# Patient Record
Sex: FEMALE | Race: WHITE | NOT HISPANIC OR LATINO | Employment: OTHER
[De-identification: names, ages, dates, MRNs, and addresses within clinical notes are randomized per-mention and may not be internally consistent; named-entity substitution may affect disease eponyms.]

---

## 2017-01-05 ENCOUNTER — RECORDS - HEALTHEAST (OUTPATIENT)
Dept: ADMINISTRATIVE | Facility: OTHER | Age: 72
End: 2017-01-05

## 2017-02-07 ENCOUNTER — RECORDS - HEALTHEAST (OUTPATIENT)
Dept: ADMINISTRATIVE | Facility: OTHER | Age: 72
End: 2017-02-07

## 2017-04-13 ENCOUNTER — RECORDS - HEALTHEAST (OUTPATIENT)
Dept: ADMINISTRATIVE | Facility: OTHER | Age: 72
End: 2017-04-13

## 2017-05-15 ENCOUNTER — COMMUNICATION - HEALTHEAST (OUTPATIENT)
Dept: PHYSICAL MEDICINE AND REHAB | Facility: CLINIC | Age: 72
End: 2017-05-15

## 2017-05-15 DIAGNOSIS — M25.512 SHOULDER PAIN, BILATERAL: ICD-10-CM

## 2017-05-15 DIAGNOSIS — M25.511 SHOULDER PAIN, BILATERAL: ICD-10-CM

## 2017-05-22 ENCOUNTER — HOSPITAL ENCOUNTER (OUTPATIENT)
Dept: PHYSICAL MEDICINE AND REHAB | Facility: CLINIC | Age: 72
Discharge: HOME | End: 2017-05-22
Attending: PHYSICAL MEDICINE & REHABILITATION

## 2017-05-22 DIAGNOSIS — G47.9 SLEEP DIFFICULTIES: ICD-10-CM

## 2017-05-22 DIAGNOSIS — M48.061 FORAMINAL STENOSIS OF LUMBAR REGION: ICD-10-CM

## 2017-05-22 DIAGNOSIS — M54.50 LOW BACK PAIN RADIATING TO LEFT LEG: ICD-10-CM

## 2017-05-22 DIAGNOSIS — R20.0 NUMBNESS AND TINGLING OF LEFT LEG: ICD-10-CM

## 2017-05-22 DIAGNOSIS — M79.605 LOW BACK PAIN RADIATING TO LEFT LEG: ICD-10-CM

## 2017-05-22 DIAGNOSIS — M25.511 SHOULDER PAIN, BILATERAL: ICD-10-CM

## 2017-05-22 DIAGNOSIS — M19.019 GLENOHUMERAL ARTHRITIS, UNSPECIFIED LATERALITY: ICD-10-CM

## 2017-05-22 DIAGNOSIS — M79.18 MYOFASCIAL MUSCLE PAIN: ICD-10-CM

## 2017-05-22 DIAGNOSIS — M25.511 CHRONIC PAIN OF BOTH SHOULDERS: ICD-10-CM

## 2017-05-22 DIAGNOSIS — G89.29 CHRONIC PAIN OF BOTH SHOULDERS: ICD-10-CM

## 2017-05-22 DIAGNOSIS — M12.819 ROTATOR CUFF ARTHROPATHY: ICD-10-CM

## 2017-05-22 DIAGNOSIS — M25.512 CHRONIC PAIN OF BOTH SHOULDERS: ICD-10-CM

## 2017-05-22 DIAGNOSIS — R20.2 NUMBNESS AND TINGLING OF LEFT LEG: ICD-10-CM

## 2017-05-22 DIAGNOSIS — M25.512 SHOULDER PAIN, BILATERAL: ICD-10-CM

## 2017-05-22 DIAGNOSIS — M12.819 ROTATOR CUFF ARTHROPATHY, UNSPECIFIED LATERALITY: ICD-10-CM

## 2017-05-23 ENCOUNTER — OFFICE VISIT - HEALTHEAST (OUTPATIENT)
Dept: PHYSICAL THERAPY | Facility: REHABILITATION | Age: 72
End: 2017-05-23

## 2017-05-23 DIAGNOSIS — M54.50 CHRONIC LEFT-SIDED LOW BACK PAIN WITHOUT SCIATICA: ICD-10-CM

## 2017-05-23 DIAGNOSIS — G89.29 CHRONIC LEFT-SIDED LOW BACK PAIN WITHOUT SCIATICA: ICD-10-CM

## 2017-05-23 DIAGNOSIS — M62.81 MUSCLE WEAKNESS (GENERALIZED): ICD-10-CM

## 2017-05-23 DIAGNOSIS — M54.16 LUMBAR RADICULOPATHY: ICD-10-CM

## 2017-05-31 ENCOUNTER — OFFICE VISIT - HEALTHEAST (OUTPATIENT)
Dept: PHYSICAL THERAPY | Facility: REHABILITATION | Age: 72
End: 2017-05-31

## 2017-05-31 DIAGNOSIS — G89.29 CHRONIC LEFT-SIDED LOW BACK PAIN WITHOUT SCIATICA: ICD-10-CM

## 2017-05-31 DIAGNOSIS — M62.81 MUSCLE WEAKNESS (GENERALIZED): ICD-10-CM

## 2017-05-31 DIAGNOSIS — M54.50 CHRONIC LEFT-SIDED LOW BACK PAIN WITHOUT SCIATICA: ICD-10-CM

## 2017-05-31 DIAGNOSIS — M54.16 LUMBAR RADICULOPATHY: ICD-10-CM

## 2017-06-15 ENCOUNTER — RECORDS - HEALTHEAST (OUTPATIENT)
Dept: ADMINISTRATIVE | Facility: OTHER | Age: 72
End: 2017-06-15

## 2017-07-18 ENCOUNTER — COMMUNICATION - HEALTHEAST (OUTPATIENT)
Dept: PHYSICAL THERAPY | Facility: REHABILITATION | Age: 72
End: 2017-07-18

## 2017-11-30 ENCOUNTER — COMMUNICATION - HEALTHEAST (OUTPATIENT)
Dept: FAMILY MEDICINE | Facility: CLINIC | Age: 72
End: 2017-11-30

## 2017-11-30 DIAGNOSIS — D64.9 ANEMIA: ICD-10-CM

## 2017-12-06 ENCOUNTER — OFFICE VISIT - HEALTHEAST (OUTPATIENT)
Dept: FAMILY MEDICINE | Facility: CLINIC | Age: 72
End: 2017-12-06

## 2017-12-06 DIAGNOSIS — M81.0 OSTEOPOROSIS: ICD-10-CM

## 2017-12-06 DIAGNOSIS — D64.9 ANEMIA: ICD-10-CM

## 2017-12-06 DIAGNOSIS — M06.9 RHEUMATOID ARTHRITIS (H): ICD-10-CM

## 2017-12-06 ASSESSMENT — MIFFLIN-ST. JEOR: SCORE: 1216.17

## 2017-12-07 ENCOUNTER — AMBULATORY - HEALTHEAST (OUTPATIENT)
Dept: SCHEDULING | Facility: CLINIC | Age: 72
End: 2017-12-07

## 2017-12-07 DIAGNOSIS — M81.0 OSTEOPOROSIS: ICD-10-CM

## 2017-12-07 RX ORDER — ZOLPIDEM TARTRATE 10 MG/1
5 TABLET ORAL
Qty: 30 TABLET | Refills: 1 | Status: SHIPPED | OUTPATIENT
Start: 2017-12-07 | End: 2021-09-13

## 2017-12-08 ENCOUNTER — COMMUNICATION - HEALTHEAST (OUTPATIENT)
Dept: PHYSICAL MEDICINE AND REHAB | Facility: CLINIC | Age: 72
End: 2017-12-08

## 2017-12-08 DIAGNOSIS — M19.011 OSTEOARTHRITIS OF BILATERAL GLENOHUMERAL JOINTS: ICD-10-CM

## 2017-12-08 DIAGNOSIS — M25.512 BILATERAL SHOULDER PAIN: ICD-10-CM

## 2017-12-08 DIAGNOSIS — M25.511 BILATERAL SHOULDER PAIN: ICD-10-CM

## 2017-12-08 DIAGNOSIS — M19.012 OSTEOARTHRITIS OF BILATERAL GLENOHUMERAL JOINTS: ICD-10-CM

## 2017-12-19 ENCOUNTER — AMBULATORY - HEALTHEAST (OUTPATIENT)
Dept: PHYSICAL MEDICINE AND REHAB | Facility: CLINIC | Age: 72
End: 2017-12-19

## 2017-12-19 DIAGNOSIS — M25.512 SHOULDER PAIN, BILATERAL: ICD-10-CM

## 2017-12-19 DIAGNOSIS — M25.511 SHOULDER PAIN, BILATERAL: ICD-10-CM

## 2017-12-22 ENCOUNTER — HOSPITAL ENCOUNTER (OUTPATIENT)
Dept: PHYSICAL MEDICINE AND REHAB | Facility: CLINIC | Age: 72
Discharge: HOME | End: 2017-12-22
Attending: PHYSICAL MEDICINE & REHABILITATION

## 2017-12-22 DIAGNOSIS — M19.012 OSTEOARTHRITIS OF BILATERAL GLENOHUMERAL JOINTS: ICD-10-CM

## 2017-12-22 DIAGNOSIS — M25.512 BILATERAL SHOULDER PAIN: ICD-10-CM

## 2017-12-22 DIAGNOSIS — M25.511 SHOULDER PAIN, BILATERAL: ICD-10-CM

## 2017-12-22 DIAGNOSIS — M25.512 SHOULDER PAIN, BILATERAL: ICD-10-CM

## 2017-12-22 DIAGNOSIS — G47.9 SLEEP DIFFICULTIES: ICD-10-CM

## 2017-12-22 DIAGNOSIS — M48.061 FORAMINAL STENOSIS OF LUMBAR REGION: ICD-10-CM

## 2017-12-22 DIAGNOSIS — M25.511 BILATERAL SHOULDER PAIN: ICD-10-CM

## 2017-12-22 DIAGNOSIS — M79.18 MYOFASCIAL MUSCLE PAIN: ICD-10-CM

## 2017-12-22 DIAGNOSIS — M19.011 OSTEOARTHRITIS OF BILATERAL GLENOHUMERAL JOINTS: ICD-10-CM

## 2018-01-03 ENCOUNTER — COMMUNICATION - HEALTHEAST (OUTPATIENT)
Dept: PHYSICAL MEDICINE AND REHAB | Facility: CLINIC | Age: 73
End: 2018-01-03

## 2018-01-04 ENCOUNTER — HOSPITAL ENCOUNTER (OUTPATIENT)
Dept: PHYSICAL MEDICINE AND REHAB | Facility: CLINIC | Age: 73
Discharge: HOME | End: 2018-01-04
Attending: PHYSICAL MEDICINE & REHABILITATION

## 2018-01-04 DIAGNOSIS — M48.061 FORAMINAL STENOSIS OF LUMBAR REGION: ICD-10-CM

## 2018-01-04 DIAGNOSIS — M25.512 BILATERAL SHOULDER PAIN: ICD-10-CM

## 2018-01-04 DIAGNOSIS — M19.011 OSTEOARTHRITIS OF BILATERAL GLENOHUMERAL JOINTS: ICD-10-CM

## 2018-01-04 DIAGNOSIS — M54.16 LUMBAR RADICULAR PAIN: ICD-10-CM

## 2018-01-04 DIAGNOSIS — M19.012 OSTEOARTHRITIS OF BILATERAL GLENOHUMERAL JOINTS: ICD-10-CM

## 2018-01-04 DIAGNOSIS — M48.061 STENOSIS OF LATERAL RECESS OF LUMBAR SPINE: ICD-10-CM

## 2018-01-04 DIAGNOSIS — M06.9 RHEUMATOID ARTHRITIS (H): ICD-10-CM

## 2018-01-04 DIAGNOSIS — M54.42 LOW BACK PAIN WITH LEFT-SIDED SCIATICA: ICD-10-CM

## 2018-01-04 DIAGNOSIS — M25.511 BILATERAL SHOULDER PAIN: ICD-10-CM

## 2018-01-04 DIAGNOSIS — M54.50 LOW BACK PAIN: ICD-10-CM

## 2018-01-04 ASSESSMENT — MIFFLIN-ST. JEOR: SCORE: 1213.9

## 2018-01-08 ENCOUNTER — AMBULATORY - HEALTHEAST (OUTPATIENT)
Dept: FAMILY MEDICINE | Facility: CLINIC | Age: 73
End: 2018-01-08

## 2018-01-08 DIAGNOSIS — M81.0 OSTEOPOROSIS: ICD-10-CM

## 2018-04-20 ENCOUNTER — OFFICE VISIT - HEALTHEAST (OUTPATIENT)
Dept: ENDOCRINOLOGY | Facility: CLINIC | Age: 73
End: 2018-04-20

## 2018-04-20 DIAGNOSIS — M81.0 OSTEOPOROSIS: ICD-10-CM

## 2018-04-20 ASSESSMENT — MIFFLIN-ST. JEOR: SCORE: 1204.83

## 2018-06-01 ENCOUNTER — COMMUNICATION - HEALTHEAST (OUTPATIENT)
Dept: PHYSICAL MEDICINE AND REHAB | Facility: CLINIC | Age: 73
End: 2018-06-01

## 2018-06-01 DIAGNOSIS — M25.512 CHRONIC PAIN OF BOTH SHOULDERS: ICD-10-CM

## 2018-06-01 DIAGNOSIS — M25.511 CHRONIC PAIN OF BOTH SHOULDERS: ICD-10-CM

## 2018-06-01 DIAGNOSIS — G89.29 CHRONIC PAIN OF BOTH SHOULDERS: ICD-10-CM

## 2018-06-11 ENCOUNTER — HOSPITAL ENCOUNTER (OUTPATIENT)
Dept: PHYSICAL MEDICINE AND REHAB | Facility: CLINIC | Age: 73
Discharge: HOME | End: 2018-06-11
Attending: PHYSICAL MEDICINE & REHABILITATION

## 2018-06-11 DIAGNOSIS — M25.512 CHRONIC PAIN OF BOTH SHOULDERS: ICD-10-CM

## 2018-06-11 DIAGNOSIS — M12.819 ROTATOR CUFF ARTHROPATHY, UNSPECIFIED LATERALITY: ICD-10-CM

## 2018-06-11 DIAGNOSIS — M79.18 MYOFASCIAL MUSCLE PAIN: ICD-10-CM

## 2018-06-11 DIAGNOSIS — M25.511 CHRONIC PAIN OF BOTH SHOULDERS: ICD-10-CM

## 2018-06-11 DIAGNOSIS — M54.50 LUMBAR SPINE PAIN: ICD-10-CM

## 2018-06-11 DIAGNOSIS — G89.29 CHRONIC PAIN OF BOTH SHOULDERS: ICD-10-CM

## 2018-06-11 ASSESSMENT — MIFFLIN-ST. JEOR: SCORE: 1204.83

## 2018-06-13 ENCOUNTER — OFFICE VISIT - HEALTHEAST (OUTPATIENT)
Dept: RHEUMATOLOGY | Facility: CLINIC | Age: 73
End: 2018-06-13

## 2018-06-13 DIAGNOSIS — M25.50 POLYARTHRALGIA: ICD-10-CM

## 2018-06-13 DIAGNOSIS — M67.911 BILATERAL SHOULDER TENDINOPATHY: ICD-10-CM

## 2018-06-13 DIAGNOSIS — M67.912 BILATERAL SHOULDER TENDINOPATHY: ICD-10-CM

## 2018-06-13 DIAGNOSIS — D53.9 MACROCYTIC ANEMIA: ICD-10-CM

## 2018-06-13 DIAGNOSIS — M15.0 PRIMARY OSTEOARTHRITIS INVOLVING MULTIPLE JOINTS: ICD-10-CM

## 2018-06-13 DIAGNOSIS — M06.09 SERONEGATIVE RHEUMATOID ARTHRITIS OF MULTIPLE SITES (H): ICD-10-CM

## 2018-06-13 LAB
ALBUMIN SERPL-MCNC: 4.5 G/DL (ref 3.5–5)
ALT SERPL W P-5'-P-CCNC: 11 U/L (ref 0–45)
BASOPHILS # BLD AUTO: 0 THOU/UL (ref 0–0.2)
BASOPHILS NFR BLD AUTO: 0 % (ref 0–2)
CREAT SERPL-MCNC: 0.73 MG/DL (ref 0.6–1.1)
EOSINOPHIL # BLD AUTO: 0.1 THOU/UL (ref 0–0.4)
EOSINOPHIL NFR BLD AUTO: 2 % (ref 0–6)
ERYTHROCYTE [DISTWIDTH] IN BLOOD BY AUTOMATED COUNT: 11.7 % (ref 11–14.5)
GFR SERPL CREATININE-BSD FRML MDRD: >60 ML/MIN/1.73M2
HCT VFR BLD AUTO: 27.8 % (ref 35–47)
HGB BLD-MCNC: 9.4 G/DL (ref 12–16)
LYMPHOCYTES # BLD AUTO: 1.6 THOU/UL (ref 0.8–4.4)
LYMPHOCYTES NFR BLD AUTO: 23 % (ref 20–40)
MCH RBC QN AUTO: 35.9 PG (ref 27–34)
MCHC RBC AUTO-ENTMCNC: 33.9 G/DL (ref 32–36)
MCV RBC AUTO: 106 FL (ref 80–100)
MONOCYTES # BLD AUTO: 0.3 THOU/UL (ref 0–0.9)
MONOCYTES NFR BLD AUTO: 5 % (ref 2–10)
NEUTROPHILS # BLD AUTO: 5 THOU/UL (ref 2–7.7)
NEUTROPHILS NFR BLD AUTO: 71 % (ref 50–70)
PLATELET # BLD AUTO: 525 THOU/UL (ref 140–440)
PMV BLD AUTO: 7.8 FL (ref 7–10)
RBC # BLD AUTO: 2.62 MILL/UL (ref 3.8–5.4)
RHEUMATOID FACT SERPL-ACNC: <15 IU/ML (ref 0–30)
URATE SERPL-MCNC: 6.9 MG/DL (ref 2–7.5)
WBC: 7.1 THOU/UL (ref 4–11)

## 2018-06-14 ENCOUNTER — COMMUNICATION - HEALTHEAST (OUTPATIENT)
Dept: ENDOCRINOLOGY | Facility: CLINIC | Age: 73
End: 2018-06-14

## 2018-06-14 LAB
ANA SER QL: 0.1 U
CCP AB SER IA-ACNC: <0.5 U/ML
HBV SURFACE AG SERPL QL IA: NEGATIVE
HCV AB SERPL QL IA: NEGATIVE

## 2018-07-05 ENCOUNTER — COMMUNICATION - HEALTHEAST (OUTPATIENT)
Dept: ENDOCRINOLOGY | Facility: CLINIC | Age: 73
End: 2018-07-05

## 2018-07-19 ENCOUNTER — COMMUNICATION - HEALTHEAST (OUTPATIENT)
Dept: ENDOCRINOLOGY | Facility: CLINIC | Age: 73
End: 2018-07-19

## 2018-11-01 ENCOUNTER — RECORDS - HEALTHEAST (OUTPATIENT)
Dept: ADMINISTRATIVE | Facility: OTHER | Age: 73
End: 2018-11-01

## 2018-11-03 ENCOUNTER — COMMUNICATION - HEALTHEAST (OUTPATIENT)
Dept: RHEUMATOLOGY | Facility: CLINIC | Age: 73
End: 2018-11-03

## 2018-11-03 DIAGNOSIS — M15.0 PRIMARY OSTEOARTHRITIS INVOLVING MULTIPLE JOINTS: ICD-10-CM

## 2018-11-03 DIAGNOSIS — M06.09 SERONEGATIVE RHEUMATOID ARTHRITIS OF MULTIPLE SITES (H): ICD-10-CM

## 2018-12-11 ENCOUNTER — COMMUNICATION - HEALTHEAST (OUTPATIENT)
Dept: PHYSICAL MEDICINE AND REHAB | Facility: CLINIC | Age: 73
End: 2018-12-11

## 2018-12-11 DIAGNOSIS — M54.16 LUMBAR RADICULITIS: ICD-10-CM

## 2018-12-19 ENCOUNTER — HOSPITAL ENCOUNTER (OUTPATIENT)
Dept: PHYSICAL MEDICINE AND REHAB | Facility: CLINIC | Age: 73
Discharge: HOME | End: 2018-12-19
Attending: PHYSICAL MEDICINE & REHABILITATION

## 2018-12-19 DIAGNOSIS — G89.29 CHRONIC BILATERAL LOW BACK PAIN WITH LEFT-SIDED SCIATICA: ICD-10-CM

## 2018-12-19 DIAGNOSIS — M48.061 BILATERAL STENOSIS OF LATERAL RECESS OF LUMBAR SPINE: ICD-10-CM

## 2018-12-19 DIAGNOSIS — M54.42 CHRONIC BILATERAL LOW BACK PAIN WITH LEFT-SIDED SCIATICA: ICD-10-CM

## 2018-12-19 DIAGNOSIS — M48.061 LUMBAR FORAMINAL STENOSIS: ICD-10-CM

## 2018-12-19 DIAGNOSIS — M99.83 OTHER BIOMECHANICAL LESIONS OF LUMBAR REGION: ICD-10-CM

## 2018-12-19 DIAGNOSIS — M54.16 LUMBAR RADICULITIS: ICD-10-CM

## 2019-01-08 ENCOUNTER — COMMUNICATION - HEALTHEAST (OUTPATIENT)
Dept: PHYSICAL MEDICINE AND REHAB | Facility: CLINIC | Age: 74
End: 2019-01-08

## 2019-01-08 DIAGNOSIS — M19.012 OSTEOARTHRITIS OF BOTH SHOULDERS: ICD-10-CM

## 2019-01-08 DIAGNOSIS — M19.011 OSTEOARTHRITIS OF BOTH SHOULDERS: ICD-10-CM

## 2019-01-10 ENCOUNTER — HOSPITAL ENCOUNTER (OUTPATIENT)
Dept: PHYSICAL MEDICINE AND REHAB | Facility: CLINIC | Age: 74
Discharge: HOME | End: 2019-01-10
Attending: PHYSICAL MEDICINE & REHABILITATION

## 2019-01-10 DIAGNOSIS — M19.011 OSTEOARTHRITIS OF BOTH SHOULDERS, UNSPECIFIED OSTEOARTHRITIS TYPE: ICD-10-CM

## 2019-01-10 DIAGNOSIS — G89.29 CHRONIC PAIN OF BOTH SHOULDERS: ICD-10-CM

## 2019-01-10 DIAGNOSIS — M79.18 MYOFASCIAL MUSCLE PAIN: ICD-10-CM

## 2019-01-10 DIAGNOSIS — M12.819 ROTATOR CUFF ARTHROPATHY, UNSPECIFIED LATERALITY: ICD-10-CM

## 2019-01-10 DIAGNOSIS — M19.012 OSTEOARTHRITIS OF BOTH SHOULDERS, UNSPECIFIED OSTEOARTHRITIS TYPE: ICD-10-CM

## 2019-01-10 DIAGNOSIS — M25.511 CHRONIC PAIN OF BOTH SHOULDERS: ICD-10-CM

## 2019-01-10 DIAGNOSIS — M25.512 CHRONIC PAIN OF BOTH SHOULDERS: ICD-10-CM

## 2019-01-10 DIAGNOSIS — M54.50 LUMBAR SPINE PAIN: ICD-10-CM

## 2019-01-10 ASSESSMENT — MIFFLIN-ST. JEOR: SCORE: 1200.29

## 2019-01-30 ENCOUNTER — RECORDS - HEALTHEAST (OUTPATIENT)
Dept: ADMINISTRATIVE | Facility: OTHER | Age: 74
End: 2019-01-30

## 2019-02-12 ENCOUNTER — OFFICE VISIT - HEALTHEAST (OUTPATIENT)
Dept: RHEUMATOLOGY | Facility: CLINIC | Age: 74
End: 2019-02-12

## 2019-02-12 DIAGNOSIS — M25.50 POLYARTHRALGIA: ICD-10-CM

## 2019-02-12 DIAGNOSIS — M15.0 PRIMARY OSTEOARTHRITIS INVOLVING MULTIPLE JOINTS: ICD-10-CM

## 2019-02-12 DIAGNOSIS — M06.09 SERONEGATIVE RHEUMATOID ARTHRITIS OF MULTIPLE SITES (H): ICD-10-CM

## 2019-02-12 LAB
ALBUMIN SERPL-MCNC: 4.2 G/DL (ref 3.5–5)
ALT SERPL W P-5'-P-CCNC: 11 U/L (ref 0–45)
CREAT SERPL-MCNC: 0.68 MG/DL (ref 0.6–1.1)
ERYTHROCYTE [DISTWIDTH] IN BLOOD BY AUTOMATED COUNT: 11.7 % (ref 11–14.5)
GFR SERPL CREATININE-BSD FRML MDRD: >60 ML/MIN/1.73M2
HCT VFR BLD AUTO: 25.1 % (ref 35–47)
HGB BLD-MCNC: 8.7 G/DL (ref 12–16)
MCH RBC QN AUTO: 37 PG (ref 27–34)
MCHC RBC AUTO-ENTMCNC: 34.7 G/DL (ref 32–36)
MCV RBC AUTO: 107 FL (ref 80–100)
PLATELET # BLD AUTO: 537 THOU/UL (ref 140–440)
PMV BLD AUTO: 8.1 FL (ref 7–10)
RBC # BLD AUTO: 2.36 MILL/UL (ref 3.8–5.4)
WBC: 5.5 THOU/UL (ref 4–11)

## 2019-04-26 ENCOUNTER — COMMUNICATION - HEALTHEAST (OUTPATIENT)
Dept: RHEUMATOLOGY | Facility: CLINIC | Age: 74
End: 2019-04-26

## 2019-04-26 DIAGNOSIS — M15.0 PRIMARY OSTEOARTHRITIS INVOLVING MULTIPLE JOINTS: ICD-10-CM

## 2019-05-12 ENCOUNTER — COMMUNICATION - HEALTHEAST (OUTPATIENT)
Dept: PHYSICAL MEDICINE AND REHAB | Facility: CLINIC | Age: 74
End: 2019-05-12

## 2019-05-12 DIAGNOSIS — M25.512 CHRONIC PAIN OF BOTH SHOULDERS: ICD-10-CM

## 2019-05-12 DIAGNOSIS — M79.18 MYOFASCIAL MUSCLE PAIN: ICD-10-CM

## 2019-05-12 DIAGNOSIS — M25.511 CHRONIC PAIN OF BOTH SHOULDERS: ICD-10-CM

## 2019-05-12 DIAGNOSIS — G89.29 CHRONIC PAIN OF BOTH SHOULDERS: ICD-10-CM

## 2019-07-08 ENCOUNTER — OFFICE VISIT - HEALTHEAST (OUTPATIENT)
Dept: FAMILY MEDICINE | Facility: CLINIC | Age: 74
End: 2019-07-08
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
## 2019-07-08 DIAGNOSIS — Z79.2 NEED FOR PROPHYLACTIC ANTIBIOTIC: ICD-10-CM

## 2019-07-24 ENCOUNTER — COMMUNICATION - HEALTHEAST (OUTPATIENT)
Dept: PHYSICAL MEDICINE AND REHAB | Facility: CLINIC | Age: 74
End: 2019-07-24

## 2019-07-24 DIAGNOSIS — M25.511 SHOULDER PAIN, BILATERAL: ICD-10-CM

## 2019-07-24 DIAGNOSIS — M25.512 SHOULDER PAIN, BILATERAL: ICD-10-CM

## 2019-08-07 ENCOUNTER — COMMUNICATION - HEALTHEAST (OUTPATIENT)
Dept: RHEUMATOLOGY | Facility: CLINIC | Age: 74
End: 2019-08-07

## 2019-08-07 ENCOUNTER — HOSPITAL ENCOUNTER (OUTPATIENT)
Dept: PHYSICAL MEDICINE AND REHAB | Facility: CLINIC | Age: 74
Discharge: HOME | End: 2019-08-07
Attending: PHYSICAL MEDICINE & REHABILITATION

## 2019-08-07 DIAGNOSIS — M25.511 SHOULDER PAIN, BILATERAL: ICD-10-CM

## 2019-08-07 DIAGNOSIS — G89.29 CHRONIC PAIN OF BOTH SHOULDERS: ICD-10-CM

## 2019-08-07 DIAGNOSIS — M25.511 CHRONIC PAIN OF BOTH SHOULDERS: ICD-10-CM

## 2019-08-07 DIAGNOSIS — M06.09 SERONEGATIVE RHEUMATOID ARTHRITIS OF MULTIPLE SITES (H): ICD-10-CM

## 2019-08-07 DIAGNOSIS — M25.512 SHOULDER PAIN, BILATERAL: ICD-10-CM

## 2019-08-07 DIAGNOSIS — M25.512 CHRONIC PAIN OF BOTH SHOULDERS: ICD-10-CM

## 2019-08-07 DIAGNOSIS — M79.18 MYOFASCIAL MUSCLE PAIN: ICD-10-CM

## 2019-08-07 ASSESSMENT — MIFFLIN-ST. JEOR: SCORE: 1200.29

## 2019-09-04 ENCOUNTER — COMMUNICATION - HEALTHEAST (OUTPATIENT)
Dept: PHYSICAL MEDICINE AND REHAB | Facility: CLINIC | Age: 74
End: 2019-09-04

## 2019-09-06 ENCOUNTER — COMMUNICATION - HEALTHEAST (OUTPATIENT)
Dept: OTHER | Facility: CLINIC | Age: 74
End: 2019-09-06

## 2019-09-06 ENCOUNTER — RECORDS - HEALTHEAST (OUTPATIENT)
Dept: RADIOLOGY | Facility: CLINIC | Age: 74
End: 2019-09-06

## 2019-09-06 ENCOUNTER — HOSPITAL ENCOUNTER (OUTPATIENT)
Dept: PHYSICAL MEDICINE AND REHAB | Facility: CLINIC | Age: 74
Discharge: HOME | End: 2019-09-06
Attending: PHYSICIAN ASSISTANT

## 2019-09-06 DIAGNOSIS — M80.00XA AGE-RELATED OSTEOPOROSIS WITH CURRENT PATHOLOGICAL FRACTURE, INITIAL ENCOUNTER: ICD-10-CM

## 2019-09-06 DIAGNOSIS — S32.010A CLOSED COMPRESSION FRACTURE OF FIRST LUMBAR VERTEBRA, INITIAL ENCOUNTER: ICD-10-CM

## 2019-09-09 ENCOUNTER — AMBULATORY - HEALTHEAST (OUTPATIENT)
Dept: OTHER | Facility: CLINIC | Age: 74
End: 2019-09-09

## 2019-09-12 ENCOUNTER — OFFICE VISIT - HEALTHEAST (OUTPATIENT)
Dept: RHEUMATOLOGY | Facility: CLINIC | Age: 74
End: 2019-09-12

## 2019-09-12 DIAGNOSIS — M25.50 POLYARTHRALGIA: ICD-10-CM

## 2019-09-12 DIAGNOSIS — M06.09 SERONEGATIVE RHEUMATOID ARTHRITIS OF MULTIPLE SITES (H): ICD-10-CM

## 2019-09-12 DIAGNOSIS — M15.0 PRIMARY OSTEOARTHRITIS INVOLVING MULTIPLE JOINTS: ICD-10-CM

## 2019-09-17 ENCOUNTER — HOSPITAL ENCOUNTER (OUTPATIENT)
Dept: PHYSICAL MEDICINE AND REHAB | Facility: CLINIC | Age: 74
Discharge: HOME | End: 2019-09-17
Attending: PHYSICIAN ASSISTANT

## 2019-09-17 DIAGNOSIS — S32.010D CLOSED COMPRESSION FRACTURE OF L1 LUMBAR VERTEBRA WITH ROUTINE HEALING, SUBSEQUENT ENCOUNTER: ICD-10-CM

## 2019-09-27 ENCOUNTER — AMBULATORY - HEALTHEAST (OUTPATIENT)
Dept: LAB | Facility: CLINIC | Age: 74
End: 2019-09-27

## 2019-09-27 DIAGNOSIS — M25.50 POLYARTHRALGIA: ICD-10-CM

## 2019-09-27 LAB
ALBUMIN SERPL-MCNC: 4.3 G/DL (ref 3.5–5)
ALT SERPL W P-5'-P-CCNC: 10 U/L (ref 0–45)
CREAT SERPL-MCNC: 0.64 MG/DL (ref 0.6–1.1)
ERYTHROCYTE [DISTWIDTH] IN BLOOD BY AUTOMATED COUNT: 18.1 % (ref 11–14.5)
GFR SERPL CREATININE-BSD FRML MDRD: >60 ML/MIN/1.73M2
HCT VFR BLD AUTO: 27.6 % (ref 35–47)
HGB BLD-MCNC: 8.7 G/DL (ref 12–16)
MCH RBC QN AUTO: 35.1 PG (ref 27–34)
MCHC RBC AUTO-ENTMCNC: 31.5 G/DL (ref 32–36)
MCV RBC AUTO: 111 FL (ref 80–100)
PLATELET # BLD AUTO: 539 THOU/UL (ref 140–440)
PMV BLD AUTO: 11.9 FL (ref 8.5–12.5)
RBC # BLD AUTO: 2.48 MILL/UL (ref 3.8–5.4)
WBC: 9.4 THOU/UL (ref 4–11)

## 2019-10-01 ENCOUNTER — HOSPITAL ENCOUNTER (OUTPATIENT)
Dept: PHYSICAL MEDICINE AND REHAB | Facility: CLINIC | Age: 74
Discharge: HOME | End: 2019-10-01
Attending: PHYSICAL MEDICINE & REHABILITATION

## 2019-10-01 DIAGNOSIS — M06.09 SERONEGATIVE RHEUMATOID ARTHRITIS OF MULTIPLE SITES (H): ICD-10-CM

## 2019-10-01 DIAGNOSIS — M53.3 SACROILIAC JOINT PAIN: ICD-10-CM

## 2019-10-01 DIAGNOSIS — S32.010D COMPRESSION FRACTURE OF L1 VERTEBRA WITH ROUTINE HEALING, SUBSEQUENT ENCOUNTER: ICD-10-CM

## 2019-10-01 DIAGNOSIS — M80.00XD AGE-RELATED OSTEOPOROSIS WITH CURRENT PATHOLOGICAL FRACTURE WITH ROUTINE HEALING, SUBSEQUENT ENCOUNTER: ICD-10-CM

## 2019-10-01 DIAGNOSIS — M54.50 ACUTE RIGHT-SIDED LOW BACK PAIN WITHOUT SCIATICA: ICD-10-CM

## 2019-10-02 ENCOUNTER — COMMUNICATION - HEALTHEAST (OUTPATIENT)
Dept: RHEUMATOLOGY | Facility: CLINIC | Age: 74
End: 2019-10-02

## 2019-10-02 ENCOUNTER — COMMUNICATION - HEALTHEAST (OUTPATIENT)
Dept: PHYSICAL MEDICINE AND REHAB | Facility: CLINIC | Age: 74
End: 2019-10-02

## 2019-10-02 DIAGNOSIS — M15.0 PRIMARY OSTEOARTHRITIS INVOLVING MULTIPLE JOINTS: ICD-10-CM

## 2019-10-03 ENCOUNTER — COMMUNICATION - HEALTHEAST (OUTPATIENT)
Dept: INTERNAL MEDICINE | Facility: CLINIC | Age: 74
End: 2019-10-03

## 2019-10-03 ENCOUNTER — COMMUNICATION - HEALTHEAST (OUTPATIENT)
Dept: PHYSICAL MEDICINE AND REHAB | Facility: CLINIC | Age: 74
End: 2019-10-03

## 2019-10-04 ENCOUNTER — OFFICE VISIT - HEALTHEAST (OUTPATIENT)
Dept: PHYSICAL THERAPY | Facility: REHABILITATION | Age: 74
End: 2019-10-04

## 2019-10-04 DIAGNOSIS — M54.50 ACUTE RIGHT-SIDED LOW BACK PAIN WITHOUT SCIATICA: ICD-10-CM

## 2019-10-04 DIAGNOSIS — R29.818 DIFFICULTY BALANCING: ICD-10-CM

## 2019-10-04 DIAGNOSIS — M80.00XD AGE-RELATED OSTEOPOROSIS WITH CURRENT PATHOLOGICAL FRACTURE WITH ROUTINE HEALING, SUBSEQUENT ENCOUNTER: ICD-10-CM

## 2019-10-04 DIAGNOSIS — M53.3 SACRO-ILIAC PAIN: ICD-10-CM

## 2019-10-04 DIAGNOSIS — M53.3 SACROILIAC JOINT PAIN: ICD-10-CM

## 2019-10-07 ENCOUNTER — OFFICE VISIT - HEALTHEAST (OUTPATIENT)
Dept: INTERNAL MEDICINE | Facility: CLINIC | Age: 74
End: 2019-10-07

## 2019-10-07 DIAGNOSIS — S32.010D COMPRESSION FRACTURE OF L1 VERTEBRA WITH ROUTINE HEALING: ICD-10-CM

## 2019-10-07 DIAGNOSIS — M80.00XD AGE-RELATED OSTEOPOROSIS WITH CURRENT PATHOLOGICAL FRACTURE WITH ROUTINE HEALING, SUBSEQUENT ENCOUNTER: ICD-10-CM

## 2019-10-07 DIAGNOSIS — R53.82 CHRONIC FATIGUE: ICD-10-CM

## 2019-10-07 DIAGNOSIS — M80.08XA AGE-RELATED OSTEOPOROSIS WITH CURRENT PATHOLOGICAL FRACTURE, VERTEBRA(E), INITIAL ENCOUNTER FOR FRACTURE (H): ICD-10-CM

## 2019-10-07 LAB
ALP SERPL-CCNC: 71 U/L (ref 45–120)
ANION GAP SERPL CALCULATED.3IONS-SCNC: 7 MMOL/L (ref 5–18)
BUN SERPL-MCNC: 17 MG/DL (ref 8–28)
CALCIUM SERPL-MCNC: 9.7 MG/DL (ref 8.5–10.5)
CHLORIDE BLD-SCNC: 107 MMOL/L (ref 98–107)
CO2 SERPL-SCNC: 26 MMOL/L (ref 22–31)
CREAT SERPL-MCNC: 0.65 MG/DL (ref 0.6–1.1)
GFR SERPL CREATININE-BSD FRML MDRD: >60 ML/MIN/1.73M2
GLUCOSE BLD-MCNC: 95 MG/DL (ref 70–125)
POTASSIUM BLD-SCNC: 4.8 MMOL/L (ref 3.5–5)
PTH-INTACT SERPL-MCNC: 82 PG/ML (ref 10–86)
SODIUM SERPL-SCNC: 140 MMOL/L (ref 136–145)
TSH SERPL DL<=0.005 MIU/L-ACNC: 9.16 UIU/ML (ref 0.3–5)

## 2019-10-07 ASSESSMENT — MIFFLIN-ST. JEOR: SCORE: 1179.89

## 2019-10-08 ENCOUNTER — COMMUNICATION - HEALTHEAST (OUTPATIENT)
Dept: INTERNAL MEDICINE | Facility: CLINIC | Age: 74
End: 2019-10-08

## 2019-10-08 ENCOUNTER — OFFICE VISIT - HEALTHEAST (OUTPATIENT)
Dept: PHYSICAL THERAPY | Facility: REHABILITATION | Age: 74
End: 2019-10-08

## 2019-10-08 DIAGNOSIS — M53.3 SACRO-ILIAC PAIN: ICD-10-CM

## 2019-10-08 DIAGNOSIS — M53.3 SACROILIAC JOINT PAIN: ICD-10-CM

## 2019-10-08 DIAGNOSIS — M54.50 ACUTE RIGHT-SIDED LOW BACK PAIN WITHOUT SCIATICA: ICD-10-CM

## 2019-10-08 DIAGNOSIS — E03.9 ACQUIRED HYPOTHYROIDISM: ICD-10-CM

## 2019-10-08 DIAGNOSIS — R29.818 DIFFICULTY BALANCING: ICD-10-CM

## 2019-10-08 DIAGNOSIS — M80.00XD AGE-RELATED OSTEOPOROSIS WITH CURRENT PATHOLOGICAL FRACTURE WITH ROUTINE HEALING, SUBSEQUENT ENCOUNTER: ICD-10-CM

## 2019-10-08 DIAGNOSIS — E56.9 VITAMIN DEFICIENCY: ICD-10-CM

## 2019-10-08 LAB
25(OH)D3 SERPL-MCNC: 22.2 NG/ML (ref 30–80)
ALBUMIN PERCENT: 64.8 % (ref 51–67)
ALBUMIN SERPL ELPH-MCNC: 4.2 G/DL (ref 3.2–4.7)
ALPHA 1 PERCENT: 2.6 % (ref 2–4)
ALPHA 2 PERCENT: 9.8 % (ref 5–13)
ALPHA1 GLOB SERPL ELPH-MCNC: 0.2 G/DL (ref 0.1–0.3)
ALPHA2 GLOB SERPL ELPH-MCNC: 0.6 G/DL (ref 0.4–0.9)
B-GLOBULIN SERPL ELPH-MCNC: 0.7 G/DL (ref 0.7–1.2)
BETA PERCENT: 10.4 % (ref 10–17)
GAMMA GLOB SERPL ELPH-MCNC: 0.8 G/DL (ref 0.6–1.4)
GAMMA GLOBULIN PERCENT: 12.4 % (ref 9–20)
PATH ICD:: NORMAL
PROT PATTERN SERPL ELPH-IMP: NORMAL
PROT SERPL-MCNC: 6.5 G/DL (ref 6–8)
REVIEWING PATHOLOGIST: NORMAL

## 2019-10-10 LAB
TTG IGA SER-ACNC: 0.3 U/ML
TTG IGG SER-ACNC: <0.6 U/ML

## 2019-10-11 ENCOUNTER — COMMUNICATION - HEALTHEAST (OUTPATIENT)
Dept: INTERNAL MEDICINE | Facility: CLINIC | Age: 74
End: 2019-10-11

## 2019-10-15 ENCOUNTER — OFFICE VISIT - HEALTHEAST (OUTPATIENT)
Dept: PHYSICAL THERAPY | Facility: REHABILITATION | Age: 74
End: 2019-10-15

## 2019-10-15 DIAGNOSIS — M80.00XD AGE-RELATED OSTEOPOROSIS WITH CURRENT PATHOLOGICAL FRACTURE WITH ROUTINE HEALING, SUBSEQUENT ENCOUNTER: ICD-10-CM

## 2019-10-15 DIAGNOSIS — M54.50 ACUTE RIGHT-SIDED LOW BACK PAIN WITHOUT SCIATICA: ICD-10-CM

## 2019-10-15 DIAGNOSIS — R29.818 DIFFICULTY BALANCING: ICD-10-CM

## 2019-10-15 DIAGNOSIS — M53.3 SACRO-ILIAC PAIN: ICD-10-CM

## 2019-10-16 ENCOUNTER — COMMUNICATION - HEALTHEAST (OUTPATIENT)
Dept: PHYSICAL MEDICINE AND REHAB | Facility: CLINIC | Age: 74
End: 2019-10-16

## 2019-10-22 ENCOUNTER — OFFICE VISIT - HEALTHEAST (OUTPATIENT)
Dept: PHYSICAL THERAPY | Facility: REHABILITATION | Age: 74
End: 2019-10-22

## 2019-10-22 DIAGNOSIS — M53.3 SACRO-ILIAC PAIN: ICD-10-CM

## 2019-10-22 DIAGNOSIS — R29.818 DIFFICULTY BALANCING: ICD-10-CM

## 2019-10-22 DIAGNOSIS — M80.00XD AGE-RELATED OSTEOPOROSIS WITH CURRENT PATHOLOGICAL FRACTURE WITH ROUTINE HEALING, SUBSEQUENT ENCOUNTER: ICD-10-CM

## 2019-10-22 DIAGNOSIS — M54.50 ACUTE RIGHT-SIDED LOW BACK PAIN WITHOUT SCIATICA: ICD-10-CM

## 2019-10-25 ENCOUNTER — COMMUNICATION - HEALTHEAST (OUTPATIENT)
Dept: PHYSICAL MEDICINE AND REHAB | Facility: CLINIC | Age: 74
End: 2019-10-25

## 2019-10-25 ENCOUNTER — AMBULATORY - HEALTHEAST (OUTPATIENT)
Dept: PHYSICAL MEDICINE AND REHAB | Facility: CLINIC | Age: 74
End: 2019-10-25

## 2019-10-25 ENCOUNTER — OFFICE VISIT - HEALTHEAST (OUTPATIENT)
Dept: PHYSICAL THERAPY | Facility: REHABILITATION | Age: 74
End: 2019-10-25

## 2019-10-25 ENCOUNTER — RECORDS - HEALTHEAST (OUTPATIENT)
Dept: ADMINISTRATIVE | Facility: OTHER | Age: 74
End: 2019-10-25

## 2019-10-25 DIAGNOSIS — M54.50 ACUTE RIGHT-SIDED LOW BACK PAIN WITHOUT SCIATICA: ICD-10-CM

## 2019-10-25 DIAGNOSIS — S32.010D COMPRESSION FRACTURE OF L1 VERTEBRA WITH ROUTINE HEALING, SUBSEQUENT ENCOUNTER: ICD-10-CM

## 2019-10-25 DIAGNOSIS — M80.00XD AGE-RELATED OSTEOPOROSIS WITH CURRENT PATHOLOGICAL FRACTURE WITH ROUTINE HEALING, SUBSEQUENT ENCOUNTER: ICD-10-CM

## 2019-10-25 DIAGNOSIS — M53.3 SACRO-ILIAC PAIN: ICD-10-CM

## 2019-10-25 DIAGNOSIS — S32.010D COMPRESSION FRACTURE OF L1 VERTEBRA WITH ROUTINE HEALING: ICD-10-CM

## 2019-10-25 DIAGNOSIS — M53.3 SACROILIAC JOINT PAIN: ICD-10-CM

## 2019-10-29 ENCOUNTER — HOSPITAL ENCOUNTER (OUTPATIENT)
Dept: PHYSICAL MEDICINE AND REHAB | Facility: CLINIC | Age: 74
Discharge: HOME | End: 2019-10-29
Attending: PHYSICAL MEDICINE & REHABILITATION

## 2019-10-29 DIAGNOSIS — M54.50 ACUTE RIGHT-SIDED LOW BACK PAIN WITHOUT SCIATICA: ICD-10-CM

## 2019-10-29 DIAGNOSIS — M80.00XD AGE-RELATED OSTEOPOROSIS WITH CURRENT PATHOLOGICAL FRACTURE WITH ROUTINE HEALING, SUBSEQUENT ENCOUNTER: ICD-10-CM

## 2019-10-29 DIAGNOSIS — S32.010D COMPRESSION FRACTURE OF L1 VERTEBRA WITH ROUTINE HEALING, SUBSEQUENT ENCOUNTER: ICD-10-CM

## 2019-10-29 DIAGNOSIS — M53.3 SACROILIAC JOINT PAIN: ICD-10-CM

## 2019-11-07 ENCOUNTER — OFFICE VISIT - HEALTHEAST (OUTPATIENT)
Dept: NEUROSURGERY | Facility: CLINIC | Age: 74
End: 2019-11-07

## 2019-11-07 DIAGNOSIS — S32.010A COMPRESSION FRACTURE OF L1 VERTEBRA, INITIAL ENCOUNTER (H): ICD-10-CM

## 2019-11-07 ASSESSMENT — MIFFLIN-ST. JEOR: SCORE: 1179.89

## 2019-11-08 ENCOUNTER — OFFICE VISIT - HEALTHEAST (OUTPATIENT)
Dept: PHYSICAL THERAPY | Facility: REHABILITATION | Age: 74
End: 2019-11-08

## 2019-11-08 DIAGNOSIS — M53.3 SACRO-ILIAC PAIN: ICD-10-CM

## 2019-11-08 DIAGNOSIS — S32.010D COMPRESSION FRACTURE OF L1 VERTEBRA WITH ROUTINE HEALING: ICD-10-CM

## 2019-11-08 DIAGNOSIS — M54.50 ACUTE RIGHT-SIDED LOW BACK PAIN WITHOUT SCIATICA: ICD-10-CM

## 2019-11-08 DIAGNOSIS — M80.00XD AGE-RELATED OSTEOPOROSIS WITH CURRENT PATHOLOGICAL FRACTURE WITH ROUTINE HEALING, SUBSEQUENT ENCOUNTER: ICD-10-CM

## 2019-11-08 DIAGNOSIS — R29.818 DIFFICULTY BALANCING: ICD-10-CM

## 2019-11-08 DIAGNOSIS — M53.3 SACROILIAC JOINT PAIN: ICD-10-CM

## 2019-11-12 ENCOUNTER — HOSPITAL ENCOUNTER (OUTPATIENT)
Dept: PHYSICAL MEDICINE AND REHAB | Facility: CLINIC | Age: 74
Discharge: HOME | End: 2019-11-12
Attending: PHYSICAL MEDICINE & REHABILITATION

## 2019-11-12 DIAGNOSIS — M53.3 SACROILIAC JOINT PAIN: ICD-10-CM

## 2019-11-12 DIAGNOSIS — M54.50 ACUTE RIGHT-SIDED LOW BACK PAIN WITHOUT SCIATICA: ICD-10-CM

## 2019-11-13 ENCOUNTER — COMMUNICATION - HEALTHEAST (OUTPATIENT)
Dept: PHYSICAL MEDICINE AND REHAB | Facility: CLINIC | Age: 74
End: 2019-11-13

## 2019-11-15 ENCOUNTER — OFFICE VISIT - HEALTHEAST (OUTPATIENT)
Dept: PHYSICAL THERAPY | Facility: REHABILITATION | Age: 74
End: 2019-11-15

## 2019-11-15 DIAGNOSIS — M53.3 SACRO-ILIAC PAIN: ICD-10-CM

## 2019-11-15 DIAGNOSIS — R29.818 DIFFICULTY BALANCING: ICD-10-CM

## 2019-11-15 DIAGNOSIS — M54.50 ACUTE RIGHT-SIDED LOW BACK PAIN WITHOUT SCIATICA: ICD-10-CM

## 2019-11-15 DIAGNOSIS — M53.3 SACROILIAC JOINT PAIN: ICD-10-CM

## 2019-11-15 DIAGNOSIS — S32.010D COMPRESSION FRACTURE OF L1 VERTEBRA WITH ROUTINE HEALING: ICD-10-CM

## 2019-11-15 DIAGNOSIS — M80.00XD AGE-RELATED OSTEOPOROSIS WITH CURRENT PATHOLOGICAL FRACTURE WITH ROUTINE HEALING, SUBSEQUENT ENCOUNTER: ICD-10-CM

## 2019-11-17 ENCOUNTER — COMMUNICATION - HEALTHEAST (OUTPATIENT)
Dept: PHYSICAL MEDICINE AND REHAB | Facility: CLINIC | Age: 74
End: 2019-11-17

## 2019-11-17 DIAGNOSIS — S32.010D CLOSED COMPRESSION FRACTURE OF L1 LUMBAR VERTEBRA WITH ROUTINE HEALING, SUBSEQUENT ENCOUNTER: ICD-10-CM

## 2019-11-19 ENCOUNTER — COMMUNICATION - HEALTHEAST (OUTPATIENT)
Dept: PHYSICAL MEDICINE AND REHAB | Facility: CLINIC | Age: 74
End: 2019-11-19

## 2019-11-19 DIAGNOSIS — S32.010D CLOSED COMPRESSION FRACTURE OF L1 LUMBAR VERTEBRA WITH ROUTINE HEALING, SUBSEQUENT ENCOUNTER: ICD-10-CM

## 2019-11-22 ENCOUNTER — OFFICE VISIT - HEALTHEAST (OUTPATIENT)
Dept: PHYSICAL THERAPY | Facility: REHABILITATION | Age: 74
End: 2019-11-22

## 2019-11-22 DIAGNOSIS — R29.818 DIFFICULTY BALANCING: ICD-10-CM

## 2019-11-22 DIAGNOSIS — M53.3 SACROILIAC JOINT PAIN: ICD-10-CM

## 2019-11-22 DIAGNOSIS — M54.50 ACUTE RIGHT-SIDED LOW BACK PAIN WITHOUT SCIATICA: ICD-10-CM

## 2019-11-22 DIAGNOSIS — M80.00XD AGE-RELATED OSTEOPOROSIS WITH CURRENT PATHOLOGICAL FRACTURE WITH ROUTINE HEALING, SUBSEQUENT ENCOUNTER: ICD-10-CM

## 2019-11-22 DIAGNOSIS — M53.3 SACRO-ILIAC PAIN: ICD-10-CM

## 2019-11-22 DIAGNOSIS — S32.010D COMPRESSION FRACTURE OF L1 VERTEBRA WITH ROUTINE HEALING: ICD-10-CM

## 2019-11-25 ENCOUNTER — HOSPITAL ENCOUNTER (OUTPATIENT)
Dept: PHYSICAL MEDICINE AND REHAB | Facility: CLINIC | Age: 74
Discharge: HOME | End: 2019-11-25
Attending: PHYSICAL MEDICINE & REHABILITATION

## 2019-11-25 DIAGNOSIS — M80.00XD AGE-RELATED OSTEOPOROSIS WITH CURRENT PATHOLOGICAL FRACTURE WITH ROUTINE HEALING, SUBSEQUENT ENCOUNTER: ICD-10-CM

## 2019-11-25 DIAGNOSIS — M53.3 SACROILIAC JOINT PAIN: ICD-10-CM

## 2019-11-25 DIAGNOSIS — S32.010D COMPRESSION FRACTURE OF L1 VERTEBRA WITH ROUTINE HEALING, SUBSEQUENT ENCOUNTER: ICD-10-CM

## 2019-11-25 DIAGNOSIS — M54.50 ACUTE RIGHT-SIDED LOW BACK PAIN WITHOUT SCIATICA: ICD-10-CM

## 2019-12-02 ENCOUNTER — COMMUNICATION - HEALTHEAST (OUTPATIENT)
Dept: NEUROSURGERY | Facility: CLINIC | Age: 74
End: 2019-12-02

## 2019-12-02 ENCOUNTER — RECORDS - HEALTHEAST (OUTPATIENT)
Dept: ADMINISTRATIVE | Facility: OTHER | Age: 74
End: 2019-12-02

## 2019-12-03 ENCOUNTER — RECORDS - HEALTHEAST (OUTPATIENT)
Dept: RADIOLOGY | Facility: CLINIC | Age: 74
End: 2019-12-03

## 2019-12-05 ENCOUNTER — COMMUNICATION - HEALTHEAST (OUTPATIENT)
Dept: PHYSICAL MEDICINE AND REHAB | Facility: CLINIC | Age: 74
End: 2019-12-05

## 2019-12-05 ENCOUNTER — OFFICE VISIT - HEALTHEAST (OUTPATIENT)
Dept: NEUROSURGERY | Facility: CLINIC | Age: 74
End: 2019-12-05

## 2019-12-05 DIAGNOSIS — M46.1 SACROILIITIS (H): ICD-10-CM

## 2019-12-05 DIAGNOSIS — M81.0 SENILE OSTEOPOROSIS: ICD-10-CM

## 2019-12-05 DIAGNOSIS — S32.011G CLOSED STABLE BURST FRACTURE OF FIRST LUMBAR VERTEBRA WITH DELAYED HEALING, SUBSEQUENT ENCOUNTER: ICD-10-CM

## 2019-12-05 DIAGNOSIS — M53.3 SI (SACROILIAC) JOINT DYSFUNCTION: ICD-10-CM

## 2019-12-05 ASSESSMENT — MIFFLIN-ST. JEOR: SCORE: 1179.89

## 2019-12-17 ENCOUNTER — HOSPITAL ENCOUNTER (OUTPATIENT)
Dept: PHYSICAL MEDICINE AND REHAB | Facility: CLINIC | Age: 74
Discharge: HOME | End: 2019-12-17
Attending: PHYSICAL MEDICINE & REHABILITATION

## 2019-12-17 DIAGNOSIS — M53.3 SI (SACROILIAC) JOINT DYSFUNCTION: ICD-10-CM

## 2019-12-17 ASSESSMENT — MIFFLIN-ST. JEOR: SCORE: 1179.89

## 2020-01-13 ENCOUNTER — HOSPITAL ENCOUNTER (OUTPATIENT)
Dept: PHYSICAL MEDICINE AND REHAB | Facility: CLINIC | Age: 75
Discharge: HOME | End: 2020-01-13
Attending: PHYSICAL MEDICINE & REHABILITATION

## 2020-01-13 DIAGNOSIS — G89.29 CHRONIC RIGHT-SIDED LOW BACK PAIN WITH RIGHT-SIDED SCIATICA: ICD-10-CM

## 2020-01-13 DIAGNOSIS — S32.010D CLOSED COMPRESSION FRACTURE OF L1 LUMBAR VERTEBRA WITH ROUTINE HEALING, SUBSEQUENT ENCOUNTER: ICD-10-CM

## 2020-01-13 DIAGNOSIS — M48.061 LUMBAR FORAMINAL STENOSIS: ICD-10-CM

## 2020-01-13 DIAGNOSIS — M54.41 CHRONIC RIGHT-SIDED LOW BACK PAIN WITH RIGHT-SIDED SCIATICA: ICD-10-CM

## 2020-01-13 DIAGNOSIS — M79.18 RIGHT BUTTOCK PAIN: ICD-10-CM

## 2020-01-14 ENCOUNTER — HOSPITAL ENCOUNTER (OUTPATIENT)
Dept: MRI IMAGING | Facility: HOSPITAL | Age: 75
Discharge: HOME | End: 2020-01-14
Attending: PHYSICAL MEDICINE & REHABILITATION

## 2020-01-16 ENCOUNTER — HOSPITAL ENCOUNTER (OUTPATIENT)
Dept: PHYSICAL MEDICINE AND REHAB | Facility: CLINIC | Age: 75
Discharge: HOME | End: 2020-01-16
Attending: PHYSICAL MEDICINE & REHABILITATION

## 2020-01-16 DIAGNOSIS — M48.061 LUMBAR FORAMINAL STENOSIS: ICD-10-CM

## 2020-01-16 DIAGNOSIS — G89.29 CHRONIC RIGHT-SIDED LOW BACK PAIN WITH RIGHT-SIDED SCIATICA: ICD-10-CM

## 2020-01-16 DIAGNOSIS — M54.41 CHRONIC RIGHT-SIDED LOW BACK PAIN WITH RIGHT-SIDED SCIATICA: ICD-10-CM

## 2020-01-16 DIAGNOSIS — M79.18 RIGHT BUTTOCK PAIN: ICD-10-CM

## 2020-01-16 DIAGNOSIS — S32.010D CLOSED COMPRESSION FRACTURE OF L1 LUMBAR VERTEBRA WITH ROUTINE HEALING, SUBSEQUENT ENCOUNTER: ICD-10-CM

## 2020-01-22 ENCOUNTER — HOSPITAL ENCOUNTER (OUTPATIENT)
Dept: PHYSICAL MEDICINE AND REHAB | Facility: CLINIC | Age: 75
Discharge: HOME | End: 2020-01-22
Attending: PHYSICAL MEDICINE & REHABILITATION

## 2020-01-22 DIAGNOSIS — M48.061 LUMBAR FORAMINAL STENOSIS: ICD-10-CM

## 2020-01-22 DIAGNOSIS — M54.41 CHRONIC RIGHT-SIDED LOW BACK PAIN WITH RIGHT-SIDED SCIATICA: ICD-10-CM

## 2020-01-22 DIAGNOSIS — G89.29 CHRONIC RIGHT-SIDED LOW BACK PAIN WITH RIGHT-SIDED SCIATICA: ICD-10-CM

## 2020-01-22 DIAGNOSIS — M79.18 RIGHT BUTTOCK PAIN: ICD-10-CM

## 2020-01-27 ENCOUNTER — COMMUNICATION - HEALTHEAST (OUTPATIENT)
Dept: PHYSICAL MEDICINE AND REHAB | Facility: CLINIC | Age: 75
End: 2020-01-27

## 2020-01-27 DIAGNOSIS — M54.16 LUMBAR RADICULOPATHY: ICD-10-CM

## 2020-01-29 ENCOUNTER — HOSPITAL ENCOUNTER (OUTPATIENT)
Dept: PHYSICAL MEDICINE AND REHAB | Facility: CLINIC | Age: 75
Discharge: HOME | End: 2020-01-29
Attending: PHYSICAL MEDICINE & REHABILITATION

## 2020-01-29 DIAGNOSIS — M54.16 LUMBAR RADICULOPATHY: ICD-10-CM

## 2020-02-03 ENCOUNTER — COMMUNICATION - HEALTHEAST (OUTPATIENT)
Dept: PHYSICAL MEDICINE AND REHAB | Facility: CLINIC | Age: 75
End: 2020-02-03

## 2020-02-03 ENCOUNTER — AMBULATORY - HEALTHEAST (OUTPATIENT)
Dept: PHYSICAL MEDICINE AND REHAB | Facility: CLINIC | Age: 75
End: 2020-02-03

## 2020-02-03 DIAGNOSIS — M54.16 RIGHT LUMBAR RADICULITIS: ICD-10-CM

## 2020-02-06 ENCOUNTER — HOSPITAL ENCOUNTER (OUTPATIENT)
Dept: PHYSICAL MEDICINE AND REHAB | Facility: CLINIC | Age: 75
Discharge: HOME | End: 2020-02-06
Attending: PHYSICAL MEDICINE & REHABILITATION

## 2020-02-06 DIAGNOSIS — M54.16 RIGHT LUMBAR RADICULITIS: ICD-10-CM

## 2020-02-07 ENCOUNTER — AMBULATORY - HEALTHEAST (OUTPATIENT)
Dept: PHYSICAL MEDICINE AND REHAB | Facility: CLINIC | Age: 75
End: 2020-02-07

## 2020-02-07 DIAGNOSIS — M54.16 RIGHT LUMBAR RADICULITIS: ICD-10-CM

## 2020-02-11 ENCOUNTER — AMBULATORY - HEALTHEAST (OUTPATIENT)
Dept: PHYSICAL MEDICINE AND REHAB | Facility: CLINIC | Age: 75
End: 2020-02-11

## 2020-02-11 ENCOUNTER — HOSPITAL ENCOUNTER (OUTPATIENT)
Dept: PHYSICAL MEDICINE AND REHAB | Facility: CLINIC | Age: 75
Discharge: HOME | End: 2020-02-11
Attending: PHYSICAL MEDICINE & REHABILITATION

## 2020-02-11 DIAGNOSIS — M54.16 RIGHT LUMBAR RADICULITIS: ICD-10-CM

## 2020-02-13 ENCOUNTER — RECORDS - HEALTHEAST (OUTPATIENT)
Dept: ADMINISTRATIVE | Facility: OTHER | Age: 75
End: 2020-02-13

## 2020-02-13 ENCOUNTER — COMMUNICATION - HEALTHEAST (OUTPATIENT)
Dept: RHEUMATOLOGY | Facility: CLINIC | Age: 75
End: 2020-02-13

## 2020-02-13 DIAGNOSIS — M06.09 SERONEGATIVE RHEUMATOID ARTHRITIS OF MULTIPLE SITES (H): ICD-10-CM

## 2020-02-18 ENCOUNTER — AMBULATORY - HEALTHEAST (OUTPATIENT)
Dept: PHYSICAL MEDICINE AND REHAB | Facility: CLINIC | Age: 75
End: 2020-02-18

## 2020-02-24 ENCOUNTER — HOSPITAL ENCOUNTER (OUTPATIENT)
Dept: PHYSICAL MEDICINE AND REHAB | Facility: CLINIC | Age: 75
Discharge: HOME | End: 2020-02-24
Attending: PHYSICAL MEDICINE & REHABILITATION

## 2020-02-24 DIAGNOSIS — M19.012 PRIMARY OSTEOARTHRITIS OF BOTH SHOULDERS: ICD-10-CM

## 2020-02-24 DIAGNOSIS — M54.41 CHRONIC RIGHT-SIDED LOW BACK PAIN WITH RIGHT-SIDED SCIATICA: ICD-10-CM

## 2020-02-24 DIAGNOSIS — M19.011 PRIMARY OSTEOARTHRITIS OF BOTH SHOULDERS: ICD-10-CM

## 2020-02-24 DIAGNOSIS — M48.061 LUMBAR FORAMINAL STENOSIS: ICD-10-CM

## 2020-02-24 DIAGNOSIS — M79.18 RIGHT BUTTOCK PAIN: ICD-10-CM

## 2020-02-24 DIAGNOSIS — G89.29 CHRONIC RIGHT-SIDED LOW BACK PAIN WITH RIGHT-SIDED SCIATICA: ICD-10-CM

## 2020-02-26 ENCOUNTER — AMBULATORY - HEALTHEAST (OUTPATIENT)
Dept: LAB | Facility: CLINIC | Age: 75
End: 2020-02-26

## 2020-02-26 ENCOUNTER — AMBULATORY - HEALTHEAST (OUTPATIENT)
Dept: RHEUMATOLOGY | Facility: CLINIC | Age: 75
End: 2020-02-26

## 2020-02-26 DIAGNOSIS — M06.09 SERONEGATIVE RHEUMATOID ARTHRITIS OF MULTIPLE SITES (H): ICD-10-CM

## 2020-02-26 LAB
ALBUMIN SERPL-MCNC: 4.5 G/DL (ref 3.5–5)
ALT SERPL W P-5'-P-CCNC: 12 U/L (ref 0–45)
CREAT SERPL-MCNC: 0.68 MG/DL (ref 0.6–1.1)
ERYTHROCYTE [DISTWIDTH] IN BLOOD BY AUTOMATED COUNT: 16.6 % (ref 11–14.5)
GFR SERPL CREATININE-BSD FRML MDRD: >60 ML/MIN/1.73M2
HCT VFR BLD AUTO: 26.2 % (ref 35–47)
HGB BLD-MCNC: 8.3 G/DL (ref 12–16)
MCH RBC QN AUTO: 35.8 PG (ref 27–34)
MCHC RBC AUTO-ENTMCNC: 31.7 G/DL (ref 32–36)
MCV RBC AUTO: 113 FL (ref 80–100)
PLATELET # BLD AUTO: 538 THOU/UL (ref 140–440)
PMV BLD AUTO: 11.6 FL (ref 8.5–12.5)
RBC # BLD AUTO: 2.32 MILL/UL (ref 3.8–5.4)
WBC: 6.2 THOU/UL (ref 4–11)

## 2020-02-27 ENCOUNTER — OFFICE VISIT - HEALTHEAST (OUTPATIENT)
Dept: RHEUMATOLOGY | Facility: CLINIC | Age: 75
End: 2020-02-27

## 2020-02-27 DIAGNOSIS — M15.0 PRIMARY OSTEOARTHRITIS INVOLVING MULTIPLE JOINTS: ICD-10-CM

## 2020-02-27 DIAGNOSIS — M25.50 POLYARTHRALGIA: ICD-10-CM

## 2020-02-27 DIAGNOSIS — M06.09 SERONEGATIVE RHEUMATOID ARTHRITIS OF MULTIPLE SITES (H): ICD-10-CM

## 2020-02-27 DIAGNOSIS — Z79.899 HIGH RISK MEDICATION USE: ICD-10-CM

## 2020-02-27 ASSESSMENT — MIFFLIN-ST. JEOR: SCORE: 1179.89

## 2020-02-28 ENCOUNTER — COMMUNICATION - HEALTHEAST (OUTPATIENT)
Dept: PHYSICAL MEDICINE AND REHAB | Facility: CLINIC | Age: 75
End: 2020-02-28

## 2020-02-28 DIAGNOSIS — M54.50 ACUTE RIGHT-SIDED LOW BACK PAIN WITHOUT SCIATICA: ICD-10-CM

## 2020-03-01 ENCOUNTER — COMMUNICATION - HEALTHEAST (OUTPATIENT)
Dept: PHYSICAL MEDICINE AND REHAB | Facility: CLINIC | Age: 75
End: 2020-03-01

## 2020-03-01 DIAGNOSIS — M79.18 MYOFASCIAL MUSCLE PAIN: ICD-10-CM

## 2020-03-03 ENCOUNTER — HOSPITAL ENCOUNTER (OUTPATIENT)
Dept: PHYSICAL MEDICINE AND REHAB | Facility: CLINIC | Age: 75
Discharge: HOME | End: 2020-03-03
Attending: PHYSICAL MEDICINE & REHABILITATION

## 2020-03-03 DIAGNOSIS — M19.012 PRIMARY OSTEOARTHRITIS OF BOTH SHOULDERS: ICD-10-CM

## 2020-03-03 DIAGNOSIS — M19.011 PRIMARY OSTEOARTHRITIS OF BOTH SHOULDERS: ICD-10-CM

## 2020-03-03 ASSESSMENT — MIFFLIN-ST. JEOR: SCORE: 1179.89

## 2020-03-05 ENCOUNTER — OFFICE VISIT - HEALTHEAST (OUTPATIENT)
Dept: PHYSICAL THERAPY | Facility: REHABILITATION | Age: 75
End: 2020-03-05

## 2020-03-05 DIAGNOSIS — M79.18 RIGHT BUTTOCK PAIN: ICD-10-CM

## 2020-03-05 DIAGNOSIS — M48.061 LUMBAR FORAMINAL STENOSIS: ICD-10-CM

## 2020-03-12 ENCOUNTER — OFFICE VISIT - HEALTHEAST (OUTPATIENT)
Dept: PHYSICAL THERAPY | Facility: REHABILITATION | Age: 75
End: 2020-03-12

## 2020-03-12 DIAGNOSIS — M54.50 ACUTE RIGHT-SIDED LOW BACK PAIN WITHOUT SCIATICA: ICD-10-CM

## 2020-03-12 DIAGNOSIS — M79.18 RIGHT BUTTOCK PAIN: ICD-10-CM

## 2020-03-12 DIAGNOSIS — M48.061 LUMBAR FORAMINAL STENOSIS: ICD-10-CM

## 2020-03-23 ENCOUNTER — COMMUNICATION - HEALTHEAST (OUTPATIENT)
Dept: PHYSICAL MEDICINE AND REHAB | Facility: CLINIC | Age: 75
End: 2020-03-23

## 2020-04-01 ENCOUNTER — COMMUNICATION - HEALTHEAST (OUTPATIENT)
Dept: INTERNAL MEDICINE | Facility: CLINIC | Age: 75
End: 2020-04-01

## 2020-04-01 DIAGNOSIS — Z92.29 PERSONAL HISTORY OF OTHER DRUG THERAPY: ICD-10-CM

## 2020-04-01 DIAGNOSIS — M81.0 OSTEOPOROSIS: ICD-10-CM

## 2020-04-14 ENCOUNTER — COMMUNICATION - HEALTHEAST (OUTPATIENT)
Dept: INTERNAL MEDICINE | Facility: CLINIC | Age: 75
End: 2020-04-14

## 2020-04-14 ENCOUNTER — AMBULATORY - HEALTHEAST (OUTPATIENT)
Dept: NURSING | Facility: CLINIC | Age: 75
End: 2020-04-14

## 2020-07-29 ENCOUNTER — COMMUNICATION - HEALTHEAST (OUTPATIENT)
Dept: PHYSICAL MEDICINE AND REHAB | Facility: CLINIC | Age: 75
End: 2020-07-29

## 2020-07-29 DIAGNOSIS — M79.18 MYOFASCIAL MUSCLE PAIN: ICD-10-CM

## 2020-07-30 ENCOUNTER — COMMUNICATION - HEALTHEAST (OUTPATIENT)
Dept: PHYSICAL MEDICINE AND REHAB | Facility: CLINIC | Age: 75
End: 2020-07-30

## 2020-07-30 RX ORDER — BACLOFEN 10 MG/1
TABLET ORAL
Qty: 60 TABLET | Refills: 4 | Status: SHIPPED | OUTPATIENT
Start: 2020-07-30 | End: 2021-09-28

## 2020-08-05 ENCOUNTER — HOSPITAL ENCOUNTER (OUTPATIENT)
Dept: PHYSICAL MEDICINE AND REHAB | Facility: CLINIC | Age: 75
Discharge: HOME | End: 2020-08-05
Attending: PHYSICAL MEDICINE & REHABILITATION

## 2020-08-05 DIAGNOSIS — M48.061 LUMBAR FORAMINAL STENOSIS: ICD-10-CM

## 2020-08-05 DIAGNOSIS — M54.42 CHRONIC BILATERAL LOW BACK PAIN WITH LEFT-SIDED SCIATICA: ICD-10-CM

## 2020-08-05 DIAGNOSIS — G89.29 CHRONIC BILATERAL LOW BACK PAIN WITH LEFT-SIDED SCIATICA: ICD-10-CM

## 2020-08-11 ENCOUNTER — HOSPITAL ENCOUNTER (OUTPATIENT)
Dept: PHYSICAL MEDICINE AND REHAB | Facility: CLINIC | Age: 75
Discharge: HOME | End: 2020-08-11
Attending: PHYSICAL MEDICINE & REHABILITATION

## 2020-08-11 DIAGNOSIS — M25.512 CHRONIC PAIN OF BOTH SHOULDERS: ICD-10-CM

## 2020-08-11 DIAGNOSIS — M25.511 CHRONIC PAIN OF BOTH SHOULDERS: ICD-10-CM

## 2020-08-11 DIAGNOSIS — M48.061 LUMBAR FORAMINAL STENOSIS: ICD-10-CM

## 2020-08-11 DIAGNOSIS — G89.29 CHRONIC PAIN OF BOTH SHOULDERS: ICD-10-CM

## 2020-08-26 ENCOUNTER — HOSPITAL ENCOUNTER (OUTPATIENT)
Dept: PHYSICAL MEDICINE AND REHAB | Facility: CLINIC | Age: 75
Discharge: HOME | End: 2020-08-26
Attending: PHYSICAL MEDICINE & REHABILITATION

## 2020-08-26 DIAGNOSIS — M25.511 CHRONIC PAIN OF BOTH SHOULDERS: ICD-10-CM

## 2020-08-26 DIAGNOSIS — M25.512 CHRONIC PAIN OF BOTH SHOULDERS: ICD-10-CM

## 2020-08-26 DIAGNOSIS — G89.29 CHRONIC PAIN OF BOTH SHOULDERS: ICD-10-CM

## 2020-08-28 ENCOUNTER — COMMUNICATION - HEALTHEAST (OUTPATIENT)
Dept: ADMINISTRATIVE | Facility: CLINIC | Age: 75
End: 2020-08-28

## 2020-09-02 ENCOUNTER — COMMUNICATION - HEALTHEAST (OUTPATIENT)
Dept: LAB | Facility: CLINIC | Age: 75
End: 2020-09-02

## 2020-09-02 ENCOUNTER — AMBULATORY - HEALTHEAST (OUTPATIENT)
Dept: LAB | Facility: CLINIC | Age: 75
End: 2020-09-02

## 2020-09-02 DIAGNOSIS — M06.09 SERONEGATIVE RHEUMATOID ARTHRITIS OF MULTIPLE SITES (H): ICD-10-CM

## 2020-09-02 LAB
ALBUMIN SERPL-MCNC: 4.2 G/DL (ref 3.5–5)
ALT SERPL W P-5'-P-CCNC: 12 U/L (ref 0–45)
CREAT SERPL-MCNC: 0.68 MG/DL (ref 0.6–1.1)
ERYTHROCYTE [DISTWIDTH] IN BLOOD BY AUTOMATED COUNT: 20.1 % (ref 11–14.5)
GFR SERPL CREATININE-BSD FRML MDRD: >60 ML/MIN/1.73M2
HCT VFR BLD AUTO: 25.6 % (ref 35–47)
HGB BLD-MCNC: 8.2 G/DL (ref 12–16)
MCH RBC QN AUTO: 35.5 PG (ref 27–34)
MCHC RBC AUTO-ENTMCNC: 32 G/DL (ref 32–36)
MCV RBC AUTO: 111 FL (ref 80–100)
PLATELET # BLD AUTO: 553 THOU/UL (ref 140–440)
PMV BLD AUTO: 11.7 FL (ref 8.5–12.5)
RBC # BLD AUTO: 2.31 MILL/UL (ref 3.8–5.4)
WBC: 6.6 THOU/UL (ref 4–11)

## 2020-09-08 ENCOUNTER — OFFICE VISIT - HEALTHEAST (OUTPATIENT)
Dept: RHEUMATOLOGY | Facility: CLINIC | Age: 75
End: 2020-09-08

## 2020-09-08 DIAGNOSIS — Z79.899 HIGH RISK MEDICATION USE: ICD-10-CM

## 2020-09-08 DIAGNOSIS — M15.0 PRIMARY OSTEOARTHRITIS INVOLVING MULTIPLE JOINTS: ICD-10-CM

## 2020-09-08 DIAGNOSIS — M06.09 SERONEGATIVE RHEUMATOID ARTHRITIS OF MULTIPLE SITES (H): ICD-10-CM

## 2020-09-08 DIAGNOSIS — M25.50 POLYARTHRALGIA: ICD-10-CM

## 2020-09-09 ENCOUNTER — COMMUNICATION - HEALTHEAST (OUTPATIENT)
Dept: PHYSICAL MEDICINE AND REHAB | Facility: CLINIC | Age: 75
End: 2020-09-09

## 2020-09-17 ENCOUNTER — AMBULATORY - HEALTHEAST (OUTPATIENT)
Dept: SCHEDULING | Facility: CLINIC | Age: 75
End: 2020-09-17

## 2020-09-17 DIAGNOSIS — M80.00XD AGE-RELATED OSTEOPOROSIS WITH CURRENT PATHOLOGICAL FRACTURE WITH ROUTINE HEALING, SUBSEQUENT ENCOUNTER: ICD-10-CM

## 2020-09-17 DIAGNOSIS — M80.08XA AGE-RELATED OSTEOPOROSIS WITH CURRENT PATHOLOGICAL FRACTURE, VERTEBRA(E), INITIAL ENCOUNTER FOR FRACTURE (H): ICD-10-CM

## 2020-09-22 ENCOUNTER — OFFICE VISIT - HEALTHEAST (OUTPATIENT)
Dept: FAMILY MEDICINE | Facility: CLINIC | Age: 75
End: 2020-09-22

## 2020-09-22 DIAGNOSIS — L03.116 CELLULITIS OF LEFT LOWER LEG: ICD-10-CM

## 2020-09-22 DIAGNOSIS — L08.9 WOUND INFECTION: ICD-10-CM

## 2020-09-22 DIAGNOSIS — T14.8XXA WOUND INFECTION: ICD-10-CM

## 2020-09-22 DIAGNOSIS — Z79.899 HIGH RISK MEDICATION USE: ICD-10-CM

## 2020-11-04 ENCOUNTER — COMMUNICATION - HEALTHEAST (OUTPATIENT)
Dept: INTERNAL MEDICINE | Facility: CLINIC | Age: 75
End: 2020-11-04

## 2020-11-12 ENCOUNTER — AMBULATORY - HEALTHEAST (OUTPATIENT)
Dept: NURSING | Facility: CLINIC | Age: 75
End: 2020-11-12

## 2020-11-12 DIAGNOSIS — S32.010D COMPRESSION FRACTURE OF L1 VERTEBRA WITH ROUTINE HEALING: ICD-10-CM

## 2020-11-12 DIAGNOSIS — M80.00XD AGE-RELATED OSTEOPOROSIS WITH CURRENT PATHOLOGICAL FRACTURE WITH ROUTINE HEALING, SUBSEQUENT ENCOUNTER: ICD-10-CM

## 2020-12-20 ENCOUNTER — COMMUNICATION - HEALTHEAST (OUTPATIENT)
Dept: RHEUMATOLOGY | Facility: CLINIC | Age: 75
End: 2020-12-20

## 2020-12-20 DIAGNOSIS — M15.0 PRIMARY OSTEOARTHRITIS INVOLVING MULTIPLE JOINTS: ICD-10-CM

## 2021-01-01 ENCOUNTER — HOSPITAL ENCOUNTER (OUTPATIENT)
Dept: PHYSICAL THERAPY | Facility: CLINIC | Age: 76
End: 2021-11-30
Payer: MEDICARE

## 2021-01-01 ENCOUNTER — HOSPITAL ENCOUNTER (OUTPATIENT)
Dept: PHYSICAL THERAPY | Facility: CLINIC | Age: 76
End: 2021-12-21
Payer: MEDICARE

## 2021-01-01 ENCOUNTER — TELEPHONE (OUTPATIENT)
Dept: LAB | Facility: CLINIC | Age: 76
End: 2021-01-01

## 2021-01-01 ENCOUNTER — HOSPITAL ENCOUNTER (OUTPATIENT)
Dept: PHYSICAL THERAPY | Facility: CLINIC | Age: 76
End: 2021-10-26
Attending: PHYSICAL MEDICINE & REHABILITATION
Payer: MEDICARE

## 2021-01-01 ENCOUNTER — HOSPITAL ENCOUNTER (OUTPATIENT)
Dept: PHYSICAL THERAPY | Facility: CLINIC | Age: 76
End: 2021-11-09
Payer: MEDICARE

## 2021-01-01 ENCOUNTER — HOSPITAL ENCOUNTER (OUTPATIENT)
Dept: PHYSICAL THERAPY | Facility: CLINIC | Age: 76
End: 2021-11-02
Payer: MEDICARE

## 2021-01-01 ENCOUNTER — HOSPITAL ENCOUNTER (OUTPATIENT)
Dept: PHYSICAL THERAPY | Facility: CLINIC | Age: 76
End: 2021-12-07
Payer: MEDICARE

## 2021-01-01 ENCOUNTER — LAB (OUTPATIENT)
Dept: LAB | Facility: CLINIC | Age: 76
End: 2021-01-01
Payer: MEDICARE

## 2021-01-01 ENCOUNTER — OFFICE VISIT (OUTPATIENT)
Dept: RHEUMATOLOGY | Facility: CLINIC | Age: 76
End: 2021-01-01
Payer: MEDICARE

## 2021-01-01 VITALS
HEART RATE: 88 BPM | HEIGHT: 63 IN | BODY MASS INDEX: 28.34 KG/M2 | DIASTOLIC BLOOD PRESSURE: 74 MMHG | SYSTOLIC BLOOD PRESSURE: 124 MMHG

## 2021-01-01 DIAGNOSIS — M62.81 GENERALIZED MUSCLE WEAKNESS: ICD-10-CM

## 2021-01-01 DIAGNOSIS — Z91.81 AT RISK FOR FALLS: ICD-10-CM

## 2021-01-01 DIAGNOSIS — M48.061 LUMBAR FORAMINAL STENOSIS: ICD-10-CM

## 2021-01-01 DIAGNOSIS — M54.42 CHRONIC BILATERAL LOW BACK PAIN WITH BILATERAL SCIATICA: Primary | ICD-10-CM

## 2021-01-01 DIAGNOSIS — M06.09 SERONEGATIVE RHEUMATOID ARTHRITIS OF MULTIPLE SITES (H): ICD-10-CM

## 2021-01-01 DIAGNOSIS — M54.41 CHRONIC BILATERAL LOW BACK PAIN WITH BILATERAL SCIATICA: Primary | ICD-10-CM

## 2021-01-01 DIAGNOSIS — G89.29 CHRONIC BILATERAL LOW BACK PAIN WITH BILATERAL SCIATICA: Primary | ICD-10-CM

## 2021-01-01 DIAGNOSIS — Z79.899 HIGH RISK MEDICATION USE: ICD-10-CM

## 2021-01-01 DIAGNOSIS — M54.42 CHRONIC BILATERAL LOW BACK PAIN WITH BILATERAL SCIATICA: ICD-10-CM

## 2021-01-01 DIAGNOSIS — D46.9 MDS (MYELODYSPLASTIC SYNDROME) (H): Primary | ICD-10-CM

## 2021-01-01 DIAGNOSIS — C94.6 MYELODYSPLASTIC DISEASE (H): ICD-10-CM

## 2021-01-01 DIAGNOSIS — D46.9 MDS (MYELODYSPLASTIC SYNDROME) (H): ICD-10-CM

## 2021-01-01 DIAGNOSIS — M06.09 SERONEGATIVE RHEUMATOID ARTHRITIS OF MULTIPLE SITES (H): Primary | ICD-10-CM

## 2021-01-01 DIAGNOSIS — M54.41 CHRONIC BILATERAL LOW BACK PAIN WITH BILATERAL SCIATICA: ICD-10-CM

## 2021-01-01 DIAGNOSIS — G89.29 CHRONIC BILATERAL LOW BACK PAIN WITH BILATERAL SCIATICA: ICD-10-CM

## 2021-01-01 DIAGNOSIS — M15.0 PRIMARY OSTEOARTHRITIS INVOLVING MULTIPLE JOINTS: ICD-10-CM

## 2021-01-01 DIAGNOSIS — M48.061 LUMBAR FORAMINAL STENOSIS: Primary | ICD-10-CM

## 2021-01-01 LAB
ALBUMIN SERPL-MCNC: 4 G/DL (ref 3.5–5)
ALT SERPL W P-5'-P-CCNC: 13 U/L (ref 0–45)
CREAT SERPL-MCNC: 0.68 MG/DL (ref 0.6–1.1)
ERYTHROCYTE [DISTWIDTH] IN BLOOD BY AUTOMATED COUNT: 23.2 % (ref 10–15)
GFR SERPL CREATININE-BSD FRML MDRD: 85 ML/MIN/1.73M2
HCT VFR BLD AUTO: 25.7 % (ref 35–47)
HGB BLD-MCNC: 8.1 G/DL (ref 11.7–15.7)
MCH RBC QN AUTO: 34.2 PG (ref 26.5–33)
MCHC RBC AUTO-ENTMCNC: 31.5 G/DL (ref 31.5–36.5)
MCV RBC AUTO: 108 FL (ref 78–100)
PLATELET # BLD AUTO: 562 10E3/UL (ref 150–450)
RBC # BLD AUTO: 2.37 10E6/UL (ref 3.8–5.2)
WBC # BLD AUTO: 9.1 10E3/UL (ref 4–11)

## 2021-01-01 PROCEDURE — 97110 THERAPEUTIC EXERCISES: CPT | Mod: GP | Performed by: PHYSICAL THERAPIST

## 2021-01-01 PROCEDURE — 82565 ASSAY OF CREATININE: CPT

## 2021-01-01 PROCEDURE — 36415 COLL VENOUS BLD VENIPUNCTURE: CPT

## 2021-01-01 PROCEDURE — 82040 ASSAY OF SERUM ALBUMIN: CPT

## 2021-01-01 PROCEDURE — 97161 PT EVAL LOW COMPLEX 20 MIN: CPT | Mod: GP | Performed by: PHYSICAL THERAPIST

## 2021-01-01 PROCEDURE — 85027 COMPLETE CBC AUTOMATED: CPT

## 2021-01-01 PROCEDURE — 20606 DRAIN/INJ JOINT/BURSA W/US: CPT | Mod: RT | Performed by: INTERNAL MEDICINE

## 2021-01-01 PROCEDURE — 99214 OFFICE O/P EST MOD 30 MIN: CPT | Mod: 25 | Performed by: INTERNAL MEDICINE

## 2021-01-01 PROCEDURE — 84460 ALANINE AMINO (ALT) (SGPT): CPT

## 2021-01-01 RX ORDER — TRIAMCINOLONE ACETONIDE 40 MG/ML
20 INJECTION, SUSPENSION INTRA-ARTICULAR; INTRAMUSCULAR ONCE
Status: COMPLETED | OUTPATIENT
Start: 2021-01-01 | End: 2021-01-01

## 2021-01-01 RX ORDER — CELECOXIB 200 MG/1
200 CAPSULE ORAL 2 TIMES DAILY PRN
Qty: 120 CAPSULE | Refills: 0 | Status: SHIPPED | OUTPATIENT
Start: 2021-01-01 | End: 2022-01-01

## 2021-01-01 RX ORDER — HYDROXYCHLOROQUINE SULFATE 200 MG/1
TABLET, FILM COATED ORAL
Qty: 180 TABLET | Refills: 0 | Status: SHIPPED | OUTPATIENT
Start: 2021-01-01 | End: 2022-01-01

## 2021-01-01 RX ADMIN — TRIAMCINOLONE ACETONIDE 20 MG: 40 INJECTION, SUSPENSION INTRA-ARTICULAR; INTRAMUSCULAR at 14:38

## 2021-03-08 ENCOUNTER — COMMUNICATION - HEALTHEAST (OUTPATIENT)
Dept: RHEUMATOLOGY | Facility: CLINIC | Age: 76
End: 2021-03-08

## 2021-03-08 DIAGNOSIS — M06.09 SERONEGATIVE RHEUMATOID ARTHRITIS OF MULTIPLE SITES (H): ICD-10-CM

## 2021-03-17 ENCOUNTER — COMMUNICATION - HEALTHEAST (OUTPATIENT)
Dept: FAMILY MEDICINE | Facility: CLINIC | Age: 76
End: 2021-03-17

## 2021-03-17 ENCOUNTER — OFFICE VISIT - HEALTHEAST (OUTPATIENT)
Dept: FAMILY MEDICINE | Facility: CLINIC | Age: 76
End: 2021-03-17

## 2021-03-17 DIAGNOSIS — R79.89 ELEVATED TSH: ICD-10-CM

## 2021-03-17 DIAGNOSIS — D46.9 MDS (MYELODYSPLASTIC SYNDROME) (H): ICD-10-CM

## 2021-03-17 DIAGNOSIS — E03.9 HYPOTHYROIDISM, UNSPECIFIED TYPE: ICD-10-CM

## 2021-03-17 DIAGNOSIS — L03.116 CELLULITIS OF LEFT LOWER LIMB: ICD-10-CM

## 2021-03-17 DIAGNOSIS — M79.89 SWELLING OF TOE OF RIGHT FOOT: ICD-10-CM

## 2021-03-17 DIAGNOSIS — D53.9 MACROCYTIC ANEMIA: ICD-10-CM

## 2021-03-17 DIAGNOSIS — B35.1 ONYCHOMYCOSIS: ICD-10-CM

## 2021-03-17 DIAGNOSIS — D64.9 ANEMIA, UNSPECIFIED TYPE: ICD-10-CM

## 2021-03-17 DIAGNOSIS — Z51.81 ENCOUNTER FOR THERAPEUTIC DRUG MONITORING: ICD-10-CM

## 2021-03-17 LAB
C REACTIVE PROTEIN LHE: 0.1 MG/DL (ref 0–0.8)
CREAT SERPL-MCNC: 0.67 MG/DL (ref 0.6–1.1)
ERYTHROCYTE [DISTWIDTH] IN BLOOD BY AUTOMATED COUNT: 20.2 % (ref 11–14.5)
ERYTHROCYTE [SEDIMENTATION RATE] IN BLOOD BY WESTERGREN METHOD: 32 MM/HR (ref 0–20)
FERRITIN SERPL-MCNC: 296 NG/ML (ref 10–130)
FOLATE SERPL-MCNC: 11.6 NG/ML
GFR SERPL CREATININE-BSD FRML MDRD: >60 ML/MIN/1.73M2
HCT VFR BLD AUTO: 23.6 % (ref 35–47)
HGB BLD-MCNC: 7.8 G/DL (ref 12–16)
IRON SATN MFR SERPL: 42 % (ref 20–50)
IRON SERPL-MCNC: 110 UG/DL (ref 42–175)
MCH RBC QN AUTO: 35.6 PG (ref 27–34)
MCHC RBC AUTO-ENTMCNC: 33.1 G/DL (ref 32–36)
MCV RBC AUTO: 108 FL (ref 80–100)
PLATELET # BLD AUTO: 549 THOU/UL (ref 140–440)
PMV BLD AUTO: 11.3 FL (ref 7–10)
RBC # BLD AUTO: 2.19 MILL/UL (ref 3.8–5.4)
T4 FREE SERPL-MCNC: 0.7 NG/DL (ref 0.7–1.8)
TIBC SERPL-MCNC: 261 UG/DL (ref 313–563)
TRANSFERRIN SERPL-MCNC: 209 MG/DL (ref 212–360)
TSH SERPL DL<=0.005 MIU/L-ACNC: 21.77 UIU/ML (ref 0.3–5)
URATE SERPL-MCNC: 7.2 MG/DL (ref 2–7.5)
VIT B12 SERPL-MCNC: 660 PG/ML (ref 213–816)
WBC: 6.3 THOU/UL (ref 4–11)

## 2021-03-18 ENCOUNTER — COMMUNICATION - HEALTHEAST (OUTPATIENT)
Dept: FAMILY MEDICINE | Facility: CLINIC | Age: 76
End: 2021-03-18

## 2021-03-18 ENCOUNTER — AMBULATORY - HEALTHEAST (OUTPATIENT)
Dept: FAMILY MEDICINE | Facility: CLINIC | Age: 76
End: 2021-03-18

## 2021-03-18 DIAGNOSIS — E03.9 HYPOTHYROIDISM, UNSPECIFIED TYPE: ICD-10-CM

## 2021-03-18 RX ORDER — LEVOTHYROXINE SODIUM 50 UG/1
50 TABLET ORAL DAILY
Qty: 60 TABLET | Refills: 0 | Status: SHIPPED | OUTPATIENT
Start: 2021-03-18 | End: 2021-09-13

## 2021-03-30 ENCOUNTER — OFFICE VISIT - HEALTHEAST (OUTPATIENT)
Dept: FAMILY MEDICINE | Facility: CLINIC | Age: 76
End: 2021-03-30

## 2021-03-30 DIAGNOSIS — L03.116 CELLULITIS OF LEFT LOWER LIMB: ICD-10-CM

## 2021-03-30 DIAGNOSIS — M15.0 PRIMARY OSTEOARTHRITIS INVOLVING MULTIPLE JOINTS: ICD-10-CM

## 2021-03-30 DIAGNOSIS — D64.9 ANEMIA, UNSPECIFIED TYPE: ICD-10-CM

## 2021-03-30 DIAGNOSIS — E03.9 HYPOTHYROIDISM, UNSPECIFIED TYPE: ICD-10-CM

## 2021-03-30 DIAGNOSIS — M81.0 AGE-RELATED OSTEOPOROSIS WITHOUT CURRENT PATHOLOGICAL FRACTURE: ICD-10-CM

## 2021-03-30 DIAGNOSIS — D46.9 MDS (MYELODYSPLASTIC SYNDROME) (H): ICD-10-CM

## 2021-03-30 LAB
ERYTHROCYTE [DISTWIDTH] IN BLOOD BY AUTOMATED COUNT: 21.3 % (ref 11–14.5)
HCT VFR BLD AUTO: 24.5 % (ref 35–47)
HGB BLD-MCNC: 8.1 G/DL (ref 12–16)
MCH RBC QN AUTO: 35.7 PG (ref 27–34)
MCHC RBC AUTO-ENTMCNC: 33.1 G/DL (ref 32–36)
MCV RBC AUTO: 108 FL (ref 80–100)
PLATELET # BLD AUTO: 549 THOU/UL (ref 140–440)
PMV BLD AUTO: 11 FL (ref 7–10)
RBC # BLD AUTO: 2.27 MILL/UL (ref 3.8–5.4)
WBC: 6.6 THOU/UL (ref 4–11)

## 2021-03-31 ENCOUNTER — COMMUNICATION - HEALTHEAST (OUTPATIENT)
Dept: PEDIATRICS | Facility: CLINIC | Age: 76
End: 2021-03-31

## 2021-04-20 ENCOUNTER — AMBULATORY - HEALTHEAST (OUTPATIENT)
Dept: LAB | Facility: CLINIC | Age: 76
End: 2021-04-20

## 2021-04-20 ENCOUNTER — COMMUNICATION - HEALTHEAST (OUTPATIENT)
Dept: RHEUMATOLOGY | Facility: CLINIC | Age: 76
End: 2021-04-20

## 2021-04-20 ENCOUNTER — OFFICE VISIT - HEALTHEAST (OUTPATIENT)
Dept: FAMILY MEDICINE | Facility: CLINIC | Age: 76
End: 2021-04-20

## 2021-04-20 ENCOUNTER — AMBULATORY - HEALTHEAST (OUTPATIENT)
Dept: RHEUMATOLOGY | Facility: CLINIC | Age: 76
End: 2021-04-20

## 2021-04-20 DIAGNOSIS — M79.89 OTHER SPECIFIED SOFT TISSUE DISORDERS: ICD-10-CM

## 2021-04-20 DIAGNOSIS — B35.1 ONYCHOMYCOSIS: ICD-10-CM

## 2021-04-20 DIAGNOSIS — M06.09 SERONEGATIVE RHEUMATOID ARTHRITIS OF MULTIPLE SITES (H): ICD-10-CM

## 2021-04-20 DIAGNOSIS — T14.8XXD WOUND HEALING, DELAYED: ICD-10-CM

## 2021-04-20 DIAGNOSIS — L03.116 CELLULITIS OF LEFT LOWER EXTREMITY: ICD-10-CM

## 2021-04-20 DIAGNOSIS — S81.802D OPEN WOUND OF LEFT LOWER LEG, SUBSEQUENT ENCOUNTER: ICD-10-CM

## 2021-04-20 LAB
ALBUMIN SERPL-MCNC: 4.1 G/DL (ref 3.5–5)
ALT SERPL W P-5'-P-CCNC: 18 U/L (ref 0–45)
CREAT SERPL-MCNC: 0.63 MG/DL (ref 0.6–1.1)
ERYTHROCYTE [DISTWIDTH] IN BLOOD BY AUTOMATED COUNT: 20.9 % (ref 11–14.5)
GFR SERPL CREATININE-BSD FRML MDRD: >60 ML/MIN/1.73M2
HCT VFR BLD AUTO: 23.4 % (ref 35–47)
HGB BLD-MCNC: 7.5 G/DL (ref 12–16)
MCH RBC QN AUTO: 35.2 PG (ref 27–34)
MCHC RBC AUTO-ENTMCNC: 32.1 G/DL (ref 32–36)
MCV RBC AUTO: 110 FL (ref 80–100)
PLATELET # BLD AUTO: 515 THOU/UL (ref 140–440)
PMV BLD AUTO: 10.2 FL (ref 7–10)
RBC # BLD AUTO: 2.13 MILL/UL (ref 3.8–5.4)
WBC: 8.1 THOU/UL (ref 4–11)

## 2021-04-21 ENCOUNTER — OFFICE VISIT - HEALTHEAST (OUTPATIENT)
Dept: RHEUMATOLOGY | Facility: CLINIC | Age: 76
End: 2021-04-21

## 2021-04-21 DIAGNOSIS — C94.6 MYELODYSPLASTIC DISEASE (H): ICD-10-CM

## 2021-04-21 DIAGNOSIS — Z79.899 HIGH RISK MEDICATION USE: ICD-10-CM

## 2021-04-21 DIAGNOSIS — M06.09 SERONEGATIVE RHEUMATOID ARTHRITIS OF MULTIPLE SITES (H): ICD-10-CM

## 2021-04-21 DIAGNOSIS — M25.50 POLYARTHRALGIA: ICD-10-CM

## 2021-04-21 DIAGNOSIS — M15.0 PRIMARY OSTEOARTHRITIS INVOLVING MULTIPLE JOINTS: ICD-10-CM

## 2021-04-29 ENCOUNTER — RECORDS - HEALTHEAST (OUTPATIENT)
Dept: VASCULAR ULTRASOUND | Facility: CLINIC | Age: 76
End: 2021-04-29

## 2021-04-29 DIAGNOSIS — T14.8XXD OTHER INJURY OF UNSPECIFIED BODY REGION, SUBSEQUENT ENCOUNTER: ICD-10-CM

## 2021-04-29 DIAGNOSIS — M79.89 OTHER SPECIFIED SOFT TISSUE DISORDERS: ICD-10-CM

## 2021-04-29 DIAGNOSIS — S81.802D UNSPECIFIED OPEN WOUND, LEFT LOWER LEG, SUBSEQUENT ENCOUNTER: ICD-10-CM

## 2021-05-03 ENCOUNTER — AMBULATORY - HEALTHEAST (OUTPATIENT)
Dept: FAMILY MEDICINE | Facility: CLINIC | Age: 76
End: 2021-05-03

## 2021-05-03 DIAGNOSIS — D46.9 MDS (MYELODYSPLASTIC SYNDROME) (H): ICD-10-CM

## 2021-05-03 DIAGNOSIS — T14.8XXD DELAYED WOUND HEALING: ICD-10-CM

## 2021-05-03 DIAGNOSIS — I73.9 PAD (PERIPHERAL ARTERY DISEASE) (H): ICD-10-CM

## 2021-05-04 ENCOUNTER — AMBULATORY - HEALTHEAST (OUTPATIENT)
Dept: VASCULAR SURGERY | Facility: CLINIC | Age: 76
End: 2021-05-04

## 2021-05-04 ENCOUNTER — COMMUNICATION - HEALTHEAST (OUTPATIENT)
Dept: FAMILY MEDICINE | Facility: CLINIC | Age: 76
End: 2021-05-04

## 2021-05-04 DIAGNOSIS — T14.8XXD DELAYED WOUND HEALING: ICD-10-CM

## 2021-05-04 DIAGNOSIS — Z13.220 LIPID SCREENING: ICD-10-CM

## 2021-05-04 DIAGNOSIS — I73.9 PAD (PERIPHERAL ARTERY DISEASE) (H): ICD-10-CM

## 2021-05-11 ENCOUNTER — OFFICE VISIT - HEALTHEAST (OUTPATIENT)
Dept: FAMILY MEDICINE | Facility: CLINIC | Age: 76
End: 2021-05-11

## 2021-05-11 ENCOUNTER — AMBULATORY - HEALTHEAST (OUTPATIENT)
Dept: LAB | Facility: CLINIC | Age: 76
End: 2021-05-11

## 2021-05-11 DIAGNOSIS — Z13.220 LIPID SCREENING: ICD-10-CM

## 2021-05-11 DIAGNOSIS — I78.1 NON-NEOPLASTIC NEVUS: ICD-10-CM

## 2021-05-11 DIAGNOSIS — D46.9 MDS (MYELODYSPLASTIC SYNDROME) (H): ICD-10-CM

## 2021-05-11 DIAGNOSIS — I73.9 PAD (PERIPHERAL ARTERY DISEASE) (H): ICD-10-CM

## 2021-05-11 DIAGNOSIS — Z23 NEED FOR TD VACCINE: ICD-10-CM

## 2021-05-11 DIAGNOSIS — M81.0 AGE-RELATED OSTEOPOROSIS WITHOUT CURRENT PATHOLOGICAL FRACTURE: ICD-10-CM

## 2021-05-11 DIAGNOSIS — L03.116 CELLULITIS OF LEFT LOWER EXTREMITY: ICD-10-CM

## 2021-05-11 DIAGNOSIS — T14.8XXD DELAYED WOUND HEALING: ICD-10-CM

## 2021-05-11 LAB
CHOLEST SERPL-MCNC: 133 MG/DL
ERYTHROCYTE [DISTWIDTH] IN BLOOD BY AUTOMATED COUNT: 22.6 % (ref 11–14.5)
FASTING STATUS PATIENT QL REPORTED: NORMAL
HCT VFR BLD AUTO: 28.1 % (ref 35–47)
HDLC SERPL-MCNC: 76 MG/DL
HGB BLD-MCNC: 8.7 G/DL (ref 12–16)
LDLC SERPL CALC-MCNC: 47 MG/DL
MCH RBC QN AUTO: 33.9 PG (ref 27–34)
MCHC RBC AUTO-ENTMCNC: 31 G/DL (ref 32–36)
MCV RBC AUTO: 109 FL (ref 80–100)
PLATELET # BLD AUTO: 538 THOU/UL (ref 140–440)
PMV BLD AUTO: 12.3 FL (ref 8.5–12.5)
RBC # BLD AUTO: 2.57 MILL/UL (ref 3.8–5.4)
TRIGL SERPL-MCNC: 48 MG/DL
WBC: 6.4 THOU/UL (ref 4–11)

## 2021-05-17 ENCOUNTER — RECORDS - HEALTHEAST (OUTPATIENT)
Dept: ADMINISTRATIVE | Facility: OTHER | Age: 76
End: 2021-05-17

## 2021-05-26 ENCOUNTER — RECORDS - HEALTHEAST (OUTPATIENT)
Dept: ADMINISTRATIVE | Facility: CLINIC | Age: 76
End: 2021-05-26

## 2021-05-27 ENCOUNTER — RECORDS - HEALTHEAST (OUTPATIENT)
Dept: ADMINISTRATIVE | Facility: CLINIC | Age: 76
End: 2021-05-27

## 2021-05-27 VITALS — DIASTOLIC BLOOD PRESSURE: 60 MMHG | OXYGEN SATURATION: 95 % | SYSTOLIC BLOOD PRESSURE: 130 MMHG | HEART RATE: 97 BPM

## 2021-05-27 VITALS — DIASTOLIC BLOOD PRESSURE: 64 MMHG | RESPIRATION RATE: 16 BRPM | SYSTOLIC BLOOD PRESSURE: 150 MMHG | HEART RATE: 90 BPM

## 2021-05-30 NOTE — TELEPHONE ENCOUNTER
"Patient calling to make an appointment for shoulder injections. Patient was transferred back to nurse navigation line. Patient states she had good relief with the last injections, \"but the pain is back. Has been coming back for some time and I probably have waited too long.\" Reports pain started to return \"in May or maybe June.\"     Patient last seen in clinic per provider in December 2018. Explained provider would be updated with her status and request for another injection. Patient will be called with how provider would like to proceed. Stated understanding.   "

## 2021-05-30 NOTE — TELEPHONE ENCOUNTER
Phone call to pt to discuss. Stated understanding and appreciation for call back. Order placed. Transferred to scheduling to make appointment.

## 2021-05-31 ENCOUNTER — RECORDS - HEALTHEAST (OUTPATIENT)
Dept: ADMINISTRATIVE | Facility: CLINIC | Age: 76
End: 2021-05-31

## 2021-05-31 VITALS — BODY MASS INDEX: 28.24 KG/M2 | HEIGHT: 64 IN

## 2021-05-31 VITALS — HEIGHT: 64 IN | BODY MASS INDEX: 28.09 KG/M2 | WEIGHT: 164.5 LBS

## 2021-05-31 VITALS — BODY MASS INDEX: 28 KG/M2 | HEIGHT: 64 IN | WEIGHT: 164 LBS

## 2021-05-31 NOTE — PROGRESS NOTES
Assessment/Plan:      Diagnoses and all orders for this visit:    Shoulder pain, bilateral  -     OPS US Large Joint Injection Bilateral; Standing  -     OPS US Large Joint Injection Bilateral    Myofascial muscle pain  -     baclofen (LIORESAL) 10 MG tablet; Take 0.5-1 tablets (5-10 mg total) by mouth 2 (two) times a day as needed (for muscle spasms).  Dispense: 30 tablet; Refill: 0    Chronic pain of both shoulders    Other orders  -     methylPREDNISolone acetate injection (DEPO-MEDROL)  -     lidocaine (PF) 10 mg/mL (1 %) injection (XYLOCAINE-MPF)        Assessment: Pleasant 73 y.o. female with a history of rheumatoid arthritis, GERD:    1.  Worsening bilateral shoulder pain over the past several months.  This is consistent with flare of glenohumeral joint osteoarthritis.  Left is typically worse than the right.  She has had severe glenohumeral joint osteoarthritis on MRIs from [facility] 2014.  Done well with steroid injections last injection January 2019.    2.  Cervical spine myofascial pain.    Discussion:    1.  She has done quite well with steroid injections in the past.  We discussed options of repeat glenohumeral joint injections today and she would like to proceed with this.  We discussed options such as orthopedic evaluation for evaluation of shoulder arthroplasties as well but wants to get an injection today.  Please see attached procedure note.    2.  She does have pain across the upper trapezius muscles as well does well with baclofen.  This increased after some falls this winter.  She has a plane ride to see her ill brother in [place] would like a refill of this medication refills provided.    3.  Follow-up 1 month with Dr. Julian.      It was our pleasure caring for your patient today, if there any questions or concerns please do not hesitate to contact us.      Subjective:   Patient ID: Louise Benítez is a 73 y.o. female.    History of Present Illness: Patient presents today for an evaluation of  bilateral shoulder pain for glenohumeral joint injections.  She also has upper trapezius pain and cervical spine pain.  She has bilateral shoulder pain worse with moving her arms.  This is been a chronic issue for her and has had MRIs in the past at [facility].  She gets intermittent glenohumeral joint injections are quite helpful.  This pain is worsened over the past several months and she is felt that it is time to get an injection this was ordered by Dr. Julian.    She also has cervical spine upper trapezius pain bilaterally.  Has flared intermittently after some falls on the ice this winter.  Baclofen is very helpful.  She is out of this medication would like a refill.  She takes this occasionally.     Review of Systems: No fevers chills sweats recent illnesses or antibiotic use.    Past Medical History:   Diagnosis Date     Rheumatoid arthritis (H)        The following portions of the patient's history were reviewed and updated as appropriate: allergies, current medications, past family history, past medical history, past social history, past surgical history and problem list.           Objective:   Physical Exam:    Vitals:    08/07/19 1455   BP: 132/80   Pulse: 74   Temp: 98.5  F (36.9  C)     Body mass index is 27.64 kg/m .      General: Alert and oriented with normal affect. Attention, knowledge, memory, and language are intact. No acute distress.   Eyes: Sclerae are clear.  Respirations: Unlabored. CV: No lower extremity edema.  Skin: No rashes seen.    Gait:  Nonantalgic  Significantly reduced range of motion bilateral shoulders in abduction less than 90 degrees.  Decreased internal/external rotation.  Sensation is intact to light touch throughout the upper   extremities.  Reflexes are  negative Hoffmans.    Hypertonic tissue textures upper trapezius bilaterally.  Manual muscle testing reveals:  Grossly normal strength in the upper extremities          Procedure Note:    After discussing the risks and benefits  of  bilateral glenohumeral joint injection under ultrasound guidance, informed consent was obtained. The patient and physician agreed on the injection site prior to the procedure.  A verbal timeout was done prior to the procedure.  With the patient seated, from posterior lateral approach, The left shoulder region was surveyed with the ultrasound unit to identify the target.  The skin was marked and prepped with chloraprep.  The skin was then anesthetized with a skin wheal followed by infiltration with 0.5 mL of 1% lidocaine.  Under direct ultrasound visualization, a 25-gauge 2 inch needle was used to inject 3 milliliters of injectate containing 2 milliliters of 1% lidocaine and 1 milliliter containing 40 mg of depo-medrol after aspiration was negative for heme.      With the patient seated, from posterior lateral approach, The right shoulder region was surveyed with the ultrasound unit to identify the target.  The skin was marked and prepped with chloraprep.  The skin was then anesthetized with a skin wheal followed by infiltration with 0.5 mL of 1% lidocaine.  Under direct ultrasound visualization, a 25-gauge 2 inch needle was used to inject 3 milliliters of injectate containing 2 milliliters of 1% lidocaine and 1 milliliter containing 40 mg of depo-medrol after aspiration was negative for heme.     A total of 80 mg of Depo-Medrol was used for the procedure.  The patient tolerated the procedure well and there no immediate complications.      Preporcedure pain: 6/10 right, 7/10 left  Poste procedure pain: 2/10 right, 2/10 left

## 2021-05-31 NOTE — PATIENT INSTRUCTIONS - HE
Joellen Bustamante, DO Nuvia Mejia, YEE Aguilar,  DO Nguyen Burrell, YEE Steen PA-C, DC                                                                      TRUNG Sinclair PA-C    DISCHARGE INSTRUCTIONS  During office hours (8:00 a.m.- 4:30 p.m.) questions or concerns may be answered  by calling Spine Navigation Nurses at [phone]. If you experience any problems after hours please call [phone] and you will be connected to [facility].     All Patients:  ? You may experience an increase in your symptoms or have some soreness from the injection for the first 2 days (It may take anywhere between 2 days- 2 weeks for the steroid to have maximum effect).  ? You may use ice on the injection site, as frequently as 20 minutes each hour if needed.  ? You may continue taking your regular medication.  ? You may shower. No swimming, tub bath or hot tub for 48 hours.  You may remove your   bandaid/bandage as soon as you are home.  ? You may resume light activities, as tolerated unless otherwise directed.  ? Resume your usual diet as tolerated.      POSSIBLE STEROID SIDE EFFECTS   (If steroid/cortisone was used for your procedure)  -If you experience these symptoms, it should only last for a short period  Swelling of the legs        Skin redness (flushing)  Mouth (oral) irritation   Blood sugar (glucose) levels      Sweats                          Mood changes  Severe headache                  Sleeplessness            POSSIBLE PROCEDURE SIDE EFFECTS  -Call the Spine Center if you are concerned  Increased Pain      Bruising/bleeding at site                  Redness or swelling  Fever greater than 100.5            Diffuse rash            THESE INSTRUCTIONS HAVE BEEN EXPLAINED TO THE PATIENT AND THE PATIENT/PATIENT REPRESENTATITVE HAS  VERBALIZED UNDERSTANDING.  A COPY OF THE INSTRUCTIONS HAVE BEEN GIVEN TO THE PATIENT/PATIENT REPRESENTATIVE.

## 2021-06-01 ENCOUNTER — RECORDS - HEALTHEAST (OUTPATIENT)
Dept: ADMINISTRATIVE | Facility: CLINIC | Age: 76
End: 2021-06-01

## 2021-06-01 VITALS — WEIGHT: 161 LBS | BODY MASS INDEX: 27.64 KG/M2

## 2021-06-01 VITALS — BODY MASS INDEX: 27.66 KG/M2 | WEIGHT: 162 LBS | HEIGHT: 64 IN

## 2021-06-01 VITALS — WEIGHT: 162 LBS | HEIGHT: 64 IN | BODY MASS INDEX: 27.66 KG/M2

## 2021-06-01 NOTE — PROGRESS NOTES
Assessment:   Louise Benítez (AKAIRAM Booker)  is a 73 y.o. y.o. female with past medical history significant for rheumatoid arthritis, anemia, GERD who presents today for follow-up regarding acute back pain that occurred after a fall on August 30, 2019.  The patient sustained an L1 vertebral body compression fracture (closed fracture, subsequent encounter with routine healing).  There has been some continued height loss of the fracture, though the patient continues to improve clinically with regards to pain control.  She has been treated with a TLSO and activity restrictions which have helped improve her pain.   Her main concern today is acute right-sided low back pain without radicular/sciatica type pain that may be from sacroiliac joint pain in etiology.  She does have positive SI provocative maneuvers on exam today.  Patient does have rheumatoid arthritis and osteoporosis which puts her at risk for further fractures.       Plan:     A shared decision making plan was used.  The patient's values and choices were respected.  The following represents what was discussed and decided upon by the physician and the patient.      1.  DIAGNOSTIC TESTS: The patient's x-rays of the lumbar spine were personally reviewed today by the physician with the physician performing her own interpretation.  Patient does have approximately 50% height loss of the L1 vertebral body.  Per the radiology report, this is slightly progressed from previous, where it was approximately 35% height loss.  Discussed with the patient that she has had a progression of height loss.  This is likely due to the fact that her osteoporosis is untreated.  Emphasized to the patient that she needs to have the osteoporosis treated.  -X-rays of the lumbar spine can be obtained again in approximately 1 month to evaluate any progression of height loss of the L1 vertebral body.  If the patient decides to go on the cruise, she should have the x-rays done after the cruise.   If she decides not to go on the cruise, these x-rays should be done on or after October 31 of 2019..  2.  PHYSICAL THERAPY: The patient would benefit from physical therapy to work on SI mobilization exercises as well as core strengthening, pelvic floor strengthening, range of motion and stretching.  The patient wanted to go to the [facility] rehab so an order was provided today.  3.  MEDICATIONS:    -The patient can continue to take the Celebrex 200 mg twice a day as needed for pain.  -The patient had previously declined calcitonin, citing fear of the risks with this medication.  -The patient can continue using the lidocaine ointment as needed.  -The patient can continue using the baclofen as needed.  -The patient had previously discontinued the hydrocodone secondary to constipation.  4.  INTERVENTIONS: The patient may benefit from a right sacroiliac joint injection if she fails to have relief with physical therapy.  5.  PATIENT EDUCATION:    -The patient has a  cruise later this month.  She is supposed to leave on October 25.  She is wondering if she can still go on the cruise.  If the patient feels like she can tolerate going on the cruise, she could still go.  She will not likely worsen anything with regards to her back, but if her pain is not under good control and she cannot walk or stand for long periods of time she may not be able to enjoy very much.  If she feels that she cannot go on the cruise, Dr. Julian would be happy to fill out the paperwork to try and get her refund.  - Restrictions should continue:  Patient should not lift more than 5 pounds.  She should avoid bending and twisting at the waist.  -Patient should continue wearing her brace when she is up out of bed for another month.  She does not like the brace because it is uncomfortable.  She was encouraged to return to the medical equipment placed to have it adjusted, but she states that she has already done this and it remains  uncomfortable.  -Emphasized to the patient the importance of starting the Prolia treatment for osteoporosis given this fracture and the progression of the fracture.  The endocrinology clinic was contacted today to have them recall the patient to schedule the Prolia infusion.  She was also encouraged to call their office to set up this appointment.  It was strongly emphasized that she have her osteoporosis treated.  Discussed the progression of the fracture could be related to her untreated osteoporosis.  -The risks of a hip fracture were discussed with the patient including the significant morbidity and mortality associated with a hip fracture secondary to osteoporosis.  -All of her questions were answered to her satisfaction today.  She was in agreement with the treatment plan.  6.  FOLLOW-UP: The patient is asked to follow-up in approximately 1 month.  If patient decides to go on the cruise, she is welcome to follow-up prior to the cruise.  If she decides not to go on the cruise, then she should follow-up in approximately 1 month.  If there are any questions/concerns or any significant worsening of pain prior to that time, the patient is asked to call the clinic via the nurse navigation line or via [employer].     Subjective:     Louise Benítez (AKAIRAM Booker)  is a 73 y.o. female who presents today for follow-up regarding pain related to an L1 compression fracture.  She reports that the pain continues to improve.  She really does not have any pain at the level of L1.  Her pain is all right low back with some radiation into the right buttock.  This is most irritating pain to her.  She wonders if this is actually from the fracture of this is a secondary problem.  She did fall, and she would not be surprised if there is another area that is contributing to her pain.  She is rating her pain today at a 7 out of 10.  At worst it is a 10 out of 10.  At best it is a 4 out of 10.  Her pain is worse with walking and with  standing.  She can only walk a few feet before pain limits her.  She can only stand for about 10 minutes before she has to sit down due to the pain.  She is very concerned because she has a cruise coming up on October 25.  They will be flying to [place] for the cruise.  She worries that she will not be able to enjoy her time there because she cannot stand or walk for very long.  She is wondering if she may be she should cancel the cruise.  She does feel better with sitting down and with lying down.  She has been using Celebrex 200 mg twice a day which does help somewhat.  She is also been rubbing CBD cream over the low back area and this seems to help some as well.  She is wondering how much longer she has to wear the back brace for as it is quite uncomfortable for her.  She is wondering the results of her x-rays today.    Past medical history is reviewed and is unchanged for any new medical diagnoses in the interim.      Family history is reviewed and is unchanged in the interim.      Review of Systems:  Negative for numbness/tingling, weakness, bowel/bladder dysfunction, foot drop, headache, dizziness, nausea/vomiting, blurred vision, balance changes.     Objective:   CONSTITUTIONAL:  Vital signs as above.  No acute distress.  The patient is well nourished and well groomed.    PSYCHIATRIC:  The patient is awake, alert, oriented to person, place and time.  The patient is answering questions appropriately with clear speech.  Normal affect.  SKIN:  Skin over the face, posterior torso, bilateral upper and lower extremities is clean, dry, intact without rashes.  MUSCULOSKELETAL:  Gait is non-antalgic.  The patient is able to heel and toe walk without any difficulty.  The patient does not have any tenderness to percussion over the spinous processes from approximately T10 down to L3.  Patient does have significant tenderness over the right SI joint.  The patient has 5/5 strength for the bilateral hip flexors, knee  flexors/extensors, ankle dorsiflexors/plantar flexors, ankle evertors/invertors.   Fabere's test is positive on the right side for pain localizing to the right SI joint.  The patient declines to perform Yeomans test, stating that she cannot lay on her stomach.  Gaenslen's test was deferred given her history of a right total hip arthroplasty.  The patient does not have any significant pain with one leg at standing stork test bilaterally.  NEUROLOGICAL:  Trace patellar, medial hamstring, achilles reflexes which are symmetric bilaterally.  No ankle clonus bilaterally.  Sensation to light touch is intact in the bilateral L4, L5, and S1 dermatomes.

## 2021-06-01 NOTE — PATIENT INSTRUCTIONS - HE
Please call endocrinology back to schedule your Prolia appointment.  If you have further fractures, you have a high morbidity/mortality risk.  You are to fracture your hip, there is a high chance that you would have to enter nursing home and that you would never be able to function without assistance from the nursing home there is also a large chance that you would not live past a year from injury if you sustained a hip fracture.    An order for physical therapy has been provided today.  You can schedule physical therapy before you leave today or someone will call you to schedule physical therapy.  It will be very important for you to do your physical therapy exercises on a regular basis to decrease your pain and prevent future flares of pain.    Xrays for the lumbar spine are ordered.  Please have these done in 1 month.  If you decide to go on the cruise, have the xrays done AFTER the cruise.    Please follow-up with Dr. Julian in 1 month (have your xrays done before this visit UNLESS you are going on the cruise and you want to follow-up before the cruise.  If you follow-up before the cruise, you will have the xrays done AFTER the cruise).    Please wear your brace for another month.    If you decide not to go on the cruise, please send Dr. Julian the paperwork and she would be happy to fill out the paperwork for why you will cannot go on the cruise.

## 2021-06-01 NOTE — PROGRESS NOTES
Assessment:   Louise Benítez is a 73 y.o. y.o. female with past medical history significant for osteoporosis, seronegative rheumatoid arthritis who presents today for follow-up regarding acute low back pain after a fall from standing height 1 week ago.  CT lumbar spine showed an acute appearing 10% compression fracture of the L1 vertebral body.  Pain extends into the buttocks which I believe is referred pain related to the fracture.  She had point tenderness over the L1 spinous process on exam.  She was neurologically intact.       Plan:     A shared decision making plan was used.  The patient's values and choices were respected.  The following represents what was discussed and decided upon by the physician assistant and the patient.      1.  DIAGNOSTIC TESTS: I reviewed the report of the CT lumbar spine from [facility] on August 30, 2019.  I also reviewed the upright AP and lateral lumbar spine x-rays from [facility].  I  ordered repeat AP and lateral lumbar spine x-rays to be performed in 4 weeks to monitor the fracture.    2.  PHYSICAL THERAPY: No physical therapy was ordered.    3.  MEDICATIONS:   -I provided a prescription for hydrocodone/acetaminophen 5/325 mg 1 tab every 8 hours as needed, #30 with no refills.  I reviewed the risk of the medication.  I checked the [place] prescription monitoring database.  She received 6 tabs of Percocet on September 3, 2019.  She had previously received 20 tabs of Percocet on August 31, 2019.  I chose not to refill Percocet because Percocet has been constipating.  Hopefully she will tolerate Vicodin better.  I did encourage her to continue using MiraLAX.  -I also provided a prescription for calcitonin nasal spray.    4.  INTERVENTIONS: No interventions were ordered.    5.  REFERRALS: I entered a referral to [facility] to get a TLSO brace.  Patient should wear this brace when she is up out of bed.  She does not need to wear  when she is sleeping or showering.    6.  PATIENT EDUCATION: Patient should not lift more than 5 pounds.  She should avoid bending and twisting at the waist.  -Patient saw Dr. Ceballos on April 20, 2019.  At that time it was recommended that she start Prolia.  It appears endocrinology attempted to contact her to schedule Prolia injection but they were unable to reach her.  She states that she did decided not to pursue Prolia due to concern for side effects.  I expand to the patient that the risk of not treating her osteoporosis outweighs the risk of medication.  She is at high risk for additional fracture.  I encouraged the patient to call endocrinology to begin treatment of osteoporosis with Prolia.    7.  FOLLOW-UP: I will see the patient back in clinic in 10 days to follow-up and refill medication if needed.  She has questions or concerns in the meantime, she should not hesitate to call.    Subjective:     Louise Benítez is a 73 y.o. female who presents today for follow-up regarding acute low back pain.  Pain began after a fall 1 week ago.  Patient states that she was applying WD-40 to a lock at her cabin.  She reached forward and twisted to put the WD-40 into a [place].  She states that she lost her balance and slipped because she was wearing plastic Birkenstocks.  She fell and landed under a table.  She had abrupt onset of severe low back pain.  Pain became so severe that she went to the local emergency department.  A CT scan was obtained which revealed an acute appearing L1 compression fracture.    Patient complains of low back pain.  Patient states that she has midline lower back pain.  Pain extends into the buttocks bilaterally.  Pain extends to the proximal posterior thighs.  She states that with certain movements she experiences muscle spasms which started in the buttocks and radiate up the back.  He denies any pain radiating further down the legs.  She denies any numbness or tingling down the legs.  She  states that she feels generally weak but denies focal weakness.  She denies any loss of bowel or bladder control.  She states that she developed constipation after taking Percocet.  She stopped taking Percocet approximately 2 days ago.  She does not feel that her pain is under adequate control.  She rates her pain today as a 10 out of 10.  Pain is aggravated with transitioning from seated to standing, twisting, coughing.  Pain is alleviated with sitting and lying down and sleeping.    Patient has been using oxycodone up until 2 days ago.  This helped with her pain because constipation.  She has been using MiraLAX and had a bowel movement.  She uses baclofen 10 mill grams twice daily.  She uses naproxen 500 mg twice daily.  She stopped taking gabapentin due to side effects.    Past medical history is reviewed and is pertinent for the following emergency department visit.    Review of Systems:  Positive for generalized weakness.  Negative for numbness/tingling, loss of bowel/bladder control, inability to urinate, headache, pain much worse at night, trip/stumble/falls, difficulty swallowing, difficulty with hand skills, fevers, unintentional weight loss.     Objective:   CONSTITUTIONAL:  Vital signs as above.  No acute distress.  The patient is well nourished and well groomed.    PSYCHIATRIC:  The patient is awake, alert, oriented to person, place and time.  The patient is answering questions appropriately with clear speech.  Normal affect.  HEENT: Normocephalic, atraumatic.  Sclera clear.    SKIN:  Skin over the face, posterior torso, bilateral upper and lower extremities is clean, dry, intact without rashes.  MUSCULOSKELETAL:  Gait is guarded.  The patient is able to rise onto toes and heels bilaterally with support.  Moderate tenderness over the L1 spinous process.  Patient describes a soreness to palpation along the lower lumbar paraspinous muscles and gluteal muscles.  The patient has 5/5 strength for the  bilateral hip flexors, knee flexors/extensors, ankle dorsiflexors/plantar flexors, ankle evertors/invertors.    NEUROLOGICAL: Trace patellar, achilles reflexes which are symmetric bilaterally.  No ankle clonus bilaterally.  Sensation to light touch is intact in the bilateral L4, L5, and S1 dermatomes.       RESULTS: I reviewed the report of a CT lumbar spine from [facility] from August 30, 2019.  This shows an acute appearing 10% fracture of the L1 vertebral body.  There is also multilevel degenerative disc disease with levocurvature of the thoracolumbar spine and multilevel facet arthropathy.    Upright AP and lateral lumbar spine x-rays from [facility] dated August 31, 2019:  XR SPINE LUMBAR 3 VIEWS    INDICATION:  BACK INJURY; MUSCULOSKELETAL PROBLEM; FALL    COMPARISON:  None.    FINDINGS:  AP lateral cone-down lateral L5-S1 views lumbar spine obtained. Rotatory  scoliotic curvature is present in the lumbar spine convex to the patient's  left, measuring about 27 degrees.  Extensive multilevel lower thoracic and  lumbar degenerative disc disease and spondylosis.  Changes are considered  moderate in severity.  There is grade 1 anterolisthesis at L4-5 measuring about  5 mm.  There is about 2 mm anterolisthesis at L5-S1.  Compression deformity at  L1 is present, of uncertain acuity.  Severe diffuse osseous demineralization.  Lower lumbar facet arthropathy.  Sacroiliac osteoarthritis.  Bilateral total  hip arthroplasties have been performed.    IMPRESSION  1. Superior endplate compression deformity of L1, age indeterminate.  CT or MRI  correlation may be considered.  2. Extensive multilevel lumbar degenerative disc disease and spondylosis.  Degenerative anterolisthesis at L4-5 and L5-S1.  3. Rotatory scoliotic curvature lumbar spine convex to the patient's left.  4. Diffuse osseous demineralization.

## 2021-06-01 NOTE — PROGRESS NOTES
Assessment:   Louise Benítez is a 73 y.o. y.o. female with past medical history significant for osteoporosis, seronegative rheumatoid arthritis who presents today for follow-up regarding acute low back pain after a fall from standing height August 30 (2.5 weeks ago).  CT lumbar spine showed an acute appearing 10% compression fracture of the L1 vertebral body.  Pain extends into the buttocks which I believe is referred pain related to the fracture.  We are treating the fracture with a TLSO brace and activity restrictions.  Patient reports improvement in her pain over the past 10 days.  She remains neurologically intact.       Plan:     A shared decision making plan was used.  The patient's values and choices were respected.  The following represents what was discussed and decided upon by the physician assistant and the patient.      1.  DIAGNOSTIC TESTS: I reviewed the report of the CT lumbar spine from [facility] on August 30, 2019.  I also reviewed the upright AP and lateral lumbar spine x-rays from [facility].  Patient will have follow-up AP and lateral lumbar spine x-rays to monitor the fracture in about a week and a half (4 weeks after her fall).  She request that this be done at [place] [facility] in [place].    2.  PHYSICAL THERAPY: No physical therapy was ordered.    3.  MEDICATIONS:   -I refilled the patient's naproxen 500 mill grams twice daily as needed.  -Patient has not taken Vicodin.  This causes constipation.  -Patient did not start calcitonin due to concern for side effects.  I did educate the patient that this is a low risk medication, but if pain is under adequate control with naproxen she does not have to take the calcitonin.  -Patient can continue using lidocaine ointment as needed.  -Patient can continue using baclofen as needed.    4.  INTERVENTIONS: No interventions were ordered.    5.  PATIENT EDUCATION: Patient should not lift more than 5 pounds.   She should avoid bending and twisting at the waist.  -Patient will continue wearing her brace when she is up out of bed.  -I again strongly urged the patient to schedule an appointment for her Prolia treatment for osteoporosis.    6.  FOLLOW-UP: Patient will follow-up in about 2 weeks with Dr. Julian.  She has questions or concerns in the meantime, she should not hesitate to call.    Subjective:     Louise Benítez is a 73 y.o. female who presents today for follow-up regarding acute low back pain which began after a fall from standing height on August 30, 2019.  I saw the patient on September 6 7/2019.  At that time I prescribed a TLSO brace.  Also prescribed Vicodin and calcitonin.  Patient states that her pain has improved slightly since she was last seen.    Patient complains of low back pain.  Patient states that she has midline lower back pain.  Pain extends into the right buttock.  She denies any pain further down the legs.  She denies any numbness, tingling, or weakness down the legs.  She rates her pain today as an 8 of 10.  At its worst it is an 8-10.  At its best it is an 8-10.  Pain is aggravated with standing and alleviated with lying down.  Patient states that the brace is helping provide significant relief of her back pain, although she is having some discomfort at her hips and ribs from the brace itself.    Patient been wearing her brace when she is up out of bed.  Patient is using naproxen 500 mg once or twice daily for pain.  She request a refill.  She also uses baclofen as needed and lidocaine ointment as needed.  She has not been taking the Vicodin due to constipation.  She did not start the calcitonin due to concern for side effects.    Past medical history is reviewed and is pertinent for the following emergency department visit.    Review of Systems:  Negative for numbness/tingling, weakness, loss of bowel/bladder control, inability to urinate, headache, pain much worse at night,  trip/stumble/falls, difficulty swallowing, difficulty with hand skills, fevers, unintentional weight loss.     Objective:   CONSTITUTIONAL:  Vital signs as above.  No acute distress.  The patient is well nourished and well groomed.  Patient arrives wearing her TLSO brace.  PSYCHIATRIC:  The patient is awake, alert, oriented to person, place and time.  The patient is answering questions appropriately with clear speech.  Normal affect.  HEENT: Normocephalic, atraumatic.  Sclera clear.    SKIN:  Skin over the face, posterior torso, bilateral upper and lower extremities is clean, dry, intact without rashes.  MUSCULOSKELETAL:  Gait is guarded.  The patient is able to rise onto toes and heels bilaterally with support.  No significant tenderness over the L1 spinous process.  Patient describes tenderness palpation along the right PSIS.  NEUROLOGICAL: Trace patellar, achilles reflexes which are symmetric bilaterally.  No ankle clonus bilaterally.  Sensation to light touch is intact in the bilateral L4, L5, and S1 dermatomes.       RESULTS: I reviewed the report of a CT lumbar spine from [facility] from August 30, 2019.  This shows an acute appearing 10% fracture of the L1 vertebral body.  There is also multilevel degenerative disc disease with levocurvature of the thoracolumbar spine and multilevel facet arthropathy.    Upright AP and lateral lumbar spine x-rays from [facility] dated August 31, 2019:  XR SPINE LUMBAR 3 VIEWS    INDICATION:  BACK INJURY; MUSCULOSKELETAL PROBLEM; FALL    COMPARISON:  None.    FINDINGS:  AP lateral cone-down lateral L5-S1 views lumbar spine obtained. Rotatory  scoliotic curvature is present in the lumbar spine convex to the patient's  left, measuring about 27 degrees.  Extensive multilevel lower thoracic and  lumbar degenerative disc disease and spondylosis.  Changes are considered  moderate in severity.  There is grade 1 anterolisthesis at L4-5 measuring about  5  mm.  There is about 2 mm anterolisthesis at L5-S1.  Compression deformity at  L1 is present, of uncertain acuity.  Severe diffuse osseous demineralization.  Lower lumbar facet arthropathy.  Sacroiliac osteoarthritis.  Bilateral total  hip arthroplasties have been performed.    IMPRESSION  1. Superior endplate compression deformity of L1, age indeterminate.  CT or MRI  correlation may be considered.  2. Extensive multilevel lumbar degenerative disc disease and spondylosis.  Degenerative anterolisthesis at L4-5 and L5-S1.  3. Rotatory scoliotic curvature lumbar spine convex to the patient's left.  4. Diffuse osseous demineralization.    CT pelvis from [facility] dated August 30, 2019:  CT PELVIS MUSCULOSKELETAL AND LEFT HIP WO    INDICATION:  BACK INJURY; MUSCULOSKELETAL PROBLEM; FALL    TECHNIQUE:  Axial volumetric MDCT imaging was performed according to the standard protocol  without IV contrast. Multiplanar reconstruction images were generated.        While obtaining CT images, dose reduction techniques were utilized including:    Automated exposure control, adjustment of mA and/or kV according to patient  size, and/or use of iterative reconstruction technique    RADIATION DOSE:  300 DLP (mGy cm)    COMPARISON:  Hip and pelvic radiographs 08/30/2019    FINDINGS:  Postoperative changes from bilateral total hip arthroplasties.  No acute  complication or perihardware lucency to suggest loosening.  Bones are decreased  in mineralization.  No displaced fracture or malalignment.  Mild-to-moderate  degenerative changes of both sacroiliac joints.  Postoperative decompression to  the lower lumbar spine.  Multilevel lower lumbar facet arthropathy.  The  included lower abdominal pelvic contents are unremarkable.  Scattered pelvic  phleboliths.  4.4 x 3.2 transverse by 8.4 cm craniocaudal increased density  within the posterolateral left thigh (series 14, image 618), likely hematoma.    IMPRESSION:  1.  Probable hematoma along the left posterolateral thigh.  2. Postsurgical changes of bilateral total hip arthroplasties.  No acute  fracture.  3. Osteopenia.

## 2021-06-01 NOTE — TELEPHONE ENCOUNTER
Dr. Julian has reached out to one of the osteoporosis specialists to see if she can be seen sooner.  Prolia needs to be ordered by their clinic as Dr. Julian is not familiar with the dose for this medication.

## 2021-06-01 NOTE — PROGRESS NOTES
"ASSESSMENT AND PLAN:  Louise Benítez 73 y.o. female is seen here on 09/12/19 for follow-up.  She has seronegative rheumatoid arthritis with several comorbidities including myelodysplastic syndrome with anemia she follows up with [place] [facility], unfortunately she had a fall resulting in compression fracture of the lumbar spine L1, was seen in spine clinic and is been given a brace.  She held her hydroxychloroquine.  We discussed the importance of staying on it.  She is due for eye examination in November.  Management principles of OA reviewed, prior to avoid nonsteroidals preferably, choice of acetaminophen will be the first 1.  I will ask her to return for follow-up here in 6 months.      Diagnoses and all orders for this visit:    Seronegative rheumatoid arthritis of multiple sites (H)  -     hydroxychloroquine (PLAQUENIL) 200 mg tablet; Take 2 tablets (400 mg total) by mouth at bedtime.  Dispense: 180 tablet; Refill: 0    Primary osteoarthritis involving multiple joints    Polyarthralgia      HISTORY OF PRESENTING ILLNESS:  Louise Benítez, 73 y.o., female is here for follow-up.  She was last seen here in February of this year.  Recently she was seen in spine clinic started on a brace, she is been getting narcotics for pain control.  She held her hydroxychloroquine doing this.  She is also taking less Celebrex especially when she is in other areas such as [place] she noted that she goes weeks without having to take it.  Apart from her low back pain other joint areas and not troublesome at all she noted no pain.  She gets her myelodysplasia management at the [place] [facility] and is due to have for the labs drawn, recent labs showed she remembers a hemoglobin of \"seven something \"she is due for eye examination this November..  She has not had fever weight loss blurry vision eye redness mouth ulcer nausea or rash.  She slipped on ice a few days ago landed on her \"tailbone\" this is been painful and is " "beginning to get better.  On her previous visit she had noted that it was 20 years ago when she developed pain stiffness and found to have elevated sed rate.  A diagnosis of polymyalgia rheumatica was made.  Thereafter she was felt to have rheumatoid arthritis.  Initially she followed up to the clinic in [place] where she was given methotrexate and azathioprine.  She recalls that initially the knees used to troubles her quite a bit.  She remembers having \"gallons of fluid\" removed from her knees.  These have not troubled her of late.  Meanwhile she developed complications from prednisone treatment.  She had AVN bilaterally.  That resulted in replacement of hip joints in 2000 and 2003.  She had been following up at [facility] for several years.  She was on hydroxychloroquine, methotrexate and azathioprine having been discontinued.  She is also been taking Celebrex on fairly regular basis.  She takes this once daily usually but sometimes 2 times.  This is for her widespread osteoarthritis.  Recently she was seen at spine clinic and had shoulder injections.  She has had lumbar spondylosis.  She has noted that it is hard for her to define the level of pain.  She would call this as \"medium\".  She noted severe pain in her shoulders however.  She had those injected 48 hours ago.  She is able to describe herself otherwise in good health apart from chronic anemia.  She has been seen in hematology.  This is microcytic.  Most recent labs showed 8.7.  She was asked to consider bone marrow biopsy.  She has chosen to defer that.  She has noted that she is able to walk couple of miles a day she cleans her own house and does a good job of it she feels.  She does not recognize her psoriasis is at herself or the family.  No history of ulcerative colitis or Crohn's disease.   Further historical information and ADL limitations as noted in the multidimensional health assessment questionnaire attached in the EMR.      " ALLERGIES:Patient has no known allergies.    PAST MEDICAL/ACTIVE PROBLEMS/MEDICATION/ FAMILY HISTORY/SOCIAL DATA:  The patient has a family history of  Past Medical History:   Diagnosis Date     Rheumatoid arthritis (H)      Social History     Tobacco Use   Smoking Status Former Smoker   Smokeless Tobacco Never Used     Patient Active Problem List   Diagnosis     Ptosis Of Eyelid     Lower Back Pain     Patient Counseling: End-Of-Life Issues     Osteoporosis     Seronegative rheumatoid arthritis of multiple sites (H)     Primary osteoarthritis involving multiple joints     Bilateral shoulder tendinopathy     Macrocytic anemia     Polyarthralgia     Current Outpatient Medications   Medication Sig Dispense Refill     baclofen (LIORESAL) 10 MG tablet Take 0.5-1 tablets (5-10 mg total) by mouth 2 (two) times a day as needed (for muscle spasms). 30 tablet 0     calcitonin, salmon, (MIACALCIN) 200 unit/actuation nasal spray Apply 1 spray into alternating nostrils daily. 3.7 mL 0     doxycycline (VIBRA-TABS) 100 MG tablet        gabapentin (NEURONTIN) 100 MG capsule 1 cap at bedtime x 3 days.  Then may take 2 caps at bedtime x 3 days, then may take 3 caps at bedtime 270 capsule 1     HYDROcodone-acetaminophen 5-325 mg per tablet Take 1 tablet by mouth 3 (three) times a day as needed for pain. 30 tablet 0     hydroxychloroquine (PLAQUENIL) 200 mg tablet TAKE TWO TABLETS BY MOUTH DAILY AT BEDTIME  180 tablet 0     lidocaine (XYLOCAINE) 5 % ointment Apply small amount (1gm) to shoulders three times daily as needed for pain 120 g 2     methylPREDNISolone (MEDROL DOSEPACK) 4 mg tablet Keeps on hand       naproxen (NAPROSYN) 500 MG tablet Take 500 mg by mouth.       oxyCODONE-acetaminophen (PERCOCET/ENDOCET) 5-325 mg per tablet TAKE 1 TABLET BY MOUTH EVERY 6 HOURS AS NEEDED FOR PAIN (MAX OF 4000 MG OF ACETAMINOPHEN FROM ALL SOURCES IN 24 HOURS)  0     zolpidem (AMBIEN) 10 mg tablet Take 0.5 tablets (5 mg total) by mouth at  bedtime as needed for sleep. Take 1 tablet by mouth at bedtime if needed for sleep. 30 tablet 1     celecoxib (CELEBREX) 200 MG capsule TAKE ONE CAPSULE BY MOUTH TWICE DAILY  180 capsule 0     omeprazole (PRILOSEC) 20 MG capsule Take 1 capsule (20 mg total) by mouth daily before breakfast. 90 capsule 3     No current facility-administered medications for this visit.      DETAILED EXAMINATION  09/12/19  :  Vitals:    09/12/19 1108   BP: 124/64   Patient Site: Right Arm   Patient Position: Sitting   Cuff Size: Adult Regular   Pulse: 76   Weight: 161 lb (73 kg)     Alert oriented. Head including the face is examined for malar rash, heliotropes, scarring, lupus pernio. Eyes examined for redness such as in episcleritis/scleritis, periorbital lesions.   Neck/ Face examined for parotid gland swelling, range of motion of neck.  Left upper and lower and right upper and lower extremities examined for tenderness, swelling, warmth of the appendicular joints, range of motion, edema, rash.  Some of the important findings included: [place] there is associated deformity, squaring the first CMC, no synovitis of palpable joints of upper extremities.  She has no effusion warmth JLT of the knees.  She is wearing a brace for stabilizing her spine.    LAB / IMAGING DATA:  ALT   Date Value Ref Range Status   02/12/2019 11 0 - 45 U/L Final   06/13/2018 11 0 - 45 U/L Final     Albumin   Date Value Ref Range Status   02/12/2019 4.2 3.5 - 5.0 g/dL Final   06/13/2018 4.5 3.5 - 5.0 g/dL Final     Creatinine   Date Value Ref Range Status   02/12/2019 0.68 0.60 - 1.10 mg/dL Final   06/13/2018 0.73 0.60 - 1.10 mg/dL Final       WBC   Date Value Ref Range Status   02/12/2019 5.5 4.0 - 11.0 thou/uL Final   06/13/2018 7.1 4.0 - 11.0 thou/uL Final     Hemoglobin   Date Value Ref Range Status   02/12/2019 8.7 (L) 12.0 - 16.0 g/dL Final   06/13/2018 9.4 (L) 12.0 - 16.0 g/dL Final   12/06/2017 8.7 (L) 12.0 - 16.0 g/dL Final     Platelets   Date Value  Ref Range Status   02/12/2019 537 (H) 140 - 440 thou/uL Final   06/13/2018 525 (H) 140 - 440 thou/uL Final   06/30/2016 458 (H) 140 - 440 thou/uL Final       Lab Results   Component Value Date    RF <15.0 06/13/2018

## 2021-06-01 NOTE — TELEPHONE ENCOUNTER
"Patient calling to inform provider that she called to make appointment for Prolia. \"I guess I am no longer an established patient there. I can't get in until December to see nurse practitioner. They don't even have an endocrinologist right now. I was told that maybe if Dr. Julian wrote the order for Prolia I would get in sooner. Please ask her.\"   "

## 2021-06-01 NOTE — PROGRESS NOTES
"S: Patient is a 74 y/o female, 5'4\", 161 lbs seen at our [facility] for the fitting and delivery of an Aspen Horizon 456 OTS TLSO on orders from Iveth Pratt PA-C for the treatment of Dx: Closed wedge compression fracture of first lumbar vertebra, initial encounter (H) [S32.010A]    O: Patient arrived with her  and was walking under her own power. Patient that she fell a few days ago at her cabin while fixing a hutch and has had significant back pain since that incident.    G: Patient's TLSO will support and stabilize her affected spinal vertebra with soft tissue compression in order to allow proper healing, aid in pain control, and prevent further injury during her healing period.     A: Patient was fit with the Horizon 456 OTS style TLSO. Patient's TLSO was adjusted, all straps and fasteners were properly tightened to provide optimal fit. After fitting was complete,, fit was deemed as optimal. Patient, her  and I then discussed care and use of her new TLSO and they were given written care and use instructions provided by the . Patient and her  reported they understood all instructions and that all of their questions were answered through the course of our appointment.     P: Patient was given my card and asked to call our office if there are any questions or concerns regarding the fit and function of her OTS TLSO in the future.   "

## 2021-06-01 NOTE — TELEPHONE ENCOUNTER
Phone call to patient to inform her provider has reached out to osteoporosis specialists herself to see if that will move up her appointment. Also explained that the Prolia is a medication managed by them. Stated understanding and appreciation for call back.

## 2021-06-02 VITALS — HEIGHT: 64 IN | BODY MASS INDEX: 27.49 KG/M2 | WEIGHT: 161 LBS

## 2021-06-02 VITALS — BODY MASS INDEX: 27.64 KG/M2 | WEIGHT: 161 LBS

## 2021-06-02 NOTE — PROGRESS NOTES
[facility] Daily Progress     Patient Name: Louise Benítez  Date: 10/25/2019  Visit #: 5  PTA visit #:  -  Referral Diagnosis:  Acute right-sided low back pain without sciatica  Referring provider: Zayra Carrasco*  Visit Diagnosis:     ICD-10-CM    1. Acute right-sided low back pain without sciatica M54.5    2. Sacro-iliac pain M53.3    3. Sacroiliac joint pain M53.3    4. Age-related osteoporosis with current pathological fracture with routine healing, subsequent encounter M80.00XD          Assessment:   The patient has tightness in her dura throughout especially at S2 and has tightness on the piriformis and gluteus medius on the right and some tightness in the sciatic nerve on the right side in the piriformis region.  Did manual therapy to aid in these.  After manual therapy the patient reported that she had less pain with standing and walking    HEP/POC compliance is  good .           Goal Status:  Pt. will demonstrate/verbalize independence in self-management of condition in : 4 weeks;12 weeks  Pt. will be independent with home exercise program in : 4 weeks;12 weeks  Pt. will report decreased intensity, frequency of : Pain;in 4 weeks;in 12 weeks  Patient will transfer: sit/stand;supine/sit;for car;for in/out of bed;for in/out of chair;with less pain;with less difficulty;in 4 weeks;in 12 weeks      Plan / Patient Education:     Plan for Next Visit: Add lower extremity strengthening. Progress stretches as tolerated. Go over proper posture. Progress stabilization- hip, core as tolerated. Work on balance and gait as needed. Assess ankle and knee ROM     Subjective:     Pain Rating: sharp pain right buttock with movement    The patient states that she is still sore in her right buttock SI region. Going down the stairs is sore she is doing it partially sideways was her stairs at home are narrow.  Today at the clinic she was able to go up and down the stairs safely but noted increased pain when  flexing her right hip and bringing it forward.  . She only feels ok if she doesn't move. Any transitional movements increase her pain. Her stretches have been going well at home. Her exercises lying down feel good. Patient has a follow up with Dr. Julian on 11/1/19. The patient reported that she was told that she no longer had to wear the brace at all times.  I told her not to just take it off all the time to slowly wean and also see what the x-ray says and what her MD says on Tuesday.  Discussed with the patient to avoid the slightly bent forward position as this puts extra strain on the SI.      Objective:   Stork test was negative today.  Tightness on the paraspinals of the spine but did not address these at this time will be working on this as needed after the patient gets the results from the x-rays.  I told the patient not to go past neutral if she has a total hip replacement.    The patient has tightness in her dura throughout especially at S2 and has tightness on the piriformis and gluteus medius on the right and some tightness in the sciatic nerve on the right side in the piriformis region.  Did manual therapy to aid in these.  After manual therapy the patient reported that she had less pain with standing and walking       Exercises:  Exercise #1: standing heel and toe raises x10  Comment #1: straight leg raise x10  Exercise #2: supine tighten your stomach and buttocks 5-10 seconds 5-10 reps  Comment #2: supine shotgun x10   Exercise #3: pressure to the left buttocks/piriformis for 3 minutes to get the muscle to relax  Comment #3: supine clamshells x10 orange band  Exercise #4: balance feet together 1 minute  Comment #4: Tandem stance x30sec  Exercise #5: Seated Hamstring stretch 2x30 sec each side  Comment #5: Supine Piriformis stretch x30sec  Exercise #6: Standing rows x10 blue band  Comment #6: Gentle leg pull    Treatment Today     TREATMENT MINUTES COMMENTS   Evaluation     Self-care/ Home management      Manual therapy 28   The patient has tightness in her dura throughout especially at S2 and has tightness on the piriformis and gluteus medius on the right and some tightness in the sciatic nerve on the right side in the piriformis region.  Did manual therapy to aid in these.  After manual therapy the patient reported that she had less pain with standing and walking   Neuromuscular Re-education     Therapeutic Activity     Therapeutic Exercises  Patient to continue with exercises at home   Gait training     Modality__________________                Total 28    Blank areas are intentional and mean the treatment did not include these items.       Adriana Lemos, PT  10/25/2019

## 2021-06-02 NOTE — TELEPHONE ENCOUNTER
Phone call to patient to review results and provider's recommendations. Results given to patient. She will continue to wear her back brace until she can discuss the images and next steps in care in person with Dr. Julian on Tuesday.

## 2021-06-02 NOTE — TELEPHONE ENCOUNTER
"Pt opting to cancel upcoming cruise. Paperwork dropped off by patient; this has been filled out by Dr. Julian, copied and sent to scanning. Pt notified and would like to  because of other pages she needs to submit for cancellation along with this info (ie charges, receipts, etc.)    When speaking with patient, she also brought up that the airlines also need proof that she is not able to travel. Pt requesting letter from physician to state that \"she is unable to travel because of her back\" or something similar to that.    Please advise.      "

## 2021-06-02 NOTE — TELEPHONE ENCOUNTER
Current diagnosis: M81.0  Last Prolia Injection Date: 10/08/19  Requested action for Cydney:  Insurance verification and schedule appt

## 2021-06-02 NOTE — TELEPHONE ENCOUNTER
"[facility] called back and wanted to clarify the XR Lumbar Spine 2 or 3 VWS order. Would like to talk to Dr. Julian. Informed [facility] that Dr. Julian is off today. Per [facility], the pt completed the lateral xray and left their office already. After the pt left the clinic, they realize that in a different comment on the order it also says, \"AP and lateral.\" Since the pt had already left their office, the pt will need to come back for the AP view if that is still needed. [facility] would like to know if Dr. Julian only wanted lateral or both AP and lateral since it was commented on the order on 2 different section. Please advise.      Reason for Exam   Priority: Routine   Lateral view only - for L1 vertebral body compression fx monitoring.   Dx: Compression fracture of L1 vertebra with routine healing, subsequent encounter [S32.010D (ICD-10-CM)]   Comments: AP and Lateral views       "

## 2021-06-02 NOTE — TELEPHONE ENCOUNTER
LMTCB. Please relay below message to pt.     Dr. Julian from [facility] spine care reached out to Dr. Ordonez to see Louise for osteo consult. Dr. Ordonez can see patient earlier appt.

## 2021-06-02 NOTE — PROGRESS NOTES
The following steps were completed to comply with the REMS program for Prolia:  1. Ordering provider has previously reviewed information in the Medication Guide and Patient Counseling Chart, including the serious risks of Prolia  and the symptoms of each risk and have been advised  to seek prompt medical attention if they have signs or symptoms of any of the serious risks.  2. Provided each patient a copy of the Medication Guide and Patient Brochure.  See MAR for administration details.   Indication: Prolia  (denosumab) is a prescription medicine used to treat osteoporosis in patients who:   Are at high risk for fracture, meaning patients who have had a fracture related to osteoporosis, or who have multiple risk factors for fracture; Cannot use another osteoporosis medicine or other osteoporosis medicines did not work well.   The timeline for early/late injections would be 4 weeks early and any time after the 6 month negrita. If a patient receives their injection late, then the subsequent injection would be 6 months from the date that they actually received the injection    1.  When was the last injection?  New    2.  Has insurance for this injection been verified?  Patient sign ABN and make agreement she will pay the injection price if her insurance doesn't cover for that    3.  Did you experience any new onset achiness or rashes that lasted for over a month with your previous Prolia injection?   Unknown    4.  Do you have a fever over 101?F or a new deep cough that is unusual for you today? No    5.  Have you started any new medications in the last 6 months that you were told could affect your immune system? These may have been prescribed by oncologist, transplant, rheumatology, or dermatology.   No    6.  In the last 6 months have you have gastric bypass or parathyroid surgery?   No    7.  Do you plan dental work requiring drilling into the bone such as implants/extractions or oral surgery in the next 2-3 months?    No

## 2021-06-02 NOTE — TELEPHONE ENCOUNTER
I called to call [facility] at [phone] but no one answered. I called the main line [phone] to confirm with the  if they are now able to see the lumbar xray order correctly. Per , they can now see the xray lumbar spine order.

## 2021-06-02 NOTE — TELEPHONE ENCOUNTER
Nurse navigation to call the patient and let her know that Dr. Ordonez has agreed to see her sooner than December.  His office will be calling her.  She is asked to please be aware that they will be calling.  If she misses the call, please return their call.

## 2021-06-02 NOTE — PROGRESS NOTES
Assessment:   Louise Benítez (AKAIRAM Booker) is a 73 y.o. y.o. female with past medical history significant for rheumatoid arthritis, anemia, GERD who presents today for follow-up regarding acute low back pain that occurred after a fall on August 30, 2019 where she sustained an L1 vertebral body compression fracture (closed fracture, subsequent encounter with routine healing).  The patient does not have any further height loss on this time on imaging (stable at 50% height loss), however she does have progression of her kyphosis going from 10 degrees to 15 degrees.  She has just initiated treatment for osteoporosis which is thought to be a contributing factor to her fracture.  At this time her main pain is right-sided low back pain with right buttock pain.  This is thought to be sacroiliac joint pain in etiology and not thought to be from her fracture.  She is not having relief with physical therapy.  She is without red flag symptoms.       Plan:     A shared decision making plan was used.  The patient's values and choices were respected.  The following represents what was discussed and decided upon by the physician and the patient.      1.  DIAGNOSTIC TESTS:    -The patient's lateral x-ray of the lumbar spine from October 25, 2019 was personally reviewed today by the physician with the physician performing her own interpretation.  The patient does not appear to have any progressive height loss from the last x-ray on September 30, 2019.  The height loss remains at approximately 50%.  She does have a 15 degree kyphosis but this does not appear to have progressed from her previous x-ray.  -Dr. Julian was called and spoke with the radiologist regarding her x-ray from September 30, 2019 to measure the kyphosis at that time.  The radiologist said that the kyphosis at that x-ray was 10 degrees.  2.  PHYSICAL THERAPY: The patient is encouraged to complete physical therapy as ordered.  3.  MEDICATIONS: The patient can continue  to take the Celebrex 200 mg twice a day as needed for pain.  -The patient can continue to take baclofen as needed.  -The patient can continue to use the lidocaine ointment as needed.   -Patient had previously declined calcitonin.  She states that she did  the prescription.  Dr. Julian encouraged her to use this to try and see if this would help relieve her pain.  The chances that she will have side effects are very low.  -Of note the patient has had severe constipation with hydrocodone.  4.  INTERVENTIONS: A right sacroiliac joint injection is ordered today.  This will be scheduled 2 weeks out to allow her time to see the neurosurgeon.  If the neurosurgeon does not feel that she should have the injection, it will be canceled.  Otherwise this injection can go forward.  5.  PATIENT EDUCATION:    -The patient is encouraged to continue wearing her brace while she is awake.  She can take it off for bathing and when she sleeps at night.  She should otherwise try and wear it at all times.  If she is going out for special occasions where she is dressed up, would be okay to take it off for an hour or so.  When she sees a neurosurgeon, the neurosurgeon can further determine if she needs to wear the brace or not.  -Patient did have her consultation with the osteoporosis clinic earlier this month.  She is encouraged to follow through with treatment.  She had her first Prolia infusion on October 7 of 2019 and will have Prolia infusions every 6 months.  -Dr. Julian did communicate directly with Dr. Putnam regarding whether or not the patient should be seen in the neurosurgery fracture clinic.  Dr. Putnam did recommend the patient come in for consultation.  A referral was placed today.  Dr. Julian emphasized to the patient that she should return neurosurgeries call if she misses their call to schedule an appointment.  -Dr. Julian did fill out the paperwork so the patient could try and get a refund from her cruise.  -All of  her questions were answered to her satisfaction today.  She was in agreement with the treatment plan.  6.  FOLLOW-UP: Patient can follow-up in 2 weeks for the SI joint injection (if cleared by the neurosurgeon).  If there are any questions/concerns or any significant worsening of pain prior to that time, the patient is asked to call the clinic via the nurse navigation line or [facility].     Subjective:     Louise Benítez (AKAIRAM Booker) is a 73 y.o. female who presents today for follow-up regarding continued low back pain and right buttock pain that occurred after a fall on August 30, 2019.  The patient feels that she has not received any improvement since she has gone through physical therapy for the right buttock pain.  The pain has not worsened, just has not improved.  She is rating her pain at a 0 when she is not moving, but when she does move it can be as high as a 10 out of 10.  At best when she is moving it is a 5 out of 10.  Any sort of normal daily movement great the pain.  She does feel better if she holds absolutely still/is at rest.  She denies any new symptoms at this time.  There is no radicular pain going down into the leg.  The pain just goes into the right buttock.  She denies any numbness tingling or weakness in the legs.  She is interested in a right sacroiliac joint injection.  She has been wearing her brace most of the time, but she does not like how uncomfortable it is and she would like to discontinue it as soon as possible.  She is taking the naproxen 500 mg once a day.  She is not taking the baclofen as a Celebrex at this time.    Past medical history is reviewed and is unchanged for any new medical diagnoses in the interim.      Family history is reviewed and is unchanged in the interim.      Review of Systems:  Negative for numbness/tingling, weakness, bowel/bladder dysfunction, foot drop, headache, dizziness, nausea/vomiting, blurred vision, balance changes.     Objective:   CONSTITUTIONAL:   Vital signs as above.  No acute distress.  The patient is well nourished and well groomed.    PSYCHIATRIC:  The patient is awake, alert, oriented to person, place and time.  The patient is answering questions appropriately with clear speech.  Normal affect.  SKIN:  Skin over the face, posterior torso, bilateral upper and lower extremities is clean, dry, intact without rashes.  MUSCULOSKELETAL:  Gait is non-antalgic.  The patient is able to heel and toe walk with mild difficulty.  No tenderness to percussion over the spinous processes of the lumbar spine.  Patient has some mild tenderness over the right L1 transverse process area.  Patient does have significant tenderness over the right SI joint.  No significant tenderness over the left SI joint.  Mild tenderness to palpation over the right buttock.       The patient has 5/5 strength for the bilateral hip flexors, knee flexors/extensors, ankle dorsiflexors/plantar flexors, ankle evertors/invertors.  Patient does have positive right straight leg raising test for reproduction of pain localizing to the right SI joint.  The patient does not have any significant pain with right hip range of motion testing.  Fabere's test and Gaenslen's tests are deferred given her right total hip arthroplasty.  Patient has significant difficulty with delays test, but this does not cause any significant pain.  NEUROLOGICAL: 1/4 patellar, with absent medial hamstring, and trace Achilles reflexes which are symmetric bilaterally.  No ankle clonus bilaterally.  Sensation to light touch is intact in the bilateral L4, L5, and S1 dermatomes.

## 2021-06-02 NOTE — TELEPHONE ENCOUNTER
----- Message from Patrick Thomson DO sent at 10/25/2019  3:34 PM CDT -----  Please call the patient let her know I reviewed x-ray of her lumbar spine as Dr. Julian is out of the office today.  The L1 compression fracture remains unchanged.  Recommended she continue with recommendations from Dr. Julian.

## 2021-06-02 NOTE — PROGRESS NOTES
[facility] Certification Request    October 4, 2019      Patient: Louise Benítez  MR Number: [id number]  YOB: 1945  Date of Visit: 10/4/2019      Dear Joellen Galeana DO:    Thank you for this referral.   We are seeing Louise Benítez for Physical Therapy of   M54.5 (ICD-10-CM) - Acute right-sided low back pain without sciatica   M53.3 (ICD-10-CM) - Sacroiliac joint pain     .    Medicare and/or Medicaid requires physician review and approval of the treatment plan. Please review the plan of care and verify that you agree with the therapy plan of care by co-signing this note.      Plan of Care  Authorization / Certification Start Date: 10/04/19  Authorization / Certification End Date: 01/04/20  Authorization / Certification Number of Visits: 6-12  Communication with: Referral Source  Patient Related Instruction: Nature of Condition;Treatment plan and rationale;Basis of treatment;Body mechanics;Posture;Next steps;Expected outcome  Times per Week: 1-2  Number of Weeks: 4-12  Number of Visits: 4-12  Discharge Planning: when goals are met or are progressing   Precautions / Restrictions : RA. osteopoosis, bilateral MICHAEL  Therapeutic Exercise: ROM;Stretching;Strengthening  Neuromuscular Reeducation: kinesio tape;posture;balance/proprioception;core  Manual Therapy: soft tissue mobilization  Modalities: electrical stimulation;TENS;cold pack;hot pack  Gait Training: as needed  Equipment: theraband      Goals:  Pt. will demonstrate/verbalize independence in self-management of condition in : 4 weeks;12 weeks  Pt. will be independent with home exercise program in : 4 weeks;12 weeks  Pt. will report decreased intensity, frequency of : Pain;in 4 weeks;in 12 weeks  Patient will transfer: sit/stand;supine/sit;for car;for in/out of bed;for in/out of chair;with less pain;with less difficulty;in 4 weeks;in 12 weeks          If you have any questions or concerns, please don't hesitate to  call.    Sincerely,      Adriana Lemos, PT        Physician recommendation:     _x__ Follow therapist's recommendation        ___ Modify therapy      *Physician co-signature indicates they certify the need for these services furnished within this plan and while under their care.  Diagnosis: Right low back pain thought to be SI joint pain in etiology.  Language:  English  Contraindications/Precautions: L1 compression fracture - routine healing.  Hx of B/L THAs.  Please work on core strengthening, ROM, stretching.  SI self correction exercises.  HEP.  Modalities as needed (not greater than 15% of the total treatment time).  Gentle manual medicine as needed.    4-6 visits.  [facility]   [facility] Initial Evaluation    Patient Name: Louise Benítez  Date of evaluation: 10/4/2019  Referral Diagnosis: Acute right-sided low back pain without sciatica  Referring provider: Zayra Carrasco*  Visit Diagnosis:     ICD-10-CM    1. Acute right-sided low back pain without sciatica M54.5    2. Sacro-iliac pain M53.3        Assessment:    Patient is 5 weeks compression fracture with tight left piriformis most likely causing the  ariable sacrum position and significantly tight muscle on the left pirformis, to assess SI further as we go along,  The patient is at moderate risk for falling.  Pt. is appropriate for skilled PT intervention as outlined in the Plan of Care (POC).  Pt. is a good candidate for skilled PT services to improve pain levels and function.  Skilled Physical Therapy needed to increase ROM, increase strength, decrease pain and improve functional status.     Goals:  Pt. will demonstrate/verbalize independence in self-management of condition in : 4 weeks;12 weeks  Pt. will be independent with home exercise program in : 4 weeks;12 weeks  Pt. will report decreased intensity, frequency of : Pain;in 4 weeks;in 12 weeks  Patient will transfer: sit/stand;supine/sit;for car;for in/out of  bed;for in/out of chair;with less pain;with less difficulty;in 4 weeks;in 12 weeks    The patient was involved in setting the goals.    Patient's expectations/goals are realistic.    Barriers to Learning or Achieving Goals:  No Barriers.       Plan / Patient Instructions:        Plan of Care:   Authorization / Certification Start Date: 10/04/19  Authorization / Certification End Date: 01/04/20  Authorization / Certification Number of Visits: 6-12  Communication with: Referral Source  Patient Related Instruction: Nature of Condition;Treatment plan and rationale;Basis of treatment;Body mechanics;Posture;Next steps;Expected outcome  Times per Week: 1-2  Number of Weeks: 4-12  Number of Visits: 4-12  Discharge Planning: when goals are met or are progressing   Precautions / Restrictions : RA. osteopoosis, bilateral MICHAEL  Therapeutic Exercise: ROM;Stretching;Strengthening  Neuromuscular Reeducation: kinesio tape;posture;balance/proprioception;core  Manual Therapy: soft tissue mobilization  Modalities: electrical stimulation;TENS;cold pack;hot pack  Gait Training: as needed  Equipment: theraband      Plan for next visit: OK to do slight flexion to check the motion of the SI joint for assessment just not repetitive motion.  Add in gentle stretching as tolerated to the LE but MICHAEL bilaterally.  Go over proper posture.  Progress stabilization as tolerated. Work on balance and gait as needed. Check SLR, assess ankle and knee ROM      Subjective:           History of Present Illness:    Louise is a 73 y.o. female who presents to therapy today with complaints of a fall August 30, 5 weeks ago today she fell no pervious pain in her buttock.off to the left  And ended up under a chair.  Bruised buttocks, leg and arm.  She fell on a hard floor.   Sharp pain  In the buttocks pain the right EILEEN region more than the left  And along the right side of the SI.  Tender to the touch and constant pain but it will get better heat helps for 5-10  min  Have not tried cold Onset was related to trauma. Symptoms are constant and getting better.   No Numbness tingling or buring no weakness.          Pain Ratin when sitting  Pain rating at best: 0  Pain rating at worst: 10  Pain description: sharp  Pain with transitional movements  Sit to stand getting into bed really flares not doing house work not liftring bending twisting       Worried about losing her balance    Functional limitations are described as occurring with:   bending  lifting  personal cares dressing lower body and donning shoes and socks  standing increased pain after 5-10 minutes  transitional movements getting in  bed, chair and car and getting out of  bed, chair and car    Patient reports benefit from:  rest  , anti-inflammatory, heat         Objective:      Note: Items left blank indicates the item was not performed or not indicated at the time of the evaluation.    Variable sacrum position and significantly tight muscle on the left pirformis        Patient Outcome Measures :        Examination  1. Acute right-sided low back pain without sciatica     2. Sacro-iliac pain       Involved side: Right  Posture Observation:      General sitting posture is  fair posture the patient is wearing a back support to give her spine support.    Lumbar ROM:  Not able to test as the patient has a compression fracture and is at 5 weeks post  Date:      *Indicate scale AROM AROM AROM   Lumbar Flexion      Lumbar Extension       Right Left Right Left Right Left   Lumbar Sidebending         Lumbar Rotation         Thoracic Flexion      Thoracic Extension      Thoracic Sidebending         Thoracic Rotation           Lower Extremity Strength:     Date:      LE strength/ Right Left Right Left Right Left   Hip Flexion (L1-3)         Hip Extension (L5-S1)         Hip Abduction (L4-5)         Hip Adduction (L2-3)         Hip External Rotation         Hip Internal Rotation         Knee Extension (L3-4) 4/5 4+/5        Knee Flexion 5/5 5/5       Ankle Dorsiflexion (L4-5) 5/5 5/5       Great Toe Extension (L5) 5/5 5/5       Ankle Plantar flexion (S1)         Abdominals        Sensation           Reflex Testing  Lumbar Dermatomes Right Left UE Reflexes Right Left   Iliac Crest and Groin (L1)   Biceps (C5-6)     Anterior Medial Thigh (L2)   Brachioradialis (C5-6)     Anterior Thigh, Medial Epicondyle Femur (L3)   Triceps (C7-8)     Lateral Thigh, Anterior Knee, Medial Leg/Malleolus (L4)   Joes s test     Lateral Leg, Dorsal Foot (L5)   LE Reflexes     Lateral Foot (S1)   Patellar (L3-4)     Posterior Leg (S2)   Achilles (S1-2)     Other:   Babinski Response       Palpation: Variable sacrum position and significantly tight muscle on the left pirformis. After STM, MFR to the left piriformis the SI was then neutral.  The patient had significant pain with transitional movements especially standing to supine she was instructed in the proper way to go seated to supine.    The patient is at moderate risk for falls 23/28 tinetti score  Lumbar Special Tests:     Lumbar Special Tests Right Left SI Tests Right  Left   Quadrant test   SI Compression     Straight leg raise   SI Distraction     Crossover response   POSH Test     Slump   Sacral Thrust     Sit-up test  FADIR     Trunk extensor endurance test  Resisted Abduction     Prone instability test  Other:     Pubic shotgun  Other:     Significantly tight hamstrings bilaterally moderate tight ER bilaterally did not assess IR as patient has bilateral MICHAEL    Treatment Today     TREATMENT MINUTES COMMENTS   Evaluation 25 See evaluation   Self-care/ Home management     Manual therapy 15 STM, MFR to the left piriformis, gluteals as welll as the lateral hip   Neuromuscular Re-education 3 Standing balance HEP   Therapeutic Activity     Therapeutic Exercises 10 See flow sheet   Gait training     Modality__________________                Total 53    Blank areas are intentional and mean the  treatment did not include these items.     PT Evaluation Code: (Please list factors)  Patient History/Comorbidities: none  Examination: compression fracture and SI pain  Clinical Presentation: stable  Clinical Decision Making: low    Patient History/  Comorbidities Examination  (body structures and functions, activity limitations, and/or participation restrictions) Clinical Presentation Clinical Decision Making (Complexity)   No documented Comorbidities or personal factors 1-2 Elements Stable and/or uncomplicated Low   1-2 documented comorbidities or personal factor 3 Elements Evolving clinical presentation with changing characteristics Moderate   3-4 documented comorbidities or personal factors 4 or more Unstable and unpredictable High              Adriana Lemos PT  10/4/2019  8:09 AM

## 2021-06-02 NOTE — TELEPHONE ENCOUNTER
[facility] called the emergency line stating that pt is at their office for an xray now. However, the xray order was ordered incorrectly so they cannot view the order. To please have Dr. Julian re-order it as external to [facility]. Informed [facility] that Dr. Julian is out of the clinic today but I will have someone release the order correctly.

## 2021-06-02 NOTE — PROGRESS NOTES
ASSESSMENT:  1. Age-related osteoporosis with current pathological fracture with routine healing, subsequent encounter  Louise sustained an L1 compression fracture in a fall on August 30.  I would regard this as a fragility fracture.  She also had a history of a left proximal humerus fracture 3 years ago.  Both hips have been replaced due to a diagnosis of avascular necrosis.  Lowest T score was -2.8 in the wrist on recent bone density study.  She has multiple risk factors for low bone density.  She was treated with prolonged steroid therapy in her 50s for a diagnosis of polymyalgia rheumatica.  She is now treated for seronegative rheumatoid arthritis.  She reports previous treatment with alendronate and raloxifene years ago.  She has not had recent antiresorptive therapy.  She will be screened for secondary causes of low bone density.  Management options were discussed including antiresorptive agents and anabolic agents.  I would favor use of an anabolic agent.  However, Louise is not comfortable with self administering a daily injection.  Evenity is not yet available in the [employer] system.  After discussion, she is most interested in beginning Prolia.  She would like to begin this today, although we have not clarified her insurance coverage for it yet.  - Electrophoresis, Protein, Serum  - Thyroid Stimulating Hormone (TSH)  - Parathyroid Hormone Intact  - Basic Metabolic Panel  - Vitamin D, Total (25-Hydroxy)  - Calcium, 24 Hour Urine; Future  - Alkaline Phosphatase  - Tissue Transglutaminase,IgA & IgG  - DXA Bone Density Scan; Future    2. Compression fracture of L1 vertebra with routine healing  She is noting slow improvement in back pain.  She is no longer using any narcotic analgesics    3. Chronic fatigue  TSH is checked as part of her evaluation for low bone density  - Thyroid Stimulating Hormone (TSH)          PLAN:  Patient Instructions   1. Desired calcium intake is 1200 mg to 1500 mg daily between diet  and supplement.    2.  Start Prolia every six months    3.  Recheck bone density in one year with clinic visit to follow.    4.  Skip calcium supplement for two days prior to 24 hour urine collection      Orders Placed This Encounter   Procedures     DXA Bone Density Scan     Standing Status:   Future     Standing Expiration Date:   4/7/2021     Order Specific Question:   Can the procedure be changed per Radiologist protocol?     Answer:   Yes     Electrophoresis, Protein, Serum     Thyroid Stimulating Hormone (TSH)     Parathyroid Hormone Intact     Basic Metabolic Panel     Vitamin D, Total (25-Hydroxy)     Calcium, 24 Hour Urine     Standing Status:   Future     Standing Expiration Date:   10/7/2020     Alkaline Phosphatase     Tissue Transglutaminase,IgA & IgG     Medications Discontinued During This Encounter   Medication Reason     oxyCODONE-acetaminophen (PERCOCET/ENDOCET) 5-325 mg per tablet Therapy completed     HYDROcodone-acetaminophen 5-325 mg per tablet Alternate therapy     naproxen (NAPROSYN) 500 MG tablet Alternate therapy     calcitonin, salmon, (MIACALCIN) 200 unit/actuation nasal spray Therapy completed     doxycycline (VIBRA-TABS) 100 MG tablet Therapy completed     gabapentin (NEURONTIN) 100 MG capsule Therapy completed       Return in about 1 year (around 10/7/2020) for Recheck.      ASSESSED PROBLEMS:  1. Age-related osteoporosis with current pathological fracture with routine healing, subsequent encounter  Electrophoresis, Protein, Serum    Thyroid Stimulating Hormone (TSH)    Parathyroid Hormone Intact    Basic Metabolic Panel    Vitamin D, Total (25-Hydroxy)    Calcium, 24 Hour Urine    Alkaline Phosphatase    Tissue Transglutaminase,IgA & IgG    DXA Bone Density Scan   2. Compression fracture of L1 vertebra with routine healing     3. Chronic fatigue  Thyroid Stimulating Hormone (TSH)   4. Age-related osteoporosis with current pathological fracture, vertebra(e), initial encounter for  "fracture (H)   DXA Bone Density Scan       CHIEF COMPLAINT:  Chief Complaint   Patient presents with     Osteoporosis Consult       HISTORY OF PRESENT ILLNESS:  Louise is a 73 y.o. female presenting to the clinic today for an osteoporosis consult. Louise fractured her back on August 30th. She was moving around her cabin, cleaning for Labor day. She went to put an object down and missed and went flat down. There was nothing to break her fall and she ended up under a table with a kitchen chair on top of her. She currently has a brace on. Louise also broke her left shoulder in 2016.  She notes that it is heeled but she has osteoporosis if both her shoulders.     Rheumatoid Arthritis: Louise was diagnosed with rheumatoid when she was 48. She went undiagnosed for a while and due to that had a lot of complications. She had polymyalgia rheumatica with giant cell arthritis and took a lot of prednisone to help. She notes that steroids had affects on her eyes and body function. She experienced avascular necrosis of the hips and therefore had both replaced       REVIEW OF SYSTEMS:   Denies radiation treatment  Admits to fractures   Admits to low hemoglobin  Admits to ecchemosis   Admits to heart burn   Admits to height changes   All other systems are negative.    PFSH:  The last couple of weeks she has take omeprazole every day    TOBACCO USE:  Social History     Tobacco Use   Smoking Status Former Smoker   Smokeless Tobacco Never Used       VITALS:  Vitals:    10/07/19 1314   BP: 136/80   Patient Site: Left Arm   Patient Position: Sitting   Cuff Size: Adult Large   Pulse: 82   SpO2: 99%   Weight: 160 lb (72.6 kg)   Height: 5' 3\" (1.6 m)     Wt Readings from Last 3 Encounters:   10/07/19 160 lb (72.6 kg)   09/17/19 161 lb (73 kg)   09/12/19 161 lb (73 kg)     Body mass index is 28.34 kg/m .    PHYSICAL EXAM:  Constitutional:   Reveals alert pleasant woman wearing a back brace. Ambulates carefully but without assistance .  Vitals: " per nursing notes.  Back: Mild thoracic Kyphosis  Neuro:  Alert and oriented. No gross focal deficits.  Psychiatric:  Memory intact, mood appropriate.    ADDITIONAL HISTORY SUMMARIZED (2):None.  DECISION TO OBTAIN EXTRA INFORMATION (1): None.   RADIOLOGY TESTS (1): Reviewed DXA Bone Density 1/2/2018.  LABS (1): Labs reviewed and ordered.  MEDICINE TESTS (1): None.  INDEPENDENT REVIEW (2 each): None.     The visit lasted a total of 31 minutes face to face with the patient. Over 50% of the time was spent counseling and educating the patient about osteopathic consultation.    IMariaelena, am scribing for and in the presence of, Dr. Ordonez.    IRadu, personally performed the services described in this documentation, as scribed by Mariaelena Krause in my presence, and it is both accurate and complete.    Dragon dictation was used for this note.  Speech recognition errors are a possibility.    MEDICATIONS:  Current Outpatient Medications   Medication Sig Dispense Refill     baclofen (LIORESAL) 10 MG tablet Take 0.5-1 tablets (5-10 mg total) by mouth 2 (two) times a day as needed (for muscle spasms). 30 tablet 0     celecoxib (CELEBREX) 200 MG capsule TAKE ONE CAPSULE BY MOUTH TWICE DAILY  180 capsule 0     hydroxychloroquine (PLAQUENIL) 200 mg tablet Take 2 tablets (400 mg total) by mouth at bedtime. 180 tablet 0     lidocaine (XYLOCAINE) 5 % ointment Apply small amount (1gm) to shoulders three times daily as needed for pain 120 g 2     methylPREDNISolone (MEDROL DOSEPACK) 4 mg tablet Keeps on hand       zolpidem (AMBIEN) 10 mg tablet Take 0.5 tablets (5 mg total) by mouth at bedtime as needed for sleep. Take 1 tablet by mouth at bedtime if needed for sleep. 30 tablet 1     omeprazole (PRILOSEC) 20 MG capsule Take 1 capsule (20 mg total) by mouth daily before breakfast. 90 capsule 3     Current Facility-Administered Medications   Medication Dose Route Frequency Provider Last Rate Last Dose     denosumab 60 mg  (PROLIA 60 mg/ml)  60 mg Subcutaneous Once Radu Ordonez MD           Total data points: 2

## 2021-06-02 NOTE — TELEPHONE ENCOUNTER
Call to pt with this information. Pt states she just got off the phone with them. The provider is out of clinic today but they will be calling her tomorrow to have her be seen sooner.

## 2021-06-02 NOTE — PATIENT INSTRUCTIONS - HE
1. Desired calcium intake is 1200 mg to 1500 mg daily between diet and supplement.    2.  Start Prolia every six months    3.  Recheck bone density in one year with clinic visit to follow.    4.  Skip calcium supplement for two days prior to 24 hour urine collection

## 2021-06-02 NOTE — PROGRESS NOTES
[facility] Daily Progress     Patient Name: Louise Benítez  Date: 10/15/2019  Visit #: 3  PTA visit #:  -  Referral Diagnosis:  Acute right-sided low back pain without sciatica  Referring provider: Zayra Carrasco*  Visit Diagnosis:     ICD-10-CM    1. Acute right-sided low back pain without sciatica M54.5    2. Sacro-iliac pain M53.3    3. Difficulty balancing R29.818    4. Age-related osteoporosis with current pathological fracture with routine healing, subsequent encounter M80.00XD          Assessment:     HEP/POC compliance is  good .     Patient presents with significant pain which she attributes to the piriformis stretch during her HEP. Following cues for correct technique, pt was able to tolerate the stretch without an increase in pain. Patient's pain is likely muscular as she presents with increased tightness in today's session.  Will benefit from strengthening for hips and core in upcoming sessions, within restrictions. Patient appropriate to continue with skilled PT per POC.    Goal Status:  Pt. will demonstrate/verbalize independence in self-management of condition in : 4 weeks;12 weeks  Pt. will be independent with home exercise program in : 4 weeks;12 weeks  Pt. will report decreased intensity, frequency of : Pain;in 4 weeks;in 12 weeks  Patient will transfer: sit/stand;supine/sit;for car;for in/out of bed;for in/out of chair;with less pain;with less difficulty;in 4 weeks;in 12 weeks      Plan / Patient Education:     Plan for Next Visit: Add lower extremity strengthening. Progress stretches as tolerated. Go over proper posture. Progress stabilization- hip, core as tolerated. Work on balance and gait as needed. Assess ankle and knee ROM     Give HEP pictures.    Subjective:     Pain Rating: 10/10 upon arrival, better in supine, pain returns once sitting or standing    The patient expresses significant pain at the SI today and states that the piriformis stretch has made her pain  worse. She is fearful that she has caused further injury. She took a naproxen this morning. Has been using heat to manage pain which has helped. The TLSO is very bothersome and she does not like it. She has been going for walks at the store while holding onto a cart to stay active. Patient returns to see Dr. Julian next week.     Objective:     Patient technique for piriformis stretch examined and cues given for correct performance.     Pain with transitions from supine<> sitting    Tenderness in left glut, lumbar paraspinals.     Exercises:  Exercise #1: standing heel and toe raises x10  Comment #1: straight leg raise x10  Exercise #2: supine tighten your stomach and buttocks 5-10 seconds 5-10 reps  Comment #2: supine shotgun x10   Exercise #3: pressure to the left buttocks/piriformis for 3 minutes to get the muscle to relax  Comment #3: supine clamshells x10 orange band  Exercise #4: balance feet together 1 minute  Comment #4: Tandem stance x30sec  Exercise #5: Seated Hamstring stretch 2x30 sec each side  Comment #5: Supine Piriformis stretch x30sec    Treatment Today     TREATMENT MINUTES COMMENTS   Evaluation     Self-care/ Home management     Manual therapy     Neuromuscular Re-education     Therapeutic Activity     Therapeutic Exercises 28 Review and progression of HEP   Gait training     Modality__________________                Total 28    Blank areas are intentional and mean the treatment did not include these items.       Dominguez ANAND, PT  10/15/2019

## 2021-06-02 NOTE — PROGRESS NOTES
[facility] Daily Progress     Patient Name: Louise Benítez  Date: 10/22/2019  Visit #: 4  PTA visit #:  -  Referral Diagnosis:  Acute right-sided low back pain without sciatica  Referring provider: Zayra Carrasco*  Visit Diagnosis:     ICD-10-CM    1. Acute right-sided low back pain without sciatica M54.5    2. Sacro-iliac pain M53.3    3. Difficulty balancing R29.818    4. Age-related osteoporosis with current pathological fracture with routine healing, subsequent encounter M80.00XD          Assessment:     HEP/POC compliance is  good .     Patient reports that she is feeling worse, however she presents with decreased tightness and less pain with supine exercises during the session. Pain in right hip/ lower back is consistent with SI dysfunction, likely complicated by inability to stretch and tightness from compression fracture. Rows were added for postural control, patient tolerated this well. Will benefit from strengthening for hips and core in upcoming sessions, within restrictions. Patient appropriate to continue with skilled PT per POC.      Goal Status:  Pt. will demonstrate/verbalize independence in self-management of condition in : 4 weeks;12 weeks  Pt. will be independent with home exercise program in : 4 weeks;12 weeks  Pt. will report decreased intensity, frequency of : Pain;in 4 weeks;in 12 weeks  Patient will transfer: sit/stand;supine/sit;for car;for in/out of bed;for in/out of chair;with less pain;with less difficulty;in 4 weeks;in 12 weeks      Plan / Patient Education:     Plan for Next Visit: Add lower extremity strengthening. Progress stretches as tolerated. Go over proper posture. Progress stabilization- hip, core as tolerated. Work on balance and gait as needed. Assess ankle and knee ROM     Subjective:     Pain Rating: not rated this session    The patient states that she is feeling worse. Going down the stairs is especially challenging. She only feels ok if she doesn't  move. Any transitional movements increase her pain. Her stretches have been going well at home. Her exercises lying down feel good. Patient has a follow up with Dr. Julian on 11/1/19.       Objective:     Improved ROM noted during piriformis stretch.      Pain with transitions from supine<> sitting. Less aberant motion noted.        Exercises:  Exercise #1: standing heel and toe raises x10  Comment #1: straight leg raise x10  Exercise #2: supine tighten your stomach and buttocks 5-10 seconds 5-10 reps  Comment #2: supine shotgun x10   Exercise #3: pressure to the left buttocks/piriformis for 3 minutes to get the muscle to relax  Comment #3: supine clamshells x10 orange band  Exercise #4: balance feet together 1 minute  Comment #4: Tandem stance x30sec  Exercise #5: Seated Hamstring stretch 2x30 sec each side  Comment #5: Supine Piriformis stretch x30sec  Exercise #6: Standing rows x10 blue band  Comment #6: Gentle leg pull    Treatment Today     TREATMENT MINUTES COMMENTS   Evaluation     Self-care/ Home management     Manual therapy     Neuromuscular Re-education     Therapeutic Activity     Therapeutic Exercises 29 Review and progression of HEP   Gait training     Modality__________________                Total 29    Blank areas are intentional and mean the treatment did not include these items.       Dominguez ANAND, PT  10/22/2019

## 2021-06-02 NOTE — TELEPHONE ENCOUNTER
----- Message from Radu Ordonez MD sent at 10/11/2019  7:51 AM CDT -----  Louise: Your vitamin D level is low at 22.2.  I would advise taking vitamin D 50,000 IU once weekly for 3 months as a booster dose.  Then switch to over-the-counter vitamin D 2000 IU daily.  Take this indefinitely.  The TSH, thyroid-stimulating hormone, is mildly elevated at 9.16.  This is consistent with mild hypothyroidism.  I would advise beginning levothyroxine 50 mcg once daily.  You then should have the TSH rechecked by your primary physician in 3 months.  Other labs screening for secondary causes of low bone density were in the normal range.  It was nice to meet you.  I will send the vitamin D and thyroid prescriptions to your pharmacy.    Radu Ordonez MD

## 2021-06-02 NOTE — PROGRESS NOTES
[facility] Daily Progress     Patient Name: Louise Benítez  Date: 10/8/2019  Visit #: 2  PTA visit #:  -  Referral Diagnosis:  Acute right-sided low back pain without sciatica  Referring provider: Zayra Carrasco*  Visit Diagnosis:     ICD-10-CM    1. Acute right-sided low back pain without sciatica M54.5    2. Sacro-iliac pain M53.3    3. Difficulty balancing R29.818    4. Age-related osteoporosis with current pathological fracture with routine healing, subsequent encounter M80.00XD    5. Sacroiliac joint pain M53.3          Assessment:     HEP/POC compliance is  good .     Patient limited with mobility, function due to TLSO. She does report right glut pain with functional mobility as well. Able to tolerate addition of stretching for hip today. Will benefit from strengthening for hips and core in upcoming sessions, within restrictions. Patient appropriate to continue with skilled PT per POC.    Goal Status:  Pt. will demonstrate/verbalize independence in self-management of condition in : 4 weeks;12 weeks  Pt. will be independent with home exercise program in : 4 weeks;12 weeks  Pt. will report decreased intensity, frequency of : Pain;in 4 weeks;in 12 weeks  Patient will transfer: sit/stand;supine/sit;for car;for in/out of bed;for in/out of chair;with less pain;with less difficulty;in 4 weeks;in 12 weeks      Plan / Patient Education:     Plan for Next Visit: Add lower extremity strengthening. Progress stretches as tolerated. Go over proper posture. Progress stabilization- hip, core as tolerated. Work on balance and gait as needed. Check SLR, assess ankle and knee ROM     Print Tandem Stance Picture.    Subjective:     Pain Rating: not rated today    Feeling very sore through left hip where bruised. Gets a sharp pain in right buttock. She is planning to cancel her cruise as she does not feel like she can sit long enough to travel. The patient expresses concern that her brace is not positioned  right, therapist explained proper fit to the patient. Patient states that she has been instructed to wear the brace for three more weeks.       Objective:     Patient instructed to start walking daily for aerobic activity.   Instruction provided on log roll technique, however pt is unable to perform correct technique without Cheng from therapist.     Exercises:  Exercise #1: standing heel and toe raises x10  Exercise #2: standing tighten your stomach and buttocks 5-10 seconds 5-10 reps  Exercise #3: pressure to the left buttocks/piriformis for 3 minutes to get the muscle to relax  Exercise #4: balance feet together 1 minute  Comment #4: Tandem stance x30sec  Exercise #5: Seated Hamstring stretch 2x30 sec each side  Comment #5: Supine Piriformis stretch x30sec    Treatment Today     TREATMENT MINUTES COMMENTS   Evaluation     Self-care/ Home management     Manual therapy     Neuromuscular Re-education     Therapeutic Activity     Therapeutic Exercises 28 Review and progression of HEP   Gait training     Modality__________________                Total 28    Blank areas are intentional and mean the treatment did not include these items.       Dominguez ANAND, PT  10/8/2019

## 2021-06-02 NOTE — PATIENT INSTRUCTIONS - HE
Please continue to wear your brace as often as possible.  You should be wearing it all day except for when you bathe.  You may take it off when you sleep at night.  Otherwise you should try to wear it at all times.    You can take the calcitonin to try and help with your pain.    You can take Tylenol in addition to the naproxen to try to help with your pain.  You can take 500 to 1000 mg of Tylenol up to 3 times a day.  Do not exceed 3000 mg of Tylenol in a 24-hour period.    A referral to the neurosurgeons has been ordered today to have them evaluate your fracture.  It is unlikely that you will need surgery, but the surgeon should evaluate you at this point given that there has been progression of the kyphosis (curvature of your spine).    A right sacroiliac joint injection has been ordered today.  Please schedule this injection at least 2 weeks from now to allow time for insurance prior authorization.  On the day of your injection, you cannot be sick or taking antibiotics.  If you become sick and are prescribed, please call the clinic so your injection can be rescheduled for once you have completed your antibiotics.  You will need to bring a  with you for your injection.   If you have any questions or concerns prior to your injection, please do not hesitate to call the nurse navigation line at [phone] or contact Dr. Julian through [facility].

## 2021-06-02 NOTE — TELEPHONE ENCOUNTER
SPOKE WITH PATIENT LETTING HER KNOW WE WILL NEED TO SPEAK WITH DR FREIRE AND SEE WHEN HE WOULD LIKE TO WORK HER IN EARLIER FOR OSTEO APPT -

## 2021-06-03 ENCOUNTER — RECORDS - HEALTHEAST (OUTPATIENT)
Dept: ADMINISTRATIVE | Facility: CLINIC | Age: 76
End: 2021-06-03

## 2021-06-03 VITALS
HEART RATE: 82 BPM | WEIGHT: 160 LBS | HEIGHT: 63 IN | BODY MASS INDEX: 28.35 KG/M2 | DIASTOLIC BLOOD PRESSURE: 80 MMHG | OXYGEN SATURATION: 99 % | SYSTOLIC BLOOD PRESSURE: 136 MMHG

## 2021-06-03 VITALS
OXYGEN SATURATION: 99 % | WEIGHT: 160 LBS | HEART RATE: 101 BPM | HEIGHT: 63 IN | BODY MASS INDEX: 28.35 KG/M2 | SYSTOLIC BLOOD PRESSURE: 143 MMHG | DIASTOLIC BLOOD PRESSURE: 65 MMHG

## 2021-06-03 VITALS
HEART RATE: 76 BPM | DIASTOLIC BLOOD PRESSURE: 64 MMHG | BODY MASS INDEX: 27.64 KG/M2 | WEIGHT: 161 LBS | SYSTOLIC BLOOD PRESSURE: 124 MMHG

## 2021-06-03 VITALS — WEIGHT: 161 LBS | BODY MASS INDEX: 27.64 KG/M2

## 2021-06-03 VITALS — WEIGHT: 161 LBS | BODY MASS INDEX: 27.49 KG/M2 | HEIGHT: 64 IN

## 2021-06-03 NOTE — TELEPHONE ENCOUNTER
Patient returned call. Explained that she  needs to wear the brace for another 4 weeks until her follow-up. She is to wear it at all times unless she is bathing or sleeping. Also explained that a custom brace could be ordered for her to see if it would be more comfortable. Stated understanding.

## 2021-06-03 NOTE — PROGRESS NOTES
[facility] Daily Progress     Patient Name: Louise Benítez  Date: 11/22/2019  Visit #: 8/12  PTA visit #:  -  Referral Diagnosis:  Acute right-sided low back pain without sciatica  Referring provider: Zayra Carrasco*  Visit Diagnosis:     ICD-10-CM    1. Acute right-sided low back pain without sciatica M54.5    2. Sacro-iliac pain M53.3    3. Sacroiliac joint pain M53.3    4. Age-related osteoporosis with current pathological fracture with routine healing, subsequent encounter M80.00XD    5. Compression fracture of L1 vertebra with routine healing S32.010D    6. Difficulty balancing R29.818          Assessment:   Stork right side is stuck.  After working on tightness in the left upper abdominal region the stork test was then WNL.  The patient reported after trying to do some extension standing that she had increased pain.  So decreased to just trying to stand up straight against the wall and tighten her stomach and buttocks as well as drop her shoulders down and back..  No change in walking with and without the walker with pain.  The patient reports that with standing on one leg she no longer has pain in standing.  The patient consistently has a left rotated L1 with palpation that gets somewhat better with manual therapy.  The tightness in her paraspinals has improved.  The patient was instructed to try to walk and lay down more and decrease the amount of sitting and explained to the patient what happens when  she sits is that she puts increased pressure on the vertebral body    HEP/POC compliance is  good .           Goal Status:  Pt. will demonstrate/verbalize independence in self-management of condition in : 4 weeks;12 weeks;Met  Pt. will be independent with home exercise program in : 4 weeks;12 weeks;Met  Pt. will report decreased intensity, frequency of : Pain;in 4 weeks;in 12 weeks;Comment;Progressing toward  Comment:: soreness across the whol back 5/10 pain with standing, right side of  the SI pain with movement twisting or standing or getting out of bed.  decreased frequency of the intense pain. Goal improving now at a 6/10 pain  Patient will transfer: sit/stand;supine/sit;for car;for in/out of bed;for in/out of chair;with less pain;with less difficulty;in 4 weeks;in 12 weeks;Progressing toward      Plan / Patient Education:     Plan for Next Visit:   Add lower extremity strengthening. Progress stretches as tolerated. Go over proper posture. Progress stabilization- hip, core as tolerated. Work on balance and gait as needed. Assess ankle and knee ROM     Subjective:   With walking the pain has started going down into her hips and upper thighs posteriorly burning this past week.  Pain Ratin/10 pain currently in the SI bilaterally right more than the left.    Since the injection the pain is less with getting in and out of bed she doesn't have  excruciating pain.    The day she went without the brace 4 hours without the brace AMA she did not have pain all day.  Discussed with the patient the importance of wearing the brace until her MD tells her that she no longer needs it      The patient states that she is still sore in her right buttock SI region  Slowly improving.. Any transitional movements increase her pain    Objective:   Right side of SI is stuck  Listening to left anterior stomach   L1 left rotated           Exercises:  Exercise #1: standing heel and toe raises x10, HEP, verbal  Comment #1: straight leg raise x10, patient to dorsiflex foot to do some nerve gliding, HEP , verbal  Exercise #2: supine tighten your stomach and buttocks 5-10 seconds 5-10 reps, HEP, verbal  Comment #2: supine shotgun x10 discontinue patient to do at home, only when needed, verbal  Exercise #3: pressure to the left buttocks/piriformis for 3 minutes to get the muscle to relax, HEP, verbal  Comment #3: supine clamshells x10 orange band, only as needed and as tolerated, changed to sidelying, HEP, verbal  Exercise  #4: balance feet together 1 minute, HEP, verbal  Comment #4: Tandem stance x30sec, HEP, verbal  Exercise #5: standing against the wall tighten your stomach and buttocks and have the shoulder blades go down and back hold 5-10 seconds do 5-10 reps verbal cues on how much to tighten and proper posture  Comment #5: hold off on piriformis stretch patient to not go past neutral as she has a total hip replacement  Exercise #6: Standing rows x10 blue band, HEP trialed pulling to the right and to the left in standing but the patient had increase in her pain.  in standing so decreased to seated and decreased from blue to green and patient no longer had pain., verbal review  Comment #6: supine working transverse abdominis and either do heel slides or hip flexion as tolerated patient did this well after verbal and tactile cues, verbal review    Treatment Today     TREATMENT MINUTES COMMENTS   Evaluation     Self-care/ Home management     Manual therapy 30  After working on tightness in the left upper abdominal region the stork test was then WNL.       Neuromuscular Re-education     Therapeutic Activity      Therapeutic Exercises 25 The patient reported after trying to do some extension standing either just dropping her hips or doing EIS  that she had increased pain.  So decreased to just trying to stand up straight against the wall and tighten her stomach and buttocks as well as drop her shoulders down and back..  No change in walking with and without the walker with pain.     Gait training     Modality__________________                Total 55    Blank areas are intentional and mean the treatment did not include these items.       Adriana Lemos, PT  11/22/2019

## 2021-06-03 NOTE — PROGRESS NOTES
NEUROSURGERY CONSULT NOTE     Louise Benítez   [address] [place] [address] [address]  [place] [address]  73 y.o.  female  Primary Care Provider:  Desire Lua MD    Date of service: 11/7/2019    Informant: Patient, electronic medical record    Neurosurgery was asked to see this patient by Dr. Julian for evaluation of a compression fracture of L1 vertebra .     CC: Lower back pain    NEUROSURGERY ATTENDING: The patient's clinical examination, laboratory data,and the plan was reviewed and made collaboratively with Rabia Putnam MD.    IMPRESSION/PLAN:  73 year old female with hx of L1 fracture sustained august 30, being treated in [facility]. She has had progression of height loss and focal kyphosis measuring roughly 17 degrees by my measurement. Her back pain has improved, she still complains of right sided low back pain thought to be SI joint related, she is due to get an injection at the spine center. She has no neuro deficits. Given her age, osteoporosis and lack of progression/neuro deficits and that she is two months out from injury, I'm not sure fusion would be of benefit at this time. Patient is tearful, feels the brace is limiting her activities. I assured her she can travel and go out to lunch, etc with the brace in place. I offered orthotist consultation for brace recommendations - she declined.     Plan  1. Continue brace and restrictions, would recommend brace at all times when out of bed  2. Would not recommend she take the brace off for lunch, outings etc like she would like  3. Continue osteoporosis treatment  4. Discussed she will likely need at least 12 weeks of bracing, possibly longer if her pain continues  5. Offered alternative meds, she declined. Encouraged her to discontinue naproxen and would not recommend resuming her celebrex  6. F/u 4 weeks with repeat x-rays    Patient Active Problem List   Diagnosis     Ptosis Of Eyelid     Lower Back Pain     Patient Counseling: End-Of-Life Issues      Osteoporosis     Seronegative rheumatoid arthritis of multiple sites (H)     Primary osteoarthritis involving multiple joints     Bilateral shoulder tendinopathy     Macrocytic anemia     Polyarthralgia     Compression fracture of L1 vertebra with routine healing       HPI: Louise Benítez is a 73 year old female presented in referral from Dr. Eliel Bustamante for L1 compression fracture. PMH includes polymyalgia rheumatica, chronic prednison use, avascular necrosis of both hips, temporal arteritis. Fracture occurred in late august after a mechanical fall. This resulted in immediate back pain. She was found to have L1 compression fracture. She was sent to spine center for management of her back pain. Recommended she undergo right SI joint injection but given her fracture had worsened and she had kyphosis, neurosurgical evaluation was warranted. Patient tells me that she has continued to have back soreness but notes that it has been slowly improving over time.  She notes there is an area of pain located in her right buttock and is scheduled for an injection for this.  She has continued to do physical therapy for her back and has tolerated this well. Patient notes that her back brace has been helping, noting that she has been able to do more activity during the day and walk longer distances.  Her pain is exacerbated after standing for a long period of time but will resolve at rest. She does have questions about whether or not she can take her brace off for a few hours so she can go out to lunch.  She will occasionally develop numbness and tingling in her legs particularly when she crosses her legs.  She denies any pain radiating down her legs, bladder or bowel incontinence.      REVIEW OF SYSTEMS:  Review of Systems - History obtained from the patient  Musculoskeletal ROS: Positive for back pain.    Neuro ROS: Positive for numbness and tingling, Negative for bladder and bowel incontinence.      SOCIAL HISTORY:   Social  "History     Socioeconomic History     Marital status:      Spouse name: None     Number of children: None     Years of education: None     Highest education level: None   Occupational History     None   Social Needs     Financial resource strain: None     Food insecurity:     Worry: None     Inability: None     Transportation needs:     Medical: None     Non-medical: None   Tobacco Use     Smoking status: Former Smoker     Smokeless tobacco: Never Used   Substance and Sexual Activity     Alcohol use: Yes     Frequency: 2-3 times a week     Comment: wine      Drug use: None     Sexual activity: None   Lifestyle     Physical activity:     Days per week: None     Minutes per session: None     Stress: None   Relationships     Social connections:     Talks on phone: None     Gets together: None     Attends Nondenominational service: None     Active member of club or organization: None     Attends meetings of clubs or organizations: None     Relationship status: None     Intimate partner violence:     Fear of current or ex partner: None     Emotionally abused: None     Physically abused: None     Forced sexual activity: None   Other Topics Concern     None   Social History Narrative     None       FAMILY HISTORY:  History reviewed. No pertinent family history.    SURGERY HISTORY:  Bilateral hip replacements from avascular necrosis.   ALLERGIES/SENSITIVITIES:   No Known Allergies    MEDICATIONS:  Reviewed, on prolia    PHYSICAL EXAM:     General Appearance:  in no apparent distress      VITAL SIGNS:  /65   Pulse (!) 101   Ht 5' 3\" (1.6 m)   Wt 160 lb (72.6 kg)   SpO2 99%   BMI 28.34 kg/m    Body mass index is 28.34 kg/m .    Mental Status: Alert, oriented, thought content appropriate, speech normal  Cranial Nerves: II to XII  unremarkable  MotorStrength:normal 5/5 strength in all tested muscle groups    Sensation: intact to touch and temp  Bulk and tone: normal  Reflexes: biceps, triceps normal, brachioradialis, " patellar and achilles hypo    [place] Coma Score  Eye openin - Opens eyes on own   Verbal:  5 - Alert and oriented   Motor:  6 - Follows simple motor commands   GCS Total: 15       Cardiac: Extremities: less then 2 second capillary refill  Resp: normal respiratory effort  GI:  soft, nd, nt  Psych:age appropriate, tearful  Skin: no rashes, no ecchymoses, no petechiae      IMAGING:   Personally reviewed image/s and Personally reviewed impression/s  EXAM DATE:         10/25/2019     X-RAY SPINE, SINGLE VIEW  10/25/2019 11:15 AM     INDICATION: L1 compression fracture.  COMPARISON: 2019.  TECHNIQUE: Single upright lateral radiograph of the lumbar spine.     FINDINGS: Diffuse bone demineralization. There are 5 nonrib-bearing lumbar type  vertebral bodies. Redemonstration of chronic anterior wedge compression  deformity of the L1 vertebral body, again with 50% height loss anteriorly. There  is redemonstration of focal segmental kyphosis measuring 15 degrees from the  superior T12 endplate to the inferior L3 endplate. Vertebral body heights are  otherwise maintained. 2-3 mm retrolistheses of L1 on L2 and of L2 on L3 and 3 mm  anterolisthesis of L4 on L5 and of L5 on S1. Unchanged moderate degenerative  spondylosis throughout the lumbar spine. Atherosclerotic vascular  calcifications. Partially visualized hip arthroplasty.       Thank you for allowing us to participate in the care of this patient.      I, Santosh Montez, am serving as a scribe to document services personally performed by Radha Devine CNP, based on my observation and the provider's statements to me. I, Radha Devine CNP attest that Santosh Montez is acting in a scribe capacity, has observed my performance of the services and has documented them in accordance with my direction.      Radha Devine CNP  [facility] Neurosurgery  [phone]

## 2021-06-03 NOTE — PATIENT INSTRUCTIONS - HE
Please follow up with Dr Julian two weeks post procedure. It is recommended to continue physical therapy. Call to schedule a follow up appointment with Dr Putnam. Neuro Surgery scheduling [phone]. Continue to wear your brace until you are directed otherwise. Please call Care Navigation with any questions or concerns.       DISCHARGE INSTRUCTIONS    During office hours (8:00 a.m.- 4:00 p.m.) questions or concerns may be answered  by calling [facility] Navigation Nurses at  [phone].  Messages received after hours will be returned the following business day.      In the case of an emergency, please dial 911 or seek assistance at the nearest Emergency Room/Urgent Care facility.     All Patients:    ? You may experience an increase in your symptoms for the first 2 days (It may take anywhere between 2 days- 2 weeks for the steroid to have maximum effect).    ? You may use ice on the injection site, as frequently as 20 minutes each hour if needed.    ? You may take your pain medicine.    ? You may continue taking your regular medication after your injection. If you have had a Medial Branch Block you may resume pain medication once your pain diary is completed.    ? You may shower. No swimming, tub bath or hot tub for 48 hours.  You may remove your bandaid/bandage as soon as you are home.    ? You may resume light activities, as tolerated.    ? Resume your usual diet as tolerated.    ? It is strongly advised that you do not drive for 1-3 hours post injection.    ? If you have had oral sedation:  Do not drive for 8 hours post injection.      ? If you have had IV sedation:  Do not drive for 24 hours post injection.  Do not operate hazardous machinery or make important personal/business decisions for 24 hours.      POSSIBLE STEROID SIDE EFFECTS (If steroid/cortisone was used for your procedure)    -If you experience these symptoms, it should only last for a short period      Swelling of the legs                Skin  redness (flushing)       Mouth (oral) irritation     Blood sugar (glucose) levels              Sweats                      Mood changes    Headache    Sleeplessness         POSSIBLE PROCEDURE SIDE EFFECTS  -Call the Spine Center if you are concerned    Increased Pain             Increased numbness/tingling        Nausea/Vomiting            Bruising/bleeding at site        Redness or swelling                                                Difficulty walking        Weakness             Fever greater than 100.5    *In the event of a severe headache after an epidural steroid injection that is relieved by lying down, please call the [facility] to speak with a clinical staff member*

## 2021-06-03 NOTE — PROGRESS NOTES
[facility] Daily Progress     Patient Name: Louise Benítez  Date: 11/15/2019  Visit #: 7  PTA visit #:  -  Referral Diagnosis:  Acute right-sided low back pain without sciatica  Referring provider: Zayra Carrasco*  Visit Diagnosis:     ICD-10-CM    1. Acute right-sided low back pain without sciatica M54.5    2. Sacro-iliac pain M53.3    3. Sacroiliac joint pain M53.3    4. Age-related osteoporosis with current pathological fracture with routine healing, subsequent encounter M80.00XD    5. Compression fracture of L1 vertebra with routine healing S32.010D    6. Difficulty balancing R29.818          Assessment:   [facility] [place] today.   Dural mobilization from rectus capitus posterior minor to the sacral base.  After manual therapy she had a neutral spine.  Also discussed the importance of wearing her brace until her bones heal and the MD says that she may start weaning off the brace.  Discussed the importance of not bending over and discussed with the patient a model so she could understand how bending forward can cause more issues.    HEP/POC compliance is  good .           Goal Status:  Pt. will demonstrate/verbalize independence in self-management of condition in : 4 weeks;12 weeks;Met  Pt. will be independent with home exercise program in : 4 weeks;12 weeks;Met  Pt. will report decreased intensity, frequency of : Pain;in 4 weeks;in 12 weeks;Comment;Progressing toward  Comment:: soreness across the whold back 5/10 pain with standing, right side of the SI pain with movement twisting or standing or getting out of bed.  decreased frequency of the intense pain.  Patient will transfer: sit/stand;supine/sit;for car;for in/out of bed;for in/out of chair;with less pain;with less difficulty;in 4 weeks;in 12 weeks;Progressing toward      Plan / Patient Education:     Plan for Next Visit:   Add lower extremity strengthening. Progress stretches as tolerated. Go over proper posture. Progress stabilization-  hip, core as tolerated. Work on balance and gait as needed. Assess ankle and knee ROM     Subjective:     Pain Ratin/10 pain currently in the SI bilaterally right more than the left.    Since the injection the pain is less with getting in and out of bed she don't have  excreciating pain.    The day she went without the brace 4 hours without the brace AMA she did not have pain all day.  Discussed with the patient the importance of wearing the brace until her MD tells her that she no longer needs it      The patient states that she is still sore in her right buttock SI region  Slowly improving.. Any transitional movements increase her pain    Objective:     L2,1,T12, T11  Left rotated tender to     After treeatment 3/10 pain    Stork test negative           Exercises:  Exercise #1: standing heel and toe raises x10, HEP  Comment #1: straight leg raise x10, patient to dorsiflex foot to do some nerve gliding   Exercise #2: supine tighten your stomach and buttocks 5-10 seconds 5-10 reps, HEP  Comment #2: supine shotgun x10 discontinue patient to do at home  Exercise #3: pressure to the left buttocks/piriformis for 3 minutes to get the muscle to relax, HEP  Comment #3: supine clamshells x10 orange band, only as needed and as tolerated  Exercise #4: balance feet together 1 minute, HEP  Comment #4: Tandem stance x30sec, HEP  Exercise #5: seaterd hamstring stretch, HEP  Comment #5: hold off on piriformis stretch patient to not go past neutral as she has a total hip replacement  Exercise #6: Standing rows x10 blue band, HEP  Comment #6: supine working transverse abdominis and either do heel slides or hip flexion as tolerated patient did this well after verbal and tactile cues    Treatment Today     TREATMENT MINUTES COMMENTS   Evaluation     Self-care/ Home management     Manual therapy 20 Dural mobilization from rectus capitus posterior minor to the sacral base.  After manual therapy she had a neutral spine.      Neuromuscular Re-education     Therapeutic Activity      Therapeutic Exercises 8 Added band exercises pulling to either side seated with abdominal set   Gait training     Modality__________________                Total 28    Blank areas are intentional and mean the treatment did not include these items.       Adriana Lemos, PT  11/15/2019

## 2021-06-03 NOTE — PROGRESS NOTES
Assessment:   Louise Benítez (MAILE Booker) is a 73 y.o. y.o. female with past medical history significant for rheumatoid arthritis, anemia, GERD, osteoporosis who presents today for follow-up regarding acute low back pain that started after a fall on August 30 of 2019 where she sustained an L1 vertebral body compression fracture (closed fracture, subsequent encounter with routine healing).  The compression fracture is not thought to be the current cause of her pain.  The current cause of her pain is thought to be right sacroiliac joint pain in etiology.  She is status post a right sacroiliac joint injection on November 12, 2019.  Patient had significant relief for about a week following this injection and pain has returned but she attributes most of her pain to wearing the back brace.  She does have osteoporosis which is being treated with Prolia injections.    Regarding the L1 vertebral body compression fracture, the patient has seen neurosurgery regarding the increase in kyphosis (progression of 5 degrees from 10 to 15 degrees). She is not deemed to need surgery at this time.  However it is recommended that she wear her brace until she sees neurosurgery in follow-up in approximately 2 weeks.       Plan:     A shared decision making plan was used.  The patient's values and choices were respected.  The following represents what was discussed and decided upon by the physician and the patient.      1.  DIAGNOSTIC TESTS: The patient's x-rays of the lumbar spine from October 25 of 2019 was personally reviewed today by the physician with the physician performing her own interpretation.  The patient has approximately 50% height loss at the L1 vertebrae.  2.  PHYSICAL THERAPY: The patient does not have any physical therapy scheduled until after the neurosurgery appointment which is perfect.  She can continue to do the exercises of the physical therapist taught her in the interim.  3.  MEDICATIONS: Patient was requesting a  prescription for naproxen.  Naproxen 500 mg 1 tablet every 12 hours as needed for pain was prescribed today.  She is told that she can alternate between naproxen and Celebrex, but she should not take them simultaneously.  She verbalized understanding.  4.  INTERVENTIONS: No further injections are necessary at this time.  Can consider repeat right sacroiliac joint injection after she completes wearing her brace.  5.  PATIENT EDUCATION:    -Discussed with patient that she should continue to wear the back brace until she follows up with Dr. Putnam.  Dr. Putnam will determine if she still needs to wear the brace.  However if her fracture has stabilized where she is not continuing to lose height, she will likely no longer need to wear the back brace.  Her  -Patient asked if she could travel to [place] for [place] for 2 weeks.  Dr. Julian thinks that this would be an excellent idea, as she typically does better in warmer weather, especially given her rheumatoid arthritis.  However she is welcome to discuss this with Dr. Putnam.  6.  FOLLOW-UP: Nurse navigation is asked to call patient in 2 weeks to check in and see what was discussed at her visit with Dr. Putnam.  If there are any questions/concerns or any significant worsening of pain prior to that time, the patient is asked to call the clinic via the nurse navigation line or via [employer].     Subjective:     Louise Benítez (AKAIRAM Booker) is a 73 y.o. female who presents today for follow-up regarding chronic right-sided low back pain.  The patient is status post a right sacroiliac joint injection performed on November 12, 2019.  The patient reports that she felt significantly better for the first week following the injection but now the pain has returned and she feels that it is potentially even worse than it was prior to the injection.  She is rating her pain today at a 10 out of 10.  At worst it is a 10 out of 10.  At best it is an 8 out of 10.  Her pain is worse with  walking.  She does feel better with sitting and with laying down, especially if she can take off her brace.  She thinks that the brace is contributing to the return of her pain.  She says is rubbing right where she has the pain, near the right SI joint.  She has been going to physical therapy and is trying to do her exercises on a regular basis.  She is managing her pain with Celebrex and has been taking over-the-counter naproxen, but she would like a prescription for prescription strength naproxen.  She does have a follow-up appointment with Dr. Putnam on December 5.  She is very anxious to learn when she can stop wearing the brace.  Denies any radiation of pain going down into the legs.  No numbness tingling or weakness in the legs.    Past medical history is reviewed and is unchanged for any new medical diagnoses in the interim.      Family history is reviewed and is unchanged in the interim.      Review of Systems:  Negative for numbness/tingling, weakness, bowel/bladder dysfunction, foot drop, headache, dizziness, nausea/vomiting, blurred vision, balance changes.     Objective:   CONSTITUTIONAL:  Vital signs as above.  No acute distress.  The patient is well nourished and well groomed.    PSYCHIATRIC:  The patient is awake, alert, oriented to person, place and time.  The patient is answering questions appropriately with clear speech.  Normal affect.  SKIN:  Skin over the face, posterior torso, bilateral upper and lower extremities is clean, dry, intact without rashes.  MUSCULOSKELETAL:  Gait is non-antalgic.  The patient is able to heel and toe walk without any difficulty.  Her back brace is removed for the exam (then done immediately following the exam).  Mild tenderness over the right lower lumbar paraspinal muscles.      The patient has 5/5 strength for the bilateral hip flexors, knee flexors/extensors, ankle dorsiflexors/plantar flexors, ankle evertors/invertors.  Fabere's test is positive on the right side  for pain localizing to the right SI joint.  Gaenslen's test is positive on the right side for pain localizing to the right SI joint.  Yeomans test is positive on the right side for pain localizing to the right SI joint.  NEUROLOGICAL: Trace patellar, medial hamstring, achilles reflexes which are symmetric bilaterally.  No ankle clonus bilaterally.  Sensation to light touch is intact in the bilateral L4, L5, and S1 dermatomes.

## 2021-06-03 NOTE — TELEPHONE ENCOUNTER
Patient is calling to confirm that her xray that she had done, is correct. It was taken with her brace on at [facility]. It is under the media tab. Please call the patient if it needs to be redone. [phone]

## 2021-06-03 NOTE — PROGRESS NOTES
[facility] Daily Progress     Patient Name: Louise Benítez  Date: 11/8/2019  Visit #: 6  PTA visit #:  -  Referral Diagnosis:  Acute right-sided low back pain without sciatica  Referring provider: Zayra Carrasco*  Visit Diagnosis:     ICD-10-CM    1. Acute right-sided low back pain without sciatica M54.5    2. Sacro-iliac pain M53.3    3. Sacroiliac joint pain M53.3    4. Age-related osteoporosis with current pathological fracture with routine healing, subsequent encounter M80.00XD    5. Compression fracture of L1 vertebra with routine healing S32.010D    6. Difficulty balancing R29.818          Assessment:   Global listening left anterior stomach.  stork and reverse stork were WNL today.  Left rotated L4,L3,L2,L1 and tightness in the sacrum tightness in the lower stomach region and tightness in the fascia of the spine.  Did manual therapy for the lower stomach and spine and sacrum and also dural mobilization from rectus capitus posterior minor to the sacral base.  After manual therapy she had a neutral spine.  The patient is unable to get in and out of the bed with log rolling due to fear of falling so she is twisting more than I would like her to she is to try to do this as best as she can at home.  Discussed the importance of not twisting as she has not healed properly.  Also discussed the importance of wearing her brace until her bones heal and the MD says that she may start weaning off the brace.    HEP/POC compliance is  good .           Goal Status:  Pt. will demonstrate/verbalize independence in self-management of condition in : 4 weeks;12 weeks;Met  Pt. will be independent with home exercise program in : 4 weeks;12 weeks;Met  Pt. will report decreased intensity, frequency of : Pain;in 4 weeks;in 12 weeks;Comment;Progressing toward  Comment:: soreness across the whold back 5/10 pain with standing, right side of the SI pain with movement twisting or standing or getting out of bed.   decreased frequency of the intense pain.  Patient will transfer: sit/stand;supine/sit;for car;for in/out of bed;for in/out of chair;with less pain;with less difficulty;in 4 weeks;in 12 weeks;Progressing toward      Plan / Patient Education:     Plan for Next Visit: Continue per MD patient to call back after MD appointment.  Add lower extremity strengthening. Progress stretches as tolerated. Go over proper posture. Progress stabilization- hip, core as tolerated. Work on balance and gait as needed. Assess ankle and knee ROM     Subjective:     Pain Rating: sharp pain right buttock with movement    The patient states that she is still sore in her right buttock SI region  Slowly improving.. Any transitional movements increase her pain. Her stretches have been going well at home. Her exercises lying down feel good. Patient has a follow up with Dr. Julian next week for an injection.     Objective:   Stork test and reverse stork was negative today.    I told the patient not to go past neutral if she has a total hip replacement.  Global listening left anterior stomach.  stork and reverse stork were WNL today.  Left rotated L4,L3,L2,L1 and tightness in the sacrum tightness in the lower stomach region and tightness in the fascia of the spine.  Did manual therapy for the lower stomach and spine and sacrum and also dural mobilization from rectus capitus posterior minor to the sacral base.  After manual therapy she had a neutral spine.  The patient is unable to get in and out of the bed with log rolling due to fear of falling so she is twisting more than I would like her to she is to try to do this as best as she can at home.  Discussed the importance of not twisting as she has not healed properly.  Also discussed the importance of wearing her brace until her bones heal and the MD says that she may start weaning off the brace.         Exercises:  Exercise #1: standing heel and toe raises x10  Comment #1: straight leg raise  x10  Exercise #2: supine tighten your stomach and buttocks 5-10 seconds 5-10 reps  Comment #2: supine shotgun x10   Exercise #3: pressure to the left buttocks/piriformis for 3 minutes to get the muscle to relax  Comment #3: supine clamshells x10 orange band  Exercise #4: balance feet together 1 minute  Comment #4: Tandem stance x30sec  Exercise #5: Seated Hamstring stretch 2x30 sec each side  Comment #5: Supine Piriformis stretch x30sec  Exercise #6: Standing rows x10 blue band  Comment #6: Gentle leg pull    Treatment Today     TREATMENT MINUTES COMMENTS   Evaluation     Self-care/ Home management 8  The patient is unable to get in and out of the bed with log rolling due to fear of falling so she is twisting more than I would like her to she is to try to do this as best as she can at home.  Discussed the importance of not twisting as she has not healed properly.  Also discussed the importance of wearing her brace until her bones heal and the MD says that she may start weaning off the brace   Manual therapy 20   Global listening left anterior stomach.  stork and reverse stork were WNL today.  Left rotated L4,L3,L2,L1 and tightness in the sacrum tightness in the lower stomach region and tightness in the fascia of the spine.  Did manual therapy for the lower stomach and spine and sacrum and also dural mobilization from rectus capitus posterior minor to the sacral base.  After manual therapy she had a neutral spine.  The patient is unable to get in and out of the bed with log rolling due to fear of falling so she is twisting more than I would like her to she is to try to do this as best as she can at home.  Discussed the importance of not twisting as she has not healed properly.  Also discussed the importance of wearing her brace until her bones heal and the MD says that she may start weaning off the brace.     Neuromuscular Re-education     Therapeutic Activity      Therapeutic Exercises 5 Patient to continue with  exercises at home   Gait training     Modality__________________                Total 33    Blank areas are intentional and mean the treatment did not include these items.       Adriana Lemos, PT  11/8/2019

## 2021-06-03 NOTE — TELEPHONE ENCOUNTER
Please call the patient and let her know I spoke to neurosurgery.  Neurosurgery says they discussed with her that she needs to wear the brace for another 4 weeks until her follow-up.  She is to wear it at all times unless she is bathing or sleeping.  They did offer her a custom brace to see if that would be more comfortable for her but she declined.  If she changes her mind about the custom brace, she needs to call the neurosurgery clinic to get an order.

## 2021-06-03 NOTE — PATIENT INSTRUCTIONS - HE
Stand against the wall and tighten your stomach and buttock also bring your shoulders down and back.    Hold 5-10 seconds 5-10 reps.

## 2021-06-03 NOTE — PATIENT INSTRUCTIONS - HE
Naproxen 500 mg 1 tablet every 12 hours as needed for pain as prescribed today.  Please take this medication with food/water to prevent any stomach upset.  Please do not take over-the-counter naproxen/Aleve, ibuprofen/Motrin/Advil while you are taking the prescription strength naproxen.    Naproxen and Celebrex should not be taken at the same time.  You can alternate between naproxen and Celebrex, but they should not be taken within 12 hours of each other.    Please follow-up with Dr. Putnam as scheduled on December 5, 2019.  Please ask her how much longer he have to wear the brace and if she thinks you are okay to travel to [place].    You can keep your physical therapy appointment for December 6.    A nurse will call you in 2 weeks to see how you are doing.  If you are doing better at that time, you are welcome to follow-up as needed.  If you are not doing better, the nurse will relay this information to the physician and then further recommendations can be made. Please do not hesitate to contact the clinic at [phone] if you have any questions/concerns or any worsening of your pain prior to that time.  You are also welcome to contact Dr. Julian via [employer].

## 2021-06-03 NOTE — PATIENT INSTRUCTIONS - HE
We will bring you back in four weeks with repeat x-rays and an appt to follow. I would continue to recommend bracing for an additional four weeks, I would not recommend taking it off for any period of time during the day. Continue osteoporosis treatment. Take tylenol instead of naproxen. 1000mg three times a day.       Call [phone] with the following symptoms:    *Temperature 101(fever) or greater or chills  *Redness, swelling or increased drainage from the wound  *Worsening pain not relieved by the pain prescription given  *Worsening or new onset of weakness, or numbness and tingling  *Loss or change in your ability to control bowel or bladder function  *Change in your ability to walk, talk, see or think  *If you did not have a bowel movement before leaving the hospital and your bowels have not moved within 48 hours of your discharge    QUESTIONS OR CONCERNS:   OUR OFFICE HOURS ARE FROM 9:00 A.M -4:30 P.M. MONDAY-FRIDAY.  AFTER OFFICE HOURS, CALLS ARE ANSWERED BY THE ON-CALL PROVIDER. PLEASE CALL WITH ROUTINE QUESTIONS DURING OFFICE HOURS. THE ON-CALL PROVIDER WILL NOT REFILL PRESCRIPTIONS.      *No lifting, pushing or pulling greater than 5-10 pound (this is about a gallon of milk).  *No repetitive bending, twisting, or jarring activities  *No overhead work  *No aerobic or strenuous activity  *No activities with increased risk of falls  *You may move about your home as tolerated  *You may walk up and down stairs as tolerated

## 2021-06-03 NOTE — TELEPHONE ENCOUNTER
The xrays that were done at [facility] on 12/2/2019 are correct.  Belkys Acosta RN, CNRN

## 2021-06-04 VITALS
HEIGHT: 63 IN | WEIGHT: 160 LBS | OXYGEN SATURATION: 98 % | HEART RATE: 84 BPM | BODY MASS INDEX: 28.35 KG/M2 | DIASTOLIC BLOOD PRESSURE: 80 MMHG | SYSTOLIC BLOOD PRESSURE: 168 MMHG

## 2021-06-04 VITALS — BODY MASS INDEX: 28.35 KG/M2 | WEIGHT: 160 LBS | HEIGHT: 63 IN

## 2021-06-04 VITALS — HEIGHT: 63 IN | BODY MASS INDEX: 28.35 KG/M2 | WEIGHT: 160 LBS

## 2021-06-04 VITALS
HEART RATE: 84 BPM | DIASTOLIC BLOOD PRESSURE: 68 MMHG | WEIGHT: 158 LBS | BODY MASS INDEX: 27.99 KG/M2 | SYSTOLIC BLOOD PRESSURE: 122 MMHG

## 2021-06-04 VITALS
HEIGHT: 63 IN | WEIGHT: 160 LBS | DIASTOLIC BLOOD PRESSURE: 70 MMHG | BODY MASS INDEX: 28.35 KG/M2 | SYSTOLIC BLOOD PRESSURE: 128 MMHG

## 2021-06-04 NOTE — PROGRESS NOTES
"NEUROSURGERY FOLLOWUP  NOTE   12/05/19    A/P:  Louise Benítez with a past medical history significant for osteoporosis, currently on Prolia (initiated in September 2019) who experienced a ground-level fall on August 30, 2018 resulting in L1 burst fracture     X-rays are stable compared to last imaging.  Okay to discontinue TLSO brace and patient can follow-up with the spine center for further treatment of her sacroiliitis.  Patient is graduated from neurosurgery clinic, no follow-up is required.    S:  Since our last visit patient notes that the brace has been causing her significant discomfort and was wondering if she could take her brace off. She had tried a cortisone injection for her back pain which did provide some relief.   She notes some pain radiates into her buttocks but denies any pain radiating into her legs.  This pain is located in the right buttocks and has had this since she had fallen. She denies any numbness or tingling in her legs or feet.  Patient states that she started getting the Prolia shot since the end of September.      The pts PMH, PSH, ROS, Meds, Allergies, SH, FH are all unchanged and summarized in the pts health history from last visit        PHYSICAL EXAM:   Constitutional: /80   Pulse 84   Ht 5' 3\" (1.6 m)   Wt 160 lb (72.6 kg)   SpO2 98%   BMI 28.34 kg/m       General: Awake, alert, NAD  HEENT: AT/NC, EOMI, face symmetric, oral mucosa moist and pink  Heart: RRR  Lungs: Nonlabored respirations without accessory muscle use.  Neuro: Awake, alert, speech is clear and content is appropriate. MAEW x 4 with full strength in b/l UE and LE. Sensation is intact to temperature and LT. vibratory sense is diminished in the right great toe, intact on the left  Coordination: Gait is within normal limits.  Patient does have a kyphotic posture.  Reflexes: 2+ patellar and Achilles reflexes bilaterally.  2 beats of clonus on the left toes downgoing bilaterally.  Musculoskeletal: Negative " straight leg raise bl.  Positive right Jose finger test.  Psych: Appropriate mood and affect, pleasant, cooperative, alert and oriented x 3  Skin: No obvious rash or lesion      IMAGING:   I personally reviewed all radiographic images   Lumbar spine x-rays 12/2/2018: Patient has a prominent dextroscoliosis.  L1 fracture is again visualized with approximately 20 degrees of segmental kyphosis, stable compared to her last x-rays obtained on 10/25/2019    Rabia Putnam MD      CC:     Desire Lua MD  [address] [address]    I, Santosh Montez, am serving as a scribe to document services personally performed by Dr. Rabia Putnam MD, based on my observation and the provider's statements to me. I, Rabia Putnam MD attest that Santosh Montez is acting in a scribe capacity, has observed my performance of the services and has documented them in accordance with my direction.

## 2021-06-04 NOTE — TELEPHONE ENCOUNTER
Discussed with Dr. Julian. Patient may have the repeat right sacroiliac joint injection. Phone call to pt to discuss. Patient appreciative. Injection requirements reviewed with patient. Stated understanding. Transferred to scheduling to make appointment.

## 2021-06-04 NOTE — PATIENT INSTRUCTIONS - HE
Please follow up in about a month post procedure with Dr Julian to evaluate your plan of care.       DISCHARGE INSTRUCTIONS    During office hours (8:00 a.m.- 4:00 p.m.) questions or concerns may be answered  by calling [facility]ine Center Navigation Nurses at  [phone].  Messages received after hours will be returned the following business day.      In the case of an emergency, please dial 911 or seek assistance at the nearest Emergency Room/Urgent Care facility.     All Patients:    ? You may experience an increase in your symptoms for the first 2 days (It may take anywhere between 2 days- 2 weeks for the steroid to have maximum effect).    ? You may use ice on the injection site, as frequently as 20 minutes each hour if needed.    ? You may take your pain medicine.    ? You may continue taking your regular medication after your injection. If you have had a Medial Branch Block you may resume pain medication once your pain diary is completed.    ? You may shower. No swimming, tub bath or hot tub for 48 hours.  You may remove your bandaid/bandage as soon as you are home.    ? You may resume light activities, as tolerated.    ? Resume your usual diet as tolerated.    ? It is strongly advised that you do not drive for 1-3 hours post injection.    ? If you have had oral sedation:  Do not drive for 8 hours post injection.      ? If you have had IV sedation:  Do not drive for 24 hours post injection.  Do not operate hazardous machinery or make important personal/business decisions for 24 hours.      POSSIBLE STEROID SIDE EFFECTS (If steroid/cortisone was used for your procedure)    -If you experience these symptoms, it should only last for a short period      Swelling of the legs                Skin redness (flushing)       Mouth (oral) irritation     Blood sugar (glucose) levels              Sweats                      Mood changes    Headache    Sleeplessness         POSSIBLE PROCEDURE SIDE EFFECTS  -Call the Spine  Center if you are concerned    Increased Pain             Increased numbness/tingling        Nausea/Vomiting            Bruising/bleeding at site        Redness or swelling                                                Difficulty walking        Weakness             Fever greater than 100.5    *In the event of a severe headache after an epidural steroid injection that is relieved by lying down, please call the [facility] to speak with a clinical staff member*

## 2021-06-04 NOTE — TELEPHONE ENCOUNTER
"Patient had called and left message on nurse navigation line stating \"I just saw Dr. Putnam. She said I don't have to wear my brace anymore. I would like to get the injection Dr. Julian said I could have when I was out of the brace.\"     Chart reviewed. Order placed in [facility] for repeat right sacroiliac joint injection. Patient did have a right SI joint injection on 11/12/19.   "

## 2021-06-05 VITALS
DIASTOLIC BLOOD PRESSURE: 80 MMHG | WEIGHT: 160 LBS | HEART RATE: 80 BPM | BODY MASS INDEX: 28.34 KG/M2 | SYSTOLIC BLOOD PRESSURE: 130 MMHG

## 2021-06-05 VITALS
DIASTOLIC BLOOD PRESSURE: 68 MMHG | WEIGHT: 160 LBS | RESPIRATION RATE: 16 BRPM | SYSTOLIC BLOOD PRESSURE: 130 MMHG | TEMPERATURE: 98.5 F | HEART RATE: 65 BPM | BODY MASS INDEX: 28.34 KG/M2

## 2021-06-05 VITALS
RESPIRATION RATE: 16 BRPM | TEMPERATURE: 99.1 F | OXYGEN SATURATION: 99 % | DIASTOLIC BLOOD PRESSURE: 73 MMHG | WEIGHT: 155 LBS | BODY MASS INDEX: 27.46 KG/M2 | HEART RATE: 85 BPM | SYSTOLIC BLOOD PRESSURE: 165 MMHG

## 2021-06-05 VITALS — OXYGEN SATURATION: 100 % | DIASTOLIC BLOOD PRESSURE: 62 MMHG | HEART RATE: 85 BPM | SYSTOLIC BLOOD PRESSURE: 142 MMHG

## 2021-06-05 NOTE — TELEPHONE ENCOUNTER
Nurse navigation to call her and let her know that the next recommendation is a diagnostic right L2-3 transforaminal epidural injection.        Physician note:  If she failed that, the next recommendation would be a right L4-5 TFESI with steroid.

## 2021-06-05 NOTE — PATIENT INSTRUCTIONS - HE
Please complete your pain diary and return the diary to the Spine Center at your earliest convenience.  The Spine Center will contact you to schedule your next appointment after your pain diary is reviewed by your doctor.  Thank you.    DISCHARGE INSTRUCTIONS    During office hours (8:00 a.m.- 4:00 p.m.) questions or concerns may be answered  by calling Spine Center Navigation Nurses at  [phone].  Messages received after hours will be returned the following business day.      In the case of an emergency, please dial 911 or seek assistance at the nearest Emergency Room/Urgent Care facility.     All Patients:  ? You may experience an increase in your symptoms for the first 2 days, once the numbing medication wears off.    ? You may resume your regular medication, no pain medication until you have completed your diary    ? You may shower. No swimming, tub bath or hot tub for 24 hours; remove bandage after 4 hours    ? Continue your activities that can cause you pain to test the blocks.                         ? You should not drive for the next 3 to 5 hours (have someone drive you)           POSSIBLE PROCEDURE SIDE EFFECTS  -Call Spine Center if concerned-    Increased Pain  Increased numbness/tingling     Nausea/Vomiting  Bruising/bleeding at site (hematoma)             Swelling at site (edema) Headache  Difficulty walking  Infection        Fever greater than 100.5

## 2021-06-05 NOTE — TELEPHONE ENCOUNTER
Patient contacted and informed that Dr. Julian recommended proceeding with a diagnostic right L4-5 transforaminal epidural injection given she did not have any short-term improvement with the previous diagnostic right L2-3 TFEI.      The patient was in agreement with the plan and was transferred to the scheduling team to assist with scheduling the procedure.    The patient was encouraged to contact the Spine Center if she had any questions or concerns prior to her next injection appointment.

## 2021-06-05 NOTE — PROGRESS NOTES
Assessment:   Louise Benítez (MAILE Booker) is a 74 y.o. y.o. female with past medical history significant for rheumatoid arthritis, anemia, GERD, osteoporosis who presents today for follow-up regarding chronic right low back pain with what is thought to be right radicular/sciatic type leg pain, manifesting as right buttock pain.  The symptoms worsened after a fall on August 30, 2019 where she sustained an L1 vertebral body compression fracture (closed fracture, subsequent encounter with routine healing).  There was thought to be an element of sacroiliac joint pain as well.  She is status post a repeat right sacroiliac joint injection on December 17, 2019.  At this time, not have any relief after the second SI joint injection.  It is potentially thought that lumbar foraminal stenosis at the L5-S1 level is the current cause of her pain.    Regarding the L1 vertebral body compression fracture, she has seen neurosurgery who does not feel that surgery is necessary for the fracture, despite the kyphosis deformity.  She has been released from wearing the brace and does not require any further follow-up from neurosurgery per Dr. Putnam from her visit from December 5, 2019.    Patient is receiving Prolia shots for her osteoporosis.       Plan:     A shared decision making plan was used.  The patient's values and choices were respected.  The following represents what was discussed and decided upon by the physician and the patient.      1.  DIAGNOSTIC TESTS:  -An updated MRI of her low back is ordered today given that her last MRI is 5 years old.  Patient's pain has not responded as expected to conservative treatment, so an updated MRI is warranted.  -The patient's MRI of the lumbar spine from 2015 (January) was personally reviewed today by the physician with the physician performing her own interpretation.  The patient does have degenerative changes seen throughout the lower lumbar spine with significant right L5-S1 foraminal  stenosis, that is significantly worse on the right side compared to the left.  There is bilateral lateral recess stenosis seen at the L4-5 level with a slight disc bulge at this level.  Please refer to the report for details.  - X-ray of her lumbar spine from 2019 was personally reviewed today by the physician with the physician performing her own interpretation.  The patient's L1 compression fracture is seen without progression of height loss (remains at 50% height loss).  The T12-L3 kyphosis remained stable at 15 degrees.  2.  PHYSICAL THERAPY: Patient has completed physical therapy and is encouraged to continue doing her home exercise program on a regular basis.  She is interested in repeating physical therapy but will await the results of MRI before considering repeat physical therapy  3.  MEDICATIONS:    -She can continue with the naproxen 500 mg twice a day as needed for pain.  She can alternate between the naproxen and the Celebrex, but should not take them simultaneously.  4.  INTERVENTIONS: Patient may benefit from a lumbar epidural steroid injection versus lumbar medial branch blocks to help determine if the pain is lumbar facet mediated.  Will await the results of the MRI prior to ordering any interventions.  5.  PATIENT EDUCATION:    -Discussed the possibility of nerve root impingement from foraminal stenosis versus lumbar facet arthropathy causing the buttock pain.  Patient is in agreement with the plan to update her MRI and then discuss further treatment options after that.  6.  FOLLOW-UP: After the MRI to review the results and discuss treatment options.  If there are any questions/concerns or any significant worsening of pain prior to that time, the patient is asked to call the clinic via the nurse navigation line or via [employer].     Subjective:     Louise Benítez (AKAIRAM Booker) is a 74 y.o. female who presents today for follow-up regarding continued right-sided low back pain and right buttock pain.   The patient is status post a right sacroiliac joint injection performed on December 17, 2019.  Patient reports that she had relief for about a day and then pain has back but concentrating mainly in the right buttock.  It does not go distal to that.  She does have some numbness and tingling, but no weakness.  She rates her pain today at a 5 out of 10.  At worst it is a 9 out of 10.  At best it is a 4 out of 10.  Her pain is worse with standing and walking.  She does feel better with laying down.  She is taking naproxen about once a day as well as using a lidocaine ointment.  These do provide some mild relief.  She has completed physical therapy and is still doing her exercises, but does not feel like they are helping at all with the pain.    Past medical history is reviewed and is unchanged for any new medical diagnoses in the interim.      Family history is reviewed and is unchanged in the interim.      Review of Systems:  Positive for occasional numbness and tingling in the right buttock area.  Negative for any weakness in the right lower extremity or left lower extremity.  Negative for any bowel or bladder incontinence, headaches, pain worse at night, trips, stumbles, falls, dysphagia, difficulty with hand skills, fevers, unintentional weight loss.     Objective:   CONSTITUTIONAL:  Vital signs as above.  No acute distress.  The patient is well nourished and well groomed.    PSYCHIATRIC:  The patient is awake, alert, oriented to person, place and time.  The patient is answering questions appropriately with clear speech.  Normal affect.  SKIN:  Skin over the face, posterior torso, bilateral upper and lower extremities is clean, dry, intact without rashes.  MUSCULOSKELETAL:  Gait is non-antalgic.  The patient is able to heel and toe walk without any difficulty.  Mild tenderness over the right lower lumbar paraspinal muscles and right gluteal muscles, no significant tenderness over the left lower lumbar paraspinal  muscles or left gluteal muscles..      The patient has 5/5 strength for the bilateral hip flexors, knee flexors/extensors, ankle dorsiflexors/plantar flexors, ankle evertors/invertors.   Lumbar facet provocative maneuvers (simultaneous extension rotation) is negative bilaterally for reproduction of pain.  NEUROLOGICAL: 1/4 patellar, medial hamstring, achilles reflexes which are symmetric bilaterally.  No ankle clonus bilaterally.  Sensation to light touch is intact in the bilateral L4, L5, and S1 dermatomes.

## 2021-06-05 NOTE — TELEPHONE ENCOUNTER
Phone call to pt to discuss. Information shared. Stated understanding. Order placed in Epic for diagnostic right L2-3 transforaminal epidural injection. Injection requirements reviewed with patient. Stated understanding. Transferred to scheduling to make appointment.

## 2021-06-05 NOTE — PATIENT INSTRUCTIONS - HE
Please complete your pain diary and return the diary to the Spine Center at your earliest convenience.  The Spine Center will contact you to schedule your next appointment after your pain diary is reviewed by your doctor.  Thank you.    DISCHARGE INSTRUCTIONS    During office hours (8:00 a.m.- 4:00 p.m.) questions or concerns may be answered  by calling Spine Center Navigation Nurses at  [phone].  Messages received after hours will be returned the following business day.      In the case of an emergency, please dial 911 or seek assistance at the nearest Emergency Room/Urgent Care facility.     All Patients:  ? You may experience an increase in your symptoms for the first 2 days, once the numbing medication wears off.    ? You may resume your regular medication, no pain medication until you have completed your diary    ? You may shower. No swimming, tub bath or hot tub for 24 hours; remove bandage after 4 hours    ? Continue your activities that can cause you pain to test the blocks.                         ? You should not drive for the next 3 to 5 hours (have someone drive you)           POSSIBLE PROCEDURE SIDE EFFECTS  -Call Spine Center if concerned-    Increased Pain  Increased numbness/tingling     Nausea/Vomiting  Bruising/bleeding at site (hematoma)             Swelling at site (edema) Headache  Difficulty walking  Infection        Fever greater than 100.5

## 2021-06-05 NOTE — PATIENT INSTRUCTIONS - HE
Please complete your pain diary and return the diary to the Spine Center at your earliest convenience.  The Spine Center will contact you to schedule your next appointment after your pain diary is reviewed by your doctor.  Thank you.    DISCHARGE INSTRUCTIONS    During office hours (8:00 a.m.- 4:00 p.m.) questions or concerns may be answered  by calling Spine Center Navigation Nurses at  [phone].  Messages received after hours will be returned the following business day.      In the case of an emergency, please dial 911 or seek assistance at the nearest Emergency Room/Urgent Care facility.     All Patients:  ? You may experience an increase in your symptoms for the first 2 days, once the numbing medication wears off.    ? You may resume your regular medication, no pain medication until you have completed your diary    ? You may shower. No swimming, tub bath or hot tub for 24 hours; remove bandage after 4 hours    ? Continue your activities that can cause you pain to test the blocks.                         ? You should not drive for the next 3 to 5 hours (have someone drive you)           POSSIBLE PROCEDURE SIDE EFFECTS  -Call Spine Center if concerned-    Increased Pain  Increased numbness/tingling     Nausea/Vomiting  Bruising/bleeding at site (hematoma)             Swelling at site (edema) Headache  Difficulty walking  Infection        Fever greater than 100.5

## 2021-06-05 NOTE — PATIENT INSTRUCTIONS - HE
An MRI of your low back is ordered today.  Please follow-up with Dr. Julian after the MRI to review the results and discuss your treatment options. Please do not hesitate to contact the clinic at [phone] if you have any questions/concerns or any worsening of your pain prior to that time.  You are also welcome to contact Dr. Julian via griddig.

## 2021-06-05 NOTE — PROGRESS NOTES
Assessment:   Louise Benítez (AKAIRAM Booker) is a 74 y.o. y.o. female with past medical history significant for rheumatoid arthritis, anemia, GERD, osteoporosis who presents today for follow-up regarding chronic right-sided low back pain with what is thought to be right radicular/sciatica type leg pain, manifesting as right buttock pain.   Her updated MRI does show that she has severe foraminal stenosis at multiple levels, though slightly worse at the L2-3 and L3-4 level, there is significant foraminal stenosis seen at the L4-5 and L5-S1 levels.  There is endplate inflammatory changes seen at the L2-3 level.The symptoms were significantly worsened after a fall on August 30, 2019 where she sustained an L1 vertebral body compression fracture (closed fracture, subsequent encounter with routine healing).  This is not thought to be contributing to her current pain.  She is status post a right sacroiliac joint injection on December 17, 2019 that did not provide any significant relief.     Of note the patient has been cleared by neurosurgery for the L1 vertebral body compression fracture.  Surgery is not indicated at this time, despite the kyphosis deformity which has developed.  She no longer has to wear the brace and does not require further surgery follow-up.  Patient is receiving Prolia shots for her osteoporosis.       Plan:     A shared decision making plan was used.  The patient's values and choices were respected.  The following represents what was discussed and decided upon by the physician and the patient.      1.  DIAGNOSTIC TESTS: The patient's MRI of the low back performed on January 13, 2020 was personally reviewed today by the physician with visit from performing her own interpretation.  She does have a scoliosis.  Patient does have endplate edema seen at the L2-3 level with severe right L2-3 foraminal stenosis.  There is also severe right L3-4 foraminal stenosis.  There is right L4-5 and L5-S1 foraminal  stenosis that is fairly severe, though it does appear to be slightly worse at the L2-3 and L3-4 levels.  2.  PHYSICAL THERAPY: The patient was offered a repeat course of physical therapy.  She reports that she has done physical therapy in the past but did not feel that it was that beneficial.  She wants to hold off for now.  If she does want to go to physical therapy, a friend recommended a physical therapist and [place] to her.  She is welcome to go wherever she would like for physical therapy, but she is encouraged to contact her insurance company to make sure that this physical therapist is covered by her insurance.  3.  MEDICATIONS: No changes to her medications at this time.  She can continue to alternate between Celebrex and naproxen.  4.  INTERVENTIONS:    -The patient was offered a right L5-S1 transforaminal epidural  DIAGNOSTIC  Injection.  This level was chosen because it most often correlates with right buttock pain.  She also had left buttock pain in 2015 that responded best to a right L4-5 and L5-S1 TFESI after failing to have relief of the left buttock pain with a left L2-3 TF PATRICK and later a left L4-5 TFESI.  If she does have relief, then a right L5-S1 TFESI could be performed.  -If she failed to have relief with this injection, could try a right L2-3 TFESI (could potentially start with a diagnostic injection given her total steroid use needs to be monitored carefully given that she requires injections).  This level would be considered because there are inflammatory changes seen at the endplates, that indicate that this could be more of an acute process.  Essentially could justify doing a right L3-4 or L4-5 TFESI if she failed to have relief with any of the previously mentioned levels.  5.  PATIENT EDUCATION: The patient was in agreement with the treatment plan.  She did not have any further questions today.  6.  FOLLOW-UP: Patient will follow-up next week (January 22 on Wednesday at 10:00 nurse  visit time; she is asked to arrive at 945 for this appointment).  If there are any questions/concerns or any significant worsening of pain prior to that time, the patient is asked to call the clinic via the nurse navigation line or via [facility].     Subjective:     Louise Benítez (AKAIRAM Booker)  is a 74 y.o. female who presents today for follow-up regarding chronic right-sided buttock pain, now radiating into the right posterior lateral proximal thigh.  The patient is here today specifically to review her MRI results.  Her pain is largely unchanged since her last visit on January 13 of 2020.  Her pain continues to be in the right low back and radiating into the right buttock.  Again she has some pain going more distally now, into the proximal part of the right posterior lateral thigh.  She does have some numbness and tingling but denies any weakness.  She is also having some return of the left-sided low back pain and left buttock and thigh pain, but she states that she wants to focus on the right side.  Currently her pain is rated at a 6 out of 10.  At worst it is a 10 out of 10.  At best it is a 5 out of 10.  Her pain is worse with standing.  She does feel better with laying down.  She denies any new symptoms at this time.  She is potentially interested in returning to physical therapy, but not to the same physical therapist that she saw before.  She feels that she did not receive much benefit from that physical therapy.  She was talking with a friend who is a physical therapist who recommended a physical therapist in [facility] ([place]).  She is not sure if she wants to go to physical therapy at this time.  She continues to manage her pain with alternating naproxen and Celebrex which are somewhat helpful.    Past medical history is reviewed and is unchanged for any new medical diagnoses in the interim.      Family history is reviewed and is unchanged in the interim.      Review of Systems:  Positive for numbness and  tingling in the right thigh.  Negative for any weakness, bowel or bladder incontinence, pain worse at night, trips, stumbles, falls, headaches, dysphagia, difficulty with hand skills, fevers, unintentional weight loss.     Objective:   CONSTITUTIONAL:  Vital signs as above.  No acute distress.  The patient is well nourished and well groomed.    PSYCHIATRIC:  The patient is awake, alert, oriented to person, place and time.  The patient is answering questions appropriately with clear speech.  Normal affect.  SKIN:  Skin over the face, posterior torso, bilateral upper and lower extremities is clean, dry, intact without rashes.  MUSCULOSKELETAL:  Gait is non-antalgic.  The patient is able to heel and toe walk without any difficulty.  Mild tenderness over the bilateral lumbar paraspinal muscles and bilateral glue muscles.      The patient has 5/5 strength for the bilateral hip flexors, knee flexors/extensors, ankle dorsiflexors/plantar flexors, ankle evertors/invertors.  With right lumbar facet loading (simultaneous extension rotation the right) the patient reports some pain at the waistline, but no reproduction of buttock pain.  Negative seated straight leg raising test bilaterally.  NEUROLOGICAL: This patellar, medial hamstring, achilles reflexes which are symmetric bilaterally.  No ankle clonus bilaterally.  Sensation to light touch is intact in the bilateral L4, L5, and S1 dermatomes.

## 2021-06-05 NOTE — PATIENT INSTRUCTIONS - HE
A diagnostic right L5 transforaminal epidural injection has been ordered today.  You can schedule this injection for Wednesday, January 22 at 10:00 AM (nurse visit time).  Please plan to arrive at 945 for this appointment.  On the day of your injection, you cannot be sick or taking antibiotics.  If you become sick and are prescribed, please call the clinic so your injection can be rescheduled for once you have completed your antibiotics.  You will need to bring a  with you for your injection.   If you have any questions or concerns prior to your injection, please do not hesitate to call the nurse navigation line at [phone] or contact Dr. Julian through [facility].    Please call if you would like an order for physical therapy.  Please check with your insurance company and make sure that the physical therapist that was recommended to you is covered by your insurance.

## 2021-06-06 NOTE — PROGRESS NOTES
[facility] Certification Request    March 5, 2020      Patient: Louise Benítez  MR Number: [id number]  YOB: 1945  Date of Visit: 3/5/2020      Dear Dr. Julian,    Thank you for this referral.   We are seeing Louise Benítez for Physical Therapy of low back pain.    Medicare and/or Medicaid requires physician review and approval of the treatment plan. Please review the plan of care and verify that you agree with the therapy plan of care by co-signing this note.      Plan of Care  Authorization / Certification Start Date: 03/05/20  Authorization / Certification End Date: 05/04/20  Authorization / Certification Number of Visits: 6  Communication with: Referral Source  Patient Related Instruction: Nature of Condition;Treatment plan and rationale;Self Care instruction;Basis of treatment;Body mechanics;Posture  Times per Week: 1-2  Number of Weeks: 4-6  Number of Visits: 4-6  Discharge Planning: to self-care when PT goals are met or plateau of progress  Precautions / Restrictions : RA, osteoporosis, tamia MICHAEL  Therapeutic Exercise: ROM;Stretching;Strengthening  Neuromuscular Reeducation: kinesio tape;posture;core  Manual Therapy: soft tissue mobilization;myofascial release;joint mobilization;muscle energy      Goals:  Pt. will demonstrate/verbalize independence in self-management of condition in : 4 weeks  Pt. will be independent with home exercise program in : 4 weeks    Pt will: report doing laundry without increasing her low back pain in 4 weeks.  Pt will: tolerate walking for 10 minutes with pain <2/10 in 4 weeks        If you have any questions or concerns, please don't hesitate to call.    Sincerely,      Erum Ang, PT        Physician recommendation:     ___ Follow therapist's recommendation        ___ Modify therapy      *Physician co-signature indicates they certify the need for these services furnished within this plan and while under their care.      [facility]  Rehabilitation  [facility]-Pelvic/Cervico-Thoracic Initial Evaluation    Patient Name: Louise Benítez  Date of evaluation: 3/5/2020  Referral Diagnosis:   Right buttock pain [M79.18]       Lumbar foraminal stenosis [M48.061]       Specifically nerve glides, 4-6 visits    Referring provider:   Visit Diagnosis:     ICD-10-CM    1. Right buttock pain M79.18    2. Lumbar foraminal stenosis M48.061        Assessment:      Louise Benítez is a 74 y.o. female who presents to therapy today with chief complaints of low back pain and buttock pain L>R.  Pt sx began in August 2019 after a fall and landing on her sacrum.  On exam pt demonstrates significantly decreased gross trunk ROM, significantly limited hip and ankle mobility.  Neuro exam today is negative for nerve tension tests and myotome/dermatomes are intact.  Pt would benefit from skilled PT to address the above limitations.  Nerve glides were initiated as a HEP today as well as lower trunk mobility exercises.     PMH significant for rheumatoid arthritis, anemia, GERD, osteoporosis.     POC and pathology of condition were reviewed with patient.  Pt. is in agreement with the Plan of Care  A Home Exercise Program (HEP) was initiated today.  Pt. was instructed in exercises by PT and patient was given a handout with detailed instructions.    Goals:  Pt. will demonstrate/verbalize independence in self-management of condition in : 4 weeks  Pt. will be independent with home exercise program in : 4 weeks    Pt will: report doing laundry without increasing her low back pain in 4 weeks.  Pt will: tolerate walking for 10 minutes with pain <2/10 in 4 weeks      Patient's expectations/goals are realistic.    Barriers to Learning or Achieving Goals:  Chronicity of the problem.        Plan / Patient Instructions:        Plan of Care:   Authorization / Certification Start Date: 03/05/20  Authorization / Certification End Date: 05/04/20  Authorization / Certification Number of Visits:  6  Communication with: Referral Source  Patient Related Instruction: Nature of Condition;Treatment plan and rationale;Self Care instruction;Basis of treatment;Body mechanics;Posture  Times per Week: 1-2  Number of Weeks: 4-6  Number of Visits: 4-6  Discharge Planning: to self-care when PT goals are met or plateau of progress  Precautions / Restrictions : RA, osteoporosis, tamia MICHAEL  Therapeutic Exercise: ROM;Stretching;Strengthening  Neuromuscular Reeducation: kinesio tape;posture;core  Manual Therapy: soft tissue mobilization;myofascial release;joint mobilization;muscle energy    Plan for next visit: continue nerve glides, hip/LE mobility,      Subjective:         Social information:   Living Situation: lives with her , has a cleaning lady    History of Present Illness:    Louise is a 74 y.o. female who presents to therapy today with complaints of low back pain and buttock pain L>R.  Onset 2019, DALJIT fell, was trying to set down objects/turning, ended up under a table with a chair on top of her.  She reports she had a back fracture, she reports she was in a back brace until almost Imelda.  She report her previous PT worked on her SI joint, that pain is better but still there.  She has also had a couple injections which she reports did not help at all.      Location: Low back, left buttocks. Timing: better in the mornings, worse at the end of the day. Alleviating factors: medications  Functional limitations:    Bending   Doing laundry      Severity:   Pain Ratin   Pain rating at best: not rated   Pain rating at worst: not rated  Quality/Description: sore    Pertinent Past Medical History: RA on meds for it, she has OA, osteoporosis, tamia hip replacements >10 years ago    Associated symptoms:                         Numbness: -                        Weakness: -     Denies changes in bowel or bladder   Denies legs 'giving way' or balance issues, though reports falling a couple times this  week.      Objective:      Note: Items left blank indicates the item was not performed or not indicated at the time of the evaluation.      Examination  1. Right buttock pain     2. Lumbar foraminal stenosis       Precautions/Restrictions:     Posture Observation: osteoporosis, RA      Lumbar ROM:  Date: 3/5/2020     *Indicate scale AROM AROM AROM   Lumbar Flexion To mid shins, pain in R low back     Lumbar Extension Severely limited, no pain      Right Left Right Left Right Left   Lumbar Sidebending         Lumbar Rotation 75% limited  Sore in R low back 75% liited  Sore in R low back             Lower Extremity Strength:     Date: 3/5/2020   LE strength/5 Right Left   Hip Flexion (L1-3) 4+ 4+   Hip Extension (L5-S1)     Hip Abduction (L4-5)     Hip Adduction (L2-3)     Hip External Rotation     Hip Internal Rotation     Knee Extension (L3-4) 5 5   Knee Flexion 5 5   Ankle Dorsiflexion (L4-5) 5 5   Great Toe Extension (L5) 5 5   Ankle Plantar flexion (S1)     Abdominals      Lumbar Sensation    Intact to light touch            Lumbar Special Tests:   Lumbar Special Tests Right Left   Slump - -   Straight leg raise - 80 - 80   Crossover response - -   AMY SIGNIFICANT Hypomobility SIGNIFICANT Hypomobility  + LBP   FADIR - -   Hip Scour Test     Robby Test     Melanie's     Hip rotation ROM     Ely's     Prone instability test    Pubic shotgun        Tandem Stance - 0 seconds L foot forward, 3 seconds R foot forward      Palpation: tender to gluteals bilaterally L>R, tenderness throughout lumbar spine and ITB bilaterally    Repeated Motion Testing:  Does not centralize  Does not peripheralize    Passive Mobility - Joint Integrity:  Pt unable to sidelay or lay prone, PAs were not tested today    LE Screen:  Significant decreased gastorc/soleus bilaterally.   R ankle DF 0/5 deg, L ankle DF -3/4 degrees           Treatment Today     TREATMENT MINUTES COMMENTS   Evaluation 20 Low complexity lumbar evaluation   Self-care/  "Home management     Manual therapy 10 Passive stretches with patient, HEP was first done by PT, then by patient demonstrating on her own.   Neuromuscular Re-education     Therapeutic Activity     Therapeutic Exercises 20 Exercises:  Exercise #1: SKTC 30\"  Comment #1: Piriformis modified 30\"  Exercise #2: SLUMP SLRs  10x tamia  Comment #2: LTR 10x slow  Exercise #3: Gastroc stretch standing 30\" each       Gait training     Modality__________________                Total 50    Blank areas are intentional and mean the treatment did not include these items.            Erum Ang, MAYET  3/5/2020  8:47 AM  "

## 2021-06-06 NOTE — PROGRESS NOTES
Assessment:   Louise Benítez (MAILE Booker)  is a 74 y.o. y.o. female with past medical history significant for rheumatoid arthritis, anemia, GERD, osteoporosis who presents today for follow-up regarding chronic right-sided low back pain with what is thought to be radicular pain going into the right buttock.  Her MRI shows severe foraminal stenosis in the lumbar spine at multiple levels.  She is currently status post a right L4-5 TFESI performed on February 11 of 2020.  At this time, she is reporting approximately 60% relief following this injection.  She still has some pain, but she reports that the very intense pain is no longer there.  He remains without any red flag symptoms.    Patient is also complaining of return of bilateral shoulder pain secondary to primary osteoarthritis of the bilateral shoulders.  She has done well in the past with previous glenohumeral joint injections (ultrasound-guided).       Plan:     A shared decision making plan was used.  The patient's values and choices were respected.  The following represents what was discussed and decided upon by the physician and the patient.      1.  DIAGNOSTIC TESTS:  The patient's MRI of the lumbar spine performed on January 13 of 2020 was personally reviewed today by the physician with the physician performing her own interpretation.  She does have a significant scoliosis.  There is severe right L2-3 foraminal stenosis with endplate edema seen at the L2-3 level.  There is severe right L3-4 foraminal stenosis.  There is also significant right L4-5 and L5-S1 foraminal stenosis, though the right L2-3 and L3-4 levels appear to be the most severe.  There are degenerative changes seen of the facets in the lower lumbar spine.  Addendum on 2-24-20 at 16:51:  If the patient is going to be referred to neurosurgery, Dr. Gonzalez requests that she have an EMG first (her case was discussed at [facility]).  Will consider this if patient wishes to have spine surgery  consultation.  End addendum.  2.  PHYSICAL THERAPY: Patient has previously declined physical therapy.  At this time, she would like to return to physical therapy.  Dr. Julian explained that she will have the diagnosis of lumbar radicular pain from lumbar foraminal stenosis.  The physical therapist can work with her on nerve glide/flossing exercises that will be different than the exercises she learned for the SI joint pain.  The patient wanted to go so an order was provided.  3.  MEDICATIONS: No changes to her medications at this time.  She can continue to alternate between naproxen and Celebrex.  -Did discuss with the patient that the gabapentin medication can be helpful for her.  Discussed that if it made her too groggy during the day, she could just take the medication at bedtime.  The patient would like to try taking the gabapentin just at bedtime.  She has gabapentin 100 mg capsules at home.  She can start with 100 mg at bedtime.  If she does not have any side effects but she feels that there is room for improvement, she can increase to 2 capsules at bedtime.  Again if she does not have any side effects but feels that there is still room for improvement, she can increase to even 3 capsules at bedtime.  Can increase further from there, but will start with that for now.  4.  INTERVENTIONS: No further lumbar spine injections are recommended at this time.  The patient had several diagnostic injections without any steroid, but since she does receive steroid injections into multiple areas (lumbar spine as well as shoulders) the total amount of steroid needs to be closely monitored.  She is safe for repeat bilateral glenohumeral joint injections.  Last time she had the glenohumeral injections was in August 2019.  -Bilateral glenohumeral joint injections performed under ultrasound guidance with Dr. Aguilar (who is always provided these injections in the past for her) is ordered today.  She can schedule these in  approximately 1 week to allow time for insurance preauthorization.  5.  PATIENT EDUCATION:    -Discussed with the patient that she has significant degenerative changes in her spine.  We will try to manage conservatively, otherwise she would need a fairly extensive lumbar fusion surgery to likely alleviate pain in the spine but does not resolve with conservative treatment.  She would like to avoid surgery if at all possible.  She may not even be a candidate given her significant osteoporosis.  -All of her questions were answered to her satisfaction.  She is in agreement with the treatment plan at this time.  6.  FOLLOW-UP: Nurse navigation is asked to call her in 4 weeks to check in and see how she is doing with physical therapy and to see if she is still taking the gabapentin at bedtime (and what dose she is currently on).  If there are any questions/concerns or any significant worsening of pain prior to that time, the patient is asked to call the clinic via the nurse navigation line or via [employer].     Subjective:     Louise Benítez (AKAIRAM Booker) is a 74 y.o. female who presents today for follow-up regarding chronic right-sided low back pain with right buttock pain.  The patient is status post a right L4-5 transforaminal epidural steroid injection performed on February 11 of 2020.  The patient reports that she has had approximately 60% relief following this injection.  While she would have like to have had 100% relief, she does state that she is significantly better.  She no longer has the very intense pain that she is having in the right buttock.  She feels satisfied that she does not need further injections, but she would like to return to physical therapy at this time to see if she can have further improvement, as the pain is still fairly significant.  Today she rates her pain today at a 1 out of 10.  She says that her pain is worse/aggravated with standing for prolonged periods of time.  She does feel better if  she can sit down.  She denies any new symptoms at this time.  Again the patient just complains of pain in the right low back and going into the right buttock.  No significant symptoms distal to that.  She denies any numbness tingling or weakness in the legs.  She continues to manage her pain with naproxen or Celebrex.  The patient reminds me that she tried gabapentin in the past and this made her very groggy.  These are somewhat helpful.    She is having return of her shoulder pain and would like to have repeat glenohumeral injections.    Past medical history is reviewed and is unchanged for any new medical diagnoses in the interim.      Family history is reviewed and is unchanged in the interim.      Review of Systems:  No pertinent positives. Pertinent negatives include no fevers, chills, unexplained weight loss, bowel incontinence, bladder incontinence, trips, stumbles, falls.  All others reviewed and are negative.     Objective:   CONSTITUTIONAL:  Vital signs as above.  No acute distress.  The patient is well nourished and well groomed.    PSYCHIATRIC:  The patient is awake, alert, oriented to person, place and time.  The patient is answering questions appropriately with clear speech.  Normal affect.  SKIN:  Skin over the face, posterior torso, bilateral upper and lower extremities is clean, dry, intact without rashes.  MUSCULOSKELETAL:  Gait is non-antalgic.  The patient is able to heel and toe walk without any difficulty.  Mild tenderness over the bilateral lower lumbar paraspinal muscles.      The patient has 5/5 strength for the bilateral hip flexors, knee flexors/extensors, ankle dorsiflexors/plantar flexors, ankle evertors/invertors.  Negative seated straight leg raising test bilaterally.  NEUROLOGICAL: Trace patellar, unable to elicit medial hamstring, and trace Achilles reflexes which are symmetric bilaterally.  No ankle clonus bilaterally.  Sensation to light touch is intact in the bilateral L4, L5, and  S1 dermatomes.

## 2021-06-06 NOTE — PROGRESS NOTES
"ASSESSMENT AND PLAN:  Louise Benítez 74 y.o. female is seen here on 02/27/20 for follow-up of seronegative rheumatoid arthritis, widespread osteoarthritis axial, appendicular, myelodysplastic syndrome with persistent anemia follows up at [facility], had her eyes examined recently and no maculopathy in association with hydroxychloroquine which she has been on for quite some time.  She is going to stay the course.  We discussed management of OA.  She is due to get corticosteroid injections elsewhere in another clinic already made an appointment there.  We will meet here in 6 months or sooner if.  Labs every 3..         Diagnoses and all orders for this visit:    Seronegative rheumatoid arthritis of multiple sites (H)  -     hydroxychloroquine (PLAQUENIL) 200 mg tablet; Take 2 tablets (400 mg total) by mouth at bedtime.  Dispense: 180 tablet; Refill: 0    Primary osteoarthritis involving multiple joints    Polyarthralgia    High risk medication use      HISTORY OF PRESENTING ILLNESS:  Louise Benítez, 74 y.o., female is here for follow-up.  She is here for follow-up of seronegative rheumatoid arthritis diagnosed elsewhere been on hydroxychloroquine for several years recent eye examination without maculopathy.  Her main concern is pain around her shoulders.  She is due to be seen and she recalls with Dr. Jimenez's office in a week where she gets ultrasound-guided corticosteroid injection sounds like intra-articularly.  She has been told that she needs \"new shoulders\".  She has had myelodysplastic syndrome precluding she understands the surgical option.  She has had bilateral hip replacement.  She reports overall pain level 2.5/10.  She has not had a flareup of her rheumatoid arthritis during the interim 6 months.  Recent labs are reviewed low hemoglobin which is stable and in the context of above-noted MDS noted.  Morning stiffness no more than 30 minutes.   She has not had fever weight loss blurry vision eye " "redness mouth ulcer nausea or rash.  She slipped on ice a few days ago landed on her \"tailbone\" this is been painful and is beginning to get better.  On her previous visit she had noted that it was 20 years ago when she developed pain stiffness and found to have elevated sed rate.  A diagnosis of polymyalgia rheumatica was made.  Thereafter she was felt to have rheumatoid arthritis.  Initially she followed up to the clinic in [place] where she was given methotrexate and azathioprine.  She recalls that initially the knees used to troubles her quite a bit.  She remembers having \"gallons of fluid\" removed from her knees.  These have not troubled her of late.  Meanwhile she developed complications from prednisone treatment.  She had AVN bilaterally.  That resulted in replacement of hip joints in 2000 and 2003.  She had been following up at [facility] for several years.  She was on hydroxychloroquine, methotrexate and azathioprine having been discontinued.  She is also been taking Celebrex on fairly regular basis.  She takes this once daily usually but sometimes 2 times.  This is for her widespread osteoarthritis.  Recently she was seen at spine clinic and had shoulder injections.  She has had lumbar spondylosis.  She has noted that it is hard for her to define the level of pain.  She would call this as \"medium\".  She noted severe pain in her shoulders however.  She had those injected 48 hours ago.  She is able to describe herself otherwise in good health apart from chronic anemia.  She has been seen in hematology.  This is microcytic.  Most recent labs showed 8.7.  She was asked to consider bone marrow biopsy.  She has chosen to defer that.  She has noted that she is able to walk couple of miles a day she cleans her own house and does a good job of it she feels.  She does not recognize her psoriasis is at herself or the family.  No history of ulcerative colitis or Crohn's disease.   Further " historical information and ADL limitations as noted in the multidimensional health assessment questionnaire attached in the EMR.      ALLERGIES:Patient has no known allergies.    PAST MEDICAL/ACTIVE PROBLEMS/MEDICATION/ FAMILY HISTORY/SOCIAL DATA:  The patient has a family history of  Past Medical History:   Diagnosis Date     Rheumatoid arthritis (H)      Social History     Tobacco Use   Smoking Status Former Smoker   Smokeless Tobacco Never Used     Patient Active Problem List   Diagnosis     Ptosis Of Eyelid     Lower Back Pain     Patient Counseling: End-Of-Life Issues     Osteoporosis     Seronegative rheumatoid arthritis of multiple sites (H)     Primary osteoarthritis involving multiple joints     Bilateral shoulder tendinopathy     Macrocytic anemia     Polyarthralgia     Compression fracture of L1 vertebra with routine healing     Current Outpatient Medications   Medication Sig Dispense Refill     baclofen (LIORESAL) 10 MG tablet Take 0.5-1 tablets (5-10 mg total) by mouth 2 (two) times a day as needed (for muscle spasms). 30 tablet 0     celecoxib (CELEBREX) 200 MG capsule TAKE ONE CAPSULE BY MOUTH TWICE DAILY  180 capsule 0     cholecalciferol, vitamin D3, 50,000 unit Tab Take 1 tablet weekly 12 tablet 0     hydroxychloroquine (PLAQUENIL) 200 mg tablet TAKE TWO TABLETS BY MOUTH DAILY AT BEDTIME  180 tablet 0     levothyroxine (SYNTHROID) 50 MCG tablet Take 1 tablet (50 mcg total) by mouth daily. 30 tablet 11     lidocaine (XYLOCAINE) 5 % ointment Apply small amount (1gm) to shoulders three times daily as needed for pain 120 g 2     naproxen (NAPROSYN) 500 MG tablet 1 tab po q 12 hours PRN pain.  Take with food/water to prevent stomach upset.  Do not take with Celebrex. 60 tablet 1     zolpidem (AMBIEN) 10 mg tablet Take 0.5 tablets (5 mg total) by mouth at bedtime as needed for sleep. Take 1 tablet by mouth at bedtime if needed for sleep. 30 tablet 1     omeprazole (PRILOSEC) 20 MG capsule Take 1 capsule  "(20 mg total) by mouth daily before breakfast. 90 capsule 3     No current facility-administered medications for this visit.      DETAILED EXAMINATION  02/27/20  :  Vitals:    02/27/20 1124   BP: 128/70   Patient Site: Right Arm   Patient Position: Sitting   Cuff Size: Adult Regular   Weight: 160 lb (72.6 kg)   Height: 5' 3\" (1.6 m)     Alert oriented. Head including the face is examined for malar rash, heliotropes, scarring, lupus pernio. Eyes examined for redness such as in episcleritis/scleritis, periorbital lesions.   Neck/ Face examined for parotid gland swelling, range of motion of neck.  Left upper and lower and right upper and lower extremities examined for tenderness, swelling, warmth of the appendicular joints, range of motion, edema, rash.  Some of the important findings included: Marked Heberden's.  No synovitis of palpable joints of upper extremities impingement both shoulders in abduction with crepitus, first CMC squaring bilaterally.  Knees without effusion warmth or JLT.       LAB / IMAGING DATA:  ALT   Date Value Ref Range Status   02/26/2020 12 0 - 45 U/L Final   09/27/2019 10 0 - 45 U/L Final   02/12/2019 11 0 - 45 U/L Final     Albumin   Date Value Ref Range Status   02/26/2020 4.5 3.5 - 5.0 g/dL Final   09/27/2019 4.3 3.5 - 5.0 g/dL Final   02/12/2019 4.2 3.5 - 5.0 g/dL Final     Creatinine   Date Value Ref Range Status   02/26/2020 0.68 0.60 - 1.10 mg/dL Final   10/07/2019 0.65 0.60 - 1.10 mg/dL Final   09/27/2019 0.64 0.60 - 1.10 mg/dL Final       WBC   Date Value Ref Range Status   02/26/2020 6.2 4.0 - 11.0 thou/uL Final   09/27/2019 9.4 4.0 - 11.0 thou/uL Final     Hemoglobin   Date Value Ref Range Status   02/26/2020 8.3 (L) 12.0 - 16.0 g/dL Final   09/27/2019 8.7 (L) 12.0 - 16.0 g/dL Final   02/12/2019 8.7 (L) 12.0 - 16.0 g/dL Final     Platelets   Date Value Ref Range Status   02/26/2020 538 (H) 140 - 440 thou/uL Final   09/27/2019 539 (H) 140 - 440 thou/uL Final   02/12/2019 537 (H) 140 " - 440 thou/uL Final       Lab Results   Component Value Date    RF <15.0 06/13/2018

## 2021-06-06 NOTE — PROGRESS NOTES
[facility] Daily Progress     Patient Name: Louise Benítez  Date: 3/12/2020  Visit #: 2  PTA visit #:  -  Date of evaluation: 3/5/2020  Referral Diagnosis:   Right buttock pain [M79.18]       Lumbar foraminal stenosis [M48.061]       Specifically nerve glides, 4-6 visits    Referring provider: Desire Lua MD  Visit Diagnosis:     ICD-10-CM    1. Right buttock pain  M79.18    2. Lumbar foraminal stenosis  M48.061    3. Acute right-sided low back pain without sciatica  M54.5          Assessment:     Pt reports some frustration with therapy to date.  She is unable to tolerate the stretches that were given to her.  We changed treatment approach today and focused on isometric hip/core strengthening.  Pt did not tolerate this well.  Lumbar rotational mobilizations Gr 2 were also trialed, pt initially felt better but then reported her soreness increased by the end of the session.  Patient is appropriate to continue with skilled physical therapy intervention, as indicated by initial plan of care.    Goal Status:  Pt. will demonstrate/verbalize independence in self-management of condition in : 4 weeks  Pt. will be independent with home exercise program in : 4 weeks    Pt will: report doing laundry without increasing her low back pain in 4 weeks.  Pt will: tolerate walking for 10 minutes with pain <2/10 in 4 weeks      Plan / Patient Education:     Continue with initial plan of care.  Progress with home program as tolerated.   Next visit:  May trial lumbar mobilizations again.  Continue with core/hip isometric strengthening.  May have to recommend other treatment if PT is not tolerable.    Subjective:     Pain Rating: Not rated today.  Pt comes in very frustrated that the exercises made her much worse.  Per pt report they pull on just the wrong spot in her back.      Objective:     Educated pt that there is a difference between soreness and dangerous pain.  Some soreness is to be expected and sometimes  "after an evaluation we do increase sx slightly but this should resolve.      Exercises:  Exercise #1: SKTC DC  Comment #1: Piriformis modified DC  Exercise #2: SLUMP SLRs  DC  Comment #2: LTR DC  Exercise #3: Gastroc stretch standing 30\" each  Comment #3: Seated hip abduction 10x 5\"  Exercise #4: Seated hip adduction 10x 5\"        Treatment Today     TREATMENT MINUTES COMMENTS   Evaluation     Self-care/ Home management     Manual therapy 10 Lumbar rotational mobilizations R sidelying   Neuromuscular Re-education     Therapeutic Activity     Therapeutic Exercises 20 See above   Gait training     Modality__________________                Total 30    Blank areas are intentional and mean the treatment did not include these items.       Erum Ang  3/12/2020    "

## 2021-06-06 NOTE — PATIENT INSTRUCTIONS - HE
Joellen Bustamante, DO                                                    Nguyen Burrell, YEE Aguilar,  DO                                                                                TRUNG Sinclair, DO                                                                                       DISCHARGE INSTRUCTIONS  During office hours (8:00 a.m.- 4:30 p.m.) questions or concerns may be answered  by calling Spine Navigation Nurses at [phone]. If you experience any problems after hours please call [phone] and you will be connected to [facility].     All Patients:  ? You may experience an increase in your symptoms or have some soreness from the injection for the first 2 days (It may take anywhere between 2 days- 2 weeks for the steroid to have maximum effect).  ? You may use ice on the injection site, as frequently as 20 minutes each hour if needed.  ? You may continue taking your regular medication.  ? You may shower. No swimming, tub bath or hot tub for 48 hours.  You may remove your   bandaid/bandage as soon as you are home.  ? You may resume light activities, as tolerated unless otherwise directed.  ? Resume your usual diet as tolerated.      POSSIBLE STEROID SIDE EFFECTS   (If steroid/cortisone was used for your procedure)  -If you experience these symptoms, it should only last for a short period  Swelling of the legs        Skin redness (flushing)  Mouth (oral) irritation   Blood sugar (glucose) levels      Sweats                          Mood changes  Severe headache                  Sleeplessness            POSSIBLE PROCEDURE SIDE EFFECTS  -Call the Spine Center if you are concerned  Increased Pain      Bruising/bleeding at site                  Redness or swelling  Fever greater than 100.5            Diffuse rash            THESE INSTRUCTIONS HAVE BEEN EXPLAINED TO THE PATIENT AND THE PATIENT/PATIENT REPRESENTATITVE HAS VERBALIZED UNDERSTANDING.  A COPY  OF THE INSTRUCTIONS HAVE BEEN GIVEN TO THE PATIENT/PATIENT REPRESENTATIVE.

## 2021-06-06 NOTE — PROGRESS NOTES
Assessment/Plan:      Diagnoses and all orders for this visit:    Primary osteoarthritis of both shoulders  -     OPS US Large Joint Injection Bilateral; Standing  -     OPS US Large Joint Injection Bilateral    Other orders  -     methylPREDNISolone acetate injection (DEPO-MEDROL)  -     ropivacaine 5 mg/mL (0.5 %) injection (NAROPIN)  -     lidocaine (PF) 10 mg/mL (1 %) injection (XYLOCAINE-MPF)        Assessment: Pleasant 74 y.o. female with past medical history significant for rheumatoid arthritis, anemia, GERD, osteoporosis who presents today for follow-up regarding chronic right-sided low back pain with what is thought to be radicular pain going into the right buttock.:    1.  Chronic bilateral shoulder pain with osteoarthritis.      Discussion:    1.  Patient wishes to proceed with bilateral glenohumeral joint injections today under ultrasound guidance have worked well in the past.  Please see attached procedure note.    2.  Follow-up Dr. Julian 1 month.      It was our pleasure caring for your patient today, if there any questions or concerns please do not hesitate to contact us.      Subjective:   Patient ID: Louise Benítez is a 74 y.o. female.    History of Present Illness: Patient presents at the request of Dr. Julian for a bilateral glenohumeral joint injection under ultrasound guidance.  Has worked well in the past.  Last injection was August 2019.  Has bilateral shoulder pain worsening with time worse with any movement of the shoulders.    No recent fevers chills sweats illnesses antibiotic use.      Past Medical History:   Diagnosis Date     Rheumatoid arthritis (H)        The following portions of the patient's history were reviewed and updated as appropriate: allergies, current medications, past family history, past medical history, past social history, past surgical history and problem list.           Objective:   Physical Exam:    Vitals:    03/03/20 1000   BP: 144/70   Pulse: 83   Temp: 98.7   F (37.1  C)     Body mass index is 28.34 kg/m .      General: Alert and oriented with normal affect. Attention, knowledge, memory, and language are intact. No acute distress.   Eyes: Sclerae are clear.  Respirations: Unlabored.   Decreased abduction and internal rotation of bilateral shoulders.      Procedure Note:    After discussing the risks and benefits of a bilateral glenohumeral joint injection under ultrasound guidance, informed consent was obtained. The patient and physician agreed on the injection site prior to the procedure.  A verbal timeout was done prior to the procedure.  With the patient seated, from posterior lateral approach, The right shoulder region was surveyed with the ultrasound unit to identify the target.  The skin was marked and prepped with chloraprep.  The skin was then anesthetized with a skin wheal followed by infiltration with 0.5 mL of 1% lidocaine.  Under direct ultrasound visualization, a 25-gauge 2 inch needle was used to inject 3 milliliters of injectate containing 2 milliliters of 0.5% ropivacaine and 1 milliliter containing 40 mg of depo-medrol after aspiration was negative for heme.     The procedure was then repeated for the left side:  The left shoulder region was surveyed with the ultrasound unit to identify the target.  The skin was prepped with chloraprep.  The skin was then anesthetized with a skin wheal followed by infiltration with 0.5 mL of 1% lidocaine.  Under direct ultrasound visualization, a 25-gauge 2 inch needle was used to inject 3 milliliters of injectate containing 2 milliliters of 0.5% ropivacaine and 1 milliliter containing 40 mg of depo-medrol after aspiration was negative for heme.    A total of 80 mg Depo-Medrol was used for today's procedure.  The patient tolerated the procedure well and there no immediate complications.    Preporcedure pain: 10/10 bilateral  Poste procedure pain: 0/10 bilateral

## 2021-06-06 NOTE — PATIENT INSTRUCTIONS - HE
Please follow up two weeks post procedure with Dr Julian to evaluate your plan of care.       DISCHARGE INSTRUCTIONS    During office hours (8:00 a.m.- 4:00 p.m.) questions or concerns may be answered  by calling [facility] Navigation Nurses at  [phone].  Messages received after hours will be returned the following business day.      In the case of an emergency, please dial 911 or seek assistance at the nearest Emergency Room/Urgent Care facility.     All Patients:    ? You may experience an increase in your symptoms for the first 2 days (It may take anywhere between 2 days- 2 weeks for the steroid to have maximum effect).    ? You may use ice on the injection site, as frequently as 20 minutes each hour if needed.    ? You may take your pain medicine.    ? You may continue taking your regular medication after your injection. If you have had a Medial Branch Block you may resume pain medication once your pain diary is completed.    ? You may shower. No swimming, tub bath or hot tub for 48 hours.  You may remove your bandaid/bandage as soon as you are home.    ? You may resume light activities, as tolerated.    ? Resume your usual diet as tolerated.    ? It is strongly advised that you do not drive for 1-3 hours post injection.    ? If you have had oral sedation:  Do not drive for 8 hours post injection.      ? If you have had IV sedation:  Do not drive for 24 hours post injection.  Do not operate hazardous machinery or make important personal/business decisions for 24 hours.      POSSIBLE STEROID SIDE EFFECTS (If steroid/cortisone was used for your procedure)    -If you experience these symptoms, it should only last for a short period      Swelling of the legs                Skin redness (flushing)       Mouth (oral) irritation     Blood sugar (glucose) levels              Sweats                      Mood changes    Headache    Sleeplessness         POSSIBLE PROCEDURE SIDE EFFECTS  -Call the Spine Center if  you are concerned    Increased Pain             Increased numbness/tingling        Nausea/Vomiting            Bruising/bleeding at site        Redness or swelling                                                Difficulty walking        Weakness             Fever greater than 100.5    *In the event of a severe headache after an epidural steroid injection that is relieved by lying down, please call the [facility] to speak with a clinical staff member*

## 2021-06-07 ENCOUNTER — OFFICE VISIT - HEALTHEAST (OUTPATIENT)
Dept: VASCULAR SURGERY | Facility: CLINIC | Age: 76
End: 2021-06-07

## 2021-06-07 DIAGNOSIS — I73.9 PAD (PERIPHERAL ARTERY DISEASE) (H): ICD-10-CM

## 2021-06-07 RX ORDER — ATORVASTATIN CALCIUM 40 MG/1
40 TABLET, FILM COATED ORAL AT BEDTIME
Qty: 90 TABLET | Refills: 1 | Status: ON HOLD | OUTPATIENT
Start: 2021-06-07 | End: 2022-01-01

## 2021-06-07 NOTE — PROGRESS NOTES
[facility] Discharge Summary  Patient Name: Louise Benítez  Date: 4/3/2020  Referral Diagnosis: Acute right-sided low back pain without sciatica  Referring provider: Desire Lua MD  Visit Diagnosis:   1. Acute right-sided low back pain without sciatica     2. Sacro-iliac pain     3. Sacroiliac joint pain     4. Age-related osteoporosis with current pathological fracture with routine healing, subsequent encounter     5. Compression fracture of L1 vertebra with routine healing     6. Difficulty balancing         Goals:  Pt. will demonstrate/verbalize independence in self-management of condition in : 4 weeks;12 weeks;Met  Pt. will be independent with home exercise program in : 4 weeks;12 weeks;Met  Pt. will report decreased intensity, frequency of : Pain;in 4 weeks;in 12 weeks;Comment;Progressing toward  Comment:: soreness across the whol back 5/10 pain with standing, right side of the SI pain with movement twisting or standing or getting out of bed.  decreased frequency of the intense pain. Goal improving now at a 6/10 pain  Patient will transfer: sit/stand;supine/sit;for car;for in/out of bed;for in/out of chair;with less pain;with less difficulty;in 4 weeks;in 12 weeks;Progressing toward    Pt will: report doing laundry without increasing her low back pain in 4 weeks.  Pt will: tolerate walking for 10 minutes with pain <2/10 in 4 weeks      Patient was seen for 8 visits from 10/4/19 to 11/22/19 with 0 missed appointments.  The patient met goals and partially met goals and has demonstrated understanding of and independence in the home program for self-care, and progression to next steps.  She will initiate contact if questions or concerns arise.  Patient received a home program  Exercises:  Exercise #1: standing heel and toe raises x10, HEP, verbal, did not see the patient today just doing the discharge and need the exercises to pull through with the . phrase  Comment #1: straight leg raise x10,  patient to dorsiflex foot to do some nerve gliding, HEP , verbal  Exercise #2: supine tighten your stomach and buttocks 5-10 seconds 5-10 reps, HEP, verbal  Comment #2: supine shotgun x10 discontinue patient to do at home, only when needed, verbal  Exercise #3: pressure to the left buttocks/piriformis for 3 minutes to get the muscle to relax, HEP, verbal  Comment #3: supine clamshells x10 orange band, only as needed and as tolerated, changed to sidelying, HEP, verbal  Exercise #4: balance feet together 1 minute, HEP, verbal  Comment #4: Tandem stance x30sec, HEP, verbal  Exercise #5: standing against the wall tighten your stomach and buttocks and have the shoulder blades go down and back hold 5-10 seconds do 5-10 reps verbal cues on how much to tighten and proper posture  Exercise #6: Standing rows x10 blue band, HEP trialed pulling to the right and to the left in standing but the patient had increase in her pain.  in standing so decreased to seated and decreased from blue to green and patient no longer had pain., verbal review  Comment #6: supine working transverse abdominis and either do heel slides or hip flexion as tolerated patient did this well after verbal and tactile cues, verbal review        Therapy will be discontinued at this time.  The patient will need a new referral to resume.    Thank you for your referral.  Adriana Lemos PT  4/3/2020  2:11 PM

## 2021-06-07 NOTE — TELEPHONE ENCOUNTER
"Follow-up call placed to pt. Pt had OV with Dr. Julian on 2/24/2020 and then had US guided bilateral glenohumeral joint injections on 3/3/2020.     Pt states she is \"doing it. It didn't make it go away but I'm doing better. Better than I was\".     At this time pt will follow-up as needed. She will call with any questions/concerns or worsening symptoms. She expressed appreciation for the follow-up call.     "

## 2021-06-07 NOTE — PROGRESS NOTES
The following steps were completed to comply with the REMS program for Prolia:  1. Ordering provider has previously reviewed information in the Medication Guide and Patient Counseling Chart, including the serious risks of Prolia  and the symptoms of each risk and have been advised to seek prompt medical attention if they have signs or symptoms of any of the serious risks.  2. Provided each patient a copy of the Medication Guide and Patient Brochure.  See MAR for administration details.   Indication: Prolia  (denosumab) is a prescription medicine used to treat osteoporosis in patients who:   Are at high risk for fracture, meaning patients who have had a fracture related to osteoporosis, or who have multiple risk factors for fracture; Cannot use another osteoporosis medicine or other osteoporosis medicines did not work well.   The timeline for early/late injections would be 4 weeks early and any time after the 6 month negrita. If a patient receives their injection late, then the subsequent injection would be 6 months from the date that they actually received the injection    Have the screening questions been asked prior to this administration? Yes

## 2021-06-07 NOTE — TELEPHONE ENCOUNTER
"Prolia Injection Phone Screen      Screening questions have been asked 2-3 days prior to administration visit for Prolia. If any questions are answered with \"Yes,\" this phone encounter were will routed to ordering provider for further evaluation.     1.  When was the last injection?  10/7/19    2.  Has insurance for this injection been verified?  Yes    3.  Did you experience any new onset achiness or rashes that lasted for over a month with your previous Prolia injection?   No    4.  Do you have a fever over 101?F or a new deep cough that is unusual for you today? No    5.  Have you started any new medications in the last 6 months that you were told could affect your immune system? These may have been prescribed by oncologist, transplant, rheumatology, or dermatology.   No    6.  In the last 6 months have you have gastric bypass or parathyroid surgery?   No    7.  Do you plan dental work requiring drilling into the bone such as implants/extractions or oral surgery in the next 2-3 months?   No    8. Do you have new insurance since the last injection? NO    Patient informed if symptoms discussed above present prior to their administration appointment, they are to notify clinic immediately.     Mahnaz Ojeda              "

## 2021-06-08 ENCOUNTER — RECORDS - HEALTHEAST (OUTPATIENT)
Dept: ADMINISTRATIVE | Facility: OTHER | Age: 76
End: 2021-06-08

## 2021-06-08 DIAGNOSIS — M06.09 SERONEGATIVE RHEUMATOID ARTHRITIS OF MULTIPLE SITES (H): ICD-10-CM

## 2021-06-08 DIAGNOSIS — M15.0 PRIMARY OSTEOARTHRITIS INVOLVING MULTIPLE JOINTS: ICD-10-CM

## 2021-06-08 RX ORDER — CELECOXIB 200 MG/1
CAPSULE ORAL
Qty: 90 CAPSULE | Refills: 0 | Status: SHIPPED | OUTPATIENT
Start: 2021-06-08 | End: 2021-01-01

## 2021-06-08 RX ORDER — HYDROXYCHLOROQUINE SULFATE 200 MG/1
TABLET, FILM COATED ORAL
Qty: 180 TABLET | Refills: 0 | Status: SHIPPED | OUTPATIENT
Start: 2021-06-08 | End: 2021-01-01

## 2021-06-09 ENCOUNTER — RECORDS - HEALTHEAST (OUTPATIENT)
Dept: ADMINISTRATIVE | Facility: OTHER | Age: 76
End: 2021-06-09

## 2021-06-10 NOTE — PROGRESS NOTES
Assessment:   Louise Benítez (AKAIRAM Booker)  is a 74 y.o. y.o. female with past medical history significant for rheumatoid arthritis, anemia, GERD, osteoporosis who presents today for follow-up regarding chronic bilateral low back pain with left radicular/sciatica type leg pain.  Patient reports that this pain has been progressively worsening over the last 3 months or so.  The patient's pain is thought to be due to foraminal stenosis at the L4-5 and L5-S1 levels.  She is had good relief in the past with a left L4-5 and L5-S1 TFESI.  At this point she is neurologically intact and without any red flag symptoms.    PSP:  Dr. Julian       Plan:     A shared decision making plan was used.  The patient's values and choices were respected.  The following represents what was discussed and decided upon by the physician and the patient.      1.  DIAGNOSTIC TESTS: The patient's MRI of the lumbar spine performed on January 13 of 2020 was personally reviewed today by the physician with the physician performing her own interpretation.  There is a significant scoliosis.  There is severe right L2-3 foraminal stenosis seen as well as severe right L3-4 foraminal stenosis seen.  There is significant right L4-5 and L5-S1 foraminal stenosis, though the L2-3 and L3-4 levels appear to be the most severe.  There are degenerative changes of the facet joints throughout the entire lumbar spine.    -Per neurosurgery, an EMG of the right lower extremity should be ordered prior to a neurosurgery consultation to help determine the etiology of her symptoms given her MRI shows changes at almost every level.  We will hold off on this for now though the patient does not want to move forward with the surgery consultation.  2.  PHYSICAL THERAPY: The patient was given an order for physical therapy to work on the low back and leg pain at her last visit in February 2020.  She was able to go to a few sessions before the shot down occurred.  3.  MEDICATIONS:  She can remain on the baclofen 10 mg up to 3 times a day as needed for pain.  -She can remain on the Celebrex 200 mg twice a day as needed for pain.  -She can alternate the Celebrex with naproxen as needed.  -She can continue with the lidocaine ointment as needed.  -Patient has tried and failed gabapentin secondary to side effects.  4.  INTERVENTIONS: A left L4-5 and L5-S1 transforaminal epidural steroid injection was ordered today.  She can schedule this at her convenience.  5.  PATIENT EDUCATION:    -All of her questions were answered to her satisfaction today.  She was in agreement with the treatment plan.  6.  FOLLOW-UP: First available for the injection.  If there are any questions/concerns or any significant worsening of pain prior to that time, the patient is asked to call the clinic via the nurse navigation line or via [facility].     Subjective:     Louise Benítez (AKAIRAM Booker) is a 74 y.o. female who presents today for follow-up regarding bilateral low back pain with left radicular/sciatic type leg symptoms.  The patient reports that this pain feels the same as it did previously.  She reports that her left L4-5 and L5-S1 transforaminal epidural steroid injection that was performed in December 2018 provided good relief up until recently.  Reports that she has been feeling this pain come on and worsened over the last few months.  She rates her pain today at a 2 out of 10.  It is worse with moving around.  She also states that housework will aggravate the pain.  She does feel better with sitting.  She reports that there have been no significant new symptoms in the interim.  She did do a couple of sessions of physical therapy in February 2020 before the COVID-19 shutdown.  She continues to do her home exercise program at home.  She is managing with Celebrex, baclofen, lidocaine ointment, naproxen.  She had to stop gabapentin secondary to side effects.  She is interested in a repeat injection at this time.    Past  medical history is reviewed and is unchanged for any new medical diagnoses in the interim.      Family history is reviewed and is unchanged in the interim.      Review of Systems:  For numbness and tingling in the left leg as well as pain that prevents her from sleeping at night..  Pertinent negatives include no fevers, chills, unexplained weight loss, bowel incontinence, bladder incontinence, trips, stumbles, falls.  All others reviewed and are negative.     Objective:   CONSTITUTIONAL:  Vital signs as above.  No acute distress.  The patient is well nourished and well groomed.    PSYCHIATRIC:  The patient is awake, alert, oriented to person, place and time.  The patient is answering questions appropriately with clear speech.  Normal affect.  SKIN:  Skin over the face, posterior torso, bilateral upper and lower extremities is clean, dry, intact without rashes.  MUSCULOSKELETAL:  Gait is non-antalgic.  The patient is able to heel and toe walk without any difficulty.  Mild tenderness over the left lower lumbar paraspinal muscles and over the left gluteal muscles..      The patient has 5/5 strength for the bilateral hip flexors, knee flexors/extensors, ankle dorsiflexors/plantar flexors, ankle evertors/invertors.  Straight leg raising test in the supine and seated position bilaterally.  No significant left hip range of motion pain.  Jaime's test is negative on the left side.  No significant pain with right hip range of motion testing.  Fabere's test is negative on the right side.  NEUROLOGICAL: Trace patellar, medial hamstring, achilles reflexes which are symmetric bilaterally.  No ankle clonus bilaterally.  Sensation to light touch is intact in the bilateral L4, L5, and S1 dermatomes.

## 2021-06-10 NOTE — PATIENT INSTRUCTIONS - HE
Joellen Bustamante, DO Nuvia Mejia, YEE Aguilar,  DO Ngueyn Burrell, YEE Steen PA-C, DC                                                                      TRUNG Sinclair PA-C    DISCHARGE INSTRUCTIONS  During office hours (8:00 a.m.- 4:30 p.m.) questions or concerns may be answered  by calling Spine Navigation Nurses at [phone]. If you experience any problems after hours please call [phone] and you will be connected to [facility].     All Patients:  ? You may experience an increase in your symptoms or have some soreness from the injection for the first 2 days (It may take anywhere between 2 days- 2 weeks for the steroid to have maximum effect).  ? You may use ice on the injection site, as frequently as 20 minutes each hour if needed.  ? You may continue taking your regular medication.  ? You may shower. No swimming, tub bath or hot tub for 48 hours.  You may remove your   bandaid/bandage as soon as you are home.  ? You may resume light activities, as tolerated unless otherwise directed.  ? Resume your usual diet as tolerated.      POSSIBLE STEROID SIDE EFFECTS   (If steroid/cortisone was used for your procedure)  -If you experience these symptoms, it should only last for a short period  Swelling of the legs        Skin redness (flushing)  Mouth (oral) irritation   Blood sugar (glucose) levels      Sweats                          Mood changes  Severe headache                  Sleeplessness            POSSIBLE PROCEDURE SIDE EFFECTS  -Call the Spine Center if you are concerned  Increased Pain      Bruising/bleeding at site                  Redness or swelling  Fever greater than 100.5            Diffuse rash            THESE INSTRUCTIONS HAVE BEEN EXPLAINED TO THE PATIENT AND THE PATIENT/PATIENT REPRESENTATITVE HAS  VERBALIZED UNDERSTANDING.  A COPY OF THE INSTRUCTIONS HAVE BEEN GIVEN TO THE PATIENT/PATIENT REPRESENTATIVE.

## 2021-06-10 NOTE — TELEPHONE ENCOUNTER
Phone call to patient to inform her PSP wants to see her back in clinic. Stated understanding. Transferred to scheduling to make appointment.

## 2021-06-10 NOTE — PROGRESS NOTES
[facility] Certification Request    May 23, 2017      Patient: Louise Benítez  MR Number: [id number]  YOB: 1945  Date of Visit: 5/23/2017      Dear Julio Corrales, DO:    Thank you for this referral.   We are seeing Louise Benítez in Physical Therapyfor low back pain.    Medicare and/or Medicaid requires physician review and approval of the treatment plan. Please review the plan of care and verify that you agree with the therapy plan of care by co-signing this note.      Plan of Care  Authorization / Certification Start Date: 05/23/17  Authorization / Certification End Date: 08/21/17  Authorization / Certification Number of Visits: 8  Communication with: Referral Source  Patient Related Instruction: Nature of Condition;Treatment plan and rationale;Self Care instruction;Basis of treatment;Body mechanics  Times per Week: 1-2  Number of Weeks: 4  Number of Visits: 4-8  Precautions / Restrictions : RA, osteoporosis, bilateral MICHAEL  Therapeutic Exercise: ROM;Stretching;Strengthening  Neuromuscular Reeducation: kinesio tape;posture;balance/proprioception;core  Manual Therapy: soft tissue mobilization;myofascial release;muscle energy;strain counterstrain;joint mobilization  Modalities: electrical stimulation;ultrasound    Goals:  Pt. will be independent with home exercise program in : 6 weeks  Pt will: be able to walk 2 miles without increase in pain; in 8 weeks  Pt will: be able to play golf without increase in pain; in 8 weeks  Pt will: be able to complete housework without increase in pain; in 8 weeks      If you have any questions or concerns, please don't hesitate to call.    Sincerely,      Sandy Jones, PT, DPT        Physician recommendation:     _X__ Follow therapist's recommendation        ___ Modify therapy      *Physician co-signature indicates they certify the need for these services furnished within this plan and while under their care.        [facility]    [facility] Initial Evaluation    Patient Name: Louise Benítez  Date of evaluation: 5/23/2017  Referral Diagnosis: Low back pain radiating to left leg  Referring provider: Julio Aguilar DO  Visit Diagnosis:     ICD-10-CM    1. Chronic left-sided low back pain without sciatica M54.5     G89.29    2. Lumbar radiculopathy M54.16    3. Muscle weakness (generalized) M62.81        Assessment:      Louise Benítez is a 71 y.o. female who presents to therapy today with chief complaints of low back pain with pain into her L leg. Onset date of sx was 6-7 years ago.  Pt reported h/o lumbar surgery 6-7 years ago to clean out the calcium deposits, osteoporosis, and rheumatoid arthritis.  Pain symptoms are dull in nature, but can be sharp at times.  Functional impairments include sitting for long periods, lifting, and moving too much.  Pt demo's signs and sx consistent with chronic low back pain with radicular symptoms.   Pt. is appropriate for skilled PT intervention as outlined in the Plan of Care (POC).  Pt. is a good candidate for skilled PT services to improve pain levels and function.    Goals:  Pt. will be independent with home exercise program in : 6 weeks  Pt will: be able to walk 2 miles without increase in pain; in 8 weeks  Pt will: be able to play golf without increase in pain; in 8 weeks  Pt will: be able to complete housework without increase in pain; in 8 weeks    Patient's expectations/goals are realistic.    Barriers to Learning or Achieving Goals:  No Barriers.       Plan / Patient Instructions:        Plan of Care:   Authorization / Certification Start Date: 05/23/17  Authorization / Certification End Date: 08/21/17  Authorization / Certification Number of Visits: 8  Communication with: Referral Source  Patient Related Instruction: Nature of Condition;Treatment plan and rationale;Self Care instruction;Basis of treatment;Body mechanics  Times per Week: 1-2  Number of Weeks: 4  Number of Visits:  4-8  Precautions / Restrictions : RA, osteoporosis, bilateral MICHAEL  Therapeutic Exercise: ROM;Stretching;Strengthening  Neuromuscular Reeducation: kinesio tape;posture;balance/proprioception;core  Manual Therapy: soft tissue mobilization;myofascial release;muscle energy;strain counterstrain;joint mobilization  Modalities: electrical stimulation;ultrasound    POC and pathology of condition were reviewed with patient.  Pt. is in agreement with the Plan of Care  A Home Exercise Program (HEP) was initiated today.  Pt. was instructed in exercises by PT and patient was given a handout with detailed instructions.    Plan for next visit: Core and glute strengthening.  Progress bridging exercise with band if shoulders are feeling better.  Finish balance screen.     Subjective:       The patient reports that she had some steroid injections yesterday in her shoulders.  Sitting is painful.  Her back pain started 6-7 years ago.  She went to the spine center and they found a spot where there is some pinching.  She has since been getting steroid injections in her buttocks.  She continues to have arthritis and some scoliosis in her lumbar spine.  She had a hip replacement on the L in , and then in  she had the R hip replaced after having avascular necrosis.    Social information:   Living Situation:single family home   Occupation:retired   Work Status:NA   Equipment Available: None    Pain Ratin  Pain rating at best: 0  Pain rating at worst: 10  Pain description: dull, sharp and tingling    Functional limitations are described as occurring with:   Lifting, moving too much, sitting    Patient reports benefit from:  movement or exercise , pain medication, injection:   type cortisone date(s)2016       Objective:      Note: Items left blank indicates the item was not performed or not indicated at the time of the evaluation.    Patient Outcome Measures :    Modified Oswestry Low Back Pain Disablity Questionnaire  in %: 34    Scores range from 0-100%, where a score of 0% represents minimal pain and maximal function. The minimal clinically important difference is a score reduction of 12%.    Examination  1. Chronic left-sided low back pain without sciatica     2. Lumbar radiculopathy     3. Muscle weakness (generalized)       Involved side: Left  Posture Observation:      General standing posture is poor.  Convex L scoliosis thoracic/lumbar spine.    Lumbar ROM:    Date: 05/23/17     *Indicate scale AROM AROM AROM   Lumbar Flexion Major limited     Lumbar Extension Major limited      Right Left Right Left Right Left   Lumbar Sidebending Major limited Mod limited       Lumbar Rotation Major limited Major limited       Thoracic Flexion      Thoracic Extension      Thoracic Sidebending         Thoracic Rotation           Lower Extremity Strength:     Date: 05/23/17     LE strength/5 Right Left Right Left Right Left   Hip Flexion (L1-3) 5 4+       Hip Extension (L5-S1)         Hip Abduction (L4-5) 5 5       Hip Adduction (L2-3) 5 5       Hip External Rotation 4 4       Hip Internal Rotation 4+ 4+       Knee Extension (L3-4) 5 5       Knee Flexion 5 5       Ankle Dorsiflexion (L4-5) 5 5       Great Toe Extension (L5)         Ankle Plantar flexion (S1)         Abdominals        Sensation            Reflex Testing  Lumbar Dermatomes Right Left UE Reflexes Right Left   Iliac Crest and Groin (L1) WNL WNL Biceps (C5-6)     Anterior Medial Thigh (L2) WNL WNL Brachioradialis (C5-6)     Anterior Thigh, Medial Epicondyle Femur (L3) WNL WNL Triceps (C7-8)     Lateral Thigh, Anterior Knee, Medial Leg/Malleolus (L4) WNL WNL Jose s test     Lateral Leg, Dorsal Foot (L5) WNL WNL LE Reflexes     Lateral Foot (S1) WNL WNL Patellar (L3-4)     Posterior Leg (S2)   Achilles (S1-2)     Other:   Babinski Response       Palpation: Tender L PSIS and along hip rotator mm L.    Lumbar Special Tests:     Lumbar Special Tests Right Left SI Tests Right  Left    Quadrant test   SI Compression - -   Straight leg raise   SI Distraction     Crossover response   POSH Test     Slump - - Sacral Thrust - -   Sit-up test  FADIR     Trunk extensor endurance test  Resisted Abduction     Prone instability test  Other:     Pubic shotgun  Other:       Repeated Motion Testing:  Not indicated    Passive Mobility - Joint Integrity:  Hypomobile, painful L L5/S1 facet joint    LE Screen:  WNL    Appt time: 10:38AM - 11:23AM    Treatment Today     TREATMENT MINUTES COMMENTS   Evaluation 20 Low complexity lumbar evaluation   Self-care/ Home management     Manual therapy     Neuromuscular Re-education     Therapeutic Activity     Therapeutic Exercises 25 Demo/performance of HEP  Patient educated on pathology  Discussed POC   Gait training     Modality__________________                Total 45    Blank areas are intentional and mean the treatment did not include these items.     PT Evaluation Code: (Please list factors)  Patient History/Comorbidities: Osteoporosis, RA, lumbar surgery, bilateral MICHAEL 2000, 2003  Examination: Lumbar spine  Clinical Presentation: Stable  Clinical Decision Making: Low complexity    Patient History/  Comorbidities Examination  (body structures and functions, activity limitations, and/or participation restrictions) Clinical Presentation Clinical Decision Making (Complexity)   No documented Comorbidities or personal factors 1-2 Elements Stable and/or uncomplicated Low   1-2 documented comorbidities or personal factor 3 Elements Evolving clinical presentation with changing characteristics Moderate   3-4 documented comorbidities or personal factors 4 or more Unstable and unpredictable High            Sandy Jones, PT, DPT  5/23/2017  11:42 AM

## 2021-06-10 NOTE — PATIENT INSTRUCTIONS - HE
A left L4-5 and L5-S1 TFESI has been ordered today.  You can schedule this injection for August 11th or 12th next week.   On the day of your injection, you cannot be sick or taking antibiotics.  If you become sick and are prescribed, please call the clinic so your injection can be rescheduled for once you have completed your antibiotics.  You will need to bring a  with you for your injection.   If you have any questions or concerns prior to your injection, please do not hesitate to call the nurse navigation line at [phone] or contact Dr. Julian through [employer].

## 2021-06-10 NOTE — PATIENT INSTRUCTIONS - HE
DISCHARGE INSTRUCTIONS    During office hours (8:00 a.m.- 4:00 p.m.) questions or concerns may be answered  by calling [facility] Navigation Nurses at  [phone].  Messages received after hours will be returned the following business day.      In the case of an emergency, please dial 911 or seek assistance at the nearest Emergency Room/Urgent Care facility.     All Patients:    ? You may experience an increase in your symptoms for the first 2 days (It may take anywhere between 2 days- 2 weeks for the steroid to have maximum effect).    ? You may use ice on the injection site, as frequently as 20 minutes each hour if needed.    ? You may take your pain medicine.    ? You may continue taking your regular medication after your injection. If you have had a Medial Branch Block you may resume pain medication once your pain diary is completed.    ? You may shower. No swimming, tub bath or hot tub for 48 hours.  You may remove your bandaid/bandage as soon as you are home.    ? You may resume light activities, as tolerated.    ? Resume your usual diet as tolerated.    ? It is strongly advised that you do not drive for 1-3 hours post injection.    ? If you have had oral sedation:  Do not drive for 8 hours post injection.      ? If you have had IV sedation:  Do not drive for 24 hours post injection.  Do not operate hazardous machinery or make important personal/business decisions for 24 hours.      POSSIBLE STEROID SIDE EFFECTS (If steroid/cortisone was used for your procedure)    -If you experience these symptoms, it should only last for a short period      Swelling of the legs                Skin redness (flushing)       Mouth (oral) irritation     Blood sugar (glucose) levels              Sweats                      Mood changes    Headache    Sleeplessness         POSSIBLE PROCEDURE SIDE EFFECTS  -Call the Spine Center if you are concerned    Increased Pain             Increased numbness/tingling         Nausea/Vomiting            Bruising/bleeding at site        Redness or swelling                                                Difficulty walking        Weakness             Fever greater than 100.5    *In the event of a severe headache after an epidural steroid injection that is relieved by lying down, please call the [facility] to speak with a clinical staff member*

## 2021-06-10 NOTE — PROGRESS NOTES
Assessment/Plan:      Diagnoses and all orders for this visit:    Chronic pain of both shoulders  -     OPS US Large Joint Injection Bilateral; Standing  -     OPS US Large Joint Injection Bilateral        Assessment: Pleasant 74 y.o. female with past medical history significant for rheumatoid arthritis, anemia, GERD, osteoporosis     1.  Chronic bilateral shoulder pain with osteoarthritis.      Discussion:    1.  Patient presents for bilateral glenohumeral joint injections under ultrasound guidance.  Has done well with these in the past.  Wishes to proceed.  Please see attached procedure note.    2.  We will have nurse navigation contact her in 2 weeks to see how she is doing and follow-up with Dr. Julian as needed.      It was our pleasure caring for your patient today, if there any questions or concerns please do not hesitate to contact us.      Subjective:   Patient ID: Louise Benítez is a 74 y.o. female.    History of Present Illness:Patient presents at the request of Dr. Julina for bilateral glenohumeral joint injections under ultrasound guidance.  Has had these in the past.  She has bilateral shoulder pain with any movement or raising her arms.  Wishes to proceed with injection today.    Review of systems: No fever chills sweats recent illnesses or antibiotics.           Past Medical History:   Diagnosis Date     Rheumatoid arthritis (H)        The following portions of the patient's history were reviewed and updated as appropriate: allergies, current medications, past family history, past medical history, past social history, past surgical history and problem list.           Objective:   Physical Exam:    Vitals:    08/26/20 1427   BP: 132/70   Pulse: 90     There is no height or weight on file to calculate BMI.      General: Alert and oriented with normal affect. Attention, knowledge, memory, and language are intact. No acute distress.   Eyes: Sclerae are clear.  Respirations: Unlabored.  Skin: No  rashes seen.    Gait:  Nonantalgic decreased range of motion shoulders in abduction bilaterally.        Procedure Note:    After discussing the risks and benefits of a bilateral glenohumeral joint injection under ultrasound guidance, informed consent was obtained. The patient and physician agreed on the injection site prior to the procedure.  A verbal timeout was done prior to the procedure.      With the patient seated, from posterior lateral approach, The right shoulder region was surveyed with the ultrasound unit to identify the target.  The skin was marked and prepped with chloraprep.  The skin was then anesthetized with a skin wheal followed by infiltration with 0.5 mL of 1% lidocaine.  Under direct ultrasound visualization, a 25-gauge 2 inch needle was used to inject 3 milliliters of injectate containing 2 milliliters of 0.5% ropivacaine and 1 milliliter containing 40 mg of depo-medrol after aspiration was negative for heme.     The injection was then repeated on the left: With the patient seated, from posterior lateral approach, The left shoulder region was surveyed with the ultrasound unit to identify the target.  The skin was marked and prepped with chloraprep.  The skin was then anesthetized with a skin wheal followed by infiltration with 0.5 mL of 1% lidocaine.  Under direct ultrasound visualization, a 25-gauge 2 inch needle was used to inject 3 milliliters of injectate containing 2 milliliters of 0.5% ropivacaine and 1 milliliter containing 40 mg of depo-medrol after aspiration was negative for heme.     A total of 80 mg of Depo-Medrol was used for this procedure. The patient tolerated the procedure well and there no immediate complications.    Preporcedure pain: 10/10 bilaterally   Poste procedure pain: 2/10 bilaterally

## 2021-06-10 NOTE — PROGRESS NOTES
Assessment/Plan:      Diagnoses and all orders for this visit:    Rotator cuff arthropathy, unspecified laterality  -     OPS US Large Joint Injection Bilateral; Standing  -     OPS US Large Joint Injection Bilateral  -     lidocaine (XYLOCAINE) 5 % ointment; Apply small amount (1gm) to shoulders three times daily as needed for pain  Dispense: 120 g; Refill: 2  -     methylPREDNISolone acetate injection 80 mg (DEPO-MEDROL); Inject 2 mL (80 mg total) into the joint once.  -     lidocaine (PF) 10 mg/mL (1 %) injection 6 mL (XYLOCAINE-MPF); Inject 6 mL into the joint once.    Shoulder pain, bilateral  -     OPS US Large Joint Injection Bilateral; Standing  -     OPS US Large Joint Injection Bilateral    Glenohumeral arthritis, unspecified laterality  -     OPS US Large Joint Injection Bilateral; Standing  -     OPS US Large Joint Injection Bilateral    Rotator cuff arthropathy  -     OPS US Large Joint Injection Bilateral; Standing  -     OPS US Large Joint Injection Bilateral    Low back pain radiating to left leg  -     Ambulatory referral to Physical Therapy    Numbness and tingling of left leg  -     Ambulatory referral to Physical Therapy    Foraminal stenosis of lumbar region  -     gabapentin (NEURONTIN) 100 MG capsule; 1 cap at bedtime x 3 days.  Then may take 2 caps at bedtime x 3 days, then may take 3 caps at bedtime  Dispense: 90 capsule; Refill: 2  -     Ambulatory referral to Physical Therapy    Sleep difficulties  -     gabapentin (NEURONTIN) 100 MG capsule; 1 cap at bedtime x 3 days.  Then may take 2 caps at bedtime x 3 days, then may take 3 caps at bedtime  Dispense: 90 capsule; Refill: 2    Myofascial muscle pain  -     gabapentin (NEURONTIN) 100 MG capsule; 1 cap at bedtime x 3 days.  Then may take 2 caps at bedtime x 3 days, then may take 3 caps at bedtime  Dispense: 90 capsule; Refill: 2    Chronic pain of both shoulders  -     lidocaine (XYLOCAINE) 5 % ointment; Apply small amount (1gm) to  shoulders three times daily as needed for pain  Dispense: 120 g; Refill: 2        Assessment: Pleasant 71-year-old female with a history of rheumatoid arthritis, GERD:    1.  Bilateral shoulder pain consistent with clinical humeral joint osteoarthritis.  Right typically worse than the left.  Has responded well to injections in the past lasting approximately 5-6 months.  Pain flared over the past 3-4 weeks    2.  Lumbar spine pain with some left lateral leg pain to the anterior knee.  Severe foraminal stenosis on the left at L4-5.  3.  Lumbar facet arthropathy in the lower lumbar spine most significant L5-S1 resulting in a spondylolisthesis  Of L4 on L5that appears degenerative.  4.  Poor sleep    Discussion:    1. I discussed the diagnosis and treatment options.  The main issue today is her shoulders.  Injections have been helpful in the past.  She would like an injection today.  Bilateral glenohumeral joint injections under ultrasound guidance.  Please see attached procedure note.  2.  Lidocaine gel has been helpful for the shoulders.  This to be refilled.  3.  She has poor sleep along with flare of her low back pain and left leg pain.  Trial gabapentin at bedtime.  Cautioned regarding side effects.  4.  Recommend physical therapy for lumbar spine with pelvic tilt and core strengthening and nerve glides.  5.  We will follow-up with Dr. Julian in 4-6 weeks for ongoing management of the lumbar spine pain.      It was our pleasure caring for your patient today, if there any questions or concerns please do not hesitate to contact us.      Subjective:   Patient ID: Louise Benítez is a 71 y.o. female.    History of Present Illness:   Patient presents for evaluation of bilateral shoulder pain.  Had bilateral shoulder injections done approximately 6-7 months ago.  Took a couple of weeks to start working and then helped for approximately 5-6 months.  Over the past 4 weeks or so she has had increase in shoulder pain  bilaterally right is somewhat worse than left.  Pain is a 4/10 today.  Difficult for her to raise her arms up to shoulder level or do any reaching.  It does wake her up at night.    She is also having a flare of her low back pain over the past several days.  She was helping her  put some wallpaper up is having pain in the low back on the left lateral legs across the front of the knee.  She has poor sleep.  She has had physical therapy in the past for the shoulders in the back.  She gets epidural injections in the lumbar spine which generally last a year her last injection was in December.  She continues to take Celebrex.      Imaging: MRI report and images were personally reviewed and discussed with the patient.  A plastic model was utilized during the discussion.  MRI of the lumbar spine personally reviewed.  Multilevel degenerative disc disease most significant findings severe facet arthropathy L5-S1 as well as facet arthropathy L4-5 with trace/mild grade 1 spondylolisthesis that appears degenerative.  History of lumbar decompression.  Severe foraminal stenosis on the left at L4-5.    Review of Systems: *No fevers chills sweats recent illnesses.  No recent antibiotics.  No numbness, tingling or weakness.  No bowel or bladder incontinence.  No headaches, dizziness, nausea, vomiting, blurred vision or balance deficits.    Past Medical History:   Diagnosis Date     Rheumatoid arthritis        The following portions of the patient's history were reviewed and updated as appropriate: allergies, current medications, past family history, past medical history, past social history, past surgical history and problem list.      Objective:   Physical Exam:    Vitals:    05/22/17 1330   BP: 134/58   Pulse: 80       General: Alert and oriented with normal affect. Attention, knowledge, memory, and language are intact. No acute distress.   Eyes: Sclerae are clear.  Respirations: Unlabored. CV: No lower extremity edema.    Gait:  Nonantalgic  Shoulders have decreased abduction by approximately 20  bilaterally.  Sensation is intact to light touch throughout the upper and lower extremities.  Reflexes are 2+ and symmetric in the biceps triceps and brachioradialis with negative Hoffmans.      Manual muscle testing reveals:  Right /Left out of 5  Difficult to assess shoulder abductor secondary to decreased range of motion of the shoulders  5/5 elbow flexors  5/5 elbow extensors  5/5 wrist extensors  5/5 interosseus  5/5 finger flexors     5/5 knee flexors  5/5 knee extensors  5/5 ankle plantar flexors  5/5 ankle dorsiflexors  5/5   ankle evertors    Procedure Note:     After discussing the risks and benefits of bilateral glenohumeral joint injections under ultrasound guidance, informed consent was obtained. The patient and physician agreed on the injection site prior to the procedure.  A verbal timeout was done prior to the procedure.  With the patient seated, from posterior lateral approach, The right  shoulder region was surveyed with the ultrasound unit to identify the target.  The skin was marked and prepped with chloraprep.   skin wheal was made with 1% lidocaine for local anesthetic.  Under direct ultrasound visualization, a 22-gauge 3.5 inch needle was used to inject 3 milliliters of injectate containing 2 milliliters of 1% lidocaine and 1 milliliter containing 40 mg of depo-medrol after aspiration was negative for heme.      The procedure was then repeated for the left shoulder.The left  shoulder region was surveyed with the ultrasound unit to identify the target.  The skin was marked and prepped with chloraprep.   skin wheal and 0.5 mL's of 1% lidocaine was used for local anesthetic.  Under direct ultrasound visualization, a 22-gauge 3.5 inch needle was used to inject 3 milliliters of injectate containing 2 milliliters of 1% lidocaine and 1 milliliter containing 40 mg of depo-medrol after aspiration was negative for heme.   Sterile technique was utilized for the procedure.  A total of 80 mg Depo-Medrol and 4.5 milliliters of 1% lidocaine was used. The patient tolerated the procedure well and there no immediate complications.     Preprocedure pain: Left: 4/10 right: 4/10  Post procedure pain: Left: 4/10 right: 0/10

## 2021-06-10 NOTE — TELEPHONE ENCOUNTER
"PSP: Dr. Julian   Last clinic visit:  2/24/20 follow up; 3/3/20 bilateral glenohumeral joint injections  Reason for call: Would like an injection for her left leg pain  Clinical information:  Patient had called at end of day wanting to set up an appointment for injection. Returned her call. \"I normally get this one injection for the left side about once a year. It is really bothering me. Pain down the left leg, the knee wakes me up at night, and it goes all the way to the ankle. It has been bothering me for some time. I didn't think you were even open. I really need a shot all over.\"   Advice given to patient: Explained as she has not been in in close to 6 months, she should really be evaluated to determine the best injection for her. States the left leg/side is the most problematic for her. Explained PSP will be notified of the update and request for injection. Patient will be called as to how PSP would like to proceed. Stated understanding.    Provider to address: follow up needed or OK to schedule the Left L4-L5 and L5-S1 TFESI (last had 12/19/18).     **Please call patient back on her cell number.   "

## 2021-06-11 NOTE — TELEPHONE ENCOUNTER
"Phone call to patient to check on status 2 weeks post bilateral glenohumeral joint injections per Dr. Aguilar.     Patient reports \"I'm good. I would say at least 50% better. I am much more comfortable than before I came in for the injections. The pain never goes completely away.\"     Explained Dr. Julian would be updated with response. Encouraged pt to call nurse navigation line if questions or concerns arise or pain returns. Stated understanding and appreciation for call.    "

## 2021-06-11 NOTE — TELEPHONE ENCOUNTER
Dr Us    Wants an in person appt. 09/08 @ 2:45pm is her current video appt. States he wants her to review her foot, it is giving her pain and throwing off her balance.    Please call pt back @ [phone]

## 2021-06-11 NOTE — TELEPHONE ENCOUNTER
Patient has a lab appt.  No orders available.  Please review and place orders needed.  Thank you  Lab    Patient coming in at 9:00 today

## 2021-06-11 NOTE — PROGRESS NOTES
"ASSESSMENT AND PLAN:  Louise Benítez 74 y.o. female is seen here on 09/08/20 for follow-up.  This is for follow-up.  She has rheumatoid arthritis, widespread osteoarthritis, on hydroxychloroquine.  She has noted worsening pain.  This is in her right first MTP and the source of which is likely osteoarthritis, in the past corticosteroid injections been helpful.  She would like to go ahead with one today, pros and cons outlined, 20 mg of Kenalog injected under ultrasound guidance of the right first MTP.  She is to stay the course with hydroxychloroquine, in part given the good control with this and also in the background of myelodysplastic syndrome.  We will meet here in 6 months or sooner.            Diagnoses and all orders for this visit:    Seronegative rheumatoid arthritis of multiple sites (H)  -     hydrOXYchloroQUINE (PLAQUENIL) 200 mg tablet; Take 2 tablets (400 mg total) by mouth at bedtime.  Dispense: 180 tablet; Refill: 0    Primary osteoarthritis involving multiple joints  -     triamcinolone acetonide 40 mg/mL injection 20 mg (KENALOG-40)  -     celecoxib (CELEBREX) 200 MG capsule; Take 1 capsule (200 mg total) by mouth daily.  Dispense: 90 capsule; Refill: 0    High risk medication use    Polyarthralgia      HISTORY OF PRESENTING ILLNESS:  Louise Benítez, 74 y.o., female is here for follow-up.  She is here for follow-up of seronegative rheumatoid arthritis diagnosed elsewhere been on hydroxychloroquine for several years recent eye examination without maculopathy.  She has noted pain.  This is moderately severe to severe.  This is worse on the right foot, she points to the first MTP, going on for the past several weeks.  No history of trauma.  She is concerned that her fingers and toes are getting \"crooked\".  She was concerned about her left ring finger PIP swelling.  This is on her liver turned out to be and exuberant Rianna's.  Recent labs are reviewed low hemoglobin which is stable and in the " "context of above-noted MDS noted.  Morning stiffness no more than 30 minutes.   She has not had fever weight loss blurry vision eye redness mouth ulcer nausea or rash.  She slipped on ice a few days ago landed on her \"tailbone\" this is been painful and is beginning to get better.  On her previous visit she had noted that it was 20 years ago when she developed pain stiffness and found to have elevated sed rate.  A diagnosis of polymyalgia rheumatica was made.  Thereafter she was felt to have rheumatoid arthritis.  Initially she followed up to the clinic in [place] where she was given methotrexate and azathioprine.  She recalls that initially the knees used to troubles her quite a bit.  She remembers having \"gallons of fluid\" removed from her knees.  These have not troubled her of late.  Meanwhile she developed complications from prednisone treatment.  She had AVN bilaterally.  That resulted in replacement of hip joints in 2000 and 2003.  She had been following up at [facility] for several years.  She was on hydroxychloroquine, methotrexate and azathioprine having been discontinued.  She is also been taking Celebrex on fairly regular basis.  She takes this once daily usually but sometimes 2 times.  This is for her widespread osteoarthritis.  Recently she was seen at spine clinic and had shoulder injections.  She has had lumbar spondylosis.  She has noted that it is hard for her to define the level of pain.  She would call this as \"medium\".  She noted severe pain in her shoulders however.  She had those injected 48 hours ago.  She is able to describe herself otherwise in good health apart from chronic anemia.  She has been seen in hematology.  This is microcytic.  Most recent labs showed 8.7.  She was asked to consider bone marrow biopsy.  She has chosen to defer that.  She has noted that she is able to walk couple of miles a day she cleans her own house and does a good job of it she feels.  She " does not recognize her psoriasis is at herself or the family.  No history of ulcerative colitis or Crohn's disease.   Further historical information and ADL limitations as noted in the multidimensional health assessment questionnaire attached in the EMR.      ALLERGIES:Patient has no known allergies.    PAST MEDICAL/ACTIVE PROBLEMS/MEDICATION/ FAMILY HISTORY/SOCIAL DATA:  The patient has a family history of  Past Medical History:   Diagnosis Date     Rheumatoid arthritis (H)      Social History     Tobacco Use   Smoking Status Former Smoker   Smokeless Tobacco Never Used     Patient Active Problem List   Diagnosis     Ptosis Of Eyelid     Lower Back Pain     Patient Counseling: End-Of-Life Issues     Osteoporosis     Seronegative rheumatoid arthritis of multiple sites (H)     Primary osteoarthritis involving multiple joints     Bilateral shoulder tendinopathy     Macrocytic anemia     Polyarthralgia     Compression fracture of L1 vertebra with routine healing     High risk medication use     Current Outpatient Medications   Medication Sig Dispense Refill     baclofen (LIORESAL) 10 MG tablet TAKE HALF TO ONE TABLET BY MOUTH TWICE DAILY AS NEEDED FOR MUSCLE SPASMS 60 tablet 4     celecoxib (CELEBREX) 200 MG capsule TAKE ONE CAPSULE BY MOUTH TWICE DAILY  180 capsule 0     cholecalciferol, vitamin D3, 50,000 unit Tab Take 1 tablet weekly 12 tablet 0     hydroxychloroquine (PLAQUENIL) 200 mg tablet Take 2 tablets (400 mg total) by mouth at bedtime. 180 tablet 0     levothyroxine (SYNTHROID) 50 MCG tablet Take 1 tablet (50 mcg total) by mouth daily. 30 tablet 11     lidocaine (XYLOCAINE) 5 % ointment Apply small amount (1gm) to shoulders three times daily as needed for pain 120 g 2     naproxen (NAPROSYN) 500 MG tablet TAKE ONE TABLET BY MOUTH EVERY TWELVE HOURS AS NEEDED FOR PAIN. TAKE WITH FOOD/WATER TO PREVENT STOMACH UPSET, DO NOT TAKE WITH CELEBREX 60 tablet 1     zolpidem (AMBIEN) 10 mg tablet Take 0.5 tablets (5 mg  total) by mouth at bedtime as needed for sleep. Take 1 tablet by mouth at bedtime if needed for sleep. 30 tablet 1     omeprazole (PRILOSEC) 20 MG capsule Take 1 capsule (20 mg total) by mouth daily before breakfast. 90 capsule 3     No current facility-administered medications for this visit.      DETAILED EXAMINATION  09/08/20  :  Vitals:    09/08/20 1442   BP: 122/68   Patient Site: Right Arm   Patient Position: Sitting   Cuff Size: Adult Regular   Pulse: 84   Weight: 158 lb (71.7 kg)     Alert oriented. Head including the face is examined for malar rash, heliotropes, scarring, lupus pernio. Eyes examined for redness such as in episcleritis/scleritis, periorbital lesions.   Neck/ Face examined for parotid gland swelling, range of motion of neck.  Left upper and lower and right upper and lower extremities examined for tenderness, swelling, warmth of the appendicular joints, range of motion, edema, rash.  Some of the important findings included: Marked Heberden's.  No synovitis of palpable joints of upper extremities or the lower extremities, first CMC squaring bilaterally.  Knees without effusion warmth or JLT.  She has marked tenderness of the right first MTP joint line.       LAB / IMAGING DATA:  ALT   Date Value Ref Range Status   09/02/2020 12 0 - 45 U/L Final   02/26/2020 12 0 - 45 U/L Final   09/27/2019 10 0 - 45 U/L Final     Albumin   Date Value Ref Range Status   09/02/2020 4.2 3.5 - 5.0 g/dL Final   02/26/2020 4.5 3.5 - 5.0 g/dL Final   09/27/2019 4.3 3.5 - 5.0 g/dL Final     Creatinine   Date Value Ref Range Status   09/02/2020 0.68 0.60 - 1.10 mg/dL Final   02/26/2020 0.68 0.60 - 1.10 mg/dL Final   10/07/2019 0.65 0.60 - 1.10 mg/dL Final       WBC   Date Value Ref Range Status   09/02/2020 6.6 4.0 - 11.0 thou/uL Final   02/26/2020 6.2 4.0 - 11.0 thou/uL Final     Hemoglobin   Date Value Ref Range Status   09/02/2020 8.2 (L) 12.0 - 16.0 g/dL Final   02/26/2020 8.3 (L) 12.0 - 16.0 g/dL Final   09/27/2019  8.7 (L) 12.0 - 16.0 g/dL Final     Platelets   Date Value Ref Range Status   09/02/2020 553 (H) 140 - 440 thou/uL Final   02/26/2020 538 (H) 140 - 440 thou/uL Final   09/27/2019 539 (H) 140 - 440 thou/uL Final       Lab Results   Component Value Date    RF <15.0 06/13/2018

## 2021-06-11 NOTE — PROGRESS NOTES
[facility] Daily Progress     Patient Name: Louise Benítez  Date: 2017  Visit #: 2  PTA visit #:    Referral Diagnosis:  Low back pain radiating to left leg  Referring provider: Desire Lua MD  Visit Diagnosis:     ICD-10-CM    1. Chronic left-sided low back pain without sciatica M54.5     G89.29    2. Lumbar radiculopathy M54.16    3. Muscle weakness (generalized) M62.81      Louise Benítez is a 71 y.o. female who presents to therapy today with chief complaints of low back pain with pain into her L leg. Onset date of sx was 6-7 years ago.  Pt reported h/o lumbar surgery 6-7 years ago to clean out the calcium deposits, osteoporosis, and rheumatoid arthritis.  Pain symptoms are dull in nature, but can be sharp at times.  Functional impairments include sitting for long periods, lifting, and moving too much.  Pt demo's signs and sx consistent with chronic low back pain with radicular symptoms.     Precautions / Restrictions : RA, osteoporosis, bilateral MICHAEL    Assessment:     HEP/POC compliance is  fair .  The patient demonstrates good understanding of her exercises.  She reports improvements in pain level and is appropriate to continue with PT services at this time.    Goal Status:  Pt. will be independent with home exercise program in : 6 weeks  Pt will: be able to walk 2 miles without increase in pain; in 8 weeks  Pt will: be able to play golf without increase in pain; in 8 weeks  Pt will: be able to complete housework without increase in pain; in 8 weeks    Plan / Patient Education:     Continue with initial plan of care.  Progress with home program as tolerated.  Progress core strengthening.  Continue with pilates arch only if shoulders not aggravated by movement.    Subjective:     Pain Ratin  The patient reports that the weekend went surprisingly well.  Her knee was feeling a little better as well.  The back also feels better.  If she does a lot of twisting, she'll feel some  pain.  The patient reports that she was not as diligent with her exercises over the weekend.  She has had some tingling but not a lot.    Objective:     12 STS in 30 seconds.    Appt time: 11:01AM - 11:27AM    Treatment Today     TREATMENT MINUTES COMMENTS   Evaluation     Self-care/ Home management     Manual therapy     Neuromuscular Re-education     Therapeutic Activity     Therapeutic Exercises 26 See flowsheet  Subjective measures taken   Gait training     Modality__________________                Total 26    Blank areas are intentional and mean the treatment did not include these items.       Sandy Jones, PT, DPT  5/31/2017

## 2021-06-11 NOTE — PROGRESS NOTES
[facility] Discharge Summary  Patient Name: Louise Benítez  Date: 7/18/2017  Referral Diagnosis: Low back pain radiating to left leg  Referring provider: Desire Lua MD  Visit Diagnosis:   1. Chronic left-sided low back pain without sciatica     2. Lumbar radiculopathy     3. Muscle weakness (generalized)         Goals:  Pt. will be independent with home exercise program in : 6 weeks;Met  Pt will: be able to walk 2 miles without increase in pain; in 8 weeks; Unknown status  Pt will: be able to play golf without increase in pain; in 8 weeks; Unknown status  Pt will: be able to complete housework without increase in pain; in 8 weeks; Unknown status    Patient was seen for 2 visits from 5/23/17 to 5/31/17 with 2 missed appointments.  The patient attended therapy initially, but did not finish the therapy sessions prescribed.  Goals were not fully achieved. Explanation for goals not achieved: Did not return to PT.  Patient received a home program core strengthening.  The patient discontinued therapy, did not return.  The patient has been working on her HEP and is doing well overall.  She [place] her cabin for the summer and decided that if she still needs PT at the end of summer, she will request another order from her MD to return to PT in the fall.    Therapy will be discontinued at this time.  The patient will need a new referral to resume.    Thank you for your referral.  Sandy Jones, PT, DPT  7/18/2017  11:30 AM

## 2021-06-12 NOTE — TELEPHONE ENCOUNTER
Left voicemail for patient to return call to clinic. When patient returns call, please give them below message.    Patient is on CSS schedule for 11/12/20202 to receive prolia injection. Patient needs to f/u with Dr. Dye regarding Osteo since it has been one year. Also per last note, patient also needs to do DXA scan. Order is in already. [phone] to schedule DXA. Please cancel CSS appointment and help patient schedule with Dr. Dye for follow up.  Sandrine Elder CMA ............... 11:03 AM, 11/04/20

## 2021-06-13 NOTE — TELEPHONE ENCOUNTER
Message to Dr. Dye to advised ok for Prolia or need for visit as we have no orders in the MAR.  Bessie GARCIA CMA

## 2021-06-14 NOTE — PROGRESS NOTES
Assessment/Plan:      Diagnoses and all orders for this visit:    Bilateral shoulder pain    Shoulder pain, bilateral  -     OPS US Large Joint Injection Bilateral; Standing  -     OPS US Large Joint Injection Bilateral    Osteoarthritis of bilateral glenohumeral joints    Foraminal stenosis of lumbar region  -     gabapentin (NEURONTIN) 100 MG capsule; 1 cap at bedtime x 3 days.  Then may take 2 caps at bedtime x 3 days, then may take 3 caps at bedtime  Dispense: 270 capsule; Refill: 1    Sleep difficulties  -     gabapentin (NEURONTIN) 100 MG capsule; 1 cap at bedtime x 3 days.  Then may take 2 caps at bedtime x 3 days, then may take 3 caps at bedtime  Dispense: 270 capsule; Refill: 1    Myofascial muscle pain  -     gabapentin (NEURONTIN) 100 MG capsule; 1 cap at bedtime x 3 days.  Then may take 2 caps at bedtime x 3 days, then may take 3 caps at bedtime  Dispense: 270 capsule; Refill: 1        Assessment: Pleasant 71-year-old female with a history of rheumatoid arthritis, GERD:     1.    Significantly worsening bilateral shoulder pain consistent with clinical humeral joint osteoarthritis.  Right typically worse than the left.  Has responded well to injections in the past lasting approximately 5-6 months ago.  Pain flared over the past month     2.  Lumbar spine pain with some left lateral leg pain to the anterior knee.  Severe foraminal stenosis on the left at L4-5.  3.  Lumbar facet arthropathy in the lower lumbar spine most significant L5-S1 resulting in a spondylolisthesis  Of L4 on L5that appears degenerative.  4.  Poor sleep         Discussion:    1.  Her shoulder pain is significantly worse over the past month.  She responds very well to injections.  We discussed the risks and benefits of potential injections.  We discussed medications as well as well as alternatives such as surgical referral.  She is not a good surgical candidate.  2.  Bilateral gluteal humeral joint injections today under ultrasound  guidance.  Patient agrees to proceed.  Please see attached procedure note.  3.  We discussed option of gabapentin.  This does help her sleep and helps with her pain overall.  She had run out of this medication and feels it was helpful.  Will provide gabapentin 1-300 mg at bedtime for pain.  3 month supply provided per patient request.  She is going to try the lowest possible dose and see if that helps her pain.  4.  Monitor low back pain.  Stable for now.  5.  We will follow-up with me as needed if symptoms do not improve or worsen.    It was our pleasure caring for your patient today, if there any questions or concerns please do not hesitate to contact us.      Subjective:   Patient ID: Louise Benítez is a 72 y.o. female.    History of Present Illness: Patient presents for evaluation of bilateral shoulder pain.  Last seen in clinic over 6 months ago.  Had injections at that time bilateral glenohumeral joint injections which were quite helpful.  She has improved range of motion with injections and decreased pain.  With no new trauma her symptoms worsened over the past month at least.  Right shoulder is equal to left.  Has difficulty with moving her arms especially overhead activities.  No radiation down the arms numbness tingling or weakness.  Pain is an 8/10 today.    Was taking gabapentin which she feels really helped all of her pain complaints including her shoulders but feels that this caused a little drowsiness when taking 300 mg but out of this medication for quite some time and would like to try this again.  Did not need to take sleeping medication while on this as it did help her sleep fairly dramatically.  She also feels her low back pain is stable with the medications.  Feeling fairly good overall.  Wondering what other options there are other than injections or shoulder replacement.    Review of systems: No numbness, tingling or weakness.  No bowel or bladder incontinence.  No headaches, dizziness,  nausea, vomiting, blurred vision or balance deficits.    Past Medical History:   Diagnosis Date     Rheumatoid arthritis        The following portions of the patient's history were reviewed and updated as appropriate: allergies, current medications, past family history, past medical history, past social history, past surgical history and problem list.      Objective:   Physical Exam:    Vitals:    12/22/17 1244   BP: 143/63   Pulse: 73   Temp: 98.2  F (36.8  C)       General: Alert and oriented with normal affect. Attention, knowledge, memory, and language are intact. No acute distress.   Eyes: Sclerae are clear.  Respirations: Unlabored.   Gait:  Nonantalgic  Significant decreased range of motion of the shoulders in abduction and internal and external rotation with pain.  Unable to reach over her head.  Sensation is intact to light touch throughout the upper   extremities.  Reflexes are 1+ and symmetric in the biceps triceps and brachioradialis with negative Hoffmans.      Manual muscle testing reveals:  Right /Left out of 5     5/5 elbow flexors  5/5 elbow extensors  5/5 wrist extensors  5/5 interosseus  5/5 finger flexors       Procedure Note:      After discussing the risks and benefits of bilateral glenohumeral joint injections under ultrasound guidance, informed consent was obtained. The patient and physician agreed on the injection site prior to the procedure.  A verbal timeout was done prior to the procedure.  With the patient seated, from posterior lateral approach, The left shoulder region was surveyed with the ultrasound unit to identify the target.  The skin was marked and prepped with chloraprep.     Under direct ultrasound visualization, a 25-gauge 1.5 inch needle was used to inject 3 milliliters of injectate containing 2 milliliters of 1% lidocaine and 1 milliliter containing 40 mg of depo-medrol after aspiration was negative for heme.      The procedure was then repeated for the right shoulder.The  right shoulder region was surveyed with the ultrasound unit to identify the target.  The skin was marked and prepped with chloraprep.   Under direct ultrasound visualization, a 25-gauge 1.5 inch needle was used to inject 3 milliliters of injectate containing 2 milliliters of 1% lidocaine and 1 milliliter containing 40 mg of depo-medrol after aspiration was negative for heme.  Sterile technique was utilized for the procedure.  A total of 80 mg Depo-Medrol and 4  milliliters of 1% lidocaine was used. The patient tolerated the procedure well and there no immediate complications.      Preprocedure pain: Left: 8/10 right: 0/10  Post procedure pain: Left: 8/10 right: 0/10

## 2021-06-14 NOTE — PROGRESS NOTES
Assessment/Plan:     1. Anemia  Etiology of anemia is not clear.  I will request records from hematology office to determine evaluation completed.  I suspect that she has had a very thorough evaluation through their office and likely will agree that a bone marrow biopsy is needed, though will appreciate reviewing myself as well.  Advised that her anemia is quite significant and puts her at risk of significant difficulties or need for transfusion should she have blood loss in the future.  Especially she is looking toward potential shoulder replacement in the future, addressing her anemia will be important.  She states understanding.  Records requested.  While her labs here do not strongly indicate iron deficiency, I recommend a trial of iron supplement between now and her visit with hematology in follow-up.  - Hemoglobin    2. Rheumatoid arthritis  Referral is placed to [facility] rheumatology for ongoing management.    Referral placed for bone density scan with prior history of osteoporosis.  She may be a good candidate for Prolia..          Subjective:      Louise Benítez is a 72 y.o. female resented clinic today for follow-up of anemia.  She has been following with her rheumatologist on a regular basis and then had a consultation with [facility] in regards to chronic anemia.  She has drifted downward most recently has had a hemoglobin in the 8-9 range, confirmed again today.  States that she had a significant workup at [facility], however these results records are not available to me.  States that they recommended a bone marrow biopsy which she did not pursue.  She feels fine.  Denies any change in exercise tolerance or endurance.  No lightheadedness or dizziness.  She had onset of symptoms following diagnosis of polymyalgia rheumatica and then with diagnosis of rheumatoid arthritis.  Has not noted any blood in her urine or stool.  Has had previous colonoscopy, the  most recent was January 2006, without any cause, but has not had any further evaluation.  Her lab results have not indicated significant iron deficiency, she has not tried an iron supplement.  She is also hoping to reestablish care with a rheumatologist in the [facility] system and would like my recommendations and management of her rheumatoid arthritis.  She is a history of osteoporosis, is due for follow-up bone density scan.  History of Fosamax, has been off Fosamax now for many years, believes she was on about 10 years ago.    Left shoulder fracture last year, anticipate future need for bilateral shoulder replacement.    Current Outpatient Prescriptions   Medication Sig Dispense Refill     celecoxib (CELEBREX) 200 MG capsule Take 200 mg by mouth 2 (two) times a day.       hydroxychloroquine (PLAQUENIL) 200 mg tablet Take 200 mg by mouth daily. Take 2 tablets by mouth.  At bedtime.       lidocaine (XYLOCAINE) 5 % ointment Apply small amount (1gm) to shoulders three times daily as needed for pain 120 g 2     zolpidem (AMBIEN) 10 mg tablet Take 0.5 tablets (5 mg total) by mouth at bedtime as needed for sleep. Take 1 tablet by mouth at bedtime if needed for sleep. 30 tablet 1     gabapentin (NEURONTIN) 100 MG capsule 1 cap at bedtime x 3 days.  Then may take 2 caps at bedtime x 3 days, then may take 3 caps at bedtime 90 capsule 2     methylPREDNISolone (MEDROL DOSEPACK) 4 mg tablet Keeps on hand       naproxen (NAPROSYN) 500 MG tablet Take 500 mg by mouth.       omeprazole (PRILOSEC) 10 MG capsule Take 10 mg by mouth.       No current facility-administered medications for this visit.        Past Medical History, Family History, and Social History reviewed.  Past Medical History:   Diagnosis Date     Rheumatoid arthritis      No past surgical history on file.  Review of patient's allergies indicates no known allergies.  No family history on file.  Social History     Social History     Marital status:       "Spouse name: N/A     Number of children: N/A     Years of education: N/A     Occupational History     Not on file.     Social History Main Topics     Smoking status: Former Smoker     Smokeless tobacco: Not on file     Alcohol use Yes     Drug use: Not on file     Sexual activity: Not on file     Other Topics Concern     Not on file     Social History Narrative         Review of systems is as stated in HPI, and the remainder of the 10 system review is otherwise negative.    Objective:     Vitals:    12/06/17 1131   BP: 128/80   Patient Site: Right Arm   Patient Position: Sitting   Cuff Size: Adult Regular   Pulse: 72   Resp: 20   Temp: 98.3  F (36.8  C)   TempSrc: Oral   Weight: 164 lb 8 oz (74.6 kg)   Height: 5' 4\" (1.626 m)    Body mass index is 28.24 kg/(m^2).    Alert female.  No acute distress.  Further exam not performed today.    Office Visit on 12/06/2017   Component Date Value Ref Range Status     Hemoglobin 12/06/2017 8.7* 12.0 - 16.0 g/dL Final          This note has been dictated using voice recognition software. Any grammatical or context distortions are unintentional and inherent to the the software.   "

## 2021-06-15 NOTE — PROGRESS NOTES
Assessment:   Louise Benítez is a 72 y.o. y.o. female with past medical history significant for rheumatoid arthritis, anemia, GERD who presents today for follow-up regarding low back pain and left radicular leg pain.  The patient has had relief in the past with previous left L4-5 and L5-S1 transforaminal epidural steroid injections, the last time performed in December 2016.  She reports that just over the last 3-4 weeks his pain returned.  It is now fairly severe at an 8 out of 10.  Her MRI shows significant left L4-5 foraminal stenosis with foraminal and lateral recess stenosis at the L5-S1 level on the left.  She is neurologically intact with the exception of some mild sensory impairment in the left L4 dermatome.  She is otherwise without any red flag symptoms.  Patient also has bilateral shoulder pain secondary to glenohumeral osteoarthritis bilaterally.  Her shoulder pain has been under good control with glenohumeral joint injections performed by Dr. Aguilar.       Plan:     A shared decision making plan was used.  The patient's values and choices were respected.  The following represents what was discussed and decided upon by the physician and the patient.      1.  DIAGNOSTIC TESTS: Patient's MRI of the lumbar spine was personally reviewed today.  The patient has severe left L4-5 foraminal stenosis with lateral recess stenosis and foraminal stenosis at the L5-S1 level.  No further diagnostic testing necessary at this time.  2.  PHYSICAL THERAPY: She has completed physical therapy in the past and is doing her home exercise program, though not as regularly as she could be.  She is encouraged to do the exercises for both her low back and her shoulders on a more regular basis.  Can consider a repeat course of physical therapy in the future if needed.  3.  MEDICATIONS: The patient is taking Celebrex for her arthritis.  She is going to begin resuming gabapentin.  She was having some side effects on it, so she is  "encouraged to just take 1 tablet a day for now.  She can increase to 2 or 3 tablets a day as long as she does not have side effects.  4.  INTERVENTIONS: Patient would like to repeat left L4-5 and L5-S1 transforaminal epidural steroid injection.  This was performed today.  Please see the separate procedure note for details.  5.  PATIENT EDUCATION:    -All of the patient's questions were answered to her satisfaction today.  She was in agreement with the treatment plan.  6.  FOLLOW-UP: The patient is asked to follow-up if she does not have relief as expected.  She is welcome to call with any questions or concerns and she is always welcome to be seen back in clinic.    Subjective:     Louise Benítez is a 72 y.o. female who presents today for follow-up regarding back pain and left buttock pain.  The patient has previously been seen for this type of pain back in December 2016.  She reports that after the left L4-5 and L5-S1 transfemoral epidural steroid injection she had at that time she really has done well up until around this time.  She reports that she was doing some activities that she does not normally do and admits to \"doing dumb things.\"  She has had return of pain that feels exactly the same as it has been in the past.  The pain is located in the left low back and then radiates into the left buttock and posterior thigh going down to the left knee.  She does have some numbness in the left ankle which she has also had previously.  She rates her pain at an 8 out of 10.  Her pain is worse with sitting, standing, walking.  She reports at this time really nothing is making it better.  She is taking Celebrex for her rheumatoid arthritis.  This has not been helping with the back pain.  She has been on gabapentin in the past but discontinued it secondary to side effects.  She talked to Dr. Aguilar and she is going to resume taking the gabapentin but just 1 tablet a day.  She has not yet started it though.    Medical " history is reviewed and is significant for rheumatoid arthritis and GERD.  She has a new diagnosis of anemia which is being worked up by hematology.    Family history is reviewed and is unchanged in the interim.    Review of Systems:  Positive for numbness and tingling.  Negative for weakness, bowel/bladder dysfunction, foot drop, headache, dizziness, nausea/vomiting, blurred vision, balance changes.     Objective:   CONSTITUTIONAL:  Vital signs as above.  No acute distress.  The patient is well nourished and well groomed.    PSYCHIATRIC:  The patient is awake, alert, oriented to person, place and time.  The patient is answering questions appropriately with clear speech.  Normal affect.  SKIN:  Skin over the face, posterior torso, bilateral upper and lower extremities is clean, dry, intact without rashes.  MUSCULOSKELETAL:  Gait is non-antalgic.  The patient is able to heel and toe walk without any difficulty.  Mild tenderness over the left lower lumbar paraspinal muscles.      The patient has 5/5 strength for the bilateral hip flexors, knee flexors/extensors, ankle dorsiflexors/plantar flexors, ankle evertors/invertors.    NEUROLOGICAL:/4 patellar, medial hamstring, achilles reflexes which are symmetric bilaterally.  No ankle clonus bilaterally.  Sensation to light touch is impaired in the left L4 dermatome compared to the right L4 dermatome.  Sensation to light touch is intact in the bilateral L5 and S1 dermatomes.     RESULTS: His MRI of the lumbar spine was personally reviewed today.  The patient does have severe left L4-5 foraminal stenosis with left L5-S1 foraminal and lateral recess stenosis.  Please see the report for details.

## 2021-06-16 PROBLEM — M25.50 POLYARTHRALGIA: Status: ACTIVE | Noted: 2018-06-13

## 2021-06-16 PROBLEM — M06.09 SERONEGATIVE RHEUMATOID ARTHRITIS OF MULTIPLE SITES (H): Status: ACTIVE | Noted: 2018-06-13

## 2021-06-16 PROBLEM — M67.912 BILATERAL SHOULDER TENDINOPATHY: Status: ACTIVE | Noted: 2018-06-13

## 2021-06-16 PROBLEM — Z79.899 HIGH RISK MEDICATION USE: Chronic | Status: ACTIVE | Noted: 2020-02-27

## 2021-06-16 PROBLEM — M15.0 PRIMARY OSTEOARTHRITIS INVOLVING MULTIPLE JOINTS: Status: ACTIVE | Noted: 2018-06-13

## 2021-06-16 PROBLEM — T14.8XXD DELAYED WOUND HEALING: Status: ACTIVE | Noted: 2021-05-03

## 2021-06-16 PROBLEM — D53.9 MACROCYTIC ANEMIA: Status: ACTIVE | Noted: 2018-06-13

## 2021-06-16 PROBLEM — M81.0 OSTEOPOROSIS: Status: ACTIVE | Noted: 2017-12-07

## 2021-06-16 PROBLEM — M67.911 BILATERAL SHOULDER TENDINOPATHY: Status: ACTIVE | Noted: 2018-06-13

## 2021-06-16 PROBLEM — S32.010D COMPRESSION FRACTURE OF L1 VERTEBRA WITH ROUTINE HEALING: Status: ACTIVE | Noted: 2019-10-07

## 2021-06-16 PROBLEM — I73.9 PAD (PERIPHERAL ARTERY DISEASE) (H): Status: ACTIVE | Noted: 2021-05-03

## 2021-06-16 NOTE — PROGRESS NOTES
Assessment & Plan     Cellulitis:   On exam, the wound on her MOJGAN is much improved from last office visit.  Wound is healing and there is decreased erythema and tenderness to the area.  The wound is no longer oozing.  Reassured patient that she does not need any additional antibiotics.  Would continue to keep wound covered with bacitracin and gauze.  The cut on her right big toe is also healed now.  Did discuss with patient my concerns of possible venous insufficiency that is causing poor wound healing in her lower extremities and recurrent cellulitis.  Patient reported that she has a history of GCA and was on HA prednison for a long time. This most likely caused thinning of her skin in the LE causing frequent skin tears. Offered vascular studies to check for venous insufficieny but she declined.Ecnourage her to wear compression stockings for the mild ankle swelling that she gets intermittently and elevate her legs.     Onychomycosis  Patient has thick dystrophic nails with significant onychomycosis.   - continue topical ciclopirox solution application over nail and surrounding skin daily    Hypothyroidism   Lab Results   Component Value Date    TSH 21.77 (H) 03/17/2021   doing well on the Synthroid that was started after last visit. Will recheck TSH in another 4 weeks and uptitrate medication as needed.     MDS   Anemia   Patient with MDS, last Hgb was 7.8, slightly lower than her baseline. Patient asx and continues to do well tdoay . VSS without lightheadedness or dizziness. Will get repeat CBC today to ensure stability. Follows with Heme/Onc at [facility].    Osteoporosis:  Overdue for her DEXA scan. New order placed as her order is from 2019. Last DEXA 2017. Continue on Prolia    Due for Td - declined today.     30 minutes spent on the date of the encounter doing chart review, history and exam, documentation and further activities per the note  69}     No follow-ups on file.    [facility]  [facility]    Subjective   Louise Benítez is 75 y.o. and presents today for the following health issues   Louise is here to follow up after trial of antibiotics for possible cellulitis of left lower leg. She feels her leg has improved with the antibiotics but that it seems to be flaring up again since finishing the antibiotics. The wound is improving but she feels it is not healing as fast as she would like it to. She also has some left ankle and foot pain has been ongoing for a long time and comes and goes as a dull aching pain. She is wondering about getting more antibiotics today for treatment.        ROS: as per HPI       Objective    /68 (Patient Site: Right Arm, Patient Position: Sitting, Cuff Size: Adult Regular)   Pulse 65   Temp 98.5  F (36.9  C) (Oral)   Resp 16   Wt 160 lb (72.6 kg)   Breastfeeding No   BMI 28.34 kg/m    Body mass index is 28.34 kg/m .  Physical Exam   Constitutional: She is oriented to person, place, and time. She appears well-developed and well-nourished. No distress.   HENT:   Head: Normocephalic and atraumatic.   Eyes: Pupils are equal, round, and reactive to light. Conjunctivae are normal.   Neck: Normal range of motion. Neck supple. No thyromegaly present.   Cardiovascular: Normal rate, regular rhythm and normal heart sounds.   Pulmonary/Chest: Effort normal and breath sounds normal.   Musculoskeletal: Normal range of motion.         General: Edema present. No tenderness or deformity.      Comments: Mild left ankle swelling   Neurological: She is alert and oriented to person, place, and time.   Skin: Skin is warm and dry. No rash noted. She is not diaphoretic. No erythema.   Small , healing open wound on left anterior lower leg without any drainage or significant erythema. Thin, hypopigmented skin in LE bilaterally.    Psychiatric: She has a normal mood and affect. Her behavior is normal.

## 2021-06-16 NOTE — TELEPHONE ENCOUNTER
Pt has hemoglobin of 7.5.     Dr. Us informed of critical lab value.     Dr Us recommends letting pts PCP know as pt has had low hemoglobin recently and pt was seen today by Dr Currie for Cellulitis.

## 2021-06-16 NOTE — PROGRESS NOTES
"ASSESSMENT AND PLAN:  Louise Benítez 75 y.o. female is seen here on 04/21/21 for follow-up.  She has seronegative rheumatoid arthritis, osteoarthritis, myelodysplastic syndrome background, on hydroxychloroquine, she has had pain in her feet especially the first MTP, in the past she noted the corticosteroid injection was of limited value, I have asked her to consider duloxetine literature on this is provided, she is overdue for eye examination, reduce the dose of hydroxychloroquine to once daily, she is planning to have her eyes examined shortly, follow-up here in 3 months.                 Diagnoses and all orders for this visit:    Seronegative rheumatoid arthritis of multiple sites (H)  -     hydrOXYchloroQUINE (PLAQUENIL) 200 mg tablet; Take 2 tablets (400 mg total) by mouth daily.  Dispense: 180 tablet; Refill: 0    Primary osteoarthritis involving multiple joints  -     celecoxib (CELEBREX) 200 MG capsule; Take 1 capsule (200 mg total) by mouth daily.  Dispense: 90 capsule; Refill: 0    Polyarthralgia    High risk medication use    Myelodysplastic disease (H)      HISTORY OF PRESENTING ILLNESS:  Louise Benítez, 75 y.o., female is here for follow-up.  She is here for follow-up of seronegative rheumatoid arthritis diagnosed elsewhere been on hydroxychloroquine for several years recent eye examination without maculopathy.  She has noted pain.  This is moderately severe to severe, and her MTP areas after activity.  This is worse on the right foot, she points to the first MTP, going on for since Christmas 2020.  No history of trauma.  She is concerned that her fingers and toes are getting \"crooked\".  She is due for eye examination.  Corticosteroid injection done in her right first MTP on her previous visit provided only limited relief. She has not had fever weight loss blurry vision eye redness mouth ulcer nausea or rash.    On her previous visit she had noted that it was 20 years ago when she developed pain " "stiffness and found to have elevated sed rate.  A diagnosis of polymyalgia rheumatica was made.  Thereafter she was felt to have rheumatoid arthritis.  Initially she followed up to the clinic in [place] where she was given methotrexate and azathioprine.  She recalls that initially the knees used to troubles her quite a bit.  She remembers having \"gallons of fluid\" removed from her knees.  These have not troubled her of late.  Meanwhile she developed complications from prednisone treatment.  She had AVN bilaterally.  That resulted in replacement of hip joints in 2000 and 2003.  She had been following up at [facility] for several years.  She was on hydroxychloroquine, methotrexate and azathioprine having been discontinued.  She is also been taking Celebrex on fairly regular basis.  She takes this once daily usually but sometimes 2 times.  This is for her widespread osteoarthritis.  Recently she was seen at spine clinic and had shoulder injections.  She has had lumbar spondylosis.  She has noted that it is hard for her to define the level of pain.  She would call this as \"medium\".  She noted severe pain in her shoulders however.  She had those injected 48 hours ago.  She is able to describe herself otherwise in good health apart from chronic anemia.  She has been seen in hematology.  This is microcytic.  Most recent labs showed 8.7.  She was asked to consider bone marrow biopsy.  She has chosen to defer that.  She has noted that she is able to walk couple of miles a day she cleans her own house and does a good job of it she feels.  She does not recognize her psoriasis is at herself or the family.  No history of ulcerative colitis or Crohn's disease.   Further historical information and ADL limitations as noted in the multidimensional health assessment questionnaire attached in the EMR.      ALLERGIES:Patient has no known allergies.    PAST MEDICAL/ACTIVE PROBLEMS/MEDICATION/ FAMILY HISTORY/SOCIAL " DATA:  The patient has a family history of  Past Medical History:   Diagnosis Date     Rheumatoid arthritis (H)      Social History     Tobacco Use   Smoking Status Former Smoker   Smokeless Tobacco Never Used     Patient Active Problem List   Diagnosis     Ptosis Of Eyelid     Lower Back Pain     Patient Counseling: End-Of-Life Issues     Osteoporosis     Seronegative rheumatoid arthritis of multiple sites (H)     Primary osteoarthritis involving multiple joints     Bilateral shoulder tendinopathy     Macrocytic anemia     Polyarthralgia     Compression fracture of L1 vertebra with routine healing     High risk medication use     Current Outpatient Medications   Medication Sig Dispense Refill     baclofen (LIORESAL) 10 MG tablet TAKE HALF TO ONE TABLET BY MOUTH TWICE DAILY AS NEEDED FOR MUSCLE SPASMS 60 tablet 4     celecoxib (CELEBREX) 200 MG capsule TAKE ONE CAPSULE BY MOUTH ONE TIME DAILY  90 capsule 0     cephalexin (KEFLEX) 500 MG capsule Take 1 capsule (500 mg total) by mouth 3 (three) times a day for 10 days. 30 capsule 0     cholecalciferol, vitamin D3, 50,000 unit Tab Take 1 tablet weekly 12 tablet 0     ciclopirox (PENLAC) 8 % solution Apply over nail and surrounding skin. Apply daily over previous coat. After seven (7) days, may remove with alcohol and continue cycle. 6.6 mL 1     hydrOXYchloroQUINE (PLAQUENIL) 200 mg tablet TAKE TWO TABLETS BY MOUTH DAILY AT BEDTIME  180 tablet 0     levothyroxine (SYNTHROID) 50 MCG tablet Take 1 tablet (50 mcg total) by mouth daily. 60 tablet 0     naproxen (NAPROSYN) 500 MG tablet TAKE ONE TABLET BY MOUTH EVERY TWELVE HOURS AS NEEDED FOR PAIN. TAKE WITH FOOD/WATER TO PREVENT STOMACH UPSET, DO NOT TAKE WITH CELEBREX 60 tablet 1     zolpidem (AMBIEN) 10 mg tablet Take 0.5 tablets (5 mg total) by mouth at bedtime as needed for sleep. Take 1 tablet by mouth at bedtime if needed for sleep. 30 tablet 1     omeprazole (PRILOSEC) 20 MG capsule Take 1 capsule (20 mg total) by  mouth daily before breakfast. 90 capsule 3     Current Facility-Administered Medications   Medication Dose Route Frequency Provider Last Rate Last Admin     denosumab 60 mg (PROLIA 60 mg/ml)  60 mg Subcutaneous Q6 Months Radu Ordonez MD   60 mg at 11/12/20 1101     DETAILED EXAMINATION  04/21/21  :  Vitals:    04/21/21 1103   BP: 130/80   Pulse: 80   Weight: 160 lb (72.6 kg)     Alert oriented. Head including the face is examined for malar rash, heliotropes, scarring, lupus pernio. Eyes examined for redness such as in episcleritis/scleritis, periorbital lesions.   Neck/ Face examined for parotid gland swelling, range of motion of neck.  Left upper and lower and right upper and lower extremities examined for tenderness, swelling, warmth of the appendicular joints, range of motion, edema, rash.  Some of the important findings included: Marked Heberden's.  No synovitis of palpable joints of upper extremities or the lower extremities, first CMC squaring bilaterally.  She has marked Heberden's Rianna's.  Knees without effusion warmth or JLT.  She has marked tenderness of the right first MTP joint line.       LAB / IMAGING DATA:  ALT   Date Value Ref Range Status   04/20/2021 18 0 - 45 U/L Final   09/02/2020 12 0 - 45 U/L Final   02/26/2020 12 0 - 45 U/L Final     Albumin   Date Value Ref Range Status   04/20/2021 4.1 3.5 - 5.0 g/dL Final   09/02/2020 4.2 3.5 - 5.0 g/dL Final   02/26/2020 4.5 3.5 - 5.0 g/dL Final     Creatinine   Date Value Ref Range Status   04/20/2021 0.63 0.60 - 1.10 mg/dL Final   03/17/2021 0.67 0.60 - 1.10 mg/dL Final   09/02/2020 0.68 0.60 - 1.10 mg/dL Final       WBC   Date Value Ref Range Status   04/20/2021 8.1 4.0 - 11.0 thou/uL Final   03/30/2021 6.6 4.0 - 11.0 thou/uL Final     Hemoglobin   Date Value Ref Range Status   04/20/2021 7.5 (LL) 12.0 - 16.0 g/dL Final   03/30/2021 8.1 (L) 12.0 - 16.0 g/dL Final   03/17/2021 7.8 (LL) 12.0 - 16.0 g/dL Final     Platelets   Date Value Ref Range  Status   04/20/2021 515 (H) 140 - 440 thou/uL Final   03/30/2021 549 (H) 140 - 440 thou/uL Final   03/17/2021 549 (H) 140 - 440 thou/uL Final       Lab Results   Component Value Date    RF <15.0 06/13/2018    SEDRATE 32 (H) 03/17/2021

## 2021-06-16 NOTE — TELEPHONE ENCOUNTER
Was able to speak to the patient as well.     She says that her blood levels were low at the Hematologists' office the last week as week. He wanted to follow up with her in 3 months. There was no cause for alarm at their office.     Her wounds are better today and she is less pain     She will  her thyroid meds.     Abigail Currie, DO

## 2021-06-16 NOTE — TELEPHONE ENCOUNTER
Dr. Currie attempted to reach pt and left voicemail. If pt returns call please see message below from Dr. Currie.

## 2021-06-16 NOTE — TELEPHONE ENCOUNTER
----- Message from Abigail Currie DO sent at 3/17/2021  1:21 PM CDT -----  Critical results of Low Hgb of 7.8. Called patient but no answer. Left VM to call back.   ESR elevated but the rest of the bloodwork hasn't resulted    Patient has MDS and baseline Hgb is between 8.2-8.7. Have added additional bloodwork to be obtained.     If patient calls back, please let her know that her hemoglobin is lower than brittni but still above the point where she would need blood transfusion. I think this is most likely due to her myelodysplastic syndrome. Please sk her which heme/onc doc she follows with for her MDS and do a symptom intake for anemia ( shortness of breath, lightheadedness, dizziness, any blood BM, any chest pain). If asx, please tell her she needs to come back to see me in 2 weeks for anemia and wound follow up.     If she doesn't have a heme/onc doc, please let her know that I would like to refer her. If she ok with that , I will put in the order

## 2021-06-16 NOTE — TELEPHONE ENCOUNTER
Pt returning call - relayed message    Pt is seen at [facility] she doesn't remember doctor's name.      States she was seen last week, doesn't remember the date.      shortness of breath = No  Lightheadedness = Not really  Dizziness =  No I get nausea  any blood BM = no  any chest pain = no    Scheduled follow up with Dr. Currie for 3/30    Pt asked that I look at web site for [facility] - reviewed providers with patient.  She states she sees Dr. Rex Bailey.

## 2021-06-16 NOTE — PROGRESS NOTES
Assessment & Plan     Louise was seen today for foot swelling.    Diagnoses and all orders for this visit:    Cellulitis of left lower limb  Swelling of toe of right foot  Patient has arthritic changes in her foot, bunions, nonhealing scab wound of left anterior shin with surrounding cellulitis ,  Non-healing laceration with clear drainage of the right MTP joint.  Has some discoloration of the left second and third toe that patient claims is chronic.  Has very thin skin, chronic scars on both lower extremities from previous scabs/injuries.  Difficult to know what her baseline is as this is the first time that I am seeing her.  DDx: Cellulitis vs acute on chronic arthritic flare vs gout vs venous stasis.  Patient did not have significant tenderness with palpation of the right MTP joint or with axial loading making gout less likely.  Suspicious that patient may have cellulitis in both areas.  Does have very thin skin and decreased sensation in her lower extremities.  Will get some baseline blood work to rule out infectious etiology, get inflammatory markers and uric acid to definitively rule out gout. Will also start patient on Bactrim for cellulitis-is immunosuppressed and should have MRSA coverage.  We will get a creatinine since we are initiating Bactrim.  To clean the wounds for her today, applied bacitracin, gauze and Band-Aids.  Gave her some bacitracin/gauze to take home for wound care.  Reassured that there are no systemic symptoms.  If patient is slow to heal from her cellulitis, may consider doing BRANDY or vascular studies to assess for wound healing capabilities.  -     sulfamethoxazole-trimethoprim (BACTRIM DS) 800-160 mg per tablet; Take 1 tablet by mouth 2 (two) times a day for 7 days.  -     Uric Acid  -     C-Reactive Protein (CRP)  -     Sedimentation Rate  -     HM2(CBC w/o Differential)    MDS (myelodysplastic syndrome) (H)  Anemia, unspecified type  Per chart review, underwent bone marrow biopsy  "in 2019 that was definitive for MDS.  I do not see any notes in the system, under media tab or in care everywhere for a hematologist/oncologist.  Patient has chronic anemia that has not been checked in several months.  We will get a CBC today.  Is overall asymptomatic from an anemia standpoint without chest pain, shortness of breath or palpitations.  Patient has persistent anemia/worsening anemia, should refer to hematology/oncology.    Hypothyroidism, unspecified type  Last TSH was elevated at 7.2.  Was started on Synthroid but she did not think that she had a \"thyroid problem\" and self discontinued medication.  Will get repeat TSH today and resume medication accordingly.  -     Thyroid Stimulating Hormone (TSH)    Encounter for therapeutic drug monitoring  -     Creatinine    Onychomycosis  Patient has thickened dystrophic nails with significant onychomycosis in the second and third toe other left foot.  Topical solution provided.  -     ciclopirox (PENLAC) 8 % solution; Apply over nail and surrounding skin. Apply daily over previous coat. After seven (7) days, may remove with alcohol and continue cycle.    Is interested in switching to this clinic for PCP.  Is due for preventative care.  Will come back for an establish care visit.      Review of the result(s) of each unique test - Rheum notes, Bone marrow Bx, Cr, CBC, Last BMP, ALT  45 minutes spent on the date of the encounter doing chart review, review of test results, interpretation of tests, patient visit and documentation        Return in about 2 months (around 5/17/2021) for toe nail infection .    Abigail Currie DO  [facility]   Louise BARKER Jeyson is 75 y.o. with past medical history osteoarthritis, MDS, osteoporosis and GERD who presents today for swelling of lower extremities.    HPI     Patient has a longstanding history of injuries/skin tears to her lower extremities.  Says that she will run into things \"\"often\" " and not realize that she sustained an injury until she starts bleeding.  She is not sure exactly how it happened but feels that she might have scratched off some of her skin when she bumped into furniture about a week ago.  Had a scab forming where she sustained the injury.  Scab had clear discharge coming from it and she started to get worsening redness around the injury.  Has had cellulitis in the past and came in to get it looked.  Patient has no fevers, chills from it.  Patient has history of longstanding arthritis and has chronic pain in her feet.  No difficulty with walking.    Patient also has redness in the bottom of the big toe on the right foot and she is concerned about possible gout.  Initially, she had water blisters have burst and she now has a cut there?  And pain with ambulation.  She is also getting irritation side of her big toe when she wear shoes.  The pain in her big toe is so bad that it is not waking up at night.    Feels her arthritis is overall at baseline.  Missed her rheumatology appointment last week and has a follow-up coming up in April.  Stable on the hydroxychloroquine.    Review of Systems  As per HPI    Current Outpatient Medications on File Prior to Visit   Medication Sig Dispense Refill     baclofen (LIORESAL) 10 MG tablet TAKE HALF TO ONE TABLET BY MOUTH TWICE DAILY AS NEEDED FOR MUSCLE SPASMS 60 tablet 4     celecoxib (CELEBREX) 200 MG capsule TAKE ONE CAPSULE BY MOUTH ONE TIME DAILY  90 capsule 0     cholecalciferol, vitamin D3, 50,000 unit Tab Take 1 tablet weekly 12 tablet 0     hydrOXYchloroQUINE (PLAQUENIL) 200 mg tablet TAKE TWO TABLETS BY MOUTH DAILY AT BEDTIME  180 tablet 0     naproxen (NAPROSYN) 500 MG tablet TAKE ONE TABLET BY MOUTH EVERY TWELVE HOURS AS NEEDED FOR PAIN. TAKE WITH FOOD/WATER TO PREVENT STOMACH UPSET, DO NOT TAKE WITH CELEBREX 60 tablet 1     levothyroxine (SYNTHROID) 50 MCG tablet Take 1 tablet (50 mcg total) by mouth daily. 30 tablet 11     lidocaine  (XYLOCAINE) 5 % ointment Apply small amount (1gm) to shoulders three times daily as needed for pain 120 g 2     omeprazole (PRILOSEC) 20 MG capsule Take 1 capsule (20 mg total) by mouth daily before breakfast. 90 capsule 3     zolpidem (AMBIEN) 10 mg tablet Take 0.5 tablets (5 mg total) by mouth at bedtime as needed for sleep. Take 1 tablet by mouth at bedtime if needed for sleep. 30 tablet 1     Current Facility-Administered Medications on File Prior to Visit   Medication Dose Route Frequency Provider Last Rate Last Admin     denosumab 60 mg (PROLIA 60 mg/ml)  60 mg Subcutaneous Q6 Months Radu Ordonez MD   60 mg at 11/12/20 1101           Objective    /62 (Patient Site: Right Arm, Patient Position: Sitting, Cuff Size: Adult Regular)   Pulse 85   SpO2 100%   There is no height or weight on file to calculate BMI.     Physical Exam   Constitutional: She is oriented to person, place, and time. She appears well-developed and well-nourished.   HENT:   Head: Normocephalic and atraumatic.   Mouth/Throat: Oropharynx is clear and moist. No oropharyngeal exudate.   Eyes: Conjunctivae and EOM are normal. Right eye exhibits no discharge. Left eye exhibits no discharge. No scleral icterus.   Cardiovascular: Normal rate, regular rhythm and normal heart sounds. Exam reveals no gallop and no friction rub.   No murmur heard.  Pulmonary/Chest: Effort normal and breath sounds normal. No respiratory distress. She has no wheezes. She has no rales. She exhibits no tenderness.   Neurological: She is alert and oriented to person, place, and time.   Skin: Skin is warm and dry. Rash noted. She is not diaphoretic. There is erythema. No pallor.   On examination, patient has arthritic changes in his feet, bunions bilaterally.  Has thin skin on her legs bilaterally, various scars from previous scabs/injuries.  Patient has nonhealing scab on his left anterior distal shin with surrounding cellulitis.  Has clear drainage coming from the  scab.  Patient also has redness, swelling of right MTP joint without TTP or pain with axial loading. No obviou ankle or leg swelling. Onychomycosis  Of the second and third toe of the left foot.    Psychiatric:   Anxious

## 2021-06-17 NOTE — PATIENT INSTRUCTIONS - HE
Patient Instructions by Dominguez Saenz PT at 10/15/2019 11:00 AM     Author: Dominguez Saenz PT Service: -- Author Type: Physical Therapist    Filed: 10/15/2019 11:29 AM Encounter Date: 10/15/2019 Status: Signed    : Dominguez Saenz PT (Physical Therapist)          SUPINE HIP ABDUCTION - ELASTIC BAND CLAMS    Lie down on your back with your knees bent. Place an elastic band around your knees and then draw your knees apart.    10 repetitions  1x/day        HIP ADDUCTION SQUEEZE - SUPINE    Place a rolled up towel,  ball or pillow between your knees and press your knees together so that you squeeze the object firmly.    10 repetitions  1x/day

## 2021-06-17 NOTE — TELEPHONE ENCOUNTER
Order placed for fasting lipid panel. Please request that patient fast for at least 8 hours prior to blood draw.   Thanks   Rambo Cerda MD  For [facility]

## 2021-06-17 NOTE — TELEPHONE ENCOUNTER
----- Message from Abigail Currie DO sent at 5/3/2021  5:20 AM CDT -----  Please call serafin and make lab appt and make her aware about vascular referral if she hasn't read [facility] already .    Jose Martin Gil,     Your BRANDY ( the ultrasound you had done) shows moderate - severe peripheral artery disease. This is when you get narrowing of your some of your blood vessels and this causes notable delayed healing. I think that it is important for your to go see vascular to discuss your options and learn about management of PAD. I have placed the referral and someone should be calling you to schedule an appointment    I also received a message from Dr. Us's team about your blood levels being lower than usual. I would like you to come to lab to get them rechecked. .     Best, .  Abigail Currie,

## 2021-06-17 NOTE — TELEPHONE ENCOUNTER
Call placed to patient regarding referral and for a lab appointment. Left message for patient to call back. When she calls back please help her schedule a lab appointment. Also let her know regarding referral for vascular.

## 2021-06-17 NOTE — PATIENT INSTRUCTIONS - HE
Patient Instructions by Adriana Lemos PT at 11/8/2019  1:00 PM     Author: Adriana Lemos PT Service: -- Author Type: Physical Therapist    Filed: 11/8/2019  1:45 PM Encounter Date: 11/8/2019 Status: Signed    : Adriana Lemos PT (Physical Therapist)         Or lift your knee slowly

## 2021-06-17 NOTE — PATIENT INSTRUCTIONS - HE
Patient Instructions by Kadie Bose MD at 7/8/2019  2:30 PM     Author: Kadie Bose MD Service: -- Author Type: Physician    Filed: 7/8/2019  3:52 PM Encounter Date: 7/8/2019 Status: Signed    : Kadie Bose MD (Physician)       1. If you have further concerns about Lymes, follow up with your primary, or you can be seen by any primary care provider at one of our other [facility] sites   2. Review the information below on Lyme's prophylaxis and symptoms of the illness  3. If you have any questions, call the clinic number - the number is answered 24/7    ?Most infections with Borrelia species, which cause Lyme disease, are transmitted by a nymphal stage Ixodes species tick. This immature stage of the tick is quite small.     ?Borrelia burgdorferi is not usually transmitted within the first 48 to 72 hours of tick attachment. The likelihood of transmission is increased if the tick is engorged and/or has been attached for at least 72 hours.     ?Ticks are best removed with tweezers or protected fingers, pulling straight up with even pressure.   ?We suggest that patients who meet all criteria for antibiotic prophylaxis be offered a single dose of doxycycline if not contraindicated.      The criteria for prophylaxis include:  1. Attached tick identified as an adult or nymphal Ixodes scapularis tick (deer tick)  2. Tick is estimated to have been attached for ?36 hours (by degree of engorgement or time of exposure)  3. Prophylaxis is begun within 72 hours of tick removal  4. Local rate of infection of ticks with B. burgdorferi is ?20 percent (these rates of infection have been shown to occur in parts of [place], parts of the [place], parts of [place] and [place])  5. Doxycycline is not contraindicated (ie, the patient is not <8 years of age, pregnant, or lactating)    ?If the patient meets all of the above criteria, the recommended dose of doxycycline is 200 mg for adults and 4  "mg/kg up to a maximum dose of 200 mg in children ?8 years, given as a single dose. If the patient cannot take doxycycline, we do not recommend prophylaxis with an alternate antibiotic. A reasonable alternative for patients who meet criteria for prophylaxis but who prefer not to take antimicrobial prophylaxis is to observe the tick bite site and treat for Lyme disease if an erythema migrans (EM) rash develops.     ?We recommend that patients who do not meet the criteria for antibiotic prophylaxis be observed and treated only if signs or symptoms of Lyme disease (eg, EM) develop      ?We do not perform serologic testing for Lyme disease at the time of the tick bite to detect a new infection. However, a baseline serology is helpful in some patients because it can serve as a comparator to later serology if signs and symptoms subsequently develop.     What is Lyme disease? -- Lyme disease is an illness that can make you feel like you have the flu. It can also cause a rash, fever, or nerve, joint, or heart problems.  People can get Lyme disease after being bitten by a tiny insect called a tick. When a certain type of tick bites you, it can transmit the germ that causes Lyme disease from its body to yours. But a tick can infect you only if it stays attached for at least a day and a half.  The ticks that carry Lyme disease feed on deer and mice. Ticks are found in tall grass and on shrubs, and can attach to animals and people walking by. Ticks cannot fly or jump.  What are the symptoms of Lyme disease? -- Symptoms can start days or weeks after a tick bite. They include:  ?A rash where you were bitten - The rash often appears within a month of getting bitten. It is red, but its center can be the color of your skin. It might get bigger over a few days. To some, it looks like a \"bull's eye\" (picture 1).  ?Fever  ?Feeling tired  ?Body aches and pains  ?Heart problems such as a slowed heart rate  ?Headache and stiff " neck  ?Feelings of pain, weakness, or numbness  If a person is not treated, further symptoms can occur months to years after a tick bite. These include:  ?Pain and swelling of joints, such as your knees  ?Trouble with your memory and thinking  ?Skin problems, such as skin swelling or thinning (this occurs mostly in [place])  Should I see a doctor or nurse? -- Yes. If you have symptoms of Lyme disease, see a doctor or nurse. Some people don't know that they were bitten by a tick. Or they might not remember having a rash or other early symptoms.  Is there a test for Lyme disease? -- Yes. Blood tests can show if you are infected with the germ that causes Lyme disease. But it takes time for the blood tests to turn positive. This means the tests won't work if you get them right after being bitten. Also, the blood tests usually come back negative when you have the initial rash that goes with Lyme disease. Because of this, if you have the rash, you do not need a blood test to confirm that you have Lyme disease.  If your doctor or nurse suspects you have Lyme disease, he or she will do an exam and ask you questions. The doctor or nurse will use this information (and your blood test result, if needed) to decide about treatment.  How is Lyme disease treated? -- Lyme disease is usually treated with antibiotics. There are a few different types. Treatment with antibiotics should help your symptoms go away. Sometimes, symptoms improve quickly. Other times, it can take weeks or months for symptoms to go away.  What if I am pregnant? -- If you are pregnant, talk to your doctor. Some medicines for Lyme disease are safe to take if you are pregnant, but others are not.  Can Lyme disease be prevented? -- The best way to prevent Lyme disease is to avoid getting bitten by a tick. But if you were already bitten, your doctor might give you an antibiotic. In some situations, this can reduce your chances of getting Lyme disease.  To try to  "avoid getting bitten by a tick, you can:  ?Wear shoes, long-sleeved shirts, and long pants when you go outside. Keep ticks away from your skin by tucking your pants into your socks.  ?Wear light colors so you can spot any ticks that get on your clothes.  ?Wear bug spray or cream that contains DEET. (Do not use DEET on babies younger than 2 months.) On your clothes and gear, you can use bug repellents that have a chemical called \"permethrin.\"  ?Shower within 2 hours of being outdoors if you think you have been in an area where there are ticks.  ?Put dry clothes briefly (for about 4 minutes) in a dryer after being outdoors.  ?Check your clothes and body for ticks after being outdoors. Be sure to check your scalp, waist, armpits, groin, and backs of your knees. Check your children, too.  ?If you live in a place that has deer or mice nearby, take steps to keep those animals away. Deer and mice carry ticks.  If you find a tick on your body or on your child, use tweezers to grab it. Then pull it out slowly and gently. After that, wash the area with soap and water.  You do not need to keep the tick. But knowing what it looked like can help your doctor decide about your treatment. See if you can tell:  ?Its color and size  ?If it was attached to your skin or just resting on your skin  ?If it was big, round, and full of blood  You should see your doctor or nurse if you have a tick and you cannot get it off.  You should also call your doctor or nurse if you think you have had a tick attached for at least 36 hours (a day and a half). Then he or she can decide if you need to take a dose of an antibiotic to help prevent Lyme. Doctors only recommend antibiotics to prevent Lyme disease in some situations. It depends on your age, where you live, what kind of tick bit you, and how long it was attached.  If you or your child was bitten by a deer tick, you should watch the area around the bite for a month to see if a rash " appears.

## 2021-06-17 NOTE — PATIENT INSTRUCTIONS - HE
Patient Instructions by Dominguez Saenz PT at 10/8/2019 12:00 PM     Author: Dominguez Saenz PT Service: -- Author Type: Physical Therapist    Filed: 10/8/2019 12:36 PM Encounter Date: 10/8/2019 Status: Signed    : Dominguez Saenz PT (Physical Therapist)               SEATED HAMSTRING STRETCH    While seated, rest your heel on the floor with your knee straight and gently lean forward until a stretch is felt behind you knee/thigh.    Hold 15-30 seconds. Two repetitions.   2-3 times per day.       PIRIFORMIS STRETCH - MODIFIED    While lying on your back, hold your knee with your opposite hand and draw your knee up and over towards your opposite shoulder.    Hold 15-30 seconds. Two repetitions.   2-3 times per day.       SINGLE KNEE TO CHEST STRETCH - SKTC    While Lying on your back,  hold your knee and gently pull it up towards your chest.    Hold 15-30 seconds. Two repetitions.   2-3 times per day.     Alternate:

## 2021-06-17 NOTE — PROGRESS NOTES
Progress Note    Reason for Visit:  Chief Complaint     Osteoporosis          Progress Note:    HPI:   This patient seen in consultation at the request of the primary care physician because of osteoporosis.  Thank you for referring this pleasant 72-year-old female patient who is known to have rheumatoid arthritis and she is taking Plaquenil 200 mg daily.    She also takes Celebrex 200 mg twice a day.    She fell and broke her left shoulder 2 years ago she has bilateral hip replacement because because of avascular necrosis of the hip.    She had polymyalgia rheumatica with giant cell arthritis and has taken prednisone for a year in the past and she has Medrol Wood has as needed    Her mother had osteoporosis and she lost 2 inches in height she does not smoke but she drinks daily.    Her B12 is 363 and MCV is 106 hemoglobin 8.9 creatinine 0.67 calcium 9.1.  Patient Profile:  72 y.o. female, postmenopausal, is here for the first bone density test.   History of fractures - Yes;  Shoulder. Family history of osteoporosis - Yes;  mother.  Family history of hip fracture: Yes;  mother. Smoking history - No. Osteoporosis treatment past -  Yes;  Bisphosphonates and Evista. Osteoporosis treatment current - No.  Chronic medical problems - Chronic low back problems, Hip replacement  and Rheumatoid arthritis, pt states she has polymylagia. High risk medications -  Chemotherapy;  Yes, in the Past and Steroids;  Yes, in the Past.        Assessment:     1. The spine bone density L3-L4 with T-score -0.5. Sclerotic changes are noted at L2 which artifactually elevate the bone density.  2. Femoral bone densities are not able to be assessed due to previous hip replacements.  3. Left One third Radius T-score -2.8.        72 y.o. female with OSTEOPOROSIS and HIGH fracture risk.     The previous bone density was done at a different facility and on a different machine. Therefore, the results are not directly comparable. However, there appears  to have been significant decline at the one third radius site.    Review of Systems:    Nervous System: No headache, dizziness, fainting or memory loss. No tingling sensation of hand or feet.  Ears: No hearing loss or ringing in the ears  Eyes: No blurring of vision, redness, itching or dryness.  Nose: No nosebleed or loss of smell  Mouth: No mouth sores or loss of taste  Throat: No hoarseness or difficulty swallowing  Neck: No enlarged thyroid or lymph nodes.  Heart: No chest pain, palpitation or irregular heartbeat. No swelling of hands or feet  Lungs: No shortness of breath, cough, night sweats, wheezing or hemoptysis.  Gastrointestinal: No nausea or vomiting, constipation or diarrhea.  No acid reflux, abdominal pain or blood in stools.  Kidney/Bladdr: No polyuria, polydipsia, nocturia or hematuria.  Genital/Sexual: No loss of libido  Skin: No rash, hair loss or hirsutism.  No abnormal striae  Muscles/Joints/Bones: No morning stiffness, muscle aches and pain or loss of height.    Current Medications:  Current Outpatient Prescriptions   Medication Sig     celecoxib (CELEBREX) 200 MG capsule Take 200 mg by mouth 2 (two) times a day.     gabapentin (NEURONTIN) 100 MG capsule 1 cap at bedtime x 3 days.  Then may take 2 caps at bedtime x 3 days, then may take 3 caps at bedtime     hydroxychloroquine (PLAQUENIL) 200 mg tablet Take 200 mg by mouth daily. Take 2 tablets by mouth.  At bedtime.     lidocaine (XYLOCAINE) 5 % ointment Apply small amount (1gm) to shoulders three times daily as needed for pain     methylPREDNISolone (MEDROL DOSEPACK) 4 mg tablet Keeps on hand     omeprazole (PRILOSEC) 10 MG capsule Take 10 mg by mouth.     zolpidem (AMBIEN) 10 mg tablet Take 0.5 tablets (5 mg total) by mouth at bedtime as needed for sleep. Take 1 tablet by mouth at bedtime if needed for sleep.     naproxen (NAPROSYN) 500 MG tablet Take 500 mg by mouth.       Patients Active Problems:  Patient Active Problem List   Diagnosis  "    Ptosis Of Eyelid     Rheumatoid arthritis     Lower Back Pain     Patient Counseling: End-Of-Life Issues     Osteoporosis       History:   reports that she has quit smoking. She has never used smokeless tobacco. She reports that she drinks alcohol.   reports that she has quit smoking. She has never used smokeless tobacco. She reports that she drinks alcohol. Her drug history is not on file.  History   Smoking Status     Former Smoker   Smokeless Tobacco     Never Used      reports that she has quit smoking. She has never used smokeless tobacco. She reports that she drinks alcohol. Her drug history is not on file.  History   Sexual Activity     Sexual activity: Not on file     Past Medical History:   Diagnosis Date     Rheumatoid arthritis      No family history on file.  Past Medical History:   Diagnosis Date     Rheumatoid arthritis      No past surgical history on file.    Vitals   height is 5' 4\" (1.626 m) and weight is 162 lb (73.5 kg). Her blood pressure is 100/72.         Exam  General appearance: The patient looked well, not in acute distress.  Eyes: no evidence of thyroid eye disease.   Retinal exam: No evidence of diabetic retinopathy.  Mouth and Throat: Normal  Neck: No evidence of thyromegaly, enlarged lymph node or tenderness  Chest: Trachea is central. Chest is clear to auscultation and percussion. Breat sounds are normal.  Cardiovascular exam: JVP is not raised. Heart sounds are normal, no murmurs or rub  Peripheral pulses are palpable.   Abdomen: No masses or tenderness.    Back: No vertebral tenderness or kyphosis.  Extremities: No evidence of leg edema.   Skin: Normal to touch.  No abnormal striae  Neurologic exam:  Visual fields are intact by confrontation, grossly intact. No evidence of peripheral neuropathy.  Detailed foot exam normal.        Diagnosis:  No diagnosis found.    Orders:   No orders of the defined types were placed in this encounter.        Assessment and Plan: Osteoporosis the " patient had a bone DEXA scan which showed T score at the spine is -0.5 there is a lot of sclerotic changes.    T score at the wrist is -2.8.  I discussed the pathophysiology of the disease with the patient and I did advise her to start Prolia 60 mg subcutaneously every 6 month.    She has been on Fosamax 15 years ago and she took it for 2 years but the patient has severe acid reflux so is not suitable for her.    I did advise her to take vitamin D 2000 units a day.    Low hemoglobin/anemia 8.9 was high  and B12 is 363 which is within the normal range she follows up with hematology I did advise her to take B complex over-the-counter increase Prilosec to 20 mg daily and probably reduce her Celebrex and avoid naproxen because all of this would increase her anemia.    She is awaiting bone marrow biopsy.    Patient return to clinic in 1 year.    I have reviewed and ordered clinical lab test    I have reviewed and ordered radiology tests.    I have reviewed and ordered her medication as required.    I have reviewed her test results and advised with the performing physician.    I have reviewed the patient's old records.    I have reviewed and summarized the patient old records.    I did spend 60 minutes with the patient more than 50% was spent on counseling and managing her care.  I

## 2021-06-17 NOTE — TELEPHONE ENCOUNTER
Spoke with patient to schedule a lab appointment. She is scheduled on Tuesday at 930. She would like her Lipid check as well. Please order this please.

## 2021-06-17 NOTE — PATIENT INSTRUCTIONS - HE
Please note the following:    -Call the following number to schedule your DEXA bone scan at .    -Please called endocrinology, Dr. Ordonez's office for follow-up and Prolia shots.    -You have an appointment with vascular surgery, here in this building, Monday, June 7 at 2:20 PM with Dr. Cheek. Their phone number is [phone]    -I have referred you to [facility] for skin check.  Please give them a call at the number listed on your after visit summary.    -I will give you a call with the results of your blood work when I have it available    I will see you in 3 months for a Medicare annual wellness

## 2021-06-17 NOTE — PATIENT INSTRUCTIONS - HE
Patient Instructions by Adriana Lemos PT at 10/4/2019  8:00 AM     Author: Adriana Lemos PT Service: -- Author Type: Physical Therapist    Filed: 10/4/2019  9:10 AM Encounter Date: 10/4/2019 Status: Signed    : Adriana Lemos PT (Physical Therapist)           STANDING HEEL RAISES    While standing, raise up on your toes as you lift your heels off the ground.  Also raise your toes  Have a dried up sink  front of you and a chair behind you      Balance in a safe place legs together 1 minute eyes open and one minute eyes closed.    Put pressure on your left buttocks where it is tight and hold for 3 minutes until it softens.    Tighten your stomach and buttocks 5-10 seconds 5-10 reps 1-2 times per day.

## 2021-06-17 NOTE — PATIENT INSTRUCTIONS - HE
Patient Instructions by Dominguez Saenz PT at 10/22/2019 10:00 AM     Author: Dominguez Saenz PT Service: -- Author Type: Physical Therapist    Filed: 10/22/2019 10:20 AM Encounter Date: 10/22/2019 Status: Addendum    : Dominguez Saenz PT (Physical Therapist)    Related Notes: Original Note by Dominguez Saenz PT (Physical Therapist) filed at 10/22/2019 10:13 AM          SUPINE HIP ABDUCTION - ELASTIC BAND CLAMS    Lie down on your back with your knees bent. Place an elastic band around your knees and then draw your knees apart.    10 repetitions  1x/day        HIP ADDUCTION SQUEEZE - SUPINE    Place a rolled up towel,  ball or pillow between your knees and press your knees together so that you squeeze the object firmly.    10 repetitions  1x/day       PIRIFORMIS STRETCH - MODIFIED    While lying on your back, hold your knee with your opposite hand and draw your knee up and over towards your opposite shoulder.    Hold 30 seconds  2-3 times        ELASTIC BAND ROWS     Holding elastic band with both hands, draw back the band as you bend your elbows. Keep your elbows near the side of your body.    10 repetitions  1x/day      STRAIGHT LEG RAISE - SLR    While lying or sitting, raise up your leg with a straight knee.  Keep the opposite knee bent with the foot planted to the ground.    10 repetitions  1x/day

## 2021-06-17 NOTE — PROGRESS NOTES
Assessment & Plan     Louise was seen today for follow-up.    Diagnoses and all orders for this visit:    Cellulitis of left lower extremity  PAD (peripheral artery disease) (H)  Delayed wound healing  The wounds in her left lower extremity and right foot have healed nicely since last time I seen her.  Has finished her antibiotics.  Her ABIs that were ordered last time did show arterial vessel disease with a severely diseased common femoral artery on the left.  There was SFA occlusion bilaterally.  Patient was referred to vascular surgery who ordered a CTA on her.  Patient wanted me to interpret that but told her that I am unable to and that vascular would be her best resource.  Patient says that she has not received a call from them.  We gave vascular a call and they said that she declined to schedule with them several times because she was unclear why she needed this appointment.  Reeducated the patient on why she needed to go see vascular-there is evidence of vascular disease which is most likely causing delayed wound healing in her lower extremities, causing foot pain and making her prone to injury.  I would like her to go see vascular for further management of her arterial disease.  Patient amenable.  Is now scheduled for June 7 with vascular.    Age-related osteoporosis without current pathological fracture  Patient is overdue for her DEXA scan.  Did establish care with endocrinology at [facility] with Dr. Ordonez but hasn't been back to see them. Based on chart review, they did want her to see them in follow-up, unclear exactly when and continue her Prolia shots.   Encouraged her to reach out to them to schedule her prolia shot as she is coming up on 6 months since her last injection.  Also gave her the number to schedule her DEXA scan.    Non-neoplastic nevus  Patient wanted to be referred to dermatology for skin check for possible mole removal and to discuss fat sculpting.  Referral placed.  -      "[facility]    Need for Td vaccine  -     Td, Preservative Free (green label)      46 minutes spent on the date of the encounter doing chart review, history and exam, documentation and further activities per the note       Return in about 3 months (around 8/11/2021) for [facility] annual wellness, .    Abigail Currie DO  [facility]    Subjective   Louise Benítez is 75 y.o. and presents today for the following health issues   HPI     Patient is here for left lower extremity wound follow-up    Patient would also like clarification as to why she had CTA done and what it means.  Has been getting calls from vascular to get set up with an appointment but is not sure what she needs to.    Is on Prolia shots but has not heard back about \"scheduling\" and is confused as to who she should be seeing.  Was seeing endocrinology at [facility] but she does not know when they wanted to see her back.    Would also like a referral to dermatology to have them look at a few moles and she is also interested in getting \"fat sculpting\" done to look thinner.  She hopes that taking off some of the fat will help with her chronic low back pain.      Review of Systems  As per HPI       Objective    /64 (Patient Site: Right Arm, Patient Position: Sitting, Cuff Size: Adult Regular)   Pulse 90   Resp 16   There is no height or weight on file to calculate BMI.     Physical Exam   Constitutional: She appears well-developed. No distress.   Elderly, frail-appearing   Pulmonary/Chest: Effort normal and breath sounds normal.   Skin: She is not diaphoretic.   Patient continues to have mild, persistent erythema along the distal anterior shin on the left leg.  In the wound of her left lower extremity and right foot have healed nicely.  Patient has bunions on both feet.  No new wounds seen.               "

## 2021-06-18 NOTE — PATIENT INSTRUCTIONS - HE
Patient Instructions by Abigail Currie DO at 3/17/2021 10:00 AM     Author: Abigail Currie DO Service: -- Author Type: Physician    Filed: 3/17/2021 10:33 AM Encounter Date: 3/17/2021 Status: Addendum    : Abigail Currie DO (Physician)    Related Notes: Original Note by Abigail Currie DO (Physician) filed at 3/17/2021 10:30 AM       For your left lower extremity cellulitis:  Please clean your wound, use bacitracin on the wound and cover the area with Band-Aid/gauze as demonstrated to you today to help with the healing.  I have prescribed a medication called Bactrim, take 1 tab twice a day for 7days    For the cut on the right foot:  Would avoid close toed shoes as much as possible.  Wear flip-flops or slippers at home.  Please covered bacitracin as well as Band-Aid/gauze as done today in the office to help with healing    For your toenail infection, have prescribed a liquid that you apply to your toenail.  Please continue to apply it up to 48 weeks or until clearance of the infection from your toes, whichever one comes first.    We are also going to rule out gout in you    We are also rechecking your kidney function so that we can make sure that you are safe to take the antibiotics    Your thyroid has not been checked in a while, will recheck it today.    If you start getting fevers, chills or pus draining from any of your wounds while on the antibiotics, please give us a call immediately.  If no improvement once you are done with the antibiotics, please give us a call as well.    Patient Education     Wound Care  Taking proper care of your wound will help it heal. Your healthcare provider may show you how to clean and dress the wound. He or she will also explain how to tell if the wound is healing normally. If you are unsure of how to take care of the wound, be sure to clarify what dressing to use and how often you should change the bandages. Here are the basic steps.      A wound that's not healing normally may be dark in color or have white streaks.   Wash your hands  Tips for washing your hands include:    Use liquid soap and lather for 2 minutes. Scrub between your fingers and under your nails.    Rinse with warm water, keeping your fingers pointing down.    Use a paper towel to dry your hands and to turn off the faucet.  Remove the used dressing  Here are suggestions for removing the dressing:    If dressing changes cause you pain, be sure to take your pain medicine as prescribed by your healthcare provider 30 minutes before dressing changes.    Set up your supplies.    Put on disposable gloves if youre dressing a wound for someone else or your wound is infected.    Loosen the tape by pulling gently toward the wound.    Gently take off the old dressing. If the dressing is stuck to the wound, moisten it with saline (if available) or clean water.    If you have a drain or tube in the wound, be careful not to pull on it.    Remove the dressing 1 layer at a time and put it in a plastic bag. Seal the bag and put it in the trash.    Remove your gloves.  Inspect and dress the wound  Check the wound carefully:    Each time you change the dressing, check the wound carefully to be sure its healing normally by making sure your wound appears to be pink and moist, and is free of infection.      Wash your hands again. Put on a new pair of gloves.    Clean and dress the wound as directed by your healthcare provider or nurse. Don't put anything in the wound that is not prescribed or directed by your healthcare provider. If you have a drain or tube, be careful not to pull on it. Make sure to secure the drain or tube as well.    Put all unused supplies in a clean plastic bag. Seal the bag and store it in a clean, dry area between dressing changes.     Be sure to wash your hands again.  Call your healthcare provider  Call your healthcare provider if you see any of the following signs of a  problem:    Bleeding that soaks the dressing    Pink fluid weeping from the wound    Increased drainage or drainage that is yellow, yellow-green, or foul-smelling    Increased swelling or pain, or redness or swelling in the skin around the wound    A change in the color of the wound, or if streaks develop in a direction away from the wound    The area between any stitches opens up    An increase in the size of the wound    A fever of 100.4 F (38 C) or higher, or as directed by your healthcare provider    Chills, increased fatigue, or a loss of appetite      Date Last Reviewed: 7/1/2017 2000-2019 [employer]. [address] [place] [address]. All rights reserved. This information is not intended as a substitute for professional medical care. Always follow your healthcare professional's instructions.                 ADMIT

## 2021-06-18 NOTE — PROGRESS NOTES
ASSESSMENT AND PLAN:  Louise Benítez 72 y.o. female is seen here on 06/13/18 for establishment of care for polyarthritis combination of 0 positive rheumatoid arthritis, osteoarthritis.  She has been on hydroxychloroquine and Celebrex.  We discussed the management principles in the past she has taken methotrexate, azathioprine.  She has never had Biologics.  She has not experienced any significant side effects from Celebrex.  She is due for eye examination.  X-rays of the hands taken today.  She has reduced joint space in the second and third MCPs in addition extensive degenerative changes and multiple IP joints, first CMC's.  Further workup is indicated as noted.  Will meet here in the next couple months, meanwhile continue hydroxychloroquine and Celebrex.  Major side effects reviewed.  [employer] rheumat[employer] handout provided..  Diagnoses and all orders for this visit:    Seronegative rheumatoid arthritis of multiple sites (H)  -     hydroxychloroquine (PLAQUENIL) 200 mg tablet; Take 2 tablets (400 mg total) by mouth daily. Take 2 tablets by mouth.  At bedtime.  Dispense: 180 tablet; Refill: 0  -     HM1(CBC and Differential)  -     Creatinine  -     ALT (SGPT)  -     Albumin  -     Rheumatoid Factor Quant  -     CCP Antibodies  -     Hepatitis C Antibody (Anti-HCV)  -     Uric Acid  -     Antinuclear Antibody (ZAINAB) Cascade  -     Hepatitis B Surface Antigen (HBsAG)  -     XR Hands Bilateral 3 or More VWS; Future; Expected date: 6/13/18  -     XR Hands Bilateral 3 or More VWS  -     HM1 (CBC with Diff)    Primary osteoarthritis involving multiple joints  -     celecoxib (CELEBREX) 200 MG capsule; Take 1 capsule (200 mg total) by mouth 2 (two) times a day.  Dispense: 180 capsule; Refill: 0    Bilateral shoulder tendinopathy    Macrocytic anemia    Polyarthralgia  -     HM1(CBC and Differential)  -     Creatinine  -     ALT (SGPT)  -     Albumin  -     Rheumatoid Factor Quant  -     CCP Antibodies  -      "Hepatitis C Antibody (Anti-HCV)  -     Uric Acid  -     Antinuclear Antibody (ZAINAB) Cascade  -     Hepatitis B Surface Antigen (HBsAG)  -     XR Hands Bilateral 3 or More VWS; Future; Expected date: 6/13/18  -     XR Hands Bilateral 3 or More VWS  -     HM1 (CBC with Diff)      HISTORY OF PRESENTING ILLNESS:  Louise Benítez, 72 y.o., female is here for establishment of care for rheumatoid arthritis.  She reports that it was 20 years ago when she developed pain stiffness and found to have elevated sed rate.  A diagnosis of polymyalgia rheumatica was made.  Thereafter she was felt to have rheumatoid arthritis.  Initially she followed up to the clinic in [place] where she was given methotrexate and azathioprine.  She recalls that initially the knees used to troubles her quite a bit.  She remembers having \"gallons of fluid\" removed from her knees.  These have not troubled her of late.  Meanwhile she developed complications from prednisone treatment.  She had AVN bilaterally.  That resulted in replacement of hip joints in 2000 and 2003.  She had been following up at [facility] for several years.  She was on hydroxychloroquine, methotrexate and azathioprine having been discontinued.  She is also been taking Celebrex on fairly regular basis.  She takes this once daily usually but sometimes 2 times.  This is for her widespread osteoarthritis.  Recently she was seen at spine clinic and had shoulder injections.  She has had lumbar spondylosis.  She has noted that it is hard for her to define the level of pain.  She would call this as \"medium\".  She noted severe pain in her shoulders however.  She had those injected 48 hours ago.  She is able to describe herself otherwise in good health apart from chronic anemia.  She has been seen in hematology.  This is microcytic.  Most recent labs showed 8.7.  She was asked to consider bone marrow biopsy.  She has chosen to defer that.  She has noted that she is able " to walk couple of miles a day she cleans her own house and does a good job of it she feels.  She does not recognize her psoriasis is at herself or the family.  No history of ulcerative colitis or Crohn's disease.   Further historical information and ADL limitations as noted in the multidimensional health assessment questionnaire attached in the EMR. Rest of the 13 system ROS is negative.     ALLERGIES:Review of patient's allergies indicates no known allergies.    PAST MEDICAL/ACTIVE PROBLEMS/MEDICATION/ FAMILY HISTORY/SOCIAL DATA:  The patient has a family history of  Past Medical History:   Diagnosis Date     Rheumatoid arthritis (H)      History   Smoking Status     Former Smoker   Smokeless Tobacco     Never Used     Patient Active Problem List   Diagnosis     Ptosis Of Eyelid     Rheumatoid arthritis (H)     Lower Back Pain     Patient Counseling: End-Of-Life Issues     Osteoporosis     Current Outpatient Prescriptions   Medication Sig Dispense Refill     baclofen (LIORESAL) 10 MG tablet Take 0.5-1 tablets (5-10 mg total) by mouth 2 (two) times a day as needed (for muscle spasms). 30 tablet 0     celecoxib (CELEBREX) 200 MG capsule Take 200 mg by mouth 2 (two) times a day.       hydroxychloroquine (PLAQUENIL) 200 mg tablet Take 200 mg by mouth daily. Take 2 tablets by mouth.  At bedtime.       methylPREDNISolone (MEDROL DOSEPACK) 4 mg tablet Keeps on hand       omeprazole (PRILOSEC) 20 MG capsule Take 1 capsule (20 mg total) by mouth daily before breakfast. 90 capsule 1     zolpidem (AMBIEN) 10 mg tablet Take 0.5 tablets (5 mg total) by mouth at bedtime as needed for sleep. Take 1 tablet by mouth at bedtime if needed for sleep. 30 tablet 1     gabapentin (NEURONTIN) 100 MG capsule 1 cap at bedtime x 3 days.  Then may take 2 caps at bedtime x 3 days, then may take 3 caps at bedtime 270 capsule 1     lidocaine (XYLOCAINE) 5 % ointment Apply small amount (1gm) to shoulders three times daily as needed for pain 120 g  2     naproxen (NAPROSYN) 500 MG tablet Take 500 mg by mouth.       No current facility-administered medications for this visit.        COMPREHENSIVE EXAMINATION:  Vitals:    06/13/18 1607   BP: 116/60   Patient Site: Right Arm   Patient Position: Sitting   Cuff Size: Adult Regular   Pulse: 92   Weight: 161 lb (73 kg)     A well appearing alert oriented female. Vital data as noted above. Her eyes without inflammation/scleromalacia. ENTwithout oral mucositis, thrush, nasal deformity, external ear redness, deformity. Her neck is without lymphadenopathy and supple. Lungs normal sounds, no pleural rub. Heart auscultation normal rate, rhythm; no pericardial rub and murmurs. Abdomen soft, non tender, no organomegaly. Skin examined for heliotrope, malar area eruption, lupus pernio, periungual erythema, sclerodactyly, papules, erythema nodosum, purpura, nail pitting, onycholysis, and obvious psoriasis lesion. Neurological examination shows normal alertness, speech, facial symmetry, tone and power in upper and lower extremities, Tinel's and Phalen's at wrist and gait. The joint examination is performed for swelling, tenderness, warmth, erythema, and range of motion in the following joints: DIPs, PIPs, MCPs, wrists, first CMC's, elbows, shoulders, hips, knees, ankles, feet; spine for range of motion and paraspinal muscles for tenderness. The salient normal / abnormal findings are appended.  She has extensive Heberden nodes, there is no synovitis in any of the palpable appendicular joints.  She has severe limitation in her joints at the shoulders.  She is able to go and abduction about 100 .  She has first MTP tenderness bilaterally.  She has JLT of the knees.    LAB / IMAGING DATA:  No results found for: ALT, ALBUMIN, CREATININE    WBC   Date Value Ref Range Status   06/30/2016 5.8 4.0 - 11.0 thou/uL Final   05/30/2013 6.8 4.0 - 11.0 thou/uL Final     Hemoglobin   Date Value Ref Range Status   12/06/2017 8.7 (L) 12.0 - 16.0  g/dL Final   07/28/2016 8.9 (L) 12.0 - 16.0 g/dL Final   06/30/2016 9.4 (L) 12.0 - 16.0 g/dL Final     Platelets   Date Value Ref Range Status   06/30/2016 458 (H) 140 - 440 thou/uL Final   05/30/2013 365 140 - 440 thou/uL Final   05/30/2013 366 140 - 440 thou/uL Final       No results found for: ZAINAB, RF, SEDRATE

## 2021-06-18 NOTE — PATIENT INSTRUCTIONS - HE
"Patient Instructions by Erum Ang PT at 3/12/2020  3:30 PM     Author: Erum Ang PT Service: -- Author Type: Physical Therapist    Filed: 3/12/2020  3:56 PM Encounter Date: 3/12/2020 Status: Signed    : Erum Ang PT (Physical Therapist)        BALL SQUEEZE - SEATED    While sitting, place a ball between your knees. Squeeze the ball with your knees and hold.    Repeat 10x, hold 5\"    HIP ABDUCTION ISOMETRIC WITH BAND - SEATED    While sitting, place band around your knees.  Gently press knees outward against the band and hold.    Repeat 10x nice and slow      GASTROCNEMIUS STRETCH    While standing and leaning against a wall, place one foot back behind you and bend the front knee until a gentle stretch is felt on the back of the lower leg.     Your back knee should be straight the entire time.    Hold for 30 seconds on each leg                      "

## 2021-06-18 NOTE — PATIENT INSTRUCTIONS - HE
Patient Instructions by Zahraa Martines PA-C at 9/22/2020 11:50 AM     Author: Zahraa Martines PA-C Service: -- Author Type: Physician Assistant    Filed: 9/22/2020 12:05 PM Encounter Date: 9/22/2020 Status: Addendum    : Zahraa Martines PA-C (Physician Assistant)    Related Notes: Original Note by Zahraa Martines PA-C (Physician Assistant) filed at 9/22/2020 12:05 PM       Keep the area clean with soap and water and change dressing once daily.   Use nonadhesive dressings such as Vaseline gauze or Tegaderm.    Patient Education     Discharge Instructions for Cellulitis  You have been diagnosed with cellulitis. This is an infection in the deepest layer of the skin and tissue beneath the skin. In some cases, the infection also affects the muscle. Cellulitis is caused by bacteria. The bacteria can enter the body through broken skin. This can happen with a cut, scratch, animal bite, or an insect bite that has been scratched. You may have been treated in the hospital with antibiotics and fluids. You will likely be given a prescription for antibiotics to take at home. This sheet will help you take care of yourself at home.  Home care  When you are home:    Take the prescribed antibiotic medicine you are given as directed until it is gone. Take it even if you feel better. It treats the infection and stops it from returning. Not taking all the medicine can make future infections hard to treat.    Keep the infected area clean.    When possible, raise the infected area above the level of your heart. This helps keep swelling down.    Talk with your healthcare provider if you are in pain. Ask what kind of over-the-counter medicine you can take for pain.    Apply clean bandages as advised.    Take your temperature once a day for a week.    Wash your hands often to prevent spreading the infection.  In the future, wash your hands before and after you touch cuts, scratches, or bandages.  This will help prevent infection.   When to call your healthcare provider  Call your healthcare provider right away if you have any of the following:    Trouble or pain when moving the joints above or below the infected area    Discharge or pus draining from the area    Fever of 100.4 F (38 C) or higher, or as directed by your healthcare provider    Pain that gets worse in or around the infected     Redness that gets worse in or around the infected area, particularly if the area of redness expands to a wider area    Shaking chills    Swelling of the infected area    Vomiting  Date Last Reviewed: 8/1/2016 2000-2019 [employer]. [address] [place] [address]. All rights reserved. This information is not intended as a substitute for professional medical care. Always follow your healthcare professional's instructions.

## 2021-06-18 NOTE — PATIENT INSTRUCTIONS - HE
"Patient Instructions by Erum Ang PT at 3/5/2020 11:00 AM     Author: Erum Ang PT Service: -- Author Type: Physical Therapist    Filed: 3/5/2020 11:49 AM Encounter Date: 3/5/2020 Status: Addendum    : Erum Ang PT (Physical Therapist)    Related Notes: Original Note by Erum Ang PT (Physical Therapist) filed at 3/5/2020 11:48 AM        SINGLE KNEE TO CHEST STRETCH - SKTC    While Lying on your back,  hold your knee and gently pull it up towards your chest.    Alternate:      Hold 30\" on each leg      PIRIFORMIS STRETCH - MODIFIED    While lying on your back, hold your knee with your opposite hand and draw your knee up and over towards your opposite shoulder.    Hold for 30 seconds on each leg         HAMSTRING STRETCH - SUPINE    While lying on your back, raise up your leg and hold the back of your knee until a stretch is felt.                      LOWER TRUNK ROTATIONS - LTR    Lying on your back with your knees bent, gently move your knees side-to-side.    Repeat 10x nice and slow      GASTROCNEMIUS STRETCH    While standing and leaning against a wall, place one foot back behind you and bend the front knee until a gentle stretch is felt on the back of the lower leg.     Your back knee should be straight the entire time.    Hold for 30\" up to a minute                "

## 2021-06-18 NOTE — PROGRESS NOTES
Assessment/Plan:      Diagnoses and all orders for this visit:    Chronic pain of both shoulders  -     OPS US Large Joint Injection Bilateral; Standing  -     OPS US Large Joint Injection Bilateral  -     baclofen (LIORESAL) 10 MG tablet; Take 0.5-1 tablets (5-10 mg total) by mouth 2 (two) times a day as needed (for muscle spasms).  Dispense: 30 tablet; Refill: 0  -     lidocaine (XYLOCAINE) 5 % ointment; Apply small amount (1gm) to shoulders three times daily as needed for pain  Dispense: 120 g; Refill: 2    Myofascial muscle pain  -     baclofen (LIORESAL) 10 MG tablet; Take 0.5-1 tablets (5-10 mg total) by mouth 2 (two) times a day as needed (for muscle spasms).  Dispense: 30 tablet; Refill: 0    Lumbar spine pain    Rotator cuff arthropathy, unspecified laterality  -     lidocaine (XYLOCAINE) 5 % ointment; Apply small amount (1gm) to shoulders three times daily as needed for pain  Dispense: 120 g; Refill: 2    Other orders  -     methylPREDNISolone acetate injection (DEPO-MEDROL); as needed.  -     lidocaine (PF) 10 mg/mL (1 %) injection (XYLOCAINE-MPF); as needed.        Assessment: Pleasant 71-year-old female with a history of rheumatoid arthritis, GERD:    1.  Significantly worsened bilateral shoulder pain over the last month.  Consistent with glenohumeral joint osteoarthritis.  Left is worse than right today.  Severe glenohumeral joint osteoarthritis seen on MRIs from [facility] 2014 .  Pain is flared over the past month.    2.  Lumbar spine pain across the lumbosacral junction.  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  Increased after a fall.  She is not flared significantly but does have increased pain.  3.  Myofascial pain parascapular region and lumbar spine    Discussion:    1.  We discussed diagnosis and treatment options.  We discussed options of medications, therapy,  interventions as well as further imaging.  Overall she is doing fairly well but would like to proceed with interventions and medications as she is having a flare of symptoms.  2.  Trial baclofen for lumbar spine and parascapular myofascial pain.  3.  Will provide prescription of lidocaine gel for her shoulders as this is been quite helpful in the past.  4.  Recommend bilateral glenohumeral joint injections under ultrasound guidance.  She has had 160 mg of Depo-Medrol since December.  Would recommend 20 mg of Depo-Medrol in each glenohumeral joint today to see if this will provide as much therapeutic effect is 40 mg.  She agrees with this plan to attempt to limit steroid.  5.  Follow-up with me as needed if symptoms do not improve.    It was our pleasure caring for your patient today, if there any questions or concerns please do not hesitate to contact us.      Subjective:   Patient ID: Louise Benítez is a 72 y.o. female.    History of Present Illness: Patient presents for evaluation of bilateral shoulder pain and low back pain.  She has bilateral shoulder pain within the shoulders worse with moving her arms especially abduction.  Pain is a 6/10 today 10/10 at worst.  I glenohumeral joint injections in December of last year which were quite helpful.  Her pain never resolves but she is much more functional and she was even able to golf after the injections.  Over the past month her pain is significantly flared with no new injury.  No radiation down the arms numbness or tingling.  The left is now worse than right which is atypical for her.  Difficult for her to rate her pain today.  Better with rest worse with any movement.    She also has low back pain across lumbosacral junction.  Is still improved from her lumbar injection in January of this year.  She did slip and fall on some stairs not too long ago and her pain is slightly worst but not to point where she needs an injection.  Again no radiation numbness  tingling or weakness from the low back at this time.      Imaging: Right and left shoulder MRIs from [facility] 2014 reports were personally reviewed.  This shows severe osteoarthritis of the bilateral glenohumeral joints.      Review of Systems: No fevers chills sweats recent illnesses or antibiotics.  No numbness, tingling or weakness.  No bowel or bladder incontinence.  No headaches, dizziness, nausea, vomiting, blurred vision or balance deficits.    Past Medical History:   Diagnosis Date     Rheumatoid arthritis        The following portions of the patient's history were reviewed and updated as appropriate: allergies, current medications, past family history, past medical history, past social history, past surgical history and problem list.      Objective:   Physical Exam:    Vitals:    06/11/18 1230   BP: 144/60   Pulse: 72       General: Alert and oriented with normal affect. Attention, knowledge, memory, and language are intact. No acute distress.   Eyes: Sclerae are clear.  Respirations: Unlabored. CV: No lower extremity edema.   Gait:  Nonantalgic  Decreased range of motion shoulders in abduction limited to 60-80 .  Tenderness palpation bilateral upper trapezius and parascapular muscles.  Tenderness to palpation lumbosacral junction.    Sensation is intact to light touch throughout the upper and lower extremities.  Reflexes are 2+ and symmetric in the biceps triceps and brachioradialis with negative Hoffmans. 2+ patellar and 0 Achilles      Manual muscle testing reveals:  Right /Left out of 5     5/5 elbow flexors  5/5 elbow extensors  5/5 wrist extensors  5/5 interosseus  5/5 finger flexors     5/5 ankle plantar flexors  5/5 ankle dorsiflexors  5/5 ankle evertors      Procedure Note:    After discussing the risks and benefits of bilateral glenohumeral joint injection under ultrasound guidance, informed consent was obtained. The patient and physician agreed on the injection site prior to the procedure.  A verbal  timeout was done prior to the procedure.  With the patient seated, from posterior lateral approach, The left shoulder region was surveyed with the ultrasound unit to identify the target.  The skin was marked and prepped with chloraprep.  Under direct ultrasound visualization, a 25-gauge 1.5 inch needle was used to inject 3 milliliters of injectate containing 2.5 milliliters of 1% lidocaine and 0.5 milliliter containing 20 mg of depo-medrol after aspiration was negative for heme. The patient tolerated the procedure well and there no immediate complications.    The procedure was then repeated for the right shoulder: The right shoulder region was surveyed with the ultrasound unit to identify the target.  The skin was marked and prepped with chloraprep.  Under direct ultrasound visualization, a 25-gauge 1.5 inch needle was used to inject 3 milliliters of injectate containing 2.5 milliliters of 1% lidocaine and 0.5 milliliter containing 20 mg of depo-medrol after aspiration was negative for heme. The patient tolerated the procedure well and there no immediate complications.  A total of 40 mg of Depo-Medrol and 5 mL of 1% lidocaine was used for the procedure.    Preporcedure pain: Difficult to assess preprocedure pain bilaterally  Post procedure pain: 0/10 left, 0/10 right

## 2021-06-19 NOTE — LETTER
Letter by Joellen Carrasco DO at      Author: Joellen Carrasco DO Service: -- Author Type: --    Filed:  Encounter Date: 10/16/2019 Status: Signed         October 16, 2019     Patient: Louise Benítez   YOB: 1945   Date of Visit: 10/16/2019       To Whom It May Concern:    It is my medical opinion that Louise Benítez should not travel at this time due to a fracture in her lumbar spine.    If you have any questions or concerns, please don't hesitate to call.    Sincerely,      Ann Marie Julian D.O.  [facility]  Site Medical Director  [place], [place]  [phone]    Electronically signed by Joellen Bustamante DO

## 2021-06-21 NOTE — LETTER
Letter by Abigail Currie DO at      Author: Abigail Currie DO Service: -- Author Type: --    Filed:  Encounter Date: 3/18/2021 Status: (Other)         Louise Benítez  [address] [place] [address] [address]  [place] [address]             March 18, 2021         Dear MsAndres Jeyson,    Below are the results from your recent visit:    Resulted Orders   Uric Acid   Result Value Ref Range    Uric Acid 7.2 2.0 - 7.5 mg/dL   Creatinine   Result Value Ref Range    Creatinine 0.67 0.60 - 1.10 mg/dL    GFR MDRD Af Amer >60 >60 mL/min/1.73m2    GFR MDRD Non Af Amer >60 >60 mL/min/1.73m2   Thyroid Stimulating Hormone (TSH)   Result Value Ref Range    TSH 21.77 (H) 0.30 - 5.00 uIU/mL   C-Reactive Protein (CRP)   Result Value Ref Range    CRP 0.1 0.0 - 0.8 mg/dL   Sedimentation Rate   Result Value Ref Range    Sed Rate 32 (H) 0 - 20 mm/hr   HM2(CBC w/o Differential)   Result Value Ref Range    WBC 6.3 4.0 - 11.0 thou/uL    RBC 2.19 (L) 3.80 - 5.40 mill/uL    Hemoglobin 7.8 (LL) 12.0 - 16.0 g/dL    Hematocrit 23.6 (L) 35.0 - 47.0 %     (H) 80 - 100 fL    MCH 35.6 (H) 27.0 - 34.0 pg    MCHC 33.1 32.0 - 36.0 g/dL    RDW 20.2 (H) 11.0 - 14.5 %    Platelets 549 (H) 140 - 440 thou/uL    MPV 11.3 (H) 7.0 - 10.0 fL   Ferritin   Result Value Ref Range    Ferritin 296 (H) 10 - 130 ng/mL   Iron and Transferrin Iron Binding Capacity   Result Value Ref Range    Iron 110 42 - 175 ug/dL    Transferrin 209 (L) 212 - 360 mg/dL    Transferrin Saturation, Calculated 42 20 - 50 %    Transferrin IBC, Calculated 261 (L) 313 - 563 ug/dL   Vitamin B12   Result Value Ref Range    Vitamin B-12 660 213 - 816 pg/mL   Folate, Serum   Result Value Ref Range    Folate 11.6 >=3.5 ng/mL   T4, Free   Result Value Ref Range    Free T4 0.7 0.7 - 1.8 ng/dL        Your bloodwork shows lower than normal blood levels. This may be in part to your myelodysplastic  syndrome and the new cuts/bruises you got over the last week. The blood levels aren't low enough to   require a blood transfusion but I would like to follow with you closely. Would like you to see you in two  weeks for repeat bloodwork. This is very important.      I also wanted to know if you follow with a hematologist/oncologist for your myelodysplastic syndrome? If  you do, please give them a call right away to set up an appointment. If you don't follow with that  specialty, please let me know and I can refer you.      Your thyroid levels are also up. You need to go back on the synthroid. I have sent it to your pharmacy. You  should take that daily. We will re-check thyroid levels in 6 weeks.     Please call with questions or contact us using [employer].    Sincerely,        Electronically signed by Abigail Currie, DO

## 2021-06-21 NOTE — LETTER
Letter by Abigail Currie DO at      Author: Abigail Currie DO Service: -- Author Type: --    Filed:  Encounter Date: 3/31/2021 Status: (Other)        Louise Kongon  [address] [place] [address] [address]  [place] [address]             March 31, 2021         Dear Louise Benítez,    Below are the results from Louise's recent visit:    Resulted Orders   HM2(CBC w/o Differential)   Result Value Ref Range    WBC 6.6 4.0 - 11.0 thou/uL    RBC 2.27 (L) 3.80 - 5.40 mill/uL    Hemoglobin 8.1 (L) 12.0 - 16.0 g/dL    Hematocrit 24.5 (L) 35.0 - 47.0 %     (H) 80 - 100 fL    MCH 35.7 (H) 27.0 - 34.0 pg    MCHC 33.1 32.0 - 36.0 g/dL    RDW 21.3 (H) 11.0 - 14.5 %    Platelets 549 (H) 140 - 440 thou/uL    MPV 11.0 (H) 7.0 - 10.0 fL        Your blood levels are stable.  No further intervention.     Please call with questions or contact us using [employer].    Sincerely,        Electronically signed by Abigail Currie DO

## 2021-06-22 NOTE — TELEPHONE ENCOUNTER
Patient calling as she would like to schedule shoulder injections again with Dr. Aguilar. Chart reviewed. Patient last in for these injection 6/11/18. Order placed for repeat bilateral glenohumeral joint injections with US guidance. Transferred to scheduling to make appointment.

## 2021-06-22 NOTE — PROGRESS NOTES
Assessment:   Louise Benítez is a 73 y.o. y.o. female with past medical history significant for rheumatoid arthritis, anemia, GERD who presents today for follow-up regarding panic bilateral low back pain with left radicular/sciatic type leg pain.  The patient has had relief in the past with previous left L4-5 and L5-S1 transforaminal epidural steroid injections, the last time performed on December 4 of 2018.  Her MRI does show significant left L4-5 foraminal stenosis with left L5-S1 foraminal and lateral recess stenosis.  The patient is neurologically intact today.  She is without any red flag symptoms.  The patient does have bilateral shoulder pain secondary to glenohumeral osteoarthritis bilaterally.  Her older pain has been under good control with glenohumeral joint injections with Dr. Aguilar.       Plan:     A shared decision making plan was used.  The patient's values and choices were respected.  The following represents what was discussed and decided upon by the physician and the patient.      1.  DIAGNOSTIC TESTS: The patient's MRI of the lumbar spine was personally reviewed today by the physician with the physician performing her own interpretation.  The patient has severe left L4-5 foraminal stenosis with left L5-S1 foraminal and lateral recess stenosis.  -No further diagnostic testing necessary at this time.  2.  PHYSICAL THERAPY: No further physical therapy is necessary at this time.  She has done physical therapy in the past and is encouraged to continue doing her home exercise program on a regular basis.  3.  MEDICATIONS: The patient has discontinued Celebrex, gabapentin, baclofen, naproxen.  She currently is not taking any pain medications.  No changes were made today.  4.  INTERVENTIONS: The patient would like a repeat left L4-5 and L5-S1 transforaminal epidural steroid injection.  The risks and benefits of this injection were discussed with the patient today and she signed informed consent.   Please see the separate procedure note for details regarding this procedure.  5.  PATIENT EDUCATION:    -The patient was in agreement with the treatment plan.  She did not have any questions today.  6.  FOLLOW-UP: The patient is welcome to follow-up after these injection as needed.  She is encouraged to call if she has any questions or concerns in the interim.  She will be traveling this winter.    Subjective:     Louise Benítez is a 73 y.o. female who presents today for follow-up regarding chronic bilateral low back pain with left radicular/sciatic type leg pain.  The patient was last seen in clinic in January 2018.  At that time she underwent a left L4-5 and L5-S1 transfemoral epidural steroid injection.  The patient reports that she had good relief with this injection up until about a month ago when pain started to return.  He continues to be in the same area, going across the low back and then down the posterior lateral leg to the ankle.  She rates her pain at about a 4 out of 10 today.  Pain is worse with walking.  She does feel better with the injection and is looking forward to this today.  She denies any new symptoms.  She has very minimal numbness and tingling in the leg, this typically only occurs with walking.  She denies any weakness.  No significant symptoms of the right leg.  She is not currently taking any prescription or over-the-counter pain medications.    Past medical history is reviewed and is unchanged for any new medical diagnoses in the interim.      Family history is reviewed and is unchanged in the interim.      Review of Systems:  Negative for numbness/tingling, weakness, bowel/bladder dysfunction, foot drop, headache, dizziness, nausea/vomiting, blurred vision, balance changes.     Objective:   CONSTITUTIONAL:  Vital signs as above.  No acute distress.  The patient is well nourished and well groomed.    PSYCHIATRIC:  The patient is awake, alert, oriented to person, place and time.  The  patient is answering questions appropriately with clear speech.  Normal affect.  SKIN:  Skin over the face, posterior torso, bilateral upper and lower extremities is clean, dry, intact without rashes.  MUSCULOSKELETAL:  Gait is non-antalgic.  The patient is able to heel and toe walk without any difficulty.  Mild tenderness over the left lower lumbar paraspinal muscles.      The patient has 5/5 strength for the bilateral hip flexors, knee flexors/extensors, ankle dorsiflexors/plantar flexors, ankle evertors/invertors.    NEUROLOGICAL: Trace patellar, medial hamstring, achilles reflexes which are symmetric bilaterally.  No ankle clonus bilaterally.  Sensation to light touch is intact in the bilateral L4, L5, and S1 dermatomes.

## 2021-06-23 ENCOUNTER — COMMUNICATION - HEALTHEAST (OUTPATIENT)
Dept: PHYSICAL MEDICINE AND REHAB | Facility: CLINIC | Age: 76
End: 2021-06-23

## 2021-06-23 DIAGNOSIS — G89.29 CHRONIC PAIN OF BOTH SHOULDERS: ICD-10-CM

## 2021-06-23 DIAGNOSIS — M25.511 CHRONIC PAIN OF BOTH SHOULDERS: ICD-10-CM

## 2021-06-23 DIAGNOSIS — M25.512 CHRONIC PAIN OF BOTH SHOULDERS: ICD-10-CM

## 2021-06-23 NOTE — PROGRESS NOTES
Assessment/Plan:      Diagnoses and all orders for this visit:    Myofascial muscle pain  -     baclofen (LIORESAL) 10 MG tablet  Dispense: 30 tablet; Refill: 0    Osteoarthritis of both shoulders, unspecified osteoarthritis type  -     OPS US Large Joint Injection Bilateral  -     OPS US Large Joint Injection Bilateral    Lumbar spine pain    Chronic pain of both shoulders  -     baclofen (LIORESAL) 10 MG tablet  Dispense: 30 tablet; Refill: 0  -     lidocaine (XYLOCAINE) 5 % ointment  Dispense: 120 g; Refill: 2    Rotator cuff arthropathy, unspecified laterality  -     lidocaine (XYLOCAINE) 5 % ointment  Dispense: 120 g; Refill: 2        Assessment: Pleasant 73 y.o. female with a history of rheumatoid arthritis, GERD:    1.  Worsening bilateral shoulder pain over the past several months.  This is consistent with flare of glenohumeral joint osteoarthritis.  Left is typically worse than right.  She has severe glenohumeral joint osteoarthritis bilaterally on MRIs from [facility] from 2014.  Does well with intermittent steroid injections last injection in June 2018.    2.  Chronic lumbar spine pain.  She has severe foraminal stenosis and left L4-5 and facet arthropathy which is most significant L5-S1 with spondylolisthesis of L4 on L5 that is degenerative.  Improved after lumbar transforaminal epidural steroid injection one month ago.    3.  Myofascial pain lumbar spine and shoulders.      Discussion:    1.  We discussed the diagnosis and treatment options.  We discussed options to treat the shoulders including physical therapy, injections, orthopedic referral.  She wants to avoid orthopedics for now she does have a history of anemia and wants to avoid replacement she was considering this.  She will follow-up with her primary care provider regarding     2.  Patient would like bilateral glenohumeral joint injections today.  These have worked well in the past with the last injection done in June of last year.  She agrees to  proceed.  Please see attached procedure note.    3.  Baclofen has worked well for her lumbar spine for intermittent flares.  She is out of this medication.  Refill provided.    4.  Lidocaine ointment is quite helpful for the shoulders and I will prescribe this for her as well she no longer has this.    5.  Follow-up with me as needed.    It was our pleasure caring for your patient today, if there any questions or concerns please do not hesitate to contact us.      Subjective:   Patient ID: Louise Benítez is a 73 y.o. female.    History of Present Illness: Patient presents for evaluation of shoulder pain and low back pain after lumbar transforaminal epidural steroid injection.  Bilateral shoulder pain is the worst left greater than right.  Had an injection in the bilateral glenohumeral joints in June 2018.  Did well for a while but her symptoms have worsened especially over the past few months.  Worse with any use.  Better first thing in the morning but as she uses her arms or raise them to shoulder height they become quite painful.  She has limited use of her arms.  Pain is a 2/10 today bilaterally.  She did have lidocaine ointment which was really helpful for her shoulders nearly immediately but no longer has this medication.    She also has chronic low back pain with left lower extremity paresthesias.  She did recently have a left L4-5 and L5-S1 transforaminal epidural steroid injection worked quite well for her and her left leg no longer becomes numb and tingling with walking which she still gets some back pain.  No side effects from the injection and doing.  She did have baclofen in the past after she fell and that did really help her back pain which she is out of this medication.        Imaging: MRIs of the shoulders personally reviewed from [facility].  This is from 2014.  Images and report personally reviewed.  Severe glenohumeral joint osteoarthritis bilateral.    Review of Systems: No fevers chills sweats recent  illnesses or aches.  No numbness, tingling or weakness.  No bowel or bladder incontinence.  No headaches, dizziness, nausea, vomiting, blurred vision or balance deficits.    Past Medical History:   Diagnosis Date     Rheumatoid arthritis (H)        The following portions of the patient's history were reviewed and updated as appropriate: allergies, current medications, past family history, past medical history, past social history, past surgical history and problem list.      Objective:   Physical Exam:    Vitals:    01/10/19 0736   BP: 128/78   Pulse: 78   Temp: 98.2  F (36.8  C)       General: Alert and oriented with normal affect. Attention, knowledge, memory, and language are intact. No acute distress.   Eyes: Sclerae are clear.  Respirations: Unlabored. CV: No lower extremity edema.   Gait:  Nonantalgic  Tenderness palpation bilateral shoulders and deltoid region.  Tenderness lumbar paraspinals at L5-S1.  Decreased range of motion of both glenohumeral joints in all planes unable to even abductor to 90 degrees and internal and external rotation severely limited.  Crepitus on range of motion testing.  Sensation is intact to light touch throughout the upper and lower extremities.  Reflexes are 2+ and symmetric in the biceps triceps and brachioradialis with negative Hoffmans. 0 patellar and Achilles      Manual muscle testing reveals:  Right /Left out of 5     5/5 elbow flexors  5/5 elbow extensors  5/5 wrist extensors  5/5 interosseus  5/5 finger flexors     5/5 ankle plantar flexors  5/5 ankle dorsiflexors  5/5  ankle evertors      Procedure Note:    After discussing the risks and benefits of bilateral glenohumeral joint injection under ultrasound guidance, informed consent was obtained. The patient and physician agreed on the injection site prior to the procedure.  A verbal timeout was done prior to the procedure.  With the patient seated, from posterior lateral approach, The left shoulder region was surveyed  with the ultrasound unit to identify the target.  The skin was marked and prepped with chloraprep.  The skin was then anesthetized with a skin wheal followed by infiltration with 0.5 mL of 1% lidocaine.  Under direct ultrasound visualization, a 25-gauge 2 inch needle was used to inject 3 milliliters of injectate containing 2 milliliters of 1% lidocaine and 1 milliliter containing 40 mg of depo-medrol after aspiration was negative for heme.      The procedure was then repeated for the right shoulder: The right shoulder region was surveyed with the ultrasound unit to identify the target.  The skin was marked and prepped with chloraprep.  The skin was then anesthetized with a skin wheal followed by infiltration with 0.5 mL of 1% lidocaine.  Under direct ultrasound visualization, a 25-gauge 2 inch needle was used to inject 3 milliliters of injectate containing 2 milliliters of 1% lidocaine and 1 milliliter containing 40 mg of depo-medrol after aspiration was negative for heme.     The patient tolerated the procedure well and there no immediate complications.    Preporcedure pain: Right: 2/10 left: 2/10  Poste procedure pain: Right: 1/10 Left: 1/10

## 2021-06-23 NOTE — PATIENT INSTRUCTIONS - HE
DISCHARGE INSTRUCTIONS    During office hours (8:00 a.m.- 4:30 p.m.) questions or concerns may be answered  by calling      Spine Navigation Nurses at  [phone]. If you experience any problems after hours  please call [phone] and you will be connected to [facility].     All Patients:  ? You may experience an increase in your symptoms for the first 2 days (It may take anywhere between 2 days- 2 weeks for the steroid to have maximum effect).    ? You may use ice on the injection site, as frequently as 20 minutes each hour if needed.    ? You may take your pain medicine.    ? You may continue taking your regular medication.    ? You may shower. No swimming, tub bath or hot tub for 48 hours.  You may remove your bandaid/bandage as soon as you are home.    ? You may resume light activities, as tolerated.    ? Resume your usual diet as tolerated.    ? It is strongly advised that you do not drive for 1-3 hours post injection.    ? If you have had oral sedation:  Do not drive for 8 hours post injection.      ? If you have had IV sedation:  Do not drive for 24 hours post injection.  Do not operate hazardous machinery or make important personal/business decisions for 24 hours.    POSSIBLE STEROID SIDE EFFECTS (If steroid/cortisone was used for your procedure)    -If you experience these symptoms, it should only last for a short period      Swelling of the legs                Skin redness (flushing)       Mouth (oral) irritation     Blood sugar (glucose) levels              Sweats                     Mood changes    Headache         POSSIBLE PROCEDURE SIDE EFFECTS    -Call the Spine Center if you are concerned      Increased Pain             Increased numbness/tingling        Nausea/Vomiting            Bruising/bleeding at site        Redness or swelling                                                Difficulty walking        Weakness            Fever greater than 100.5    *In the event of a severe  headache after an epidural steroid injection that is relieved by lying down, please call the [facility] to speak with a clinical staff member*

## 2021-06-24 NOTE — PROGRESS NOTES
"ASSESSMENT AND PLAN:  Louise Benítez 73 y.o. female is seen here on 02/12/19 for follow-up of seronegative rheumatoid arthritis, widespread adenopathy for which she follows up elsewhere, recently found to have what she describes as myelodysplastic syndrome.  She is on hydroxychloroquine, Celebrex, omeprazole.  She having had a fall recently as noted significant pain in her coccyx area.  Apart from that she has noted well controlled symptoms apart from the shoulder discomfort for which she has had injections at the spine clinic recently.  Continue hydroxychloroquine eyes examined last November.  Management of osteoarthritis reviewed.  Follow-up in 6 months labs today and just prior to next visit.     Diagnoses and all orders for this visit:    Seronegative rheumatoid arthritis of multiple sites (H)  -     hydroxychloroquine (PLAQUENIL) 200 mg tablet  Dispense: 180 tablet; Refill: 0    Polyarthralgia  -     omeprazole (PRILOSEC) 20 MG capsule  Dispense: 90 capsule; Refill: 3  -     ALT (SGPT)  -     Albumin  -     Creatinine  -     HM2(CBC w/o Differential)    Primary osteoarthritis involving multiple joints  -     omeprazole (PRILOSEC) 20 MG capsule  Dispense: 90 capsule; Refill: 3      HISTORY OF PRESENTING ILLNESS:  Louies Benítez, 73 y.o., female is here for follow-up.  She was last seen here in June of last year.  She reports she had been traveling.  She noted pain level of moderate severity in her left shoulder and milder in the right amongst all her appendicular joints.  She reports able to do all the day-to-day activities.  She has noted her eye examination this past November during which time she was reassured.  She has not had fever weight loss blurry vision eye redness mouth ulcer nausea or rash.  She slipped on ice a few days ago landed on her \"tailbone\" this is been painful and is beginning to get better.  On her previous visit she had noted that it was 20 years ago when she developed pain stiffness " "and found to have elevated sed rate.  A diagnosis of polymyalgia rheumatica was made.  Thereafter she was felt to have rheumatoid arthritis.  Initially she followed up to the clinic in [place] where she was given methotrexate and azathioprine.  She recalls that initially the knees used to troubles her quite a bit.  She remembers having \"gallons of fluid\" removed from her knees.  These have not troubled her of late.  Meanwhile she developed complications from prednisone treatment.  She had AVN bilaterally.  That resulted in replacement of hip joints in 2000 and 2003.  She had been following up at [facility] for several years.  She was on hydroxychloroquine, methotrexate and azathioprine having been discontinued.  She is also been taking Celebrex on fairly regular basis.  She takes this once daily usually but sometimes 2 times.  This is for her widespread osteoarthritis.  Recently she was seen at spine clinic and had shoulder injections.  She has had lumbar spondylosis.  She has noted that it is hard for her to define the level of pain.  She would call this as \"medium\".  She noted severe pain in her shoulders however.  She had those injected 48 hours ago.  She is able to describe herself otherwise in good health apart from chronic anemia.  She has been seen in hematology.  This is microcytic.  Most recent labs showed 8.7.  She was asked to consider bone marrow biopsy.  She has chosen to defer that.  She has noted that she is able to walk couple of miles a day she cleans her own house and does a good job of it she feels.  She does not recognize her psoriasis is at herself or the family.  No history of ulcerative colitis or Crohn's disease.   Further historical information and ADL limitations as noted in the multidimensional health assessment questionnaire attached in the EMR.      ALLERGIES:Patient has no known allergies.    PAST MEDICAL/ACTIVE PROBLEMS/MEDICATION/ FAMILY HISTORY/SOCIAL DATA:  The " patient has a family history of  Past Medical History:   Diagnosis Date     Rheumatoid arthritis (H)      Social History     Tobacco Use   Smoking Status Former Smoker   Smokeless Tobacco Never Used     Patient Active Problem List   Diagnosis     Ptosis Of Eyelid     Lower Back Pain     Patient Counseling: End-Of-Life Issues     Osteoporosis     Seronegative rheumatoid arthritis of multiple sites (H)     Primary osteoarthritis involving multiple joints     Bilateral shoulder tendinopathy     Macrocytic anemia     Polyarthralgia     Current Outpatient Medications   Medication Sig Dispense Refill     celecoxib (CELEBREX) 200 MG capsule TAKE ONE CAPSULE BY MOUTH TWICE DAILY  180 capsule 0     hydroxychloroquine (PLAQUENIL) 200 mg tablet TAKE TWO TABLETS BY MOUTH DAILY AT BEDTIME  180 tablet 0     lidocaine (XYLOCAINE) 5 % ointment Apply small amount (1gm) to shoulders three times daily as needed for pain 120 g 2     omeprazole (PRILOSEC) 20 MG capsule Take 1 capsule (20 mg total) by mouth daily before breakfast. 90 capsule 1     zolpidem (AMBIEN) 10 mg tablet Take 0.5 tablets (5 mg total) by mouth at bedtime as needed for sleep. Take 1 tablet by mouth at bedtime if needed for sleep. 30 tablet 1     baclofen (LIORESAL) 10 MG tablet Take 0.5-1 tablets (5-10 mg total) by mouth 2 (two) times a day as needed (for muscle spasms). 30 tablet 0     gabapentin (NEURONTIN) 100 MG capsule 1 cap at bedtime x 3 days.  Then may take 2 caps at bedtime x 3 days, then may take 3 caps at bedtime 270 capsule 1     methylPREDNISolone (MEDROL DOSEPACK) 4 mg tablet Keeps on hand       naproxen (NAPROSYN) 500 MG tablet Take 500 mg by mouth.       No current facility-administered medications for this visit.      DETAILED EXAMINATION  02/12/19  :  Vitals:    02/12/19 1107   BP: 134/64   Patient Site: Right Arm   Patient Position: Sitting   Cuff Size: Adult Regular   Pulse: 68   Weight: 161 lb (73 kg)     Alert oriented. Head including the face  is examined for malar rash, heliotropes, scarring, lupus pernio. Eyes examined for redness such as in episcleritis/scleritis, periorbital lesions.   Neck/ Face examined for parotid gland swelling, range of motion of neck.  Left upper and lower and right upper and lower extremities examined for tenderness, swelling, warmth of the appendicular joints, range of motion, edema, rash.  Some of the important findings included: [place] nodes.  No synovitis palpable joints of upper extremities.  She has severe impingement of both shoulders unable to abduct the left side beyond 80 degrees her right 100 degrees.  JLT of the knees without effusion warmth.     LAB / IMAGING DATA:  ALT   Date Value Ref Range Status   06/13/2018 11 0 - 45 U/L Final     Albumin   Date Value Ref Range Status   06/13/2018 4.5 3.5 - 5.0 g/dL Final     Creatinine   Date Value Ref Range Status   06/13/2018 0.73 0.60 - 1.10 mg/dL Final       WBC   Date Value Ref Range Status   06/13/2018 7.1 4.0 - 11.0 thou/uL Final   06/30/2016 5.8 4.0 - 11.0 thou/uL Final     Hemoglobin   Date Value Ref Range Status   06/13/2018 9.4 (L) 12.0 - 16.0 g/dL Final   12/06/2017 8.7 (L) 12.0 - 16.0 g/dL Final   07/28/2016 8.9 (L) 12.0 - 16.0 g/dL Final     Platelets   Date Value Ref Range Status   06/13/2018 525 (H) 140 - 440 thou/uL Final   06/30/2016 458 (H) 140 - 440 thou/uL Final   05/30/2013 365 140 - 440 thou/uL Final       Lab Results   Component Value Date    RF <15.0 06/13/2018

## 2021-06-26 NOTE — TELEPHONE ENCOUNTER
"PSP:  Dr. Julian  Last clinic visit:  8/5/20 OV; 8/11/20 Left L4-L5 and L5-S1 TFESI and 8/26/20 Bilateral glenohumeral joint injections  Reason for call: Bilateral shoulder pain and low back pain, wanting repeat injections to shoulders at this time  Clinical information:  Patient states \"I need shoulder replacement surgery. I just haven't been going to MDs offices with this COVID. The shots really help; it never goes completely away.\" Reports shoulders are most bothersome at this time, but back is also painful.   Advice given to patient: Will update PSP of status and request. Will call back if can move forward with repeat injections to shoulders at this time without a follow up.   Provider to address: Patient is on your schedule for this afternoon if you want to evaluate. If OK to just schedule the injections, will place order and call her back.     "

## 2021-06-26 NOTE — PROGRESS NOTES
[facility]      Patient is in clinic today to see MD Erik Dodson.      Patient is here for a consult to discuss PAD and ulcers.     Pt is currently taking no meds that would impact our treatment plan.    Patient's condition is stable.    Vitals:    06/07/21 1424   BP: 152/82   Pulse: 88   Temp: 98.8  F (37.1  C)       The provider has been notified that the patient has no complaints.     Questions patient would like addressed today are: N/A      Ana Laura Dawson CMA

## 2021-06-26 NOTE — PROGRESS NOTES
VASCULAR SURGERY CLINIC CONSULTATION    VASCULAR SURGEON: Erik Dodson MD, RPVI     LOCATION:  [facility]    Louise Benítez   Medical Record #:  [id number]  YOB: 1945  Age:  75 y.o.     Date of Service: 6/7/2021    PRIMARY CARE PROVIDER: Desire Lua MD      Reason for visit: PAD    IMPRESSION: 75-year-old female with bilateral SFA occlusions, significant disease in the left CFA causing more than 50% stenosis.  Patient does have monophasic blunted flow bilaterally below the SFA occlusions.  Currently she is relatively asymptomatic and denies any complaints of classic claudication, pain at rest, or nonhealing wounds.  She stated that she did have nonhealing wounds in the left lower extremity but they are completely healed now.  ABIs are suggestive of severe peripheral vascular disease on the right and moderate peripheral vascular disease on the left.  Not sure if toe pressure of 0 is accurate given the fact that patient's foot is viable and there is no signs of any ischemia on her toes.    RECOMMENDATION/RISKS: Given the lack of her symptomatology I would not recommend an intervention as of now, however, I would keep a low threshold for potential intervention especially if she develops new ones.  We will follow-up in 3 months with repeat studies.  I would like to start his patient on statin therapy.    HPI:  Louise Benítez is a 75 y.o. female who was seen today in consultation for significant peripheral arterial disease.  Patient denies any history of claudication, pain at rest, or nonhealing wound currently.  She did have nonhealing wounds on the left shin but they are completely healed.  She admits to chronic back pain with radiation to bilateral thighs.  Denies any other symptoms.    REVIEW OF SYSTEMS:    A 12 point ROS was reviewed and is negative except for intermittent claudication.     PHH:    Past Medical History:   Diagnosis Date     Rheumatoid arthritis (H)          No past surgical history on file.    ALLERGIES:  Patient has no known allergies.    MEDS:    Current Outpatient Medications:      baclofen (LIORESAL) 10 MG tablet, TAKE HALF TO ONE TABLET BY MOUTH TWICE DAILY AS NEEDED FOR MUSCLE SPASMS, Disp: 60 tablet, Rfl: 4     celecoxib (CELEBREX) 200 MG capsule, Take 1 capsule (200 mg total) by mouth daily., Disp: 90 capsule, Rfl: 0     cholecalciferol, vitamin D3, 50,000 unit Tab, Take 1 tablet weekly, Disp: 12 tablet, Rfl: 0     hydrOXYchloroQUINE (PLAQUENIL) 200 mg tablet, Take 2 tablets (400 mg total) by mouth daily., Disp: 180 tablet, Rfl: 0     levothyroxine (SYNTHROID) 50 MCG tablet, Take 1 tablet (50 mcg total) by mouth daily., Disp: 60 tablet, Rfl: 0     atorvastatin (LIPITOR) 40 MG tablet, Take 1 tablet (40 mg total) by mouth at bedtime., Disp: 90 tablet, Rfl: 1     ciclopirox (PENLAC) 8 % solution, Apply over nail and surrounding skin. Apply daily over previous coat. After seven (7) days, may remove with alcohol and continue cycle., Disp: 6.6 mL, Rfl: 1     omeprazole (PRILOSEC) 20 MG capsule, Take 1 capsule (20 mg total) by mouth daily before breakfast., Disp: 90 capsule, Rfl: 3     zolpidem (AMBIEN) 10 mg tablet, Take 0.5 tablets (5 mg total) by mouth at bedtime as needed for sleep. Take 1 tablet by mouth at bedtime if needed for sleep., Disp: 30 tablet, Rfl: 1    Current Facility-Administered Medications:      denosumab 60 mg (PROLIA 60 mg/ml), 60 mg, Subcutaneous, Q6 Months, Radu Ordonez MD, 60 mg at 11/12/20 1101    SOCIAL HABITS:    Social History     Tobacco Use   Smoking Status Former Smoker   Smokeless Tobacco Never Used       Social History     Substance and Sexual Activity   Alcohol Use Yes     Frequency: 2-3 times a week    Comment: wine        Social History     Substance and Sexual Activity   Drug Use Never       FAMILY HISTORY:  No family history on file.    PE:  /82   Pulse 88   Temp 98.8  F (37.1  C)   Wt Readings from Last 1  Encounters:   04/21/21 160 lb (72.6 kg)     There is no height or weight on file to calculate BMI.    EXAM:  GENERAL: This is a well-developed 75 y.o. female who appears her stated age  EYES: Grossly normal.  MOUTH: Buccal mucosa normal   CARDIAC:  Normal S1 and S2, no Murmur  CHEST/LUNG:  Clear lung fields bilaterally  GASTROINTESINAL (ABDOMEN):Soft, non-tender, B/S present, no pulsatile mass   MUSCULOSKELETAL: Grossly normal and both lower extremities are intact.  HEME/LYMPH: No lymphedema  NEUROLOGIC: Focally intact, Alert and oriented x 3.   PSYCH: appropriate affect  INTEGUMENT: No open lesions or ulcers  Pulse Exam:   Carotid: No Bruit    Palpable radial pulses bilaterally.  Monophasic signals present bilaterally in the lower extremities.        DIAGNOSTIC STUDIES:     Images:  Cta Abdominal Aorta Iliofemoral Runoff    Result Date: 5/10/2021  EXAM DATE:         05/10/2021 EXAM: CT ANGIO AORTA/ARTERIES LOCATION: [facility] DATE/TIME: 5/10/2021 8:30 AM INDICATION: Arterial embolism, lower extremity PAD, nonhealing ulcers. COMPARISON: None TECHNIQUE: CT angiogram through the abdomen, pelvis, and bilateral lower extremities was performed during the arterial phase of contrast enhancement. Second phase arterial imaging was performed through the bilateral lower extremities. 2D and 3D reconstructions performed by the CT technologist. Dose reduction techniques were used. CONTRAST: 100ml IV Isovue 370 administered. I-Stat Cr=0.5mg/dl, eGFR=97. FINDINGS: CTA ABDOMEN/PELVIS: Minor calcified plaque supraceliac abdominal aorta. The celiac artery, superior mesenteric artery and inferior mesenteric artery are patent. Main right renal artery is patent. There is an accessory renal artery supplying the lower pole of the right kidney which arises from left side of the aorta and crosses the anterior aorta. Main left renal arteries patent. There is an accessory left renal artery supplying the lower  pole left kidney. Diffuse calcification of the infrarenal abdominal aorta extending into the common iliac arteries bilaterally. No evidence of abdominal aortic aneurysm. Common iliac, external iliac and internal iliac arteries are patent bilaterally. RIGHT LEG: Artifact arising from right total hip prosthesis. Common femoral and profunda femoral arteries are patent. Proximal occlusion of the right superficial femoral artery. Reconstitution of the above-knee right popliteal artery. Calcified plaque tibial peroneal trunk. Three-vessel runoff into the right foot. LEFT LEG: Artifact arising from left total hip prosthesis. Left common femoral left profunda femoral arteries are patent. Proximal occlusion of the left superficial femoral artery. Reconstitution of the left superficial femoral artery in the adductor canal. Left popliteal artery patent. Proximal occlusion of the right posterior tibial artery. Two-vessel runoff via the peroneal and anterior tibial arteries. NONVASCULAR FINDINGS: LUNG BASES: Cardiomegaly. Motion artifact. ABDOMEN: The liver, gallbladder, spleen, pancreas, adrenal glands and left kidney are unremarkable unremarkable. Malrotation of the right kidney. PELVIS: Normal MUSCULOSKELETAL: Severe degenerative changes of the lumbar spine with old anterior wedge compression deformity and loss of vertebral body height L1. IMPRESSION: 1.  Bilateral superficial femoral artery occlusions. There is three-vessel runoff into the right foot and two-vessel runoff via the left anterior tibial and peroneal arteries. 2.  Malrotation of the right kidney with right accessory lower pole renal artery arising from the left side of the aorta.         LABS:      Sodium   Date Value Ref Range Status   10/07/2019 140 136 - 145 mmol/L Final     Potassium   Date Value Ref Range Status   10/07/2019 4.8 3.5 - 5.0 mmol/L Final     Chloride   Date Value Ref Range Status   10/07/2019 107 98 - 107 mmol/L Final     BUN   Date Value Ref  Range Status   10/07/2019 17 8 - 28 mg/dL Final     Creatinine   Date Value Ref Range Status   04/20/2021 0.63 0.60 - 1.10 mg/dL Final   03/17/2021 0.67 0.60 - 1.10 mg/dL Final   09/02/2020 0.68 0.60 - 1.10 mg/dL Final     Hemoglobin   Date Value Ref Range Status   05/11/2021 8.7 (L) 12.0 - 16.0 g/dL Final   04/20/2021 7.5 (LL) 12.0 - 16.0 g/dL Final   03/30/2021 8.1 (L) 12.0 - 16.0 g/dL Final     Platelets   Date Value Ref Range Status   05/11/2021 538 (H) 140 - 440 thou/uL Final   04/20/2021 515 (H) 140 - 440 thou/uL Final   03/30/2021 549 (H) 140 - 440 thou/uL Final       Total time spent 30 minutes face to face with patient with more than 50% time spent in counseling and coordination of care.    Erik Dodson MD, [facility]  VASCULAR SURGERY

## 2021-06-26 NOTE — TELEPHONE ENCOUNTER
Order placed in Epic for repeat bilateral glenohumeral joint injections with US guidance. Phone call to patient to explain she can have the injections to shoulders and then follow up with PSP 2 weeks later. Stated understanding and appreciation for call back. Transferred to scheduling to cancel follow up appointment for this afternoon and make the injection appointment.

## 2021-06-26 NOTE — TELEPHONE ENCOUNTER
Patient okay to proceed with repeat bilateral GH joint injections under US guidance (she usually has these with Dr. Aguilar).  She should follow-up 2 weeks after injections with Dr. Julian

## 2021-06-27 ENCOUNTER — HEALTH MAINTENANCE LETTER (OUTPATIENT)
Age: 76
End: 2021-06-27

## 2021-06-27 NOTE — PROGRESS NOTES
Progress Notes by Kadie Bose MD at 7/8/2019  2:30 PM     Author: Kadie Bose MD Service: -- Author Type: Physician    Filed: 7/8/2019  6:51 PM Encounter Date: 7/8/2019 Status: Signed    : Kadie Bose MD (Physician)         Subjective:   Louise Benítez is a 73 y.o. female  Roomed by: Aldair X RMA    Accompanied by Spouse    Refills needed? No    Do you have any forms that need to be filled out? No      Chief Complaint   Patient presents with   ? Insect Bite     got possible deer tick bit over the weekend per pt one on arm and back of the right leg per pt    Patient states that she and her  were up in the [place] area over the last several days and noticed a tick on both her right arm and her right leg.  The  identified the tick is a deer tick.  Patient denies any recent fever, chills, chest pain, shortness of breath, muscle aches, joint aches or malaise.  Patient states that she has had his Lyme's disease in the past and is very concerned about getting it again would like prophylactic treatment for this.  Patient and  thinks that the tick was at least attached to her for at least 36 hours.  Review of Systems  See HPI for ROS, otherwise balance of other systems negative    No Known Allergies    Current Outpatient Medications:   ?  baclofen (LIORESAL) 10 MG tablet, TAKE HALF TO ONE TABLET BY MOUTH TWICE DAILY AS NEEDED FOR MUSCLE SPASMS, Disp: 30 tablet, Rfl: 0  ?  celecoxib (CELEBREX) 200 MG capsule, TAKE ONE CAPSULE BY MOUTH TWICE DAILY , Disp: 180 capsule, Rfl: 0  ?  hydroxychloroquine (PLAQUENIL) 200 mg tablet, Take 2 tablets (400 mg total) by mouth at bedtime., Disp: 180 tablet, Rfl: 0  ?  lidocaine (XYLOCAINE) 5 % ointment, Apply small amount (1gm) to shoulders three times daily as needed for pain, Disp: 120 g, Rfl: 2  ?  zolpidem (AMBIEN) 10 mg tablet, Take 0.5 tablets (5 mg total) by mouth at bedtime as needed for sleep. Take 1 tablet by mouth at bedtime if needed  for sleep., Disp: 30 tablet, Rfl: 1  ?  gabapentin (NEURONTIN) 100 MG capsule, 1 cap at bedtime x 3 days.  Then may take 2 caps at bedtime x 3 days, then may take 3 caps at bedtime, Disp: 270 capsule, Rfl: 1  ?  methylPREDNISolone (MEDROL DOSEPACK) 4 mg tablet, Keeps on hand, Disp: , Rfl:   ?  naproxen (NAPROSYN) 500 MG tablet, Take 500 mg by mouth., Disp: , Rfl:   ?  omeprazole (PRILOSEC) 20 MG capsule, Take 1 capsule (20 mg total) by mouth daily before breakfast., Disp: 90 capsule, Rfl: 3  Patient Active Problem List   Diagnosis   ? Ptosis Of Eyelid   ? Lower Back Pain   ? Patient Counseling: End-Of-Life Issues   ? Osteoporosis   ? Seronegative rheumatoid arthritis of multiple sites (H)   ? Primary osteoarthritis involving multiple joints   ? Bilateral shoulder tendinopathy   ? Macrocytic anemia   ? Polyarthralgia     Past Medical History:   Diagnosis Date   ? Rheumatoid arthritis (H)     - if none on file, see Problem List    Objective:     Vitals:    07/08/19 1526   BP: 145/73   Patient Site: Right Arm   Patient Position: Sitting   Cuff Size: Adult Regular   Pulse: 69   Resp: 15   Temp: 98.1  F (36.7  C)   TempSrc: Oral   SpO2: 99%   General-patient is no apparent distress  Skin- on the back of patient's right upper arm is an area of redness approximately 1 cm in diameter.  There are no attached ticks.    Assessment - Plan       1. Tick bite, initial encounter  See patient instructions.   - doxycycline (VIBRA-TABS) 100 MG tablet; Take 2 tablets (200 mg total) by mouth once for 1 dose.  Dispense: 2 tablet; Refill: 0    2. Need for prophylactic antibiotic    At the conclusion of the encounter, assessment and plan were discussed.   All questions were answered.   The patient or guardian acknowledged understanding and was involved in the decision making regarding the overall care plan.    Patient Instructions   1. If you have further concerns about Lymes, follow up with your primary, or you can be seen by any primary  care provider at one of our other [facility] sites   2. Review the information below on Lyme's prophylaxis and symptoms of the illness  3. If you have any questions, call the clinic number - the number is answered 24/7    ?Most infections with Borrelia species, which cause Lyme disease, are transmitted by a nymphal stage Ixodes species tick. This immature stage of the tick is quite small.     ?Borrelia burgdorferi is not usually transmitted within the first 48 to 72 hours of tick attachment. The likelihood of transmission is increased if the tick is engorged and/or has been attached for at least 72 hours.     ?Ticks are best removed with tweezers or protected fingers, pulling straight up with even pressure.   ?We suggest that patients who meet all criteria for antibiotic prophylaxis be offered a single dose of doxycycline if not contraindicated.      The criteria for prophylaxis include:  1. Attached tick identified as an adult or nymphal Ixodes scapularis tick (deer tick)  2. Tick is estimated to have been attached for ?36 hours (by degree of engorgement or time of exposure)  3. Prophylaxis is begun within 72 hours of tick removal  4. Local rate of infection of ticks with B. burgdorferi is ?20 percent (these rates of infection have been shown to occur in parts of [place], parts of the [place], parts of [place] and [place])  5. Doxycycline is not contraindicated (ie, the patient is not <8 years of age, pregnant, or lactating)    ?If the patient meets all of the above criteria, the recommended dose of doxycycline is 200 mg for adults and 4 mg/kg up to a maximum dose of 200 mg in children ?8 years, given as a single dose. If the patient cannot take doxycycline, we do not recommend prophylaxis with an alternate antibiotic. A reasonable alternative for patients who meet criteria for prophylaxis but who prefer not to take antimicrobial prophylaxis is to observe the tick bite site and treat for Lyme  "disease if an erythema migrans (EM) rash develops.     ?We recommend that patients who do not meet the criteria for antibiotic prophylaxis be observed and treated only if signs or symptoms of Lyme disease (eg, EM) develop      ?We do not perform serologic testing for Lyme disease at the time of the tick bite to detect a new infection. However, a baseline serology is helpful in some patients because it can serve as a comparator to later serology if signs and symptoms subsequently develop.     What is Lyme disease? -- Lyme disease is an illness that can make you feel like you have the flu. It can also cause a rash, fever, or nerve, joint, or heart problems.  People can get Lyme disease after being bitten by a tiny insect called a tick. When a certain type of tick bites you, it can transmit the germ that causes Lyme disease from its body to yours. But a tick can infect you only if it stays attached for at least a day and a half.  The ticks that carry Lyme disease feed on deer and mice. Ticks are found in tall grass and on shrubs, and can attach to animals and people walking by. Ticks cannot fly or jump.  What are the symptoms of Lyme disease? -- Symptoms can start days or weeks after a tick bite. They include:  ?A rash where you were bitten - The rash often appears within a month of getting bitten. It is red, but its center can be the color of your skin. It might get bigger over a few days. To some, it looks like a \"bull's eye\" (picture 1).  ?Fever  ?Feeling tired  ?Body aches and pains  ?Heart problems such as a slowed heart rate  ?Headache and stiff neck  ?Feelings of pain, weakness, or numbness  If a person is not treated, further symptoms can occur months to years after a tick bite. These include:  ?Pain and swelling of joints, such as your knees  ?Trouble with your memory and thinking  ?Skin problems, such as skin swelling or thinning (this occurs mostly in [place])  Should I see a doctor or nurse? -- Yes. If you " have symptoms of Lyme disease, see a doctor or nurse. Some people don't know that they were bitten by a tick. Or they might not remember having a rash or other early symptoms.  Is there a test for Lyme disease? -- Yes. Blood tests can show if you are infected with the germ that causes Lyme disease. But it takes time for the blood tests to turn positive. This means the tests won't work if you get them right after being bitten. Also, the blood tests usually come back negative when you have the initial rash that goes with Lyme disease. Because of this, if you have the rash, you do not need a blood test to confirm that you have Lyme disease.  If your doctor or nurse suspects you have Lyme disease, he or she will do an exam and ask you questions. The doctor or nurse will use this information (and your blood test result, if needed) to decide about treatment.  How is Lyme disease treated? -- Lyme disease is usually treated with antibiotics. There are a few different types. Treatment with antibiotics should help your symptoms go away. Sometimes, symptoms improve quickly. Other times, it can take weeks or months for symptoms to go away.  What if I am pregnant? -- If you are pregnant, talk to your doctor. Some medicines for Lyme disease are safe to take if you are pregnant, but others are not.  Can Lyme disease be prevented? -- The best way to prevent Lyme disease is to avoid getting bitten by a tick. But if you were already bitten, your doctor might give you an antibiotic. In some situations, this can reduce your chances of getting Lyme disease.  To try to avoid getting bitten by a tick, you can:  ?Wear shoes, long-sleeved shirts, and long pants when you go outside. Keep ticks away from your skin by tucking your pants into your socks.  ?Wear light colors so you can spot any ticks that get on your clothes.  ?Wear bug spray or cream that contains DEET. (Do not use DEET on babies younger than 2 months.) On your clothes and  "gear, you can use bug repellents that have a chemical called \"permethrin.\"  ?Shower within 2 hours of being outdoors if you think you have been in an area where there are ticks.  ?Put dry clothes briefly (for about 4 minutes) in a dryer after being outdoors.  ?Check your clothes and body for ticks after being outdoors. Be sure to check your scalp, waist, armpits, groin, and backs of your knees. Check your children, too.  ?If you live in a place that has deer or mice nearby, take steps to keep those animals away. Deer and mice carry ticks.  If you find a tick on your body or on your child, use tweezers to grab it. Then pull it out slowly and gently. After that, wash the area with soap and water.  You do not need to keep the tick. But knowing what it looked like can help your doctor decide about your treatment. See if you can tell:  ?Its color and size  ?If it was attached to your skin or just resting on your skin  ?If it was big, round, and full of blood  You should see your doctor or nurse if you have a tick and you cannot get it off.  You should also call your doctor or nurse if you think you have had a tick attached for at least 36 hours (a day and a half). Then he or she can decide if you need to take a dose of an antibiotic to help prevent Lyme. Doctors only recommend antibiotics to prevent Lyme disease in some situations. It depends on your age, where you live, what kind of tick bit you, and how long it was attached.  If you or your child was bitten by a deer tick, you should watch the area around the bite for a month to see if a rash appears.                                    "

## 2021-06-29 NOTE — PROGRESS NOTES
Progress Notes by Zahraa Martines PA-C at 9/22/2020 11:50 AM     Author: Zahraa Martines PA-C Service: -- Author Type: Physician Assistant    Filed: 9/22/2020  1:49 PM Encounter Date: 9/22/2020 Status: Signed    : Zahraa Martines PA-C (Physician Assistant)         Chief Complaint   Patient presents with   ? Leg Injury     left leg wound care. pt states drop a pan on left leg. Injured during Labor day weekend.       HPI:  Louise Benítez is a 74 y.o. female who complains of moderate erythema & tenderness to LLE onset 2 days ago. Pt dropped a pan on her leg about 2 weeks ago and had a small wound on her left shin. She has been cleaning it and applying antibiotic ointment & keeping it covered. Over the last couple of days, the erythema and tenderness have devleoped surrounding this wound. She also notes when she took the bandage off 2 days ago she tore more of her skin off and now it is bleeding. She has no pain with weight bearing. Symptoms are constant in duration. Denies purulent drainage, streaking, fever/chills, CP, SOB, or any other symptoms.  Her last tetanus immunization was in 2005.    Pt takes Plaquenil for RA.    Problem List:  2020-02: High risk medication use  2019-10: Compression fracture of L1 vertebra with routine healing  2018-06: Seronegative rheumatoid arthritis of multiple sites (H)  2018-06: Primary osteoarthritis involving multiple joints  2018-06: Bilateral shoulder tendinopathy  2018-06: Macrocytic anemia  2018-06: Polyarthralgia  2017-12: Osteoporosis  2014-12: Patient Counseling: End-Of-Life Issues  Ptosis Of Eyelid  Rheumatoid arthritis (H)  Lower Back Pain      Past Medical History:   Diagnosis Date   ? Rheumatoid arthritis (H)      No past surgical history on file.  Social History     Tobacco Use   ? Smoking status: Former Smoker   ? Smokeless tobacco: Never Used   Substance Use Topics   ? Alcohol use: Yes     Frequency: 2-3 times a week     Comment: wine         Review of Systems   Constitutional: Negative for chills and fever.   Respiratory: Negative for shortness of breath.    Cardiovascular: Negative for chest pain.   Gastrointestinal: Negative for abdominal pain, diarrhea, nausea and vomiting.   Musculoskeletal: Positive for myalgias.   Skin: Positive for color change and wound. Negative for rash.   All other systems reviewed and are negative.      Vitals:    09/22/20 1153   BP: 165/73   Patient Site: Right Arm   Patient Position: Sitting   Cuff Size: Adult Regular   Pulse: 85   Resp: 16   Temp: 99.1  F (37.3  C)   TempSrc: Oral   SpO2: 99%   Weight: 155 lb (70.3 kg)       Physical Exam   Constitutional: She appears well-developed and well-nourished.  Non-toxic appearance.   HENT:   Head: Normocephalic and atraumatic.   Cardiovascular: Normal rate, regular rhythm and normal heart sounds.   Pulmonary/Chest: Effort normal and breath sounds normal.   Musculoskeletal:      Left knee: Normal.      Left ankle: Normal.      Left lower leg: She exhibits tenderness. No edema.        Legs:    Neurological: She is alert.   Skin: Skin is warm and dry. Laceration (skin tears) noted. There is erythema.   Psychiatric: She has a normal mood and affect.       Labs:  No results found for this or any previous visit (from the past 24 hour(s)).    Radiology:  No results found.    Clinical Decision Making:    Patient without pain with weight bearing to suggest acute fracture. Suspect secondary infection of wound to L shin. No abscess noted. Rx Keflex. Advised elevation and use of OTC pain medications as needed. Nonadhesive dressing applied; wound care discussed. Recommended updating tetanus immunization but she declines; discussed risks including death if she were to acquire tetanus. She expressed understanding.  Pt is immunosuppressed so should f/u in a few days if not improving as she is at high risk for complications.    At the end of the encounter, I discussed results, diagnosis,  medications. Discussed red flags for immediate return to clinic/ER, as well as indications for follow up if no improvement. Patient understood and agreed to plan. Patient was stable for discharge.    1. Wound infection  cephalexin (KEFLEX) 500 MG capsule   2. Cellulitis of left lower leg     3. High risk medication use           Return in about 3 days (around 9/25/2020) for Follow up w/ primary care provider if not improved.    MARGARETH Drummond, PARosangelaC  [facility]

## 2021-06-30 NOTE — PROGRESS NOTES
Progress Notes by Abigail Currie DO at 4/20/2021  9:40 AM     Author: Abigail Currie DO Service: -- Author Type: Physician    Filed: 4/20/2021  7:29 PM Encounter Date: 4/20/2021 Status: Signed    : Abigail Currie DO (Physician)           Assessment & Plan     Louise was seen today for edema.    Diagnoses and all orders for this visit:    Cellulitis of left lower extremity    Open wound of left lower leg, subsequent encounter  Wound healing, delayed  Other specified soft tissue disorders   This is the third time within 2 months and that I am seeing patient for recurrent cellulitis/wounds on her lower extremities.  Patient was doing well after her initial Bactrim but seems to have reinjured her left lower extremity and opened up the scab.  There is purulence, erythema and warmth in the area.  Patient also has new area of redness on her right foot and a resolving blister on the bottom of right foot as well.  Keflex x10 days was prescribed.  Once again discussed with patient concerns about delayed wound healing and possible underlying vascular insufficiency that could be contributing to that.  Patient also seems to have decreased feeling in her feet and runs into furniture quite often.  May benefit from a home safety eval but she declined.  After extensive discussion, patient ultimately amenable to getting ABIs and being referred to wound clinic.  Also has very thin skin of the lower extremities from history of high-dose prednisone use for several years and that makes her susceptible to skin tearing/bruising as well.  -     US Ankle Brachial Indices Pre and Post Exercise; Future  -     Ambulatory referral to Wound Clinic  -     cephalexin (KEFLEX) 500 MG capsule; Take 1 capsule (500 mg total) by mouth 3 (three) times a day for 10 days.      Onychomycosis: Improving with the ciclopirox.  Would continue.    RTO: 2 weeks with me for wound follow-up.    38 minutes spent on the date of the  "encounter doing chart review, history and exam, documentation and further activities per the note    DO ANJUM [facility]   Louise Benítez is 75 y.o. and presents today for the following health issues       HPI     Patient was recently seen by me for left lower extremity wound for which she was prescribed Bactrim x7 days.  Was then seen in follow-up and wound was significantly better.  It was healed and all surrounding cellulitis was resolved.    Today, she reports that she ran into some furniture again at home and opened up her left healing wound scab.  This was 2 weeks ago and since then, the wound has gotten progressively worse.  There is pus draining from it, surrounding redness and she has pain with walking.  She has also developed a giant blister on the bottom of her right foot as well as some cuts/redness on her right foot at a different area from the last time.    Patient is hoping that she is able to get antibiotics for the wound.  Patient is very frustrated by her decreased mobility and does not understand why she continues to get these wounds.  Patient does not think she is clumsy.  Does not recall any significant falls but she keeps \"running into furniture\".  Does report decreased feeling in her feet.    Review of Systems  As per HPI      Objective    /60   Pulse 97   SpO2 95%   There is no height or weight on file to calculate BMI.     Physical Exam   Constitutional: She is oriented to person, place, and time. She appears well-developed and well-nourished. No distress.   Cardiovascular: Normal rate and regular rhythm. Exam reveals no gallop and no friction rub.   No murmur heard.  Pulmonary/Chest: Effort normal and breath sounds normal. No respiratory distress. She has no wheezes. She has no rales. She exhibits no tenderness.   Neurological: She is alert and oriented to person, place, and time.   Skin: She is not diaphoretic.   See photos " below     Left anterior shin: patient has purulent wound with surrounding warmth and erythema.     Right foot: healing blister at the bottom of the foot and erythema/warmth and healing wound along the medial distal aspect of the foot.              Right foot        Right foot         Left anterior shin

## 2021-07-03 NOTE — ADDENDUM NOTE
Addendum Note by Joellen Carrasco DO at 2/24/2020 10:20 AM     Author: Joellen Carrasco DO Service: -- Author Type: Physician    Filed: 2/24/2020  4:52 PM Date of Service: 2/24/2020 10:20 AM Status: Signed    : Joellen Carrasco DO (Physician)    Encounter addended by: Joellen Carrasco DO on: 2/24/2020  4:52   PM      Actions taken: Clinical Note Signed

## 2021-07-04 NOTE — PATIENT INSTRUCTIONS - HE
Patient Instructions by Severo García RN at 6/7/2021  2:20 PM     Author: Severo García RN Service: -- Author Type: Registered Nurse    Filed: 6/7/2021  2:59 PM Encounter Date: 6/7/2021 Status: Signed    : Severo García RN (Registered Nurse)       Patient Education   Patient Education     Peripheral Artery Disease (PAD)   Peripheral artery disease (PAD) occurs when the arteries that carry blood to the limbs are narrowed or blocked. This is usually due to a buildup of a fatty substance called plaque in the walls of the arteries.  PAD most often affects the arteries in the legs. When these arteries are narrowed or blocked, blood flow to the legs is reduced. This can cause leg and foot pain and other symptoms. If severe enough, reduced blood flow to the legs can lead to tissue death (gangrene) and the loss of a toe, foot, or leg. Having PAD also makes it more likely that arteries in other body areas are blocked. For instance, arteries that carry blood to the heart or brain may be affected. This raises the chances of heart attack and stroke.  Risk factors  Certain factors can make PAD more likely. They include:    Smoking    Diabetes    High blood pressure    Unhealthy cholesterol levels    Obesity    Inactive lifestyle    Older age    Family history of PAD  Symptoms  Many people with PAD have no symptoms. If symptoms do occur, they can include:    Pain in the muscles of the calves, thighs or hips that gets worse with activity and better with rest (intermittent claudication)    Achy, tired, or heavy feeling in the legs    Weakness, numbness, tingling, or loss of feeling in the legs    Changes in skin color of the legs    Sores on the legs and feet    Cold leg, feet, or toes    Pain the feet or toes even when lying down (rest pain)  Home care  PAD is a chronic (lifelong) condition. Treatment is focused on managing your condition and lowering your health risks. This may include doing the following:    If you smoke,  quit. This helps prevent further damage to your arteries and lowers your health risks. Ask your provider about medicines or products that can help you quit smoking. Also consider joining a stop-smoking program or support group.    Be more active. This helps you lose weight and manage problems such as high blood pressure and unhealthy cholesterol levels. Start a walking program if advised to by your provider. Your provider may also help you form a safe exercise program that is right for your needs.    Make healthy eating changes. This includes eating less fat, salt, and sugar.    Take medicines for high blood pressure, unhealthy cholesterol levels, and diabetes as directed.    Have your blood pressure and cholesterol levels checked as often as directed.    If you have diabetes, try to keep your blood sugar well controlled. Test your blood sugar as directed.    If you are overweight, talk to your provider about forming a weight-loss plan.    Watch for cuts, scrapes, or open sores on your feet. Poor blood flow to the feet may slow healing and increase the risk of infection from these problems.   Follow-up care  Follow up with your healthcare provider, or as advised. If imaging tests such as ultrasound were done, they will be reviewed by a doctor. You will be told the results and any new findings that may affect your care.  When to seek medical advice   Call your healthcare provider right away if any of these occur:    Sudden severe pain in the legs or feet    Sudden cold, pale or blue color in the legs or feet    Weakness or numbness in the legs or feet that worsens    Any sore or wound in the legs or feet that wont heal    Weak pulse in your legs or feet  Know the signs of heart attack and stroke  People with PAD are at high risk for heart attack and stroke. Knowing the signs of these problems can help you protect your health and get help when you need it. Call 911 right away if you have any of the following:  Signs  of a heart attack    Chest discomfort, such as pain, aching, tightness, or pressure that lasts more than a few minutes, or that comes and goes    Pain or discomfort in the arms, back, shoulders, neck, or jaw    Shortness of breath    Sweating (often a cold, clammy sweat)    Nausea    Lightheadedness  Signs of a stroke    Sudden numbness or weakness of the face, arms, or legs, especially on one side    Sudden confusion or trouble speaking or understanding    Sudden trouble seeing in one or both eyes    Sudden trouble walking, dizziness, or loss of balance    Sudden, severe headache with no known cause   Date Last Reviewed: 9/21/2015 2000-2017 [employer]. [address] [place] [address], [place] [address]. All rights reserved. This information is not intended as a substitute for professional medical care. Always follow your healthcare professional's instructions.           Atorvastatin Calcium Oral tablet  What is this medicine?  ATORVASTATIN (a TORE va sta tin) is known as a HMG-CoA reductase inhibitor or 'statin'. It lowers the level of cholesterol and triglycerides in the blood. This drug may also reduce the risk of heart attack, stroke, or other health problems in patients with risk factors for heart disease. Diet and lifestyle changes are often used with this drug.  This medicine may be used for other purposes; ask your health care provider or pharmacist if you have questions.  What should I tell my health care provider before I take this medicine?  They need to know if you have any of these conditions:    frequently drink alcoholic beverages    history of stroke, TIA    kidney disease    liver disease    muscle aches or weakness    other medical condition    an unusual or allergic reaction to atorvastatin, other medicines, foods, dyes, or preservatives    pregnant or trying to get pregnant    breast-feeding  How should I use this medicine?  Take this medicine by mouth with a glass of water. Follow the  directions on the prescription label. You can take this medicine with or without food. Take your doses at regular intervals. Do not take your medicine more often than directed.  Talk to your pediatrician regarding the use of this medicine in children. While this drug may be prescribed for children as young as 10 years old for selected conditions, precautions do apply.  Overdosage: If you think you have taken too much of this medicine contact a poison control center or emergency room at once.  NOTE: This medicine is only for you. Do not share this medicine with others.  What if I miss a dose?  If you miss a dose, take it as soon as you can. If it is almost time for your next dose, take only that dose. Do not take double or extra doses.  What may interact with this medicine?  Do not take this medicine with any of the following medications:    red yeast rice    telaprevir    telithromycin    voriconazole  This medicine may also interact with the following medications:    alcohol    antiviral medicines for HIV or AIDS    boceprevir    certain antibiotics like clarithromycin, erythromycin, troleandomycin    certain medicines for cholesterol like fenofibrate or gemfibrozil    cimetidine    clarithromycin    colchicine    cyclosporine    digoxin    female hormones, like estrogens or progestins and birth control pills    grapefruit juice    medicines for fungal infections like fluconazole, itraconazole, ketoconazole    niacin    rifampin    spironolactone  This list may not describe all possible interactions. Give your health care provider a list of all the medicines, herbs, non-prescription drugs, or dietary supplements you use. Also tell them if you smoke, drink alcohol, or use illegal drugs. Some items may interact with your medicine.  What should I watch for while using this medicine?  Visit your doctor or health care professional for regular check-ups. You may need regular tests to make sure your liver is working  properly.  Tell your doctor or health care professional right away if you get any unexplained muscle pain, tenderness, or weakness, especially if you also have a fever and tiredness. Your doctor or health care professional may tell you to stop taking this medicine if you develop muscle problems. If your muscle problems do not go away after stopping this medicine, contact your health care professional.  This drug is only part of a total heart-health program. Your doctor or a dietician can suggest a low-cholesterol and low-fat diet to help. Avoid alcohol and smoking, and keep a proper exercise schedule.  Do not use this drug if you are pregnant or breast-feeding. Serious side effects to an unborn child or to an infant are possible. Talk to your doctor or pharmacist for more information.  This medicine may affect blood sugar levels. If you have diabetes, check with your doctor or health care professional before you change your diet or the dose of your diabetic medicine.  If you are going to have surgery tell your health care professional that you are taking this drug.  What side effects may I notice from receiving this medicine?  Side effects that you should report to your doctor or health care professional as soon as possible:    allergic reactions like skin rash, itching or hives, swelling of the face, lips, or tongue    dark urine    fever    joint pain    muscle cramps, pain    redness, blistering, peeling or loosening of the skin, including inside the mouth    trouble passing urine or change in the amount of urine    unusually weak or tired    yellowing of eyes or skin  Side effects that usually do not require medical attention (report to your doctor or health care professional if they continue or are bothersome):    constipation    heartburn    stomach gas, pain, upset  This list may not describe all possible side effects. Call your doctor for medical advice about side effects. You may report side effects to FDA  at 1-907-FDA-5444.  Where should I keep my medicine?  Keep out of the reach of children.  Store at room temperature between 20 to 25 degrees C (68 to 77 degrees F). Throw away any unused medicine after the expiration date.  NOTE:This sheet is a summary. It may not cover all possible information. If you have questions about this medicine, talk to your doctor, pharmacist, or health care provider. Copyright  2015 [employer]

## 2021-07-04 NOTE — ADDENDUM NOTE
Addendum Note by Sophia Currie DO at 3/17/2021 10:00 AM     Author: Sophia Currie DO Service: -- Author Type: Physician    Filed: 3/17/2021  6:42 PM Encounter Date: 3/17/2021 Status: Signed    : Sophia Currie DO (Physician)    Addended by: SOPHIA CURRIE on: 3/17/2021 06:42 PM        Modules accepted: Orders

## 2021-07-05 ENCOUNTER — COMMUNICATION - HEALTHEAST (OUTPATIENT)
Dept: ADMINISTRATIVE | Facility: CLINIC | Age: 76
End: 2021-07-05

## 2021-07-05 NOTE — TELEPHONE ENCOUNTER
Telephone Encounter by Payton Herron at 7/5/2021  9:23 AM     Author: Payton Herron Service: -- Author Type: --    Filed: 7/5/2021  9:24 AM Encounter Date: 7/5/2021 Status: Signed    : Payton Herron       Unable to contact patient to schedule appt.       6/25-LMTCB x3        6/18 LMTCB x2        6/11 LMTCB

## 2021-07-05 NOTE — TELEPHONE ENCOUNTER
Telephone Encounter by Payton Herron at 7/5/2021  9:23 AM     Author: Payton Herron Service: -- Author Type: --    Filed: 7/5/2021  9:23 AM Encounter Date: 7/5/2021 Status: Signed    : Payton Herron       ----- Message from Sandra Ramires RN sent at 6/8/2021 11:52 AM CDT -----  Please call pt to schedule follow up with Dr Us.

## 2021-07-06 VITALS — HEART RATE: 88 BPM | TEMPERATURE: 98.8 F | DIASTOLIC BLOOD PRESSURE: 82 MMHG | SYSTOLIC BLOOD PRESSURE: 152 MMHG

## 2021-07-07 ENCOUNTER — HOSPITAL ENCOUNTER (OUTPATIENT)
Dept: PHYSICAL MEDICINE AND REHAB | Facility: CLINIC | Age: 76
Discharge: HOME | End: 2021-07-07
Attending: PHYSICAL MEDICINE & REHABILITATION
Payer: MEDICARE

## 2021-07-07 DIAGNOSIS — M25.512 CHRONIC PAIN OF BOTH SHOULDERS: ICD-10-CM

## 2021-07-07 DIAGNOSIS — M25.511 CHRONIC PAIN OF BOTH SHOULDERS: ICD-10-CM

## 2021-07-07 DIAGNOSIS — G89.29 CHRONIC PAIN OF BOTH SHOULDERS: ICD-10-CM

## 2021-07-07 NOTE — PATIENT INSTRUCTIONS - HE
Patient Instructions by Panchito Reed CMA at 7/7/2021  3:00 PM     Author: Panchito Reed CMA Service: -- Author Type: Certified Medical Assistant    Filed: 7/7/2021  3:58 PM Date of Service: 7/7/2021  3:00 PM Status: Signed    : Panchito Reed CMA (Certified Medical Assistant)       Joellen Bustamante, DO Nguyen Burrell, YEE Aguilar,  DO                                                                                TRUNG Sinclair, DO                                                                                       DISCHARGE INSTRUCTIONS  During office hours (8:00 a.m.- 4:30 p.m.) questions or concerns may be answered  by calling Sp[facility] Navigation Nurses at [phone]. If you experience any problems after hours please call [phone] and you will be connected to [facility].     All Patients:  ? You may experience an increase in your symptoms or have some soreness from the injection for the first 2 days (It may take anywhere between 2 days- 2 weeks for the steroid to have maximum effect).  ? You may use ice on the injection site, as frequently as 20 minutes each hour if needed.  ? You may continue taking your regular medication.  ? You may shower. No swimming, tub bath or hot tub for 48 hours.  You may remove your   bandaid/bandage as soon as you are home.  ? You may resume light activities, as tolerated unless otherwise directed.  ? Resume your usual diet as tolerated.      POSSIBLE STEROID SIDE EFFECTS   (If steroid/cortisone was used for your procedure)  -If you experience these symptoms, it should only last for a short period  Swelling of the legs        Skin redness (flushing)  Mouth (oral) irritation   Blood sugar (glucose) levels      Sweats                          Mood changes  Severe headache                  Sleeplessness            POSSIBLE PROCEDURE SIDE EFFECTS  -Call the Spine Center if you are  concerned  Increased Pain      Bruising/bleeding at site                  Redness or swelling  Fever greater than 100.5            Diffuse rash            THESE INSTRUCTIONS HAVE BEEN EXPLAINED TO THE PATIENT AND THE PATIENT/PATIENT REPRESENTATITVE HAS VERBALIZED UNDERSTANDING.  A COPY OF THE INSTRUCTIONS HAVE BEEN GIVEN TO THE PATIENT/PATIENT REPRESENTATIVE.

## 2021-07-07 NOTE — PROGRESS NOTES
Progress Notes by Julio Aguilar DO at 7/7/2021  3:00 PM     Author: Julio Aguilar DO Service: -- Author Type: Physician    Filed: 7/7/2021  4:53 PM Date of Service: 7/7/2021  3:00 PM Status: Signed    : Julio Aguilar DO (Physician)       Assessment/Plan:      Diagnoses and all orders for this visit:    Chronic pain of both shoulders  -     OPS US Large Joint Injection Bilateral; Standing  -     OPS US Large Joint Injection Bilateral    Other orders  -     methylPREDNISolone acetate injection (DEPO-MEDROL)  -     lidocaine (PF) 10 mg/mL (1 %) injection (XYLOCAINE-MPF)  -     ropivacaine 5 mg/mL (0.5 %) injection (NAROPIN); Refill: 0        Assessment: Pleasant 75 y.o. female  past medical history significant for rheumatoid arthritis, anemia, GERD, osteoporosis      1.  Chronic bilateral shoulder pain with osteoarthritis.          Discussion:    1.  The patient presents for bilateral glenohumeral joint injections under ultrasound guidance.  Has were done well in the past.  Wishes to proceed with injections today.  Please see attached procedure note.    2.  Follow-up 2 weeks with Dr. Julian.      It was our pleasure caring for your patient today, if there any questions or concerns please do not hesitate to contact us.      Subjective:   Patient ID: Louise Benítez is a 75 y.o. female.    History of Present Illness:Patient presents at the request of Joellen Julian for bilateral glenohumeral joint injection.  She has bilateral shoulder pain with arm movement.  Has done well with glenohumeral joint injections in the past.  Last injection was done approximately 11 months ago.  She has 2 to 3 months of good relief and then return of symptoms slowly.      Imaging: Plain film images of the left shoulder several years ago showed severe osteoarthritis of the left glenohumeral joint.      Review of Systems: No fever chills sweats recent illnesses antibiotics.       Past Medical History:   Diagnosis Date   ?  Rheumatoid arthritis (H)        The following portions of the patient's history were reviewed and updated as appropriate: allergies, current medications, past family history, past medical history, past social history, past surgical history and problem list.           Objective:   Physical Exam:    Vitals:    07/07/21 1508   BP: 134/60   Pulse: 78     There is no height or weight on file to calculate BMI.      General: Alert and oriented with normal affect. Attention, knowledge, memory, and language are intact. No acute distress.   Eyes: Sclerae are clear.  Respirations: Unlabored.   Skin: No rashes seen about the shoulders.    Gait:  Nonantalgic  Decreased range of motion bilateral shoulders in abduction limited less than 90 degrees internal and external rotation also limited.  Sensation is intact to light touch throughout the upper   extremities.  Reflexes are   negative Hoffmans.      Manual muscle testing reveals:  Right /Left out of 5     5/5 finger flexors             Procedure Note:    After discussing the risks and benefits of a bilateral glenohumeral joint injection under ultrasound guidance, informed consent was obtained. The patient and physician agreed on the injection site prior to the procedure.  A verbal timeout was done prior to the procedure.  With the patient seated, from posterior lateral approach, The right shoulder region was surveyed with the ultrasound unit to identify the target.  The skin was marked and prepped with chloraprep.  The skin was then anesthetized with a skin wheal followed by infiltration with 1 mL of 1% lidocaine.  Under direct ultrasound visualization, a 25-gauge 2 inch needle was used to inject 3 milliliters of injectate containing 2 milliliters of 0.5% ropivacaine and 1 milliliter containing 40 mg of depo-medrol after aspiration was negative for heme.    The injection was then repeated on the left: with the patient seated, from posterior lateral approach, The left shoulder  region was surveyed with the ultrasound unit to identify the target.  The skin was marked and prepped with chloraprep.  The skin was then anesthetized with a skin wheal followed by infiltration with 1 mL of 1% lidocaine.  Under direct ultrasound visualization, a 25-gauge 2 inch needle was used to inject 3 milliliters of injectate containing 2 milliliters of 0.5% ropivacaine and 1 milliliter containing 40 mg of depo-medrol after aspiration was negative for heme.    A total of 80 mg of Depo-Medrol was used for the procedure.  The patient tolerated the procedure well and there no immediate complications.    Preporcedure pain: Right 9/10, left 10/10    Post procedure pain: Right 0/10, left 0/10

## 2021-07-20 NOTE — PROGRESS NOTES
Assessment:   Louise Benítez (AKAIRAM Booker) is a 75 year old y.o. female with past medical history significant for rheumatoid arthritis, anemia, GERD, osteoporosis who presents today for follow-up regarding chronic bilateral shoulder pain secondary to primary osteoarthritis of the shoulders.  She is status post bilateral glenohumeral joint injections on July 7 of 2021.  At this time, she has had approximately 75% relief.  She did get hit near the right scapula from a softball/baseball, so she has some bruising and some soreness, but otherwise reports that she had good relief with the injections.  She is having return of her chronic bilateral low back pain and left greater than right sciatica type symptoms.  Her MRI does show significant foraminal stenosis at the L4-5 level and she has done well in the past with previous lumbar epidural steroid injections.      She was last seen in clinic on August 5 of 2020.  At that time she was having chronic bilateral low back pain with left radicular/sciatic type leg pain that have resolved with previous left L4-5 and L5-S1 transforaminal epidural steroid injections.      PSP:  Dr. Julian     Plan:     A shared decision making plan was used.  The patient's values and choices were respected.  The following represents what was discussed and decided upon by the physician and the patient.      1.  DIAGNOSTIC TESTS: Patient appears to have last had x-rays of the shoulder in June 2013.   -I did offer to have her have an x-ray of her right shoulder area given that she had trauma there from the baseball striking her near the right shoulder blade.  She declines at this time stating that she thinks that she is just bruised.  If she changes her mind in the future, an x-ray of the right shoulder area can be ordered.  -Patient's MRI of the lumbar spine report from January 13 of 2020 was personally reviewed today by the physician and is summarized as follows: There is significant scoliosis.   There is severe right L2-3 and L3-4 foraminal stenosis.  There is significant right L4-5 and L5-S1 foraminal stenosis, though the L2-3 and L3-4 levels appear to be the most severe.  There are degenerative changes of the facet joints seen throughout the lumbar spine  2.  PHYSICAL THERAPY: The patient was offered physical therapy for the shoulders but she declined.  She is encouraged to continue doing her home exercise program on a regular basis for both the low back and the shoulders.  I did emphasize the importance of continuing to move the right shoulder to avoid adhesive capsulitis/frozen shoulder syndrome, which could significantly worsen her shoulder pain.  She verbalized understanding.  3.  MEDICATIONS: No changes to her medications at this time.  The patient can continue to take baclofen 1/2 to 1 tablet at bedtime as needed for pain in addition to Celebrex 200 mg once a day.  4.  INTERVENTIONS: Patient would like to have bilateral L4-5 transforaminal epidural steroid injections, stating that epidural steroid injections have always helped her in the past.  Given her bilateral symptoms, she does want to have bilateral injections as opposed to just the left-sided injection.  Bilateral L4-5 transforaminal epidural steroid injections were ordered.  -She is status post bilateral glenohumeral joint injections performed under ultrasound guidance on July 7 of 2021.  -She last had a left L4-5 and L5-S1 transforaminal epidural steroid injections on August 11 of 2020.  -Her second dose of the Covid vaccine was February 17 of 2021.  5.  PATIENT EDUCATION:    -I did offer the patient a referral to an orthopedic surgeon to discuss surgery for her shoulder arthritis.  She states that she is potentially interested in this but needs to work with her hematologist first before considering surgery.  She also states that she would not want to have surgery until the fall.  I did explain that the orthopedic surgeons are quite backed  up now, and if she waits to see an orthopedic surgeon in the fall, they may not be able to schedule her until after the new year.  She verbalized understanding and states that she is does not feel that this needs to be moved up on urgently, and states that she would be fine waiting until 2022 to have surgery if she does decide to move forward with it.  She is encouraged to let me know when she would like to have the referral placed and I would be happy to place one for her.  The shoulder specialists at [facility] are Dr. Maldonado, Dr. Zavala, and Dr. Alex.  If she would prefer to see a person at [facility] orth[facility], I would be happy to refer her there, though at the moment I do not have a specific name for her.  -All of her questions were answered to her satisfaction today.  She was in agreement with the treatment plan.  6.  FOLLOW-UP: The patient can follow-up for the bilateral L4-5 transforaminal epidural steroid injections.  She does understand that these need to be scheduled at least 1 week out from the time that she calls in order to allow time for insurance authorization.  If there are any questions/concerns or any significant worsening of pain prior to that time, the patient is asked to call the clinic via the nurse navigation line or via [employer].      Subjective:     Louise Benítez (AKA Ade)  is a 75 year old female who presents today for follow-up regarding chronic bilateral shoulder pain from primary osteoarthritis of the bilateral shoulders.  The patient is status post bilateral glenohumeral joint injections under ultrasound guidance.  Reports that she had significant relief from these injections.  She notes that the relief was at least 75%.  However on July 17 of 2021, she was hit by a baseball near the right shoulder blade.  Reports that she has had increased pain since that time.  She says that she has a bruise near the area and it is very sore.  She does not think anything was  fractured.    Starting to have return of her bilateral low back pain and left greater than right radicular/sciatic type leg symptoms.  Reports that this has been intermittent over the last few months, but has been getting worse recently and she feels it is time to have a repeat epidural steroid injection.  Her last injection in August 2020 provided significant relief (though this was only on the left side).  She is rating her pain today at a 0 out of 10.  At worst is an 8 out of 10.  She is not really sure what makes her pain worse.  Again she does well with the injections.  She continues to work with her rheumatologist.  She has done physical therapy in the past and is trying to be as active as possible.  She is taking baclofen half to 1 tablet at bedtime as needed.  She also takes Celebrex 200 mg/day.    Past medical history is reviewed and is unchanged for any new medical diagnoses in the interim.      Family history is reviewed and is unchanged in the interim.        Review of Systems:  Positive for generalized weakness in the shoulders and difficulty with hand skills secondary to arthritis..  Pertinent negatives include no fevers, chills, unexplained weight loss, bowel incontinence, bladder incontinence, trips, stumbles, falls.  All others reviewed and are negative.        Objective:   CONSTITUTIONAL:  Vital signs as above.  No acute distress.  The patient is well nourished and well groomed.    PSYCHIATRIC:  The patient is awake, alert, oriented to person, place and time.  The patient is answering questions appropriately with clear speech.  Normal affect.  SKIN:  Skin over the face, posterior torso, bilateral upper and lower extremities is clean, dry, intact without rashes.  MUSCULOSKELETAL:  Gait is non-antalgic.  The patient is able to heel and toe walk without any difficulty.  Moderate tenderness over the bilateral lumbar paraspinal muscles.      The patient has 5/5 strength for the bilateral hip flexors,  knee flexors/extensors, ankle dorsiflexors/plantar flexors, ankle evertors/invertors.  Negative seated straight leg raising test bilaterally, with slightly worsened pain in the left leg compared to the right with testing.  NEUROLOGICAL: Sent patellar, medial hamstring, achilles reflexes which are symmetric bilaterally.  No ankle clonus bilaterally.  Sensation to light touch is intact in the bilateral L4, L5, and S1 dermatomes.

## 2021-07-21 ENCOUNTER — OFFICE VISIT (OUTPATIENT)
Dept: PHYSICAL MEDICINE AND REHAB | Facility: CLINIC | Age: 76
End: 2021-07-21
Payer: MEDICARE

## 2021-07-21 VITALS — SYSTOLIC BLOOD PRESSURE: 157 MMHG | HEART RATE: 89 BPM | OXYGEN SATURATION: 98 % | DIASTOLIC BLOOD PRESSURE: 67 MMHG

## 2021-07-21 DIAGNOSIS — M25.512 CHRONIC PAIN OF BOTH SHOULDERS: Primary | ICD-10-CM

## 2021-07-21 DIAGNOSIS — M25.511 CHRONIC PAIN OF BOTH SHOULDERS: Primary | ICD-10-CM

## 2021-07-21 DIAGNOSIS — M48.061 LUMBAR FORAMINAL STENOSIS: ICD-10-CM

## 2021-07-21 DIAGNOSIS — M54.42 CHRONIC BILATERAL LOW BACK PAIN WITH BILATERAL SCIATICA: ICD-10-CM

## 2021-07-21 DIAGNOSIS — G89.29 CHRONIC PAIN OF BOTH SHOULDERS: Primary | ICD-10-CM

## 2021-07-21 DIAGNOSIS — G89.29 CHRONIC BILATERAL LOW BACK PAIN WITH BILATERAL SCIATICA: ICD-10-CM

## 2021-07-21 DIAGNOSIS — M54.41 CHRONIC BILATERAL LOW BACK PAIN WITH BILATERAL SCIATICA: ICD-10-CM

## 2021-07-21 PROCEDURE — 99214 OFFICE O/P EST MOD 30 MIN: CPT | Performed by: PHYSICAL MEDICINE & REHABILITATION

## 2021-07-21 ASSESSMENT — PAIN SCALES - GENERAL: PAINLEVEL: NO PAIN (0)

## 2021-07-21 NOTE — LETTER
7/21/2021         RE: Louise Benítez  [address] [place] [address] [address]  [place] [address]        Dear Colleague,    Thank you for referring your patient, Louise Benítez, to the [facility] [place]. Please see a copy of my visit note below.    Assessment:   Louise Benítez (AKAIRAM Booker) is a 75 year old y.o. female with past medical history significant for rheumatoid arthritis, anemia, GERD, osteoporosis who presents today for follow-up regarding chronic bilateral shoulder pain secondary to primary osteoarthritis of the shoulders.  She is status post bilateral glenohumeral joint injections on July 7 of 2021.  At this time, she has had approximately 75% relief.  She did get hit near the right scapula from a softball/baseball, so she has some bruising and some soreness, but otherwise reports that she had good relief with the injections.  She is having return of her chronic bilateral low back pain and left greater than right sciatica type symptoms.  Her MRI does show significant foraminal stenosis at the L4-5 level and she has done well in the past with previous lumbar epidural steroid injections.      She was last seen in clinic on August 5 of 2020.  At that time she was having chronic bilateral low back pain with left radicular/sciatic type leg pain that have resolved with previous left L4-5 and L5-S1 transforaminal epidural steroid injections.      PSP:  Dr. Julian     Plan:     A shared decision making plan was used.  The patient's values and choices were respected.  The following represents what was discussed and decided upon by the physician and the patient.      1.  DIAGNOSTIC TESTS: Patient appears to have last had x-rays of the shoulder in June 2013.   -I did offer to have her have an x-ray of her right shoulder area given that she had trauma there from the baseball striking her near the right shoulder blade.  She declines at this time stating that she thinks that she is just bruised.  If she  changes her mind in the future, an x-ray of the right shoulder area can be ordered.  -Patient's MRI of the lumbar spine report from January 13 of 2020 was personally reviewed today by the physician and is summarized as follows: There is significant scoliosis.  There is severe right L2-3 and L3-4 foraminal stenosis.  There is significant right L4-5 and L5-S1 foraminal stenosis, though the L2-3 and L3-4 levels appear to be the most severe.  There are degenerative changes of the facet joints seen throughout the lumbar spine  2.  PHYSICAL THERAPY: The patient was offered physical therapy for the shoulders but she declined.  She is encouraged to continue doing her home exercise program on a regular basis for both the low back and the shoulders.  I did emphasize the importance of continuing to move the right shoulder to avoid adhesive capsulitis/frozen shoulder syndrome, which could significantly worsen her shoulder pain.  She verbalized understanding.  3.  MEDICATIONS: No changes to her medications at this time.  The patient can continue to take baclofen 1/2 to 1 tablet at bedtime as needed for pain in addition to Celebrex 200 mg once a day.  4.  INTERVENTIONS: Patient would like to have bilateral L4-5 transforaminal epidural steroid injections, stating that epidural steroid injections have always helped her in the past.  Given her bilateral symptoms, she does want to have bilateral injections as opposed to just the left-sided injection.  Bilateral L4-5 transforaminal epidural steroid injections were ordered.  -She is status post bilateral glenohumeral joint injections performed under ultrasound guidance on July 7 of 2021.  -She last had a left L4-5 and L5-S1 transforaminal epidural steroid injections on August 11 of 2020.  -Her second dose of the Covid vaccine was February 17 of 2021.  5.  PATIENT EDUCATION:    -I did offer the patient a referral to an orthopedic surgeon to discuss surgery for her shoulder arthritis.   She states that she is potentially interested in this but needs to work with her hematologist first before considering surgery.  She also states that she would not want to have surgery until the fall.  I did explain that the orthopedic surgeons are quite backed up now, and if she waits to see an orthopedic surgeon in the fall, they may not be able to schedule her until after the new year.  She verbalized understanding and states that she is does not feel that this needs to be moved up on urgently, and states that she would be fine waiting until 2022 to have surgery if she does decide to move forward with it.  She is encouraged to let me know when she would like to have the referral placed and I would be happy to place one for her.  The shoulder specialists at [facility] are Dr. Maldonado, Dr. Zavala, and Dr. Alex.  If she would prefer to see a person at [facility], I would be happy to refer her there, though at the moment I do not have a specific name for her.  -All of her questions were answered to her satisfaction today.  She was in agreement with the treatment plan.  6.  FOLLOW-UP: The patient can follow-up for the bilateral L4-5 transforaminal epidural steroid injections.  She does understand that these need to be scheduled at least 1 week out from the time that she calls in order to allow time for insurance authorization.  If there are any questions/concerns or any significant worsening of pain prior to that time, the patient is asked to call the clinic via the nurse navigation line or via [employer].      Subjective:     Louise Benítez (AKA Ade)  is a 75 year old female who presents today for follow-up regarding chronic bilateral shoulder pain from primary osteoarthritis of the bilateral shoulders.  The patient is status post bilateral glenohumeral joint injections under ultrasound guidance.  Reports that she had significant relief from these injections.  She notes that the relief was at  least 75%.  However on July 17 of 2021, she was hit by a baseball near the right shoulder blade.  Reports that she has had increased pain since that time.  She says that she has a bruise near the area and it is very sore.  She does not think anything was fractured.    Starting to have return of her bilateral low back pain and left greater than right radicular/sciatic type leg symptoms.  Reports that this has been intermittent over the last few months, but has been getting worse recently and she feels it is time to have a repeat epidural steroid injection.  Her last injection in August 2020 provided significant relief (though this was only on the left side).  She is rating her pain today at a 0 out of 10.  At worst is an 8 out of 10.  She is not really sure what makes her pain worse.  Again she does well with the injections.  She continues to work with her rheumatologist.  She has done physical therapy in the past and is trying to be as active as possible.  She is taking baclofen half to 1 tablet at bedtime as needed.  She also takes Celebrex 200 mg/day.    Past medical history is reviewed and is unchanged for any new medical diagnoses in the interim.      Family history is reviewed and is unchanged in the interim.        Review of Systems:  Positive for generalized weakness in the shoulders and difficulty with hand skills secondary to arthritis..  Pertinent negatives include no fevers, chills, unexplained weight loss, bowel incontinence, bladder incontinence, trips, stumbles, falls.  All others reviewed and are negative.        Objective:   CONSTITUTIONAL:  Vital signs as above.  No acute distress.  The patient is well nourished and well groomed.    PSYCHIATRIC:  The patient is awake, alert, oriented to person, place and time.  The patient is answering questions appropriately with clear speech.  Normal affect.  SKIN:  Skin over the face, posterior torso, bilateral upper and lower extremities is clean, dry, intact  without rashes.  MUSCULOSKELETAL:  Gait is non-antalgic.  The patient is able to heel and toe walk without any difficulty.  Moderate tenderness over the bilateral lumbar paraspinal muscles.      The patient has 5/5 strength for the bilateral hip flexors, knee flexors/extensors, ankle dorsiflexors/plantar flexors, ankle evertors/invertors.  Negative seated straight leg raising test bilaterally, with slightly worsened pain in the left leg compared to the right with testing.  NEUROLOGICAL: Sent patellar, medial hamstring, achilles reflexes which are symmetric bilaterally.  No ankle clonus bilaterally.  Sensation to light touch is intact in the bilateral L4, L5, and S1 dermatomes.                        Again, thank you for allowing me to participate in the care of your patient.        Sincerely,        Joellen Bustamante, DO

## 2021-07-21 NOTE — PATIENT INSTRUCTIONS
Ade,       Please make sure to move your right shoulder so it does not stiffen up.  If your right shoulder stiffens up, you will likely have increased pain.  If you would like to return to physical therapy for the shoulders, please let me know and I would be happy to place an order.    I did place an order for a bilateral L4-5 transforaminal epidural steroid injections.  You can schedule this at the  before you leave.  It will take 1 week to get the insurance authorization.    Please not hesitate to call me or contact me through [employer] if you have any questions or concerns prior to your next injection.    If you decide you would like to see the orthopedic surgeon before the fall, please let me know and I would be happy to place a referral.    Thanks, Ade!  Dr. uJlian

## 2021-07-30 ENCOUNTER — TRANSCRIBE ORDERS (OUTPATIENT)
Dept: INTERNAL MEDICINE | Facility: CLINIC | Age: 76
End: 2021-07-30

## 2021-07-30 DIAGNOSIS — M80.00XD AGE-RELATED OSTEOPOROSIS WITH CURRENT PATHOLOGICAL FRACTURE WITH ROUTINE HEALING, SUBSEQUENT ENCOUNTER: Primary | ICD-10-CM

## 2021-07-30 DIAGNOSIS — S32.010D COMPRESSION FRACTURE OF L1 VERTEBRA WITH ROUTINE HEALING: ICD-10-CM

## 2021-07-30 NOTE — PROGRESS NOTES
As part of the required manual data conversion process for integration, this encounter was created to document a CAM (Clinic Administered Medication) order. This information was copied from the [facility] patient's chart to the [facility] patient chart.     Gena Palomares PharmD  July 30, 2021

## 2021-08-03 DIAGNOSIS — M80.00XD AGE-RELATED OSTEOPOROSIS WITH CURRENT PATHOLOGICAL FRACTURE WITH ROUTINE HEALING, SUBSEQUENT ENCOUNTER: ICD-10-CM

## 2021-08-03 DIAGNOSIS — S32.010D COMPRESSION FRACTURE OF L1 VERTEBRA WITH ROUTINE HEALING: ICD-10-CM

## 2021-08-03 DIAGNOSIS — Z92.29 PERSONAL HISTORY OF OTHER DRUG THERAPY: Primary | ICD-10-CM

## 2021-08-05 ENCOUNTER — ANCILLARY PROCEDURE (OUTPATIENT)
Dept: PHYSICAL MEDICINE AND REHAB | Facility: CLINIC | Age: 76
End: 2021-08-05
Attending: PHYSICAL MEDICINE & REHABILITATION
Payer: MEDICARE

## 2021-08-05 VITALS
SYSTOLIC BLOOD PRESSURE: 130 MMHG | HEART RATE: 70 BPM | DIASTOLIC BLOOD PRESSURE: 60 MMHG | OXYGEN SATURATION: 98 % | TEMPERATURE: 98.9 F

## 2021-08-05 DIAGNOSIS — M54.42 CHRONIC BILATERAL LOW BACK PAIN WITH BILATERAL SCIATICA: ICD-10-CM

## 2021-08-05 DIAGNOSIS — G89.29 CHRONIC BILATERAL LOW BACK PAIN WITH BILATERAL SCIATICA: ICD-10-CM

## 2021-08-05 DIAGNOSIS — M54.41 CHRONIC BILATERAL LOW BACK PAIN WITH BILATERAL SCIATICA: ICD-10-CM

## 2021-08-05 DIAGNOSIS — M48.061 LUMBAR FORAMINAL STENOSIS: ICD-10-CM

## 2021-08-05 PROCEDURE — 64483 NJX AA&/STRD TFRM EPI L/S 1: CPT | Mod: 50 | Performed by: PHYSICAL MEDICINE & REHABILITATION

## 2021-08-05 RX ORDER — LIDOCAINE HYDROCHLORIDE 10 MG/ML
INJECTION, SOLUTION EPIDURAL; INFILTRATION; INTRACAUDAL; PERINEURAL
Status: COMPLETED | OUTPATIENT
Start: 2021-08-05 | End: 2021-08-05

## 2021-08-05 RX ORDER — METHYLPREDNISOLONE ACETATE 80 MG/ML
INJECTION, SUSPENSION INTRA-ARTICULAR; INTRALESIONAL; INTRAMUSCULAR; SOFT TISSUE
Status: COMPLETED | OUTPATIENT
Start: 2021-08-05 | End: 2021-08-05

## 2021-08-05 RX ADMIN — METHYLPREDNISOLONE ACETATE 80 MG: 80 INJECTION, SUSPENSION INTRA-ARTICULAR; INTRALESIONAL; INTRAMUSCULAR; SOFT TISSUE at 10:58

## 2021-08-05 RX ADMIN — LIDOCAINE HYDROCHLORIDE 1 ML: 10 INJECTION, SOLUTION EPIDURAL; INFILTRATION; INTRACAUDAL; PERINEURAL at 10:58

## 2021-08-05 ASSESSMENT — PAIN SCALES - GENERAL
PAINLEVEL: MILD PAIN (2)
PAINLEVEL: MILD PAIN (2)

## 2021-08-05 NOTE — PATIENT INSTRUCTIONS
DISCHARGE INSTRUCTIONS    During office hours (8:00 a.m.- 4:00 p.m.) questions or concerns may be answered  by calling [facility] Center Navigation Nurses at  [phone].  Messages received after hours will be returned the following business day.      In the case of an emergency, please dial 911 or seek assistance at the nearest Emergency Room/Urgent Care facility.     All Patients:  ? You may experience an increase in your symptoms for the first 2 days (It may take anywhere between 2 days- 2 weeks for the steroid to have maximum effect).    ? You may use ice on the injection site, as frequently as 20 minutes each hour if needed.    ? You may take your pain medicine.    ? You may continue taking your regular medication.    ? You may shower. No swimming, tub bath or hot tub for 48 hours.  You may remove your bandaid/bandage as soon as you are home.    ? You may resume light activities, as tolerated.    ? Resume your usual diet as tolerated.    ? It is strongly advised that you do not drive for 1-3 hours post injection.    ? If you have had oral sedation:  Do not drive for 8 hours post injection.      ? If you have had IV sedation:  Do not drive for 24 hours post injection.  Do not operate hazardous machinery or make important personal/business decisions for 24 hours.    POSSIBLE STEROID SIDE EFFECTS (If steroid/cortisone was used for your procedure)    -If you experience these symptoms, it should only last for a short period      Swelling of the legs                Skin redness (flushing)       Mouth (oral) irritation     Blood sugar (glucose) levels              Sweats                     Mood changes    Headache    Weakened immune system for up to 14 days, which could increase the risk of christ the COVID-19 virus and/or experiencing more severe symptoms of the disease, if exposed.         POSSIBLE PROCEDURE SIDE EFFECTS    -Call the Spine Center if you are concerned      Increased Pain             Increased  numbness/tingling        Nausea/Vomiting            Bruising/bleeding at site        Redness or swelling                                                Difficulty walking        Weakness            Fever greater than 100.5    *In the event of a severe headache after an epidural steroid injection that is relieved by lying down, please call the [facility] to speak with a clinical staff member*

## 2021-09-08 ENCOUNTER — TRANSFERRED RECORDS (OUTPATIENT)
Dept: HEALTH INFORMATION MANAGEMENT | Facility: CLINIC | Age: 76
End: 2021-09-08

## 2021-09-13 ENCOUNTER — ANCILLARY PROCEDURE (OUTPATIENT)
Dept: VASCULAR ULTRASOUND | Facility: CLINIC | Age: 76
End: 2021-09-13
Attending: SURGERY
Payer: MEDICARE

## 2021-09-13 ENCOUNTER — OFFICE VISIT (OUTPATIENT)
Dept: VASCULAR SURGERY | Facility: CLINIC | Age: 76
End: 2021-09-13
Attending: SURGERY
Payer: MEDICARE

## 2021-09-13 VITALS — HEART RATE: 88 BPM | DIASTOLIC BLOOD PRESSURE: 76 MMHG | TEMPERATURE: 99.3 F | SYSTOLIC BLOOD PRESSURE: 134 MMHG

## 2021-09-13 DIAGNOSIS — I73.9 PAD (PERIPHERAL ARTERY DISEASE) (H): Primary | ICD-10-CM

## 2021-09-13 DIAGNOSIS — I73.9 PAD (PERIPHERAL ARTERY DISEASE) (H): ICD-10-CM

## 2021-09-13 PROCEDURE — 93925 LOWER EXTREMITY STUDY: CPT | Mod: 26 | Performed by: SURGERY

## 2021-09-13 PROCEDURE — 93925 LOWER EXTREMITY STUDY: CPT

## 2021-09-13 PROCEDURE — 93922 UPR/L XTREMITY ART 2 LEVELS: CPT

## 2021-09-13 PROCEDURE — 99213 OFFICE O/P EST LOW 20 MIN: CPT | Performed by: SURGERY

## 2021-09-13 PROCEDURE — 93922 UPR/L XTREMITY ART 2 LEVELS: CPT | Mod: 26 | Performed by: SURGERY

## 2021-09-13 NOTE — PROGRESS NOTES
VASCULAR SURGERY CLINIC FOLLOWUP     VASCULAR SURGEON: Dr. Dodson  LOCATION: [facility]       Louise Benítez  Medical Record #:  [id number]  YOB: 1945  Age:  75 year old     Date of Service: 9/13/2021     PRIMARY CARE PROVIDER: Desire Lua      Reason for visit:  Follow-up PAD    IMPRESSION:  Asymptomatic. No claudication or rest pain  Or non healing wounds.     RECOMMENDATION:      - Recommended statin and ASA  - Follow-up with Vascular Surgery CNP in 1 year with non invasive studies     HPI:  Louise Benítez is a 75 year old female who was seen today in followup for PAD. Asymptomatic. Not taking ASA or statin     PHH:    Past Medical History:   Diagnosis Date     Rheumatoid arthritis (H)          Past Surgical History:   Procedure Laterality Date     IR LUMBAR EPIDURAL STEROID INJECTION  8/15/2012        ALLERGIES:   No Known Allergies     MEDS:    Current Outpatient Medications   Medication     baclofen (LIORESAL) 10 MG tablet     celecoxib (CELEBREX) 200 MG capsule     cholecalciferol, vitamin D3, 50,000 unit Tab     hydrOXYchloroQUINE (PLAQUENIL) 200 mg tablet     atorvastatin (LIPITOR) 40 MG tablet     omeprazole (PRILOSEC) 20 MG capsule     Current Facility-Administered Medications   Medication     [START ON 11/21/2021] denosumab (PROLIA) injection 60 mg        SOCIAL HABITS:    History   Smoking Status     Former Smoker   Smokeless Tobacco     Never Used        Alcohol Use:      Frequency of Alcohol Consumption:      Average Number of Drinks:      Frequency of Binge Drinking:         History   Drug Use Unknown        FAMILY HISTORY:  No family history on file.    REVIEW OF SYSTEMS:    A 12 point ROS was reviewed and except for what is listed in the HPI above, all others are negative    PE:  /76   Pulse 88   Temp 99.3  F (37.4  C)    Wt Readings from Last 1 Encounters:   04/21/21 72.6 kg (160 lb)     There is no height or weight on file to calculate  BMI.    EXAM:  GENERAL: This is a well-developed 75 year old female who appears her stated age  EYES: Grossly normal.  MOUTH: Buccal mucosa normal   CARDIAC:  NS1 S2, No Murmur  CHEST/LUNG:  Clear lung fields bilaterally   GASTROINTESINAL (ABDOMEN): Soft, non-tender, B/S present, no pulsatile mass  MUSCULOSKELETAL: Grossly normal and both lower extremities are intact.  HEME/LYMPH: No lymphedema  NEUROLOGIC: Focally intact, Alert and oriented x 3.   PSYCH: appropriate affect  INTEGUMENT: No open lesions or ulcers      DIAGNOSTIC STUDIES:     Images:  No results found.    I personally reviewed the images and my interpretation is   US BRANDY: Midly decreased ABIs bilaterally from last exam   US duplex: unchanged from previous exam     LABS:      Sodium   Date Value Ref Range Status   10/07/2019 140 136 - 145 mmol/L Final     Urea Nitrogen   Date Value Ref Range Status   10/07/2019 17 8 - 28 mg/dL Final     Hemoglobin   Date Value Ref Range Status   05/11/2021 8.7 (L) 12.0 - 16.0 g/dL Final   04/20/2021 7.5 (LL) 12.0 - 16.0 g/dL Final   03/30/2021 8.1 (L) 12.0 - 16.0 g/dL Final     Platelet Count   Date Value Ref Range Status   05/11/2021 538 (H) 140 - 440 thou/uL Final   04/20/2021 515 (H) 140 - 440 thou/uL Final   03/30/2021 549 (H) 140 - 440 thou/uL Final       30  minutes spent on the day of encounter doing chart review, history and exam, documentation, and further activities as noted.     Lcuita Christianson MD, MD  [facility] Vascular Surgery

## 2021-09-13 NOTE — PATIENT INSTRUCTIONS
Patient Education     Peripheral Artery Disease (PAD)   Peripheral artery disease (PAD) occurs when the arteries that carry blood to the limbs are narrowed or blocked. This is usually due to a buildup of a fatty substance called plaque in the walls of the arteries.  PAD most often affects the arteries in the legs. When these arteries are narrowed or blocked, blood flow to the legs is reduced. This can cause leg and foot pain and other symptoms. If severe enough, reduced blood flow to the legs can lead to tissue death (gangrene) and the loss of a toe, foot, or leg. Having PAD also makes it more likely that arteries in other body areas are blocked. For instance, arteries that carry blood to the heart or brain may be affected. This raises the chances of heart attack and stroke.  Risk factors  Certain factors can make PAD more likely. They include:    Smoking    Diabetes    High blood pressure    Unhealthy cholesterol levels    Obesity    Inactive lifestyle    Older age    Family history of PAD  Symptoms  Many people with PAD have no symptoms. If symptoms do occur, they can include:    Pain in the muscles of the calves, thighs, or hips that gets worse with activity and better with rest (intermittent claudication)    Achy, tired, or heavy feeling in the legs    Weakness, numbness, tingling, or loss of feeling in the legs    Changes in skin color of the legs    Sores on the legs and feet    Cold leg, feet, or toes    Pain the feet or toes even when lying down (rest pain)  Home care  PAD is a chronic (lifelong) condition. Treatment is focused on managing your condition and lowering your health risks. This may include doing the following:    If you smoke, quit. This helps prevent further damage to your arteries and lowers your health risks. Ask your provider about medicines or products that can help you quit smoking. Also consider joining a stop-smoking program or support group.    Be more active. This helps you lose weight  and manage problems such as high blood pressure and unhealthy cholesterol levels. Start a walking program if advised to by your provider. Your provider may also help you form a safe exercise program that is right for your needs.    Make healthy eating changes. This includes eating less fat, salt, and sugar.    Take medicines for high blood pressure, unhealthy cholesterol levels, and diabetes as directed.    Have your blood pressure and cholesterol levels checked as often as directed.    If you have diabetes, try to keep your blood sugar well controlled. Test your blood sugar as directed.    If you are overweight, talk to your provider about a weight-loss plan.    Watch for cuts, scrapes, or open sores on your feet. Poor blood flow to the feet may slow healing and increase the risk of infection from these problems.   Follow-up care  Follow up with your healthcare provider, or as advised. If imaging tests such as ultrasound were done, they will be reviewed by a doctor. You will be told the results and any new findings that may affect your care.  When to seek medical advice   Call your healthcare provider right away if any of these occur:    Sudden severe pain in the legs or feet    Sudden cold, pale or blue color in the legs or feet    Weakness or numbness in the legs or feet that worsens    Any sore or wound in the legs or feet that won t heal    Weak pulse in your legs or feet  Know the signs of heart attack and stroke  People with PAD are at high risk for heart attack and stroke. Knowing the signs of these problems can help you protect your health and get help when you need it. Call 911 right away if you have any of the following:    Chest discomfort, such as pain, aching, tightness, or pressure that lasts more than a few minutes, or that comes and goes    Pain or discomfort in the arms, back, shoulders, neck, or jaw    Shortness of breath    Sweating (often a cold, clammy  sweat)    Nausea    Lightheadedness    Sudden numbness or weakness of the face, arms, or legs, especially on one side    Sudden confusion or trouble speaking or understanding    Sudden trouble seeing in one or both eyes    Sudden trouble walking, dizziness, or loss of balance    Sudden, severe headache with no known cause  Alanna last reviewed this educational content on 3/1/2018    0288-4010 [employer]. All rights reserved. This information is not intended as a substitute for professional medical care. Always follow your healthcare professional's instructions.           Ankle-Brachial Index (BRANDY) or Physiologic Test    Description  An ankle-brachial index test is relatively pain free. Blood pressure cuffs of various sizes are placed on your thigh, calf, foot and toes.  Similar to having your blood pressure checked with an arm cuff, as the technician inflates the cuffs, they progressively tighten and are then quickly released.  You may feel some discomfort, but generally for less than 60 seconds for each measurement. You will be asked to remove your socks and shoes and possibly your pants or shorts. Gowns will be provided. It usually takes about 30-60 minutes.   Depending on the initial readings and patient symptoms, you may be asked to perform a light walk on a treadmill.  The technician will apply ultrasound gel, usually warmed for your comfort, to your ankles and wrists. Through the gel, the technician will use a small hand-held device that emits sound waves.  Risks  There are typically no side effects or complications associated with a physiologic study.  How to Prepare  Eat and take medications as usual.  There is no preparation required for an ankle-brachial index (BRANDY) or physiologic exam.  What Can I Expect After the Test?  The technician will send the ultrasound images to your vascular surgeon for evaluation. Typically, a report is available in 2-3 days. If anything critical is found, it is  standard practice to notify the vascular surgeon immediately.  Reference: https://vascular.org/patient-resources/vascular-tests

## 2021-09-24 NOTE — PROGRESS NOTES
Assessment:   Louise Benítez (MAILE Booker)  is a 75 year old y.o. female with past medical history significant for rheumatoid arthritis, anemia, GERD, osteoporosis who presents today for follow-up regarding chronic bilateral low back pain with bilateral (left greater than right) radicular/sciatical type leg symptoms.  Her MRI shows significant foraminal stenosis at the L4-5 level.  She is status post bilateral L4-5 transforaminal epidural steroid injections on August 15, 2021.  At this time, she is noticing significant relief following this injection, but she did have an acute flareup of pain which sounds like a lumbar sprain/strain about 2 weeks ago.  She is better but still sore.  Without any red flag symptoms.    She does have chronic bilateral shoulder pain secondary to primary osteoarthritis of the shoulders which remains under good control following bilateral glenohumeral joint injections, last performed on July 7, 2021.     PSP:  Dr. Julian       Plan:     A shared decision making plan was used.  The patient's values and choices were respected.  The following represents what was discussed and decided upon by the physician and the patient.      1.  DIAGNOSTIC TESTS: The patient's MRI of the lumbar spine from January 13, 2020 was personally reviewed today by the physician with the physician performing her own interpretation.  There is severe scoliosis.  There is right L2-3 and L3 foraminal stenosis.  There is significant right L4-5 and L5-S1 foraminal stenosis, though the L2-L3 4 levels most severe.  There are degenerative changes of the facet joints seen throughout the lumbar spine.  The images were discussed with the patient.  No further diagnostic testing necessary at this time.  2.  PHYSICAL THERAPY: The patient was previously offered physical therapy for the shoulders but she declined.  She has done PT for the low back. At this time, he would like to return to physical therapy to update her home exercise  program.  An order was provided, to the spine center (she reports that she had a bad experience with [facility] so I am asking them to make an exception to see her here)..  She is encouraged to continue doing her home exercise program on a regular basis.    3.  MEDICATIONS:    -She did request a Medrol Dosepak.  I explained that we do not want to do this too frequently and she states she cannot remember the last time that she had one and does not think that it was within the last calendar year.  I did prescribe a Medrol Dosepak for her to try and help with the residual soreness.  - She can continue to take baclofen 1/2 to 1 tablet at bedtime as needed for pain.  Refill for this medication was provided today.  - She can continue to take Celebrex 200 mg once a day or alternate with the naproxen, which was refilled today..  4.  INTERVENTIONS: No further injections are recommended at this time.  We need to be careful with the total amount of steroid that she has given that she receives injections for both her low back and her shoulders.  She is in agreement with this.  - She is status post bilateral L4-5 TFESIs on 8-5-21.  - She is status post bilateral GH joint injections on 7-7-21.  - Her second dose of the COVID vaccine was 2-17-21.   5.  PATIENT EDUCATION:    - She had been previously offered surgery consultation for her shoulders.  She declines at this time, as her pain is under good control.  -Emphasized importance of continuing to do her physical therapy program on a regular basis in order to manage her back pain.  This will hopefully help prevent flares like she recently had.  6.  FOLLOW-UP: I created a [employer] task to contact her in 4 weeks to see how she is doing with physical therapy.  If there are any questions/concerns or any significant worsening of pain prior to that time, the patient is asked to call the clinic via the nurse navigation line or via [employer].     Subjective:     Louise Benítez (MAILE Booker) is  a 75 year old female who presents today for follow-up regarding chronic bilateral low back pain with bilateral radicular/sciatic type leg symptoms.  Patient is status post bilateral L4-5 transforaminal epidural steroid injections on August 5, 2021.  Reports that she had good relief with this injection.  However about 2 weeks ago she says that she did something to her back.  She is not really sure what.  She remembers fixing dinner and then sitting down.  She does not know if she twisted wrong or sat wrong, but she had really severe pain.  Reports it is much better now, but she still has some residual soreness.  The soreness is just across her low back.  She denies any significant radicular leg symptoms at this time.  She denies any numbness tingling or weakness in the legs.  She is not really sure what makes her pain worse.  She does feel like the injections have been helpful.  She is wondering if she could have a Medrol Dosepak to help with the residual soreness as this is helped significantly in the past with her areas of pain.  She has not been going to physical therapy recently, and admits that she is not as diligent about doing her home exercise program as she could be.  She continues to take naproxen and Celebrex as needed.  She does find the naproxen slightly more effective than the Celebrex.  She takes baclofen as needed as well and does request a refill of this medication today.    Past medical history is reviewed and is unchanged for any new medical diagnoses in the interim.      Family history is reviewed and is unchanged in the interim.      Review of Systems:   Pertinent negatives include no numbness, tingling, weakness, headaches, fevers, chills, unexplained weight loss, bowel incontinence, bladder incontinence, trips, stumbles, falls.  All others reviewed and are negative.     Objective:   CONSTITUTIONAL:  Vital signs as above.  No acute distress.  The patient is well nourished and well groomed.     PSYCHIATRIC:  The patient is awake, alert, oriented to person, place and time.  The patient is answering questions appropriately with clear speech.  Normal affect.  SKIN:  Skin over the face, posterior torso, bilateral upper and lower extremities is clean, dry, intact without rashes.  MUSCULOSKELETAL:  Gait is non-antalgic.  She denies any significant pain with lumbar flexion or lumbar extension.  Moderate tenderness over the bilateral lower lumbar paraspinal muscles.      The patient has 5/5 strength for the bilateral hip flexors, knee flexors/extensors, ankle dorsiflexors/plantar flexors, ankle evertors/invertors.    NEUROLOGICAL: Absent patellar, medial hamstring, achilles reflexes which are symmetric bilaterally.  No ankle clonus bilaterally.  Sensation to light touch is intact in the bilateral L4, L5, and S1 dermatomes.

## 2021-09-28 ENCOUNTER — OFFICE VISIT (OUTPATIENT)
Dept: PHYSICAL MEDICINE AND REHAB | Facility: CLINIC | Age: 76
End: 2021-09-28
Payer: MEDICARE

## 2021-09-28 VITALS — SYSTOLIC BLOOD PRESSURE: 137 MMHG | TEMPERATURE: 98.3 F | HEART RATE: 80 BPM | DIASTOLIC BLOOD PRESSURE: 62 MMHG

## 2021-09-28 DIAGNOSIS — G89.29 CHRONIC BILATERAL LOW BACK PAIN WITH BILATERAL SCIATICA: Primary | ICD-10-CM

## 2021-09-28 DIAGNOSIS — M54.42 CHRONIC BILATERAL LOW BACK PAIN WITH BILATERAL SCIATICA: Primary | ICD-10-CM

## 2021-09-28 DIAGNOSIS — M54.41 CHRONIC BILATERAL LOW BACK PAIN WITH BILATERAL SCIATICA: Primary | ICD-10-CM

## 2021-09-28 DIAGNOSIS — M54.59 LUMBAR FACET JOINT PAIN: ICD-10-CM

## 2021-09-28 DIAGNOSIS — M79.18 MYOFASCIAL MUSCLE PAIN: ICD-10-CM

## 2021-09-28 PROCEDURE — 99214 OFFICE O/P EST MOD 30 MIN: CPT | Performed by: PHYSICAL MEDICINE & REHABILITATION

## 2021-09-28 RX ORDER — NAPROXEN 500 MG/1
500 TABLET ORAL DAILY PRN
Qty: 90 TABLET | Refills: 0 | Status: SHIPPED | OUTPATIENT
Start: 2021-09-28 | End: 2021-01-01

## 2021-09-28 RX ORDER — BACLOFEN 10 MG/1
TABLET ORAL
Qty: 90 TABLET | Refills: 0 | Status: SHIPPED | OUTPATIENT
Start: 2021-09-28 | End: 2022-01-01

## 2021-09-28 RX ORDER — METHYLPREDNISOLONE 4 MG
TABLET, DOSE PACK ORAL
Qty: 21 TABLET | Refills: 0 | Status: SHIPPED | OUTPATIENT
Start: 2021-09-28 | End: 2022-01-01

## 2021-09-28 ASSESSMENT — PAIN SCALES - GENERAL: PAINLEVEL: MILD PAIN (3)

## 2021-09-28 NOTE — LETTER
9/28/2021         RE: Louise Benítez  [address] [place] [address] [address]  [place] [address]        Dear Colleague,    Thank you for referring your patient, Louise Benítez, to the [facility] [place]. Please see a copy of my visit note below.    Assessment:   Louise Benítez (AKAIRAM Booker)  is a 75 year old y.o. female with past medical history significant for rheumatoid arthritis, anemia, GERD, osteoporosis who presents today for follow-up regarding chronic bilateral low back pain with bilateral (left greater than right) radicular/sciatical type leg symptoms.  Her MRI shows significant foraminal stenosis at the L4-5 level.  She is status post bilateral L4-5 transforaminal epidural steroid injections on August 15, 2021.  At this time, she is noticing significant relief following this injection, but she did have an acute flareup of pain which sounds like a lumbar sprain/strain about 2 weeks ago.  She is better but still sore.  Without any red flag symptoms.    She does have chronic bilateral shoulder pain secondary to primary osteoarthritis of the shoulders which remains under good control following bilateral glenohumeral joint injections, last performed on July 7, 2021.     PSP:  Dr. Julian       Plan:     A shared decision making plan was used.  The patient's values and choices were respected.  The following represents what was discussed and decided upon by the physician and the patient.      1.  DIAGNOSTIC TESTS: The patient's MRI of the lumbar spine from January 13, 2020 was personally reviewed today by the physician with the physician performing her own interpretation.  There is severe scoliosis.  There is right L2-3 and L3 foraminal stenosis.  There is significant right L4-5 and L5-S1 foraminal stenosis, though the L2-L3 4 levels most severe.  There are degenerative changes of the facet joints seen throughout the lumbar spine.  The images were discussed with the patient.  No further diagnostic testing  necessary at this time.  2.  PHYSICAL THERAPY: The patient was previously offered physical therapy for the shoulders but she declined.  She has done PT for the low back. At this time, he would like to return to physical therapy to update her home exercise program.  An order was provided, to the spine center (she reports that she had a bad experience with [facility] so I am asking them to make an exception to see her here)..  She is encouraged to continue doing her home exercise program on a regular basis.    3.  MEDICATIONS:    -She did request a Medrol Dosepak.  I explained that we do not want to do this too frequently and she states she cannot remember the last time that she had one and does not think that it was within the last calendar year.  I did prescribe a Medrol Dosepak for her to try and help with the residual soreness.  - She can continue to take baclofen 1/2 to 1 tablet at bedtime as needed for pain.  Refill for this medication was provided today.  - She can continue to take Celebrex 200 mg once a day or alternate with the naproxen, which was refilled today..  4.  INTERVENTIONS: No further injections are recommended at this time.  We need to be careful with the total amount of steroid that she has given that she receives injections for both her low back and her shoulders.  She is in agreement with this.  - She is status post bilateral L4-5 TFESIs on 8-5-21.  - She is status post bilateral GH joint injections on 7-7-21.  - Her second dose of the COVID vaccine was 2-17-21.   5.  PATIENT EDUCATION:    - She had been previously offered surgery consultation for her shoulders.  She declines at this time, as her pain is under good control.  -Emphasized importance of continuing to do her physical therapy program on a regular basis in order to manage her back pain.  This will hopefully help prevent flares like she recently had.  6.  FOLLOW-UP: I created a MyChart task to contact her in 4 weeks to see how she is  doing with physical therapy.  If there are any questions/concerns or any significant worsening of pain prior to that time, the patient is asked to call the clinic via the nurse navigation line or via [employer].     Subjective:     Louise Benítez (AKAIRAM Booker) is a 75 year old female who presents today for follow-up regarding chronic bilateral low back pain with bilateral radicular/sciatic type leg symptoms.  Patient is status post bilateral L4-5 transforaminal epidural steroid injections on August 5, 2021.  Reports that she had good relief with this injection.  However about 2 weeks ago she says that she did something to her back.  She is not really sure what.  She remembers fixing dinner and then sitting down.  She does not know if she twisted wrong or sat wrong, but she had really severe pain.  Reports it is much better now, but she still has some residual soreness.  The soreness is just across her low back.  She denies any significant radicular leg symptoms at this time.  She denies any numbness tingling or weakness in the legs.  She is not really sure what makes her pain worse.  She does feel like the injections have been helpful.  She is wondering if she could have a Medrol Dosepak to help with the residual soreness as this is helped significantly in the past with her areas of pain.  She has not been going to physical therapy recently, and admits that she is not as diligent about doing her home exercise program as she could be.  She continues to take naproxen and Celebrex as needed.  She does find the naproxen slightly more effective than the Celebrex.  She takes baclofen as needed as well and does request a refill of this medication today.    Past medical history is reviewed and is unchanged for any new medical diagnoses in the interim.      Family history is reviewed and is unchanged in the interim.      Review of Systems:   Pertinent negatives include no numbness, tingling, weakness, headaches, fevers, chills,  unexplained weight loss, bowel incontinence, bladder incontinence, trips, stumbles, falls.  All others reviewed and are negative.     Objective:   CONSTITUTIONAL:  Vital signs as above.  No acute distress.  The patient is well nourished and well groomed.    PSYCHIATRIC:  The patient is awake, alert, oriented to person, place and time.  The patient is answering questions appropriately with clear speech.  Normal affect.  SKIN:  Skin over the face, posterior torso, bilateral upper and lower extremities is clean, dry, intact without rashes.  MUSCULOSKELETAL:  Gait is non-antalgic.  She denies any significant pain with lumbar flexion or lumbar extension.  Moderate tenderness over the bilateral lower lumbar paraspinal muscles.      The patient has 5/5 strength for the bilateral hip flexors, knee flexors/extensors, ankle dorsiflexors/plantar flexors, ankle evertors/invertors.    NEUROLOGICAL: Absent patellar, medial hamstring, achilles reflexes which are symmetric bilaterally.  No ankle clonus bilaterally.  Sensation to light touch is intact in the bilateral L4, L5, and S1 dermatomes.               Again, thank you for allowing me to participate in the care of your patient.        Sincerely,        Joellen Bustamante, DO

## 2021-10-16 ENCOUNTER — HEALTH MAINTENANCE LETTER (OUTPATIENT)
Age: 76
End: 2021-10-16

## 2021-10-26 NOTE — PROGRESS NOTES
[facility]          OUTPATIENT PHYSICAL THERAPY ORTHOPEDIC EVALUATION  PLAN OF TREATMENT FOR OUTPATIENT REHABILITATION  (COMPLETE FOR INITIAL CLAIMS ONLY)  Patient's Last Name, First Name, M.I.  YOB: 1945  Louise Benítez    Provider s Name:  [facility]   Medical Record No.  [id number]   Start of Care Date:  10/26/21   Onset Date:  07/07/21   Type:     _X__PT   ___OT   ___SLP Medical Diagnosis:  (P) chronic bilateral low back pain     PT Diagnosis:  chronic B LBP, weakness, fall risk   Visits from SOC:  1      _________________________________________________________________________________  Plan of Treatment/Functional Goals:  joint mobilization, manual therapy, ROM, strengthening, stretching     TENS     Goals  Goal Identifier: walk  Goal Description: able to walk on even surfaces with less fear of falling as core strength improves   Target Date: 01/23/22    Goal Identifier: housework  Goal Description: Able to perform household tasks such as vacuuming, with good technique and pain 0-2/10  Target Date: 01/23/22    Goal Identifier: endurance  Goal Description: able to do tasks around the house for 15+ minutes without having to take frequent breaks as endurance ans strength improve   Target Date: 01/23/22                                                           Therapy Frequency:  2 times/Week  Predicted Duration of Therapy Intervention:  6    Yomaira Carter PT                 I CERTIFY THE NEED FOR THESE SERVICES FURNISHED UNDER        THIS PLAN OF TREATMENT AND WHILE UNDER MY CARE     (Physician co-signature of this document indicates review and certification of the therapy plan).                       Certification Date From:  10/26/21   Certification Date To:  (P) 01/23/22    Referring Provider:  Dr. Julian    Initial Assessment        See Epic Evaluation Start of Care Date: 10/26/21                                                                                  10/26/21 0915   General Information   Type of Visit Initial OP Ortho PT Evaluation   Start of Care Date 10/26/21   Referring Physician Dr. Julian   Orders Evaluate and Treat   Certification Required? Yes   Medical Diagnosis chronic bilateral LBP with B sciatica   Surgical/Medical history reviewed Yes   Precautions/Limitations fall precautions;other (see comments)   General Information Comments osteoporosis,   Body Part(s)   Body Part(s) Lumbar Spine/SI   Presentation and Etiology   Pertinent history of current problem (include personal factors and/or comorbidities that impact the POC) She fell a couple years ago and fx her back.  She had PT and Covid happened so had to stop going to therapy.  Since then she has not been very active.  Now she feels she can't walk far and function very well due to weakness which causes pain.  She had B L4-5 transforaminal epidural steroid injections which helped.  Not long ago she moved wrong and irritated her back again.  She also was given a Medrol Dose carmelita which she feels did help a lttle.     Impairments A. Pain;E. Decreased flexibility;F. Decreased strength and endurance;L. Tingling   Functional Limitations perform activities of daily living;perform desired leisure / sports activities   Symptom Location bilateral low back soreness, leg symptoms   How/Where did it occur From insidious onset   Onset date of current episode/exacerbation 07/07/21   Chronicity Chronic   Pain rating (0-10 point scale) Best (/10);Worst (/10)   Best (/10) 5   Worst (/10) 9   Pain quality A. Sharp;H. Other   Pain quality comment sore   Frequency of pain/symptoms A. Constant   Pain/symptoms are: Worse during the day   Pain/symptoms exacerbated by B. Walking;C. Lifting;I. Bending;K. Home tasks   Pain/symptoms eased by A. Sitting;C. Rest;E. Changing positions   Progression of symptoms since onset: Unchanged   Prior Level of Function   Prior Level of Function-Mobility  can do most tasks but tire more easily and need to rest more due to pain.   Prior Level of Function-ADLs household tasks, walking   Current Level of Function   Patient role/employment history F. Retired   Living environment House/townhome   Home/community accessibility with    Current equipment-Gait/Locomotion None   Fall Risk Screen   Fall screen completed by PT   Have you fallen 2 or more times in the past year? No   Have you fallen and had an injury in the past year? No   Is patient a fall risk? Yes   Fall screen comments pt is afraid she is going to fall   Abuse Screen (yes response referral indicated)   Feels Unsafe at Home or Work/School no   Feels Threatened by Someone no   Does Anyone Try to Keep You From Having Contact with Others or Doing Things Outside Your Home? no   Physical Signs of Abuse Present no   Lumbar Spine/SI Objective Findings   Posture moderate to severe thoracic kyphosis, decrease lumbar lordosis   Balance/Proprioception (Single Leg Stance) APTA: 0, 8 with bilateral UE   Flexion ROM hands to mid shins, no pain   Extension ROM 25% limited, no pain   Right Side Bending ROM 25% limited, no pain   Left Side Bending ROM 25% limited no pain   Lumbar ROM Comment denied pain today with movement.  Slow and controlled   Hip Flexion (L2) Strength R=4/5, L=4/5   Hip Abduction Strength B 4/5   Hip Adduction Strength B 4/5   Knee Flexion Strength R=4+/5, L=4/5   Knee Extension (L3) Strength B 4+/5   Ankle Dorsiflexion (L4) Strength B 5/5   Ankle Plantar Flexion (S1) Strength B 5/5   Hamstring Flexibility good   Hip Flexor Flexibility fair   Quadricep Flexibility fair   Piriformis Flexibility fair   SLR neg   Crossover SLR neg   Slump Test neg   Lumbar/SI Special Tests Comments Pt unable to lay prone   Palpation Pain throughout bilateral low thoracic and lumbar paraspinals, glutes, QL, piriformis   Planned Therapy Interventions   Planned Therapy Interventions joint mobilization;manual  therapy;ROM;strengthening;stretching   Planned Modality Interventions   Planned Modality Interventions TENS   Clinical Impression   Criteria for Skilled Therapeutic Interventions Met yes, treatment indicated   PT Diagnosis chronic B LBP, weakness, fall risk   Influenced by the following impairments decreased APTA, leg weakness, trunk weakness, decrease endurance demonstrated with tasks throughout eval   Clinical Presentation Stable/Uncomplicated   Clinical Decision Making (Complexity) Low complexity   Therapy Frequency 2 times/Week   Predicted Duration of Therapy Intervention (days/wks) 12   Risk & Benefits of therapy have been explained Yes   Patient, Family & other staff in agreement with plan of care Yes   Clinical Impression Comments Pt presents with back pain following a fall and sustaining a fx over a year ago.  She did start therapy at the start of Covid over 1 1/2 years ago then had to stop due to clinics closing.  During this time she has been sitting more and very inactive and her pain levels have increased.  Due to this her function has decreased.  She demonstrates decreased APTA, leg weakness, decreased endurance all which puts her at a higher risk for falls.  Feel pt will benefit from physical therapy to work on her endurance, strength and help prevent falls.    ORTHO GOALS   PT Ortho Eval Goals 1;2;3   Ortho Goal 1   Goal Identifier walk   Goal Description able to walk on even surfaces with less fear of falling as core strength improves    Target Date 01/23/22   Ortho Goal 2   Goal Identifier housework   Goal Description Able to perform household tasks such as vacuuming, with good technique and pain 0-2/10   Target Date 01/23/22   Ortho Goal 3   Goal Identifier endurance   Goal Description able to do tasks around the house for 15+ minutes without having to take frequent breaks as endurance ans strength improve    Target Date 01/23/22   Total Evaluation Time   PT Eval, Low Complexity Minutes (03637) 85    Therapy Certification   Certification date from 10/26/21   Certification date to 01/23/22   Medical Diagnosis chronic bilateral low back pain

## 2021-10-26 NOTE — PROGRESS NOTES
Date 10/26/21   Exercise    Sit<>stand 3 chairs, 3 reps   Stretches; sitting hamstring, SKC, QL Hold 30 seconds X 1-3 reps

## 2021-11-02 NOTE — PROGRESS NOTES
Date 11/2/21 10/26/21   Exercise     Sit<>stand Discussed.  She can feel it in her legs 3 chairs, 3 reps   Stretches; sitting hamstring, SKC, QL Hold 30s X 1 each Hold 30 seconds X 1-3 reps   Nu-step for warm up 4 min    Supine Piriformis above 90 degrees Hold 30 seconds    Sitting isometric add squeezing pillow Hold 10 seconds x 6    marching 15, hold 2-3s each    Heel and toe raises X 20                             Assessment: Pt returns for her first follow-up.  She was confused on the stretches and was over stretching causing pain.  Edu pt on not being quite so aggressive and painfree.  She felt she wanted to try the stretches again at home to see if they go better this week. Also encouraged her to walk or be more active.  Suggested she drive to a mall or a box store and walk a couple times around the building as she doesn't feel comfortable going to the [facility] or an exercise place.  She did get a little dizzy when she got up from laying down but it did subside after a few minutes.

## 2021-11-04 NOTE — PROGRESS NOTES
"      Rheumatology follow-up visit note     Louise is a 75 year old female presents today for follow-up.    Louise was seen today for recheck.    Diagnoses and all orders for this visit:    Seronegative rheumatoid arthritis of multiple sites (H)  -     OR ARTHROCENTESIS ASPIR&/INJ INTERM JT/BURS W/US  -     triamcinolone (KENALOG-40) injection 20 mg  -     hydroxychloroquine (PLAQUENIL) 200 MG tablet; [HYDROXYCHLOROQUINE (PLAQUENIL) 200 MG TABLET] TAKE TWO TABLETS BY MOUTH DAILY AT BEDTIME    Primary osteoarthritis involving multiple joints  -     celecoxib (CELEBREX) 200 MG capsule; 1 capsule (200 mg) by Per NG tube route 2 times daily as needed for moderate pain    High risk medication use    Myelodysplastic disease (H)        This patient with rheumatoid arthritis is here for follow-up, she has osteoarthritis, she is noted increasing pain in her right first MTP joint.  She would like to proceed with local injection done with Kenalog 20 mg under ultrasound guidance after pros and cons were outlined.  The side effects of Celebrex and hydroxychloroquine reviewed.  Follow-up in 6 months.  Reminded him of eye examination.    Follow up in 6 months.    HPI    Louise Benítez is a 75 year old female is here for follow-up of seronegative rheumatoid arthritis diagnosed elsewhere been on hydroxychloroquine for several years recent eye examination without maculopathy.  She has noted pain.  This is moderately severe to severe, and her MTP areas after activity.  This is worse on the right foot, she points to the first MTP, going on for since Christmas 2020.  No history of trauma.  She is concerned that her fingers and toes are getting \"crooked\".  She would like to have a corticosteroid injection there today.  She is due for eye examination.   She has not had fever weight loss blurry vision eye redness mouth ulcer nausea or rash.    On her previous visit she had noted that it was 20 years ago when she developed pain stiffness " "and found to have elevated sed rate.  A diagnosis of polymyalgia rheumatica was made.  Thereafter she was felt to have rheumatoid arthritis.  Initially she followed up to the clinic in [place] where she was given methotrexate and azathioprine.  She recalls that initially the knees used to troubles her quite a bit.  She remembers having \"gallons of fluid\" removed from her knees.  These have not troubled her of late.  Meanwhile she developed complications from prednisone treatment.  She had AVN bilaterally.  That resulted in replacement of hip joints in 2000 and 2003.  She had been following up at [facility] for several years.  She was on hydroxychloroquine, methotrexate and azathioprine having been discontinued.  She is also been taking Celebrex on fairly regular basis.  She takes this once daily usually but sometimes 2 times.  This is for her widespread osteoarthritis.  Recently she was seen at spine clinic and had shoulder injections.  She has had lumbar spondylosis.  She has noted that it is hard for her to define the level of pain.  She would call this as \"medium\".  She noted severe pain in her shoulders however.  She had those injected 48 hours ago.  She is able to describe herself otherwise in good health apart from chronic anemia.  She has been seen in hematology.  This is microcytic.  Most recent labs showed 8.7.  She was asked to consider bone marrow biopsy.  She has chosen to defer that.  She has noted that she is able to walk couple of miles a day she cleans her own house and does a good job of it she feels.  She does not recognize her psoriasis is at herself or the family.  No history of ulcerative colitis or Crohn's disease.   Further historical information and ADL limitations as noted in the multidimensional health assessment questionnaire attached in the EMR.             DETAILED EXAMINATION  11/04/21  :    Vitals:    11/04/21 1106   BP: 124/74   Pulse: 88   Height: 1.6 m (5' 3\")    "  Alert oriented. Head including the face is examined for malar rash, heliotropes, scarring, lupus pernio. Eyes examined for redness such as in episcleritis/scleritis, periorbital lesions.   Neck/ Face examined for parotid gland swelling, range of motion of neck.  Left upper and lower and right upper and lower extremities examined for tenderness, swelling, warmth of the appendicular joints, range of motion, edema, rash.  Some of the important findings included: she does not have evidence of synovitis in the palpable joints of the upper extremities.  No significant deformities of the digits.  ++ Heberden nodes.  Range of motion of the shoulders  show full abduction.  No JLT effusion or warmth of the knees.  she does not have dactylitis of the digits.  Marked tenderness of the right first MTP.  Surgical redness especially the dorsum.  Ultrasound examination did not show double contour sign.     Patient Active Problem List    Diagnosis Date Noted     PAD (peripheral artery disease) (H) 05/03/2021     Priority: Medium     Delayed wound healing 05/03/2021     Priority: Medium     High risk medication use 02/27/2020     Priority: Medium     Compression fracture of L1 vertebra with routine healing 10/07/2019     Priority: Medium     Seronegative rheumatoid arthritis of multiple sites (H) 06/13/2018     Priority: Medium     Primary osteoarthritis involving multiple joints 06/13/2018     Priority: Medium     Bilateral shoulder tendinopathy 06/13/2018     Priority: Medium     Macrocytic anemia 06/13/2018     Priority: Medium     Polyarthralgia 06/13/2018     Priority: Medium     Osteoporosis 12/07/2017     Priority: Medium     Ptosis Of Eyelid      Priority: Medium     Created by Conversion  Replacement Utility updated for latest IMO load         Patient Counseling: End-Of-Life Issues 12/28/2014     Priority: Medium     Lower Back Pain      Priority: Medium     Created by Conversion         Past Surgical History:   Procedure  Laterality Date     IR LUMBAR EPIDURAL STEROID INJECTION  8/15/2012      Past Medical History:   Diagnosis Date     Rheumatoid arthritis (H)      No Known Allergies  Current Outpatient Medications   Medication Sig Dispense Refill     baclofen (LIORESAL) 10 MG tablet Take half to one tablet by mouth at bedtime as needed for muscle spasm 90 tablet 0     celecoxib (CELEBREX) 200 MG capsule [CELECOXIB (CELEBREX) 200 MG CAPSULE] TAKE ONE CAPSULE BY MOUTH ONE TIME DAILY  90 capsule 0     cholecalciferol, vitamin D3, 50,000 unit Tab [CHOLECALCIFEROL, VITAMIN D3, 50,000 UNIT TAB] Take 1 tablet weekly 12 tablet 0     hydrOXYchloroQUINE (PLAQUENIL) 200 mg tablet [HYDROXYCHLOROQUINE (PLAQUENIL) 200 MG TABLET] TAKE TWO TABLETS BY MOUTH DAILY AT BEDTIME  180 tablet 0     naproxen (NAPROSYN) 500 MG tablet Take 1 tablet (500 mg) by mouth daily as needed for moderate pain 90 tablet 0     omeprazole (PRILOSEC) 20 MG capsule [OMEPRAZOLE (PRILOSEC) 20 MG CAPSULE] Take 1 capsule (20 mg total) by mouth daily before breakfast. 90 capsule 3     atorvastatin (LIPITOR) 40 MG tablet [ATORVASTATIN (LIPITOR) 40 MG TABLET] Take 1 tablet (40 mg total) by mouth at bedtime. (Patient not taking: Reported on 11/4/2021) 90 tablet 1     methylPREDNISolone (MEDROL DOSEPAK) 4 MG tablet therapy pack Follow Package Directions (Patient not taking: Reported on 11/4/2021) 21 tablet 0     family history is not on file.     Social Connections:      Frequency of Communication with Friends and Family:      Frequency of Social Gatherings with Friends and Family:      Attends Hindu Services:      Active Member of Clubs or Organizations:      Attends Club or Organization Meetings:      Marital Status:           WBC Count   Date Value Ref Range Status   11/03/2021 9.1 4.0 - 11.0 10e3/uL Final     RBC Count   Date Value Ref Range Status   11/03/2021 2.37 (L) 3.80 - 5.20 10e6/uL Final     Hemoglobin   Date Value Ref Range Status   11/03/2021 8.1 (L) 11.7 - 15.7  g/dL Final     Hematocrit   Date Value Ref Range Status   11/03/2021 25.7 (L) 35.0 - 47.0 % Final     MCV   Date Value Ref Range Status   11/03/2021 108 (H) 78 - 100 fL Final     MCH   Date Value Ref Range Status   11/03/2021 34.2 (H) 26.5 - 33.0 pg Final     Platelet Count   Date Value Ref Range Status   11/03/2021 562 (H) 150 - 450 10e3/uL Final     % Lymphocytes   Date Value Ref Range Status   06/13/2018 23 20 - 40 % Final     ALT   Date Value Ref Range Status   11/03/2021 13 0 - 45 U/L Final     Albumin   Date Value Ref Range Status   11/03/2021 4.0 3.5 - 5.0 g/dL Final     Alkaline Phosphatase   Date Value Ref Range Status   10/07/2019 71 45 - 120 U/L Final     Creatinine   Date Value Ref Range Status   11/03/2021 0.68 0.60 - 1.10 mg/dL Final     GFR Estimate   Date Value Ref Range Status   11/03/2021 85 >60 mL/min/1.73m2 Final     Comment:     As of July 11, 2021, eGFR is calculated by the CKD-EPI creatinine equation, without race adjustment. eGFR can be influenced by muscle mass, exercise, and diet. The reported eGFR is an estimation only and is only applicable if the renal function is stable.   04/20/2021 >60 >60 mL/min/1.73m2 Final     GFR Estimate If Black   Date Value Ref Range Status   04/20/2021 >60 >60 mL/min/1.73m2 Final     Erythrocyte Sedimentation Rate   Date Value Ref Range Status   03/17/2021 32 (H) 0 - 20 mm/hr Final     CRP   Date Value Ref Range Status   03/17/2021 0.1 0.0 - 0.8 mg/dL Final

## 2021-11-09 NOTE — PROGRESS NOTES
Date 11/9/21 11/2/21 10/26/21   Exercise      Sit<>stand 30s X 1 each Discussed.  She can feel it in her legs 3 chairs, 3 reps   Stretches; sitting hamstring, SKC, QL Hold 30s X 1 each Hold 30s X 1 each Hold 30 seconds X 1-3 reps   Nu-step for warm up 4 min 4 min    Supine Piriformis above 90 degrees Hold 30s x 1 Hold 30 seconds    Sitting isometric add squeezing pillow Hold 10s X 6 Hold 10 seconds x 6    marching  15, hold 2-3s each    Heel and toe raises  X 20    bridges Hold 5-10s X 6 reps     Clamshells -pt did not want to lay on left side Did not issue     Sitting hip rollouts with orange tband X 10-20                 Assessment: Pt returns and has stopped doing the stretches as she feels they are too painful  Again, edu pt on not being quite so aggressive and only doing them in a painfree range. Unsure if she will continue to do the stretches at home or not, but she was able to demonstrate a good understanding and technique and did not overstretch in the clinic. Attempted to have pt perform clamshells to strengthen the hip abductors but she did not want to lay on her right side.  She felt the sitting hip rollout was a better option for her.

## 2021-11-30 NOTE — PROGRESS NOTES
Date 11/30/21 11/9/21 11/2/21 10/26/21   Exercise       Sit<>stand  30s X 1 each Discussed.  She can feel it in her legs 3 chairs, 3 reps   Stretches; sitting hamstring, SKC, QL All 2 reps, hold 30 s Hold 30s X 1 each Hold 30s X 1 each Hold 30 seconds X 1-3 reps   Nu-step for warm up 4 min 4 min 4 min    Supine Piriformis above 90 degrees Hold 30s Hold 30s x 1 Hold 30 seconds    Sitting isometric add squeezing pillow Hold 10s X 6 Hold 10s X 6 Hold 10 seconds x 6    marching X 15, hold 2 sec  15, hold 2-3s each    Heel and toe raises X 20  X 20    bridges Hold 5 seconds X 6 Hold 5-10s X 6 reps     Clamshells -pt did not want to lay on left side  Did not issue     Sitting hip rollouts with orange tband 15 X 10-20     1 leg stand (1 hand for support) Hold 30s      Tandem standing Leading with each leg, 1 hand for support, 30s        Assessment: Pt returns and did fall last night carrying something up the stairs.  Since her hands were full she could not break her fall with her hands so is stiff and sore all over today.   She has not tried ice or stretch.  We did review the stretching in the clinic today to see if it will help some of her stiffness.  She needed a few VC's to perform the stretches correctly but was able to make the corrections.  She did not want any new ex today.   Discussed the use of a SEC for balance but she doesn't feel she needs one.  She did feel better and looser leaving the clinic today and will do the stretches again later today.

## 2021-12-07 NOTE — PROGRESS NOTES
Date 12/7/21 11/30/21 11/9/21 11/2/21 10/26/21   Exercise        Sit<>stand   30s X 1 each Discussed.  She can feel it in her legs 3 chairs, 3 reps   Stretches; sitting hamstring, SKC, QL  All 2 reps, hold 30 s Hold 30s X 1 each Hold 30s X 1 each Hold 30 seconds X 1-3 reps   Nu-step for warm up 4 min 4 min 4 min 4 min    Supine Piriformis above 90 degrees Hold 30s X 2 reps Hold 30s Hold 30s x 1 Hold 30 seconds    Sitting isometric add squeezing pillow Hold 10s X 6 reps Hold 10s X 6 Hold 10s X 6 Hold 10 seconds x 6    marching X 15  X 15, hold 2 sec  15, hold 2-3s each    Heel and toe raises X 10 X 20  X 20    bridges Hold 5s X 6 reps Hold 5 seconds X 6 Hold 5-10s X 6 reps     Clamshells -pt did not want to lay on left side   Did not issue     Sitting hip rollouts with orange tband  15 X 10-20     1 leg stand (1 hand for support) 20s X 2 each side Hold 30s      Tandem standing Leading with each leg, 1 hand for support, 30s X 2 each Leading with each leg, 1 hand for support, 30s        Assessment: Pt returns and feels a lot of the soreness from her fall has resolved but her shoulder and rib pain remain.  She did not want any new ex so reviewed her current HEP.  Exercises she felt comfortable with we did not review.  She only needed a few VC's to perform some of the stretches but was able to demonstrate good technique after.  Will discuss her POC at the next visit to determine if she feels her goals are met, if she needs to return to the MD or if she wants more visits to help with strength, balance and pain control

## 2021-12-21 NOTE — PROGRESS NOTES
[facility]    OUTPATIENT PHYSICAL THERAPY  PLAN OF TREATMENT FOR OUTPATIENT REHABILITATION AND PROGRESS NOTE           Patient's Last Name, First Name, Louise Braden Date of Birth  1945   Provider's Name  [facility] Medical Record No.  [id number]    Onset Date  7/7/21 Start of Care Date  10/26/21   Type:     _X_PT   ___OT   ___SLP Medical Diagnosis  Chronic bilateral LBP with B sciatica   PT Diagnosis  Chronic bilateral LBP, weakness and risk for falls Plan of Treatment  Frequency/Duration: 1-2X/week for 6 more visits  Certification date from 12/21/21 to 2/21/22     Goals:  Goal Identifier walk   Goal Description able to walk on even surfaces with less fear of falling as core strength improves    Target Date 02/19/22   Date Met      Progress (detail required for progress note): feet hurt when she walks in the house so suggested she wear shoes.  At times she feels it is easier to walk and other times not so easy.  She is still fearful of falling     Goal Identifier housework   Goal Description Able to perform household tasks such as vacuuming, with good technique and pain 0-2/10   Target Date 01/23/22   Date Met   will discharge goal   Progress (detail required for progress note): she is going to get a cleaning lady so doesn't feel this goal is appropriate anymore.       Goal Identifier endurance   Goal Description able to do tasks around the house for 15+ minutes without having to take frequent breaks as endurance ans strength improve    Target Date 02/19/22   Date Met      Progress (detail required for progress note):  Still needs to take frequent breaks but feels some of the tasks are easier.  Has increased time to about 20 minutes. Will continue goal     Goal Identifier bend   Goal Description able to bend over and  objects on floor with  pain 0-2/10   Target Date 02/19/22   Date Met      Progress (detail required for progress note):           Beginning/End Dates of Progress Note Reporting Period:  10/26/21  to 12/21/21    Progress Toward Goals:   Progress this reporting period: she has made some progress albeit slow.  She has improved her endurance to perform cleaning at home but will be getting a cleaning lady to help with the harder more involved cleaning tasks.  She would like to continue to work on her balance as well as be able to bend over and pick objects up from the floor easier.      Client Self (Subjective) Report for Progress Note Reporting Period: My back feels better.  I am not as stiff as I used to be.     Outcome Measures (Most Recent Score):    Pt struggles to give a pain rating number             I CERTIFY THE NEED FOR THESE SERVICES FURNISHED UNDER        THIS PLAN OF TREATMENT AND WHILE UNDER MY CARE     (Physician co-signature of this document indicates review and certification of the therapy plan).                Referring Provider: Dr. Eliel Carter, PT      Date 12/21/21 12/7/21 11/30/21 11/9/21 11/2/21 10/26/21   Exercise         Sit<>stand    30s X 1 each Discussed.  She can feel it in her legs 3 chairs, 3 reps   Stretches; sitting hamstring, SKC, QL   All 2 reps, hold 30 s Hold 30s X 1 each Hold 30s X 1 each Hold 30 seconds X 1-3 reps   Nu-step for warm up 3 min 4 min 4 min 4 min 4 min    Supine Piriformis above 90 degrees  Hold 30s X 2 reps Hold 30s Hold 30s x 1 Hold 30 seconds    Sitting isometric add squeezing pillow  Hold 10s X 6 reps Hold 10s X 6 Hold 10s X 6 Hold 10 seconds x 6    marching X 15 X 15  X 15, hold 2 sec  15, hold 2-3s each    Heel and toe raises X 10 X 10 X 20  X 20    bridges  Hold 5s X 6 reps Hold 5 seconds X 6 Hold 5-10s X 6 reps     Clamshells -pt did not want to lay on left side    Did not issue     Sitting hip rollouts with orange tband   15 X 10-20     1 leg stand (1 hand for  support) 25s X 2 each side with CG to 1 hand for support 20s X 2 each side Hold 30s      Tandem standing Leading with each foot, 30s with CG Leading with each leg, 1 hand for support, 30s X 2 each Leading with each leg, 1 hand for support, 30s        Assessment: Pt returns and feels she has made some good progress in therapy but pain remains in multiple areas.  She reports being compliant with HEP but feels up to 6 more visits would be helpful to work on her balance and continued core strength.

## 2022-01-01 ENCOUNTER — APPOINTMENT (OUTPATIENT)
Dept: SPEECH THERAPY | Facility: CLINIC | Age: 77
DRG: 955 | End: 2022-01-01
Payer: MEDICARE

## 2022-01-01 ENCOUNTER — HEALTH MAINTENANCE LETTER (OUTPATIENT)
Age: 77
End: 2022-01-01

## 2022-01-01 ENCOUNTER — APPOINTMENT (OUTPATIENT)
Dept: GENERAL RADIOLOGY | Facility: CLINIC | Age: 77
DRG: 955 | End: 2022-01-01
Payer: MEDICARE

## 2022-01-01 ENCOUNTER — DOCUMENTATION ONLY (OUTPATIENT)
Dept: OTHER | Facility: CLINIC | Age: 77
End: 2022-01-01

## 2022-01-01 ENCOUNTER — APPOINTMENT (OUTPATIENT)
Dept: RADIOLOGY | Facility: HOSPITAL | Age: 77
End: 2022-01-01
Attending: EMERGENCY MEDICINE
Payer: MEDICARE

## 2022-01-01 ENCOUNTER — APPOINTMENT (OUTPATIENT)
Dept: OCCUPATIONAL THERAPY | Facility: CLINIC | Age: 77
DRG: 955 | End: 2022-01-01
Payer: MEDICARE

## 2022-01-01 ENCOUNTER — APPOINTMENT (OUTPATIENT)
Dept: PHYSICAL THERAPY | Facility: CLINIC | Age: 77
DRG: 083 | End: 2022-01-01
Attending: PSYCHIATRY & NEUROLOGY
Payer: MEDICARE

## 2022-01-01 ENCOUNTER — TELEPHONE (OUTPATIENT)
Dept: NEUROSURGERY | Facility: CLINIC | Age: 77
End: 2022-01-01

## 2022-01-01 ENCOUNTER — TRANSFERRED RECORDS (OUTPATIENT)
Dept: HEALTH INFORMATION MANAGEMENT | Facility: CLINIC | Age: 77
End: 2022-01-01

## 2022-01-01 ENCOUNTER — APPOINTMENT (OUTPATIENT)
Dept: CT IMAGING | Facility: CLINIC | Age: 77
DRG: 955 | End: 2022-01-01
Attending: STUDENT IN AN ORGANIZED HEALTH CARE EDUCATION/TRAINING PROGRAM
Payer: MEDICARE

## 2022-01-01 ENCOUNTER — VIRTUAL VISIT (OUTPATIENT)
Dept: NEUROSURGERY | Facility: CLINIC | Age: 77
End: 2022-01-01
Payer: MEDICARE

## 2022-01-01 ENCOUNTER — MEDICAL CORRESPONDENCE (OUTPATIENT)
Dept: FAMILY MEDICINE | Facility: CLINIC | Age: 77
End: 2022-01-01

## 2022-01-01 ENCOUNTER — APPOINTMENT (OUTPATIENT)
Dept: OCCUPATIONAL THERAPY | Facility: CLINIC | Age: 77
DRG: 083 | End: 2022-01-01
Attending: PSYCHIATRY & NEUROLOGY
Payer: MEDICARE

## 2022-01-01 ENCOUNTER — ANESTHESIA EVENT (OUTPATIENT)
Dept: SURGERY | Facility: CLINIC | Age: 77
DRG: 955 | End: 2022-01-01
Payer: MEDICARE

## 2022-01-01 ENCOUNTER — APPOINTMENT (OUTPATIENT)
Dept: CT IMAGING | Facility: CLINIC | Age: 77
DRG: 955 | End: 2022-01-01
Payer: MEDICARE

## 2022-01-01 ENCOUNTER — APPOINTMENT (OUTPATIENT)
Dept: CT IMAGING | Facility: HOSPITAL | Age: 77
End: 2022-01-01
Attending: EMERGENCY MEDICINE
Payer: MEDICARE

## 2022-01-01 ENCOUNTER — NURSE TRIAGE (OUTPATIENT)
Dept: NURSING | Facility: CLINIC | Age: 77
End: 2022-01-01
Payer: MEDICARE

## 2022-01-01 ENCOUNTER — MEDICAL CORRESPONDENCE (OUTPATIENT)
Dept: HEALTH INFORMATION MANAGEMENT | Facility: CLINIC | Age: 77
End: 2022-01-01

## 2022-01-01 ENCOUNTER — TELEPHONE (OUTPATIENT)
Dept: FAMILY MEDICINE | Facility: CLINIC | Age: 77
End: 2022-01-01
Payer: MEDICARE

## 2022-01-01 ENCOUNTER — PRE VISIT (OUTPATIENT)
Dept: NEUROSURGERY | Facility: CLINIC | Age: 77
End: 2022-01-01

## 2022-01-01 ENCOUNTER — APPOINTMENT (OUTPATIENT)
Dept: NEUROLOGY | Facility: CLINIC | Age: 77
DRG: 955 | End: 2022-01-01
Attending: NURSE PRACTITIONER
Payer: MEDICARE

## 2022-01-01 ENCOUNTER — TELEPHONE (OUTPATIENT)
Dept: NEUROSURGERY | Facility: CLINIC | Age: 77
End: 2022-01-01
Payer: MEDICARE

## 2022-01-01 ENCOUNTER — DOCUMENTATION ONLY (OUTPATIENT)
Dept: NURSING | Facility: CLINIC | Age: 77
End: 2022-01-01

## 2022-01-01 ENCOUNTER — APPOINTMENT (OUTPATIENT)
Dept: OCCUPATIONAL THERAPY | Facility: CLINIC | Age: 77
DRG: 083 | End: 2022-01-01
Attending: NURSE PRACTITIONER
Payer: MEDICARE

## 2022-01-01 ENCOUNTER — OFFICE VISIT (OUTPATIENT)
Dept: FAMILY MEDICINE | Facility: CLINIC | Age: 77
End: 2022-01-01
Payer: MEDICARE

## 2022-01-01 ENCOUNTER — APPOINTMENT (OUTPATIENT)
Dept: GENERAL RADIOLOGY | Facility: CLINIC | Age: 77
DRG: 955 | End: 2022-01-01
Attending: STUDENT IN AN ORGANIZED HEALTH CARE EDUCATION/TRAINING PROGRAM
Payer: MEDICARE

## 2022-01-01 ENCOUNTER — APPOINTMENT (OUTPATIENT)
Dept: PHYSICAL THERAPY | Facility: CLINIC | Age: 77
DRG: 955 | End: 2022-01-01
Payer: MEDICARE

## 2022-01-01 ENCOUNTER — MEDICAL CORRESPONDENCE (OUTPATIENT)
Dept: HEALTH INFORMATION MANAGEMENT | Facility: CLINIC | Age: 77
End: 2022-01-01
Payer: MEDICARE

## 2022-01-01 ENCOUNTER — TRANSFERRED RECORDS (OUTPATIENT)
Dept: HEALTH INFORMATION MANAGEMENT | Facility: CLINIC | Age: 77
End: 2022-01-01
Payer: MEDICARE

## 2022-01-01 ENCOUNTER — APPOINTMENT (OUTPATIENT)
Dept: CT IMAGING | Facility: CLINIC | Age: 77
DRG: 083 | End: 2022-01-01
Attending: NURSE PRACTITIONER
Payer: MEDICARE

## 2022-01-01 ENCOUNTER — HOSPITAL ENCOUNTER (INPATIENT)
Facility: CLINIC | Age: 77
LOS: 8 days | Discharge: NURSING, REHAB OR LONG-TERM CARE | DRG: 083 | End: 2022-05-20
Attending: PSYCHIATRY & NEUROLOGY | Admitting: PSYCHIATRY & NEUROLOGY
Payer: MEDICARE

## 2022-01-01 ENCOUNTER — APPOINTMENT (OUTPATIENT)
Dept: CARDIOLOGY | Facility: CLINIC | Age: 77
DRG: 955 | End: 2022-01-01
Payer: MEDICARE

## 2022-01-01 ENCOUNTER — APPOINTMENT (OUTPATIENT)
Dept: ULTRASOUND IMAGING | Facility: CLINIC | Age: 77
DRG: 955 | End: 2022-01-01
Attending: STUDENT IN AN ORGANIZED HEALTH CARE EDUCATION/TRAINING PROGRAM
Payer: MEDICARE

## 2022-01-01 ENCOUNTER — APPOINTMENT (OUTPATIENT)
Dept: CT IMAGING | Facility: CLINIC | Age: 77
DRG: 955 | End: 2022-01-01
Attending: NURSE PRACTITIONER
Payer: MEDICARE

## 2022-01-01 ENCOUNTER — APPOINTMENT (OUTPATIENT)
Dept: INTERVENTIONAL RADIOLOGY/VASCULAR | Facility: CLINIC | Age: 77
DRG: 955 | End: 2022-01-01
Attending: NURSE PRACTITIONER
Payer: MEDICARE

## 2022-01-01 ENCOUNTER — APPOINTMENT (OUTPATIENT)
Dept: GENERAL RADIOLOGY | Facility: CLINIC | Age: 77
DRG: 955 | End: 2022-01-01
Attending: NURSE PRACTITIONER
Payer: MEDICARE

## 2022-01-01 ENCOUNTER — TELEPHONE (OUTPATIENT)
Dept: FAMILY MEDICINE | Facility: CLINIC | Age: 77
End: 2022-01-01

## 2022-01-01 ENCOUNTER — ANESTHESIA (OUTPATIENT)
Dept: SURGERY | Facility: CLINIC | Age: 77
DRG: 955 | End: 2022-01-01
Payer: MEDICARE

## 2022-01-01 ENCOUNTER — APPOINTMENT (OUTPATIENT)
Dept: GENERAL RADIOLOGY | Facility: CLINIC | Age: 77
DRG: 955 | End: 2022-01-01
Attending: NEUROLOGICAL SURGERY
Payer: MEDICARE

## 2022-01-01 ENCOUNTER — ANESTHESIA (OUTPATIENT)
Dept: INTENSIVE CARE | Facility: CLINIC | Age: 77
DRG: 955 | End: 2022-01-01
Payer: MEDICARE

## 2022-01-01 ENCOUNTER — PATIENT OUTREACH (OUTPATIENT)
Dept: CARE COORDINATION | Facility: CLINIC | Age: 77
End: 2022-01-01

## 2022-01-01 ENCOUNTER — HOSPITAL ENCOUNTER (EMERGENCY)
Facility: HOSPITAL | Age: 77
Discharge: ANOTHER HOSPITAL | End: 2022-05-12
Attending: EMERGENCY MEDICINE | Admitting: EMERGENCY MEDICINE
Payer: MEDICARE

## 2022-01-01 ENCOUNTER — APPOINTMENT (OUTPATIENT)
Dept: CARDIOLOGY | Facility: CLINIC | Age: 77
DRG: 955 | End: 2022-01-01
Attending: NURSE PRACTITIONER
Payer: MEDICARE

## 2022-01-01 ENCOUNTER — APPOINTMENT (OUTPATIENT)
Dept: GENERAL RADIOLOGY | Facility: CLINIC | Age: 77
DRG: 955 | End: 2022-01-01
Attending: PSYCHIATRY & NEUROLOGY
Payer: MEDICARE

## 2022-01-01 ENCOUNTER — APPOINTMENT (OUTPATIENT)
Dept: PHYSICAL THERAPY | Facility: CLINIC | Age: 77
DRG: 083 | End: 2022-01-01
Attending: NURSE PRACTITIONER
Payer: MEDICARE

## 2022-01-01 ENCOUNTER — OFFICE VISIT (OUTPATIENT)
Dept: RHEUMATOLOGY | Facility: CLINIC | Age: 77
End: 2022-01-01
Payer: MEDICARE

## 2022-01-01 ENCOUNTER — OFFICE VISIT (OUTPATIENT)
Dept: PODIATRY | Facility: CLINIC | Age: 77
End: 2022-01-01
Payer: MEDICARE

## 2022-01-01 ENCOUNTER — APPOINTMENT (OUTPATIENT)
Dept: EDUCATION SERVICES | Facility: CLINIC | Age: 77
DRG: 083 | End: 2022-01-01
Attending: NURSE PRACTITIONER
Payer: MEDICARE

## 2022-01-01 ENCOUNTER — HOSPITAL ENCOUNTER (INPATIENT)
Facility: CLINIC | Age: 77
LOS: 65 days | Discharge: HOSPICE | DRG: 955 | End: 2022-08-26
Attending: STUDENT IN AN ORGANIZED HEALTH CARE EDUCATION/TRAINING PROGRAM | Admitting: NEUROLOGICAL SURGERY
Payer: MEDICARE

## 2022-01-01 ENCOUNTER — NURSE TRIAGE (OUTPATIENT)
Dept: NURSING | Facility: CLINIC | Age: 77
End: 2022-01-01

## 2022-01-01 ENCOUNTER — APPOINTMENT (OUTPATIENT)
Dept: SPEECH THERAPY | Facility: CLINIC | Age: 77
DRG: 955 | End: 2022-01-01
Attending: NURSE PRACTITIONER
Payer: MEDICARE

## 2022-01-01 ENCOUNTER — APPOINTMENT (OUTPATIENT)
Dept: PHYSICAL THERAPY | Facility: CLINIC | Age: 77
DRG: 955 | End: 2022-01-01
Attending: STUDENT IN AN ORGANIZED HEALTH CARE EDUCATION/TRAINING PROGRAM
Payer: MEDICARE

## 2022-01-01 ENCOUNTER — ANESTHESIA EVENT (OUTPATIENT)
Dept: INTENSIVE CARE | Facility: CLINIC | Age: 77
DRG: 955 | End: 2022-01-01
Payer: MEDICARE

## 2022-01-01 ENCOUNTER — HOSPITAL ENCOUNTER (OUTPATIENT)
Dept: CT IMAGING | Facility: HOSPITAL | Age: 77
Discharge: HOME | End: 2022-06-06
Attending: STUDENT IN AN ORGANIZED HEALTH CARE EDUCATION/TRAINING PROGRAM | Admitting: STUDENT IN AN ORGANIZED HEALTH CARE EDUCATION/TRAINING PROGRAM
Payer: MEDICARE

## 2022-01-01 VITALS
DIASTOLIC BLOOD PRESSURE: 68 MMHG | RESPIRATION RATE: 18 BRPM | OXYGEN SATURATION: 95 % | HEART RATE: 84 BPM | SYSTOLIC BLOOD PRESSURE: 130 MMHG | TEMPERATURE: 98.5 F

## 2022-01-01 VITALS
TEMPERATURE: 98.2 F | DIASTOLIC BLOOD PRESSURE: 74 MMHG | HEART RATE: 80 BPM | SYSTOLIC BLOOD PRESSURE: 167 MMHG | RESPIRATION RATE: 24 BRPM | OXYGEN SATURATION: 100 %

## 2022-01-01 VITALS
OXYGEN SATURATION: 92 % | TEMPERATURE: 98.3 F | RESPIRATION RATE: 19 BRPM | BODY MASS INDEX: 28.34 KG/M2 | SYSTOLIC BLOOD PRESSURE: 138 MMHG | DIASTOLIC BLOOD PRESSURE: 58 MMHG | WEIGHT: 160 LBS | HEART RATE: 69 BPM

## 2022-01-01 VITALS
BODY MASS INDEX: 24.95 KG/M2 | WEIGHT: 146.16 LBS | RESPIRATION RATE: 20 BRPM | SYSTOLIC BLOOD PRESSURE: 137 MMHG | DIASTOLIC BLOOD PRESSURE: 34 MMHG | TEMPERATURE: 97.7 F | OXYGEN SATURATION: 96 % | HEIGHT: 64 IN | HEART RATE: 88 BPM

## 2022-01-01 VITALS
WEIGHT: 155 LBS | RESPIRATION RATE: 18 BRPM | HEART RATE: 78 BPM | DIASTOLIC BLOOD PRESSURE: 67 MMHG | BODY MASS INDEX: 27.46 KG/M2 | SYSTOLIC BLOOD PRESSURE: 162 MMHG | OXYGEN SATURATION: 99 % | TEMPERATURE: 97.1 F

## 2022-01-01 VITALS — OXYGEN SATURATION: 95 % | HEIGHT: 63 IN | BODY MASS INDEX: 27.46 KG/M2 | HEART RATE: 89 BPM | WEIGHT: 155 LBS

## 2022-01-01 VITALS
DIASTOLIC BLOOD PRESSURE: 74 MMHG | OXYGEN SATURATION: 98 % | RESPIRATION RATE: 14 BRPM | SYSTOLIC BLOOD PRESSURE: 159 MMHG | HEART RATE: 93 BPM

## 2022-01-01 VITALS — SYSTOLIC BLOOD PRESSURE: 122 MMHG | DIASTOLIC BLOOD PRESSURE: 80 MMHG | HEART RATE: 80 BPM

## 2022-01-01 VITALS
BODY MASS INDEX: 22.76 KG/M2 | HEART RATE: 88 BPM | HEIGHT: 64 IN | RESPIRATION RATE: 20 BRPM | SYSTOLIC BLOOD PRESSURE: 139 MMHG | WEIGHT: 133.3 LBS | DIASTOLIC BLOOD PRESSURE: 60 MMHG | OXYGEN SATURATION: 93 % | TEMPERATURE: 98.1 F

## 2022-01-01 DIAGNOSIS — M25.50 POLYARTHRALGIA: ICD-10-CM

## 2022-01-01 DIAGNOSIS — S06.5X0A TRAUMATIC SUBDURAL HEMORRHAGE WITHOUT LOSS OF CONSCIOUSNESS, INITIAL ENCOUNTER (H): ICD-10-CM

## 2022-01-01 DIAGNOSIS — R54 FRAILTY: ICD-10-CM

## 2022-01-01 DIAGNOSIS — T14.8XXD DELAYED WOUND HEALING: ICD-10-CM

## 2022-01-01 DIAGNOSIS — M20.42 HAMMER TOES OF BOTH FEET: ICD-10-CM

## 2022-01-01 DIAGNOSIS — Z79.899 HIGH RISK MEDICATION USE: ICD-10-CM

## 2022-01-01 DIAGNOSIS — M20.41 HAMMERTOE OF RIGHT FOOT: Primary | ICD-10-CM

## 2022-01-01 DIAGNOSIS — I73.9 PAD (PERIPHERAL ARTERY DISEASE) (H): ICD-10-CM

## 2022-01-01 DIAGNOSIS — M06.09 SERONEGATIVE RHEUMATOID ARTHRITIS OF MULTIPLE SITES (H): ICD-10-CM

## 2022-01-01 DIAGNOSIS — M06.09 SERONEGATIVE RHEUMATOID ARTHRITIS OF MULTIPLE SITES (H): Primary | ICD-10-CM

## 2022-01-01 DIAGNOSIS — C94.6 MYELODYSPLASTIC DISEASE (H): ICD-10-CM

## 2022-01-01 DIAGNOSIS — M20.5X1 CROSSOVER TOE DEFORMITY OF RIGHT FOOT: ICD-10-CM

## 2022-01-01 DIAGNOSIS — S06.2XAA MIDLINE SHIFT OF BRAIN DUE TO HEMATOMA (H): ICD-10-CM

## 2022-01-01 DIAGNOSIS — S06.5XAA SDH (SUBDURAL HEMATOMA) (H): Primary | ICD-10-CM

## 2022-01-01 DIAGNOSIS — L03.115 BILATERAL CELLULITIS OF LOWER LEG: ICD-10-CM

## 2022-01-01 DIAGNOSIS — L08.9 LOCAL INFECTION OF WOUND: Primary | ICD-10-CM

## 2022-01-01 DIAGNOSIS — M20.41 HAMMER TOES OF BOTH FEET: ICD-10-CM

## 2022-01-01 DIAGNOSIS — S06.A0XA MIDLINE SHIFT OF BRAIN DUE TO HEMATOMA (H): ICD-10-CM

## 2022-01-01 DIAGNOSIS — R29.810 FACIAL DROOP: ICD-10-CM

## 2022-01-01 DIAGNOSIS — T14.8XXA LOCAL INFECTION OF WOUND: Primary | ICD-10-CM

## 2022-01-01 DIAGNOSIS — L03.116 BILATERAL CELLULITIS OF LOWER LEG: ICD-10-CM

## 2022-01-01 DIAGNOSIS — Z51.5 HOSPICE CARE PATIENT: ICD-10-CM

## 2022-01-01 DIAGNOSIS — S06.5XAA SUBDURAL HEMATOMA (H): ICD-10-CM

## 2022-01-01 DIAGNOSIS — T14.8XXA OPEN WOUND: ICD-10-CM

## 2022-01-01 DIAGNOSIS — S06.5XAA SUBDURAL HEMATOMA (H): Primary | ICD-10-CM

## 2022-01-01 DIAGNOSIS — I83.019 STASIS ULCER OF LOWER EXTREMITY, RIGHT (H): ICD-10-CM

## 2022-01-01 DIAGNOSIS — S06.5XAA SDH (SUBDURAL HEMATOMA) (H): ICD-10-CM

## 2022-01-01 DIAGNOSIS — K27.9 PEPTIC ULCER: ICD-10-CM

## 2022-01-01 DIAGNOSIS — D53.9 MACROCYTIC ANEMIA: ICD-10-CM

## 2022-01-01 DIAGNOSIS — M15.0 PRIMARY OSTEOARTHRITIS INVOLVING MULTIPLE JOINTS: ICD-10-CM

## 2022-01-01 DIAGNOSIS — M25.551 HIP PAIN, RIGHT: ICD-10-CM

## 2022-01-01 DIAGNOSIS — K22.10 ESOPHAGEAL ULCER WITHOUT BLEEDING: ICD-10-CM

## 2022-01-01 DIAGNOSIS — G93.40 ACUTE ENCEPHALOPATHY: Primary | ICD-10-CM

## 2022-01-01 DIAGNOSIS — L97.919 STASIS ULCER OF LOWER EXTREMITY, RIGHT (H): ICD-10-CM

## 2022-01-01 DIAGNOSIS — M20.41 HAMMERTOE OF RIGHT FOOT: ICD-10-CM
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
## 2022-01-01 DIAGNOSIS — I61.9 NONTRAUMATIC INTRACEREBRAL HEMORRHAGE, UNSPECIFIED CEREBRAL LOCATION, UNSPECIFIED LATERALITY (H): ICD-10-CM

## 2022-01-01 DIAGNOSIS — L03.116 BILATERAL CELLULITIS OF LOWER LEG: Primary | ICD-10-CM

## 2022-01-01 DIAGNOSIS — L03.115 CELLULITIS OF BOTH FEET: Primary | ICD-10-CM

## 2022-01-01 DIAGNOSIS — I10 ESSENTIAL HYPERTENSION, BENIGN: ICD-10-CM

## 2022-01-01 DIAGNOSIS — Z79.899 HIGH RISK MEDICATION USE: Chronic | ICD-10-CM

## 2022-01-01 DIAGNOSIS — M79.18 MYOFASCIAL MUSCLE PAIN: ICD-10-CM
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
## 2022-01-01 DIAGNOSIS — R53.81 DEBILITY: ICD-10-CM

## 2022-01-01 DIAGNOSIS — L03.116 CELLULITIS OF BOTH FEET: Primary | ICD-10-CM

## 2022-01-01 DIAGNOSIS — L03.115 BILATERAL CELLULITIS OF LOWER LEG: Primary | ICD-10-CM

## 2022-01-01 DIAGNOSIS — Z96.641 HISTORY OF RIGHT HIP REPLACEMENT: ICD-10-CM

## 2022-01-01 LAB
ABO/RH(D): NORMAL
ALBUMIN SERPL BCG-MCNC: 2.5 G/DL (ref 3.5–5.2)
ALBUMIN SERPL BCG-MCNC: 3.2 G/DL (ref 3.5–5.2)
ALBUMIN SERPL BCG-MCNC: 3.3 G/DL (ref 3.5–5.2)
ALBUMIN SERPL BCG-MCNC: 3.4 G/DL (ref 3.5–5.2)
ALBUMIN SERPL BCG-MCNC: 3.4 G/DL (ref 3.5–5.2)
ALBUMIN SERPL BCG-MCNC: 3.5 G/DL (ref 3.5–5.2)
ALBUMIN SERPL BCG-MCNC: 3.5 G/DL (ref 3.5–5.2)
ALBUMIN SERPL BCG-MCNC: 3.9 G/DL (ref 3.5–5.2)
ALBUMIN SERPL BCG-MCNC: 3.9 G/DL (ref 3.5–5.2)
ALBUMIN SERPL BCG-MCNC: 4 G/DL (ref 3.5–5.2)
ALBUMIN SERPL-MCNC: 3.6 G/DL (ref 3.4–5)
ALBUMIN SERPL-MCNC: 4 G/DL (ref 3.5–5)
ALBUMIN UR-MCNC: 10 MG/DL
ALBUMIN UR-MCNC: 30 MG/DL
ALBUMIN UR-MCNC: 30 MG/DL
ALBUMIN UR-MCNC: NEGATIVE MG/DL
ALP SERPL-CCNC: 105 U/L (ref 35–104)
ALP SERPL-CCNC: 117 U/L (ref 35–104)
ALP SERPL-CCNC: 123 U/L (ref 35–104)
ALP SERPL-CCNC: 125 U/L (ref 35–104)
ALP SERPL-CCNC: 134 U/L (ref 35–104)
ALP SERPL-CCNC: 160 U/L (ref 35–104)
ALP SERPL-CCNC: 170 U/L (ref 35–104)
ALP SERPL-CCNC: 200 U/L (ref 35–104)
ALP SERPL-CCNC: 67 U/L (ref 35–104)
ALP SERPL-CCNC: 69 U/L (ref 40–150)
ALP SERPL-CCNC: 70 U/L (ref 45–120)
ALP SERPL-CCNC: 85 U/L (ref 35–104)
ALP SERPL-CCNC: 86 U/L (ref 35–104)
ALP SERPL-CCNC: 87 U/L (ref 35–104)
ALP SERPL-CCNC: 91 U/L (ref 35–104)
ALT SERPL W P-5'-P-CCNC: 10 U/L (ref 10–35)
ALT SERPL W P-5'-P-CCNC: 110 U/L (ref 10–35)
ALT SERPL W P-5'-P-CCNC: 13 U/L (ref 10–35)
ALT SERPL W P-5'-P-CCNC: 14 U/L (ref 10–35)
ALT SERPL W P-5'-P-CCNC: 14 U/L (ref 10–35)
ALT SERPL W P-5'-P-CCNC: 16 U/L (ref 10–35)
ALT SERPL W P-5'-P-CCNC: 17 U/L (ref 10–35)
ALT SERPL W P-5'-P-CCNC: 19 U/L (ref 0–45)
ALT SERPL W P-5'-P-CCNC: 20 U/L (ref 10–35)
ALT SERPL W P-5'-P-CCNC: 23 U/L (ref 0–50)
ALT SERPL W P-5'-P-CCNC: 26 U/L (ref 10–35)
ALT SERPL W P-5'-P-CCNC: 26 U/L (ref 10–35)
ALT SERPL W P-5'-P-CCNC: 27 U/L (ref 10–35)
ALT SERPL W P-5'-P-CCNC: 28 U/L (ref 10–35)
ALT SERPL W P-5'-P-CCNC: 44 U/L (ref 10–35)
AMMONIA PLAS-SCNC: 44 UMOL/L (ref 11–51)
AMMONIA PLAS-SCNC: 51 UMOL/L (ref 11–51)
ANION GAP SERPL CALCULATED.3IONS-SCNC: 10 MMOL/L (ref 7–15)
ANION GAP SERPL CALCULATED.3IONS-SCNC: 11 MMOL/L (ref 7–15)
ANION GAP SERPL CALCULATED.3IONS-SCNC: 12 MMOL/L (ref 7–15)
ANION GAP SERPL CALCULATED.3IONS-SCNC: 13 MMOL/L (ref 7–15)
ANION GAP SERPL CALCULATED.3IONS-SCNC: 15 MMOL/L (ref 7–15)
ANION GAP SERPL CALCULATED.3IONS-SCNC: 16 MMOL/L (ref 7–15)
ANION GAP SERPL CALCULATED.3IONS-SCNC: 5 MMOL/L (ref 3–14)
ANION GAP SERPL CALCULATED.3IONS-SCNC: 6 MMOL/L (ref 3–14)
ANION GAP SERPL CALCULATED.3IONS-SCNC: 6 MMOL/L (ref 7–15)
ANION GAP SERPL CALCULATED.3IONS-SCNC: 7 MMOL/L (ref 3–14)
ANION GAP SERPL CALCULATED.3IONS-SCNC: 7 MMOL/L (ref 7–15)
ANION GAP SERPL CALCULATED.3IONS-SCNC: 8 MMOL/L (ref 3–14)
ANION GAP SERPL CALCULATED.3IONS-SCNC: 8 MMOL/L (ref 3–14)
ANION GAP SERPL CALCULATED.3IONS-SCNC: 8 MMOL/L (ref 5–18)
ANION GAP SERPL CALCULATED.3IONS-SCNC: 8 MMOL/L (ref 7–15)
ANION GAP SERPL CALCULATED.3IONS-SCNC: 9 MMOL/L (ref 3–14)
ANION GAP SERPL CALCULATED.3IONS-SCNC: 9 MMOL/L (ref 7–15)
ANTIBODY SCREEN: NEGATIVE
APPEARANCE UR: ABNORMAL
APPEARANCE UR: CLEAR
APTT PPP: 27 SECONDS (ref 22–38)
APTT PPP: 27 SECONDS (ref 22–38)
APTT PPP: 33 SECONDS (ref 22–38)
APTT PPP: 36 SECONDS (ref 22–38)
APTT PPP: 71 SECONDS (ref 22–38)
AST SERPL W P-5'-P-CCNC: 126 U/L (ref 10–35)
AST SERPL W P-5'-P-CCNC: 34 U/L (ref 10–35)
AST SERPL W P-5'-P-CCNC: 38 U/L (ref 0–45)
AST SERPL W P-5'-P-CCNC: 38 U/L (ref 10–35)
AST SERPL W P-5'-P-CCNC: 39 U/L (ref 10–35)
AST SERPL W P-5'-P-CCNC: 40 U/L (ref 0–40)
AST SERPL W P-5'-P-CCNC: 40 U/L (ref 10–35)
AST SERPL W P-5'-P-CCNC: 42 U/L (ref 10–35)
AST SERPL W P-5'-P-CCNC: 42 U/L (ref 10–35)
AST SERPL W P-5'-P-CCNC: 43 U/L (ref 10–35)
AST SERPL W P-5'-P-CCNC: 44 U/L (ref 10–35)
AST SERPL W P-5'-P-CCNC: 50 U/L (ref 10–35)
AST SERPL W P-5'-P-CCNC: 51 U/L (ref 10–35)
AST SERPL W P-5'-P-CCNC: 52 U/L (ref 10–35)
AST SERPL W P-5'-P-CCNC: 56 U/L (ref 10–35)
ATRIAL RATE - MUSE: 78 BPM
ATRIAL RATE - MUSE: 81 BPM
ATRIAL RATE - MUSE: 89 BPM
BACTERIA BLD CULT: NO GROWTH
BACTERIA FLD CULT: ABNORMAL
BACTERIA SPT CULT: ABNORMAL
BACTERIA SPT CULT: NORMAL
BACTERIA UR CULT: NO GROWTH
BACTERIA WND CULT: ABNORMAL
BACTERIA WND CULT: ABNORMAL
BASE EXCESS BLDA CALC-SCNC: -0.4 MMOL/L (ref -9–1.8)
BASE EXCESS BLDA CALC-SCNC: -1.4 MMOL/L (ref -9–1.8)
BASE EXCESS BLDA CALC-SCNC: -3 MMOL/L (ref -9–1.8)
BASE EXCESS BLDA CALC-SCNC: -4.3 MMOL/L (ref -9.6–2)
BASE EXCESS BLDA CALC-SCNC: 7.1 MMOL/L (ref -9–1.8)
BASE EXCESS BLDV CALC-SCNC: -3.9 MMOL/L (ref -7.7–1.9)
BASOPHILS # BLD AUTO: 0.1 10E3/UL (ref 0–0.2)
BASOPHILS # BLD AUTO: 0.4 10E3/UL (ref 0–0.2)
BASOPHILS # BLD MANUAL: 0.7 10E3/UL (ref 0–0.2)
BASOPHILS NFR BLD AUTO: 0 %
BASOPHILS NFR BLD AUTO: 1 %
BASOPHILS NFR BLD MANUAL: 1 %
BILIRUB DIRECT SERPL-MCNC: 0.24 MG/DL (ref 0–0.3)
BILIRUB DIRECT SERPL-MCNC: 0.35 MG/DL (ref 0–0.3)
BILIRUB SERPL-MCNC: 0.2 MG/DL
BILIRUB SERPL-MCNC: 0.3 MG/DL
BILIRUB SERPL-MCNC: 0.3 MG/DL
BILIRUB SERPL-MCNC: 0.4 MG/DL
BILIRUB SERPL-MCNC: 0.5 MG/DL
BILIRUB SERPL-MCNC: 0.6 MG/DL
BILIRUB SERPL-MCNC: 0.7 MG/DL
BILIRUB SERPL-MCNC: 0.8 MG/DL
BILIRUB SERPL-MCNC: 0.9 MG/DL
BILIRUB SERPL-MCNC: 1 MG/DL
BILIRUB SERPL-MCNC: 1.3 MG/DL (ref 0.2–1.3)
BILIRUB SERPL-MCNC: 1.5 MG/DL (ref 0–1)
BILIRUB UR QL STRIP: NEGATIVE
BLD PROD TYP BPU: NORMAL
BLOOD COMPONENT TYPE: NORMAL
BUN SERPL-MCNC: 100 MG/DL (ref 8–23)
BUN SERPL-MCNC: 100 MG/DL (ref 8–23)
BUN SERPL-MCNC: 11.3 MG/DL (ref 8–23)
BUN SERPL-MCNC: 12 MG/DL (ref 7–30)
BUN SERPL-MCNC: 13 MG/DL (ref 7–30)
BUN SERPL-MCNC: 14 MG/DL (ref 7–30)
BUN SERPL-MCNC: 14.3 MG/DL (ref 8–23)
BUN SERPL-MCNC: 15 MG/DL (ref 7–30)
BUN SERPL-MCNC: 15 MG/DL (ref 7–30)
BUN SERPL-MCNC: 15.6 MG/DL (ref 8–23)
BUN SERPL-MCNC: 15.8 MG/DL (ref 8–23)
BUN SERPL-MCNC: 15.9 MG/DL (ref 8–23)
BUN SERPL-MCNC: 16 MG/DL (ref 7–30)
BUN SERPL-MCNC: 16 MG/DL (ref 7–30)
BUN SERPL-MCNC: 16.4 MG/DL (ref 8–23)
BUN SERPL-MCNC: 17 MG/DL (ref 7–30)
BUN SERPL-MCNC: 17 MG/DL (ref 7–30)
BUN SERPL-MCNC: 17.1 MG/DL (ref 8–23)
BUN SERPL-MCNC: 17.8 MG/DL (ref 8–23)
BUN SERPL-MCNC: 18.1 MG/DL (ref 8–23)
BUN SERPL-MCNC: 19 MG/DL (ref 8–28)
BUN SERPL-MCNC: 19.3 MG/DL (ref 8–23)
BUN SERPL-MCNC: 19.4 MG/DL (ref 8–23)
BUN SERPL-MCNC: 19.5 MG/DL (ref 8–23)
BUN SERPL-MCNC: 20 MG/DL (ref 8–23)
BUN SERPL-MCNC: 20 MG/DL (ref 8–23)
BUN SERPL-MCNC: 20.2 MG/DL (ref 8–23)
BUN SERPL-MCNC: 20.4 MG/DL (ref 8–23)
BUN SERPL-MCNC: 21.2 MG/DL (ref 8–23)
BUN SERPL-MCNC: 21.5 MG/DL (ref 8–23)
BUN SERPL-MCNC: 21.9 MG/DL (ref 8–23)
BUN SERPL-MCNC: 21.9 MG/DL (ref 8–23)
BUN SERPL-MCNC: 23.1 MG/DL (ref 8–23)
BUN SERPL-MCNC: 23.2 MG/DL (ref 8–23)
BUN SERPL-MCNC: 23.4 MG/DL (ref 8–23)
BUN SERPL-MCNC: 23.5 MG/DL (ref 8–23)
BUN SERPL-MCNC: 25.2 MG/DL (ref 8–23)
BUN SERPL-MCNC: 25.5 MG/DL (ref 8–23)
BUN SERPL-MCNC: 25.5 MG/DL (ref 8–23)
BUN SERPL-MCNC: 26.5 MG/DL (ref 8–23)
BUN SERPL-MCNC: 27.3 MG/DL (ref 8–23)
BUN SERPL-MCNC: 28.3 MG/DL (ref 8–23)
BUN SERPL-MCNC: 29.1 MG/DL (ref 8–23)
BUN SERPL-MCNC: 31.8 MG/DL (ref 8–23)
BUN SERPL-MCNC: 33.7 MG/DL (ref 8–23)
BUN SERPL-MCNC: 34.8 MG/DL (ref 8–23)
BUN SERPL-MCNC: 35.3 MG/DL (ref 8–23)
BUN SERPL-MCNC: 37.3 MG/DL (ref 8–23)
BUN SERPL-MCNC: 40.5 MG/DL (ref 8–23)
BUN SERPL-MCNC: 43.5 MG/DL (ref 8–23)
BUN SERPL-MCNC: 46.5 MG/DL (ref 8–23)
BUN SERPL-MCNC: 47.4 MG/DL (ref 8–23)
BUN SERPL-MCNC: 56.9 MG/DL (ref 8–23)
BUN SERPL-MCNC: 61.2 MG/DL (ref 8–23)
BUN SERPL-MCNC: 64.2 MG/DL (ref 8–23)
BUN SERPL-MCNC: 70.2 MG/DL (ref 8–23)
BUN SERPL-MCNC: 98.9 MG/DL (ref 8–23)
C DIFF TOX B STL QL: NEGATIVE
C DIFF TOX B STL QL: NEGATIVE
C PNEUM DNA SPEC QL NAA+PROBE: NOT DETECTED
CA-I BLD-MCNC: 4.6 MG/DL (ref 4.4–5.2)
CA-I BLD-MCNC: 4.7 MG/DL (ref 4.4–5.2)
CA-I BLD-MCNC: 4.8 MG/DL (ref 4.4–5.2)
CA-I BLD-MCNC: 4.8 MG/DL (ref 4.4–5.2)
CALCIUM SERPL-MCNC: 7.9 MG/DL (ref 8.8–10.2)
CALCIUM SERPL-MCNC: 8 MG/DL (ref 8.8–10.2)
CALCIUM SERPL-MCNC: 8.1 MG/DL (ref 8.8–10.2)
CALCIUM SERPL-MCNC: 8.2 MG/DL (ref 8.8–10.2)
CALCIUM SERPL-MCNC: 8.3 MG/DL (ref 8.8–10.2)
CALCIUM SERPL-MCNC: 8.4 MG/DL (ref 8.8–10.2)
CALCIUM SERPL-MCNC: 8.4 MG/DL (ref 8.8–10.2)
CALCIUM SERPL-MCNC: 8.5 MG/DL (ref 8.5–10.1)
CALCIUM SERPL-MCNC: 8.5 MG/DL (ref 8.8–10.2)
CALCIUM SERPL-MCNC: 8.7 MG/DL (ref 8.5–10.1)
CALCIUM SERPL-MCNC: 8.7 MG/DL (ref 8.8–10.2)
CALCIUM SERPL-MCNC: 8.7 MG/DL (ref 8.8–10.2)
CALCIUM SERPL-MCNC: 8.8 MG/DL (ref 8.5–10.1)
CALCIUM SERPL-MCNC: 8.8 MG/DL (ref 8.5–10.1)
CALCIUM SERPL-MCNC: 8.8 MG/DL (ref 8.8–10.2)
CALCIUM SERPL-MCNC: 8.8 MG/DL (ref 8.8–10.2)
CALCIUM SERPL-MCNC: 8.9 MG/DL (ref 8.5–10.1)
CALCIUM SERPL-MCNC: 8.9 MG/DL (ref 8.8–10.2)
CALCIUM SERPL-MCNC: 9 MG/DL (ref 8.5–10.1)
CALCIUM SERPL-MCNC: 9 MG/DL (ref 8.8–10.2)
CALCIUM SERPL-MCNC: 9.1 MG/DL (ref 8.8–10.2)
CALCIUM SERPL-MCNC: 9.1 MG/DL (ref 8.8–10.2)
CALCIUM SERPL-MCNC: 9.2 MG/DL (ref 8.5–10.1)
CALCIUM SERPL-MCNC: 9.2 MG/DL (ref 8.5–10.5)
CALCIUM SERPL-MCNC: 9.2 MG/DL (ref 8.8–10.2)
CALCIUM SERPL-MCNC: 9.3 MG/DL (ref 8.5–10.1)
CALCIUM SERPL-MCNC: 9.4 MG/DL (ref 8.8–10.2)
CALCIUM SERPL-MCNC: 9.5 MG/DL (ref 8.5–10.1)
CALCIUM SERPL-MCNC: 9.5 MG/DL (ref 8.8–10.2)
CALCIUM SERPL-MCNC: 9.5 MG/DL (ref 8.8–10.2)
CALCIUM SERPL-MCNC: 9.6 MG/DL (ref 8.8–10.2)
CALCIUM SERPL-MCNC: 9.6 MG/DL (ref 8.8–10.2)
CALCIUM SERPL-MCNC: 9.7 MG/DL (ref 8.8–10.2)
CALCIUM SERPL-MCNC: 9.7 MG/DL (ref 8.8–10.2)
CALCIUM SERPL-MCNC: 9.9 MG/DL (ref 8.8–10.2)
CF REDUC 30M P MA P HEP LENFR BLD TEG: 0 % (ref 0–8)
CF REDUC 60M P MA P HEPASE LENFR BLD TEG: 0 % (ref 0–15)
CFT P HPASE BLD TEG: 1.9 MINUTE (ref 1–3)
CHLORIDE BLD-SCNC: 103 MMOL/L (ref 94–109)
CHLORIDE BLD-SCNC: 103 MMOL/L (ref 94–109)
CHLORIDE BLD-SCNC: 104 MMOL/L (ref 94–109)
CHLORIDE BLD-SCNC: 106 MMOL/L (ref 98–107)
CHLORIDE BLD-SCNC: 107 MMOL/L (ref 94–109)
CHLORIDE BLD-SCNC: 108 MMOL/L (ref 94–109)
CHLORIDE SERPL-SCNC: 101 MMOL/L (ref 98–107)
CHLORIDE SERPL-SCNC: 101 MMOL/L (ref 98–107)
CHLORIDE SERPL-SCNC: 102 MMOL/L (ref 98–107)
CHLORIDE SERPL-SCNC: 103 MMOL/L (ref 98–107)
CHLORIDE SERPL-SCNC: 104 MMOL/L (ref 98–107)
CHLORIDE SERPL-SCNC: 105 MMOL/L (ref 98–107)
CHLORIDE SERPL-SCNC: 106 MMOL/L (ref 98–107)
CHLORIDE SERPL-SCNC: 106 MMOL/L (ref 98–107)
CHLORIDE SERPL-SCNC: 107 MMOL/L (ref 98–107)
CHLORIDE SERPL-SCNC: 108 MMOL/L (ref 98–107)
CHLORIDE SERPL-SCNC: 110 MMOL/L (ref 98–107)
CHLORIDE SERPL-SCNC: 111 MMOL/L (ref 98–107)
CHLORIDE SERPL-SCNC: 112 MMOL/L (ref 98–107)
CHLORIDE SERPL-SCNC: 115 MMOL/L (ref 98–107)
CHLORIDE SERPL-SCNC: 117 MMOL/L (ref 98–107)
CHLORIDE SERPL-SCNC: 118 MMOL/L (ref 98–107)
CHLORIDE SERPL-SCNC: 119 MMOL/L (ref 98–107)
CHLORIDE SERPL-SCNC: 119 MMOL/L (ref 98–107)
CHLORIDE SERPL-SCNC: 120 MMOL/L (ref 98–107)
CHLORIDE SERPL-SCNC: 121 MMOL/L (ref 98–107)
CHLORIDE SERPL-SCNC: 124 MMOL/L (ref 98–107)
CHLORIDE SERPL-SCNC: 124 MMOL/L (ref 98–107)
CHLORIDE SERPL-SCNC: 126 MMOL/L (ref 98–107)
CHLORIDE SERPL-SCNC: 126 MMOL/L (ref 98–107)
CHLORIDE SERPL-SCNC: 127 MMOL/L (ref 98–107)
CHOLEST SERPL-MCNC: 97 MG/DL
CI (COAGULATION INDEX)(Z): 2.1 (ref -3–3)
CLOT ANGLE P HPASE BLD TEG: 69.5 DEGREES (ref 53–72)
CLOT INIT P HPASE BLD TEG: 6.1 MINUTE (ref 5–10)
CLOT STRENGTH P HPASE BLD TEG: 15.7 KD/SC (ref 4.5–11)
CO2 SERPL-SCNC: 24 MMOL/L (ref 22–31)
CO2 SERPL-SCNC: 25 MMOL/L (ref 20–32)
CO2 SERPL-SCNC: 25 MMOL/L (ref 20–32)
CO2 SERPL-SCNC: 26 MMOL/L (ref 20–32)
CO2 SERPL-SCNC: 27 MMOL/L (ref 20–32)
CO2 SERPL-SCNC: 28 MMOL/L (ref 20–32)
CODING SYSTEM: NORMAL
COLONOSCOPY: NORMAL
COLOR UR AUTO: ABNORMAL
COLOR UR AUTO: ABNORMAL
COLOR UR AUTO: NORMAL
COLOR UR AUTO: YELLOW
CREAT SERPL-MCNC: 0.34 MG/DL (ref 0.51–0.95)
CREAT SERPL-MCNC: 0.35 MG/DL (ref 0.51–0.95)
CREAT SERPL-MCNC: 0.36 MG/DL (ref 0.51–0.95)
CREAT SERPL-MCNC: 0.37 MG/DL (ref 0.51–0.95)
CREAT SERPL-MCNC: 0.38 MG/DL (ref 0.51–0.95)
CREAT SERPL-MCNC: 0.38 MG/DL (ref 0.51–0.95)
CREAT SERPL-MCNC: 0.39 MG/DL (ref 0.51–0.95)
CREAT SERPL-MCNC: 0.4 MG/DL (ref 0.51–0.95)
CREAT SERPL-MCNC: 0.4 MG/DL (ref 0.51–0.95)
CREAT SERPL-MCNC: 0.41 MG/DL (ref 0.51–0.95)
CREAT SERPL-MCNC: 0.41 MG/DL (ref 0.51–0.95)
CREAT SERPL-MCNC: 0.42 MG/DL (ref 0.51–0.95)
CREAT SERPL-MCNC: 0.43 MG/DL (ref 0.51–0.95)
CREAT SERPL-MCNC: 0.44 MG/DL (ref 0.51–0.95)
CREAT SERPL-MCNC: 0.45 MG/DL (ref 0.51–0.95)
CREAT SERPL-MCNC: 0.45 MG/DL (ref 0.51–0.95)
CREAT SERPL-MCNC: 0.46 MG/DL (ref 0.51–0.95)
CREAT SERPL-MCNC: 0.49 MG/DL (ref 0.51–0.95)
CREAT SERPL-MCNC: 0.51 MG/DL (ref 0.51–0.95)
CREAT SERPL-MCNC: 0.52 MG/DL (ref 0.51–0.95)
CREAT SERPL-MCNC: 0.53 MG/DL (ref 0.51–0.95)
CREAT SERPL-MCNC: 0.54 MG/DL (ref 0.51–0.95)
CREAT SERPL-MCNC: 0.54 MG/DL (ref 0.51–0.95)
CREAT SERPL-MCNC: 0.55 MG/DL (ref 0.51–0.95)
CREAT SERPL-MCNC: 0.55 MG/DL (ref 0.51–0.95)
CREAT SERPL-MCNC: 0.58 MG/DL (ref 0.52–1.04)
CREAT SERPL-MCNC: 0.59 MG/DL (ref 0.51–0.95)
CREAT SERPL-MCNC: 0.59 MG/DL (ref 0.52–1.04)
CREAT SERPL-MCNC: 0.59 MG/DL (ref 0.52–1.04)
CREAT SERPL-MCNC: 0.6 MG/DL (ref 0.52–1.04)
CREAT SERPL-MCNC: 0.6 MG/DL (ref 0.52–1.04)
CREAT SERPL-MCNC: 0.61 MG/DL (ref 0.52–1.04)
CREAT SERPL-MCNC: 0.62 MG/DL (ref 0.51–0.95)
CREAT SERPL-MCNC: 0.63 MG/DL (ref 0.52–1.04)
CREAT SERPL-MCNC: 0.64 MG/DL (ref 0.52–1.04)
CREAT SERPL-MCNC: 0.64 MG/DL (ref 0.52–1.04)
CREAT SERPL-MCNC: 0.66 MG/DL (ref 0.51–0.95)
CREAT SERPL-MCNC: 0.68 MG/DL (ref 0.51–0.95)
CREAT SERPL-MCNC: 0.69 MG/DL (ref 0.6–1.1)
CREAT SERPL-MCNC: 0.7 MG/DL (ref 0.51–0.95)
CREAT SERPL-MCNC: 0.73 MG/DL (ref 0.51–0.95)
CREAT SERPL-MCNC: 0.84 MG/DL (ref 0.51–0.95)
CREAT SERPL-MCNC: 0.93 MG/DL (ref 0.51–0.95)
CREAT SERPL-MCNC: 0.96 MG/DL (ref 0.51–0.95)
CROSSMATCH: NORMAL
CRP SERPL-MCNC: 17.2 MG/L
CRP SERPL-MCNC: 59.4 MG/L
DEPRECATED HCO3 PLAS-SCNC: 17 MMOL/L (ref 22–29)
DEPRECATED HCO3 PLAS-SCNC: 17 MMOL/L (ref 22–29)
DEPRECATED HCO3 PLAS-SCNC: 18 MMOL/L (ref 22–29)
DEPRECATED HCO3 PLAS-SCNC: 19 MMOL/L (ref 22–29)
DEPRECATED HCO3 PLAS-SCNC: 19 MMOL/L (ref 22–29)
DEPRECATED HCO3 PLAS-SCNC: 20 MMOL/L (ref 22–29)
DEPRECATED HCO3 PLAS-SCNC: 21 MMOL/L (ref 22–29)
DEPRECATED HCO3 PLAS-SCNC: 22 MMOL/L (ref 22–29)
DEPRECATED HCO3 PLAS-SCNC: 23 MMOL/L (ref 22–29)
DEPRECATED HCO3 PLAS-SCNC: 24 MMOL/L (ref 22–29)
DEPRECATED HCO3 PLAS-SCNC: 25 MMOL/L (ref 22–29)
DEPRECATED HCO3 PLAS-SCNC: 26 MMOL/L (ref 22–29)
DEPRECATED HCO3 PLAS-SCNC: 27 MMOL/L (ref 22–29)
DEPRECATED HCO3 PLAS-SCNC: 28 MMOL/L (ref 22–29)
DEPRECATED HCO3 PLAS-SCNC: 29 MMOL/L (ref 22–29)
DEPRECATED HCO3 PLAS-SCNC: 29 MMOL/L (ref 22–29)
DEPRECATED HCO3 PLAS-SCNC: 30 MMOL/L (ref 22–29)
DEPRECATED HCO3 PLAS-SCNC: 31 MMOL/L (ref 22–29)
DIASTOLIC BLOOD PRESSURE - MUSE: 81 MMHG
DIASTOLIC BLOOD PRESSURE - MUSE: NORMAL MMHG
DIASTOLIC BLOOD PRESSURE - MUSE: NORMAL MMHG
EOSINOPHIL # BLD AUTO: 0 10E3/UL (ref 0–0.7)
EOSINOPHIL # BLD MANUAL: 0 10E3/UL (ref 0–0.7)
EOSINOPHIL NFR BLD AUTO: 0 %
EOSINOPHIL NFR BLD MANUAL: 0 %
ERYTHROCYTE [DISTWIDTH] IN BLOOD BY AUTOMATED COUNT: 16 % (ref 10–15)
ERYTHROCYTE [DISTWIDTH] IN BLOOD BY AUTOMATED COUNT: 16.1 % (ref 10–15)
ERYTHROCYTE [DISTWIDTH] IN BLOOD BY AUTOMATED COUNT: 16.2 % (ref 10–15)
ERYTHROCYTE [DISTWIDTH] IN BLOOD BY AUTOMATED COUNT: 16.2 % (ref 10–15)
ERYTHROCYTE [DISTWIDTH] IN BLOOD BY AUTOMATED COUNT: 16.3 % (ref 10–15)
ERYTHROCYTE [DISTWIDTH] IN BLOOD BY AUTOMATED COUNT: 16.3 % (ref 10–15)
ERYTHROCYTE [DISTWIDTH] IN BLOOD BY AUTOMATED COUNT: 16.5 % (ref 10–15)
ERYTHROCYTE [DISTWIDTH] IN BLOOD BY AUTOMATED COUNT: 16.6 % (ref 10–15)
ERYTHROCYTE [DISTWIDTH] IN BLOOD BY AUTOMATED COUNT: 16.8 % (ref 10–15)
ERYTHROCYTE [DISTWIDTH] IN BLOOD BY AUTOMATED COUNT: 16.9 % (ref 10–15)
ERYTHROCYTE [DISTWIDTH] IN BLOOD BY AUTOMATED COUNT: 17 % (ref 10–15)
ERYTHROCYTE [DISTWIDTH] IN BLOOD BY AUTOMATED COUNT: 17.1 % (ref 10–15)
ERYTHROCYTE [DISTWIDTH] IN BLOOD BY AUTOMATED COUNT: 17.2 % (ref 10–15)
ERYTHROCYTE [DISTWIDTH] IN BLOOD BY AUTOMATED COUNT: 17.3 % (ref 10–15)
ERYTHROCYTE [DISTWIDTH] IN BLOOD BY AUTOMATED COUNT: 17.4 % (ref 10–15)
ERYTHROCYTE [DISTWIDTH] IN BLOOD BY AUTOMATED COUNT: 17.4 % (ref 10–15)
ERYTHROCYTE [DISTWIDTH] IN BLOOD BY AUTOMATED COUNT: 17.6 % (ref 10–15)
ERYTHROCYTE [DISTWIDTH] IN BLOOD BY AUTOMATED COUNT: 17.6 % (ref 10–15)
ERYTHROCYTE [DISTWIDTH] IN BLOOD BY AUTOMATED COUNT: 17.7 % (ref 10–15)
ERYTHROCYTE [DISTWIDTH] IN BLOOD BY AUTOMATED COUNT: 17.8 % (ref 10–15)
ERYTHROCYTE [DISTWIDTH] IN BLOOD BY AUTOMATED COUNT: 17.8 % (ref 10–15)
ERYTHROCYTE [DISTWIDTH] IN BLOOD BY AUTOMATED COUNT: 18.2 % (ref 10–15)
ERYTHROCYTE [DISTWIDTH] IN BLOOD BY AUTOMATED COUNT: 18.5 % (ref 10–15)
ERYTHROCYTE [DISTWIDTH] IN BLOOD BY AUTOMATED COUNT: 18.7 % (ref 10–15)
ERYTHROCYTE [DISTWIDTH] IN BLOOD BY AUTOMATED COUNT: 18.7 % (ref 10–15)
ERYTHROCYTE [DISTWIDTH] IN BLOOD BY AUTOMATED COUNT: 18.9 % (ref 10–15)
ERYTHROCYTE [DISTWIDTH] IN BLOOD BY AUTOMATED COUNT: 18.9 % (ref 10–15)
ERYTHROCYTE [DISTWIDTH] IN BLOOD BY AUTOMATED COUNT: 19 % (ref 10–15)
ERYTHROCYTE [DISTWIDTH] IN BLOOD BY AUTOMATED COUNT: 19 % (ref 10–15)
ERYTHROCYTE [DISTWIDTH] IN BLOOD BY AUTOMATED COUNT: 19.6 % (ref 10–15)
ERYTHROCYTE [DISTWIDTH] IN BLOOD BY AUTOMATED COUNT: 19.7 % (ref 10–15)
ERYTHROCYTE [DISTWIDTH] IN BLOOD BY AUTOMATED COUNT: 19.8 % (ref 10–15)
ERYTHROCYTE [DISTWIDTH] IN BLOOD BY AUTOMATED COUNT: 19.9 % (ref 10–15)
ERYTHROCYTE [DISTWIDTH] IN BLOOD BY AUTOMATED COUNT: 20 % (ref 10–15)
ERYTHROCYTE [DISTWIDTH] IN BLOOD BY AUTOMATED COUNT: 20 % (ref 10–15)
ERYTHROCYTE [DISTWIDTH] IN BLOOD BY AUTOMATED COUNT: 20.1 % (ref 10–15)
ERYTHROCYTE [DISTWIDTH] IN BLOOD BY AUTOMATED COUNT: 20.1 % (ref 10–15)
ERYTHROCYTE [DISTWIDTH] IN BLOOD BY AUTOMATED COUNT: 20.2 % (ref 10–15)
ERYTHROCYTE [DISTWIDTH] IN BLOOD BY AUTOMATED COUNT: 20.3 % (ref 10–15)
ERYTHROCYTE [DISTWIDTH] IN BLOOD BY AUTOMATED COUNT: 20.4 % (ref 10–15)
ERYTHROCYTE [DISTWIDTH] IN BLOOD BY AUTOMATED COUNT: 20.6 % (ref 10–15)
ERYTHROCYTE [DISTWIDTH] IN BLOOD BY AUTOMATED COUNT: 20.7 % (ref 10–15)
ERYTHROCYTE [DISTWIDTH] IN BLOOD BY AUTOMATED COUNT: 21.2 % (ref 10–15)
ERYTHROCYTE [DISTWIDTH] IN BLOOD BY AUTOMATED COUNT: 21.3 % (ref 10–15)
ERYTHROCYTE [DISTWIDTH] IN BLOOD BY AUTOMATED COUNT: 23.3 % (ref 10–15)
ERYTHROCYTE [DISTWIDTH] IN BLOOD BY AUTOMATED COUNT: 23.7 % (ref 10–15)
ERYTHROCYTE [DISTWIDTH] IN BLOOD BY AUTOMATED COUNT: 23.8 % (ref 10–15)
ERYTHROCYTE [DISTWIDTH] IN BLOOD BY AUTOMATED COUNT: 23.9 % (ref 10–15)
ERYTHROCYTE [DISTWIDTH] IN BLOOD BY AUTOMATED COUNT: 24.1 % (ref 10–15)
ERYTHROCYTE [DISTWIDTH] IN BLOOD BY AUTOMATED COUNT: 24.2 % (ref 10–15)
ERYTHROCYTE [DISTWIDTH] IN BLOOD BY AUTOMATED COUNT: 25.4 % (ref 10–15)
ERYTHROCYTE [DISTWIDTH] IN BLOOD BY AUTOMATED COUNT: 25.6 % (ref 10–15)
ERYTHROCYTE [DISTWIDTH] IN BLOOD BY AUTOMATED COUNT: 25.7 % (ref 10–15)
ERYTHROCYTE [DISTWIDTH] IN BLOOD BY AUTOMATED COUNT: 25.9 % (ref 10–15)
ERYTHROCYTE [DISTWIDTH] IN BLOOD BY AUTOMATED COUNT: 26.1 % (ref 10–15)
ERYTHROCYTE [SEDIMENTATION RATE] IN BLOOD BY WESTERGREN METHOD: 60 MM/HR (ref 0–30)
ERYTHROCYTE [SEDIMENTATION RATE] IN BLOOD BY WESTERGREN METHOD: 64 MM/HR (ref 0–30)
FERRITIN SERPL-MCNC: 568 NG/ML (ref 8–252)
FIBRINOGEN PPP-MCNC: 197 MG/DL (ref 170–490)
FIBRINOGEN PPP-MCNC: 267 MG/DL (ref 170–490)
FIBRINOGEN PPP-MCNC: 523 MG/DL (ref 170–490)
FIBRINOGEN PPP-MCNC: NORMAL MG/DL
FLUAV H1 2009 PAND RNA SPEC QL NAA+PROBE: NOT DETECTED
FLUAV H1 RNA SPEC QL NAA+PROBE: NOT DETECTED
FLUAV H3 RNA SPEC QL NAA+PROBE: NOT DETECTED
FLUAV RNA SPEC QL NAA+PROBE: NOT DETECTED
FLUBV RNA SPEC QL NAA+PROBE: NOT DETECTED
FOLATE SERPL-MCNC: 15.1 NG/ML (ref 4.6–34.8)
FOLATE SERPL-MCNC: 56.2 NG/ML
GFR SERPL CREATININE-BSD FRML MDRD: 60 ML/MIN/1.73M2
GFR SERPL CREATININE-BSD FRML MDRD: 63 ML/MIN/1.73M2
GFR SERPL CREATININE-BSD FRML MDRD: 71 ML/MIN/1.73M2
GFR SERPL CREATININE-BSD FRML MDRD: 84 ML/MIN/1.73M2
GFR SERPL CREATININE-BSD FRML MDRD: 88 ML/MIN/1.73M2
GFR SERPL CREATININE-BSD FRML MDRD: 88 ML/MIN/1.73M2
GFR SERPL CREATININE-BSD FRML MDRD: 89 ML/MIN/1.73M2
GFR SERPL CREATININE-BSD FRML MDRD: 89 ML/MIN/1.73M2
GFR SERPL CREATININE-BSD FRML MDRD: 90 ML/MIN/1.73M2
GFR SERPL CREATININE-BSD FRML MDRD: >90 ML/MIN/1.73M2
GLUCOSE BLD-MCNC: 100 MG/DL (ref 70–99)
GLUCOSE BLD-MCNC: 101 MG/DL (ref 70–99)
GLUCOSE BLD-MCNC: 103 MG/DL (ref 70–99)
GLUCOSE BLD-MCNC: 106 MG/DL (ref 70–99)
GLUCOSE BLD-MCNC: 107 MG/DL (ref 70–125)
GLUCOSE BLD-MCNC: 113 MG/DL (ref 70–99)
GLUCOSE BLD-MCNC: 114 MG/DL (ref 70–99)
GLUCOSE BLD-MCNC: 131 MG/DL (ref 70–99)
GLUCOSE BLD-MCNC: 88 MG/DL (ref 70–99)
GLUCOSE BLD-MCNC: 94 MG/DL (ref 70–99)
GLUCOSE BLD-MCNC: 95 MG/DL (ref 70–99)
GLUCOSE BLDC GLUCOMTR-MCNC: 100 MG/DL (ref 70–99)
GLUCOSE BLDC GLUCOMTR-MCNC: 103 MG/DL (ref 70–99)
GLUCOSE BLDC GLUCOMTR-MCNC: 103 MG/DL (ref 70–99)
GLUCOSE BLDC GLUCOMTR-MCNC: 104 MG/DL (ref 70–99)
GLUCOSE BLDC GLUCOMTR-MCNC: 105 MG/DL (ref 70–99)
GLUCOSE BLDC GLUCOMTR-MCNC: 109 MG/DL (ref 70–99)
GLUCOSE BLDC GLUCOMTR-MCNC: 110 MG/DL (ref 70–99)
GLUCOSE BLDC GLUCOMTR-MCNC: 113 MG/DL (ref 70–99)
GLUCOSE BLDC GLUCOMTR-MCNC: 113 MG/DL (ref 70–99)
GLUCOSE BLDC GLUCOMTR-MCNC: 115 MG/DL (ref 70–99)
GLUCOSE BLDC GLUCOMTR-MCNC: 117 MG/DL (ref 70–99)
GLUCOSE BLDC GLUCOMTR-MCNC: 121 MG/DL (ref 70–99)
GLUCOSE BLDC GLUCOMTR-MCNC: 123 MG/DL (ref 70–99)
GLUCOSE BLDC GLUCOMTR-MCNC: 128 MG/DL (ref 70–99)
GLUCOSE BLDC GLUCOMTR-MCNC: 139 MG/DL (ref 70–99)
GLUCOSE BLDC GLUCOMTR-MCNC: 140 MG/DL (ref 70–99)
GLUCOSE BLDC GLUCOMTR-MCNC: 72 MG/DL (ref 70–99)
GLUCOSE BLDC GLUCOMTR-MCNC: 81 MG/DL (ref 70–99)
GLUCOSE BLDC GLUCOMTR-MCNC: 86 MG/DL (ref 70–99)
GLUCOSE BLDC GLUCOMTR-MCNC: 86 MG/DL (ref 70–99)
GLUCOSE SERPL-MCNC: 102 MG/DL (ref 70–99)
GLUCOSE SERPL-MCNC: 102 MG/DL (ref 70–99)
GLUCOSE SERPL-MCNC: 103 MG/DL (ref 70–99)
GLUCOSE SERPL-MCNC: 104 MG/DL (ref 70–99)
GLUCOSE SERPL-MCNC: 105 MG/DL (ref 70–99)
GLUCOSE SERPL-MCNC: 108 MG/DL (ref 70–99)
GLUCOSE SERPL-MCNC: 108 MG/DL (ref 70–99)
GLUCOSE SERPL-MCNC: 110 MG/DL (ref 70–99)
GLUCOSE SERPL-MCNC: 110 MG/DL (ref 70–99)
GLUCOSE SERPL-MCNC: 111 MG/DL (ref 70–99)
GLUCOSE SERPL-MCNC: 112 MG/DL (ref 70–99)
GLUCOSE SERPL-MCNC: 113 MG/DL (ref 70–99)
GLUCOSE SERPL-MCNC: 114 MG/DL (ref 70–99)
GLUCOSE SERPL-MCNC: 114 MG/DL (ref 70–99)
GLUCOSE SERPL-MCNC: 116 MG/DL (ref 70–99)
GLUCOSE SERPL-MCNC: 117 MG/DL (ref 70–99)
GLUCOSE SERPL-MCNC: 119 MG/DL (ref 70–99)
GLUCOSE SERPL-MCNC: 119 MG/DL (ref 70–99)
GLUCOSE SERPL-MCNC: 120 MG/DL (ref 70–99)
GLUCOSE SERPL-MCNC: 120 MG/DL (ref 70–99)
GLUCOSE SERPL-MCNC: 121 MG/DL (ref 70–99)
GLUCOSE SERPL-MCNC: 122 MG/DL (ref 70–99)
GLUCOSE SERPL-MCNC: 123 MG/DL (ref 70–99)
GLUCOSE SERPL-MCNC: 125 MG/DL (ref 70–99)
GLUCOSE SERPL-MCNC: 129 MG/DL (ref 70–99)
GLUCOSE SERPL-MCNC: 129 MG/DL (ref 70–99)
GLUCOSE SERPL-MCNC: 130 MG/DL (ref 70–99)
GLUCOSE SERPL-MCNC: 138 MG/DL (ref 70–99)
GLUCOSE SERPL-MCNC: 139 MG/DL (ref 70–99)
GLUCOSE SERPL-MCNC: 141 MG/DL (ref 70–99)
GLUCOSE SERPL-MCNC: 146 MG/DL (ref 70–99)
GLUCOSE SERPL-MCNC: 150 MG/DL (ref 70–99)
GLUCOSE SERPL-MCNC: 151 MG/DL (ref 70–99)
GLUCOSE SERPL-MCNC: 167 MG/DL (ref 70–99)
GLUCOSE SERPL-MCNC: 82 MG/DL (ref 70–99)
GLUCOSE SERPL-MCNC: 86 MG/DL (ref 70–99)
GLUCOSE SERPL-MCNC: 91 MG/DL (ref 70–99)
GLUCOSE SERPL-MCNC: 95 MG/DL (ref 70–99)
GLUCOSE SERPL-MCNC: 97 MG/DL (ref 70–99)
GLUCOSE UR STRIP-MCNC: NEGATIVE MG/DL
GRAM STAIN RESULT: ABNORMAL
GRAM STAIN RESULT: NORMAL
HADV DNA SPEC QL NAA+PROBE: NOT DETECTED
HAPTOGLOB SERPL-MCNC: 98 MG/DL (ref 32–197)
HBA1C MFR BLD: 5.5 %
HBA1C MFR BLD: NORMAL %
HBA1C MFR BLD: NORMAL %
HCO3 BLD-SCNC: 22 MMOL/L (ref 21–28)
HCO3 BLD-SCNC: 22 MMOL/L (ref 21–28)
HCO3 BLD-SCNC: 25 MMOL/L (ref 21–28)
HCO3 BLD-SCNC: 32 MMOL/L (ref 21–28)
HCO3 BLDA-SCNC: 21 MMOL/L (ref 21–28)
HCO3 BLDV-SCNC: 21 MMOL/L (ref 21–28)
HCOV PNL SPEC NAA+PROBE: NOT DETECTED
HCT VFR BLD AUTO: 19.6 % (ref 35–47)
HCT VFR BLD AUTO: 19.8 % (ref 35–47)
HCT VFR BLD AUTO: 20.9 % (ref 35–47)
HCT VFR BLD AUTO: 21.2 % (ref 35–47)
HCT VFR BLD AUTO: 21.4 % (ref 35–47)
HCT VFR BLD AUTO: 21.5 % (ref 35–47)
HCT VFR BLD AUTO: 21.8 % (ref 35–47)
HCT VFR BLD AUTO: 21.9 % (ref 35–47)
HCT VFR BLD AUTO: 22.2 % (ref 35–47)
HCT VFR BLD AUTO: 22.8 % (ref 35–47)
HCT VFR BLD AUTO: 23.3 % (ref 35–47)
HCT VFR BLD AUTO: 23.7 % (ref 35–47)
HCT VFR BLD AUTO: 23.7 % (ref 35–47)
HCT VFR BLD AUTO: 23.8 % (ref 35–47)
HCT VFR BLD AUTO: 24 % (ref 35–47)
HCT VFR BLD AUTO: 24.2 % (ref 35–47)
HCT VFR BLD AUTO: 24.3 % (ref 35–47)
HCT VFR BLD AUTO: 25 % (ref 35–47)
HCT VFR BLD AUTO: 25.3 % (ref 35–47)
HCT VFR BLD AUTO: 25.4 % (ref 35–47)
HCT VFR BLD AUTO: 25.5 % (ref 35–47)
HCT VFR BLD AUTO: 25.6 % (ref 35–47)
HCT VFR BLD AUTO: 25.6 % (ref 35–47)
HCT VFR BLD AUTO: 25.8 % (ref 35–47)
HCT VFR BLD AUTO: 25.9 % (ref 35–47)
HCT VFR BLD AUTO: 25.9 % (ref 35–47)
HCT VFR BLD AUTO: 26 % (ref 35–47)
HCT VFR BLD AUTO: 26.1 % (ref 35–47)
HCT VFR BLD AUTO: 26.4 % (ref 35–47)
HCT VFR BLD AUTO: 26.5 % (ref 35–47)
HCT VFR BLD AUTO: 26.8 % (ref 35–47)
HCT VFR BLD AUTO: 27 % (ref 35–47)
HCT VFR BLD AUTO: 27.1 % (ref 35–47)
HCT VFR BLD AUTO: 27.2 % (ref 35–47)
HCT VFR BLD AUTO: 27.3 % (ref 35–47)
HCT VFR BLD AUTO: 27.4 % (ref 35–47)
HCT VFR BLD AUTO: 27.4 % (ref 35–47)
HCT VFR BLD AUTO: 27.7 % (ref 35–47)
HCT VFR BLD AUTO: 27.7 % (ref 35–47)
HCT VFR BLD AUTO: 27.9 % (ref 35–47)
HCT VFR BLD AUTO: 28 % (ref 35–47)
HCT VFR BLD AUTO: 28.2 % (ref 35–47)
HCT VFR BLD AUTO: 28.2 % (ref 35–47)
HCT VFR BLD AUTO: 28.7 % (ref 35–47)
HCT VFR BLD AUTO: 28.8 % (ref 35–47)
HCT VFR BLD AUTO: 28.8 % (ref 35–47)
HCT VFR BLD AUTO: 29.3 % (ref 35–47)
HCT VFR BLD AUTO: 29.6 % (ref 35–47)
HCT VFR BLD AUTO: 29.8 % (ref 35–47)
HCT VFR BLD AUTO: 30.8 % (ref 35–47)
HCT VFR BLD AUTO: 30.9 % (ref 35–47)
HCT VFR BLD AUTO: 32.1 % (ref 35–47)
HDLC SERPL-MCNC: 75 MG/DL
HGB BLD-MCNC: 10 G/DL (ref 11.7–15.7)
HGB BLD-MCNC: 10.1 G/DL (ref 11.7–15.7)
HGB BLD-MCNC: 6 G/DL (ref 11.7–15.7)
HGB BLD-MCNC: 6.2 G/DL (ref 11.7–15.7)
HGB BLD-MCNC: 6.3 G/DL (ref 11.7–15.7)
HGB BLD-MCNC: 6.4 G/DL (ref 11.7–15.7)
HGB BLD-MCNC: 6.6 G/DL (ref 11.7–15.7)
HGB BLD-MCNC: 6.8 G/DL (ref 11.7–15.7)
HGB BLD-MCNC: 7 G/DL (ref 11.7–15.7)
HGB BLD-MCNC: 7.1 G/DL (ref 11.7–15.7)
HGB BLD-MCNC: 7.2 G/DL (ref 11.7–15.7)
HGB BLD-MCNC: 7.3 G/DL (ref 11.7–15.7)
HGB BLD-MCNC: 7.4 G/DL (ref 11.7–15.7)
HGB BLD-MCNC: 7.5 G/DL (ref 11.7–15.7)
HGB BLD-MCNC: 7.5 G/DL (ref 11.7–15.7)
HGB BLD-MCNC: 7.6 G/DL (ref 11.7–15.7)
HGB BLD-MCNC: 7.7 G/DL (ref 11.7–15.7)
HGB BLD-MCNC: 7.8 G/DL (ref 11.7–15.7)
HGB BLD-MCNC: 7.9 G/DL (ref 11.7–15.7)
HGB BLD-MCNC: 8 G/DL (ref 11.7–15.7)
HGB BLD-MCNC: 8.1 G/DL (ref 11.7–15.7)
HGB BLD-MCNC: 8.2 G/DL (ref 11.7–15.7)
HGB BLD-MCNC: 8.3 G/DL (ref 11.7–15.7)
HGB BLD-MCNC: 8.4 G/DL (ref 11.7–15.7)
HGB BLD-MCNC: 8.5 G/DL (ref 11.7–15.7)
HGB BLD-MCNC: 8.5 G/DL (ref 11.7–15.7)
HGB BLD-MCNC: 8.6 G/DL (ref 11.7–15.7)
HGB BLD-MCNC: 8.7 G/DL (ref 11.7–15.7)
HGB BLD-MCNC: 8.8 G/DL (ref 11.7–15.7)
HGB BLD-MCNC: 8.9 G/DL (ref 11.7–15.7)
HGB BLD-MCNC: 8.9 G/DL (ref 11.7–15.7)
HGB BLD-MCNC: 9 G/DL (ref 11.7–15.7)
HGB BLD-MCNC: 9 G/DL (ref 11.7–15.7)
HGB BLD-MCNC: 9.2 G/DL (ref 11.7–15.7)
HGB BLD-MCNC: 9.2 G/DL (ref 11.7–15.7)
HGB BLD-MCNC: 9.4 G/DL (ref 11.7–15.7)
HGB BLD-MCNC: 9.8 G/DL (ref 11.7–15.7)
HGB UR QL STRIP: ABNORMAL
HGB UR QL STRIP: NEGATIVE
HMPV RNA SPEC QL NAA+PROBE: NOT DETECTED
HOLD SPECIMEN: NORMAL
HPIV1 RNA SPEC QL NAA+PROBE: NOT DETECTED
HPIV2 RNA SPEC QL NAA+PROBE: NOT DETECTED
HPIV3 RNA SPEC QL NAA+PROBE: NOT DETECTED
HPIV4 RNA SPEC QL NAA+PROBE: NOT DETECTED
IMM GRANULOCYTES # BLD: 0.1 10E3/UL
IMM GRANULOCYTES # BLD: 0.3 10E3/UL
IMM GRANULOCYTES # BLD: 0.3 10E3/UL
IMM GRANULOCYTES # BLD: 0.8 10E3/UL
IMM GRANULOCYTES # BLD: 2.3 10E3/UL
IMM GRANULOCYTES NFR BLD: 1 %
IMM GRANULOCYTES NFR BLD: 2 %
IMM GRANULOCYTES NFR BLD: 2 %
IMM GRANULOCYTES NFR BLD: 3 %
IMM GRANULOCYTES NFR BLD: 4 %
INR PPP: 1.07 (ref 0.85–1.15)
INR PPP: 1.08 (ref 0.85–1.15)
INR PPP: 1.15 (ref 0.85–1.15)
INR PPP: 1.28 (ref 0.85–1.15)
INR PPP: 1.34 (ref 0.85–1.15)
INR PPP: NORMAL
INTERPRETATION ECG - MUSE: NORMAL
IRON SATN MFR SERPL: 6 % (ref 15–46)
IRON SERPL-MCNC: 11 UG/DL (ref 35–180)
ISSUE DATE AND TIME: NORMAL
KETONES UR STRIP-MCNC: NEGATIVE MG/DL
LACTATE BLD-SCNC: 4 MMOL/L
LACTATE SERPL-SCNC: 0.5 MMOL/L (ref 0.7–2)
LACTATE SERPL-SCNC: 0.6 MMOL/L (ref 0.7–2)
LACTATE SERPL-SCNC: 1.2 MMOL/L (ref 0.7–2)
LACTATE SERPL-SCNC: 1.6 MMOL/L (ref 0.7–2)
LACTATE SERPL-SCNC: 1.9 MMOL/L (ref 0.7–2)
LACTATE SERPL-SCNC: 2.3 MMOL/L (ref 0.7–2)
LACTATE SERPL-SCNC: 4.3 MMOL/L (ref 0.7–2)
LDH SERPL L TO P-CCNC: 235 U/L (ref 0–250)
LDLC SERPL CALC-MCNC: 10 MG/DL
LEUKOCYTE ESTERASE UR QL STRIP: ABNORMAL
LEUKOCYTE ESTERASE UR QL STRIP: NEGATIVE
LVEF ECHO: NORMAL
LVEF ECHO: NORMAL
LYMPHOCYTES # BLD AUTO: 0.5 10E3/UL (ref 0.8–5.3)
LYMPHOCYTES # BLD AUTO: 0.6 10E3/UL (ref 0.8–5.3)
LYMPHOCYTES # BLD AUTO: 0.7 10E3/UL (ref 0.8–5.3)
LYMPHOCYTES # BLD AUTO: 0.7 10E3/UL (ref 0.8–5.3)
LYMPHOCYTES # BLD AUTO: 1.2 10E3/UL (ref 0.8–5.3)
LYMPHOCYTES # BLD MANUAL: 3.6 10E3/UL (ref 0.8–5.3)
LYMPHOCYTES NFR BLD AUTO: 2 %
LYMPHOCYTES NFR BLD AUTO: 2 %
LYMPHOCYTES NFR BLD AUTO: 4 %
LYMPHOCYTES NFR BLD AUTO: 4 %
LYMPHOCYTES NFR BLD AUTO: 6 %
LYMPHOCYTES NFR BLD MANUAL: 5 %
M PNEUMO DNA SPEC QL NAA+PROBE: NOT DETECTED
MAGNESIUM SERPL-MCNC: 1.7 MG/DL (ref 1.7–2.3)
MAGNESIUM SERPL-MCNC: 1.8 MG/DL (ref 1.7–2.3)
MAGNESIUM SERPL-MCNC: 1.9 MG/DL (ref 1.7–2.3)
MAGNESIUM SERPL-MCNC: 1.9 MG/DL (ref 1.7–2.3)
MAGNESIUM SERPL-MCNC: 2 MG/DL (ref 1.6–2.3)
MAGNESIUM SERPL-MCNC: 2 MG/DL (ref 1.6–2.3)
MAGNESIUM SERPL-MCNC: 2 MG/DL (ref 1.7–2.3)
MAGNESIUM SERPL-MCNC: 2.1 MG/DL (ref 1.6–2.3)
MAGNESIUM SERPL-MCNC: 2.1 MG/DL (ref 1.7–2.3)
MAGNESIUM SERPL-MCNC: 2.2 MG/DL (ref 1.6–2.3)
MAGNESIUM SERPL-MCNC: 2.2 MG/DL (ref 1.6–2.3)
MAGNESIUM SERPL-MCNC: 2.2 MG/DL (ref 1.7–2.3)
MAGNESIUM SERPL-MCNC: 2.3 MG/DL (ref 1.6–2.3)
MAGNESIUM SERPL-MCNC: 2.3 MG/DL (ref 1.7–2.3)
MAGNESIUM SERPL-MCNC: 2.4 MG/DL (ref 1.7–2.3)
MAGNESIUM SERPL-MCNC: 2.5 MG/DL (ref 1.7–2.3)
MAGNESIUM SERPL-MCNC: 2.6 MG/DL (ref 1.7–2.3)
MAGNESIUM SERPL-MCNC: 2.7 MG/DL (ref 1.7–2.3)
MCF P HPASE BLD TEG: 75.8 MM (ref 50–70)
MCH RBC QN AUTO: 29.2 PG (ref 26.5–33)
MCH RBC QN AUTO: 29.4 PG (ref 26.5–33)
MCH RBC QN AUTO: 29.6 PG (ref 26.5–33)
MCH RBC QN AUTO: 29.8 PG (ref 26.5–33)
MCH RBC QN AUTO: 29.9 PG (ref 26.5–33)
MCH RBC QN AUTO: 30 PG (ref 26.5–33)
MCH RBC QN AUTO: 30 PG (ref 26.5–33)
MCH RBC QN AUTO: 30.2 PG (ref 26.5–33)
MCH RBC QN AUTO: 30.3 PG (ref 26.5–33)
MCH RBC QN AUTO: 30.3 PG (ref 26.5–33)
MCH RBC QN AUTO: 30.4 PG (ref 26.5–33)
MCH RBC QN AUTO: 30.5 PG (ref 26.5–33)
MCH RBC QN AUTO: 30.6 PG (ref 26.5–33)
MCH RBC QN AUTO: 30.7 PG (ref 26.5–33)
MCH RBC QN AUTO: 30.8 PG (ref 26.5–33)
MCH RBC QN AUTO: 30.8 PG (ref 26.5–33)
MCH RBC QN AUTO: 30.9 PG (ref 26.5–33)
MCH RBC QN AUTO: 31 PG (ref 26.5–33)
MCH RBC QN AUTO: 31.1 PG (ref 26.5–33)
MCH RBC QN AUTO: 31.2 PG (ref 26.5–33)
MCH RBC QN AUTO: 31.2 PG (ref 26.5–33)
MCH RBC QN AUTO: 31.3 PG (ref 26.5–33)
MCH RBC QN AUTO: 31.3 PG (ref 26.5–33)
MCH RBC QN AUTO: 31.4 PG (ref 26.5–33)
MCH RBC QN AUTO: 31.6 PG (ref 26.5–33)
MCH RBC QN AUTO: 31.7 PG (ref 26.5–33)
MCH RBC QN AUTO: 31.8 PG (ref 26.5–33)
MCH RBC QN AUTO: 31.9 PG (ref 26.5–33)
MCH RBC QN AUTO: 32 PG (ref 26.5–33)
MCH RBC QN AUTO: 32.4 PG (ref 26.5–33)
MCH RBC QN AUTO: 32.5 PG (ref 26.5–33)
MCH RBC QN AUTO: 32.7 PG (ref 26.5–33)
MCH RBC QN AUTO: 32.8 PG (ref 26.5–33)
MCHC RBC AUTO-ENTMCNC: 29.3 G/DL (ref 31.5–36.5)
MCHC RBC AUTO-ENTMCNC: 29.5 G/DL (ref 31.5–36.5)
MCHC RBC AUTO-ENTMCNC: 29.7 G/DL (ref 31.5–36.5)
MCHC RBC AUTO-ENTMCNC: 29.7 G/DL (ref 31.5–36.5)
MCHC RBC AUTO-ENTMCNC: 29.9 G/DL (ref 31.5–36.5)
MCHC RBC AUTO-ENTMCNC: 30 G/DL (ref 31.5–36.5)
MCHC RBC AUTO-ENTMCNC: 30.3 G/DL (ref 31.5–36.5)
MCHC RBC AUTO-ENTMCNC: 30.4 G/DL (ref 31.5–36.5)
MCHC RBC AUTO-ENTMCNC: 30.5 G/DL (ref 31.5–36.5)
MCHC RBC AUTO-ENTMCNC: 30.6 G/DL (ref 31.5–36.5)
MCHC RBC AUTO-ENTMCNC: 30.7 G/DL (ref 31.5–36.5)
MCHC RBC AUTO-ENTMCNC: 30.8 G/DL (ref 31.5–36.5)
MCHC RBC AUTO-ENTMCNC: 30.9 G/DL (ref 31.5–36.5)
MCHC RBC AUTO-ENTMCNC: 31 G/DL (ref 31.5–36.5)
MCHC RBC AUTO-ENTMCNC: 31.1 G/DL (ref 31.5–36.5)
MCHC RBC AUTO-ENTMCNC: 31.2 G/DL (ref 31.5–36.5)
MCHC RBC AUTO-ENTMCNC: 31.3 G/DL (ref 31.5–36.5)
MCHC RBC AUTO-ENTMCNC: 31.4 G/DL (ref 31.5–36.5)
MCHC RBC AUTO-ENTMCNC: 31.4 G/DL (ref 31.5–36.5)
MCHC RBC AUTO-ENTMCNC: 31.5 G/DL (ref 31.5–36.5)
MCHC RBC AUTO-ENTMCNC: 31.6 G/DL (ref 31.5–36.5)
MCHC RBC AUTO-ENTMCNC: 31.8 G/DL (ref 31.5–36.5)
MCHC RBC AUTO-ENTMCNC: 32 G/DL (ref 31.5–36.5)
MCHC RBC AUTO-ENTMCNC: 32.1 G/DL (ref 31.5–36.5)
MCHC RBC AUTO-ENTMCNC: 32.1 G/DL (ref 31.5–36.5)
MCHC RBC AUTO-ENTMCNC: 32.2 G/DL (ref 31.5–36.5)
MCHC RBC AUTO-ENTMCNC: 32.2 G/DL (ref 31.5–36.5)
MCHC RBC AUTO-ENTMCNC: 32.3 G/DL (ref 31.5–36.5)
MCHC RBC AUTO-ENTMCNC: 32.4 G/DL (ref 31.5–36.5)
MCHC RBC AUTO-ENTMCNC: 32.5 G/DL (ref 31.5–36.5)
MCHC RBC AUTO-ENTMCNC: 33.1 G/DL (ref 31.5–36.5)
MCHC RBC AUTO-ENTMCNC: 33.6 G/DL (ref 31.5–36.5)
MCHC RBC AUTO-ENTMCNC: 33.6 G/DL (ref 31.5–36.5)
MCV RBC AUTO: 100 FL (ref 78–100)
MCV RBC AUTO: 101 FL (ref 78–100)
MCV RBC AUTO: 102 FL (ref 78–100)
MCV RBC AUTO: 103 FL (ref 78–100)
MCV RBC AUTO: 104 FL (ref 78–100)
MCV RBC AUTO: 105 FL (ref 78–100)
MCV RBC AUTO: 90 FL (ref 78–100)
MCV RBC AUTO: 91 FL (ref 78–100)
MCV RBC AUTO: 92 FL (ref 78–100)
MCV RBC AUTO: 93 FL (ref 78–100)
MCV RBC AUTO: 93 FL (ref 78–100)
MCV RBC AUTO: 94 FL (ref 78–100)
MCV RBC AUTO: 94 FL (ref 78–100)
MCV RBC AUTO: 95 FL (ref 78–100)
MCV RBC AUTO: 95 FL (ref 78–100)
MCV RBC AUTO: 96 FL (ref 78–100)
MCV RBC AUTO: 96 FL (ref 78–100)
MCV RBC AUTO: 97 FL (ref 78–100)
MCV RBC AUTO: 98 FL (ref 78–100)
MCV RBC AUTO: 99 FL (ref 78–100)
MONOCYTES # BLD AUTO: 0.4 10E3/UL (ref 0–1.3)
MONOCYTES # BLD AUTO: 0.5 10E3/UL (ref 0–1.3)
MONOCYTES # BLD AUTO: 0.6 10E3/UL (ref 0–1.3)
MONOCYTES # BLD AUTO: 0.8 10E3/UL (ref 0–1.3)
MONOCYTES # BLD AUTO: 2.3 10E3/UL (ref 0–1.3)
MONOCYTES # BLD MANUAL: 0 10E3/UL (ref 0–1.3)
MONOCYTES NFR BLD AUTO: 2 %
MONOCYTES NFR BLD AUTO: 3 %
MONOCYTES NFR BLD AUTO: 4 %
MONOCYTES NFR BLD AUTO: 5 %
MONOCYTES NFR BLD AUTO: 6 %
MONOCYTES NFR BLD MANUAL: 0 %
MRSA DNA SPEC QL NAA+PROBE: NEGATIVE
MUCOUS THREADS #/AREA URNS LPF: PRESENT /LPF
MYELOCYTES # BLD MANUAL: 1.4 10E3/UL
MYELOCYTES NFR BLD MANUAL: 2 %
NEUTROPHILS # BLD AUTO: 13.7 10E3/UL (ref 1.6–8.3)
NEUTROPHILS # BLD AUTO: 15.4 10E3/UL (ref 1.6–8.3)
NEUTROPHILS # BLD AUTO: 25.1 10E3/UL (ref 1.6–8.3)
NEUTROPHILS # BLD AUTO: 52.8 10E3/UL (ref 1.6–8.3)
NEUTROPHILS # BLD AUTO: 8.4 10E3/UL (ref 1.6–8.3)
NEUTROPHILS # BLD MANUAL: 66.1 10E3/UL (ref 1.6–8.3)
NEUTROPHILS NFR BLD AUTO: 86 %
NEUTROPHILS NFR BLD AUTO: 88 %
NEUTROPHILS NFR BLD AUTO: 89 %
NEUTROPHILS NFR BLD AUTO: 91 %
NEUTROPHILS NFR BLD AUTO: 92 %
NEUTROPHILS NFR BLD MANUAL: 92 %
NITRATE UR QL: NEGATIVE
NONHDLC SERPL-MCNC: 22 MG/DL
NRBC # BLD AUTO: 0 10E3/UL
NRBC # BLD AUTO: 0 10E3/UL
NRBC # BLD AUTO: 0.1 10E3/UL
NRBC # BLD AUTO: 0.1 10E3/UL
NRBC # BLD AUTO: 0.2 10E3/UL
NRBC BLD AUTO-RTO: 0 /100
NRBC BLD AUTO-RTO: 1 /100
NRBC BLD AUTO-RTO: 1 /100
NT-PROBNP SERPL-MCNC: 1626 PG/ML (ref 0–1800)
O2/TOTAL GAS SETTING VFR VENT: 21 %
O2/TOTAL GAS SETTING VFR VENT: 30 %
O2/TOTAL GAS SETTING VFR VENT: 4 %
O2/TOTAL GAS SETTING VFR VENT: 40 %
O2/TOTAL GAS SETTING VFR VENT: 40 %
O2/TOTAL GAS SETTING VFR VENT: 50 %
OXYHGB MFR BLD: 94 % (ref 92–100)
P AXIS - MUSE: -13 DEGREES
P AXIS - MUSE: 25 DEGREES
P AXIS - MUSE: 47 DEGREES
PATH REPORT.COMMENTS IMP SPEC: NORMAL
PATH REPORT.FINAL DX SPEC: NORMAL
PATH REPORT.FINAL DX SPEC: NORMAL
PATH REPORT.GROSS SPEC: NORMAL
PATH REPORT.MICROSCOPIC SPEC OTHER STN: NORMAL
PATH REPORT.RELEVANT HX SPEC: NORMAL
PATH REPORT.RELEVANT HX SPEC: NORMAL
PCO2 BLD: 32 MM HG (ref 35–45)
PCO2 BLD: 39 MM HG (ref 35–45)
PCO2 BLD: 41 MM HG (ref 35–45)
PCO2 BLD: 45 MM HG (ref 35–45)
PCO2 BLDA: 40 MM HG (ref 35–45)
PCO2 BLDV: 34 MM HG (ref 40–50)
PH BLD: 7.37 [PH] (ref 7.35–7.45)
PH BLD: 7.38 [PH] (ref 7.35–7.45)
PH BLD: 7.45 [PH] (ref 7.35–7.45)
PH BLD: 7.46 [PH] (ref 7.35–7.45)
PH BLDA: 7.33 [PH] (ref 7.35–7.45)
PH BLDV: 7.39 [PH] (ref 7.32–7.43)
PH UR STRIP: 5 [PH] (ref 5–7)
PH UR STRIP: 5.5 [PH] (ref 5–7)
PH UR STRIP: 6 [PH] (ref 5–7)
PH UR STRIP: 7.5 [PH] (ref 5–7)
PHOSPHATE SERPL-MCNC: 2.5 MG/DL (ref 2.5–4.5)
PHOSPHATE SERPL-MCNC: 2.6 MG/DL (ref 2.5–4.5)
PHOSPHATE SERPL-MCNC: 2.8 MG/DL (ref 2.5–4.5)
PHOSPHATE SERPL-MCNC: 3 MG/DL (ref 2.5–4.5)
PHOSPHATE SERPL-MCNC: 3.1 MG/DL (ref 2.5–4.5)
PHOSPHATE SERPL-MCNC: 3.2 MG/DL (ref 2.5–4.5)
PHOSPHATE SERPL-MCNC: 3.3 MG/DL (ref 2.5–4.5)
PHOSPHATE SERPL-MCNC: 3.4 MG/DL (ref 2.5–4.5)
PHOSPHATE SERPL-MCNC: 3.5 MG/DL (ref 2.5–4.5)
PHOSPHATE SERPL-MCNC: 3.5 MG/DL (ref 2.5–4.5)
PHOSPHATE SERPL-MCNC: 3.6 MG/DL (ref 2.5–4.5)
PHOSPHATE SERPL-MCNC: 3.7 MG/DL (ref 2.5–4.5)
PHOSPHATE SERPL-MCNC: 3.8 MG/DL (ref 2.5–4.5)
PHOSPHATE SERPL-MCNC: 3.9 MG/DL (ref 2.5–4.5)
PHOSPHATE SERPL-MCNC: 3.9 MG/DL (ref 2.5–4.5)
PHOSPHATE SERPL-MCNC: 4.1 MG/DL (ref 2.5–4.5)
PHOSPHATE SERPL-MCNC: 4.2 MG/DL (ref 2.5–4.5)
PHOSPHATE SERPL-MCNC: 4.2 MG/DL (ref 2.5–4.5)
PHOSPHATE SERPL-MCNC: 4.5 MG/DL (ref 2.5–4.5)
PHOSPHATE SERPL-MCNC: 4.5 MG/DL (ref 2.5–4.5)
PHOSPHATE SERPL-MCNC: 4.9 MG/DL (ref 2.5–4.5)
PHOTO IMAGE: NORMAL
PLAT MORPH BLD: ABNORMAL
PLATELET # BLD AUTO: 314 10E3/UL (ref 150–450)
PLATELET # BLD AUTO: 322 10E3/UL (ref 150–450)
PLATELET # BLD AUTO: 338 10E3/UL (ref 150–450)
PLATELET # BLD AUTO: 344 10E3/UL (ref 150–450)
PLATELET # BLD AUTO: 348 10E3/UL (ref 150–450)
PLATELET # BLD AUTO: 352 10E3/UL (ref 150–450)
PLATELET # BLD AUTO: 352 10E3/UL (ref 150–450)
PLATELET # BLD AUTO: 353 10E3/UL (ref 150–450)
PLATELET # BLD AUTO: 353 10E3/UL (ref 150–450)
PLATELET # BLD AUTO: 355 10E3/UL (ref 150–450)
PLATELET # BLD AUTO: 355 10E3/UL (ref 150–450)
PLATELET # BLD AUTO: 366 10E3/UL (ref 150–450)
PLATELET # BLD AUTO: 368 10E3/UL (ref 150–450)
PLATELET # BLD AUTO: 373 10E3/UL (ref 150–450)
PLATELET # BLD AUTO: 376 10E3/UL (ref 150–450)
PLATELET # BLD AUTO: 381 10E3/UL (ref 150–450)
PLATELET # BLD AUTO: 383 10E3/UL (ref 150–450)
PLATELET # BLD AUTO: 389 10E3/UL (ref 150–450)
PLATELET # BLD AUTO: 390 10E3/UL (ref 150–450)
PLATELET # BLD AUTO: 408 10E3/UL (ref 150–450)
PLATELET # BLD AUTO: 408 10E3/UL (ref 150–450)
PLATELET # BLD AUTO: 422 10E3/UL (ref 150–450)
PLATELET # BLD AUTO: 427 10E3/UL (ref 150–450)
PLATELET # BLD AUTO: 435 10E3/UL (ref 150–450)
PLATELET # BLD AUTO: 446 10E3/UL (ref 150–450)
PLATELET # BLD AUTO: 448 10E3/UL (ref 150–450)
PLATELET # BLD AUTO: 449 10E3/UL (ref 150–450)
PLATELET # BLD AUTO: 449 10E3/UL (ref 150–450)
PLATELET # BLD AUTO: 456 10E3/UL (ref 150–450)
PLATELET # BLD AUTO: 461 10E3/UL (ref 150–450)
PLATELET # BLD AUTO: 464 10E3/UL (ref 150–450)
PLATELET # BLD AUTO: 465 10E3/UL (ref 150–450)
PLATELET # BLD AUTO: 472 10E3/UL (ref 150–450)
PLATELET # BLD AUTO: 476 10E3/UL (ref 150–450)
PLATELET # BLD AUTO: 480 10E3/UL (ref 150–450)
PLATELET # BLD AUTO: 480 10E3/UL (ref 150–450)
PLATELET # BLD AUTO: 486 10E3/UL (ref 150–450)
PLATELET # BLD AUTO: 488 10E3/UL (ref 150–450)
PLATELET # BLD AUTO: 488 10E3/UL (ref 150–450)
PLATELET # BLD AUTO: 489 10E3/UL (ref 150–450)
PLATELET # BLD AUTO: 490 10E3/UL (ref 150–450)
PLATELET # BLD AUTO: 499 10E3/UL (ref 150–450)
PLATELET # BLD AUTO: 502 10E3/UL (ref 150–450)
PLATELET # BLD AUTO: 502 10E3/UL (ref 150–450)
PLATELET # BLD AUTO: 503 10E3/UL (ref 150–450)
PLATELET # BLD AUTO: 504 10E3/UL (ref 150–450)
PLATELET # BLD AUTO: 511 10E3/UL (ref 150–450)
PLATELET # BLD AUTO: 515 10E3/UL (ref 150–450)
PLATELET # BLD AUTO: 515 10E3/UL (ref 150–450)
PLATELET # BLD AUTO: 520 10E3/UL (ref 150–450)
PLATELET # BLD AUTO: 532 10E3/UL (ref 150–450)
PLATELET # BLD AUTO: 541 10E3/UL (ref 150–450)
PLATELET # BLD AUTO: 544 10E3/UL (ref 150–450)
PLATELET # BLD AUTO: 553 10E3/UL (ref 150–450)
PLATELET # BLD AUTO: 558 10E3/UL (ref 150–450)
PLATELET # BLD AUTO: 562 10E3/UL (ref 150–450)
PLATELET # BLD AUTO: 567 10E3/UL (ref 150–450)
PLATELET # BLD AUTO: 585 10E3/UL (ref 150–450)
PLATELET # BLD AUTO: 614 10E3/UL (ref 150–450)
PLATELET # BLD AUTO: 619 10E3/UL (ref 150–450)
PLATELET # BLD AUTO: 666 10E3/UL (ref 150–450)
PO2 BLD: 108 MM HG (ref 80–105)
PO2 BLD: 152 MM HG (ref 80–105)
PO2 BLD: 53 MM HG (ref 80–105)
PO2 BLD: 76 MM HG (ref 80–105)
PO2 BLDA: 207 MM HG (ref 80–105)
PO2 BLDV: 44 MM HG (ref 25–47)
POTASSIUM BLD-SCNC: 3.8 MMOL/L (ref 3.4–5.3)
POTASSIUM BLD-SCNC: 3.8 MMOL/L (ref 3.4–5.3)
POTASSIUM BLD-SCNC: 4 MMOL/L (ref 3.4–5.3)
POTASSIUM BLD-SCNC: 4 MMOL/L (ref 3.5–5)
POTASSIUM BLD-SCNC: 4.1 MMOL/L (ref 3.4–5.3)
POTASSIUM BLD-SCNC: 4.1 MMOL/L (ref 3.5–5)
POTASSIUM BLD-SCNC: 4.2 MMOL/L (ref 3.4–5.3)
POTASSIUM BLD-SCNC: 4.3 MMOL/L (ref 3.4–5.3)
POTASSIUM SERPL-SCNC: 2.9 MMOL/L (ref 3.4–5.3)
POTASSIUM SERPL-SCNC: 3.3 MMOL/L (ref 3.4–5.3)
POTASSIUM SERPL-SCNC: 3.3 MMOL/L (ref 3.4–5.3)
POTASSIUM SERPL-SCNC: 3.4 MMOL/L (ref 3.4–5.3)
POTASSIUM SERPL-SCNC: 3.6 MMOL/L (ref 3.4–5.3)
POTASSIUM SERPL-SCNC: 3.7 MMOL/L (ref 3.4–4.5)
POTASSIUM SERPL-SCNC: 3.7 MMOL/L (ref 3.4–5.3)
POTASSIUM SERPL-SCNC: 3.9 MMOL/L (ref 3.4–5.3)
POTASSIUM SERPL-SCNC: 4 MMOL/L (ref 3.4–5.3)
POTASSIUM SERPL-SCNC: 4.1 MMOL/L (ref 3.4–5.3)
POTASSIUM SERPL-SCNC: 4.2 MMOL/L (ref 3.4–5.3)
POTASSIUM SERPL-SCNC: 4.3 MMOL/L (ref 3.4–5.3)
POTASSIUM SERPL-SCNC: 4.4 MMOL/L (ref 3.4–4.5)
POTASSIUM SERPL-SCNC: 4.4 MMOL/L (ref 3.4–5.3)
POTASSIUM SERPL-SCNC: 4.5 MMOL/L (ref 3.4–4.5)
POTASSIUM SERPL-SCNC: 4.5 MMOL/L (ref 3.4–5.3)
POTASSIUM SERPL-SCNC: 4.5 MMOL/L (ref 3.4–5.3)
POTASSIUM SERPL-SCNC: 4.6 MMOL/L (ref 3.4–5.3)
POTASSIUM SERPL-SCNC: 4.9 MMOL/L (ref 3.4–5.3)
POTASSIUM SERPL-SCNC: 5 MMOL/L (ref 3.4–5.3)
PR INTERVAL - MUSE: 120 MS
PR INTERVAL - MUSE: 156 MS
PR INTERVAL - MUSE: 164 MS
PROCALCITONIN SERPL IA-MCNC: 0.06 NG/ML
PROCALCITONIN SERPL IA-MCNC: 0.09 NG/ML
PROCALCITONIN SERPL IA-MCNC: 0.09 NG/ML
PROT SERPL-MCNC: 4.8 G/DL (ref 6.4–8.3)
PROT SERPL-MCNC: 5.1 G/DL (ref 6.4–8.3)
PROT SERPL-MCNC: 5.5 G/DL (ref 6.4–8.3)
PROT SERPL-MCNC: 5.6 G/DL (ref 6.4–8.3)
PROT SERPL-MCNC: 5.7 G/DL (ref 6.4–8.3)
PROT SERPL-MCNC: 5.7 G/DL (ref 6.4–8.3)
PROT SERPL-MCNC: 5.9 G/DL (ref 6.4–8.3)
PROT SERPL-MCNC: 6.1 G/DL (ref 6.4–8.3)
PROT SERPL-MCNC: 6.2 G/DL (ref 6.4–8.3)
PROT SERPL-MCNC: 6.2 G/DL (ref 6.4–8.3)
PROT SERPL-MCNC: 6.3 G/DL (ref 6.4–8.3)
PROT SERPL-MCNC: 6.3 G/DL (ref 6–8)
PROT SERPL-MCNC: 6.4 G/DL (ref 6.8–8.8)
PROT SERPL-MCNC: 6.8 G/DL (ref 6.4–8.3)
PROT SERPL-MCNC: 6.9 G/DL (ref 6.4–8.3)
QRS DURATION - MUSE: 92 MS
QT - MUSE: 374 MS
QT - MUSE: 400 MS
QT - MUSE: 404 MS
QTC - MUSE: 455 MS
QTC - MUSE: 460 MS
QTC - MUSE: 464 MS
R AXIS - MUSE: -12 DEGREES
R AXIS - MUSE: -19 DEGREES
R AXIS - MUSE: -8 DEGREES
RADIOLOGIST FLAGS: ABNORMAL
RBC # BLD AUTO: 1.95 10E6/UL (ref 3.8–5.2)
RBC # BLD AUTO: 2.02 10E6/UL (ref 3.8–5.2)
RBC # BLD AUTO: 2.07 10E6/UL (ref 3.8–5.2)
RBC # BLD AUTO: 2.07 10E6/UL (ref 3.8–5.2)
RBC # BLD AUTO: 2.08 10E6/UL (ref 3.8–5.2)
RBC # BLD AUTO: 2.22 10E6/UL (ref 3.8–5.2)
RBC # BLD AUTO: 2.26 10E6/UL (ref 3.8–5.2)
RBC # BLD AUTO: 2.26 10E6/UL (ref 3.8–5.2)
RBC # BLD AUTO: 2.27 10E6/UL (ref 3.8–5.2)
RBC # BLD AUTO: 2.31 10E6/UL (ref 3.8–5.2)
RBC # BLD AUTO: 2.31 10E6/UL (ref 3.8–5.2)
RBC # BLD AUTO: 2.35 10E6/UL (ref 3.8–5.2)
RBC # BLD AUTO: 2.36 10E6/UL (ref 3.8–5.2)
RBC # BLD AUTO: 2.37 10E6/UL (ref 3.8–5.2)
RBC # BLD AUTO: 2.38 10E6/UL (ref 3.8–5.2)
RBC # BLD AUTO: 2.38 10E6/UL (ref 3.8–5.2)
RBC # BLD AUTO: 2.4 10E6/UL (ref 3.8–5.2)
RBC # BLD AUTO: 2.41 10E6/UL (ref 3.8–5.2)
RBC # BLD AUTO: 2.41 10E6/UL (ref 3.8–5.2)
RBC # BLD AUTO: 2.43 10E6/UL (ref 3.8–5.2)
RBC # BLD AUTO: 2.43 10E6/UL (ref 3.8–5.2)
RBC # BLD AUTO: 2.46 10E6/UL (ref 3.8–5.2)
RBC # BLD AUTO: 2.46 10E6/UL (ref 3.8–5.2)
RBC # BLD AUTO: 2.47 10E6/UL (ref 3.8–5.2)
RBC # BLD AUTO: 2.49 10E6/UL (ref 3.8–5.2)
RBC # BLD AUTO: 2.52 10E6/UL (ref 3.8–5.2)
RBC # BLD AUTO: 2.53 10E6/UL (ref 3.8–5.2)
RBC # BLD AUTO: 2.53 10E6/UL (ref 3.8–5.2)
RBC # BLD AUTO: 2.56 10E6/UL (ref 3.8–5.2)
RBC # BLD AUTO: 2.58 10E6/UL (ref 3.8–5.2)
RBC # BLD AUTO: 2.58 10E6/UL (ref 3.8–5.2)
RBC # BLD AUTO: 2.61 10E6/UL (ref 3.8–5.2)
RBC # BLD AUTO: 2.62 10E6/UL (ref 3.8–5.2)
RBC # BLD AUTO: 2.63 10E6/UL (ref 3.8–5.2)
RBC # BLD AUTO: 2.64 10E6/UL (ref 3.8–5.2)
RBC # BLD AUTO: 2.65 10E6/UL (ref 3.8–5.2)
RBC # BLD AUTO: 2.66 10E6/UL (ref 3.8–5.2)
RBC # BLD AUTO: 2.7 10E6/UL (ref 3.8–5.2)
RBC # BLD AUTO: 2.7 10E6/UL (ref 3.8–5.2)
RBC # BLD AUTO: 2.74 10E6/UL (ref 3.8–5.2)
RBC # BLD AUTO: 2.75 10E6/UL (ref 3.8–5.2)
RBC # BLD AUTO: 2.78 10E6/UL (ref 3.8–5.2)
RBC # BLD AUTO: 2.82 10E6/UL (ref 3.8–5.2)
RBC # BLD AUTO: 2.85 10E6/UL (ref 3.8–5.2)
RBC # BLD AUTO: 2.85 10E6/UL (ref 3.8–5.2)
RBC # BLD AUTO: 2.87 10E6/UL (ref 3.8–5.2)
RBC # BLD AUTO: 2.88 10E6/UL (ref 3.8–5.2)
RBC # BLD AUTO: 2.89 10E6/UL (ref 3.8–5.2)
RBC # BLD AUTO: 2.9 10E6/UL (ref 3.8–5.2)
RBC # BLD AUTO: 2.91 10E6/UL (ref 3.8–5.2)
RBC # BLD AUTO: 2.92 10E6/UL (ref 3.8–5.2)
RBC # BLD AUTO: 2.92 10E6/UL (ref 3.8–5.2)
RBC # BLD AUTO: 2.94 10E6/UL (ref 3.8–5.2)
RBC # BLD AUTO: 2.96 10E6/UL (ref 3.8–5.2)
RBC # BLD AUTO: 2.97 10E6/UL (ref 3.8–5.2)
RBC # BLD AUTO: 3.16 10E6/UL (ref 3.8–5.2)
RBC # BLD AUTO: 3.26 10E6/UL (ref 3.8–5.2)
RBC # BLD AUTO: 3.3 10E6/UL (ref 3.8–5.2)
RBC MORPH BLD: ABNORMAL
RBC URINE: 1 /HPF
RBC URINE: 1 /HPF
RBC URINE: 2 /HPF
RBC URINE: 25 /HPF
RBC URINE: <1 /HPF
RBC URINE: >182 /HPF
RETICS # AUTO: 0.03 10E6/UL (ref 0.03–0.1)
RETICS/RBC NFR AUTO: 1.3 % (ref 0.5–2)
RSV RNA SPEC QL NAA+PROBE: NOT DETECTED
RSV RNA SPEC QL NAA+PROBE: NOT DETECTED
RV+EV RNA SPEC QL NAA+PROBE: DETECTED
SA TARGET DNA: NEGATIVE
SA TARGET DNA: NEGATIVE
SA TARGET DNA: POSITIVE
SA TARGET DNA: POSITIVE
SARS-COV-2 RNA RESP QL NAA+PROBE: NEGATIVE
SODIUM BLD-SCNC: 138 MMOL/L (ref 133–144)
SODIUM SERPL-SCNC: 134 MMOL/L (ref 136–145)
SODIUM SERPL-SCNC: 135 MMOL/L (ref 136–145)
SODIUM SERPL-SCNC: 136 MMOL/L (ref 133–144)
SODIUM SERPL-SCNC: 136 MMOL/L (ref 133–144)
SODIUM SERPL-SCNC: 136 MMOL/L (ref 136–145)
SODIUM SERPL-SCNC: 137 MMOL/L (ref 133–144)
SODIUM SERPL-SCNC: 137 MMOL/L (ref 133–144)
SODIUM SERPL-SCNC: 137 MMOL/L (ref 136–145)
SODIUM SERPL-SCNC: 138 MMOL/L (ref 133–144)
SODIUM SERPL-SCNC: 138 MMOL/L (ref 136–145)
SODIUM SERPL-SCNC: 138 MMOL/L (ref 136–145)
SODIUM SERPL-SCNC: 139 MMOL/L (ref 133–144)
SODIUM SERPL-SCNC: 139 MMOL/L (ref 136–145)
SODIUM SERPL-SCNC: 140 MMOL/L (ref 133–144)
SODIUM SERPL-SCNC: 140 MMOL/L (ref 136–145)
SODIUM SERPL-SCNC: 141 MMOL/L (ref 136–145)
SODIUM SERPL-SCNC: 142 MMOL/L (ref 136–145)
SODIUM SERPL-SCNC: 143 MMOL/L (ref 136–145)
SODIUM SERPL-SCNC: 144 MMOL/L (ref 136–145)
SODIUM SERPL-SCNC: 145 MMOL/L (ref 136–145)
SODIUM SERPL-SCNC: 146 MMOL/L (ref 136–145)
SODIUM SERPL-SCNC: 146 MMOL/L (ref 136–145)
SODIUM SERPL-SCNC: 147 MMOL/L (ref 136–145)
SODIUM SERPL-SCNC: 148 MMOL/L (ref 136–145)
SODIUM SERPL-SCNC: 149 MMOL/L (ref 136–145)
SODIUM SERPL-SCNC: 150 MMOL/L (ref 136–145)
SODIUM SERPL-SCNC: 152 MMOL/L (ref 136–145)
SODIUM SERPL-SCNC: 153 MMOL/L (ref 136–145)
SODIUM SERPL-SCNC: 154 MMOL/L (ref 136–145)
SODIUM SERPL-SCNC: 155 MMOL/L (ref 136–145)
SP GR UR STRIP: 1.01 (ref 1–1.03)
SP GR UR STRIP: 1.02 (ref 1–1.03)
SP GR UR STRIP: 1.03 (ref 1–1.03)
SPECIMEN EXPIRATION DATE: NORMAL
SQUAMOUS EPITHELIAL: 1 /HPF
SQUAMOUS EPITHELIAL: 1 /HPF
SQUAMOUS EPITHELIAL: 6 /HPF
SQUAMOUS EPITHELIAL: <1 /HPF
SYSTOLIC BLOOD PRESSURE - MUSE: 196 MMHG
SYSTOLIC BLOOD PRESSURE - MUSE: NORMAL MMHG
SYSTOLIC BLOOD PRESSURE - MUSE: NORMAL MMHG
T AXIS - MUSE: 12 DEGREES
T AXIS - MUSE: 29 DEGREES
T AXIS - MUSE: 38 DEGREES
TIBC SERPL-MCNC: 180 UG/DL (ref 240–430)
TRANSFERRIN SERPL-MCNC: 177 MG/DL (ref 200–360)
TRANSFERRIN SERPL-MCNC: 191 MG/DL (ref 200–360)
TRANSITIONAL EPI: 1 /HPF
TRANSITIONAL EPI: <1 /HPF
TRANSITIONAL EPI: <1 /HPF
TRIGL SERPL-MCNC: 62 MG/DL
TROPONIN T SERPL HS-MCNC: 18 NG/L
UNIT ABO/RH: NORMAL
UNIT NUMBER: NORMAL
UNIT STATUS: NORMAL
UNIT TYPE ISBT: 2800
UNIT TYPE ISBT: 600
UNIT TYPE ISBT: 9500
UPPER GI ENDOSCOPY: NORMAL
URATE CRY #/AREA URNS HPF: ABNORMAL /HPF
UROBILINOGEN UR STRIP-MCNC: 2 MG/DL
UROBILINOGEN UR STRIP-MCNC: <2 MG/DL
UROBILINOGEN UR STRIP-MCNC: NORMAL MG/DL
VANCOMYCIN SERPL-MCNC: 10.2 UG/ML
VANCOMYCIN SERPL-MCNC: 14.7 UG/ML
VENTRICULAR RATE- MUSE: 78 BPM
VENTRICULAR RATE- MUSE: 81 BPM
VENTRICULAR RATE- MUSE: 89 BPM
VIT B12 SERPL-MCNC: 697 PG/ML (ref 232–1245)
VIT B12 SERPL-MCNC: 736 PG/ML (ref 232–1245)
VIT B12 SERPL-MCNC: 861 PG/ML (ref 193–986)
WBC # BLD AUTO: 10.4 10E3/UL (ref 4–11)
WBC # BLD AUTO: 10.5 10E3/UL (ref 4–11)
WBC # BLD AUTO: 10.6 10E3/UL (ref 4–11)
WBC # BLD AUTO: 10.7 10E3/UL (ref 4–11)
WBC # BLD AUTO: 10.7 10E3/UL (ref 4–11)
WBC # BLD AUTO: 10.8 10E3/UL (ref 4–11)
WBC # BLD AUTO: 11 10E3/UL (ref 4–11)
WBC # BLD AUTO: 11.2 10E3/UL (ref 4–11)
WBC # BLD AUTO: 11.3 10E3/UL (ref 4–11)
WBC # BLD AUTO: 11.5 10E3/UL (ref 4–11)
WBC # BLD AUTO: 11.5 10E3/UL (ref 4–11)
WBC # BLD AUTO: 11.6 10E3/UL (ref 4–11)
WBC # BLD AUTO: 12.1 10E3/UL (ref 4–11)
WBC # BLD AUTO: 12.2 10E3/UL (ref 4–11)
WBC # BLD AUTO: 12.5 10E3/UL (ref 4–11)
WBC # BLD AUTO: 12.7 10E3/UL (ref 4–11)
WBC # BLD AUTO: 12.8 10E3/UL (ref 4–11)
WBC # BLD AUTO: 13.2 10E3/UL (ref 4–11)
WBC # BLD AUTO: 13.3 10E3/UL (ref 4–11)
WBC # BLD AUTO: 13.9 10E3/UL (ref 4–11)
WBC # BLD AUTO: 14.1 10E3/UL (ref 4–11)
WBC # BLD AUTO: 14.1 10E3/UL (ref 4–11)
WBC # BLD AUTO: 15.2 10E3/UL (ref 4–11)
WBC # BLD AUTO: 15.2 10E3/UL (ref 4–11)
WBC # BLD AUTO: 15.4 10E3/UL (ref 4–11)
WBC # BLD AUTO: 16.5 10E3/UL (ref 4–11)
WBC # BLD AUTO: 17.3 10E3/UL (ref 4–11)
WBC # BLD AUTO: 21.6 10E3/UL (ref 4–11)
WBC # BLD AUTO: 27 10E3/UL (ref 4–11)
WBC # BLD AUTO: 34.8 10E3/UL (ref 4–11)
WBC # BLD AUTO: 39.2 10E3/UL (ref 4–11)
WBC # BLD AUTO: 41.5 10E3/UL (ref 4–11)
WBC # BLD AUTO: 59 10E3/UL (ref 4–11)
WBC # BLD AUTO: 6.1 10E3/UL (ref 4–11)
WBC # BLD AUTO: 6.4 10E3/UL (ref 4–11)
WBC # BLD AUTO: 6.5 10E3/UL (ref 4–11)
WBC # BLD AUTO: 6.7 10E3/UL (ref 4–11)
WBC # BLD AUTO: 6.9 10E3/UL (ref 4–11)
WBC # BLD AUTO: 7 10E3/UL (ref 4–11)
WBC # BLD AUTO: 7.4 10E3/UL (ref 4–11)
WBC # BLD AUTO: 7.7 10E3/UL (ref 4–11)
WBC # BLD AUTO: 71.9 10E3/UL (ref 4–11)
WBC # BLD AUTO: 72.9 10E3/UL (ref 4–11)
WBC # BLD AUTO: 8.1 10E3/UL (ref 4–11)
WBC # BLD AUTO: 8.3 10E3/UL (ref 4–11)
WBC # BLD AUTO: 8.6 10E3/UL (ref 4–11)
WBC # BLD AUTO: 8.6 10E3/UL (ref 4–11)
WBC # BLD AUTO: 8.7 10E3/UL (ref 4–11)
WBC # BLD AUTO: 8.8 10E3/UL (ref 4–11)
WBC # BLD AUTO: 8.9 10E3/UL (ref 4–11)
WBC # BLD AUTO: 9 10E3/UL (ref 4–11)
WBC # BLD AUTO: 9 10E3/UL (ref 4–11)
WBC # BLD AUTO: 9.3 10E3/UL (ref 4–11)
WBC # BLD AUTO: 9.4 10E3/UL (ref 4–11)
WBC # BLD AUTO: 9.6 10E3/UL (ref 4–11)
WBC # BLD AUTO: 9.7 10E3/UL (ref 4–11)
WBC # BLD AUTO: 9.8 10E3/UL (ref 4–11)
WBC # BLD AUTO: 9.9 10E3/UL (ref 4–11)
WBC # BLD AUTO: 9.9 10E3/UL (ref 4–11)
WBC URINE: 1 /HPF
WBC URINE: 2 /HPF
WBC URINE: 2 /HPF
WBC URINE: 66 /HPF

## 2022-01-01 PROCEDURE — 84484 ASSAY OF TROPONIN QUANT: CPT | Performed by: STUDENT IN AN ORGANIZED HEALTH CARE EDUCATION/TRAINING PROGRAM

## 2022-01-01 PROCEDURE — 80048 BASIC METABOLIC PNL TOTAL CA: CPT | Performed by: NEUROLOGICAL SURGERY

## 2022-01-01 PROCEDURE — 258N000003 HC RX IP 258 OP 636: Performed by: NURSE ANESTHETIST, CERTIFIED REGISTERED

## 2022-01-01 PROCEDURE — 80053 COMPREHEN METABOLIC PANEL: CPT | Performed by: STUDENT IN AN ORGANIZED HEALTH CARE EDUCATION/TRAINING PROGRAM

## 2022-01-01 PROCEDURE — 92610 EVALUATE SWALLOWING FUNCTION: CPT | Mod: GN

## 2022-01-01 PROCEDURE — 999N000157 HC STATISTIC RCP TIME EA 10 MIN

## 2022-01-01 PROCEDURE — 82140 ASSAY OF AMMONIA: CPT | Performed by: NURSE PRACTITIONER

## 2022-01-01 PROCEDURE — 250N000013 HC RX MED GY IP 250 OP 250 PS 637: Performed by: NURSE PRACTITIONER

## 2022-01-01 PROCEDURE — 85007 BL SMEAR W/DIFF WBC COUNT: CPT | Performed by: STUDENT IN AN ORGANIZED HEALTH CARE EDUCATION/TRAINING PROGRAM

## 2022-01-01 PROCEDURE — 99207 PR APP CREDIT; MD BILLING SHARED VISIT: CPT | Performed by: PHYSICIAN ASSISTANT

## 2022-01-01 PROCEDURE — 250N000011 HC RX IP 250 OP 636: Performed by: STUDENT IN AN ORGANIZED HEALTH CARE EDUCATION/TRAINING PROGRAM

## 2022-01-01 PROCEDURE — 76705 ECHO EXAM OF ABDOMEN: CPT | Mod: 26 | Performed by: STUDENT IN AN ORGANIZED HEALTH CARE EDUCATION/TRAINING PROGRAM

## 2022-01-01 PROCEDURE — 82607 VITAMIN B-12: CPT | Performed by: STUDENT IN AN ORGANIZED HEALTH CARE EDUCATION/TRAINING PROGRAM

## 2022-01-01 PROCEDURE — 70450 CT HEAD/BRAIN W/O DYE: CPT | Mod: 26 | Performed by: STUDENT IN AN ORGANIZED HEALTH CARE EDUCATION/TRAINING PROGRAM

## 2022-01-01 PROCEDURE — 36415 COLL VENOUS BLD VENIPUNCTURE: CPT | Performed by: NEUROLOGICAL SURGERY

## 2022-01-01 PROCEDURE — 250N000009 HC RX 250: Performed by: STUDENT IN AN ORGANIZED HEALTH CARE EDUCATION/TRAINING PROGRAM

## 2022-01-01 PROCEDURE — 84100 ASSAY OF PHOSPHORUS: CPT | Performed by: NEUROLOGICAL SURGERY

## 2022-01-01 PROCEDURE — P9016 RBC LEUKOCYTES REDUCED: HCPCS | Performed by: STUDENT IN AN ORGANIZED HEALTH CARE EDUCATION/TRAINING PROGRAM

## 2022-01-01 PROCEDURE — 120N000002 HC R&B MED SURG/OB UMMC

## 2022-01-01 PROCEDURE — 250N000013 HC RX MED GY IP 250 OP 250 PS 637: Performed by: STUDENT IN AN ORGANIZED HEALTH CARE EDUCATION/TRAINING PROGRAM

## 2022-01-01 PROCEDURE — 83735 ASSAY OF MAGNESIUM: CPT | Performed by: PHYSICIAN ASSISTANT

## 2022-01-01 PROCEDURE — 97530 THERAPEUTIC ACTIVITIES: CPT | Mod: GP

## 2022-01-01 PROCEDURE — 999N000065 XR CHEST PORT 1 VIEW

## 2022-01-01 PROCEDURE — 87486 CHLMYD PNEUM DNA AMP PROBE: CPT | Performed by: STUDENT IN AN ORGANIZED HEALTH CARE EDUCATION/TRAINING PROGRAM

## 2022-01-01 PROCEDURE — 70450 CT HEAD/BRAIN W/O DYE: CPT | Mod: MG

## 2022-01-01 PROCEDURE — 80048 BASIC METABOLIC PNL TOTAL CA: CPT | Performed by: STUDENT IN AN ORGANIZED HEALTH CARE EDUCATION/TRAINING PROGRAM

## 2022-01-01 PROCEDURE — 84100 ASSAY OF PHOSPHORUS: CPT | Performed by: NURSE PRACTITIONER

## 2022-01-01 PROCEDURE — 250N000009 HC RX 250: Performed by: NURSE PRACTITIONER

## 2022-01-01 PROCEDURE — 86923 COMPATIBILITY TEST ELECTRIC: CPT | Performed by: STUDENT IN AN ORGANIZED HEALTH CARE EDUCATION/TRAINING PROGRAM

## 2022-01-01 PROCEDURE — 97110 THERAPEUTIC EXERCISES: CPT | Mod: GP

## 2022-01-01 PROCEDURE — 83615 LACTATE (LD) (LDH) ENZYME: CPT | Performed by: NURSE PRACTITIONER

## 2022-01-01 PROCEDURE — 258N000003 HC RX IP 258 OP 636: Performed by: STUDENT IN AN ORGANIZED HEALTH CARE EDUCATION/TRAINING PROGRAM

## 2022-01-01 PROCEDURE — 85027 COMPLETE CBC AUTOMATED: CPT | Performed by: NURSE PRACTITIONER

## 2022-01-01 PROCEDURE — 360N000078 HC SURGERY LEVEL 5, PER MIN: Performed by: NEUROLOGICAL SURGERY

## 2022-01-01 PROCEDURE — 83735 ASSAY OF MAGNESIUM: CPT | Performed by: NEUROLOGICAL SURGERY

## 2022-01-01 PROCEDURE — 250N000009 HC RX 250

## 2022-01-01 PROCEDURE — 250N000011 HC RX IP 250 OP 636

## 2022-01-01 PROCEDURE — 250N000011 HC RX IP 250 OP 636: Performed by: PSYCHIATRY & NEUROLOGY

## 2022-01-01 PROCEDURE — 99232 SBSQ HOSP IP/OBS MODERATE 35: CPT | Mod: FS | Performed by: STUDENT IN AN ORGANIZED HEALTH CARE EDUCATION/TRAINING PROGRAM

## 2022-01-01 PROCEDURE — 84100 ASSAY OF PHOSPHORUS: CPT | Performed by: STUDENT IN AN ORGANIZED HEALTH CARE EDUCATION/TRAINING PROGRAM

## 2022-01-01 PROCEDURE — 85730 THROMBOPLASTIN TIME PARTIAL: CPT | Performed by: STUDENT IN AN ORGANIZED HEALTH CARE EDUCATION/TRAINING PROGRAM

## 2022-01-01 PROCEDURE — G1010 CDSM STANSON: HCPCS | Mod: GC | Performed by: RADIOLOGY

## 2022-01-01 PROCEDURE — 200N000002 HC R&B ICU UMMC

## 2022-01-01 PROCEDURE — 84466 ASSAY OF TRANSFERRIN: CPT | Performed by: NEUROLOGICAL SURGERY

## 2022-01-01 PROCEDURE — 120N000003 HC R&B IMCU UMMC

## 2022-01-01 PROCEDURE — 85025 COMPLETE CBC W/AUTO DIFF WBC: CPT | Performed by: EMERGENCY MEDICINE

## 2022-01-01 PROCEDURE — 258N000003 HC RX IP 258 OP 636: Performed by: NURSE PRACTITIONER

## 2022-01-01 PROCEDURE — 82140 ASSAY OF AMMONIA: CPT | Performed by: STUDENT IN AN ORGANIZED HEALTH CARE EDUCATION/TRAINING PROGRAM

## 2022-01-01 PROCEDURE — 73552 X-RAY EXAM OF FEMUR 2/>: CPT | Mod: RT

## 2022-01-01 PROCEDURE — C9254 INJECTION, LACOSAMIDE: HCPCS | Performed by: NURSE PRACTITIONER

## 2022-01-01 PROCEDURE — 250N000013 HC RX MED GY IP 250 OP 250 PS 637: Performed by: PSYCHIATRY & NEUROLOGY

## 2022-01-01 PROCEDURE — 36415 COLL VENOUS BLD VENIPUNCTURE: CPT | Performed by: STUDENT IN AN ORGANIZED HEALTH CARE EDUCATION/TRAINING PROGRAM

## 2022-01-01 PROCEDURE — 74018 RADEX ABDOMEN 1 VIEW: CPT | Mod: 26 | Performed by: STUDENT IN AN ORGANIZED HEALTH CARE EDUCATION/TRAINING PROGRAM

## 2022-01-01 PROCEDURE — 70450 CT HEAD/BRAIN W/O DYE: CPT

## 2022-01-01 PROCEDURE — 82310 ASSAY OF CALCIUM: CPT | Performed by: PHYSICIAN ASSISTANT

## 2022-01-01 PROCEDURE — 250N000013 HC RX MED GY IP 250 OP 250 PS 637: Performed by: PHYSICIAN ASSISTANT

## 2022-01-01 PROCEDURE — 71045 X-RAY EXAM CHEST 1 VIEW: CPT | Mod: 26 | Performed by: RADIOLOGY

## 2022-01-01 PROCEDURE — 250N000011 HC RX IP 250 OP 636: Performed by: NEUROLOGICAL SURGERY

## 2022-01-01 PROCEDURE — 95711 VEEG 2-12 HR UNMONITORED: CPT

## 2022-01-01 PROCEDURE — 95714 VEEG EA 12-26 HR UNMNTR: CPT

## 2022-01-01 PROCEDURE — 85018 HEMOGLOBIN: CPT | Performed by: STUDENT IN AN ORGANIZED HEALTH CARE EDUCATION/TRAINING PROGRAM

## 2022-01-01 PROCEDURE — 84295 ASSAY OF SERUM SODIUM: CPT | Performed by: NURSE PRACTITIONER

## 2022-01-01 PROCEDURE — 97530 THERAPEUTIC ACTIVITIES: CPT | Mod: GO

## 2022-01-01 PROCEDURE — 45380 COLONOSCOPY AND BIOPSY: CPT | Performed by: INTERNAL MEDICINE

## 2022-01-01 PROCEDURE — 85027 COMPLETE CBC AUTOMATED: CPT | Performed by: STUDENT IN AN ORGANIZED HEALTH CARE EDUCATION/TRAINING PROGRAM

## 2022-01-01 PROCEDURE — 83605 ASSAY OF LACTIC ACID: CPT | Performed by: STUDENT IN AN ORGANIZED HEALTH CARE EDUCATION/TRAINING PROGRAM

## 2022-01-01 PROCEDURE — 94640 AIRWAY INHALATION TREATMENT: CPT

## 2022-01-01 PROCEDURE — 99207 PR APP CREDIT; MD BILLING SHARED VISIT: CPT | Performed by: INTERNAL MEDICINE

## 2022-01-01 PROCEDURE — 88305 TISSUE EXAM BY PATHOLOGIST: CPT | Mod: 26 | Performed by: PATHOLOGY

## 2022-01-01 PROCEDURE — 999N000248 HC STATISTIC IV INSERT WITH US BY RN

## 2022-01-01 PROCEDURE — 85396 CLOTTING ASSAY WHOLE BLOOD: CPT | Performed by: STUDENT IN AN ORGANIZED HEALTH CARE EDUCATION/TRAINING PROGRAM

## 2022-01-01 PROCEDURE — 72125 CT NECK SPINE W/O DYE: CPT | Mod: 26 | Performed by: RADIOLOGY

## 2022-01-01 PROCEDURE — 80048 BASIC METABOLIC PNL TOTAL CA: CPT | Performed by: NURSE PRACTITIONER

## 2022-01-01 PROCEDURE — 85060 BLOOD SMEAR INTERPRETATION: CPT | Performed by: STUDENT IN AN ORGANIZED HEALTH CARE EDUCATION/TRAINING PROGRAM

## 2022-01-01 PROCEDURE — 93005 ELECTROCARDIOGRAM TRACING: CPT

## 2022-01-01 PROCEDURE — G1010 CDSM STANSON: HCPCS

## 2022-01-01 PROCEDURE — 99292 CRITICAL CARE ADDL 30 MIN: CPT | Mod: FS | Performed by: PSYCHIATRY & NEUROLOGY

## 2022-01-01 PROCEDURE — 82565 ASSAY OF CREATININE: CPT | Performed by: STUDENT IN AN ORGANIZED HEALTH CARE EDUCATION/TRAINING PROGRAM

## 2022-01-01 PROCEDURE — 80053 COMPREHEN METABOLIC PANEL: CPT | Performed by: PHYSICIAN ASSISTANT

## 2022-01-01 PROCEDURE — 95720 EEG PHY/QHP EA INCR W/VEEG: CPT | Performed by: PSYCHIATRY & NEUROLOGY

## 2022-01-01 PROCEDURE — 250N000011 HC RX IP 250 OP 636: Performed by: NURSE PRACTITIONER

## 2022-01-01 PROCEDURE — 99231 SBSQ HOSP IP/OBS SF/LOW 25: CPT | Performed by: PHYSICIAN ASSISTANT

## 2022-01-01 PROCEDURE — 94668 MNPJ CHEST WALL SBSQ: CPT

## 2022-01-01 PROCEDURE — 250N000013 HC RX MED GY IP 250 OP 250 PS 637: Performed by: NEUROLOGICAL SURGERY

## 2022-01-01 PROCEDURE — 85014 HEMATOCRIT: CPT | Performed by: NEUROLOGICAL SURGERY

## 2022-01-01 PROCEDURE — 84295 ASSAY OF SERUM SODIUM: CPT | Performed by: STUDENT IN AN ORGANIZED HEALTH CARE EDUCATION/TRAINING PROGRAM

## 2022-01-01 PROCEDURE — 99232 SBSQ HOSP IP/OBS MODERATE 35: CPT | Performed by: PHYSICIAN ASSISTANT

## 2022-01-01 PROCEDURE — 87641 MR-STAPH DNA AMP PROBE: CPT | Performed by: NURSE PRACTITIONER

## 2022-01-01 PROCEDURE — 99153 MOD SED SAME PHYS/QHP EA: CPT | Performed by: INTERNAL MEDICINE

## 2022-01-01 PROCEDURE — 85384 FIBRINOGEN ACTIVITY: CPT | Performed by: STUDENT IN AN ORGANIZED HEALTH CARE EDUCATION/TRAINING PROGRAM

## 2022-01-01 PROCEDURE — 85027 COMPLETE CBC AUTOMATED: CPT | Performed by: NEUROLOGICAL SURGERY

## 2022-01-01 PROCEDURE — 85730 THROMBOPLASTIN TIME PARTIAL: CPT

## 2022-01-01 PROCEDURE — 36415 COLL VENOUS BLD VENIPUNCTURE: CPT | Performed by: NURSE PRACTITIONER

## 2022-01-01 PROCEDURE — G1010 CDSM STANSON: HCPCS | Mod: GC | Performed by: STUDENT IN AN ORGANIZED HEALTH CARE EDUCATION/TRAINING PROGRAM

## 2022-01-01 PROCEDURE — 84155 ASSAY OF PROTEIN SERUM: CPT | Performed by: STUDENT IN AN ORGANIZED HEALTH CARE EDUCATION/TRAINING PROGRAM

## 2022-01-01 PROCEDURE — 87641 MR-STAPH DNA AMP PROBE: CPT | Performed by: STUDENT IN AN ORGANIZED HEALTH CARE EDUCATION/TRAINING PROGRAM

## 2022-01-01 PROCEDURE — 999N000127 HC STATISTIC PERIPHERAL IV START W US GUIDANCE

## 2022-01-01 PROCEDURE — 74018 RADEX ABDOMEN 1 VIEW: CPT | Mod: 26 | Performed by: RADIOLOGY

## 2022-01-01 PROCEDURE — 250N000011 HC RX IP 250 OP 636: Performed by: NURSE ANESTHETIST, CERTIFIED REGISTERED

## 2022-01-01 PROCEDURE — 99203 OFFICE O/P NEW LOW 30 MIN: CPT | Performed by: PODIATRIST

## 2022-01-01 PROCEDURE — 87205 SMEAR GRAM STAIN: CPT | Performed by: STUDENT IN AN ORGANIZED HEALTH CARE EDUCATION/TRAINING PROGRAM

## 2022-01-01 PROCEDURE — 36011 PLACE CATHETER IN VEIN: CPT | Performed by: PSYCHIATRY & NEUROLOGY

## 2022-01-01 PROCEDURE — 36592 COLLECT BLOOD FROM PICC: CPT | Performed by: STUDENT IN AN ORGANIZED HEALTH CARE EDUCATION/TRAINING PROGRAM

## 2022-01-01 PROCEDURE — 87493 C DIFF AMPLIFIED PROBE: CPT | Performed by: NURSE PRACTITIONER

## 2022-01-01 PROCEDURE — 99233 SBSQ HOSP IP/OBS HIGH 50: CPT | Mod: FS | Performed by: STUDENT IN AN ORGANIZED HEALTH CARE EDUCATION/TRAINING PROGRAM

## 2022-01-01 PROCEDURE — 99233 SBSQ HOSP IP/OBS HIGH 50: CPT | Performed by: NURSE PRACTITIONER

## 2022-01-01 PROCEDURE — 92526 ORAL FUNCTION THERAPY: CPT | Mod: GN

## 2022-01-01 PROCEDURE — 71045 X-RAY EXAM CHEST 1 VIEW: CPT

## 2022-01-01 PROCEDURE — 99233 SBSQ HOSP IP/OBS HIGH 50: CPT | Performed by: PHYSICIAN ASSISTANT

## 2022-01-01 PROCEDURE — 97535 SELF CARE MNGMENT TRAINING: CPT | Mod: GO

## 2022-01-01 PROCEDURE — G0463 HOSPITAL OUTPT CLINIC VISIT: HCPCS

## 2022-01-01 PROCEDURE — 86850 RBC ANTIBODY SCREEN: CPT | Performed by: NEUROLOGICAL SURGERY

## 2022-01-01 PROCEDURE — 83735 ASSAY OF MAGNESIUM: CPT | Performed by: NURSE PRACTITIONER

## 2022-01-01 PROCEDURE — C9113 INJ PANTOPRAZOLE SODIUM, VIA: HCPCS | Performed by: NURSE PRACTITIONER

## 2022-01-01 PROCEDURE — 250N000009 HC RX 250: Performed by: NEUROLOGICAL SURGERY

## 2022-01-01 PROCEDURE — 86850 RBC ANTIBODY SCREEN: CPT | Performed by: STUDENT IN AN ORGANIZED HEALTH CARE EDUCATION/TRAINING PROGRAM

## 2022-01-01 PROCEDURE — 72192 CT PELVIS W/O DYE: CPT

## 2022-01-01 PROCEDURE — 74174 CTA ABD&PLVS W/CONTRAST: CPT | Mod: 26 | Performed by: RADIOLOGY

## 2022-01-01 PROCEDURE — 85730 THROMBOPLASTIN TIME PARTIAL: CPT | Performed by: NURSE PRACTITIONER

## 2022-01-01 PROCEDURE — 70450 CT HEAD/BRAIN W/O DYE: CPT | Mod: 26 | Performed by: RADIOLOGY

## 2022-01-01 PROCEDURE — 250N000013 HC RX MED GY IP 250 OP 250 PS 637

## 2022-01-01 PROCEDURE — 94640 AIRWAY INHALATION TREATMENT: CPT | Mod: 76

## 2022-01-01 PROCEDURE — 85027 COMPLETE CBC AUTOMATED: CPT | Performed by: PHYSICIAN ASSISTANT

## 2022-01-01 PROCEDURE — 97110 THERAPEUTIC EXERCISES: CPT | Mod: GO

## 2022-01-01 PROCEDURE — 250N000011 HC RX IP 250 OP 636: Performed by: PHYSICIAN ASSISTANT

## 2022-01-01 PROCEDURE — 36415 COLL VENOUS BLD VENIPUNCTURE: CPT | Performed by: PHYSICIAN ASSISTANT

## 2022-01-01 PROCEDURE — 99232 SBSQ HOSP IP/OBS MODERATE 35: CPT | Mod: FS | Performed by: INTERNAL MEDICINE

## 2022-01-01 PROCEDURE — 999N000285 HC STATISTIC VASC ACCESS LAB DRAW WITH PIV START

## 2022-01-01 PROCEDURE — 97530 THERAPEUTIC ACTIVITIES: CPT | Mod: GO | Performed by: OCCUPATIONAL THERAPIST

## 2022-01-01 PROCEDURE — 97535 SELF CARE MNGMENT TRAINING: CPT | Mod: GO | Performed by: OCCUPATIONAL THERAPIST

## 2022-01-01 PROCEDURE — 99232 SBSQ HOSP IP/OBS MODERATE 35: CPT | Performed by: STUDENT IN AN ORGANIZED HEALTH CARE EDUCATION/TRAINING PROGRAM

## 2022-01-01 PROCEDURE — C9803 HOPD COVID-19 SPEC COLLECT: HCPCS

## 2022-01-01 PROCEDURE — 36415 COLL VENOUS BLD VENIPUNCTURE: CPT | Performed by: EMERGENCY MEDICINE

## 2022-01-01 PROCEDURE — 85018 HEMOGLOBIN: CPT

## 2022-01-01 PROCEDURE — 250N000009 HC RX 250: Performed by: RADIOLOGY

## 2022-01-01 PROCEDURE — 94003 VENT MGMT INPAT SUBQ DAY: CPT

## 2022-01-01 PROCEDURE — 83735 ASSAY OF MAGNESIUM: CPT | Performed by: STUDENT IN AN ORGANIZED HEALTH CARE EDUCATION/TRAINING PROGRAM

## 2022-01-01 PROCEDURE — 85610 PROTHROMBIN TIME: CPT | Performed by: STUDENT IN AN ORGANIZED HEALTH CARE EDUCATION/TRAINING PROGRAM

## 2022-01-01 PROCEDURE — U0003 INFECTIOUS AGENT DETECTION BY NUCLEIC ACID (DNA OR RNA); SEVERE ACUTE RESPIRATORY SYNDROME CORONAVIRUS 2 (SARS-COV-2) (CORONAVIRUS DISEASE [COVID-19]), AMPLIFIED PROBE TECHNIQUE, MAKING USE OF HIGH THROUGHPUT TECHNOLOGIES AS DESCRIBED BY CMS-2020-01-R: HCPCS | Performed by: STUDENT IN AN ORGANIZED HEALTH CARE EDUCATION/TRAINING PROGRAM

## 2022-01-01 PROCEDURE — 99239 HOSP IP/OBS DSCHRG MGMT >30: CPT | Performed by: PHYSICIAN ASSISTANT

## 2022-01-01 PROCEDURE — 85025 COMPLETE CBC W/AUTO DIFF WBC: CPT | Performed by: STUDENT IN AN ORGANIZED HEALTH CARE EDUCATION/TRAINING PROGRAM

## 2022-01-01 PROCEDURE — 80048 BASIC METABOLIC PNL TOTAL CA: CPT

## 2022-01-01 PROCEDURE — 85014 HEMATOCRIT: CPT | Performed by: STUDENT IN AN ORGANIZED HEALTH CARE EDUCATION/TRAINING PROGRAM

## 2022-01-01 PROCEDURE — 94667 MNPJ CHEST WALL 1ST: CPT

## 2022-01-01 PROCEDURE — 85027 COMPLETE CBC AUTOMATED: CPT

## 2022-01-01 PROCEDURE — 99152 MOD SED SAME PHYS/QHP 5/>YRS: CPT | Performed by: RADIOLOGY

## 2022-01-01 PROCEDURE — 85018 HEMOGLOBIN: CPT | Performed by: NEUROLOGICAL SURGERY

## 2022-01-01 PROCEDURE — U0005 INFEC AGEN DETEC AMPLI PROBE: HCPCS | Performed by: NURSE PRACTITIONER

## 2022-01-01 PROCEDURE — 82330 ASSAY OF CALCIUM: CPT | Performed by: NURSE PRACTITIONER

## 2022-01-01 PROCEDURE — 86901 BLOOD TYPING SEROLOGIC RH(D): CPT | Performed by: STUDENT IN AN ORGANIZED HEALTH CARE EDUCATION/TRAINING PROGRAM

## 2022-01-01 PROCEDURE — 84145 PROCALCITONIN (PCT): CPT | Performed by: NURSE PRACTITIONER

## 2022-01-01 PROCEDURE — 81001 URINALYSIS AUTO W/SCOPE: CPT | Performed by: NURSE PRACTITIONER

## 2022-01-01 PROCEDURE — 999N000128 HC STATISTIC PERIPHERAL IV START W/O US GUIDANCE

## 2022-01-01 PROCEDURE — 99233 SBSQ HOSP IP/OBS HIGH 50: CPT | Performed by: STUDENT IN AN ORGANIZED HEALTH CARE EDUCATION/TRAINING PROGRAM

## 2022-01-01 PROCEDURE — 87205 SMEAR GRAM STAIN: CPT | Performed by: PHYSICIAN ASSISTANT

## 2022-01-01 PROCEDURE — 999N000226 HC STATISTIC SLP IP EVAL DEFER

## 2022-01-01 PROCEDURE — 99207 PR APP CREDIT; MD BILLING SHARED VISIT: CPT | Performed by: STUDENT IN AN ORGANIZED HEALTH CARE EDUCATION/TRAINING PROGRAM

## 2022-01-01 PROCEDURE — 99231 SBSQ HOSP IP/OBS SF/LOW 25: CPT | Mod: FS | Performed by: PEDIATRICS

## 2022-01-01 PROCEDURE — 82310 ASSAY OF CALCIUM: CPT | Performed by: NURSE PRACTITIONER

## 2022-01-01 PROCEDURE — 99221 1ST HOSP IP/OBS SF/LOW 40: CPT | Mod: GC | Performed by: NEUROLOGICAL SURGERY

## 2022-01-01 PROCEDURE — 86901 BLOOD TYPING SEROLOGIC RH(D): CPT | Performed by: NEUROLOGICAL SURGERY

## 2022-01-01 PROCEDURE — 82310 ASSAY OF CALCIUM: CPT | Performed by: STUDENT IN AN ORGANIZED HEALTH CARE EDUCATION/TRAINING PROGRAM

## 2022-01-01 PROCEDURE — 82330 ASSAY OF CALCIUM: CPT

## 2022-01-01 PROCEDURE — 258N000003 HC RX IP 258 OP 636: Performed by: PSYCHIATRY & NEUROLOGY

## 2022-01-01 PROCEDURE — 83010 ASSAY OF HAPTOGLOBIN QUANT: CPT | Performed by: NURSE PRACTITIONER

## 2022-01-01 PROCEDURE — 999N000178 HC STATISTIC SUCTION SPUTUM

## 2022-01-01 PROCEDURE — 84132 ASSAY OF SERUM POTASSIUM: CPT | Performed by: NEUROLOGICAL SURGERY

## 2022-01-01 PROCEDURE — 86923 COMPATIBILITY TEST ELECTRIC: CPT

## 2022-01-01 PROCEDURE — 99292 CRITICAL CARE ADDL 30 MIN: CPT | Mod: 25 | Performed by: STUDENT IN AN ORGANIZED HEALTH CARE EDUCATION/TRAINING PROGRAM

## 2022-01-01 PROCEDURE — 81001 URINALYSIS AUTO W/SCOPE: CPT | Performed by: STUDENT IN AN ORGANIZED HEALTH CARE EDUCATION/TRAINING PROGRAM

## 2022-01-01 PROCEDURE — U0005 INFEC AGEN DETEC AMPLI PROBE: HCPCS | Performed by: STUDENT IN AN ORGANIZED HEALTH CARE EDUCATION/TRAINING PROGRAM

## 2022-01-01 PROCEDURE — 0DJD8ZZ INSPECTION OF LOWER INTESTINAL TRACT, VIA NATURAL OR ARTIFICIAL OPENING ENDOSCOPIC: ICD-10-PCS | Performed by: INTERNAL MEDICINE

## 2022-01-01 PROCEDURE — 93306 TTE W/DOPPLER COMPLETE: CPT | Mod: 26 | Performed by: INTERNAL MEDICINE

## 2022-01-01 PROCEDURE — 272N000302 HC DEVICE INFLATION CR5

## 2022-01-01 PROCEDURE — 99291 CRITICAL CARE FIRST HOUR: CPT | Mod: FS | Performed by: PSYCHIATRY & NEUROLOGY

## 2022-01-01 PROCEDURE — 87077 CULTURE AEROBIC IDENTIFY: CPT | Performed by: PHYSICIAN ASSISTANT

## 2022-01-01 PROCEDURE — 85045 AUTOMATED RETICULOCYTE COUNT: CPT

## 2022-01-01 PROCEDURE — 85652 RBC SED RATE AUTOMATED: CPT | Performed by: STUDENT IN AN ORGANIZED HEALTH CARE EDUCATION/TRAINING PROGRAM

## 2022-01-01 PROCEDURE — 84295 ASSAY OF SERUM SODIUM: CPT

## 2022-01-01 PROCEDURE — G0500 MOD SEDAT ENDO SERVICE >5YRS: HCPCS | Performed by: INTERNAL MEDICINE

## 2022-01-01 PROCEDURE — P9016 RBC LEUKOCYTES REDUCED: HCPCS

## 2022-01-01 PROCEDURE — 258N000003 HC RX IP 258 OP 636: Performed by: EMERGENCY MEDICINE

## 2022-01-01 PROCEDURE — 250N000011 HC RX IP 250 OP 636: Performed by: RADIOLOGY

## 2022-01-01 PROCEDURE — 72170 X-RAY EXAM OF PELVIS: CPT

## 2022-01-01 PROCEDURE — 99207 PR APP CREDIT; MD BILLING SHARED VISIT: CPT | Performed by: PEDIATRICS

## 2022-01-01 PROCEDURE — 0W3P8ZZ CONTROL BLEEDING IN GASTROINTESTINAL TRACT, VIA NATURAL OR ARTIFICIAL OPENING ENDOSCOPIC: ICD-10-PCS | Performed by: INTERNAL MEDICINE

## 2022-01-01 PROCEDURE — 36415 COLL VENOUS BLD VENIPUNCTURE: CPT | Performed by: HOSPITALIST

## 2022-01-01 PROCEDURE — 99291 CRITICAL CARE FIRST HOUR: CPT | Mod: 25 | Performed by: STUDENT IN AN ORGANIZED HEALTH CARE EDUCATION/TRAINING PROGRAM

## 2022-01-01 PROCEDURE — 72125 CT NECK SPINE W/O DYE: CPT

## 2022-01-01 PROCEDURE — 99152 MOD SED SAME PHYS/QHP 5/>YRS: CPT

## 2022-01-01 PROCEDURE — 99222 1ST HOSP IP/OBS MODERATE 55: CPT | Mod: 24

## 2022-01-01 PROCEDURE — 31720 CLEARANCE OF AIRWAYS: CPT

## 2022-01-01 PROCEDURE — 87040 BLOOD CULTURE FOR BACTERIA: CPT | Performed by: NURSE PRACTITIONER

## 2022-01-01 PROCEDURE — 93005 ELECTROCARDIOGRAM TRACING: CPT | Performed by: EMERGENCY MEDICINE

## 2022-01-01 PROCEDURE — 99214 OFFICE O/P EST MOD 30 MIN: CPT | Performed by: NURSE PRACTITIONER

## 2022-01-01 PROCEDURE — 85018 HEMOGLOBIN: CPT | Performed by: NURSE PRACTITIONER

## 2022-01-01 PROCEDURE — 99232 SBSQ HOSP IP/OBS MODERATE 35: CPT | Mod: FS | Performed by: ORTHOPAEDIC SURGERY

## 2022-01-01 PROCEDURE — G1010 CDSM STANSON: HCPCS | Performed by: RADIOLOGY

## 2022-01-01 PROCEDURE — 87040 BLOOD CULTURE FOR BACTERIA: CPT | Performed by: NEUROLOGICAL SURGERY

## 2022-01-01 PROCEDURE — 99285 EMERGENCY DEPT VISIT HI MDM: CPT | Mod: 25

## 2022-01-01 PROCEDURE — 49440 PLACE GASTROSTOMY TUBE PERC: CPT

## 2022-01-01 PROCEDURE — 999N000015 HC STATISTIC ARTERIAL MONITORING DAILY

## 2022-01-01 PROCEDURE — 278N000051 HC OR IMPLANT GENERAL: Performed by: NEUROLOGICAL SURGERY

## 2022-01-01 PROCEDURE — 80202 ASSAY OF VANCOMYCIN: CPT | Performed by: STUDENT IN AN ORGANIZED HEALTH CARE EDUCATION/TRAINING PROGRAM

## 2022-01-01 PROCEDURE — 49440 PLACE GASTROSTOMY TUBE PERC: CPT | Performed by: RADIOLOGY

## 2022-01-01 PROCEDURE — 95718 EEG PHYS/QHP 2-12 HR W/VEEG: CPT | Performed by: PSYCHIATRY & NEUROLOGY

## 2022-01-01 PROCEDURE — 88342 IMHCHEM/IMCYTCHM 1ST ANTB: CPT | Mod: 26 | Performed by: PATHOLOGY

## 2022-01-01 PROCEDURE — 99207 PR NO BILLABLE SERVICE THIS VISIT: CPT | Performed by: NURSE PRACTITIONER

## 2022-01-01 PROCEDURE — 83036 HEMOGLOBIN GLYCOSYLATED A1C: CPT | Performed by: STUDENT IN AN ORGANIZED HEALTH CARE EDUCATION/TRAINING PROGRAM

## 2022-01-01 PROCEDURE — U0005 INFEC AGEN DETEC AMPLI PROBE: HCPCS | Performed by: PHYSICIAN ASSISTANT

## 2022-01-01 PROCEDURE — U0003 INFECTIOUS AGENT DETECTION BY NUCLEIC ACID (DNA OR RNA); SEVERE ACUTE RESPIRATORY SYNDROME CORONAVIRUS 2 (SARS-COV-2) (CORONAVIRUS DISEASE [COVID-19]), AMPLIFIED PROBE TECHNIQUE, MAKING USE OF HIGH THROUGHPUT TECHNOLOGIES AS DESCRIBED BY CMS-2020-01-R: HCPCS | Performed by: NURSE PRACTITIONER

## 2022-01-01 PROCEDURE — 272N000001 HC OR GENERAL SUPPLY STERILE: Performed by: NEUROLOGICAL SURGERY

## 2022-01-01 PROCEDURE — 99232 SBSQ HOSP IP/OBS MODERATE 35: CPT | Mod: GC | Performed by: NEUROLOGICAL SURGERY

## 2022-01-01 PROCEDURE — 97165 OT EVAL LOW COMPLEX 30 MIN: CPT | Mod: GO

## 2022-01-01 PROCEDURE — 84145 PROCALCITONIN (PCT): CPT | Performed by: STUDENT IN AN ORGANIZED HEALTH CARE EDUCATION/TRAINING PROGRAM

## 2022-01-01 PROCEDURE — 97116 GAIT TRAINING THERAPY: CPT | Mod: GP

## 2022-01-01 PROCEDURE — 97530 THERAPEUTIC ACTIVITIES: CPT | Mod: GP | Performed by: PHYSICAL THERAPIST

## 2022-01-01 PROCEDURE — 93308 TTE F-UP OR LMTD: CPT | Mod: 26 | Performed by: INTERNAL MEDICINE

## 2022-01-01 PROCEDURE — 93010 ELECTROCARDIOGRAM REPORT: CPT | Mod: 59 | Performed by: STUDENT IN AN ORGANIZED HEALTH CARE EDUCATION/TRAINING PROGRAM

## 2022-01-01 PROCEDURE — 36592 COLLECT BLOOD FROM PICC: CPT

## 2022-01-01 PROCEDURE — 86140 C-REACTIVE PROTEIN: CPT | Performed by: NURSE PRACTITIONER

## 2022-01-01 PROCEDURE — 99221 1ST HOSP IP/OBS SF/LOW 40: CPT | Mod: 24 | Performed by: PHYSICIAN ASSISTANT

## 2022-01-01 PROCEDURE — 99285 EMERGENCY DEPT VISIT HI MDM: CPT | Mod: 25 | Performed by: STUDENT IN AN ORGANIZED HEALTH CARE EDUCATION/TRAINING PROGRAM

## 2022-01-01 PROCEDURE — 83605 ASSAY OF LACTIC ACID: CPT | Performed by: NURSE PRACTITIONER

## 2022-01-01 PROCEDURE — 83550 IRON BINDING TEST: CPT | Performed by: STUDENT IN AN ORGANIZED HEALTH CARE EDUCATION/TRAINING PROGRAM

## 2022-01-01 PROCEDURE — 999N000155 HC STATISTIC RAPCV CVP MONITORING

## 2022-01-01 PROCEDURE — 99233 SBSQ HOSP IP/OBS HIGH 50: CPT | Performed by: PSYCHIATRY & NEUROLOGY

## 2022-01-01 PROCEDURE — 43255 EGD CONTROL BLEEDING ANY: CPT | Performed by: INTERNAL MEDICINE

## 2022-01-01 PROCEDURE — 31500 INSERT EMERGENCY AIRWAY: CPT | Performed by: STUDENT IN AN ORGANIZED HEALTH CARE EDUCATION/TRAINING PROGRAM

## 2022-01-01 PROCEDURE — 85610 PROTHROMBIN TIME: CPT | Performed by: NURSE PRACTITIONER

## 2022-01-01 PROCEDURE — 84155 ASSAY OF PROTEIN SERUM: CPT | Performed by: NURSE PRACTITIONER

## 2022-01-01 PROCEDURE — 80048 BASIC METABOLIC PNL TOTAL CA: CPT | Performed by: PHYSICIAN ASSISTANT

## 2022-01-01 PROCEDURE — 74018 RADEX ABDOMEN 1 VIEW: CPT

## 2022-01-01 PROCEDURE — 84466 ASSAY OF TRANSFERRIN: CPT | Performed by: STUDENT IN AN ORGANIZED HEALTH CARE EDUCATION/TRAINING PROGRAM

## 2022-01-01 PROCEDURE — 36592 COLLECT BLOOD FROM PICC: CPT | Performed by: NEUROLOGICAL SURGERY

## 2022-01-01 PROCEDURE — 93306 TTE W/DOPPLER COMPLETE: CPT

## 2022-01-01 PROCEDURE — 87493 C DIFF AMPLIFIED PROBE: CPT | Performed by: PHYSICIAN ASSISTANT

## 2022-01-01 PROCEDURE — 80053 COMPREHEN METABOLIC PANEL: CPT | Performed by: NEUROLOGICAL SURGERY

## 2022-01-01 PROCEDURE — 85384 FIBRINOGEN ACTIVITY: CPT | Performed by: NURSE PRACTITIONER

## 2022-01-01 PROCEDURE — C9113 INJ PANTOPRAZOLE SODIUM, VIA: HCPCS

## 2022-01-01 PROCEDURE — 61312 CRNEC/CRNOT STTL XDRL/SDRL: CPT | Mod: GC | Performed by: NEUROLOGICAL SURGERY

## 2022-01-01 PROCEDURE — 250N000009 HC RX 250: Performed by: PSYCHIATRY & NEUROLOGY

## 2022-01-01 PROCEDURE — 94002 VENT MGMT INPAT INIT DAY: CPT

## 2022-01-01 PROCEDURE — 250N000009 HC RX 250: Performed by: PHYSICIAN ASSISTANT

## 2022-01-01 PROCEDURE — 999N000065 XR ABDOMEN PORT 1 VIEWS

## 2022-01-01 PROCEDURE — 99214 OFFICE O/P EST MOD 30 MIN: CPT | Performed by: PHYSICIAN ASSISTANT

## 2022-01-01 PROCEDURE — 99291 CRITICAL CARE FIRST HOUR: CPT | Mod: 25 | Performed by: PSYCHIATRY & NEUROLOGY

## 2022-01-01 PROCEDURE — 36415 COLL VENOUS BLD VENIPUNCTURE: CPT

## 2022-01-01 PROCEDURE — 99223 1ST HOSP IP/OBS HIGH 75: CPT | Performed by: PHYSICIAN ASSISTANT

## 2022-01-01 PROCEDURE — 82248 BILIRUBIN DIRECT: CPT | Performed by: STUDENT IN AN ORGANIZED HEALTH CARE EDUCATION/TRAINING PROGRAM

## 2022-01-01 PROCEDURE — 83036 HEMOGLOBIN GLYCOSYLATED A1C: CPT | Performed by: NURSE PRACTITIONER

## 2022-01-01 PROCEDURE — 0DH63UZ INSERTION OF FEEDING DEVICE INTO STOMACH, PERCUTANEOUS APPROACH: ICD-10-PCS | Performed by: RADIOLOGY

## 2022-01-01 PROCEDURE — C9803 HOPD COVID-19 SPEC COLLECT: HCPCS | Performed by: STUDENT IN AN ORGANIZED HEALTH CARE EDUCATION/TRAINING PROGRAM

## 2022-01-01 PROCEDURE — 258N000003 HC RX IP 258 OP 636

## 2022-01-01 PROCEDURE — 258N000003 HC RX IP 258 OP 636: Performed by: NEUROLOGICAL SURGERY

## 2022-01-01 PROCEDURE — G1004 CDSM NDSC: HCPCS

## 2022-01-01 PROCEDURE — 88342 IMHCHEM/IMCYTCHM 1ST ANTB: CPT | Mod: TC | Performed by: INTERNAL MEDICINE

## 2022-01-01 PROCEDURE — 82310 ASSAY OF CALCIUM: CPT | Performed by: NEUROLOGICAL SURGERY

## 2022-01-01 PROCEDURE — U0005 INFEC AGEN DETEC AMPLI PROBE: HCPCS

## 2022-01-01 PROCEDURE — 44500 INTRO GASTROINTESTINAL TUBE: CPT

## 2022-01-01 PROCEDURE — 99232 SBSQ HOSP IP/OBS MODERATE 35: CPT | Mod: FS | Performed by: PEDIATRICS

## 2022-01-01 PROCEDURE — 00C40ZZ EXTIRPATION OF MATTER FROM INTRACRANIAL SUBDURAL SPACE, OPEN APPROACH: ICD-10-PCS | Performed by: NEUROLOGICAL SURGERY

## 2022-01-01 PROCEDURE — 82805 BLOOD GASES W/O2 SATURATION: CPT | Performed by: PSYCHIATRY & NEUROLOGY

## 2022-01-01 PROCEDURE — 82803 BLOOD GASES ANY COMBINATION: CPT | Performed by: STUDENT IN AN ORGANIZED HEALTH CARE EDUCATION/TRAINING PROGRAM

## 2022-01-01 PROCEDURE — 99215 OFFICE O/P EST HI 40 MIN: CPT | Performed by: FAMILY MEDICINE

## 2022-01-01 PROCEDURE — 999N000111 HC STATISTIC OT IP EVAL DEFER

## 2022-01-01 PROCEDURE — 93321 DOPPLER ECHO F-UP/LMTD STD: CPT | Mod: 26 | Performed by: INTERNAL MEDICINE

## 2022-01-01 PROCEDURE — 93325 DOPPLER ECHO COLOR FLOW MAPG: CPT

## 2022-01-01 PROCEDURE — 97161 PT EVAL LOW COMPLEX 20 MIN: CPT | Mod: GP | Performed by: PHYSICAL THERAPIST

## 2022-01-01 PROCEDURE — 370N000017 HC ANESTHESIA TECHNICAL FEE, PER MIN: Performed by: NEUROLOGICAL SURGERY

## 2022-01-01 PROCEDURE — 99239 HOSP IP/OBS DSCHRG MGMT >30: CPT | Mod: FS | Performed by: PEDIATRICS

## 2022-01-01 PROCEDURE — 99213 OFFICE O/P EST LOW 20 MIN: CPT | Performed by: FAMILY MEDICINE

## 2022-01-01 PROCEDURE — 99291 CRITICAL CARE FIRST HOUR: CPT | Mod: GC | Performed by: PSYCHIATRY & NEUROLOGY

## 2022-01-01 PROCEDURE — 96374 THER/PROPH/DIAG INJ IV PUSH: CPT

## 2022-01-01 PROCEDURE — 76705 ECHO EXAM OF ABDOMEN: CPT

## 2022-01-01 PROCEDURE — 82040 ASSAY OF SERUM ALBUMIN: CPT | Performed by: STUDENT IN AN ORGANIZED HEALTH CARE EDUCATION/TRAINING PROGRAM

## 2022-01-01 PROCEDURE — 96375 TX/PRO/DX INJ NEW DRUG ADDON: CPT

## 2022-01-01 PROCEDURE — 99222 1ST HOSP IP/OBS MODERATE 55: CPT | Performed by: STUDENT IN AN ORGANIZED HEALTH CARE EDUCATION/TRAINING PROGRAM

## 2022-01-01 PROCEDURE — 94799 UNLISTED PULMONARY SVC/PX: CPT

## 2022-01-01 PROCEDURE — 74177 CT ABD & PELVIS W/CONTRAST: CPT | Mod: 26 | Performed by: RADIOLOGY

## 2022-01-01 PROCEDURE — 85049 AUTOMATED PLATELET COUNT: CPT | Performed by: NEUROLOGICAL SURGERY

## 2022-01-01 PROCEDURE — 87086 URINE CULTURE/COLONY COUNT: CPT | Performed by: STUDENT IN AN ORGANIZED HEALTH CARE EDUCATION/TRAINING PROGRAM

## 2022-01-01 PROCEDURE — 99291 CRITICAL CARE FIRST HOUR: CPT | Mod: AI | Performed by: PSYCHIATRY & NEUROLOGY

## 2022-01-01 PROCEDURE — 99233 SBSQ HOSP IP/OBS HIGH 50: CPT | Mod: FS | Performed by: ORTHOPAEDIC SURGERY

## 2022-01-01 PROCEDURE — 87205 SMEAR GRAM STAIN: CPT | Performed by: NEUROLOGICAL SURGERY

## 2022-01-01 PROCEDURE — 93325 DOPPLER ECHO COLOR FLOW MAPG: CPT | Mod: 26 | Performed by: INTERNAL MEDICINE

## 2022-01-01 PROCEDURE — 87070 CULTURE OTHR SPECIMN AEROBIC: CPT | Performed by: STUDENT IN AN ORGANIZED HEALTH CARE EDUCATION/TRAINING PROGRAM

## 2022-01-01 PROCEDURE — 86140 C-REACTIVE PROTEIN: CPT | Performed by: STUDENT IN AN ORGANIZED HEALTH CARE EDUCATION/TRAINING PROGRAM

## 2022-01-01 PROCEDURE — 36620 INSERTION CATHETER ARTERY: CPT | Performed by: PSYCHIATRY & NEUROLOGY

## 2022-01-01 PROCEDURE — 250N000013 HC RX MED GY IP 250 OP 250 PS 637: Performed by: EMERGENCY MEDICINE

## 2022-01-01 PROCEDURE — 83605 ASSAY OF LACTIC ACID: CPT | Performed by: HOSPITALIST

## 2022-01-01 PROCEDURE — 87635 SARS-COV-2 COVID-19 AMP PRB: CPT | Performed by: EMERGENCY MEDICINE

## 2022-01-01 PROCEDURE — 99214 OFFICE O/P EST MOD 30 MIN: CPT | Performed by: INTERNAL MEDICINE

## 2022-01-01 PROCEDURE — 84132 ASSAY OF SERUM POTASSIUM: CPT

## 2022-01-01 PROCEDURE — 250N000011 HC RX IP 250 OP 636: Performed by: INTERNAL MEDICINE

## 2022-01-01 PROCEDURE — C1769 GUIDE WIRE: HCPCS

## 2022-01-01 PROCEDURE — 96365 THER/PROPH/DIAG IV INF INIT: CPT | Mod: 59 | Performed by: STUDENT IN AN ORGANIZED HEALTH CARE EDUCATION/TRAINING PROGRAM

## 2022-01-01 PROCEDURE — 82803 BLOOD GASES ANY COMBINATION: CPT | Performed by: NURSE PRACTITIONER

## 2022-01-01 PROCEDURE — 82607 VITAMIN B-12: CPT | Performed by: NEUROLOGICAL SURGERY

## 2022-01-01 PROCEDURE — 999N000185 HC STATISTIC TRANSPORT TIME EA 15 MIN

## 2022-01-01 PROCEDURE — 84132 ASSAY OF SERUM POTASSIUM: CPT | Performed by: STUDENT IN AN ORGANIZED HEALTH CARE EDUCATION/TRAINING PROGRAM

## 2022-01-01 PROCEDURE — 80061 LIPID PANEL: CPT | Performed by: NURSE PRACTITIONER

## 2022-01-01 PROCEDURE — 99443 PR PHYSICIAN TELEPHONE EVALUATION 21-30 MIN: CPT | Mod: 95 | Performed by: NURSE PRACTITIONER

## 2022-01-01 PROCEDURE — 99292 CRITICAL CARE ADDL 30 MIN: CPT | Mod: 25 | Performed by: PSYCHIATRY & NEUROLOGY

## 2022-01-01 PROCEDURE — 99233 SBSQ HOSP IP/OBS HIGH 50: CPT | Mod: 24

## 2022-01-01 PROCEDURE — G1004 CDSM NDSC: HCPCS | Mod: GC | Performed by: RADIOLOGY

## 2022-01-01 PROCEDURE — 93010 ELECTROCARDIOGRAM REPORT: CPT | Performed by: INTERNAL MEDICINE

## 2022-01-01 PROCEDURE — 250N000009 HC RX 250: Performed by: NURSE ANESTHETIST, CERTIFIED REGISTERED

## 2022-01-01 PROCEDURE — 250N000011 HC RX IP 250 OP 636: Performed by: EMERGENCY MEDICINE

## 2022-01-01 PROCEDURE — 5A1935Z RESPIRATORY VENTILATION, LESS THAN 24 CONSECUTIVE HOURS: ICD-10-PCS | Performed by: STUDENT IN AN ORGANIZED HEALTH CARE EDUCATION/TRAINING PROGRAM

## 2022-01-01 PROCEDURE — 82746 ASSAY OF FOLIC ACID SERUM: CPT | Performed by: STUDENT IN AN ORGANIZED HEALTH CARE EDUCATION/TRAINING PROGRAM

## 2022-01-01 PROCEDURE — 81001 URINALYSIS AUTO W/SCOPE: CPT | Performed by: EMERGENCY MEDICINE

## 2022-01-01 PROCEDURE — 82374 ASSAY BLOOD CARBON DIOXIDE: CPT | Performed by: STUDENT IN AN ORGANIZED HEALTH CARE EDUCATION/TRAINING PROGRAM

## 2022-01-01 PROCEDURE — 85610 PROTHROMBIN TIME: CPT

## 2022-01-01 PROCEDURE — 82728 ASSAY OF FERRITIN: CPT | Performed by: STUDENT IN AN ORGANIZED HEALTH CARE EDUCATION/TRAINING PROGRAM

## 2022-01-01 PROCEDURE — 84132 ASSAY OF SERUM POTASSIUM: CPT | Performed by: NURSE PRACTITIONER

## 2022-01-01 PROCEDURE — 370N000003 HC ANESTHESIA WARD SERVICE

## 2022-01-01 PROCEDURE — 82248 BILIRUBIN DIRECT: CPT | Performed by: NURSE PRACTITIONER

## 2022-01-01 PROCEDURE — C1713 ANCHOR/SCREW BN/BN,TIS/BN: HCPCS | Performed by: NEUROLOGICAL SURGERY

## 2022-01-01 PROCEDURE — 272N000151 HC KIT CR11

## 2022-01-01 PROCEDURE — 96375 TX/PRO/DX INJ NEW DRUG ADDON: CPT | Mod: 59 | Performed by: STUDENT IN AN ORGANIZED HEALTH CARE EDUCATION/TRAINING PROGRAM

## 2022-01-01 PROCEDURE — 99291 CRITICAL CARE FIRST HOUR: CPT | Performed by: NURSE PRACTITIONER

## 2022-01-01 PROCEDURE — 99232 SBSQ HOSP IP/OBS MODERATE 35: CPT | Performed by: NURSE PRACTITIONER

## 2022-01-01 PROCEDURE — 0W310ZZ CONTROL BLEEDING IN CRANIAL CAVITY, OPEN APPROACH: ICD-10-PCS | Performed by: NEUROLOGICAL SURGERY

## 2022-01-01 PROCEDURE — C1763 CONN TISS, NON-HUMAN: HCPCS | Performed by: NEUROLOGICAL SURGERY

## 2022-01-01 PROCEDURE — 86923 COMPATIBILITY TEST ELECTRIC: CPT | Performed by: PHYSICIAN ASSISTANT

## 2022-01-01 PROCEDURE — 84450 TRANSFERASE (AST) (SGOT): CPT | Performed by: NURSE PRACTITIONER

## 2022-01-01 PROCEDURE — 82330 ASSAY OF CALCIUM: CPT | Performed by: STUDENT IN AN ORGANIZED HEALTH CARE EDUCATION/TRAINING PROGRAM

## 2022-01-01 PROCEDURE — 83880 ASSAY OF NATRIURETIC PEPTIDE: CPT | Performed by: NURSE PRACTITIONER

## 2022-01-01 PROCEDURE — 80048 BASIC METABOLIC PNL TOTAL CA: CPT | Performed by: PSYCHIATRY & NEUROLOGY

## 2022-01-01 PROCEDURE — 88341 IMHCHEM/IMCYTCHM EA ADD ANTB: CPT | Mod: 26 | Performed by: PATHOLOGY

## 2022-01-01 PROCEDURE — 99207 PR NO BILLABLE SERVICE THIS VISIT: CPT | Performed by: INTERNAL MEDICINE

## 2022-01-01 PROCEDURE — 80053 COMPREHEN METABOLIC PANEL: CPT | Performed by: EMERGENCY MEDICINE

## 2022-01-01 PROCEDURE — 71275 CT ANGIOGRAPHY CHEST: CPT | Mod: 26 | Performed by: RADIOLOGY

## 2022-01-01 PROCEDURE — 85652 RBC SED RATE AUTOMATED: CPT | Performed by: NURSE PRACTITIONER

## 2022-01-01 PROCEDURE — 258N000003 HC RX IP 258 OP 636: Performed by: INTERNAL MEDICINE

## 2022-01-01 PROCEDURE — 93005 ELECTROCARDIOGRAM TRACING: CPT | Performed by: STUDENT IN AN ORGANIZED HEALTH CARE EDUCATION/TRAINING PROGRAM

## 2022-01-01 DEVICE — IMP BUR HOLE COVER 17MM LOW PROFILE TI 421.527: Type: IMPLANTABLE DEVICE | Site: SKULL | Status: FUNCTIONAL

## 2022-01-01 DEVICE — GRAFT DURAGEN 3X3" ID330: Type: IMPLANTABLE DEVICE | Site: CRANIAL | Status: FUNCTIONAL

## 2022-01-01 DEVICE — IMP SCR SYN MATRIX LOW PRO 1.5X03MM SELF DRILL 04.503.103.01: Type: IMPLANTABLE DEVICE | Site: SKULL | Status: FUNCTIONAL

## 2022-01-01 DEVICE — IMP BUR HOLE COVER FOR SHUNT 17MM LOW PROFILE TI 421.554: Type: IMPLANTABLE DEVICE | Site: SKULL | Status: FUNCTIONAL

## 2022-01-01 RX ORDER — MAGNESIUM SULFATE HEPTAHYDRATE 40 MG/ML
2 INJECTION, SOLUTION INTRAVENOUS ONCE
Status: COMPLETED | OUTPATIENT
Start: 2022-01-01 | End: 2022-01-01

## 2022-01-01 RX ORDER — NALOXONE HYDROCHLORIDE 0.4 MG/ML
0.4 INJECTION, SOLUTION INTRAMUSCULAR; INTRAVENOUS; SUBCUTANEOUS
Status: DISCONTINUED | OUTPATIENT
Start: 2022-01-01 | End: 2022-01-01 | Stop reason: HOSPADM

## 2022-01-01 RX ORDER — DOXYCYCLINE 100 MG/1
100 CAPSULE ORAL 2 TIMES DAILY
Qty: 20 CAPSULE | Refills: 0 | Status: SHIPPED | OUTPATIENT
Start: 2022-01-01 | End: 2022-01-01

## 2022-01-01 RX ORDER — LIDOCAINE 40 MG/G
CREAM TOPICAL
Status: DISCONTINUED | OUTPATIENT
Start: 2022-01-01 | End: 2022-01-01

## 2022-01-01 RX ORDER — CEPHALEXIN 500 MG/1
500 CAPSULE ORAL 3 TIMES DAILY
Qty: 21 CAPSULE | Refills: 0 | OUTPATIENT
Start: 2022-01-01

## 2022-01-01 RX ORDER — ACETAMINOPHEN 325 MG/1
650 TABLET ORAL EVERY 4 HOURS PRN
Status: DISCONTINUED | OUTPATIENT
Start: 2022-01-01 | End: 2022-01-01 | Stop reason: HOSPADM

## 2022-01-01 RX ORDER — POTASSIUM CHLORIDE 1.5 G/1.58G
40 POWDER, FOR SOLUTION ORAL ONCE
Status: COMPLETED | OUTPATIENT
Start: 2022-01-01 | End: 2022-01-01

## 2022-01-01 RX ORDER — SODIUM CHLORIDE, SODIUM GLUCONATE, SODIUM ACETATE, POTASSIUM CHLORIDE AND MAGNESIUM CHLORIDE 526; 502; 368; 37; 30 MG/100ML; MG/100ML; MG/100ML; MG/100ML; MG/100ML
1000 INJECTION, SOLUTION INTRAVENOUS ONCE
Status: COMPLETED | OUTPATIENT
Start: 2022-01-01 | End: 2022-01-01

## 2022-01-01 RX ORDER — LOPERAMIDE HCL 1 MG/7.5ML
4 SUSPENSION ORAL 4 TIMES DAILY PRN
Status: CANCELLED | OUTPATIENT
Start: 2022-01-01

## 2022-01-01 RX ORDER — MUPIROCIN CALCIUM 20 MG/G
CREAM TOPICAL 2 TIMES DAILY
Status: DISCONTINUED | OUTPATIENT
Start: 2022-01-01 | End: 2022-01-01 | Stop reason: HOSPADM

## 2022-01-01 RX ORDER — AMLODIPINE BESYLATE 5 MG/1
5 TABLET ORAL EVERY EVENING
Status: DISCONTINUED | OUTPATIENT
Start: 2022-01-01 | End: 2022-01-01

## 2022-01-01 RX ORDER — ONDANSETRON 4 MG/1
4 TABLET, ORALLY DISINTEGRATING ORAL EVERY 6 HOURS PRN
Status: CANCELLED | OUTPATIENT
Start: 2022-01-01

## 2022-01-01 RX ORDER — DIPHENOXYLATE HCL/ATROPINE 2.5-.025/5
2.5 LIQUID (ML) ORAL 4 TIMES DAILY PRN
Status: CANCELLED | OUTPATIENT
Start: 2022-01-01

## 2022-01-01 RX ORDER — MAGNESIUM OXIDE 400 MG/1
400 TABLET ORAL 2 TIMES DAILY
Status: COMPLETED | OUTPATIENT
Start: 2022-01-01 | End: 2022-01-01

## 2022-01-01 RX ORDER — ALBUTEROL SULFATE 0.83 MG/ML
SOLUTION RESPIRATORY (INHALATION)
Status: DISCONTINUED
Start: 2022-01-01 | End: 2022-01-01 | Stop reason: HOSPADM

## 2022-01-01 RX ORDER — AMLODIPINE BESYLATE 5 MG/1
5 TABLET ORAL DAILY
Status: DISCONTINUED | OUTPATIENT
Start: 2022-01-01 | End: 2022-01-01

## 2022-01-01 RX ORDER — AMLODIPINE BESYLATE 5 MG/1
5 TABLET ORAL ONCE
Status: COMPLETED | OUTPATIENT
Start: 2022-01-01 | End: 2022-01-01

## 2022-01-01 RX ORDER — ACETAMINOPHEN 650 MG/1
650 SUPPOSITORY RECTAL EVERY 6 HOURS PRN
Status: DISCONTINUED | OUTPATIENT
Start: 2022-01-01 | End: 2022-01-01 | Stop reason: HOSPADM

## 2022-01-01 RX ORDER — AMLODIPINE BESYLATE 10 MG/1
10 TABLET ORAL DAILY
Status: DISCONTINUED | OUTPATIENT
Start: 2022-01-01 | End: 2022-01-01

## 2022-01-01 RX ORDER — NOREPINEPHRINE BITARTRATE 0.06 MG/ML
.01-.6 INJECTION, SOLUTION INTRAVENOUS CONTINUOUS
Status: DISCONTINUED | OUTPATIENT
Start: 2022-01-01 | End: 2022-01-01

## 2022-01-01 RX ORDER — POTASSIUM CHLORIDE 1.5 G/1.58G
20 POWDER, FOR SOLUTION ORAL ONCE
Status: COMPLETED | OUTPATIENT
Start: 2022-01-01 | End: 2022-01-01

## 2022-01-01 RX ORDER — MORPHINE SULFATE 10 MG/5ML
5-10 SOLUTION ORAL
Qty: 100 ML | Refills: 0 | Status: SHIPPED | OUTPATIENT
Start: 2022-01-01

## 2022-01-01 RX ORDER — ACETAMINOPHEN 650 MG/1
650 SUPPOSITORY RECTAL EVERY 6 HOURS PRN
Status: CANCELLED | OUTPATIENT
Start: 2022-01-01

## 2022-01-01 RX ORDER — OXYCODONE HYDROCHLORIDE 10 MG/1
10 TABLET ORAL EVERY 4 HOURS PRN
Status: DISCONTINUED | OUTPATIENT
Start: 2022-01-01 | End: 2022-01-01

## 2022-01-01 RX ORDER — POTASSIUM CHLORIDE 750 MG/1
20 TABLET, EXTENDED RELEASE ORAL ONCE
Status: COMPLETED | OUTPATIENT
Start: 2022-01-01 | End: 2022-01-01

## 2022-01-01 RX ORDER — BISACODYL 10 MG
10 SUPPOSITORY, RECTAL RECTAL ONCE
Status: COMPLETED | OUTPATIENT
Start: 2022-01-01 | End: 2022-01-01

## 2022-01-01 RX ORDER — NALOXONE HYDROCHLORIDE 0.4 MG/ML
0.2 INJECTION, SOLUTION INTRAMUSCULAR; INTRAVENOUS; SUBCUTANEOUS
Status: DISCONTINUED | OUTPATIENT
Start: 2022-01-01 | End: 2022-01-01

## 2022-01-01 RX ORDER — POTASSIUM CHLORIDE 750 MG/1
40 TABLET, EXTENDED RELEASE ORAL ONCE
Status: COMPLETED | OUTPATIENT
Start: 2022-01-01 | End: 2022-01-01

## 2022-01-01 RX ORDER — POLYETHYLENE GLYCOL 3350 17 G/17G
17 POWDER, FOR SOLUTION ORAL DAILY
Status: DISCONTINUED | OUTPATIENT
Start: 2022-01-01 | End: 2022-01-01

## 2022-01-01 RX ORDER — ONDANSETRON 4 MG/1
4 TABLET, ORALLY DISINTEGRATING ORAL EVERY 6 HOURS PRN
Qty: 10 TABLET | Refills: 0 | Status: SHIPPED | OUTPATIENT
Start: 2022-01-01

## 2022-01-01 RX ORDER — LEVETIRACETAM 500 MG/1
1000 TABLET ORAL 2 TIMES DAILY
Status: DISCONTINUED | OUTPATIENT
Start: 2022-01-01 | End: 2022-01-01

## 2022-01-01 RX ORDER — SODIUM CHLORIDE, SODIUM LACTATE, POTASSIUM CHLORIDE, CALCIUM CHLORIDE 600; 310; 30; 20 MG/100ML; MG/100ML; MG/100ML; MG/100ML
INJECTION, SOLUTION INTRAVENOUS CONTINUOUS
Status: DISCONTINUED | OUTPATIENT
Start: 2022-01-01 | End: 2022-01-01

## 2022-01-01 RX ORDER — HEPARIN SODIUM 5000 [USP'U]/.5ML
5000 INJECTION, SOLUTION INTRAVENOUS; SUBCUTANEOUS
Status: DISCONTINUED | OUTPATIENT
Start: 2022-01-01 | End: 2022-01-01 | Stop reason: ALTCHOICE

## 2022-01-01 RX ORDER — BACLOFEN 10 MG/1
5 TABLET ORAL
Status: DISCONTINUED | OUTPATIENT
Start: 2022-01-01 | End: 2022-01-01

## 2022-01-01 RX ORDER — ETOMIDATE 2 MG/ML
20 INJECTION INTRAVENOUS ONCE
Status: COMPLETED | OUTPATIENT
Start: 2022-01-01 | End: 2022-01-01

## 2022-01-01 RX ORDER — LEVETIRACETAM 1000 MG/1
1000 TABLET ORAL 2 TIMES DAILY
Qty: 60 TABLET | Refills: 0 | Status: SHIPPED | OUTPATIENT
Start: 2022-01-01

## 2022-01-01 RX ORDER — BACLOFEN 10 MG/1
5 TABLET ORAL
Qty: 90 TABLET | Refills: 0 | Status: ON HOLD | DISCHARGE
Start: 2022-01-01 | End: 2022-01-01

## 2022-01-01 RX ORDER — NOREPINEPHRINE BITARTRATE 0.06 MG/ML
INJECTION, SOLUTION INTRAVENOUS
Status: COMPLETED
Start: 2022-01-01 | End: 2022-01-01

## 2022-01-01 RX ORDER — LOPERAMIDE HYDROCHLORIDE 1 MG/5ML
2 SOLUTION ORAL 2 TIMES DAILY PRN
Status: DISCONTINUED | OUTPATIENT
Start: 2022-01-01 | End: 2022-01-01

## 2022-01-01 RX ORDER — HALOPERIDOL 2 MG/ML
1-2 SOLUTION ORAL EVERY 6 HOURS PRN
Status: DISCONTINUED | OUTPATIENT
Start: 2022-01-01 | End: 2022-01-01 | Stop reason: HOSPADM

## 2022-01-01 RX ORDER — HYDRALAZINE HYDROCHLORIDE 20 MG/ML
10-20 INJECTION INTRAMUSCULAR; INTRAVENOUS
Status: DISCONTINUED | OUTPATIENT
Start: 2022-01-01 | End: 2022-01-01 | Stop reason: HOSPADM

## 2022-01-01 RX ORDER — IOPAMIDOL 755 MG/ML
97 INJECTION, SOLUTION INTRAVASCULAR ONCE
Status: COMPLETED | OUTPATIENT
Start: 2022-01-01 | End: 2022-01-01

## 2022-01-01 RX ORDER — CEFTRIAXONE 1 G/1
1 INJECTION, POWDER, FOR SOLUTION INTRAMUSCULAR; INTRAVENOUS EVERY 24 HOURS
Status: DISCONTINUED | OUTPATIENT
Start: 2022-01-01 | End: 2022-01-01

## 2022-01-01 RX ORDER — FAMOTIDINE 20 MG/1
20 TABLET, FILM COATED ORAL 2 TIMES DAILY
Status: DISCONTINUED | OUTPATIENT
Start: 2022-01-01 | End: 2022-01-01

## 2022-01-01 RX ORDER — LEVETIRACETAM 5 MG/ML
500 INJECTION INTRAVASCULAR EVERY 12 HOURS
Status: DISCONTINUED | OUTPATIENT
Start: 2022-01-01 | End: 2022-01-01

## 2022-01-01 RX ORDER — NALOXONE HYDROCHLORIDE 0.4 MG/ML
0.2 INJECTION, SOLUTION INTRAMUSCULAR; INTRAVENOUS; SUBCUTANEOUS
Status: DISCONTINUED | OUTPATIENT
Start: 2022-01-01 | End: 2022-01-01 | Stop reason: HOSPADM

## 2022-01-01 RX ORDER — HYDROMORPHONE HCL IN WATER/PF 6 MG/30 ML
0.2 PATIENT CONTROLLED ANALGESIA SYRINGE INTRAVENOUS
Status: DISCONTINUED | OUTPATIENT
Start: 2022-01-01 | End: 2022-01-01

## 2022-01-01 RX ORDER — ACETAMINOPHEN 325 MG/1
975 TABLET ORAL EVERY 6 HOURS
Status: DISCONTINUED | OUTPATIENT
Start: 2022-01-01 | End: 2022-01-01

## 2022-01-01 RX ORDER — MORPHINE SULFATE 10 MG/5ML
5-10 SOLUTION ORAL
Status: CANCELLED | OUTPATIENT
Start: 2022-01-01

## 2022-01-01 RX ORDER — LIDOCAINE HYDROCHLORIDE 20 MG/ML
5 SOLUTION OROPHARYNGEAL ONCE
Status: COMPLETED | OUTPATIENT
Start: 2022-01-01 | End: 2022-01-01

## 2022-01-01 RX ORDER — NALOXONE HYDROCHLORIDE 0.4 MG/ML
0.4 INJECTION, SOLUTION INTRAMUSCULAR; INTRAVENOUS; SUBCUTANEOUS
Status: DISCONTINUED | OUTPATIENT
Start: 2022-01-01 | End: 2022-01-01

## 2022-01-01 RX ORDER — LOPERAMIDE HCL 1 MG/7.5ML
4 SUSPENSION ORAL 4 TIMES DAILY PRN
Status: DISCONTINUED | OUTPATIENT
Start: 2022-01-01 | End: 2022-01-01 | Stop reason: HOSPADM

## 2022-01-01 RX ORDER — AMOXICILLIN 250 MG
1 CAPSULE ORAL 2 TIMES DAILY
Status: DISCONTINUED | OUTPATIENT
Start: 2022-01-01 | End: 2022-01-01

## 2022-01-01 RX ORDER — POLYETHYLENE GLYCOL 3350 17 G/17G
17 POWDER, FOR SOLUTION ORAL DAILY
Status: DISCONTINUED | OUTPATIENT
Start: 2022-01-01 | End: 2022-01-01 | Stop reason: HOSPADM

## 2022-01-01 RX ORDER — FENTANYL CITRATE 50 UG/ML
INJECTION, SOLUTION INTRAMUSCULAR; INTRAVENOUS PRN
Status: DISCONTINUED | OUTPATIENT
Start: 2022-01-01 | End: 2022-01-01

## 2022-01-01 RX ORDER — ACETYLCYSTEINE 200 MG/ML
2 SOLUTION ORAL; RESPIRATORY (INHALATION) EVERY 6 HOURS
Status: DISCONTINUED | OUTPATIENT
Start: 2022-01-01 | End: 2022-01-01

## 2022-01-01 RX ORDER — LORAZEPAM 1 MG/1
1 TABLET ORAL
Status: DISCONTINUED | OUTPATIENT
Start: 2022-01-01 | End: 2022-01-01 | Stop reason: HOSPADM

## 2022-01-01 RX ORDER — IOPAMIDOL 755 MG/ML
54 INJECTION, SOLUTION INTRAVASCULAR ONCE
Status: COMPLETED | OUTPATIENT
Start: 2022-01-01 | End: 2022-01-01

## 2022-01-01 RX ORDER — LORAZEPAM 2 MG/ML
1 CONCENTRATE ORAL
Qty: 30 ML | Refills: 0 | Status: SHIPPED | OUTPATIENT
Start: 2022-01-01

## 2022-01-01 RX ORDER — DIAZEPAM 10 MG/2ML
5 INJECTION, SOLUTION INTRAMUSCULAR; INTRAVENOUS EVERY 6 HOURS PRN
Status: DISCONTINUED | OUTPATIENT
Start: 2022-01-01 | End: 2022-01-01

## 2022-01-01 RX ORDER — UREA 10 %
500 LOTION (ML) TOPICAL DAILY
Status: DISCONTINUED | OUTPATIENT
Start: 2022-01-01 | End: 2022-01-01

## 2022-01-01 RX ORDER — FLUMAZENIL 0.1 MG/ML
0.2 INJECTION, SOLUTION INTRAVENOUS
Status: DISCONTINUED | OUTPATIENT
Start: 2022-01-01 | End: 2022-01-01 | Stop reason: HOSPADM

## 2022-01-01 RX ORDER — CHLORHEXIDINE GLUCONATE ORAL RINSE 1.2 MG/ML
15 SOLUTION DENTAL 4 TIMES DAILY
Status: DISCONTINUED | OUTPATIENT
Start: 2022-01-01 | End: 2022-01-01

## 2022-01-01 RX ORDER — FUROSEMIDE 10 MG/ML
20 INJECTION INTRAMUSCULAR; INTRAVENOUS ONCE
Status: COMPLETED | OUTPATIENT
Start: 2022-01-01 | End: 2022-01-01

## 2022-01-01 RX ORDER — ATROPINE SULFATE 10 MG/ML
2 SOLUTION/ DROPS OPHTHALMIC EVERY 4 HOURS PRN
Status: CANCELLED | OUTPATIENT
Start: 2022-01-01

## 2022-01-01 RX ORDER — ACETAMINOPHEN 325 MG/10.15ML
650 LIQUID ORAL EVERY 4 HOURS PRN
Status: DISCONTINUED | OUTPATIENT
Start: 2022-01-01 | End: 2022-01-01

## 2022-01-01 RX ORDER — CEFAZOLIN SODIUM 2 G/100ML
2 INJECTION, SOLUTION INTRAVENOUS EVERY 8 HOURS
Status: COMPLETED | OUTPATIENT
Start: 2022-01-01 | End: 2022-01-01

## 2022-01-01 RX ORDER — FENTANYL CITRATE 50 UG/ML
25-50 INJECTION, SOLUTION INTRAMUSCULAR; INTRAVENOUS EVERY 5 MIN PRN
Status: DISCONTINUED | OUTPATIENT
Start: 2022-01-01 | End: 2022-01-01 | Stop reason: HOSPADM

## 2022-01-01 RX ORDER — MULTIPLE VITAMINS W/ MINERALS TAB 9MG-400MCG
1 TAB ORAL DAILY
Status: DISCONTINUED | OUTPATIENT
Start: 2022-01-01 | End: 2022-01-01 | Stop reason: HOSPADM

## 2022-01-01 RX ORDER — HEPARIN SODIUM,PORCINE 10 UNIT/ML
5-20 VIAL (ML) INTRAVENOUS EVERY 24 HOURS
Status: DISCONTINUED | OUTPATIENT
Start: 2022-01-01 | End: 2022-01-01

## 2022-01-01 RX ORDER — LIDOCAINE 4 G/G
3 PATCH TOPICAL EVERY 24 HOURS
Status: DISCONTINUED | OUTPATIENT
Start: 2022-01-01 | End: 2022-01-01 | Stop reason: HOSPADM

## 2022-01-01 RX ORDER — PROPOFOL 10 MG/ML
INJECTION, EMULSION INTRAVENOUS CONTINUOUS PRN
Status: DISCONTINUED | OUTPATIENT
Start: 2022-01-01 | End: 2022-01-01

## 2022-01-01 RX ORDER — BISACODYL 10 MG
10 SUPPOSITORY, RECTAL RECTAL DAILY PRN
Status: DISCONTINUED | OUTPATIENT
Start: 2022-01-01 | End: 2022-01-01 | Stop reason: HOSPADM

## 2022-01-01 RX ORDER — PROPOFOL 10 MG/ML
5-75 INJECTION, EMULSION INTRAVENOUS CONTINUOUS
Status: DISCONTINUED | OUTPATIENT
Start: 2022-01-01 | End: 2022-01-01

## 2022-01-01 RX ORDER — OXYCODONE HYDROCHLORIDE 5 MG/1
5 TABLET ORAL EVERY 4 HOURS PRN
Status: DISCONTINUED | OUTPATIENT
Start: 2022-01-01 | End: 2022-01-01

## 2022-01-01 RX ORDER — LOPERAMIDE HCL 1 MG/7.5ML
2 SUSPENSION ORAL 4 TIMES DAILY PRN
Status: DISCONTINUED | OUTPATIENT
Start: 2022-01-01 | End: 2022-01-01

## 2022-01-01 RX ORDER — AMOXICILLIN 250 MG
2 CAPSULE ORAL 2 TIMES DAILY
Status: DISCONTINUED | OUTPATIENT
Start: 2022-01-01 | End: 2022-01-01

## 2022-01-01 RX ORDER — MORPHINE SULFATE 4 MG/ML
4 INJECTION, SOLUTION INTRAMUSCULAR; INTRAVENOUS ONCE
Status: COMPLETED | OUTPATIENT
Start: 2022-01-01 | End: 2022-01-01

## 2022-01-01 RX ORDER — ACETAMINOPHEN 325 MG/1
650 TABLET ORAL ONCE
Status: COMPLETED | OUTPATIENT
Start: 2022-01-01 | End: 2022-01-01

## 2022-01-01 RX ORDER — LABETALOL HYDROCHLORIDE 5 MG/ML
10-40 INJECTION, SOLUTION INTRAVENOUS EVERY 10 MIN PRN
Status: DISCONTINUED | OUTPATIENT
Start: 2022-01-01 | End: 2022-01-01

## 2022-01-01 RX ORDER — MAGNESIUM OXIDE 400 MG/1
400 TABLET ORAL EVERY 4 HOURS
Status: COMPLETED | OUTPATIENT
Start: 2022-01-01 | End: 2022-01-01

## 2022-01-01 RX ORDER — METOCLOPRAMIDE HYDROCHLORIDE 5 MG/ML
10 INJECTION INTRAMUSCULAR; INTRAVENOUS ONCE
Status: COMPLETED | OUTPATIENT
Start: 2022-01-01 | End: 2022-01-01

## 2022-01-01 RX ORDER — PIPERACILLIN SODIUM, TAZOBACTAM SODIUM 4; .5 G/20ML; G/20ML
4.5 INJECTION, POWDER, LYOPHILIZED, FOR SOLUTION INTRAVENOUS EVERY 6 HOURS
Status: DISCONTINUED | OUTPATIENT
Start: 2022-01-01 | End: 2022-01-01

## 2022-01-01 RX ORDER — ETOMIDATE 2 MG/ML
INJECTION INTRAVENOUS PRN
Status: DISCONTINUED | OUTPATIENT
Start: 2022-01-01 | End: 2022-01-01

## 2022-01-01 RX ORDER — AMOXICILLIN 250 MG
1-2 CAPSULE ORAL AT BEDTIME
Status: DISCONTINUED | OUTPATIENT
Start: 2022-01-01 | End: 2022-01-01 | Stop reason: HOSPADM

## 2022-01-01 RX ORDER — OXYCODONE HYDROCHLORIDE 5 MG/1
5 TABLET ORAL EVERY 4 HOURS PRN
Qty: 12 TABLET | Refills: 0 | Status: ON HOLD | OUTPATIENT
Start: 2022-01-01 | End: 2022-01-01

## 2022-01-01 RX ORDER — CARBOXYMETHYLCELLULOSE SODIUM 5 MG/ML
1-2 SOLUTION/ DROPS OPHTHALMIC
Status: DISCONTINUED | OUTPATIENT
Start: 2022-01-01 | End: 2022-01-01 | Stop reason: HOSPADM

## 2022-01-01 RX ORDER — BUPIVACAINE HYDROCHLORIDE AND EPINEPHRINE 5; 5 MG/ML; UG/ML
INJECTION, SOLUTION PERINEURAL PRN
Status: DISCONTINUED | OUTPATIENT
Start: 2022-01-01 | End: 2022-01-01 | Stop reason: HOSPADM

## 2022-01-01 RX ORDER — HEPARIN SODIUM,PORCINE 10 UNIT/ML
5-20 VIAL (ML) INTRAVENOUS
Status: DISCONTINUED | OUTPATIENT
Start: 2022-01-01 | End: 2022-01-01

## 2022-01-01 RX ORDER — IODIXANOL 320 MG/ML
100 INJECTION, SOLUTION INTRAVASCULAR ONCE
Status: DISCONTINUED | OUTPATIENT
Start: 2022-01-01 | End: 2022-01-01

## 2022-01-01 RX ORDER — CEPHALEXIN 500 MG/1
500 CAPSULE ORAL 3 TIMES DAILY
Qty: 21 CAPSULE | Refills: 0 | Status: SHIPPED | OUTPATIENT
Start: 2022-01-01 | End: 2022-01-01

## 2022-01-01 RX ORDER — MORPHINE SULFATE 2 MG/ML
1-2 INJECTION, SOLUTION INTRAMUSCULAR; INTRAVENOUS
Status: DISCONTINUED | OUTPATIENT
Start: 2022-01-01 | End: 2022-01-01 | Stop reason: HOSPADM

## 2022-01-01 RX ORDER — MORPHINE SULFATE 2 MG/ML
1-2 INJECTION, SOLUTION INTRAMUSCULAR; INTRAVENOUS
Status: CANCELLED | OUTPATIENT
Start: 2022-01-01

## 2022-01-01 RX ORDER — SODIUM CHLORIDE 9 MG/ML
INJECTION, SOLUTION INTRAVENOUS CONTINUOUS
Status: DISCONTINUED | OUTPATIENT
Start: 2022-01-01 | End: 2022-01-01

## 2022-01-01 RX ORDER — POLYETHYLENE GLYCOL 3350 17 G/17G
17 POWDER, FOR SOLUTION ORAL DAILY
Qty: 510 G | Refills: 0 | Status: ON HOLD | DISCHARGE
Start: 2022-01-01 | End: 2022-01-01

## 2022-01-01 RX ORDER — HYDROXYCHLOROQUINE SULFATE 200 MG/1
400 TABLET, FILM COATED ORAL AT BEDTIME
Status: DISCONTINUED | OUTPATIENT
Start: 2022-01-01 | End: 2022-01-01

## 2022-01-01 RX ORDER — NALOXONE HYDROCHLORIDE 0.4 MG/ML
0.1 INJECTION, SOLUTION INTRAMUSCULAR; INTRAVENOUS; SUBCUTANEOUS
Status: CANCELLED | OUTPATIENT
Start: 2022-01-01

## 2022-01-01 RX ORDER — GUAIFENESIN 600 MG/1
15 TABLET, EXTENDED RELEASE ORAL DAILY
Status: DISCONTINUED | OUTPATIENT
Start: 2022-01-01 | End: 2022-01-01

## 2022-01-01 RX ORDER — HALOPERIDOL 2 MG/ML
1-2 SOLUTION ORAL EVERY 6 HOURS PRN
Qty: 15 ML | Refills: 0 | Status: SHIPPED | OUTPATIENT
Start: 2022-01-01

## 2022-01-01 RX ORDER — POLYETHYLENE GLYCOL 3350 17 G/17G
17 POWDER, FOR SOLUTION ORAL DAILY PRN
Status: DISCONTINUED | OUTPATIENT
Start: 2022-01-01 | End: 2022-01-01

## 2022-01-01 RX ORDER — AMOXICILLIN 250 MG
1-2 CAPSULE ORAL 2 TIMES DAILY PRN
Status: DISCONTINUED | OUTPATIENT
Start: 2022-01-01 | End: 2022-01-01

## 2022-01-01 RX ORDER — ACETAMINOPHEN 325 MG/10.15ML
650 LIQUID ORAL EVERY 4 HOURS PRN
Qty: 118 ML | Refills: 0 | Status: SHIPPED | OUTPATIENT
Start: 2022-01-01

## 2022-01-01 RX ORDER — MODAFINIL 100 MG/1
100 TABLET ORAL DAILY
Status: DISCONTINUED | OUTPATIENT
Start: 2022-01-01 | End: 2022-01-01

## 2022-01-01 RX ORDER — LEVETIRACETAM 750 MG/1
750 TABLET ORAL 2 TIMES DAILY
Status: DISCONTINUED | OUTPATIENT
Start: 2022-01-01 | End: 2022-01-01

## 2022-01-01 RX ORDER — POTASSIUM CHLORIDE 750 MG/1
20 TABLET, EXTENDED RELEASE ORAL
Status: CANCELLED | OUTPATIENT
Start: 2022-01-01

## 2022-01-01 RX ORDER — ATROPINE SULFATE 10 MG/ML
2 SOLUTION/ DROPS OPHTHALMIC EVERY 4 HOURS PRN
Qty: 15 ML | Refills: 0 | Status: SHIPPED | OUTPATIENT
Start: 2022-01-01

## 2022-01-01 RX ORDER — LEVETIRACETAM 10 MG/ML
1000 INJECTION INTRAVASCULAR EVERY 12 HOURS
Status: DISCONTINUED | OUTPATIENT
Start: 2022-01-01 | End: 2022-01-01

## 2022-01-01 RX ORDER — AMINO AC/PROTEIN HYDR/WHEY PRO 10G-100/30
1 LIQUID (ML) ORAL 2 TIMES DAILY
Status: DISCONTINUED | OUTPATIENT
Start: 2022-01-01 | End: 2022-01-01

## 2022-01-01 RX ORDER — HYDROXYCHLOROQUINE SULFATE 200 MG/1
TABLET, FILM COATED ORAL
Qty: 180 TABLET | Refills: 0 | Status: ON HOLD | OUTPATIENT
Start: 2022-01-01 | End: 2022-01-01

## 2022-01-01 RX ORDER — LEVETIRACETAM 10 MG/ML
1000 INJECTION INTRAVASCULAR ONCE
Status: COMPLETED | OUTPATIENT
Start: 2022-01-01 | End: 2022-01-01

## 2022-01-01 RX ORDER — HYDRALAZINE HYDROCHLORIDE 20 MG/ML
10-20 INJECTION INTRAMUSCULAR; INTRAVENOUS EVERY 30 MIN PRN
Status: DISCONTINUED | OUTPATIENT
Start: 2022-01-01 | End: 2022-01-01

## 2022-01-01 RX ORDER — HYDROXYCHLOROQUINE SULFATE 200 MG/1
TABLET, FILM COATED ORAL
Qty: 180 TABLET | Refills: 0 | OUTPATIENT
Start: 2022-01-01

## 2022-01-01 RX ORDER — ATROPINE SULFATE 10 MG/ML
2 SOLUTION/ DROPS OPHTHALMIC
Status: DISCONTINUED | OUTPATIENT
Start: 2022-01-01 | End: 2022-01-01

## 2022-01-01 RX ORDER — ALBUTEROL SULFATE 0.83 MG/ML
2.5 SOLUTION RESPIRATORY (INHALATION) EVERY 6 HOURS PRN
Status: CANCELLED | OUTPATIENT
Start: 2022-01-01

## 2022-01-01 RX ORDER — AMOXICILLIN 250 MG
1-2 CAPSULE ORAL AT BEDTIME
Status: ON HOLD | DISCHARGE
Start: 2022-01-01 | End: 2022-01-01

## 2022-01-01 RX ORDER — DIPHENOXYLATE HCL/ATROPINE 2.5-.025/5
2.5 LIQUID (ML) ORAL 4 TIMES DAILY PRN
Status: DISCONTINUED | OUTPATIENT
Start: 2022-01-01 | End: 2022-01-01 | Stop reason: HOSPADM

## 2022-01-01 RX ORDER — OXYCODONE HYDROCHLORIDE 5 MG/1
5-10 TABLET ORAL EVERY 4 HOURS PRN
Status: DISCONTINUED | OUTPATIENT
Start: 2022-01-01 | End: 2022-01-01

## 2022-01-01 RX ORDER — MUPIROCIN 20 MG/G
OINTMENT TOPICAL DAILY
Qty: 22 G | Refills: 0 | Status: ON HOLD | OUTPATIENT
Start: 2022-01-01 | End: 2022-01-01

## 2022-01-01 RX ORDER — ALBUTEROL SULFATE 0.83 MG/ML
2.5 SOLUTION RESPIRATORY (INHALATION) EVERY 6 HOURS PRN
Status: DISCONTINUED | OUTPATIENT
Start: 2022-01-01 | End: 2022-01-01 | Stop reason: HOSPADM

## 2022-01-01 RX ORDER — CEFAZOLIN SODIUM 2 G/100ML
2 INJECTION, SOLUTION INTRAVENOUS
Status: COMPLETED | OUTPATIENT
Start: 2022-01-01 | End: 2022-01-01

## 2022-01-01 RX ORDER — IOPAMIDOL 755 MG/ML
100 INJECTION, SOLUTION INTRAVASCULAR ONCE
Status: COMPLETED | OUTPATIENT
Start: 2022-01-01 | End: 2022-01-01

## 2022-01-01 RX ORDER — BISACODYL 10 MG
10 SUPPOSITORY, RECTAL RECTAL DAILY PRN
Status: CANCELLED | OUTPATIENT
Start: 2022-01-01

## 2022-01-01 RX ORDER — LIDOCAINE HYDROCHLORIDE 20 MG/ML
SOLUTION OROPHARYNGEAL
Status: COMPLETED
Start: 2022-01-01 | End: 2022-01-01

## 2022-01-01 RX ORDER — CARBOXYMETHYLCELLULOSE SODIUM 5 MG/ML
1-2 SOLUTION/ DROPS OPHTHALMIC
Qty: 30 EACH | Refills: 0 | Status: SHIPPED | OUTPATIENT
Start: 2022-01-01

## 2022-01-01 RX ORDER — NALOXONE HYDROCHLORIDE 0.4 MG/ML
0.2 INJECTION, SOLUTION INTRAMUSCULAR; INTRAVENOUS; SUBCUTANEOUS
Status: CANCELLED | OUTPATIENT
Start: 2022-01-01

## 2022-01-01 RX ORDER — FENTANYL CITRATE 50 UG/ML
INJECTION, SOLUTION INTRAMUSCULAR; INTRAVENOUS PRN
Status: COMPLETED | OUTPATIENT
Start: 2022-01-01 | End: 2022-01-01

## 2022-01-01 RX ORDER — ENOXAPARIN SODIUM 100 MG/ML
40 INJECTION SUBCUTANEOUS EVERY 24 HOURS
Status: DISCONTINUED | OUTPATIENT
Start: 2022-01-01 | End: 2022-01-01

## 2022-01-01 RX ORDER — DIPHENOXYLATE HCL/ATROPINE 2.5-.025/5
2.5 LIQUID (ML) ORAL 2 TIMES DAILY
Status: DISCONTINUED | OUTPATIENT
Start: 2022-01-01 | End: 2022-01-01

## 2022-01-01 RX ORDER — ACETAMINOPHEN 325 MG/1
650 TABLET ORAL EVERY 4 HOURS PRN
Status: DISCONTINUED | OUTPATIENT
Start: 2022-01-01 | End: 2022-01-01

## 2022-01-01 RX ORDER — ALBUTEROL SULFATE 5 MG/ML
2.5 SOLUTION RESPIRATORY (INHALATION)
Status: DISCONTINUED | OUTPATIENT
Start: 2022-01-01 | End: 2022-01-01

## 2022-01-01 RX ORDER — LEVETIRACETAM 100 MG/ML
1000 SOLUTION ORAL ONCE
Status: DISCONTINUED | OUTPATIENT
Start: 2022-01-01 | End: 2022-01-01

## 2022-01-01 RX ORDER — CELECOXIB 200 MG/1
200 CAPSULE ORAL 2 TIMES DAILY
Status: ON HOLD | COMMUNITY
End: 2022-01-01

## 2022-01-01 RX ORDER — LEVETIRACETAM 500 MG/1
500 TABLET ORAL 2 TIMES DAILY
Status: COMPLETED | OUTPATIENT
Start: 2022-01-01 | End: 2022-01-01

## 2022-01-01 RX ORDER — ACETAMINOPHEN 325 MG/10.15ML
650 LIQUID ORAL EVERY 4 HOURS PRN
Status: CANCELLED | OUTPATIENT
Start: 2022-01-01

## 2022-01-01 RX ORDER — SODIUM CHLORIDE, SODIUM GLUCONATE, SODIUM ACETATE, POTASSIUM CHLORIDE AND MAGNESIUM CHLORIDE 526; 502; 368; 37; 30 MG/100ML; MG/100ML; MG/100ML; MG/100ML; MG/100ML
100 INJECTION, SOLUTION INTRAVENOUS CONTINUOUS
Status: DISCONTINUED | OUTPATIENT
Start: 2022-01-01 | End: 2022-01-01

## 2022-01-01 RX ORDER — SODIUM CHLORIDE, SODIUM LACTATE, POTASSIUM CHLORIDE, CALCIUM CHLORIDE 600; 310; 30; 20 MG/100ML; MG/100ML; MG/100ML; MG/100ML
INJECTION, SOLUTION INTRAVENOUS CONTINUOUS PRN
Status: DISCONTINUED | OUTPATIENT
Start: 2022-01-01 | End: 2022-01-01

## 2022-01-01 RX ORDER — ACETAMINOPHEN 325 MG/1
975 TABLET ORAL EVERY 6 HOURS PRN
Status: DISCONTINUED | OUTPATIENT
Start: 2022-01-01 | End: 2022-01-01

## 2022-01-01 RX ORDER — MORPHINE SULFATE 10 MG/5ML
5-10 SOLUTION ORAL
Status: DISCONTINUED | OUTPATIENT
Start: 2022-01-01 | End: 2022-01-01 | Stop reason: HOSPADM

## 2022-01-01 RX ORDER — LORAZEPAM 2 MG/ML
1 CONCENTRATE ORAL
Status: CANCELLED | OUTPATIENT
Start: 2022-01-01

## 2022-01-01 RX ORDER — LABETALOL HYDROCHLORIDE 5 MG/ML
10 INJECTION, SOLUTION INTRAVENOUS ONCE
Status: COMPLETED | OUTPATIENT
Start: 2022-01-01 | End: 2022-01-01

## 2022-01-01 RX ORDER — SODIUM CHLORIDE, SODIUM GLUCONATE, SODIUM ACETATE, POTASSIUM CHLORIDE AND MAGNESIUM CHLORIDE 526; 502; 368; 37; 30 MG/100ML; MG/100ML; MG/100ML; MG/100ML; MG/100ML
75 INJECTION, SOLUTION INTRAVENOUS CONTINUOUS
Status: DISCONTINUED | OUTPATIENT
Start: 2022-01-01 | End: 2022-01-01

## 2022-01-01 RX ORDER — ACETAMINOPHEN 325 MG/1
650 TABLET ORAL EVERY 4 HOURS PRN
Qty: 60 TABLET | Refills: 0 | Status: ON HOLD | DISCHARGE
Start: 2022-01-01 | End: 2022-01-01

## 2022-01-01 RX ORDER — LABETALOL HYDROCHLORIDE 5 MG/ML
INJECTION, SOLUTION INTRAVENOUS
Status: COMPLETED
Start: 2022-01-01 | End: 2022-01-01

## 2022-01-01 RX ORDER — LORAZEPAM 1 MG/1
1 TABLET ORAL
Status: CANCELLED | OUTPATIENT
Start: 2022-01-01

## 2022-01-01 RX ORDER — CELECOXIB 200 MG/1
200 CAPSULE ORAL 2 TIMES DAILY PRN
Qty: 100 CAPSULE | Refills: 0 | Status: SHIPPED | OUTPATIENT
Start: 2022-01-01 | End: 2022-01-01

## 2022-01-01 RX ORDER — ACETAMINOPHEN 325 MG/10.15ML
650 LIQUID ORAL EVERY 4 HOURS PRN
Status: DISCONTINUED | OUTPATIENT
Start: 2022-01-01 | End: 2022-01-01 | Stop reason: HOSPADM

## 2022-01-01 RX ORDER — NALOXONE HYDROCHLORIDE 0.4 MG/ML
0.1 INJECTION, SOLUTION INTRAMUSCULAR; INTRAVENOUS; SUBCUTANEOUS
Status: DISCONTINUED | OUTPATIENT
Start: 2022-01-01 | End: 2022-01-01 | Stop reason: HOSPADM

## 2022-01-01 RX ORDER — CEFADROXIL 500 MG/1
500 CAPSULE ORAL 2 TIMES DAILY
Qty: 20 CAPSULE | Refills: 0 | Status: SHIPPED | OUTPATIENT
Start: 2022-01-01 | End: 2022-01-01

## 2022-01-01 RX ORDER — LEVETIRACETAM 500 MG/1
1000 TABLET ORAL 2 TIMES DAILY
Status: DISCONTINUED | OUTPATIENT
Start: 2022-01-01 | End: 2022-01-01 | Stop reason: HOSPADM

## 2022-01-01 RX ORDER — LABETALOL HYDROCHLORIDE 5 MG/ML
10 INJECTION, SOLUTION INTRAVENOUS EVERY 10 MIN PRN
Status: DISCONTINUED | OUTPATIENT
Start: 2022-01-01 | End: 2022-01-01 | Stop reason: HOSPADM

## 2022-01-01 RX ORDER — CEFAZOLIN SODIUM 1 G/3ML
INJECTION, POWDER, FOR SOLUTION INTRAMUSCULAR; INTRAVENOUS PRN
Status: DISCONTINUED | OUTPATIENT
Start: 2022-01-01 | End: 2022-01-01

## 2022-01-01 RX ORDER — PROCHLORPERAZINE MALEATE 5 MG
5 TABLET ORAL EVERY 6 HOURS PRN
Status: DISCONTINUED | OUTPATIENT
Start: 2022-01-01 | End: 2022-01-01

## 2022-01-01 RX ORDER — HYDROXYCHLOROQUINE SULFATE 200 MG
400 TABLET ORAL AT BEDTIME
Status: DISCONTINUED | OUTPATIENT
Start: 2022-01-01 | End: 2022-01-01

## 2022-01-01 RX ORDER — MORPHINE SULFATE 10 MG/5ML
5-10 SOLUTION ORAL
Status: DISCONTINUED | OUTPATIENT
Start: 2022-01-01 | End: 2022-01-01

## 2022-01-01 RX ORDER — ATROPINE SULFATE 10 MG/ML
2 SOLUTION/ DROPS OPHTHALMIC EVERY 4 HOURS PRN
Status: DISCONTINUED | OUTPATIENT
Start: 2022-01-01 | End: 2022-01-01 | Stop reason: HOSPADM

## 2022-01-01 RX ORDER — LORAZEPAM 2 MG/ML
4 INJECTION INTRAMUSCULAR ONCE
Status: COMPLETED | OUTPATIENT
Start: 2022-01-01 | End: 2022-01-01

## 2022-01-01 RX ORDER — MORPHINE SULFATE 2 MG/ML
1 INJECTION, SOLUTION INTRAMUSCULAR; INTRAVENOUS EVERY 4 HOURS PRN
Status: DISCONTINUED | OUTPATIENT
Start: 2022-01-01 | End: 2022-01-01

## 2022-01-01 RX ORDER — MULTIPLE VITAMINS W/ MINERALS TAB 9MG-400MCG
1 TAB ORAL DAILY
Status: ON HOLD | COMMUNITY
End: 2022-01-01

## 2022-01-01 RX ORDER — BACLOFEN 10 MG/1
5-10 TABLET ORAL
Status: DISCONTINUED | OUTPATIENT
Start: 2022-01-01 | End: 2022-01-01 | Stop reason: HOSPADM

## 2022-01-01 RX ORDER — LOPERAMIDE HCL 1 MG/7.5ML
4 SUSPENSION ORAL 2 TIMES DAILY
Status: DISCONTINUED | OUTPATIENT
Start: 2022-01-01 | End: 2022-01-01

## 2022-01-01 RX ORDER — CELECOXIB 200 MG/1
200 CAPSULE ORAL 2 TIMES DAILY PRN
Qty: 120 CAPSULE | Refills: 0 | Status: SHIPPED | OUTPATIENT
Start: 2022-01-01 | End: 2022-01-01

## 2022-01-01 RX ORDER — CEPHALEXIN 500 MG/1
500 CAPSULE ORAL EVERY 6 HOURS SCHEDULED
Status: COMPLETED | OUTPATIENT
Start: 2022-01-01 | End: 2022-01-01

## 2022-01-01 RX ORDER — HEPARIN SODIUM 5000 [USP'U]/.5ML
5000 INJECTION, SOLUTION INTRAVENOUS; SUBCUTANEOUS EVERY 8 HOURS
Status: DISPENSED | OUTPATIENT
Start: 2022-01-01 | End: 2022-01-01

## 2022-01-01 RX ORDER — LIDOCAINE 40 MG/G
CREAM TOPICAL
Status: ACTIVE | OUTPATIENT
Start: 2022-01-01 | End: 2022-01-01

## 2022-01-01 RX ORDER — DIPHENOXYLATE HCL/ATROPINE 2.5-.025MG
1 TABLET ORAL ONCE
Status: COMPLETED | OUTPATIENT
Start: 2022-01-01 | End: 2022-01-01

## 2022-01-01 RX ORDER — MAGNESIUM SULFATE HEPTAHYDRATE 40 MG/ML
2 INJECTION, SOLUTION INTRAVENOUS
Status: CANCELLED | OUTPATIENT
Start: 2022-01-01

## 2022-01-01 RX ORDER — HYDROXYCHLOROQUINE SULFATE 200 MG/1
TABLET, FILM COATED ORAL
Qty: 180 TABLET | Refills: 0 | Status: SHIPPED | OUTPATIENT
Start: 2022-01-01 | End: 2022-01-01

## 2022-01-01 RX ORDER — ACETAMINOPHEN 325 MG/1
975 TABLET ORAL EVERY 8 HOURS
Status: DISCONTINUED | OUTPATIENT
Start: 2022-01-01 | End: 2022-01-01

## 2022-01-01 RX ORDER — ATROPINE SULFATE 10 MG/ML
2 SOLUTION/ DROPS OPHTHALMIC
Status: DISCONTINUED | OUTPATIENT
Start: 2022-01-01 | End: 2022-01-01 | Stop reason: HOSPADM

## 2022-01-01 RX ORDER — LEVETIRACETAM 500 MG/1
1000 TABLET ORAL 2 TIMES DAILY
Status: CANCELLED | OUTPATIENT
Start: 2022-01-01

## 2022-01-01 RX ORDER — VANCOMYCIN HYDROCHLORIDE 1 G/200ML
1000 INJECTION, SOLUTION INTRAVENOUS EVERY 12 HOURS
Status: DISCONTINUED | OUTPATIENT
Start: 2022-01-01 | End: 2022-01-01

## 2022-01-01 RX ORDER — ONDANSETRON 4 MG/1
4 TABLET, ORALLY DISINTEGRATING ORAL EVERY 6 HOURS PRN
Status: DISCONTINUED | OUTPATIENT
Start: 2022-01-01 | End: 2022-01-01 | Stop reason: HOSPADM

## 2022-01-01 RX ORDER — AMOXICILLIN 250 MG
1-2 CAPSULE ORAL 2 TIMES DAILY PRN
Status: CANCELLED | OUTPATIENT
Start: 2022-01-01

## 2022-01-01 RX ORDER — HYDRALAZINE HYDROCHLORIDE 25 MG/1
25 TABLET, FILM COATED ORAL EVERY 6 HOURS PRN
Status: DISCONTINUED | OUTPATIENT
Start: 2022-01-01 | End: 2022-01-01

## 2022-01-01 RX ORDER — LOPERAMIDE HCL 1 MG/7.5ML
2 SUSPENSION ORAL 2 TIMES DAILY
Status: DISCONTINUED | OUTPATIENT
Start: 2022-01-01 | End: 2022-01-01

## 2022-01-01 RX ORDER — LEVETIRACETAM 750 MG/1
750 TABLET ORAL 2 TIMES DAILY
Status: COMPLETED | OUTPATIENT
Start: 2022-01-01 | End: 2022-01-01

## 2022-01-01 RX ORDER — ATROPINE SULFATE 10 MG/ML
2 SOLUTION/ DROPS OPHTHALMIC
Status: CANCELLED | OUTPATIENT
Start: 2022-01-01

## 2022-01-01 RX ORDER — CELECOXIB 200 MG/1
CAPSULE ORAL
Qty: 120 CAPSULE | Refills: 0 | OUTPATIENT
Start: 2022-01-01

## 2022-01-01 RX ORDER — LEVETIRACETAM 500 MG/1
500 TABLET ORAL 2 TIMES DAILY
Status: DISCONTINUED | OUTPATIENT
Start: 2022-01-01 | End: 2022-01-01

## 2022-01-01 RX ORDER — OXYCODONE HYDROCHLORIDE 5 MG/1
5 TABLET ORAL EVERY 4 HOURS PRN
Status: DISCONTINUED | OUTPATIENT
Start: 2022-01-01 | End: 2022-01-01 | Stop reason: HOSPADM

## 2022-01-01 RX ORDER — HYDROMORPHONE HCL IN WATER/PF 6 MG/30 ML
0.4 PATIENT CONTROLLED ANALGESIA SYRINGE INTRAVENOUS
Status: DISCONTINUED | OUTPATIENT
Start: 2022-01-01 | End: 2022-01-01

## 2022-01-01 RX ORDER — AMINO AC/PROTEIN HYDR/WHEY PRO 10G-100/30
1 LIQUID (ML) ORAL 3 TIMES DAILY
Status: DISCONTINUED | OUTPATIENT
Start: 2022-01-01 | End: 2022-01-01

## 2022-01-01 RX ORDER — MIDAZOLAM HCL IN 0.9 % NACL/PF 1 MG/ML
1-8 PLASTIC BAG, INJECTION (ML) INTRAVENOUS CONTINUOUS
Status: DISCONTINUED | OUTPATIENT
Start: 2022-01-01 | End: 2022-01-01

## 2022-01-01 RX ORDER — ONDANSETRON 2 MG/ML
4 INJECTION INTRAMUSCULAR; INTRAVENOUS EVERY 6 HOURS PRN
Status: DISCONTINUED | OUTPATIENT
Start: 2022-01-01 | End: 2022-01-01

## 2022-01-01 RX ORDER — LIDOCAINE HYDROCHLORIDE 20 MG/ML
JELLY TOPICAL ONCE
Status: COMPLETED | OUTPATIENT
Start: 2022-01-01 | End: 2022-01-01

## 2022-01-01 RX ORDER — CARBOXYMETHYLCELLULOSE SODIUM 5 MG/ML
1-2 SOLUTION/ DROPS OPHTHALMIC
Status: CANCELLED | OUTPATIENT
Start: 2022-01-01

## 2022-01-01 RX ORDER — DEXMEDETOMIDINE HYDROCHLORIDE 4 UG/ML
.1-1.2 INJECTION, SOLUTION INTRAVENOUS CONTINUOUS
Status: DISCONTINUED | OUTPATIENT
Start: 2022-01-01 | End: 2022-01-01

## 2022-01-01 RX ORDER — LIDOCAINE 4 G/G
3 PATCH TOPICAL
Status: DISCONTINUED | OUTPATIENT
Start: 2022-01-01 | End: 2022-01-01

## 2022-01-01 RX ORDER — HYDROXYCHLOROQUINE SULFATE 200 MG/1
400 TABLET, FILM COATED ORAL AT BEDTIME
Status: DISCONTINUED | OUTPATIENT
Start: 2022-01-01 | End: 2022-01-01 | Stop reason: HOSPADM

## 2022-01-01 RX ORDER — POTASSIUM CHLORIDE 750 MG/1
40 TABLET, EXTENDED RELEASE ORAL
Status: CANCELLED | OUTPATIENT
Start: 2022-01-01

## 2022-01-01 RX ORDER — ALBUTEROL SULFATE 0.83 MG/ML
SOLUTION RESPIRATORY (INHALATION)
Status: COMPLETED
Start: 2022-01-01 | End: 2022-01-01

## 2022-01-01 RX ORDER — LORAZEPAM 2 MG/ML
1 CONCENTRATE ORAL
Status: DISCONTINUED | OUTPATIENT
Start: 2022-01-01 | End: 2022-01-01 | Stop reason: HOSPADM

## 2022-01-01 RX ADMIN — ALBUTEROL SULFATE 2.5 MG: 2.5 SOLUTION RESPIRATORY (INHALATION) at 09:38

## 2022-01-01 RX ADMIN — ACETAMINOPHEN 650 MG: 325 SOLUTION ORAL at 08:21

## 2022-01-01 RX ADMIN — Medication 400 MG: at 21:29

## 2022-01-01 RX ADMIN — OXYCODONE HYDROCHLORIDE 5 MG: 5 TABLET ORAL at 18:29

## 2022-01-01 RX ADMIN — ACETAMINOPHEN 650 MG: 325 TABLET ORAL at 07:52

## 2022-01-01 RX ADMIN — MULTIVITAMIN 15 ML: LIQUID ORAL at 09:21

## 2022-01-01 RX ADMIN — PROPOFOL 30 MCG/KG/MIN: 10 INJECTION, EMULSION INTRAVENOUS at 18:33

## 2022-01-01 RX ADMIN — CEPHALEXIN 500 MG: 500 CAPSULE ORAL at 23:16

## 2022-01-01 RX ADMIN — ACETAMINOPHEN 325MG 975 MG: 325 TABLET ORAL at 08:13

## 2022-01-01 RX ADMIN — PIPERACILLIN AND TAZOBACTAM 4.5 G: 4; .5 INJECTION, POWDER, LYOPHILIZED, FOR SOLUTION INTRAVENOUS; PARENTERAL at 12:04

## 2022-01-01 RX ADMIN — CEPHALEXIN 500 MG: 500 CAPSULE ORAL at 12:12

## 2022-01-01 RX ADMIN — CHLORHEXIDINE GLUCONATE 0.12% ORAL RINSE 15 ML: 1.2 LIQUID ORAL at 20:53

## 2022-01-01 RX ADMIN — ALBUTEROL SULFATE 2.5 MG: 2.5 SOLUTION RESPIRATORY (INHALATION) at 20:40

## 2022-01-01 RX ADMIN — Medication 1 TABLET: at 08:19

## 2022-01-01 RX ADMIN — CHLORHEXIDINE GLUCONATE 0.12% ORAL RINSE 15 ML: 1.2 LIQUID ORAL at 13:27

## 2022-01-01 RX ADMIN — CYANOCOBALAMIN TAB 500 MCG 500 MCG: 500 TAB at 08:13

## 2022-01-01 RX ADMIN — CYANOCOBALAMIN TAB 500 MCG 500 MCG: 500 TAB at 07:14

## 2022-01-01 RX ADMIN — LEVETIRACETAM 1000 MG: 500 TABLET, FILM COATED ORAL at 20:44

## 2022-01-01 RX ADMIN — SODIUM CHLORIDE: 9 INJECTION, SOLUTION INTRAVENOUS at 23:07

## 2022-01-01 RX ADMIN — PANTOPRAZOLE SODIUM 40 MG: 40 INJECTION, POWDER, FOR SOLUTION INTRAVENOUS at 07:31

## 2022-01-01 RX ADMIN — MULTIVITAMIN 15 ML: LIQUID ORAL at 07:53

## 2022-01-01 RX ADMIN — LABETALOL HYDROCHLORIDE 10 MG: 5 INJECTION, SOLUTION INTRAVENOUS at 00:04

## 2022-01-01 RX ADMIN — OXYCODONE HYDROCHLORIDE 5 MG: 5 TABLET ORAL at 06:00

## 2022-01-01 RX ADMIN — OXYCODONE HYDROCHLORIDE 5 MG: 5 TABLET ORAL at 03:05

## 2022-01-01 RX ADMIN — OXYCODONE HYDROCHLORIDE 5 MG: 5 TABLET ORAL at 23:16

## 2022-01-01 RX ADMIN — ACETAMINOPHEN 650 MG: 325 TABLET ORAL at 18:29

## 2022-01-01 RX ADMIN — LIDOCAINE 3 PATCH: 560 PATCH PERCUTANEOUS; TOPICAL; TRANSDERMAL at 17:31

## 2022-01-01 RX ADMIN — POLYETHYLENE GLYCOL 3350 17 G: 17 POWDER, FOR SOLUTION ORAL at 07:32

## 2022-01-01 RX ADMIN — CHLORHEXIDINE GLUCONATE 0.12% ORAL RINSE 15 ML: 1.2 LIQUID ORAL at 11:49

## 2022-01-01 RX ADMIN — Medication 40 MG: at 08:27

## 2022-01-01 RX ADMIN — MAGNESIUM SULFATE IN WATER 2 G: 40 INJECTION, SOLUTION INTRAVENOUS at 13:04

## 2022-01-01 RX ADMIN — PIPERACILLIN AND TAZOBACTAM 4.5 G: 4; .5 INJECTION, POWDER, FOR SOLUTION INTRAVENOUS at 20:45

## 2022-01-01 RX ADMIN — Medication 400 MG: at 21:22

## 2022-01-01 RX ADMIN — PIPERACILLIN AND TAZOBACTAM 4.5 G: 4; .5 INJECTION, POWDER, LYOPHILIZED, FOR SOLUTION INTRAVENOUS; PARENTERAL at 06:17

## 2022-01-01 RX ADMIN — SODIUM CHLORIDE, SODIUM GLUCONATE, SODIUM ACETATE, POTASSIUM CHLORIDE AND MAGNESIUM CHLORIDE 1000 ML: 526; 502; 368; 37; 30 INJECTION, SOLUTION INTRAVENOUS at 11:41

## 2022-01-01 RX ADMIN — MULTIVITAMIN 15 ML: LIQUID ORAL at 08:18

## 2022-01-01 RX ADMIN — CEFAZOLIN SODIUM 2 G: 2 INJECTION, SOLUTION INTRAVENOUS at 07:53

## 2022-01-01 RX ADMIN — ACETAMINOPHEN 650 MG: 325 SOLUTION ORAL at 13:47

## 2022-01-01 RX ADMIN — CHLORHEXIDINE GLUCONATE 0.12% ORAL RINSE 15 ML: 1.2 LIQUID ORAL at 16:53

## 2022-01-01 RX ADMIN — DOCUSATE SODIUM 50 MG AND SENNOSIDES 8.6 MG 2 TABLET: 8.6; 5 TABLET, FILM COATED ORAL at 20:29

## 2022-01-01 RX ADMIN — Medication 400 MG: at 22:02

## 2022-01-01 RX ADMIN — PIPERACILLIN AND TAZOBACTAM 4.5 G: 4; .5 INJECTION, POWDER, FOR SOLUTION INTRAVENOUS at 21:40

## 2022-01-01 RX ADMIN — PIPERACILLIN AND TAZOBACTAM 4.5 G: 4; .5 INJECTION, POWDER, LYOPHILIZED, FOR SOLUTION INTRAVENOUS; PARENTERAL at 00:00

## 2022-01-01 RX ADMIN — LIDOCAINE 2 PATCH: 560 PATCH PERCUTANEOUS; TOPICAL; TRANSDERMAL at 17:02

## 2022-01-01 RX ADMIN — LIDOCAINE 3 PATCH: 560 PATCH PERCUTANEOUS; TOPICAL; TRANSDERMAL at 18:30

## 2022-01-01 RX ADMIN — PANTOPRAZOLE SODIUM 40 MG: 40 INJECTION, POWDER, FOR SOLUTION INTRAVENOUS at 08:01

## 2022-01-01 RX ADMIN — LEVETIRACETAM 1000 MG: 500 TABLET, FILM COATED ORAL at 08:13

## 2022-01-01 RX ADMIN — PANTOPRAZOLE SODIUM 40 MG: 40 INJECTION, POWDER, FOR SOLUTION INTRAVENOUS at 08:14

## 2022-01-01 RX ADMIN — HEPARIN SODIUM 5000 UNITS: 5000 INJECTION, SOLUTION INTRAVENOUS; SUBCUTANEOUS at 15:57

## 2022-01-01 RX ADMIN — HYDROCORTISONE SODIUM SUCCINATE 50 MG: 100 INJECTION, POWDER, FOR SOLUTION INTRAMUSCULAR; INTRAVENOUS at 23:08

## 2022-01-01 RX ADMIN — PIPERACILLIN AND TAZOBACTAM 4.5 G: 4; .5 INJECTION, POWDER, FOR SOLUTION INTRAVENOUS at 21:07

## 2022-01-01 RX ADMIN — CHLORHEXIDINE GLUCONATE 0.12% ORAL RINSE 15 ML: 1.2 LIQUID ORAL at 19:47

## 2022-01-01 RX ADMIN — POLYETHYLENE GLYCOL 3350 17 G: 17 POWDER, FOR SOLUTION ORAL at 07:54

## 2022-01-01 RX ADMIN — LABETALOL HYDROCHLORIDE 20 MG: 5 INJECTION, SOLUTION INTRAVENOUS at 09:44

## 2022-01-01 RX ADMIN — ACETAMINOPHEN 650 MG: 325 SOLUTION ORAL at 18:58

## 2022-01-01 RX ADMIN — CYANOCOBALAMIN TAB 500 MCG 500 MCG: 500 TAB at 08:28

## 2022-01-01 RX ADMIN — LEVETIRACETAM 1000 MG: 500 TABLET, FILM COATED ORAL at 08:59

## 2022-01-01 RX ADMIN — HYDRALAZINE HYDROCHLORIDE 20 MG: 20 INJECTION INTRAMUSCULAR; INTRAVENOUS at 23:14

## 2022-01-01 RX ADMIN — Medication 400 MG: at 21:31

## 2022-01-01 RX ADMIN — Medication 1 PACKET: at 16:05

## 2022-01-01 RX ADMIN — CHLORHEXIDINE GLUCONATE 0.12% ORAL RINSE 15 ML: 1.2 LIQUID ORAL at 17:35

## 2022-01-01 RX ADMIN — Medication 1 PACKET: at 08:50

## 2022-01-01 RX ADMIN — MORPHINE SULFATE 4 MG: 4 INJECTION, SOLUTION INTRAMUSCULAR; INTRAVENOUS at 11:27

## 2022-01-01 RX ADMIN — LEVETIRACETAM 1000 MG: 500 TABLET, FILM COATED ORAL at 09:20

## 2022-01-01 RX ADMIN — SODIUM CHLORIDE 100 MG: 9 INJECTION, SOLUTION INTRAVENOUS at 20:47

## 2022-01-01 RX ADMIN — MUPIROCIN: 2 CREAM TOPICAL at 07:55

## 2022-01-01 RX ADMIN — ACETAMINOPHEN 975 MG: 325 TABLET, FILM COATED ORAL at 12:04

## 2022-01-01 RX ADMIN — HEPARIN SODIUM 5000 UNITS: 5000 INJECTION, SOLUTION INTRAVENOUS; SUBCUTANEOUS at 23:51

## 2022-01-01 RX ADMIN — SODIUM CHLORIDE 100 MG: 9 INJECTION, SOLUTION INTRAVENOUS at 07:51

## 2022-01-01 RX ADMIN — Medication 1 PACKET: at 16:07

## 2022-01-01 RX ADMIN — Medication 10 MG: at 20:18

## 2022-01-01 RX ADMIN — VANCOMYCIN HYDROCHLORIDE 1250 MG: 10 INJECTION, POWDER, LYOPHILIZED, FOR SOLUTION INTRAVENOUS at 08:11

## 2022-01-01 RX ADMIN — ACETAMINOPHEN 650 MG: 325 SOLUTION ORAL at 20:21

## 2022-01-01 RX ADMIN — Medication 1 PACKET: at 08:49

## 2022-01-01 RX ADMIN — LEVETIRACETAM 1000 MG: 500 TABLET, FILM COATED ORAL at 21:19

## 2022-01-01 RX ADMIN — AMLODIPINE BESYLATE 5 MG: 5 TABLET ORAL at 20:41

## 2022-01-01 RX ADMIN — LEVETIRACETAM 1000 MG: 500 TABLET, FILM COATED ORAL at 19:56

## 2022-01-01 RX ADMIN — LABETALOL HYDROCHLORIDE 20 MG: 5 INJECTION, SOLUTION INTRAVENOUS at 06:51

## 2022-01-01 RX ADMIN — FENTANYL CITRATE 50 MCG: 50 INJECTION, SOLUTION INTRAMUSCULAR; INTRAVENOUS at 18:36

## 2022-01-01 RX ADMIN — HEPARIN SODIUM 5000 UNITS: 5000 INJECTION, SOLUTION INTRAVENOUS; SUBCUTANEOUS at 08:30

## 2022-01-01 RX ADMIN — LEVETIRACETAM 1000 MG: 500 TABLET, FILM COATED ORAL at 08:43

## 2022-01-01 RX ADMIN — POLYETHYLENE GLYCOL 3350 17 G: 17 POWDER, FOR SOLUTION ORAL at 08:13

## 2022-01-01 RX ADMIN — LORAZEPAM 4 MG: 2 INJECTION INTRAMUSCULAR; INTRAVENOUS at 00:44

## 2022-01-01 RX ADMIN — Medication 1 PACKET: at 07:58

## 2022-01-01 RX ADMIN — Medication 400 MG: at 22:35

## 2022-01-01 RX ADMIN — ACETAMINOPHEN 975 MG: 325 TABLET, FILM COATED ORAL at 05:20

## 2022-01-01 RX ADMIN — HYDRALAZINE HYDROCHLORIDE 20 MG: 20 INJECTION INTRAMUSCULAR; INTRAVENOUS at 13:00

## 2022-01-01 RX ADMIN — LEVETIRACETAM 1000 MG: 500 TABLET, FILM COATED ORAL at 20:50

## 2022-01-01 RX ADMIN — PANTOPRAZOLE SODIUM 40 MG: 40 INJECTION, POWDER, FOR SOLUTION INTRAVENOUS at 20:45

## 2022-01-01 RX ADMIN — Medication 40 MG: at 07:57

## 2022-01-01 RX ADMIN — Medication 1 PACKET: at 16:20

## 2022-01-01 RX ADMIN — PANTOPRAZOLE SODIUM 40 MG: 40 INJECTION, POWDER, FOR SOLUTION INTRAVENOUS at 07:42

## 2022-01-01 RX ADMIN — ACETAMINOPHEN 325MG 975 MG: 325 TABLET ORAL at 21:05

## 2022-01-01 RX ADMIN — PHENYLEPHRINE HYDROCHLORIDE 100 MCG: 10 INJECTION INTRAVENOUS at 21:31

## 2022-01-01 RX ADMIN — MULTIVITAMIN 15 ML: LIQUID ORAL at 08:21

## 2022-01-01 RX ADMIN — MULTIVITAMIN 15 ML: LIQUID ORAL at 07:30

## 2022-01-01 RX ADMIN — LOPERAMIDE HCL 4 MG: 1 SOLUTION ORAL at 09:22

## 2022-01-01 RX ADMIN — HYDROCORTISONE SODIUM SUCCINATE 50 MG: 100 INJECTION, POWDER, FOR SOLUTION INTRAMUSCULAR; INTRAVENOUS at 04:51

## 2022-01-01 RX ADMIN — CEFAZOLIN SODIUM 2 G: 2 INJECTION, SOLUTION INTRAVENOUS at 23:56

## 2022-01-01 RX ADMIN — FUROSEMIDE 20 MG: 10 INJECTION, SOLUTION INTRAMUSCULAR; INTRAVENOUS at 12:45

## 2022-01-01 RX ADMIN — CEFAZOLIN SODIUM 2 G: 2 INJECTION, SOLUTION INTRAVENOUS at 08:21

## 2022-01-01 RX ADMIN — CEFAZOLIN SODIUM 2 G: 2 INJECTION, SOLUTION INTRAVENOUS at 20:38

## 2022-01-01 RX ADMIN — HYDROCORTISONE SODIUM SUCCINATE 50 MG: 100 INJECTION, POWDER, FOR SOLUTION INTRAMUSCULAR; INTRAVENOUS at 23:07

## 2022-01-01 RX ADMIN — LEVETIRACETAM 500 MG: 500 TABLET, FILM COATED ORAL at 21:01

## 2022-01-01 RX ADMIN — Medication 1 PACKET: at 11:55

## 2022-01-01 RX ADMIN — POLYETHYLENE GLYCOL 3350, SODIUM SULFATE ANHYDROUS, SODIUM BICARBONATE, SODIUM CHLORIDE, POTASSIUM CHLORIDE 4000 ML: 236; 22.74; 6.74; 5.86; 2.97 POWDER, FOR SOLUTION ORAL at 15:01

## 2022-01-01 RX ADMIN — PIPERACILLIN AND TAZOBACTAM 4.5 G: 4; .5 INJECTION, POWDER, FOR SOLUTION INTRAVENOUS at 20:53

## 2022-01-01 RX ADMIN — LEVETIRACETAM 1000 MG: 500 TABLET, FILM COATED ORAL at 08:39

## 2022-01-01 RX ADMIN — HYDRALAZINE HYDROCHLORIDE 10 MG: 20 INJECTION INTRAMUSCULAR; INTRAVENOUS at 00:14

## 2022-01-01 RX ADMIN — Medication 400 MG: at 22:11

## 2022-01-01 RX ADMIN — LOPERAMIDE HCL 4 MG: 1 SOLUTION ORAL at 21:23

## 2022-01-01 RX ADMIN — Medication 1 PACKET: at 08:02

## 2022-01-01 RX ADMIN — POTASSIUM CHLORIDE 20 MEQ: 750 TABLET, EXTENDED RELEASE ORAL at 06:35

## 2022-01-01 RX ADMIN — OXYCODONE HYDROCHLORIDE 5 MG: 5 TABLET ORAL at 08:07

## 2022-01-01 RX ADMIN — SODIUM CHLORIDE: 9 INJECTION, SOLUTION INTRAVENOUS at 07:53

## 2022-01-01 RX ADMIN — LEVETIRACETAM 1000 MG: 500 TABLET, FILM COATED ORAL at 08:27

## 2022-01-01 RX ADMIN — LEVETIRACETAM 500 MG: 500 TABLET, FILM COATED ORAL at 19:30

## 2022-01-01 RX ADMIN — LEVETIRACETAM 1000 MG: 500 TABLET, FILM COATED ORAL at 20:21

## 2022-01-01 RX ADMIN — CYANOCOBALAMIN TAB 500 MCG 500 MCG: 500 TAB at 08:10

## 2022-01-01 RX ADMIN — SODIUM CHLORIDE, SODIUM GLUCONATE, SODIUM ACETATE, POTASSIUM CHLORIDE AND MAGNESIUM CHLORIDE 100 ML/HR: 526; 502; 368; 37; 30 INJECTION, SOLUTION INTRAVENOUS at 12:51

## 2022-01-01 RX ADMIN — ACETYLCYSTEINE 2 ML: 200 SOLUTION ORAL; RESPIRATORY (INHALATION) at 14:45

## 2022-01-01 RX ADMIN — Medication 0.03 MCG/KG/MIN: at 17:35

## 2022-01-01 RX ADMIN — LEVETIRACETAM 1000 MG: 500 TABLET, FILM COATED ORAL at 20:10

## 2022-01-01 RX ADMIN — CHLORHEXIDINE GLUCONATE 0.12% ORAL RINSE 15 ML: 1.2 LIQUID ORAL at 16:06

## 2022-01-01 RX ADMIN — LABETALOL HYDROCHLORIDE 10 MG: 5 INJECTION, SOLUTION INTRAVENOUS at 02:07

## 2022-01-01 RX ADMIN — PIPERACILLIN AND TAZOBACTAM 4.5 G: 4; .5 INJECTION, POWDER, FOR SOLUTION INTRAVENOUS at 03:35

## 2022-01-01 RX ADMIN — Medication 40 MG: at 07:29

## 2022-01-01 RX ADMIN — ATROPINE SULFATE 2 DROP: 10 SOLUTION/ DROPS OPHTHALMIC at 07:51

## 2022-01-01 RX ADMIN — HEPARIN SODIUM 5000 UNITS: 5000 INJECTION, SOLUTION INTRAVENOUS; SUBCUTANEOUS at 15:29

## 2022-01-01 RX ADMIN — CEFAZOLIN SODIUM 2 G: 2 INJECTION, SOLUTION INTRAVENOUS at 11:08

## 2022-01-01 RX ADMIN — MULTIVITAMIN 15 ML: LIQUID ORAL at 07:45

## 2022-01-01 RX ADMIN — LEVETIRACETAM 1000 MG: 500 TABLET, FILM COATED ORAL at 09:00

## 2022-01-01 RX ADMIN — LABETALOL HYDROCHLORIDE 10 MG: 5 INJECTION, SOLUTION INTRAVENOUS at 21:30

## 2022-01-01 RX ADMIN — LEVETIRACETAM 1000 MG: 500 TABLET, FILM COATED ORAL at 21:28

## 2022-01-01 RX ADMIN — ACETAMINOPHEN 650 MG: 325 SOLUTION ORAL at 16:26

## 2022-01-01 RX ADMIN — AMLODIPINE BESYLATE 5 MG: 5 TABLET ORAL at 08:59

## 2022-01-01 RX ADMIN — LEVETIRACETAM 1000 MG: 500 TABLET, FILM COATED ORAL at 19:45

## 2022-01-01 RX ADMIN — ACETAMINOPHEN 975 MG: 325 TABLET, FILM COATED ORAL at 17:05

## 2022-01-01 RX ADMIN — CYANOCOBALAMIN TAB 500 MCG 500 MCG: 500 TAB at 07:53

## 2022-01-01 RX ADMIN — Medication 1 PACKET: at 20:43

## 2022-01-01 RX ADMIN — Medication 400 MG: at 21:44

## 2022-01-01 RX ADMIN — LABETALOL HYDROCHLORIDE 10 MG: 5 INJECTION, SOLUTION INTRAVENOUS at 15:15

## 2022-01-01 RX ADMIN — Medication 40 MG: at 08:43

## 2022-01-01 RX ADMIN — LORAZEPAM 1 MG: 1 TABLET ORAL at 20:04

## 2022-01-01 RX ADMIN — CYANOCOBALAMIN TAB 500 MCG 500 MCG: 500 TAB at 08:02

## 2022-01-01 RX ADMIN — LABETALOL HYDROCHLORIDE 20 MG: 5 INJECTION, SOLUTION INTRAVENOUS at 01:22

## 2022-01-01 RX ADMIN — SODIUM CHLORIDE 500 ML: 9 INJECTION, SOLUTION INTRAVENOUS at 15:42

## 2022-01-01 RX ADMIN — PIPERACILLIN SODIUM AND TAZOBACTAM SODIUM 4.5 G: 4; .5 INJECTION, POWDER, LYOPHILIZED, FOR SOLUTION INTRAVENOUS at 23:18

## 2022-01-01 RX ADMIN — Medication 1 TABLET: at 08:01

## 2022-01-01 RX ADMIN — POTASSIUM & SODIUM PHOSPHATES POWDER PACK 280-160-250 MG 1 PACKET: 280-160-250 PACK at 05:10

## 2022-01-01 RX ADMIN — MUPIROCIN: 2 CREAM TOPICAL at 08:01

## 2022-01-01 RX ADMIN — CEFAZOLIN SODIUM 2 G: 2 INJECTION, SOLUTION INTRAVENOUS at 17:44

## 2022-01-01 RX ADMIN — AMLODIPINE BESYLATE 10 MG: 10 TABLET ORAL at 08:51

## 2022-01-01 RX ADMIN — LEVETIRACETAM 1000 MG: 500 TABLET, FILM COATED ORAL at 20:01

## 2022-01-01 RX ADMIN — LABETALOL HYDROCHLORIDE 10 MG: 5 INJECTION, SOLUTION INTRAVENOUS at 22:25

## 2022-01-01 RX ADMIN — LIDOCAINE HYDROCHLORIDE 5 ML: 20 SOLUTION OROPHARYNGEAL at 11:59

## 2022-01-01 RX ADMIN — Medication 1 PACKET: at 20:05

## 2022-01-01 RX ADMIN — LEVETIRACETAM 1000 MG: 500 TABLET, FILM COATED ORAL at 07:57

## 2022-01-01 RX ADMIN — CHLORHEXIDINE GLUCONATE 0.12% ORAL RINSE 15 ML: 1.2 LIQUID ORAL at 16:51

## 2022-01-01 RX ADMIN — POLYETHYLENE GLYCOL 3350 17 G: 17 POWDER, FOR SOLUTION ORAL at 19:07

## 2022-01-01 RX ADMIN — LEVETIRACETAM 500 MG: 500 TABLET, FILM COATED ORAL at 08:19

## 2022-01-01 RX ADMIN — LABETALOL HYDROCHLORIDE 10 MG: 5 INJECTION, SOLUTION INTRAVENOUS at 23:43

## 2022-01-01 RX ADMIN — LEVETIRACETAM 1000 MG: 500 TABLET, FILM COATED ORAL at 08:23

## 2022-01-01 RX ADMIN — POTASSIUM CHLORIDE 20 MEQ: 750 TABLET, EXTENDED RELEASE ORAL at 07:53

## 2022-01-01 RX ADMIN — HEPARIN SODIUM 5000 UNITS: 5000 INJECTION, SOLUTION INTRAVENOUS; SUBCUTANEOUS at 16:19

## 2022-01-01 RX ADMIN — Medication 40 MG: at 20:11

## 2022-01-01 RX ADMIN — Medication 400 MG: at 22:36

## 2022-01-01 RX ADMIN — SODIUM CHLORIDE 100 MG: 9 INJECTION, SOLUTION INTRAVENOUS at 08:23

## 2022-01-01 RX ADMIN — CHLORHEXIDINE GLUCONATE 0.12% ORAL RINSE 15 ML: 1.2 LIQUID ORAL at 15:21

## 2022-01-01 RX ADMIN — LABETALOL HYDROCHLORIDE 10 MG: 5 INJECTION, SOLUTION INTRAVENOUS at 03:12

## 2022-01-01 RX ADMIN — LABETALOL HYDROCHLORIDE 20 MG: 5 INJECTION, SOLUTION INTRAVENOUS at 03:08

## 2022-01-01 RX ADMIN — AMLODIPINE BESYLATE 10 MG: 10 TABLET ORAL at 07:53

## 2022-01-01 RX ADMIN — Medication 1 PACKET: at 07:53

## 2022-01-01 RX ADMIN — Medication 1 PACKET: at 14:36

## 2022-01-01 RX ADMIN — PANTOPRAZOLE SODIUM 40 MG: 40 INJECTION, POWDER, FOR SOLUTION INTRAVENOUS at 09:21

## 2022-01-01 RX ADMIN — POLYETHYLENE GLYCOL 3350 17 G: 17 POWDER, FOR SOLUTION ORAL at 08:20

## 2022-01-01 RX ADMIN — Medication 1 PACKET: at 07:59

## 2022-01-01 RX ADMIN — Medication 1 PACKET: at 16:51

## 2022-01-01 RX ADMIN — ACETAMINOPHEN 650 MG: 160 SOLUTION ORAL at 20:04

## 2022-01-01 RX ADMIN — HYDROCORTISONE SODIUM SUCCINATE 50 MG: 100 INJECTION, POWDER, FOR SOLUTION INTRAMUSCULAR; INTRAVENOUS at 11:47

## 2022-01-01 RX ADMIN — LOPERAMIDE HCL 4 MG: 1 SOLUTION ORAL at 08:20

## 2022-01-01 RX ADMIN — PANTOPRAZOLE SODIUM 40 MG: 40 INJECTION, POWDER, FOR SOLUTION INTRAVENOUS at 07:46

## 2022-01-01 RX ADMIN — LEVETIRACETAM 500 MG: 5 INJECTION INTRAVENOUS at 01:17

## 2022-01-01 RX ADMIN — Medication 1 PACKET: at 08:58

## 2022-01-01 RX ADMIN — OXYCODONE HYDROCHLORIDE 5 MG: 5 TABLET ORAL at 11:10

## 2022-01-01 RX ADMIN — ACETAMINOPHEN 650 MG: 325 SOLUTION ORAL at 20:23

## 2022-01-01 RX ADMIN — Medication 400 MG: at 21:50

## 2022-01-01 RX ADMIN — CEPHALEXIN 500 MG: 500 CAPSULE ORAL at 12:21

## 2022-01-01 RX ADMIN — Medication 40 MG: at 16:11

## 2022-01-01 RX ADMIN — ENOXAPARIN SODIUM 40 MG: 40 INJECTION SUBCUTANEOUS at 16:07

## 2022-01-01 RX ADMIN — AMLODIPINE BESYLATE 5 MG: 5 TABLET ORAL at 08:20

## 2022-01-01 RX ADMIN — ENOXAPARIN SODIUM 40 MG: 40 INJECTION SUBCUTANEOUS at 16:53

## 2022-01-01 RX ADMIN — SODIUM CHLORIDE, POTASSIUM CHLORIDE, SODIUM LACTATE AND CALCIUM CHLORIDE 500 ML: 600; 310; 30; 20 INJECTION, SOLUTION INTRAVENOUS at 15:47

## 2022-01-01 RX ADMIN — ENOXAPARIN SODIUM 40 MG: 40 INJECTION SUBCUTANEOUS at 16:06

## 2022-01-01 RX ADMIN — PANTOPRAZOLE SODIUM 40 MG: 40 INJECTION, POWDER, FOR SOLUTION INTRAVENOUS at 08:19

## 2022-01-01 RX ADMIN — Medication 400 MG: at 20:12

## 2022-01-01 RX ADMIN — CEFAZOLIN SODIUM 2 G: 2 INJECTION, SOLUTION INTRAVENOUS at 00:33

## 2022-01-01 RX ADMIN — HEPARIN SODIUM 5000 UNITS: 5000 INJECTION, SOLUTION INTRAVENOUS; SUBCUTANEOUS at 16:42

## 2022-01-01 RX ADMIN — Medication 1 TABLET: at 07:54

## 2022-01-01 RX ADMIN — ACETAMINOPHEN 650 MG: 160 SOLUTION ORAL at 18:35

## 2022-01-01 RX ADMIN — Medication 1 PACKET: at 12:08

## 2022-01-01 RX ADMIN — CYANOCOBALAMIN TAB 500 MCG 500 MCG: 500 TAB at 09:02

## 2022-01-01 RX ADMIN — Medication 100 MG: at 18:20

## 2022-01-01 RX ADMIN — HEPARIN SODIUM 5000 UNITS: 5000 INJECTION, SOLUTION INTRAVENOUS; SUBCUTANEOUS at 00:10

## 2022-01-01 RX ADMIN — LEVETIRACETAM 1000 MG: 500 TABLET, FILM COATED ORAL at 09:59

## 2022-01-01 RX ADMIN — PIPERACILLIN AND TAZOBACTAM 4.5 G: 4; .5 INJECTION, POWDER, FOR SOLUTION INTRAVENOUS at 03:22

## 2022-01-01 RX ADMIN — PIPERACILLIN AND TAZOBACTAM 4.5 G: 4; .5 INJECTION, POWDER, LYOPHILIZED, FOR SOLUTION INTRAVENOUS; PARENTERAL at 12:08

## 2022-01-01 RX ADMIN — ALBUTEROL SULFATE 2.5 MG: 2.5 SOLUTION RESPIRATORY (INHALATION) at 01:17

## 2022-01-01 RX ADMIN — LEVETIRACETAM 500 MG: 500 TABLET, FILM COATED ORAL at 07:46

## 2022-01-01 RX ADMIN — CEFAZOLIN SODIUM 2 G: 2 INJECTION, SOLUTION INTRAVENOUS at 03:39

## 2022-01-01 RX ADMIN — LEVETIRACETAM 750 MG: 750 TABLET, FILM COATED ORAL at 07:42

## 2022-01-01 RX ADMIN — LEVETIRACETAM 1000 MG: 500 TABLET, FILM COATED ORAL at 20:47

## 2022-01-01 RX ADMIN — PIPERACILLIN AND TAZOBACTAM 4.5 G: 4; .5 INJECTION, POWDER, FOR SOLUTION INTRAVENOUS at 03:21

## 2022-01-01 RX ADMIN — LEVETIRACETAM 1000 MG: 500 TABLET, FILM COATED ORAL at 08:18

## 2022-01-01 RX ADMIN — CEFAZOLIN SODIUM 2 G: 2 INJECTION, SOLUTION INTRAVENOUS at 00:00

## 2022-01-01 RX ADMIN — MUPIROCIN: 2 CREAM TOPICAL at 08:06

## 2022-01-01 RX ADMIN — METOCLOPRAMIDE HYDROCHLORIDE 10 MG: 5 INJECTION INTRAMUSCULAR; INTRAVENOUS at 21:05

## 2022-01-01 RX ADMIN — POTASSIUM CHLORIDE 20 MEQ: 750 TABLET, EXTENDED RELEASE ORAL at 03:10

## 2022-01-01 RX ADMIN — Medication 1 PACKET: at 20:54

## 2022-01-01 RX ADMIN — SODIUM CHLORIDE 100 MG: 9 INJECTION, SOLUTION INTRAVENOUS at 07:45

## 2022-01-01 RX ADMIN — SODIUM CHLORIDE, PRESERVATIVE FREE 74 ML: 5 INJECTION INTRAVENOUS at 09:41

## 2022-01-01 RX ADMIN — ACETAMINOPHEN 975 MG: 325 TABLET, FILM COATED ORAL at 17:10

## 2022-01-01 RX ADMIN — MIDAZOLAM 1 MG: 1 INJECTION INTRAMUSCULAR; INTRAVENOUS at 18:36

## 2022-01-01 RX ADMIN — PANTOPRAZOLE SODIUM 40 MG: 40 INJECTION, POWDER, FOR SOLUTION INTRAVENOUS at 08:46

## 2022-01-01 RX ADMIN — MULTIVITAMIN 15 ML: LIQUID ORAL at 08:43

## 2022-01-01 RX ADMIN — HEPARIN SODIUM 5000 UNITS: 5000 INJECTION, SOLUTION INTRAVENOUS; SUBCUTANEOUS at 16:15

## 2022-01-01 RX ADMIN — DOCUSATE SODIUM 50 MG AND SENNOSIDES 8.6 MG 2 TABLET: 8.6; 5 TABLET, FILM COATED ORAL at 21:25

## 2022-01-01 RX ADMIN — HYDRALAZINE HYDROCHLORIDE 10 MG: 20 INJECTION INTRAMUSCULAR; INTRAVENOUS at 06:22

## 2022-01-01 RX ADMIN — Medication 1 PACKET: at 07:52

## 2022-01-01 RX ADMIN — LOPERAMIDE HCL 2 MG: 1 SOLUTION ORAL at 07:54

## 2022-01-01 RX ADMIN — OXYCODONE HYDROCHLORIDE 5 MG: 5 TABLET ORAL at 21:01

## 2022-01-01 RX ADMIN — HYDROCORTISONE SODIUM SUCCINATE 50 MG: 100 INJECTION, POWDER, FOR SOLUTION INTRAMUSCULAR; INTRAVENOUS at 12:05

## 2022-01-01 RX ADMIN — ACETAMINOPHEN 650 MG: 325 TABLET ORAL at 13:37

## 2022-01-01 RX ADMIN — HYDRALAZINE HYDROCHLORIDE 10 MG: 20 INJECTION INTRAMUSCULAR; INTRAVENOUS at 04:17

## 2022-01-01 RX ADMIN — ACETAMINOPHEN 650 MG: 160 SOLUTION ORAL at 13:52

## 2022-01-01 RX ADMIN — ENOXAPARIN SODIUM 40 MG: 40 INJECTION SUBCUTANEOUS at 16:42

## 2022-01-01 RX ADMIN — Medication 40 MG: at 15:51

## 2022-01-01 RX ADMIN — Medication 400 MG: at 21:56

## 2022-01-01 RX ADMIN — SENNOSIDES AND DOCUSATE SODIUM 2 TABLET: 50; 8.6 TABLET ORAL at 08:44

## 2022-01-01 RX ADMIN — Medication 40 MG: at 16:08

## 2022-01-01 RX ADMIN — LEVETIRACETAM 1000 MG: 500 TABLET, FILM COATED ORAL at 20:04

## 2022-01-01 RX ADMIN — ACETAMINOPHEN 650 MG: 325 SOLUTION ORAL at 04:35

## 2022-01-01 RX ADMIN — CHLORHEXIDINE GLUCONATE 0.12% ORAL RINSE 15 ML: 1.2 LIQUID ORAL at 07:51

## 2022-01-01 RX ADMIN — LEVETIRACETAM 1000 MG: 500 TABLET, FILM COATED ORAL at 09:53

## 2022-01-01 RX ADMIN — SODIUM CHLORIDE, POTASSIUM CHLORIDE, SODIUM LACTATE AND CALCIUM CHLORIDE: 600; 310; 30; 20 INJECTION, SOLUTION INTRAVENOUS at 12:03

## 2022-01-01 RX ADMIN — PANTOPRAZOLE SODIUM 40 MG: 40 INJECTION, POWDER, FOR SOLUTION INTRAVENOUS at 20:02

## 2022-01-01 RX ADMIN — FENTANYL CITRATE 100 MCG: 50 INJECTION, SOLUTION INTRAMUSCULAR; INTRAVENOUS at 19:38

## 2022-01-01 RX ADMIN — CEFAZOLIN SODIUM 2 G: 2 INJECTION, SOLUTION INTRAVENOUS at 06:46

## 2022-01-01 RX ADMIN — PANTOPRAZOLE SODIUM 40 MG: 40 INJECTION, POWDER, FOR SOLUTION INTRAVENOUS at 19:26

## 2022-01-01 RX ADMIN — MORPHINE SULFATE 5 MG: 10 SOLUTION ORAL at 02:29

## 2022-01-01 RX ADMIN — PIPERACILLIN AND TAZOBACTAM 4.5 G: 4; .5 INJECTION, POWDER, FOR SOLUTION INTRAVENOUS at 04:13

## 2022-01-01 RX ADMIN — LEVETIRACETAM 500 MG: 500 TABLET, FILM COATED ORAL at 08:26

## 2022-01-01 RX ADMIN — SODIUM CHLORIDE, SODIUM GLUCONATE, SODIUM ACETATE, POTASSIUM CHLORIDE AND MAGNESIUM CHLORIDE 100 ML/HR: 526; 502; 368; 37; 30 INJECTION, SOLUTION INTRAVENOUS at 00:06

## 2022-01-01 RX ADMIN — HYDROXYCHLOROQUINE SULFATE 400 MG: 200 TABLET, FILM COATED ORAL at 21:12

## 2022-01-01 RX ADMIN — OXYCODONE HYDROCHLORIDE 5 MG: 5 TABLET ORAL at 12:56

## 2022-01-01 RX ADMIN — CYANOCOBALAMIN TAB 500 MCG 500 MCG: 500 TAB at 07:42

## 2022-01-01 RX ADMIN — MULTIVITAMIN 15 ML: LIQUID ORAL at 08:56

## 2022-01-01 RX ADMIN — Medication 5 MG: at 20:54

## 2022-01-01 RX ADMIN — PIPERACILLIN SODIUM AND TAZOBACTAM SODIUM 4.5 G: 4; .5 INJECTION, POWDER, LYOPHILIZED, FOR SOLUTION INTRAVENOUS at 17:05

## 2022-01-01 RX ADMIN — CHLORHEXIDINE GLUCONATE 0.12% ORAL RINSE 15 ML: 1.2 LIQUID ORAL at 08:46

## 2022-01-01 RX ADMIN — LOPERAMIDE HCL 4 MG: 1 SOLUTION ORAL at 19:47

## 2022-01-01 RX ADMIN — AMLODIPINE BESYLATE 10 MG: 10 TABLET ORAL at 08:10

## 2022-01-01 RX ADMIN — CEPHALEXIN 500 MG: 500 CAPSULE ORAL at 01:11

## 2022-01-01 RX ADMIN — CHLORHEXIDINE GLUCONATE 0.12% ORAL RINSE 15 ML: 1.2 LIQUID ORAL at 08:49

## 2022-01-01 RX ADMIN — AMLODIPINE BESYLATE 10 MG: 10 TABLET ORAL at 08:02

## 2022-01-01 RX ADMIN — Medication 1 PACKET: at 13:03

## 2022-01-01 RX ADMIN — MUPIROCIN: 2 CREAM TOPICAL at 20:30

## 2022-01-01 RX ADMIN — HEPARIN SODIUM 5000 UNITS: 5000 INJECTION, SOLUTION INTRAVENOUS; SUBCUTANEOUS at 15:50

## 2022-01-01 RX ADMIN — GLUCAGON HYDROCHLORIDE 1 MG: 1 INJECTION, POWDER, FOR SOLUTION INTRAMUSCULAR; INTRAVENOUS; SUBCUTANEOUS at 18:38

## 2022-01-01 RX ADMIN — CYANOCOBALAMIN TAB 500 MCG 500 MCG: 500 TAB at 08:01

## 2022-01-01 RX ADMIN — ACETAMINOPHEN 650 MG: 325 TABLET ORAL at 16:55

## 2022-01-01 RX ADMIN — Medication 40 MG: at 17:35

## 2022-01-01 RX ADMIN — ENOXAPARIN SODIUM 40 MG: 40 INJECTION SUBCUTANEOUS at 15:10

## 2022-01-01 RX ADMIN — ACETAMINOPHEN 325MG 975 MG: 325 TABLET ORAL at 21:40

## 2022-01-01 RX ADMIN — PIPERACILLIN AND TAZOBACTAM 4.5 G: 4; .5 INJECTION, POWDER, LYOPHILIZED, FOR SOLUTION INTRAVENOUS; PARENTERAL at 00:12

## 2022-01-01 RX ADMIN — Medication 40 MG: at 09:01

## 2022-01-01 RX ADMIN — HEPARIN SODIUM 5000 UNITS: 5000 INJECTION, SOLUTION INTRAVENOUS; SUBCUTANEOUS at 23:40

## 2022-01-01 RX ADMIN — HEPARIN SODIUM 5000 UNITS: 5000 INJECTION, SOLUTION INTRAVENOUS; SUBCUTANEOUS at 07:38

## 2022-01-01 RX ADMIN — CYANOCOBALAMIN TAB 500 MCG 500 MCG: 500 TAB at 07:51

## 2022-01-01 RX ADMIN — PHENYLEPHRINE HYDROCHLORIDE 100 MCG: 10 INJECTION INTRAVENOUS at 21:15

## 2022-01-01 RX ADMIN — Medication 400 MG: at 08:26

## 2022-01-01 RX ADMIN — MUPIROCIN: 2 CREAM TOPICAL at 21:54

## 2022-01-01 RX ADMIN — ACETAMINOPHEN 650 MG: 325 SOLUTION ORAL at 00:55

## 2022-01-01 RX ADMIN — HEPARIN SODIUM 5000 UNITS: 5000 INJECTION, SOLUTION INTRAVENOUS; SUBCUTANEOUS at 07:44

## 2022-01-01 RX ADMIN — Medication 40 MG: at 16:07

## 2022-01-01 RX ADMIN — AMLODIPINE BESYLATE 10 MG: 10 TABLET ORAL at 07:51

## 2022-01-01 RX ADMIN — Medication 50 MG: at 19:37

## 2022-01-01 RX ADMIN — Medication 400 MG: at 21:04

## 2022-01-01 RX ADMIN — Medication 5 MG: at 22:36

## 2022-01-01 RX ADMIN — ACETAMINOPHEN 975 MG: 325 TABLET, FILM COATED ORAL at 23:07

## 2022-01-01 RX ADMIN — Medication 1 PACKET: at 08:45

## 2022-01-01 RX ADMIN — DIPHENOXYLATE HYDROCHLORIDE AND ATROPINE SULFATE 1 TABLET: 2.5; .025 TABLET ORAL at 15:11

## 2022-01-01 RX ADMIN — ACETAMINOPHEN 325MG 975 MG: 325 TABLET ORAL at 04:01

## 2022-01-01 RX ADMIN — PIPERACILLIN AND TAZOBACTAM 4.5 G: 4; .5 INJECTION, POWDER, LYOPHILIZED, FOR SOLUTION INTRAVENOUS; PARENTERAL at 05:23

## 2022-01-01 RX ADMIN — ACETAMINOPHEN 650 MG: 325 TABLET ORAL at 07:41

## 2022-01-01 RX ADMIN — CEFAZOLIN SODIUM 2 G: 2 INJECTION, SOLUTION INTRAVENOUS at 04:01

## 2022-01-01 RX ADMIN — Medication 1 PACKET: at 08:30

## 2022-01-01 RX ADMIN — OXYCODONE HYDROCHLORIDE 5 MG: 5 TABLET ORAL at 23:12

## 2022-01-01 RX ADMIN — AMLODIPINE BESYLATE 5 MG: 5 TABLET ORAL at 20:05

## 2022-01-01 RX ADMIN — CYANOCOBALAMIN TAB 500 MCG 500 MCG: 500 TAB at 07:49

## 2022-01-01 RX ADMIN — Medication 400 MG: at 22:04

## 2022-01-01 RX ADMIN — Medication 1 PACKET: at 14:35

## 2022-01-01 RX ADMIN — LABETALOL HYDROCHLORIDE 20 MG: 5 INJECTION, SOLUTION INTRAVENOUS at 17:14

## 2022-01-01 RX ADMIN — LEVETIRACETAM 500 MG: 5 INJECTION INTRAVENOUS at 01:11

## 2022-01-01 RX ADMIN — ACETAMINOPHEN 325MG 975 MG: 325 TABLET ORAL at 23:57

## 2022-01-01 RX ADMIN — CYANOCOBALAMIN TAB 500 MCG 500 MCG: 500 TAB at 08:18

## 2022-01-01 RX ADMIN — ACETAMINOPHEN 650 MG: 325 TABLET ORAL at 03:07

## 2022-01-01 RX ADMIN — LIDOCAINE 3 PATCH: 560 PATCH PERCUTANEOUS; TOPICAL; TRANSDERMAL at 19:31

## 2022-01-01 RX ADMIN — LEVETIRACETAM 1000 MG: 500 TABLET, FILM COATED ORAL at 20:05

## 2022-01-01 RX ADMIN — Medication 1 PACKET: at 09:20

## 2022-01-01 RX ADMIN — SODIUM CHLORIDE 100 MG: 9 INJECTION, SOLUTION INTRAVENOUS at 20:04

## 2022-01-01 RX ADMIN — LEVETIRACETAM 500 MG: 5 INJECTION INTRAVENOUS at 13:13

## 2022-01-01 RX ADMIN — ALBUTEROL SULFATE 2.5 MG: 2.5 SOLUTION RESPIRATORY (INHALATION) at 14:45

## 2022-01-01 RX ADMIN — Medication 40 MG: at 08:18

## 2022-01-01 RX ADMIN — CEFAZOLIN SODIUM 2 G: 2 INJECTION, SOLUTION INTRAVENOUS at 22:35

## 2022-01-01 RX ADMIN — Medication 1 PACKET: at 14:34

## 2022-01-01 RX ADMIN — MULTIVITAMIN 15 ML: LIQUID ORAL at 08:01

## 2022-01-01 RX ADMIN — LABETALOL HYDROCHLORIDE 20 MG: 5 INJECTION, SOLUTION INTRAVENOUS at 02:48

## 2022-01-01 RX ADMIN — Medication 1 PACKET: at 14:52

## 2022-01-01 RX ADMIN — LEVETIRACETAM 500 MG: 500 TABLET, FILM COATED ORAL at 22:35

## 2022-01-01 RX ADMIN — LEVETIRACETAM 1000 MG: 500 TABLET, FILM COATED ORAL at 08:01

## 2022-01-01 RX ADMIN — ACETAMINOPHEN 650 MG: 325 TABLET ORAL at 20:31

## 2022-01-01 RX ADMIN — CEFAZOLIN SODIUM 2 G: 2 INJECTION, SOLUTION INTRAVENOUS at 20:24

## 2022-01-01 RX ADMIN — CHLORHEXIDINE GLUCONATE 0.12% ORAL RINSE 15 ML: 1.2 LIQUID ORAL at 12:06

## 2022-01-01 RX ADMIN — CHLORHEXIDINE GLUCONATE 0.12% ORAL RINSE 15 ML: 1.2 LIQUID ORAL at 12:23

## 2022-01-01 RX ADMIN — Medication 400 MG: at 22:15

## 2022-01-01 RX ADMIN — POLYETHYLENE GLYCOL 3350 17 G: 17 POWDER, FOR SOLUTION ORAL at 08:06

## 2022-01-01 RX ADMIN — ACETAMINOPHEN 650 MG: 325 TABLET ORAL at 22:34

## 2022-01-01 RX ADMIN — HYDRALAZINE HYDROCHLORIDE 20 MG: 20 INJECTION INTRAMUSCULAR; INTRAVENOUS at 23:21

## 2022-01-01 RX ADMIN — HEPARIN SODIUM 5000 UNITS: 5000 INJECTION, SOLUTION INTRAVENOUS; SUBCUTANEOUS at 00:07

## 2022-01-01 RX ADMIN — PIPERACILLIN AND TAZOBACTAM 4.5 G: 4; .5 INJECTION, POWDER, LYOPHILIZED, FOR SOLUTION INTRAVENOUS; PARENTERAL at 05:11

## 2022-01-01 RX ADMIN — Medication 1 PACKET: at 11:29

## 2022-01-01 RX ADMIN — Medication 40 MG: at 07:45

## 2022-01-01 RX ADMIN — CHLORHEXIDINE GLUCONATE 0.12% ORAL RINSE 15 ML: 1.2 LIQUID ORAL at 08:59

## 2022-01-01 RX ADMIN — CEFAZOLIN SODIUM 2 G: 2 INJECTION, SOLUTION INTRAVENOUS at 17:03

## 2022-01-01 RX ADMIN — CHLORHEXIDINE GLUCONATE 0.12% ORAL RINSE 15 ML: 1.2 LIQUID ORAL at 20:02

## 2022-01-01 RX ADMIN — Medication 1 PACKET: at 11:11

## 2022-01-01 RX ADMIN — LEVETIRACETAM 1000 MG: 500 TABLET, FILM COATED ORAL at 08:56

## 2022-01-01 RX ADMIN — FUROSEMIDE 20 MG: 10 INJECTION, SOLUTION INTRAMUSCULAR; INTRAVENOUS at 12:07

## 2022-01-01 RX ADMIN — CEFAZOLIN SODIUM 2 G: 2 INJECTION, SOLUTION INTRAVENOUS at 18:24

## 2022-01-01 RX ADMIN — ENOXAPARIN SODIUM 40 MG: 40 INJECTION SUBCUTANEOUS at 17:16

## 2022-01-01 RX ADMIN — ACETAMINOPHEN 650 MG: 325 SOLUTION ORAL at 02:25

## 2022-01-01 RX ADMIN — CYANOCOBALAMIN TAB 500 MCG 500 MCG: 500 TAB at 08:14

## 2022-01-01 RX ADMIN — CEFAZOLIN SODIUM 2 G: 2 INJECTION, SOLUTION INTRAVENOUS at 15:59

## 2022-01-01 RX ADMIN — LEVETIRACETAM 1000 MG: 500 TABLET, FILM COATED ORAL at 07:51

## 2022-01-01 RX ADMIN — CHLORHEXIDINE GLUCONATE 0.12% ORAL RINSE 15 ML: 1.2 LIQUID ORAL at 12:33

## 2022-01-01 RX ADMIN — LEVETIRACETAM 1000 MG: 500 TABLET, FILM COATED ORAL at 07:37

## 2022-01-01 RX ADMIN — Medication 1 PACKET: at 20:00

## 2022-01-01 RX ADMIN — LEVETIRACETAM 1000 MG: 500 TABLET, FILM COATED ORAL at 19:46

## 2022-01-01 RX ADMIN — MULTIVITAMIN 15 ML: LIQUID ORAL at 08:27

## 2022-01-01 RX ADMIN — ACETAMINOPHEN 325MG 975 MG: 325 TABLET ORAL at 18:29

## 2022-01-01 RX ADMIN — MULTIVITAMIN 15 ML: LIQUID ORAL at 08:16

## 2022-01-01 RX ADMIN — CEPHALEXIN 500 MG: 500 CAPSULE ORAL at 23:46

## 2022-01-01 RX ADMIN — HEPARIN SODIUM 5000 UNITS: 5000 INJECTION, SOLUTION INTRAVENOUS; SUBCUTANEOUS at 07:41

## 2022-01-01 RX ADMIN — LEVETIRACETAM 1000 MG: 500 TABLET, FILM COATED ORAL at 20:48

## 2022-01-01 RX ADMIN — Medication 400 MG: at 22:12

## 2022-01-01 RX ADMIN — ACETAMINOPHEN 650 MG: 325 SOLUTION ORAL at 04:05

## 2022-01-01 RX ADMIN — PHENYLEPHRINE HYDROCHLORIDE 100 MCG: 10 INJECTION INTRAVENOUS at 19:51

## 2022-01-01 RX ADMIN — Medication 40 MG: at 17:06

## 2022-01-01 RX ADMIN — ACETAMINOPHEN 325MG 975 MG: 325 TABLET ORAL at 12:23

## 2022-01-01 RX ADMIN — ACETAMINOPHEN 975 MG: 325 TABLET, FILM COATED ORAL at 05:22

## 2022-01-01 RX ADMIN — Medication 40 MG: at 08:09

## 2022-01-01 RX ADMIN — CHLORHEXIDINE GLUCONATE 0.12% ORAL RINSE 15 ML: 1.2 LIQUID ORAL at 21:52

## 2022-01-01 RX ADMIN — LEVETIRACETAM 1000 MG: 500 TABLET, FILM COATED ORAL at 20:19

## 2022-01-01 RX ADMIN — SODIUM CHLORIDE 200 MG: 9 INJECTION, SOLUTION INTRAVENOUS at 11:46

## 2022-01-01 RX ADMIN — Medication 5 MG: at 21:44

## 2022-01-01 RX ADMIN — VANCOMYCIN HYDROCHLORIDE 750 MG: 1 INJECTION, POWDER, LYOPHILIZED, FOR SOLUTION INTRAVENOUS at 08:47

## 2022-01-01 RX ADMIN — ATROPINE SULFATE 2 DROP: 10 SOLUTION/ DROPS OPHTHALMIC at 20:50

## 2022-01-01 RX ADMIN — CHLORHEXIDINE GLUCONATE 0.12% ORAL RINSE 15 ML: 1.2 LIQUID ORAL at 20:44

## 2022-01-01 RX ADMIN — CYANOCOBALAMIN TAB 500 MCG 500 MCG: 500 TAB at 07:41

## 2022-01-01 RX ADMIN — Medication 40 MG: at 07:53

## 2022-01-01 RX ADMIN — PIPERACILLIN AND TAZOBACTAM 4.5 G: 4; .5 INJECTION, POWDER, FOR SOLUTION INTRAVENOUS at 14:36

## 2022-01-01 RX ADMIN — AMLODIPINE BESYLATE 5 MG: 5 TABLET ORAL at 08:49

## 2022-01-01 RX ADMIN — ACETAMINOPHEN 650 MG: 325 SOLUTION ORAL at 12:40

## 2022-01-01 RX ADMIN — MULTIVITAMIN 15 ML: LIQUID ORAL at 08:14

## 2022-01-01 RX ADMIN — ATROPINE SULFATE 2 DROP: 10 SOLUTION/ DROPS OPHTHALMIC at 21:39

## 2022-01-01 RX ADMIN — PANTOPRAZOLE SODIUM 40 MG: 40 INJECTION, POWDER, FOR SOLUTION INTRAVENOUS at 08:59

## 2022-01-01 RX ADMIN — LEVETIRACETAM 750 MG: 750 TABLET, FILM COATED ORAL at 08:33

## 2022-01-01 RX ADMIN — LEVETIRACETAM 1000 MG: 500 TABLET, FILM COATED ORAL at 08:15

## 2022-01-01 RX ADMIN — ACETAMINOPHEN 650 MG: 325 SOLUTION ORAL at 08:18

## 2022-01-01 RX ADMIN — ACETAMINOPHEN 650 MG: 325 TABLET ORAL at 08:01

## 2022-01-01 RX ADMIN — LEVETIRACETAM 1000 MG: 500 TABLET, FILM COATED ORAL at 07:14

## 2022-01-01 RX ADMIN — CYANOCOBALAMIN TAB 500 MCG 500 MCG: 500 TAB at 08:59

## 2022-01-01 RX ADMIN — CEPHALEXIN 500 MG: 500 CAPSULE ORAL at 17:56

## 2022-01-01 RX ADMIN — Medication 10 MG/HR: at 10:21

## 2022-01-01 RX ADMIN — IOPAMIDOL 54 ML: 755 INJECTION, SOLUTION INTRAVENOUS at 18:53

## 2022-01-01 RX ADMIN — MULTIVITAMIN 15 ML: LIQUID ORAL at 08:24

## 2022-01-01 RX ADMIN — DARBEPOETIN ALFA 300 MCG: 300 INJECTION, SOLUTION INTRAVENOUS; SUBCUTANEOUS at 18:32

## 2022-01-01 RX ADMIN — OXYCODONE HYDROCHLORIDE 5 MG: 5 TABLET ORAL at 16:55

## 2022-01-01 RX ADMIN — AMLODIPINE BESYLATE 10 MG: 10 TABLET ORAL at 08:43

## 2022-01-01 RX ADMIN — ACETAMINOPHEN 650 MG: 325 TABLET ORAL at 07:46

## 2022-01-01 RX ADMIN — POLYETHYLENE GLYCOL 3350 17 G: 17 POWDER, FOR SOLUTION ORAL at 08:02

## 2022-01-01 RX ADMIN — HEPARIN SODIUM 5000 UNITS: 5000 INJECTION, SOLUTION INTRAVENOUS; SUBCUTANEOUS at 15:53

## 2022-01-01 RX ADMIN — CEPHALEXIN 500 MG: 500 CAPSULE ORAL at 08:19

## 2022-01-01 RX ADMIN — PIPERACILLIN AND TAZOBACTAM 4.5 G: 4; .5 INJECTION, POWDER, FOR SOLUTION INTRAVENOUS at 03:32

## 2022-01-01 RX ADMIN — ACETAMINOPHEN 975 MG: 325 TABLET, FILM COATED ORAL at 23:18

## 2022-01-01 RX ADMIN — Medication 1 PACKET: at 20:22

## 2022-01-01 RX ADMIN — FENTANYL CITRATE 25 MCG: 50 INJECTION, SOLUTION INTRAMUSCULAR; INTRAVENOUS at 18:43

## 2022-01-01 RX ADMIN — LEVETIRACETAM 1000 MG: 500 TABLET, FILM COATED ORAL at 09:06

## 2022-01-01 RX ADMIN — DEXMEDETOMIDINE HYDROCHLORIDE 0.2 MCG/KG/HR: 400 INJECTION INTRAVENOUS at 21:40

## 2022-01-01 RX ADMIN — ETOMIDATE 20 MG: 2 INJECTION INTRAVENOUS at 18:20

## 2022-01-01 RX ADMIN — CHLORHEXIDINE GLUCONATE 0.12% ORAL RINSE 15 ML: 1.2 LIQUID ORAL at 08:21

## 2022-01-01 RX ADMIN — CYANOCOBALAMIN TAB 500 MCG 500 MCG: 500 TAB at 08:49

## 2022-01-01 RX ADMIN — PHENYLEPHRINE HYDROCHLORIDE 0.3 MCG/KG/MIN: 10 INJECTION INTRAVENOUS at 19:59

## 2022-01-01 RX ADMIN — VANCOMYCIN HYDROCHLORIDE 1750 MG: 10 INJECTION, POWDER, LYOPHILIZED, FOR SOLUTION INTRAVENOUS at 23:54

## 2022-01-01 RX ADMIN — LEVETIRACETAM 1000 MG: 500 TABLET, FILM COATED ORAL at 19:52

## 2022-01-01 RX ADMIN — POTASSIUM CHLORIDE 40 MEQ: 750 TABLET, EXTENDED RELEASE ORAL at 04:50

## 2022-01-01 RX ADMIN — Medication 1 PACKET: at 08:18

## 2022-01-01 RX ADMIN — ACETAMINOPHEN 975 MG: 325 TABLET, FILM COATED ORAL at 18:00

## 2022-01-01 RX ADMIN — CHLORHEXIDINE GLUCONATE 0.12% ORAL RINSE 15 ML: 1.2 LIQUID ORAL at 08:15

## 2022-01-01 RX ADMIN — Medication 1 PACKET: at 08:34

## 2022-01-01 RX ADMIN — PIPERACILLIN AND TAZOBACTAM 4.5 G: 4; .5 INJECTION, POWDER, FOR SOLUTION INTRAVENOUS at 01:56

## 2022-01-01 RX ADMIN — LEVETIRACETAM 1000 MG: 500 TABLET, FILM COATED ORAL at 20:29

## 2022-01-01 RX ADMIN — LEVETIRACETAM 500 MG: 500 TABLET, FILM COATED ORAL at 07:53

## 2022-01-01 RX ADMIN — CEFAZOLIN SODIUM 2 G: 2 INJECTION, SOLUTION INTRAVENOUS at 03:10

## 2022-01-01 RX ADMIN — AMLODIPINE BESYLATE 10 MG: 10 TABLET ORAL at 08:24

## 2022-01-01 RX ADMIN — Medication 1 TABLET: at 07:53

## 2022-01-01 RX ADMIN — CEFAZOLIN SODIUM 2 G: 2 INJECTION, SOLUTION INTRAVENOUS at 11:19

## 2022-01-01 RX ADMIN — Medication 400 MG: at 22:27

## 2022-01-01 RX ADMIN — SODIUM CHLORIDE 500 ML: 9 INJECTION, SOLUTION INTRAVENOUS at 04:14

## 2022-01-01 RX ADMIN — Medication 40 MG: at 16:54

## 2022-01-01 RX ADMIN — LOPERAMIDE HCL 4 MG: 1 SOLUTION ORAL at 07:51

## 2022-01-01 RX ADMIN — OXYCODONE HYDROCHLORIDE 5 MG: 5 TABLET ORAL at 20:30

## 2022-01-01 RX ADMIN — LABETALOL HYDROCHLORIDE 20 MG: 5 INJECTION, SOLUTION INTRAVENOUS at 09:08

## 2022-01-01 RX ADMIN — Medication 1 PACKET: at 14:40

## 2022-01-01 RX ADMIN — DOCUSATE SODIUM 50 MG AND SENNOSIDES 8.6 MG 1 TABLET: 8.6; 5 TABLET, FILM COATED ORAL at 22:07

## 2022-01-01 RX ADMIN — OXYCODONE HYDROCHLORIDE 5 MG: 5 TABLET ORAL at 22:34

## 2022-01-01 RX ADMIN — IOPAMIDOL 100 ML: 755 INJECTION, SOLUTION INTRAVENOUS at 12:34

## 2022-01-01 RX ADMIN — Medication 1 PACKET: at 09:00

## 2022-01-01 RX ADMIN — MUPIROCIN: 2 CREAM TOPICAL at 20:33

## 2022-01-01 RX ADMIN — LEVETIRACETAM 1000 MG: 500 TABLET, FILM COATED ORAL at 08:19

## 2022-01-01 RX ADMIN — CYANOCOBALAMIN TAB 500 MCG 500 MCG: 500 TAB at 08:56

## 2022-01-01 RX ADMIN — ACETAMINOPHEN 325MG 975 MG: 325 TABLET ORAL at 13:07

## 2022-01-01 RX ADMIN — VASOPRESSIN 2.4 UNITS/HR: 20 INJECTION INTRAVENOUS at 19:32

## 2022-01-01 RX ADMIN — LEVETIRACETAM 1000 MG: 500 TABLET, FILM COATED ORAL at 08:30

## 2022-01-01 RX ADMIN — Medication 1 TABLET: at 07:45

## 2022-01-01 RX ADMIN — LABETALOL HYDROCHLORIDE 10 MG: 5 INJECTION, SOLUTION INTRAVENOUS at 18:44

## 2022-01-01 RX ADMIN — ACETAMINOPHEN 325MG 975 MG: 325 TABLET ORAL at 09:21

## 2022-01-01 RX ADMIN — Medication 1 PACKET: at 09:12

## 2022-01-01 RX ADMIN — LEVETIRACETAM 1000 MG: 500 TABLET, FILM COATED ORAL at 07:44

## 2022-01-01 RX ADMIN — ACETAMINOPHEN 650 MG: 160 SOLUTION ORAL at 07:44

## 2022-01-01 RX ADMIN — Medication 1 PACKET: at 21:52

## 2022-01-01 RX ADMIN — CEPHALEXIN 500 MG: 500 CAPSULE ORAL at 08:06

## 2022-01-01 RX ADMIN — LOPERAMIDE HCL 4 MG: 1 SOLUTION ORAL at 08:21

## 2022-01-01 RX ADMIN — Medication 1 PACKET: at 16:47

## 2022-01-01 RX ADMIN — Medication 1 PACKET: at 12:12

## 2022-01-01 RX ADMIN — ACETYLCYSTEINE 2 ML: 200 SOLUTION ORAL; RESPIRATORY (INHALATION) at 01:20

## 2022-01-01 RX ADMIN — HEPARIN SODIUM 5000 UNITS: 5000 INJECTION, SOLUTION INTRAVENOUS; SUBCUTANEOUS at 07:32

## 2022-01-01 RX ADMIN — PIPERACILLIN SODIUM AND TAZOBACTAM SODIUM 4.5 G: 4; .5 INJECTION, POWDER, LYOPHILIZED, FOR SOLUTION INTRAVENOUS at 05:20

## 2022-01-01 RX ADMIN — LEVETIRACETAM 1000 MG: 500 TABLET, FILM COATED ORAL at 20:22

## 2022-01-01 RX ADMIN — LORAZEPAM 1 MG: 1 TABLET ORAL at 04:05

## 2022-01-01 RX ADMIN — VANCOMYCIN HYDROCHLORIDE 1250 MG: 10 INJECTION, POWDER, LYOPHILIZED, FOR SOLUTION INTRAVENOUS at 12:39

## 2022-01-01 RX ADMIN — PIPERACILLIN AND TAZOBACTAM 4.5 G: 4; .5 INJECTION, POWDER, FOR SOLUTION INTRAVENOUS at 15:43

## 2022-01-01 RX ADMIN — ACETAMINOPHEN 650 MG: 325 TABLET ORAL at 10:18

## 2022-01-01 RX ADMIN — LEVETIRACETAM 1000 MG: 500 TABLET, FILM COATED ORAL at 20:03

## 2022-01-01 RX ADMIN — CHLORHEXIDINE GLUCONATE 0.12% ORAL RINSE 15 ML: 1.2 LIQUID ORAL at 08:19

## 2022-01-01 RX ADMIN — Medication 400 MG: at 21:07

## 2022-01-01 RX ADMIN — MULTIVITAMIN 15 ML: LIQUID ORAL at 08:13

## 2022-01-01 RX ADMIN — LEVETIRACETAM 1000 MG: 500 TABLET, FILM COATED ORAL at 21:22

## 2022-01-01 RX ADMIN — CYANOCOBALAMIN TAB 500 MCG 500 MCG: 500 TAB at 08:43

## 2022-01-01 RX ADMIN — MUPIROCIN: 2 CREAM TOPICAL at 21:08

## 2022-01-01 RX ADMIN — PANTOPRAZOLE SODIUM 40 MG: 40 INJECTION, POWDER, FOR SOLUTION INTRAVENOUS at 20:03

## 2022-01-01 RX ADMIN — ALBUTEROL SULFATE 2.5 MG: 2.5 SOLUTION RESPIRATORY (INHALATION) at 08:21

## 2022-01-01 RX ADMIN — LABETALOL HYDROCHLORIDE 10 MG: 5 INJECTION, SOLUTION INTRAVENOUS at 19:07

## 2022-01-01 RX ADMIN — AMLODIPINE BESYLATE 10 MG: 10 TABLET ORAL at 07:14

## 2022-01-01 RX ADMIN — LEVETIRACETAM 1000 MG: 500 TABLET, FILM COATED ORAL at 08:46

## 2022-01-01 RX ADMIN — Medication 1 PACKET: at 09:02

## 2022-01-01 RX ADMIN — ALBUTEROL SULFATE 2.5 MG: 2.5 SOLUTION RESPIRATORY (INHALATION) at 02:18

## 2022-01-01 RX ADMIN — AMLODIPINE BESYLATE 10 MG: 10 TABLET ORAL at 07:57

## 2022-01-01 RX ADMIN — PIPERACILLIN AND TAZOBACTAM 4.5 G: 4; .5 INJECTION, POWDER, FOR SOLUTION INTRAVENOUS at 07:35

## 2022-01-01 RX ADMIN — HEPARIN SODIUM 5000 UNITS: 5000 INJECTION, SOLUTION INTRAVENOUS; SUBCUTANEOUS at 15:34

## 2022-01-01 RX ADMIN — PIPERACILLIN AND TAZOBACTAM 4.5 G: 4; .5 INJECTION, POWDER, FOR SOLUTION INTRAVENOUS at 16:17

## 2022-01-01 RX ADMIN — PANTOPRAZOLE SODIUM 40 MG: 40 INJECTION, POWDER, FOR SOLUTION INTRAVENOUS at 19:47

## 2022-01-01 RX ADMIN — CYANOCOBALAMIN TAB 500 MCG 500 MCG: 500 TAB at 08:16

## 2022-01-01 RX ADMIN — PHENYLEPHRINE HYDROCHLORIDE 100 MCG: 10 INJECTION INTRAVENOUS at 19:49

## 2022-01-01 RX ADMIN — MUPIROCIN: 2 CREAM TOPICAL at 20:07

## 2022-01-01 RX ADMIN — Medication 1 PACKET: at 08:09

## 2022-01-01 RX ADMIN — HEPARIN SODIUM 5000 UNITS: 5000 INJECTION, SOLUTION INTRAVENOUS; SUBCUTANEOUS at 07:14

## 2022-01-01 RX ADMIN — Medication 10 MG: at 20:05

## 2022-01-01 RX ADMIN — LABETALOL HYDROCHLORIDE 20 MG: 5 INJECTION, SOLUTION INTRAVENOUS at 18:34

## 2022-01-01 RX ADMIN — LOPERAMIDE HCL 2 MG: 1 SOLUTION ORAL at 20:20

## 2022-01-01 RX ADMIN — LABETALOL HYDROCHLORIDE 10 MG: 5 INJECTION, SOLUTION INTRAVENOUS at 08:14

## 2022-01-01 RX ADMIN — POTASSIUM & SODIUM PHOSPHATES POWDER PACK 280-160-250 MG 1 PACKET: 280-160-250 PACK at 08:37

## 2022-01-01 RX ADMIN — HEPARIN SODIUM 5000 UNITS: 5000 INJECTION, SOLUTION INTRAVENOUS; SUBCUTANEOUS at 08:44

## 2022-01-01 RX ADMIN — LEVETIRACETAM 500 MG: 500 TABLET, FILM COATED ORAL at 20:12

## 2022-01-01 RX ADMIN — CHLORHEXIDINE GLUCONATE 0.12% ORAL RINSE 15 ML: 1.2 LIQUID ORAL at 20:32

## 2022-01-01 RX ADMIN — HEPARIN SODIUM 5000 UNITS: 5000 INJECTION, SOLUTION INTRAVENOUS; SUBCUTANEOUS at 00:12

## 2022-01-01 RX ADMIN — CHLORHEXIDINE GLUCONATE 0.12% ORAL RINSE 15 ML: 1.2 LIQUID ORAL at 17:16

## 2022-01-01 RX ADMIN — AMLODIPINE BESYLATE 5 MG: 5 TABLET ORAL at 08:25

## 2022-01-01 RX ADMIN — OXYCODONE HYDROCHLORIDE 5 MG: 5 TABLET ORAL at 19:30

## 2022-01-01 RX ADMIN — CYANOCOBALAMIN TAB 500 MCG 500 MCG: 500 TAB at 08:46

## 2022-01-01 RX ADMIN — CEPHALEXIN 500 MG: 500 CAPSULE ORAL at 17:02

## 2022-01-01 RX ADMIN — PIPERACILLIN AND TAZOBACTAM 4.5 G: 4; .5 INJECTION, POWDER, LYOPHILIZED, FOR SOLUTION INTRAVENOUS; PARENTERAL at 23:24

## 2022-01-01 RX ADMIN — MULTIVITAMIN 15 ML: LIQUID ORAL at 08:23

## 2022-01-01 RX ADMIN — LABETALOL HYDROCHLORIDE 20 MG: 5 INJECTION, SOLUTION INTRAVENOUS at 13:13

## 2022-01-01 RX ADMIN — CHLORHEXIDINE GLUCONATE 0.12% ORAL RINSE 15 ML: 1.2 LIQUID ORAL at 12:22

## 2022-01-01 RX ADMIN — Medication 12.5 MG: at 03:31

## 2022-01-01 RX ADMIN — PIPERACILLIN AND TAZOBACTAM 4.5 G: 4; .5 INJECTION, POWDER, FOR SOLUTION INTRAVENOUS at 17:09

## 2022-01-01 RX ADMIN — NICARDIPINE HYDROCHLORIDE 5 MG/HR: 0.2 INJECTION, SOLUTION INTRAVENOUS at 18:15

## 2022-01-01 RX ADMIN — LEVETIRACETAM 1000 MG: 500 TABLET, FILM COATED ORAL at 19:58

## 2022-01-01 RX ADMIN — Medication 1 PACKET: at 13:28

## 2022-01-01 RX ADMIN — HYDRALAZINE HYDROCHLORIDE 10 MG: 20 INJECTION INTRAMUSCULAR; INTRAVENOUS at 20:43

## 2022-01-01 RX ADMIN — Medication 1 PACKET: at 20:32

## 2022-01-01 RX ADMIN — Medication 5 MG: at 20:29

## 2022-01-01 RX ADMIN — LEVETIRACETAM 1000 MG: 10 INJECTION INTRAVENOUS at 20:12

## 2022-01-01 RX ADMIN — Medication 1 PACKET: at 16:08

## 2022-01-01 RX ADMIN — CHLORHEXIDINE GLUCONATE 0.12% ORAL RINSE 15 ML: 1.2 LIQUID ORAL at 16:07

## 2022-01-01 RX ADMIN — Medication 1 PACKET: at 23:54

## 2022-01-01 RX ADMIN — Medication 400 MG: at 21:52

## 2022-01-01 RX ADMIN — POLYETHYLENE GLYCOL 3350 17 G: 17 POWDER, FOR SOLUTION ORAL at 22:49

## 2022-01-01 RX ADMIN — MULTIVITAMIN 15 ML: LIQUID ORAL at 09:02

## 2022-01-01 RX ADMIN — HYDRALAZINE HYDROCHLORIDE 20 MG: 20 INJECTION INTRAMUSCULAR; INTRAVENOUS at 21:33

## 2022-01-01 RX ADMIN — LEVETIRACETAM 1000 MG: 500 TABLET, FILM COATED ORAL at 20:43

## 2022-01-01 RX ADMIN — OXYCODONE HYDROCHLORIDE 5 MG: 5 TABLET ORAL at 22:47

## 2022-01-01 RX ADMIN — ACETAMINOPHEN 325MG 975 MG: 325 TABLET ORAL at 23:11

## 2022-01-01 RX ADMIN — MULTIVITAMIN 15 ML: LIQUID ORAL at 07:29

## 2022-01-01 RX ADMIN — POLYETHYLENE GLYCOL 3350 17 G: 17 POWDER, FOR SOLUTION ORAL at 07:50

## 2022-01-01 RX ADMIN — POLYETHYLENE GLYCOL 3350 17 G: 17 POWDER, FOR SOLUTION ORAL at 08:57

## 2022-01-01 RX ADMIN — VANCOMYCIN HYDROCHLORIDE 1500 MG: 10 INJECTION, POWDER, LYOPHILIZED, FOR SOLUTION INTRAVENOUS at 06:28

## 2022-01-01 RX ADMIN — AMLODIPINE BESYLATE 5 MG: 5 TABLET ORAL at 08:46

## 2022-01-01 RX ADMIN — Medication 400 MG: at 22:25

## 2022-01-01 RX ADMIN — CEFAZOLIN SODIUM 2 G: 2 INJECTION, SOLUTION INTRAVENOUS at 16:06

## 2022-01-01 RX ADMIN — LEVETIRACETAM 1000 MG: 500 TABLET, FILM COATED ORAL at 20:23

## 2022-01-01 RX ADMIN — LEVETIRACETAM 1000 MG: 500 TABLET, FILM COATED ORAL at 20:27

## 2022-01-01 RX ADMIN — MULTIVITAMIN 15 ML: LIQUID ORAL at 08:49

## 2022-01-01 RX ADMIN — Medication 400 MG: at 21:21

## 2022-01-01 RX ADMIN — CEFAZOLIN 2 G: 1 INJECTION, POWDER, FOR SOLUTION INTRAMUSCULAR; INTRAVENOUS at 19:38

## 2022-01-01 RX ADMIN — LEVETIRACETAM 1000 MG: 500 TABLET, FILM COATED ORAL at 08:49

## 2022-01-01 RX ADMIN — MULTIVITAMIN 15 ML: LIQUID ORAL at 07:41

## 2022-01-01 RX ADMIN — LOPERAMIDE HCL 4 MG: 1 SOLUTION ORAL at 22:10

## 2022-01-01 RX ADMIN — MULTIVITAMIN 15 ML: LIQUID ORAL at 07:14

## 2022-01-01 RX ADMIN — VANCOMYCIN HYDROCHLORIDE 1250 MG: 10 INJECTION, POWDER, LYOPHILIZED, FOR SOLUTION INTRAVENOUS at 19:47

## 2022-01-01 RX ADMIN — Medication 400 MG: at 23:08

## 2022-01-01 RX ADMIN — OXYCODONE HYDROCHLORIDE 5 MG: 5 TABLET ORAL at 07:53

## 2022-01-01 RX ADMIN — LEVETIRACETAM 500 MG: 500 TABLET, FILM COATED ORAL at 20:33

## 2022-01-01 RX ADMIN — LABETALOL HYDROCHLORIDE 10 MG: 5 INJECTION, SOLUTION INTRAVENOUS at 15:03

## 2022-01-01 RX ADMIN — LABETALOL HYDROCHLORIDE 10 MG: 5 INJECTION, SOLUTION INTRAVENOUS at 20:15

## 2022-01-01 RX ADMIN — ACETAMINOPHEN 650 MG: 325 TABLET ORAL at 22:07

## 2022-01-01 RX ADMIN — MAGNESIUM SULFATE HEPTAHYDRATE 2 G: 40 INJECTION, SOLUTION INTRAVENOUS at 05:19

## 2022-01-01 RX ADMIN — ATROPINE SULFATE 2 DROP: 10 SOLUTION/ DROPS OPHTHALMIC at 13:43

## 2022-01-01 RX ADMIN — Medication 1 PACKET: at 08:25

## 2022-01-01 RX ADMIN — LEVETIRACETAM 1000 MG: 500 TABLET, FILM COATED ORAL at 08:24

## 2022-01-01 RX ADMIN — CHLORHEXIDINE GLUCONATE 0.12% ORAL RINSE 15 ML: 1.2 LIQUID ORAL at 20:21

## 2022-01-01 RX ADMIN — CYANOCOBALAMIN TAB 500 MCG 500 MCG: 500 TAB at 09:21

## 2022-01-01 RX ADMIN — ACETAMINOPHEN 650 MG: 325 TABLET ORAL at 18:17

## 2022-01-01 RX ADMIN — CYANOCOBALAMIN TAB 500 MCG 500 MCG: 500 TAB at 08:21

## 2022-01-01 RX ADMIN — Medication 400 MG: at 22:49

## 2022-01-01 RX ADMIN — LEVETIRACETAM 1000 MG: 500 TABLET, FILM COATED ORAL at 20:00

## 2022-01-01 RX ADMIN — MAGNESIUM SULFATE HEPTAHYDRATE 2 G: 40 INJECTION, SOLUTION INTRAVENOUS at 02:03

## 2022-01-01 RX ADMIN — CEFAZOLIN SODIUM 2 G: 2 INJECTION, SOLUTION INTRAVENOUS at 04:18

## 2022-01-01 RX ADMIN — CHLORHEXIDINE GLUCONATE 0.12% ORAL RINSE 15 ML: 1.2 LIQUID ORAL at 08:25

## 2022-01-01 RX ADMIN — LABETALOL HYDROCHLORIDE 20 MG: 5 INJECTION, SOLUTION INTRAVENOUS at 17:58

## 2022-01-01 RX ADMIN — Medication 400 MG: at 13:52

## 2022-01-01 RX ADMIN — ACETYLCYSTEINE 2 ML: 200 SOLUTION ORAL; RESPIRATORY (INHALATION) at 01:17

## 2022-01-01 RX ADMIN — PIPERACILLIN AND TAZOBACTAM 4.5 G: 4; .5 INJECTION, POWDER, FOR SOLUTION INTRAVENOUS at 21:57

## 2022-01-01 RX ADMIN — AMLODIPINE BESYLATE 10 MG: 10 TABLET ORAL at 09:21

## 2022-01-01 RX ADMIN — AMLODIPINE BESYLATE 5 MG: 5 TABLET ORAL at 09:21

## 2022-01-01 RX ADMIN — HYDRALAZINE HYDROCHLORIDE 20 MG: 20 INJECTION INTRAMUSCULAR; INTRAVENOUS at 04:36

## 2022-01-01 RX ADMIN — LEVETIRACETAM 1000 MG: 500 TABLET, FILM COATED ORAL at 08:34

## 2022-01-01 RX ADMIN — MIDAZOLAM 0.5 MG: 1 INJECTION INTRAMUSCULAR; INTRAVENOUS at 18:42

## 2022-01-01 RX ADMIN — LABETALOL HYDROCHLORIDE 20 MG: 5 INJECTION, SOLUTION INTRAVENOUS at 13:16

## 2022-01-01 RX ADMIN — AMLODIPINE BESYLATE 5 MG: 5 TABLET ORAL at 08:15

## 2022-01-01 RX ADMIN — PANTOPRAZOLE SODIUM 40 MG: 40 INJECTION, POWDER, FOR SOLUTION INTRAVENOUS at 08:15

## 2022-01-01 RX ADMIN — CEFTRIAXONE SODIUM 1 G: 1 INJECTION, POWDER, FOR SOLUTION INTRAMUSCULAR; INTRAVENOUS at 05:56

## 2022-01-01 RX ADMIN — FUROSEMIDE 20 MG: 10 INJECTION, SOLUTION INTRAMUSCULAR; INTRAVENOUS at 11:49

## 2022-01-01 RX ADMIN — ACETAMINOPHEN 650 MG: 160 SOLUTION ORAL at 13:11

## 2022-01-01 RX ADMIN — Medication 1 PACKET: at 08:56

## 2022-01-01 RX ADMIN — MAGNESIUM SULFATE HEPTAHYDRATE 2 G: 40 INJECTION, SOLUTION INTRAVENOUS at 04:52

## 2022-01-01 RX ADMIN — MULTIVITAMIN 15 ML: LIQUID ORAL at 08:19

## 2022-01-01 RX ADMIN — LABETALOL HYDROCHLORIDE 20 MG: 5 INJECTION, SOLUTION INTRAVENOUS at 18:42

## 2022-01-01 RX ADMIN — LEVETIRACETAM 1000 MG: 500 TABLET, FILM COATED ORAL at 08:03

## 2022-01-01 RX ADMIN — POTASSIUM CHLORIDE 20 MEQ: 750 TABLET, EXTENDED RELEASE ORAL at 10:05

## 2022-01-01 RX ADMIN — Medication 1 PACKET: at 14:39

## 2022-01-01 RX ADMIN — LIDOCAINE HYDROCHLORIDE 5 ML: 20 SOLUTION ORAL; TOPICAL at 11:59

## 2022-01-01 RX ADMIN — PANTOPRAZOLE SODIUM 40 MG: 40 INJECTION, POWDER, FOR SOLUTION INTRAVENOUS at 08:53

## 2022-01-01 RX ADMIN — Medication 400 MG: at 07:53

## 2022-01-01 RX ADMIN — Medication 5 MG: at 22:13

## 2022-01-01 RX ADMIN — LABETALOL HYDROCHLORIDE 20 MG: 5 INJECTION, SOLUTION INTRAVENOUS at 17:34

## 2022-01-01 RX ADMIN — PANTOPRAZOLE SODIUM 40 MG: 40 INJECTION, POWDER, FOR SOLUTION INTRAVENOUS at 19:37

## 2022-01-01 RX ADMIN — Medication 400 MG: at 21:37

## 2022-01-01 RX ADMIN — Medication 1 PACKET: at 12:06

## 2022-01-01 RX ADMIN — CEPHALEXIN 500 MG: 500 CAPSULE ORAL at 12:56

## 2022-01-01 RX ADMIN — Medication 1 PACKET: at 12:05

## 2022-01-01 RX ADMIN — MULTIVITAMIN 15 ML: LIQUID ORAL at 08:20

## 2022-01-01 RX ADMIN — LOPERAMIDE HCL 4 MG: 1 SOLUTION ORAL at 19:52

## 2022-01-01 RX ADMIN — LEVETIRACETAM 1000 MG: 10 INJECTION INTRAVENOUS at 19:49

## 2022-01-01 RX ADMIN — ACETAMINOPHEN 650 MG: 160 SOLUTION ORAL at 20:33

## 2022-01-01 RX ADMIN — MORPHINE SULFATE 5 MG: 10 SOLUTION ORAL at 17:44

## 2022-01-01 RX ADMIN — Medication 10 MG/HR: at 01:02

## 2022-01-01 RX ADMIN — LORAZEPAM 1 MG: 1 TABLET ORAL at 18:25

## 2022-01-01 RX ADMIN — HEPARIN SODIUM 5000 UNITS: 5000 INJECTION, SOLUTION INTRAVENOUS; SUBCUTANEOUS at 23:58

## 2022-01-01 RX ADMIN — MULTIVITAMIN 15 ML: LIQUID ORAL at 07:51

## 2022-01-01 RX ADMIN — OXYCODONE HYDROCHLORIDE 5 MG: 5 TABLET ORAL at 11:43

## 2022-01-01 RX ADMIN — Medication 1 PACKET: at 08:23

## 2022-01-01 RX ADMIN — ACETAMINOPHEN 325MG 975 MG: 325 TABLET ORAL at 20:00

## 2022-01-01 RX ADMIN — PHENYLEPHRINE HYDROCHLORIDE 200 MCG: 10 INJECTION INTRAVENOUS at 21:19

## 2022-01-01 RX ADMIN — LOPERAMIDE HCL 2 MG: 1 SOLUTION ORAL at 08:45

## 2022-01-01 RX ADMIN — LABETALOL HYDROCHLORIDE 10 MG: 5 INJECTION, SOLUTION INTRAVENOUS at 07:38

## 2022-01-01 RX ADMIN — PIPERACILLIN SODIUM AND TAZOBACTAM SODIUM 4.5 G: 4; .5 INJECTION, POWDER, LYOPHILIZED, FOR SOLUTION INTRAVENOUS at 10:21

## 2022-01-01 RX ADMIN — VASOPRESSIN 2.4 UNITS/HR: 20 INJECTION INTRAVENOUS at 04:59

## 2022-01-01 RX ADMIN — LEVETIRACETAM 500 MG: 500 TABLET, FILM COATED ORAL at 08:01

## 2022-01-01 RX ADMIN — LEVETIRACETAM 500 MG: 5 INJECTION INTRAVENOUS at 12:06

## 2022-01-01 RX ADMIN — CYANOCOBALAMIN TAB 500 MCG 500 MCG: 500 TAB at 07:32

## 2022-01-01 RX ADMIN — SODIUM CHLORIDE, SODIUM GLUCONATE, SODIUM ACETATE, POTASSIUM CHLORIDE AND MAGNESIUM CHLORIDE 1000 ML: 526; 502; 368; 37; 30 INJECTION, SOLUTION INTRAVENOUS at 23:32

## 2022-01-01 RX ADMIN — ACETYLCYSTEINE 2 ML: 200 SOLUTION ORAL; RESPIRATORY (INHALATION) at 02:18

## 2022-01-01 RX ADMIN — LOPERAMIDE HCL 2 MG: 1 SOLUTION ORAL at 06:46

## 2022-01-01 RX ADMIN — POLYETHYLENE GLYCOL 3350, SODIUM SULFATE ANHYDROUS, SODIUM BICARBONATE, SODIUM CHLORIDE, POTASSIUM CHLORIDE 2000 ML: 236; 22.74; 6.74; 5.86; 2.97 POWDER, FOR SOLUTION ORAL at 20:02

## 2022-01-01 RX ADMIN — POLYETHYLENE GLYCOL 3350 17 G: 17 POWDER, FOR SOLUTION ORAL at 12:12

## 2022-01-01 RX ADMIN — ACETYLCYSTEINE 2 ML: 200 SOLUTION ORAL; RESPIRATORY (INHALATION) at 21:03

## 2022-01-01 RX ADMIN — Medication 1 PACKET: at 21:28

## 2022-01-01 RX ADMIN — ACETAMINOPHEN 325MG 975 MG: 325 TABLET ORAL at 20:21

## 2022-01-01 RX ADMIN — PANTOPRAZOLE SODIUM 40 MG: 40 INJECTION, POWDER, FOR SOLUTION INTRAVENOUS at 19:59

## 2022-01-01 RX ADMIN — Medication 10 MG: at 20:41

## 2022-01-01 RX ADMIN — CHLORHEXIDINE GLUCONATE 0.12% ORAL RINSE 15 ML: 1.2 LIQUID ORAL at 09:02

## 2022-01-01 RX ADMIN — CHLORHEXIDINE GLUCONATE 0.12% ORAL RINSE 15 ML: 1.2 LIQUID ORAL at 21:24

## 2022-01-01 RX ADMIN — ACETYLCYSTEINE 2 ML: 200 SOLUTION ORAL; RESPIRATORY (INHALATION) at 20:39

## 2022-01-01 RX ADMIN — CHLORHEXIDINE GLUCONATE 0.12% ORAL RINSE 15 ML: 1.2 LIQUID ORAL at 16:42

## 2022-01-01 RX ADMIN — OXYCODONE HYDROCHLORIDE 5 MG: 5 TABLET ORAL at 21:54

## 2022-01-01 RX ADMIN — ACETAMINOPHEN 650 MG: 160 SOLUTION ORAL at 19:45

## 2022-01-01 RX ADMIN — SODIUM CHLORIDE, POTASSIUM CHLORIDE, SODIUM LACTATE AND CALCIUM CHLORIDE: 600; 310; 30; 20 INJECTION, SOLUTION INTRAVENOUS at 13:04

## 2022-01-01 RX ADMIN — LEVETIRACETAM 1000 MG: 500 TABLET, FILM COATED ORAL at 20:02

## 2022-01-01 RX ADMIN — AMLODIPINE BESYLATE 10 MG: 10 TABLET ORAL at 08:21

## 2022-01-01 RX ADMIN — LEVETIRACETAM 500 MG: 500 TABLET, FILM COATED ORAL at 07:55

## 2022-01-01 RX ADMIN — ACETAMINOPHEN 325MG 975 MG: 325 TABLET ORAL at 12:22

## 2022-01-01 RX ADMIN — Medication 1 PACKET: at 19:59

## 2022-01-01 RX ADMIN — CEPHALEXIN 500 MG: 500 CAPSULE ORAL at 18:29

## 2022-01-01 RX ADMIN — Medication 400 MG: at 21:15

## 2022-01-01 RX ADMIN — FUROSEMIDE 20 MG: 10 INJECTION, SOLUTION INTRAMUSCULAR; INTRAVENOUS at 08:38

## 2022-01-01 RX ADMIN — ACETAMINOPHEN 650 MG: 325 SOLUTION ORAL at 20:55

## 2022-01-01 RX ADMIN — CHLORHEXIDINE GLUCONATE 0.12% ORAL RINSE 15 ML: 1.2 LIQUID ORAL at 16:31

## 2022-01-01 RX ADMIN — LIDOCAINE HYDROCHLORIDE 10 ML: 10 INJECTION, SOLUTION EPIDURAL; INFILTRATION; INTRACAUDAL; PERINEURAL at 18:40

## 2022-01-01 RX ADMIN — LEVETIRACETAM 500 MG: 500 TABLET, FILM COATED ORAL at 22:07

## 2022-01-01 RX ADMIN — FENTANYL CITRATE 50 MCG: 50 INJECTION, SOLUTION INTRAMUSCULAR; INTRAVENOUS at 12:07

## 2022-01-01 RX ADMIN — POLYETHYLENE GLYCOL 3350, SODIUM SULFATE ANHYDROUS, SODIUM BICARBONATE, SODIUM CHLORIDE, POTASSIUM CHLORIDE 2000 ML: 236; 22.74; 6.74; 5.86; 2.97 POWDER, FOR SOLUTION ORAL at 15:02

## 2022-01-01 RX ADMIN — Medication 1 PACKET: at 11:19

## 2022-01-01 RX ADMIN — OXYCODONE HYDROCHLORIDE 5 MG: 5 TABLET ORAL at 01:21

## 2022-01-01 RX ADMIN — CHLORHEXIDINE GLUCONATE 0.12% ORAL RINSE 15 ML: 1.2 LIQUID ORAL at 17:32

## 2022-01-01 RX ADMIN — Medication 400 MG: at 22:39

## 2022-01-01 RX ADMIN — Medication 1 PACKET: at 08:10

## 2022-01-01 RX ADMIN — SUGAMMADEX 200 MG: 100 INJECTION, SOLUTION INTRAVENOUS at 22:27

## 2022-01-01 RX ADMIN — Medication 1 PACKET: at 16:52

## 2022-01-01 RX ADMIN — CEFAZOLIN SODIUM 2 G: 2 INJECTION, SOLUTION INTRAVENOUS at 04:38

## 2022-01-01 RX ADMIN — LEVETIRACETAM 1000 MG: 500 TABLET, FILM COATED ORAL at 08:21

## 2022-01-01 RX ADMIN — LEVETIRACETAM 1000 MG: 500 TABLET, FILM COATED ORAL at 21:33

## 2022-01-01 RX ADMIN — CHLORHEXIDINE GLUCONATE 0.12% ORAL RINSE 15 ML: 1.2 LIQUID ORAL at 08:27

## 2022-01-01 RX ADMIN — PANTOPRAZOLE SODIUM 40 MG: 40 INJECTION, POWDER, FOR SOLUTION INTRAVENOUS at 20:53

## 2022-01-01 RX ADMIN — CEPHALEXIN 500 MG: 500 CAPSULE ORAL at 17:30

## 2022-01-01 RX ADMIN — LEVETIRACETAM 1000 MG: 100 INJECTION, SOLUTION INTRAVENOUS at 11:26

## 2022-01-01 RX ADMIN — POTASSIUM CHLORIDE 20 MEQ: 1.5 POWDER, FOR SOLUTION ORAL at 05:10

## 2022-01-01 RX ADMIN — HEPARIN SODIUM 5000 UNITS: 5000 INJECTION, SOLUTION INTRAVENOUS; SUBCUTANEOUS at 08:13

## 2022-01-01 RX ADMIN — Medication 40 MG: at 06:46

## 2022-01-01 RX ADMIN — AMLODIPINE BESYLATE 10 MG: 10 TABLET ORAL at 07:37

## 2022-01-01 RX ADMIN — LEVETIRACETAM 1000 MG: 500 TABLET, FILM COATED ORAL at 08:02

## 2022-01-01 RX ADMIN — AMLODIPINE BESYLATE 5 MG: 5 TABLET ORAL at 20:18

## 2022-01-01 RX ADMIN — MORPHINE SULFATE 1 MG: 2 INJECTION, SOLUTION INTRAMUSCULAR; INTRAVENOUS at 16:13

## 2022-01-01 RX ADMIN — CHLORHEXIDINE GLUCONATE 0.12% ORAL RINSE 15 ML: 1.2 LIQUID ORAL at 12:48

## 2022-01-01 RX ADMIN — AMLODIPINE BESYLATE 10 MG: 10 TABLET ORAL at 09:02

## 2022-01-01 RX ADMIN — ACETAMINOPHEN 650 MG: 325 TABLET ORAL at 12:21

## 2022-01-01 RX ADMIN — CYANOCOBALAMIN TAB 500 MCG 500 MCG: 500 TAB at 07:44

## 2022-01-01 RX ADMIN — Medication 1 PACKET: at 08:11

## 2022-01-01 RX ADMIN — LABETALOL HYDROCHLORIDE 10 MG: 5 INJECTION, SOLUTION INTRAVENOUS at 18:03

## 2022-01-01 RX ADMIN — CYANOCOBALAMIN TAB 500 MCG 500 MCG: 500 TAB at 08:24

## 2022-01-01 RX ADMIN — LEVETIRACETAM 1000 MG: 500 TABLET, FILM COATED ORAL at 19:37

## 2022-01-01 RX ADMIN — MULTIVITAMIN 15 ML: LIQUID ORAL at 07:44

## 2022-01-01 RX ADMIN — ACETAMINOPHEN 325MG 975 MG: 325 TABLET ORAL at 21:44

## 2022-01-01 RX ADMIN — Medication 1 PACKET: at 08:13

## 2022-01-01 RX ADMIN — Medication 0.15 MCG/KG/MIN: at 15:52

## 2022-01-01 RX ADMIN — PANTOPRAZOLE SODIUM 40 MG: 40 INJECTION, POWDER, FOR SOLUTION INTRAVENOUS at 20:14

## 2022-01-01 RX ADMIN — Medication 1 TABLET: at 08:26

## 2022-01-01 RX ADMIN — Medication 1 PACKET: at 11:39

## 2022-01-01 RX ADMIN — LABETALOL HYDROCHLORIDE 10 MG: 5 INJECTION, SOLUTION INTRAVENOUS at 01:06

## 2022-01-01 RX ADMIN — Medication 40 MG: at 07:51

## 2022-01-01 RX ADMIN — CEFAZOLIN SODIUM 2 G: 2 INJECTION, SOLUTION INTRAVENOUS at 09:21

## 2022-01-01 RX ADMIN — DIPHENOXYLATE HYDROCHLORIDE AND ATROPINE SULFATE 2.5 ML: 2.5; .025 SOLUTION ORAL at 21:24

## 2022-01-01 RX ADMIN — LEVETIRACETAM 1000 MG: 500 TABLET, FILM COATED ORAL at 19:47

## 2022-01-01 RX ADMIN — PANTOPRAZOLE SODIUM 40 MG: 40 INJECTION, POWDER, FOR SOLUTION INTRAVENOUS at 08:56

## 2022-01-01 RX ADMIN — CEPHALEXIN 500 MG: 500 CAPSULE ORAL at 13:36

## 2022-01-01 RX ADMIN — Medication 400 MG: at 23:10

## 2022-01-01 RX ADMIN — LEVETIRACETAM 1000 MG: 500 TABLET, FILM COATED ORAL at 08:33

## 2022-01-01 RX ADMIN — ALBUTEROL SULFATE 2.5 MG: 2.5 SOLUTION RESPIRATORY (INHALATION) at 21:03

## 2022-01-01 RX ADMIN — CEPHALEXIN 500 MG: 500 CAPSULE ORAL at 00:53

## 2022-01-01 RX ADMIN — CHLORHEXIDINE GLUCONATE 0.12% ORAL RINSE 15 ML: 1.2 LIQUID ORAL at 08:20

## 2022-01-01 RX ADMIN — OXYCODONE HYDROCHLORIDE 5 MG: 5 TABLET ORAL at 00:07

## 2022-01-01 RX ADMIN — VASOPRESSIN 2.4 UNITS/HR: 20 INJECTION INTRAVENOUS at 13:46

## 2022-01-01 RX ADMIN — LABETALOL HYDROCHLORIDE 10 MG: 5 INJECTION, SOLUTION INTRAVENOUS at 08:18

## 2022-01-01 RX ADMIN — CYANOCOBALAMIN TAB 500 MCG 500 MCG: 500 TAB at 09:20

## 2022-01-01 RX ADMIN — LEVETIRACETAM 1000 MG: 500 TABLET, FILM COATED ORAL at 19:31

## 2022-01-01 RX ADMIN — PIPERACILLIN AND TAZOBACTAM 4.5 G: 4; .5 INJECTION, POWDER, FOR SOLUTION INTRAVENOUS at 14:34

## 2022-01-01 RX ADMIN — Medication 1 PACKET: at 08:22

## 2022-01-01 RX ADMIN — ALBUTEROL SULFATE 2.5 MG: 2.5 SOLUTION RESPIRATORY (INHALATION) at 09:26

## 2022-01-01 RX ADMIN — Medication 1 PACKET: at 08:15

## 2022-01-01 RX ADMIN — LEVETIRACETAM 1000 MG: 10 INJECTION INTRAVENOUS at 07:29

## 2022-01-01 RX ADMIN — CHLORHEXIDINE GLUCONATE 0.12% ORAL RINSE 15 ML: 1.2 LIQUID ORAL at 14:38

## 2022-01-01 RX ADMIN — Medication 1 PACKET: at 13:06

## 2022-01-01 RX ADMIN — HYDRALAZINE HYDROCHLORIDE 20 MG: 20 INJECTION INTRAMUSCULAR; INTRAVENOUS at 05:02

## 2022-01-01 RX ADMIN — CEPHALEXIN 500 MG: 500 CAPSULE ORAL at 01:08

## 2022-01-01 RX ADMIN — Medication 400 MG: at 22:14

## 2022-01-01 RX ADMIN — LABETALOL HYDROCHLORIDE 20 MG: 5 INJECTION, SOLUTION INTRAVENOUS at 05:07

## 2022-01-01 RX ADMIN — HYDROXYCHLOROQUINE SULFATE 400 MG: 200 TABLET, FILM COATED ORAL at 22:07

## 2022-01-01 RX ADMIN — HYDROCORTISONE SODIUM SUCCINATE 50 MG: 100 INJECTION, POWDER, FOR SOLUTION INTRAMUSCULAR; INTRAVENOUS at 06:00

## 2022-01-01 RX ADMIN — Medication 40 MG: at 06:09

## 2022-01-01 RX ADMIN — Medication 40 MG: at 16:19

## 2022-01-01 RX ADMIN — PANTOPRAZOLE SODIUM 40 MG: 40 INJECTION, POWDER, FOR SOLUTION INTRAVENOUS at 19:58

## 2022-01-01 RX ADMIN — DOCUSATE SODIUM 50 MG AND SENNOSIDES 8.6 MG 2 TABLET: 8.6; 5 TABLET, FILM COATED ORAL at 23:16

## 2022-01-01 RX ADMIN — OXYCODONE HYDROCHLORIDE 5 MG: 5 TABLET ORAL at 13:36

## 2022-01-01 RX ADMIN — SODIUM CHLORIDE, SODIUM GLUCONATE, SODIUM ACETATE, POTASSIUM CHLORIDE AND MAGNESIUM CHLORIDE 100 ML/HR: 526; 502; 368; 37; 30 INJECTION, SOLUTION INTRAVENOUS at 12:50

## 2022-01-01 RX ADMIN — MULTIVITAMIN 15 ML: LIQUID ORAL at 07:32

## 2022-01-01 RX ADMIN — LABETALOL HYDROCHLORIDE 10 MG: 5 INJECTION, SOLUTION INTRAVENOUS at 13:11

## 2022-01-01 RX ADMIN — CHLORHEXIDINE GLUCONATE 0.12% ORAL RINSE 15 ML: 1.2 LIQUID ORAL at 20:20

## 2022-01-01 RX ADMIN — CYANOCOBALAMIN TAB 500 MCG 500 MCG: 500 TAB at 08:44

## 2022-01-01 RX ADMIN — ACETAMINOPHEN 650 MG: 160 SOLUTION ORAL at 04:34

## 2022-01-01 RX ADMIN — Medication 40 MG: at 08:21

## 2022-01-01 RX ADMIN — LEVETIRACETAM 1000 MG: 500 TABLET, FILM COATED ORAL at 20:39

## 2022-01-01 RX ADMIN — Medication 1 PACKET: at 21:25

## 2022-01-01 RX ADMIN — CEFAZOLIN SODIUM 2 G: 2 INJECTION, SOLUTION INTRAVENOUS at 19:27

## 2022-01-01 RX ADMIN — Medication 40 MG: at 08:20

## 2022-01-01 RX ADMIN — Medication 40 MG: at 16:20

## 2022-01-01 RX ADMIN — Medication 1 PACKET: at 15:10

## 2022-01-01 RX ADMIN — Medication 1 PACKET: at 12:39

## 2022-01-01 RX ADMIN — LOPERAMIDE HCL 4 MG: 1 SOLUTION ORAL at 08:27

## 2022-01-01 RX ADMIN — Medication 5 MG: at 21:36

## 2022-01-01 RX ADMIN — ACETAMINOPHEN 650 MG: 325 SOLUTION ORAL at 01:16

## 2022-01-01 RX ADMIN — Medication 5 MG: at 21:41

## 2022-01-01 RX ADMIN — SODIUM CHLORIDE: 9 INJECTION, SOLUTION INTRAVENOUS at 20:29

## 2022-01-01 RX ADMIN — CEFAZOLIN SODIUM 2 G: 2 INJECTION, SOLUTION INTRAVENOUS at 13:07

## 2022-01-01 RX ADMIN — Medication 40 MG: at 09:22

## 2022-01-01 RX ADMIN — MULTIVITAMIN 15 ML: LIQUID ORAL at 12:03

## 2022-01-01 RX ADMIN — ACETAMINOPHEN 650 MG: 160 SOLUTION ORAL at 11:55

## 2022-01-01 RX ADMIN — DARBEPOETIN ALFA 60 MCG: 60 INJECTION, SOLUTION INTRAVENOUS; SUBCUTANEOUS at 16:08

## 2022-01-01 RX ADMIN — AMLODIPINE BESYLATE 5 MG: 5 TABLET ORAL at 12:18

## 2022-01-01 RX ADMIN — HYDROXYCHLOROQUINE SULFATE 400 MG: 200 TABLET, FILM COATED ORAL at 21:01

## 2022-01-01 RX ADMIN — CYANOCOBALAMIN TAB 500 MCG 500 MCG: 500 TAB at 07:46

## 2022-01-01 RX ADMIN — OXYCODONE HYDROCHLORIDE 5 MG: 5 TABLET ORAL at 07:46

## 2022-01-01 RX ADMIN — CHLORHEXIDINE GLUCONATE 0.12% ORAL RINSE 15 ML: 1.2 LIQUID ORAL at 19:44

## 2022-01-01 RX ADMIN — Medication 1 PACKET: at 11:18

## 2022-01-01 RX ADMIN — LOPERAMIDE HCL 4 MG: 1 SOLUTION ORAL at 20:32

## 2022-01-01 RX ADMIN — PROPOFOL 125 MCG/KG/MIN: 10 INJECTION, EMULSION INTRAVENOUS at 19:38

## 2022-01-01 RX ADMIN — Medication 1 TABLET: at 08:06

## 2022-01-01 RX ADMIN — SODIUM CHLORIDE, SODIUM GLUCONATE, SODIUM ACETATE, POTASSIUM CHLORIDE AND MAGNESIUM CHLORIDE 75 ML: 526; 502; 368; 37; 30 INJECTION, SOLUTION INTRAVENOUS at 23:24

## 2022-01-01 RX ADMIN — HYDROCORTISONE SODIUM SUCCINATE 50 MG: 100 INJECTION, POWDER, FOR SOLUTION INTRAMUSCULAR; INTRAVENOUS at 18:00

## 2022-01-01 RX ADMIN — MUPIROCIN: 2 CREAM TOPICAL at 08:26

## 2022-01-01 RX ADMIN — LEVETIRACETAM 1000 MG: 500 TABLET, FILM COATED ORAL at 09:23

## 2022-01-01 RX ADMIN — CYANOCOBALAMIN TAB 500 MCG 500 MCG: 500 TAB at 07:31

## 2022-01-01 RX ADMIN — Medication 1 PACKET: at 13:04

## 2022-01-01 RX ADMIN — CHLORHEXIDINE GLUCONATE 0.12% ORAL RINSE 15 ML: 1.2 LIQUID ORAL at 16:16

## 2022-01-01 RX ADMIN — ACETAMINOPHEN 650 MG: 325 TABLET ORAL at 22:01

## 2022-01-01 RX ADMIN — LEVETIRACETAM 1000 MG: 500 TABLET, FILM COATED ORAL at 08:06

## 2022-01-01 RX ADMIN — ACETYLCYSTEINE 2 ML: 200 SOLUTION ORAL; RESPIRATORY (INHALATION) at 08:20

## 2022-01-01 RX ADMIN — ACETAMINOPHEN 975 MG: 325 TABLET, FILM COATED ORAL at 05:59

## 2022-01-01 RX ADMIN — BISACODYL 10 MG: 10 SUPPOSITORY RECTAL at 12:07

## 2022-01-01 RX ADMIN — LABETALOL HYDROCHLORIDE 10 MG: 5 INJECTION, SOLUTION INTRAVENOUS at 18:10

## 2022-01-01 RX ADMIN — Medication 40 MG: at 15:35

## 2022-01-01 RX ADMIN — AMLODIPINE BESYLATE 10 MG: 10 TABLET ORAL at 09:06

## 2022-01-01 RX ADMIN — ENOXAPARIN SODIUM 40 MG: 40 INJECTION SUBCUTANEOUS at 16:11

## 2022-01-01 RX ADMIN — Medication 40 MG: at 07:37

## 2022-01-01 RX ADMIN — ATROPINE SULFATE 2 DROP: 10 SOLUTION/ DROPS OPHTHALMIC at 15:57

## 2022-01-01 RX ADMIN — LEVETIRACETAM 1000 MG: 500 TABLET, FILM COATED ORAL at 21:24

## 2022-01-01 RX ADMIN — Medication 1 PACKET: at 10:18

## 2022-01-01 RX ADMIN — MULTIVITAMIN 15 ML: LIQUID ORAL at 07:37

## 2022-01-01 RX ADMIN — ENOXAPARIN SODIUM 40 MG: 40 INJECTION SUBCUTANEOUS at 16:51

## 2022-01-01 RX ADMIN — Medication 1 PACKET: at 19:44

## 2022-01-01 RX ADMIN — HYDROCORTISONE SODIUM SUCCINATE 50 MG: 100 INJECTION, POWDER, FOR SOLUTION INTRAMUSCULAR; INTRAVENOUS at 17:10

## 2022-01-01 RX ADMIN — Medication 1 PACKET: at 09:24

## 2022-01-01 RX ADMIN — LEVETIRACETAM 750 MG: 750 TABLET, FILM COATED ORAL at 19:45

## 2022-01-01 RX ADMIN — MULTIVITAMIN 15 ML: LIQUID ORAL at 08:44

## 2022-01-01 RX ADMIN — ACETYLCYSTEINE 2 ML: 200 SOLUTION ORAL; RESPIRATORY (INHALATION) at 09:26

## 2022-01-01 RX ADMIN — CHLORHEXIDINE GLUCONATE 0.12% ORAL RINSE 15 ML: 1.2 LIQUID ORAL at 21:28

## 2022-01-01 RX ADMIN — SODIUM CHLORIDE, SODIUM GLUCONATE, SODIUM ACETATE, POTASSIUM CHLORIDE AND MAGNESIUM CHLORIDE 100 ML/HR: 526; 502; 368; 37; 30 INJECTION, SOLUTION INTRAVENOUS at 09:47

## 2022-01-01 RX ADMIN — Medication 20 MG: at 21:16

## 2022-01-01 RX ADMIN — LABETALOL HYDROCHLORIDE 20 MG: 5 INJECTION, SOLUTION INTRAVENOUS at 12:45

## 2022-01-01 RX ADMIN — SODIUM CHLORIDE 500 ML: 9 INJECTION, SOLUTION INTRAVENOUS at 21:41

## 2022-01-01 RX ADMIN — CYANOCOBALAMIN TAB 500 MCG 500 MCG: 500 TAB at 08:23

## 2022-01-01 RX ADMIN — FAMOTIDINE 20 MG: 20 TABLET ORAL at 22:48

## 2022-01-01 RX ADMIN — AMLODIPINE BESYLATE 5 MG: 5 TABLET ORAL at 08:01

## 2022-01-01 RX ADMIN — MORPHINE SULFATE 5 MG: 10 SOLUTION ORAL at 02:33

## 2022-01-01 RX ADMIN — OXYCODONE HYDROCHLORIDE 5 MG: 5 TABLET ORAL at 17:02

## 2022-01-01 RX ADMIN — Medication 1 PACKET: at 12:35

## 2022-01-01 RX ADMIN — ACETYLCYSTEINE 2 ML: 200 SOLUTION ORAL; RESPIRATORY (INHALATION) at 09:38

## 2022-01-01 RX ADMIN — PIPERACILLIN SODIUM AND TAZOBACTAM SODIUM 4.5 G: 4; .5 INJECTION, POWDER, LYOPHILIZED, FOR SOLUTION INTRAVENOUS at 11:41

## 2022-01-01 RX ADMIN — LEVETIRACETAM 1000 MG: 500 TABLET, FILM COATED ORAL at 20:18

## 2022-01-01 RX ADMIN — Medication 40 MG: at 16:47

## 2022-01-01 RX ADMIN — LEVETIRACETAM 500 MG: 500 TABLET, FILM COATED ORAL at 21:25

## 2022-01-01 RX ADMIN — LORAZEPAM 1 MG: 1 TABLET ORAL at 03:32

## 2022-01-01 RX ADMIN — MULTIVITAMIN 15 ML: LIQUID ORAL at 08:09

## 2022-01-01 RX ADMIN — CEPHALEXIN 500 MG: 500 CAPSULE ORAL at 23:31

## 2022-01-01 RX ADMIN — POTASSIUM CHLORIDE 40 MEQ: 1.5 POWDER, FOR SOLUTION ORAL at 17:54

## 2022-01-01 RX ADMIN — Medication 40 MG: at 16:06

## 2022-01-01 RX ADMIN — ACETAMINOPHEN 650 MG: 325 SOLUTION ORAL at 01:29

## 2022-01-01 RX ADMIN — HEPARIN SODIUM 5000 UNITS: 5000 INJECTION, SOLUTION INTRAVENOUS; SUBCUTANEOUS at 23:24

## 2022-01-01 RX ADMIN — CEFAZOLIN SODIUM 2 G: 2 INJECTION, SOLUTION INTRAVENOUS at 19:01

## 2022-01-01 RX ADMIN — SODIUM CHLORIDE, POTASSIUM CHLORIDE, SODIUM LACTATE AND CALCIUM CHLORIDE: 600; 310; 30; 20 INJECTION, SOLUTION INTRAVENOUS at 19:38

## 2022-01-01 RX ADMIN — LEVETIRACETAM 1000 MG: 500 TABLET, FILM COATED ORAL at 07:53

## 2022-01-01 RX ADMIN — LEVETIRACETAM 500 MG: 500 TABLET, FILM COATED ORAL at 08:06

## 2022-01-01 RX ADMIN — LIDOCAINE 2 PATCH: 560 PATCH PERCUTANEOUS; TOPICAL; TRANSDERMAL at 17:56

## 2022-01-01 RX ADMIN — MULTIVITAMIN 15 ML: LIQUID ORAL at 08:46

## 2022-01-01 RX ADMIN — ACETAMINOPHEN 650 MG: 325 TABLET ORAL at 08:06

## 2022-01-01 RX ADMIN — ACETAMINOPHEN 325MG 975 MG: 325 TABLET ORAL at 20:29

## 2022-01-01 RX ADMIN — PANTOPRAZOLE SODIUM 40 MG: 40 INJECTION, POWDER, FOR SOLUTION INTRAVENOUS at 20:28

## 2022-01-01 RX ADMIN — ACETAMINOPHEN 325MG 975 MG: 325 TABLET ORAL at 16:08

## 2022-01-01 RX ADMIN — HEPARIN SODIUM 5000 UNITS: 5000 INJECTION, SOLUTION INTRAVENOUS; SUBCUTANEOUS at 16:33

## 2022-01-01 RX ADMIN — CEFAZOLIN SODIUM 2 G: 2 INJECTION, SOLUTION INTRAVENOUS at 16:07

## 2022-01-01 RX ADMIN — CHLORHEXIDINE GLUCONATE 0.12% ORAL RINSE 15 ML: 1.2 LIQUID ORAL at 08:10

## 2022-01-01 RX ADMIN — CEFAZOLIN SODIUM 2 G: 2 INJECTION, SOLUTION INTRAVENOUS at 12:18

## 2022-01-01 RX ADMIN — LOPERAMIDE HCL 4 MG: 1 SOLUTION ORAL at 21:28

## 2022-01-01 RX ADMIN — Medication 1 PACKET: at 14:20

## 2022-01-01 RX ADMIN — Medication 1 PACKET: at 07:32

## 2022-01-01 RX ADMIN — PHENYLEPHRINE HYDROCHLORIDE 100 MCG: 10 INJECTION INTRAVENOUS at 21:20

## 2022-01-01 RX ADMIN — HEPARIN SODIUM 5000 UNITS: 5000 INJECTION, SOLUTION INTRAVENOUS; SUBCUTANEOUS at 07:49

## 2022-01-01 RX ADMIN — LEVETIRACETAM 1000 MG: 500 TABLET, FILM COATED ORAL at 08:09

## 2022-01-01 RX ADMIN — PANTOPRAZOLE SODIUM 40 MG: 40 INJECTION, POWDER, FOR SOLUTION INTRAVENOUS at 07:51

## 2022-01-01 RX ADMIN — CHLORHEXIDINE GLUCONATE 0.12% ORAL RINSE 15 ML: 1.2 LIQUID ORAL at 12:43

## 2022-01-01 RX ADMIN — HYDROXYCHLOROQUINE SULFATE 400 MG: 200 TABLET, FILM COATED ORAL at 20:31

## 2022-01-01 RX ADMIN — Medication 1 PACKET: at 16:06

## 2022-01-01 RX ADMIN — CHLORHEXIDINE GLUCONATE 0.12% ORAL RINSE 15 ML: 1.2 LIQUID ORAL at 17:01

## 2022-01-01 RX ADMIN — PANTOPRAZOLE SODIUM 40 MG: 40 INJECTION, POWDER, FOR SOLUTION INTRAVENOUS at 08:49

## 2022-01-01 RX ADMIN — HEPARIN SODIUM 5000 UNITS: 5000 INJECTION, SOLUTION INTRAVENOUS; SUBCUTANEOUS at 23:54

## 2022-01-01 RX ADMIN — SODIUM CHLORIDE 100 MG: 9 INJECTION, SOLUTION INTRAVENOUS at 21:19

## 2022-01-01 RX ADMIN — POTASSIUM CHLORIDE 20 MEQ: 1.5 POWDER, FOR SOLUTION ORAL at 05:20

## 2022-01-01 RX ADMIN — LABETALOL HYDROCHLORIDE 10 MG: 5 INJECTION, SOLUTION INTRAVENOUS at 22:04

## 2022-01-01 RX ADMIN — ACETAMINOPHEN 650 MG: 160 SOLUTION ORAL at 09:10

## 2022-01-01 RX ADMIN — LEVETIRACETAM 1000 MG: 500 TABLET, FILM COATED ORAL at 20:54

## 2022-01-01 RX ADMIN — LABETALOL HYDROCHLORIDE 20 MG: 5 INJECTION, SOLUTION INTRAVENOUS at 08:35

## 2022-01-01 RX ADMIN — MULTIVITAMIN 15 ML: LIQUID ORAL at 08:59

## 2022-01-01 RX ADMIN — Medication 1 PACKET: at 09:10

## 2022-01-01 RX ADMIN — MULTIVITAMIN 15 ML: LIQUID ORAL at 08:33

## 2022-01-01 RX ADMIN — SENNOSIDES AND DOCUSATE SODIUM 2 TABLET: 50; 8.6 TABLET ORAL at 12:12

## 2022-01-01 RX ADMIN — POTASSIUM CHLORIDE 40 MEQ: 1.5 POWDER, FOR SOLUTION ORAL at 14:57

## 2022-01-01 RX ADMIN — HYDROXYCHLOROQUINE SULFATE 400 MG: 200 TABLET, FILM COATED ORAL at 21:54

## 2022-01-01 RX ADMIN — SODIUM CHLORIDE, SODIUM GLUCONATE, SODIUM ACETATE, POTASSIUM CHLORIDE AND MAGNESIUM CHLORIDE 1000 ML: 526; 502; 368; 37; 30 INJECTION, SOLUTION INTRAVENOUS at 06:28

## 2022-01-01 RX ADMIN — MORPHINE SULFATE 5 MG: 10 SOLUTION ORAL at 16:12

## 2022-01-01 RX ADMIN — CHLORHEXIDINE GLUCONATE 0.12% ORAL RINSE 15 ML: 1.2 LIQUID ORAL at 12:19

## 2022-01-01 RX ADMIN — LEVETIRACETAM 1000 MG: 500 TABLET, FILM COATED ORAL at 08:44

## 2022-01-01 RX ADMIN — LEVETIRACETAM 1000 MG: 500 TABLET, FILM COATED ORAL at 08:04

## 2022-01-01 RX ADMIN — PANTOPRAZOLE SODIUM 40 MG: 40 INJECTION, POWDER, FOR SOLUTION INTRAVENOUS at 20:22

## 2022-01-01 RX ADMIN — Medication 1 PACKET: at 08:21

## 2022-01-01 RX ADMIN — Medication 400 MG: at 21:01

## 2022-01-01 RX ADMIN — HYDROXYCHLOROQUINE SULFATE 400 MG: 200 TABLET, FILM COATED ORAL at 21:25

## 2022-01-01 RX ADMIN — Medication 1 PACKET: at 19:57

## 2022-01-01 RX ADMIN — VANCOMYCIN HYDROCHLORIDE 1000 MG: 1 INJECTION, SOLUTION INTRAVENOUS at 21:53

## 2022-01-01 RX ADMIN — HYDROXYCHLOROQUINE SULFATE 400 MG: 200 TABLET, FILM COATED ORAL at 21:34

## 2022-01-01 RX ADMIN — ENOXAPARIN SODIUM 40 MG: 40 INJECTION SUBCUTANEOUS at 17:35

## 2022-01-01 RX ADMIN — CEFTRIAXONE SODIUM 1 G: 1 INJECTION, POWDER, FOR SOLUTION INTRAMUSCULAR; INTRAVENOUS at 05:52

## 2022-01-01 RX ADMIN — CEFAZOLIN SODIUM 2 G: 2 INJECTION, SOLUTION INTRAVENOUS at 19:38

## 2022-01-01 RX ADMIN — MULTIVITAMIN 15 ML: LIQUID ORAL at 08:30

## 2022-01-01 RX ADMIN — Medication 1 PACKET: at 12:04

## 2022-01-01 RX ADMIN — OXYCODONE HYDROCHLORIDE 5 MG: 5 TABLET ORAL at 18:42

## 2022-01-01 RX ADMIN — Medication 1 PACKET: at 08:16

## 2022-01-01 RX ADMIN — Medication 1 PACKET: at 13:52

## 2022-01-01 RX ADMIN — LEVETIRACETAM 1000 MG: 500 TABLET, FILM COATED ORAL at 09:16

## 2022-01-01 RX ADMIN — LEVETIRACETAM 3500 MG: 100 INJECTION, SOLUTION INTRAVENOUS at 01:15

## 2022-01-01 RX ADMIN — PIPERACILLIN AND TAZOBACTAM 4.5 G: 4; .5 INJECTION, POWDER, FOR SOLUTION INTRAVENOUS at 08:59

## 2022-01-01 RX ADMIN — CEPHALEXIN 500 MG: 500 CAPSULE ORAL at 21:12

## 2022-01-01 RX ADMIN — VASOPRESSIN 2.4 UNITS/HR: 20 INJECTION INTRAVENOUS at 11:51

## 2022-01-01 RX ADMIN — ATROPINE SULFATE 2 DROP: 10 SOLUTION/ DROPS OPHTHALMIC at 08:54

## 2022-01-01 RX ADMIN — LEVETIRACETAM 750 MG: 750 TABLET, FILM COATED ORAL at 19:47

## 2022-01-01 RX ADMIN — CEFAZOLIN SODIUM 2 G: 2 INJECTION, SOLUTION INTRAVENOUS at 12:45

## 2022-01-01 RX ADMIN — LIDOCAINE HYDROCHLORIDE 1 TUBE: 20 JELLY TOPICAL at 18:43

## 2022-01-01 RX ADMIN — ACETAMINOPHEN 650 MG: 160 SOLUTION ORAL at 20:41

## 2022-01-01 RX ADMIN — Medication 1 PACKET: at 19:47

## 2022-01-01 RX ADMIN — Medication 1 PACKET: at 08:27

## 2022-01-01 RX ADMIN — ACETAMINOPHEN 975 MG: 325 TABLET, FILM COATED ORAL at 11:47

## 2022-01-01 RX ADMIN — CEPHALEXIN 500 MG: 500 CAPSULE ORAL at 06:12

## 2022-01-01 RX ADMIN — CEFAZOLIN SODIUM 2 G: 2 INJECTION, SOLUTION INTRAVENOUS at 14:27

## 2022-01-01 RX ADMIN — CHLORHEXIDINE GLUCONATE 0.12% ORAL RINSE 15 ML: 1.2 LIQUID ORAL at 13:06

## 2022-01-01 RX ADMIN — PANTOPRAZOLE SODIUM 40 MG: 40 INJECTION, POWDER, FOR SOLUTION INTRAVENOUS at 19:38

## 2022-01-01 RX ADMIN — CEFAZOLIN SODIUM 2 G: 2 INJECTION, SOLUTION INTRAVENOUS at 00:52

## 2022-01-01 RX ADMIN — FAMOTIDINE 20 MG: 20 TABLET ORAL at 07:45

## 2022-01-01 RX ADMIN — LEVETIRACETAM 1000 MG: 500 TABLET, FILM COATED ORAL at 08:25

## 2022-01-01 RX ADMIN — HEPARIN SODIUM 5000 UNITS: 5000 INJECTION, SOLUTION INTRAVENOUS; SUBCUTANEOUS at 15:45

## 2022-01-01 RX ADMIN — CEPHALEXIN 500 MG: 500 CAPSULE ORAL at 06:28

## 2022-01-01 RX ADMIN — PIPERACILLIN AND TAZOBACTAM 4.5 G: 4; .5 INJECTION, POWDER, LYOPHILIZED, FOR SOLUTION INTRAVENOUS; PARENTERAL at 18:04

## 2022-01-01 RX ADMIN — LABETALOL HYDROCHLORIDE 20 MG: 5 INJECTION, SOLUTION INTRAVENOUS at 07:42

## 2022-01-01 RX ADMIN — Medication 40 MG: at 16:58

## 2022-01-01 RX ADMIN — Medication 5 MG: at 20:45

## 2022-01-01 RX ADMIN — ACETAMINOPHEN 325MG 975 MG: 325 TABLET ORAL at 19:56

## 2022-01-01 RX ADMIN — Medication 1 PACKET: at 12:46

## 2022-01-01 RX ADMIN — CYANOCOBALAMIN TAB 500 MCG 500 MCG: 500 TAB at 07:29

## 2022-01-01 RX ADMIN — Medication 1 PACKET: at 14:10

## 2022-01-01 RX ADMIN — ACETAMINOPHEN 325MG 975 MG: 325 TABLET ORAL at 00:55

## 2022-01-01 RX ADMIN — BISACODYL 10 MG: 10 SUPPOSITORY RECTAL at 13:07

## 2022-01-01 RX ADMIN — CYANOCOBALAMIN TAB 500 MCG 500 MCG: 500 TAB at 08:33

## 2022-01-01 RX ADMIN — CEPHALEXIN 500 MG: 500 CAPSULE ORAL at 07:46

## 2022-01-01 RX ADMIN — Medication 400 MG: at 21:39

## 2022-01-01 RX ADMIN — LABETALOL HYDROCHLORIDE 20 MG: 5 INJECTION, SOLUTION INTRAVENOUS at 22:13

## 2022-01-01 RX ADMIN — LEVETIRACETAM 1000 MG: 500 TABLET, FILM COATED ORAL at 20:53

## 2022-01-01 RX ADMIN — HEPARIN SODIUM 5000 UNITS: 5000 INJECTION, SOLUTION INTRAVENOUS; SUBCUTANEOUS at 16:04

## 2022-01-01 RX ADMIN — Medication 40 MG: at 15:45

## 2022-01-01 RX ADMIN — PHENYLEPHRINE HYDROCHLORIDE 100 MCG: 10 INJECTION INTRAVENOUS at 19:46

## 2022-01-01 RX ADMIN — LEVETIRACETAM 750 MG: 750 TABLET, FILM COATED ORAL at 07:45

## 2022-01-01 RX ADMIN — LEVETIRACETAM 1000 MG: 500 TABLET, FILM COATED ORAL at 09:21

## 2022-01-01 RX ADMIN — ENOXAPARIN SODIUM 40 MG: 40 INJECTION SUBCUTANEOUS at 15:59

## 2022-01-01 RX ADMIN — LEVETIRACETAM 1000 MG: 500 TABLET, FILM COATED ORAL at 09:44

## 2022-01-01 RX ADMIN — CYANOCOBALAMIN TAB 500 MCG 500 MCG: 500 TAB at 08:30

## 2022-01-01 RX ADMIN — PIPERACILLIN AND TAZOBACTAM 4.5 G: 4; .5 INJECTION, POWDER, FOR SOLUTION INTRAVENOUS at 08:37

## 2022-01-01 RX ADMIN — CEPHALEXIN 500 MG: 500 CAPSULE ORAL at 13:41

## 2022-01-01 RX ADMIN — LABETALOL HYDROCHLORIDE 20 MG: 5 INJECTION, SOLUTION INTRAVENOUS at 19:26

## 2022-01-01 RX ADMIN — Medication 1 PACKET: at 11:06

## 2022-01-01 RX ADMIN — CEFAZOLIN SODIUM 2 G: 2 INJECTION, SOLUTION INTRAVENOUS at 08:38

## 2022-01-01 RX ADMIN — ENOXAPARIN SODIUM 40 MG: 40 INJECTION SUBCUTANEOUS at 16:31

## 2022-01-01 RX ADMIN — LABETALOL HYDROCHLORIDE 20 MG: 5 INJECTION, SOLUTION INTRAVENOUS at 01:01

## 2022-01-01 RX ADMIN — SODIUM CHLORIDE, POTASSIUM CHLORIDE, SODIUM LACTATE AND CALCIUM CHLORIDE: 600; 310; 30; 20 INJECTION, SOLUTION INTRAVENOUS at 22:15

## 2022-01-01 RX ADMIN — Medication 40 MG: at 17:15

## 2022-01-01 RX ADMIN — Medication 400 MG: at 21:24

## 2022-01-01 RX ADMIN — MULTIVITAMIN 15 ML: LIQUID ORAL at 07:57

## 2022-01-01 RX ADMIN — CHLORHEXIDINE GLUCONATE 0.12% ORAL RINSE 15 ML: 1.2 LIQUID ORAL at 12:37

## 2022-01-01 RX ADMIN — LEVETIRACETAM 1000 MG: 500 TABLET, FILM COATED ORAL at 19:44

## 2022-01-01 RX ADMIN — CEFTRIAXONE SODIUM 1 G: 1 INJECTION, POWDER, FOR SOLUTION INTRAMUSCULAR; INTRAVENOUS at 06:00

## 2022-01-01 RX ADMIN — ACETAMINOPHEN 325MG 975 MG: 325 TABLET ORAL at 12:37

## 2022-01-01 RX ADMIN — Medication 400 MG: at 22:18

## 2022-01-01 RX ADMIN — LEVETIRACETAM 500 MG: 500 TABLET, FILM COATED ORAL at 20:06

## 2022-01-01 RX ADMIN — LOPERAMIDE HCL 4 MG: 1 SOLUTION ORAL at 09:02

## 2022-01-01 RX ADMIN — Medication 400 MG: at 10:05

## 2022-01-01 RX ADMIN — LORAZEPAM 1 MG: 1 TABLET ORAL at 22:06

## 2022-01-01 RX ADMIN — CHLORHEXIDINE GLUCONATE 0.12% ORAL RINSE 15 ML: 1.2 LIQUID ORAL at 20:45

## 2022-01-01 RX ADMIN — CEPHALEXIN 500 MG: 500 CAPSULE ORAL at 05:50

## 2022-01-01 RX ADMIN — LEVETIRACETAM 750 MG: 750 TABLET, FILM COATED ORAL at 22:48

## 2022-01-01 RX ADMIN — HYDROXYCHLOROQUINE SULFATE 400 MG: 200 TABLET, FILM COATED ORAL at 23:16

## 2022-01-01 RX ADMIN — LOPERAMIDE HCL 4 MG: 1 SOLUTION ORAL at 21:52

## 2022-01-01 RX ADMIN — HYDRALAZINE HYDROCHLORIDE 10 MG: 20 INJECTION INTRAMUSCULAR; INTRAVENOUS at 09:30

## 2022-01-01 RX ADMIN — ACETAMINOPHEN 650 MG: 325 TABLET ORAL at 18:42

## 2022-01-01 RX ADMIN — LEVETIRACETAM 1000 MG: 500 TABLET, FILM COATED ORAL at 09:02

## 2022-01-01 RX ADMIN — Medication 40 MG: at 16:42

## 2022-01-01 RX ADMIN — ALBUTEROL SULFATE 2.5 MG: 2.5 SOLUTION RESPIRATORY (INHALATION) at 01:20

## 2022-01-01 RX ADMIN — MUPIROCIN: 2 CREAM TOPICAL at 08:22

## 2022-01-01 RX ADMIN — LOPERAMIDE HCL 4 MG: 1 SOLUTION ORAL at 21:05

## 2022-01-01 RX ADMIN — CYANOCOBALAMIN TAB 500 MCG 500 MCG: 500 TAB at 07:57

## 2022-01-01 RX ADMIN — CEPHALEXIN 500 MG: 500 CAPSULE ORAL at 05:52

## 2022-01-01 RX ADMIN — CYANOCOBALAMIN TAB 500 MCG 500 MCG: 500 TAB at 07:37

## 2022-01-01 RX ADMIN — PIPERACILLIN AND TAZOBACTAM 4.5 G: 4; .5 INJECTION, POWDER, FOR SOLUTION INTRAVENOUS at 08:30

## 2022-01-01 RX ADMIN — PIPERACILLIN AND TAZOBACTAM 4.5 G: 4; .5 INJECTION, POWDER, FOR SOLUTION INTRAVENOUS at 08:46

## 2022-01-01 RX ADMIN — LEVETIRACETAM 1000 MG: 500 TABLET, FILM COATED ORAL at 19:57

## 2022-01-01 RX ADMIN — ALBUTEROL SULFATE 2.5 MG: 2.5 SOLUTION RESPIRATORY (INHALATION) at 20:39

## 2022-01-01 RX ADMIN — ACETAMINOPHEN 650 MG: 325 SOLUTION ORAL at 08:06

## 2022-01-01 RX ADMIN — LORAZEPAM 1 MG: 1 TABLET ORAL at 20:21

## 2022-01-01 RX ADMIN — Medication 400 MG: at 21:41

## 2022-01-01 RX ADMIN — Medication 5 MG: at 20:22

## 2022-01-01 RX ADMIN — CHLORHEXIDINE GLUCONATE 0.12% ORAL RINSE 15 ML: 1.2 LIQUID ORAL at 11:59

## 2022-01-01 RX ADMIN — CHLORHEXIDINE GLUCONATE 0.12% ORAL RINSE 15 ML: 1.2 LIQUID ORAL at 12:14

## 2022-01-01 RX ADMIN — CEPHALEXIN 500 MG: 500 CAPSULE ORAL at 23:13

## 2022-01-01 RX ADMIN — PANTOPRAZOLE SODIUM 40 MG: 40 INJECTION, POWDER, FOR SOLUTION INTRAVENOUS at 20:40

## 2022-01-01 RX ADMIN — POTASSIUM & SODIUM PHOSPHATES POWDER PACK 280-160-250 MG 1 PACKET: 280-160-250 PACK at 08:14

## 2022-01-01 RX ADMIN — Medication 400 MG: at 23:58

## 2022-01-01 RX ADMIN — IOPAMIDOL 97 ML: 755 INJECTION, SOLUTION INTRAVENOUS at 09:41

## 2022-01-01 RX ADMIN — MIDAZOLAM 2 MG: 1 INJECTION INTRAMUSCULAR; INTRAVENOUS at 12:07

## 2022-01-01 RX ADMIN — HEPARIN SODIUM 5000 UNITS: 5000 INJECTION, SOLUTION INTRAVENOUS; SUBCUTANEOUS at 00:14

## 2022-01-01 RX ADMIN — CYANOCOBALAMIN TAB 500 MCG 500 MCG: 500 TAB at 08:19

## 2022-01-01 RX ADMIN — HEPARIN SODIUM 5000 UNITS: 5000 INJECTION, SOLUTION INTRAVENOUS; SUBCUTANEOUS at 16:05

## 2022-01-01 RX ADMIN — PIPERACILLIN AND TAZOBACTAM 4.5 G: 4; .5 INJECTION, POWDER, FOR SOLUTION INTRAVENOUS at 08:48

## 2022-01-01 RX ADMIN — LEVETIRACETAM 1000 MG: 500 TABLET, FILM COATED ORAL at 20:45

## 2022-01-01 RX ADMIN — Medication 1 PACKET: at 19:45

## 2022-01-01 RX ADMIN — ACETAMINOPHEN 650 MG: 325 SOLUTION ORAL at 20:18

## 2022-01-01 RX ADMIN — ACETAMINOPHEN 650 MG: 325 SOLUTION ORAL at 16:12

## 2022-01-01 RX ADMIN — Medication 400 MG: at 21:54

## 2022-01-01 RX ADMIN — LEVETIRACETAM 1000 MG: 500 TABLET, FILM COATED ORAL at 08:58

## 2022-01-01 RX ADMIN — Medication 400 MG: at 22:37

## 2022-01-01 RX ADMIN — AMLODIPINE BESYLATE 5 MG: 5 TABLET ORAL at 07:46

## 2022-01-01 RX ADMIN — CHLORHEXIDINE GLUCONATE 0.12% ORAL RINSE 15 ML: 1.2 LIQUID ORAL at 20:00

## 2022-01-01 RX ADMIN — Medication 1 PACKET: at 16:58

## 2022-01-01 RX ADMIN — Medication 400 MG: at 21:02

## 2022-01-01 RX ADMIN — Medication 40 MG: at 08:01

## 2022-01-01 RX ADMIN — ACETAMINOPHEN 650 MG: 325 TABLET ORAL at 01:21

## 2022-01-01 RX ADMIN — POTASSIUM & SODIUM PHOSPHATES POWDER PACK 280-160-250 MG 1 PACKET: 280-160-250 PACK at 03:10

## 2022-01-01 RX ADMIN — VANCOMYCIN HYDROCHLORIDE 1750 MG: 1 INJECTION, POWDER, LYOPHILIZED, FOR SOLUTION INTRAVENOUS at 22:34

## 2022-01-01 RX ADMIN — LEVETIRACETAM 1000 MG: 500 TABLET, FILM COATED ORAL at 21:52

## 2022-01-01 RX ADMIN — LEVETIRACETAM 1000 MG: 500 TABLET, FILM COATED ORAL at 20:32

## 2022-01-01 RX ADMIN — CHLORHEXIDINE GLUCONATE 0.12% ORAL RINSE 15 ML: 1.2 LIQUID ORAL at 09:21

## 2022-01-01 RX ADMIN — CEPHALEXIN 500 MG: 500 CAPSULE ORAL at 19:08

## 2022-01-01 RX ADMIN — Medication 400 MG: at 22:13

## 2022-01-01 RX ADMIN — CEPHALEXIN 500 MG: 500 CAPSULE ORAL at 11:43

## 2022-01-01 RX ADMIN — Medication 1 PACKET: at 20:46

## 2022-01-01 RX ADMIN — LABETALOL HYDROCHLORIDE 10 MG: 5 INJECTION, SOLUTION INTRAVENOUS at 15:50

## 2022-01-01 RX ADMIN — ACETAMINOPHEN 325MG 975 MG: 325 TABLET ORAL at 19:26

## 2022-01-01 RX ADMIN — Medication 1 PACKET: at 16:53

## 2022-01-01 RX ADMIN — ACETAMINOPHEN 975 MG: 325 TABLET, FILM COATED ORAL at 11:41

## 2022-01-01 RX ADMIN — LABETALOL HYDROCHLORIDE 10 MG: 5 INJECTION, SOLUTION INTRAVENOUS at 22:47

## 2022-01-01 RX ADMIN — PIPERACILLIN SODIUM AND TAZOBACTAM SODIUM 4.5 G: 4; .5 INJECTION, POWDER, LYOPHILIZED, FOR SOLUTION INTRAVENOUS at 05:51

## 2022-01-01 RX ADMIN — CEPHALEXIN 500 MG: 500 CAPSULE ORAL at 20:09

## 2022-01-01 RX ADMIN — Medication 1 PACKET: at 15:13

## 2022-01-01 RX ADMIN — DIPHENOXYLATE HYDROCHLORIDE AND ATROPINE SULFATE 2.5 ML: 2.5; .025 SOLUTION ORAL at 13:27

## 2022-01-01 RX ADMIN — MUPIROCIN: 2 CREAM TOPICAL at 07:50

## 2022-01-01 RX ADMIN — PIPERACILLIN AND TAZOBACTAM 4.5 G: 4; .5 INJECTION, POWDER, LYOPHILIZED, FOR SOLUTION INTRAVENOUS; PARENTERAL at 18:03

## 2022-01-01 RX ADMIN — CHLORHEXIDINE GLUCONATE 0.12% ORAL RINSE 15 ML: 1.2 LIQUID ORAL at 19:58

## 2022-01-01 RX ADMIN — CEPHALEXIN 500 MG: 500 CAPSULE ORAL at 12:06

## 2022-01-01 RX ADMIN — HEPARIN SODIUM 5000 UNITS: 5000 INJECTION, SOLUTION INTRAVENOUS; SUBCUTANEOUS at 23:56

## 2022-01-01 RX ADMIN — ATROPINE SULFATE 2 DROP: 10 SOLUTION/ DROPS OPHTHALMIC at 04:14

## 2022-01-01 RX ADMIN — MULTIVITAMIN 15 ML: LIQUID ORAL at 07:49

## 2022-01-01 RX ADMIN — AMLODIPINE BESYLATE 5 MG: 5 TABLET ORAL at 08:56

## 2022-01-01 ASSESSMENT — ACTIVITIES OF DAILY LIVING (ADL)
ADLS_ACUITY_SCORE: 54
ADLS_ACUITY_SCORE: 64
ADLS_ACUITY_SCORE: 64
ADLS_ACUITY_SCORE: 68
ADLS_ACUITY_SCORE: 66
ADLS_ACUITY_SCORE: 68
ADLS_ACUITY_SCORE: 60
ADLS_ACUITY_SCORE: 54
ADLS_ACUITY_SCORE: 66
ADLS_ACUITY_SCORE: 60
ADLS_ACUITY_SCORE: 30
ADLS_ACUITY_SCORE: 66
ADLS_ACUITY_SCORE: 22
ADLS_ACUITY_SCORE: 68
ADLS_ACUITY_SCORE: 62
ADLS_ACUITY_SCORE: 66
ADLS_ACUITY_SCORE: 68
ADLS_ACUITY_SCORE: 64
TRANSFERRING: 2-->COMPLETELY DEPENDENT
ADLS_ACUITY_SCORE: 66
ADLS_ACUITY_SCORE: 64
ADLS_ACUITY_SCORE: 64
DRESSING/BATHING_DIFFICULTY: YES
ADLS_ACUITY_SCORE: 62
ADLS_ACUITY_SCORE: 66
ADLS_ACUITY_SCORE: 54
ADLS_ACUITY_SCORE: 64
ADLS_ACUITY_SCORE: 64
ADLS_ACUITY_SCORE: 66
ADLS_ACUITY_SCORE: 58
ADLS_ACUITY_SCORE: 58
ADLS_ACUITY_SCORE: 68
ADLS_ACUITY_SCORE: 64
ADLS_ACUITY_SCORE: 64
ADLS_ACUITY_SCORE: 58
ADLS_ACUITY_SCORE: 62
EQUIPMENT_CURRENTLY_USED_AT_HOME: GRAB BAR, TOILET;GRAB BAR, TUB/SHOWER
ADLS_ACUITY_SCORE: 62
ADLS_ACUITY_SCORE: 58
ADLS_ACUITY_SCORE: 66
ADLS_ACUITY_SCORE: 60
ADLS_ACUITY_SCORE: 66
ADLS_ACUITY_SCORE: 22
ADLS_ACUITY_SCORE: 35
ADLS_ACUITY_SCORE: 68
ADLS_ACUITY_SCORE: 62
ADLS_ACUITY_SCORE: 62
ADLS_ACUITY_SCORE: 60
ADLS_ACUITY_SCORE: 22
ADLS_ACUITY_SCORE: 62
ADLS_ACUITY_SCORE: 66
ADLS_ACUITY_SCORE: 60
ADLS_ACUITY_SCORE: 66
ADLS_ACUITY_SCORE: 58
ADLS_ACUITY_SCORE: 62
ADLS_ACUITY_SCORE: 66
ADLS_ACUITY_SCORE: 64
ADLS_ACUITY_SCORE: 66
ADLS_ACUITY_SCORE: 54
ADLS_ACUITY_SCORE: 62
ADLS_ACUITY_SCORE: 68
ADLS_ACUITY_SCORE: 23
ADLS_ACUITY_SCORE: 64
ADLS_ACUITY_SCORE: 60
ADLS_ACUITY_SCORE: 68
ADLS_ACUITY_SCORE: 28
ADLS_ACUITY_SCORE: 68
ADLS_ACUITY_SCORE: 66
ADLS_ACUITY_SCORE: 22
ADLS_ACUITY_SCORE: 62
ADLS_ACUITY_SCORE: 64
ADLS_ACUITY_SCORE: 62
ADLS_ACUITY_SCORE: 68
ADLS_ACUITY_SCORE: 62
ADLS_ACUITY_SCORE: 68
ADLS_ACUITY_SCORE: 68
ADLS_ACUITY_SCORE: 66
ADLS_ACUITY_SCORE: 64
ADLS_ACUITY_SCORE: 62
ADLS_ACUITY_SCORE: 66
ADLS_ACUITY_SCORE: 64
ADLS_ACUITY_SCORE: 66
ADLS_ACUITY_SCORE: 60
ADLS_ACUITY_SCORE: 64
ADLS_ACUITY_SCORE: 68
ADLS_ACUITY_SCORE: 62
ADLS_ACUITY_SCORE: 64
ADLS_ACUITY_SCORE: 58
ADLS_ACUITY_SCORE: 64
ADLS_ACUITY_SCORE: 64
ADLS_ACUITY_SCORE: 66
ADLS_ACUITY_SCORE: 28
ADLS_ACUITY_SCORE: 68
ADLS_ACUITY_SCORE: 66
ADLS_ACUITY_SCORE: 68
ADLS_ACUITY_SCORE: 68
ADLS_ACUITY_SCORE: 62
ADLS_ACUITY_SCORE: 62
ADLS_ACUITY_SCORE: 64
ADLS_ACUITY_SCORE: 61
ADLS_ACUITY_SCORE: 62
ADLS_ACUITY_SCORE: 66
ADLS_ACUITY_SCORE: 68
ADLS_ACUITY_SCORE: 68
ADLS_ACUITY_SCORE: 62
ADLS_ACUITY_SCORE: 58
ADLS_ACUITY_SCORE: 68
ADLS_ACUITY_SCORE: 64
ADLS_ACUITY_SCORE: 68
ADLS_ACUITY_SCORE: 30
ADLS_ACUITY_SCORE: 68
ADLS_ACUITY_SCORE: 66
ADLS_ACUITY_SCORE: 66
ADLS_ACUITY_SCORE: 60
ADLS_ACUITY_SCORE: 30
ADLS_ACUITY_SCORE: 68
ADLS_ACUITY_SCORE: 64
ADLS_ACUITY_SCORE: 64
ADLS_ACUITY_SCORE: 23
ADLS_ACUITY_SCORE: 30
ADLS_ACUITY_SCORE: 28
ADLS_ACUITY_SCORE: 66
ADLS_ACUITY_SCORE: 68
ADLS_ACUITY_SCORE: 68
ADLS_ACUITY_SCORE: 66
ADLS_ACUITY_SCORE: 23
ADLS_ACUITY_SCORE: 68
DRESS: 2-->COMPLETELY DEPENDENT (NOT DEVELOPMENTALLY APPROPRIATE)
ADLS_ACUITY_SCORE: 64
ADLS_ACUITY_SCORE: 66
ADLS_ACUITY_SCORE: 64
ADLS_ACUITY_SCORE: 68
ADLS_ACUITY_SCORE: 60
ADLS_ACUITY_SCORE: 64
ADLS_ACUITY_SCORE: 64
ADLS_ACUITY_SCORE: 68
ADLS_ACUITY_SCORE: 58
ADLS_ACUITY_SCORE: 64
ADLS_ACUITY_SCORE: 62
ADLS_ACUITY_SCORE: 22
ADLS_ACUITY_SCORE: 66
NUMBER_OF_TIMES_PATIENT_HAS_FALLEN_WITHIN_LAST_SIX_MONTHS: 4
TOILETING_ASSISTANCE: TOILETING DIFFICULTY, DEPENDENT
ADLS_ACUITY_SCORE: 64
ADLS_ACUITY_SCORE: 68
ADLS_ACUITY_SCORE: 68
ADLS_ACUITY_SCORE: 64
ADLS_ACUITY_SCORE: 64
ADLS_ACUITY_SCORE: 66
ADLS_ACUITY_SCORE: 68
ADLS_ACUITY_SCORE: 64
ADLS_ACUITY_SCORE: 30
ADLS_ACUITY_SCORE: 62
BATHING: 2-->COMPLETELY DEPENDENT (NOT DEVELOPMENTALLY APPROPRIATE)
ADLS_ACUITY_SCORE: 64
ADLS_ACUITY_SCORE: 68
ADLS_ACUITY_SCORE: 62
ADLS_ACUITY_SCORE: 64
ADLS_ACUITY_SCORE: 68
ADLS_ACUITY_SCORE: 60
ADLS_ACUITY_SCORE: 66
ADLS_ACUITY_SCORE: 62
ADLS_ACUITY_SCORE: 66
ADLS_ACUITY_SCORE: 62
ADLS_ACUITY_SCORE: 62
ADLS_ACUITY_SCORE: 68
ADLS_ACUITY_SCORE: 68
ADLS_ACUITY_SCORE: 64
ADLS_ACUITY_SCORE: 58
ADLS_ACUITY_SCORE: 30
ADLS_ACUITY_SCORE: 62
ADLS_ACUITY_SCORE: 66
ADLS_ACUITY_SCORE: 64
ADLS_ACUITY_SCORE: 62
ADLS_ACUITY_SCORE: 66
ADLS_ACUITY_SCORE: 68
ADLS_ACUITY_SCORE: 66
ADLS_ACUITY_SCORE: 66
ADLS_ACUITY_SCORE: 61
ADLS_ACUITY_SCORE: 66
ADLS_ACUITY_SCORE: 62
ADLS_ACUITY_SCORE: 35
ADLS_ACUITY_SCORE: 68
ADLS_ACUITY_SCORE: 60
ADLS_ACUITY_SCORE: 68
ADLS_ACUITY_SCORE: 22
ADLS_ACUITY_SCORE: 30
ADLS_ACUITY_SCORE: 66
ADLS_ACUITY_SCORE: 62
ADLS_ACUITY_SCORE: 68
ADLS_ACUITY_SCORE: 64
ADLS_ACUITY_SCORE: 68
ADLS_ACUITY_SCORE: 64
ADLS_ACUITY_SCORE: 68
ADLS_ACUITY_SCORE: 30
ADLS_ACUITY_SCORE: 62
ADLS_ACUITY_SCORE: 68
ADLS_ACUITY_SCORE: 62
ADLS_ACUITY_SCORE: 66
ADLS_ACUITY_SCORE: 68
ADLS_ACUITY_SCORE: 68
ADLS_ACUITY_SCORE: 66
ADLS_ACUITY_SCORE: 62
ADLS_ACUITY_SCORE: 62
ADLS_ACUITY_SCORE: 68
ADLS_ACUITY_SCORE: 66
ADLS_ACUITY_SCORE: 22
FALL_HISTORY_WITHIN_LAST_SIX_MONTHS: YES
ADLS_ACUITY_SCORE: 64
ADLS_ACUITY_SCORE: 60
ADLS_ACUITY_SCORE: 62
ADLS_ACUITY_SCORE: 64
ADLS_ACUITY_SCORE: 66
ADLS_ACUITY_SCORE: 68
ADLS_ACUITY_SCORE: 66
ADLS_ACUITY_SCORE: 68
ADLS_ACUITY_SCORE: 66
ADLS_ACUITY_SCORE: 66
ADLS_ACUITY_SCORE: 35
ADLS_ACUITY_SCORE: 66
ADLS_ACUITY_SCORE: 68
ADLS_ACUITY_SCORE: 22
ADLS_ACUITY_SCORE: 68
ADLS_ACUITY_SCORE: 35
ADLS_ACUITY_SCORE: 35
ADLS_ACUITY_SCORE: 54
CHANGE_IN_FUNCTIONAL_STATUS_SINCE_ONSET_OF_CURRENT_ILLNESS/INJURY: YES
ADLS_ACUITY_SCORE: 58
ADLS_ACUITY_SCORE: 66
ADLS_ACUITY_SCORE: 68
ADLS_ACUITY_SCORE: 30
ADLS_ACUITY_SCORE: 62
ADLS_ACUITY_SCORE: 68
CHANGE_IN_FUNCTIONAL_STATUS_SINCE_ONSET_OF_CURRENT_ILLNESS/INJURY: YES
ADLS_ACUITY_SCORE: 62
ADLS_ACUITY_SCORE: 30
ADLS_ACUITY_SCORE: 62
ADLS_ACUITY_SCORE: 50
ADLS_ACUITY_SCORE: 62
ADLS_ACUITY_SCORE: 23
ADLS_ACUITY_SCORE: 68
ADLS_ACUITY_SCORE: 54
ADLS_ACUITY_SCORE: 68
ADLS_ACUITY_SCORE: 60
ADLS_ACUITY_SCORE: 64
DIFFICULTY_EATING/SWALLOWING: NO
ADLS_ACUITY_SCORE: 66
ADLS_ACUITY_SCORE: 66
ADLS_ACUITY_SCORE: 64
ADLS_ACUITY_SCORE: 66
ADLS_ACUITY_SCORE: 60
ADLS_ACUITY_SCORE: 68
ADLS_ACUITY_SCORE: 66
ADLS_ACUITY_SCORE: 62
ADLS_ACUITY_SCORE: 66
ADLS_ACUITY_SCORE: 60
ADLS_ACUITY_SCORE: 68
ADLS_ACUITY_SCORE: 35
ADLS_ACUITY_SCORE: 68
ADLS_ACUITY_SCORE: 64
ADLS_ACUITY_SCORE: 60
ADLS_ACUITY_SCORE: 66
ADLS_ACUITY_SCORE: 66
ADLS_ACUITY_SCORE: 64
ADLS_ACUITY_SCORE: 35
ADLS_ACUITY_SCORE: 68
ADLS_ACUITY_SCORE: 66
ADLS_ACUITY_SCORE: 68
ADLS_ACUITY_SCORE: 66
DIFFICULTY_EATING/SWALLOWING: NO
ADLS_ACUITY_SCORE: 62
ADLS_ACUITY_SCORE: 62
FALL_HISTORY_WITHIN_LAST_SIX_MONTHS: NO
ADLS_ACUITY_SCORE: 64
ADLS_ACUITY_SCORE: 68
ADLS_ACUITY_SCORE: 62
ADLS_ACUITY_SCORE: 68
ADLS_ACUITY_SCORE: 60
ADLS_ACUITY_SCORE: 68
ADLS_ACUITY_SCORE: 62
ADLS_ACUITY_SCORE: 60
ADLS_ACUITY_SCORE: 64
ADLS_ACUITY_SCORE: 66
ADLS_ACUITY_SCORE: 68
ADLS_ACUITY_SCORE: 64
ADLS_ACUITY_SCORE: 22
ADLS_ACUITY_SCORE: 54
ADLS_ACUITY_SCORE: 60
ADLS_ACUITY_SCORE: 66
ADLS_ACUITY_SCORE: 66
ADLS_ACUITY_SCORE: 54
ADLS_ACUITY_SCORE: 64
ADLS_ACUITY_SCORE: 28
ADLS_ACUITY_SCORE: 35
ADLS_ACUITY_SCORE: 62
ADLS_ACUITY_SCORE: 22
ADLS_ACUITY_SCORE: 65
ADLS_ACUITY_SCORE: 60
TRANSFERRING: 1-->ASSISTANCE (EQUIPMENT/PERSON) NEEDED
ADLS_ACUITY_SCORE: 22
ADLS_ACUITY_SCORE: 58
ADLS_ACUITY_SCORE: 62
ADLS_ACUITY_SCORE: 54
ADLS_ACUITY_SCORE: 64
ADLS_ACUITY_SCORE: 68
ADLS_ACUITY_SCORE: 66
ADLS_ACUITY_SCORE: 68
ADLS_ACUITY_SCORE: 64
ADLS_ACUITY_SCORE: 62
ADLS_ACUITY_SCORE: 61
ADLS_ACUITY_SCORE: 64
ADLS_ACUITY_SCORE: 60
ADLS_ACUITY_SCORE: 62
ADLS_ACUITY_SCORE: 60
ADLS_ACUITY_SCORE: 68
ADLS_ACUITY_SCORE: 62
ADLS_ACUITY_SCORE: 66
ADLS_ACUITY_SCORE: 61
ADLS_ACUITY_SCORE: 62
ADLS_ACUITY_SCORE: 66
ADLS_ACUITY_SCORE: 64
DRESSING/BATHING: BATHING DIFFICULTY, DEPENDENT
ADLS_ACUITY_SCORE: 64
ADLS_ACUITY_SCORE: 66
ADLS_ACUITY_SCORE: 68
ADLS_ACUITY_SCORE: 64
ADLS_ACUITY_SCORE: 61
ADLS_ACUITY_SCORE: 68
ADLS_ACUITY_SCORE: 60
ADLS_ACUITY_SCORE: 62
ADLS_ACUITY_SCORE: 61
ADLS_ACUITY_SCORE: 68
TRANSFERRING: 1-->ASSISTANCE (EQUIPMENT/PERSON) NEEDED (NOT DEVELOPMENTALLY APPROPRIATE)
ADLS_ACUITY_SCORE: 68
ADLS_ACUITY_SCORE: 68
ADLS_ACUITY_SCORE: 64
ADLS_ACUITY_SCORE: 64
ADLS_ACUITY_SCORE: 66
ADLS_ACUITY_SCORE: 66
ADLS_ACUITY_SCORE: 58
ADLS_ACUITY_SCORE: 68
ADLS_ACUITY_SCORE: 64
ADLS_ACUITY_SCORE: 22
ADLS_ACUITY_SCORE: 62
ADLS_ACUITY_SCORE: 64
ADLS_ACUITY_SCORE: 54
ADLS_ACUITY_SCORE: 60
ADLS_ACUITY_SCORE: 66
ADLS_ACUITY_SCORE: 22
ADLS_ACUITY_SCORE: 54
ADLS_ACUITY_SCORE: 64
ADLS_ACUITY_SCORE: 68
ADLS_ACUITY_SCORE: 62
ADLS_ACUITY_SCORE: 22
ADLS_ACUITY_SCORE: 30
ADLS_ACUITY_SCORE: 66
ADLS_ACUITY_SCORE: 64
ADLS_ACUITY_SCORE: 68
ADLS_ACUITY_SCORE: 64
ADLS_ACUITY_SCORE: 60
ADLS_ACUITY_SCORE: 54
ADLS_ACUITY_SCORE: 60
ADLS_ACUITY_SCORE: 64
ADLS_ACUITY_SCORE: 30
ADLS_ACUITY_SCORE: 64
ADLS_ACUITY_SCORE: 60
ADLS_ACUITY_SCORE: 23
ADLS_ACUITY_SCORE: 60
ADLS_ACUITY_SCORE: 68
ADLS_ACUITY_SCORE: 30
ADLS_ACUITY_SCORE: 68
ADLS_ACUITY_SCORE: 61
ADLS_ACUITY_SCORE: 64
ADLS_ACUITY_SCORE: 68
ADLS_ACUITY_SCORE: 68
ADLS_ACUITY_SCORE: 64
ADLS_ACUITY_SCORE: 23
ADLS_ACUITY_SCORE: 68
ADLS_ACUITY_SCORE: 66
ADLS_ACUITY_SCORE: 68
ADLS_ACUITY_SCORE: 66
ADLS_ACUITY_SCORE: 68
ADLS_ACUITY_SCORE: 64
ADLS_ACUITY_SCORE: 66
ADLS_ACUITY_SCORE: 60
ADLS_ACUITY_SCORE: 68
ADLS_ACUITY_SCORE: 64
ADLS_ACUITY_SCORE: 28
ADLS_ACUITY_SCORE: 64
ADLS_ACUITY_SCORE: 66
ADLS_ACUITY_SCORE: 64
ADLS_ACUITY_SCORE: 64
ADLS_ACUITY_SCORE: 65
ADLS_ACUITY_SCORE: 60
ADLS_ACUITY_SCORE: 66
ADLS_ACUITY_SCORE: 68
ADLS_ACUITY_SCORE: 68
ADLS_ACUITY_SCORE: 66
ADLS_ACUITY_SCORE: 68
ADLS_ACUITY_SCORE: 62
ADLS_ACUITY_SCORE: 64
ADLS_ACUITY_SCORE: 28
ADLS_ACUITY_SCORE: 68
ADLS_ACUITY_SCORE: 62
ADLS_ACUITY_SCORE: 66
ADLS_ACUITY_SCORE: 64
ADLS_ACUITY_SCORE: 58
ADLS_ACUITY_SCORE: 60
WALKING_OR_CLIMBING_STAIRS_DIFFICULTY: YES
ADLS_ACUITY_SCORE: 66
ADLS_ACUITY_SCORE: 68
ADLS_ACUITY_SCORE: 64
ADLS_ACUITY_SCORE: 60
ADLS_ACUITY_SCORE: 64
ADLS_ACUITY_SCORE: 68
ADLS_ACUITY_SCORE: 68
ADLS_ACUITY_SCORE: 64
ADLS_ACUITY_SCORE: 60
ADLS_ACUITY_SCORE: 64
ADLS_ACUITY_SCORE: 68
ADLS_ACUITY_SCORE: 64
ADLS_ACUITY_SCORE: 68
ADLS_ACUITY_SCORE: 30
ADLS_ACUITY_SCORE: 66
ADLS_ACUITY_SCORE: 62
ADLS_ACUITY_SCORE: 68
ADLS_ACUITY_SCORE: 54
ADLS_ACUITY_SCORE: 66
ADLS_ACUITY_SCORE: 66
ADLS_ACUITY_SCORE: 68
ADLS_ACUITY_SCORE: 62
ADLS_ACUITY_SCORE: 66
ADLS_ACUITY_SCORE: 23
ADLS_ACUITY_SCORE: 62
ADLS_ACUITY_SCORE: 66
ADLS_ACUITY_SCORE: 35
ADLS_ACUITY_SCORE: 68
CONCENTRATING,_REMEMBERING_OR_MAKING_DECISIONS_DIFFICULTY: NO
ADLS_ACUITY_SCORE: 66
ADLS_ACUITY_SCORE: 61
ADLS_ACUITY_SCORE: 66
ADLS_ACUITY_SCORE: 64
ADLS_ACUITY_SCORE: 30
ADLS_ACUITY_SCORE: 66
ADLS_ACUITY_SCORE: 64
ADLS_ACUITY_SCORE: 62
ADLS_ACUITY_SCORE: 66
ADLS_ACUITY_SCORE: 64
ADLS_ACUITY_SCORE: 68
ADLS_ACUITY_SCORE: 66
ADLS_ACUITY_SCORE: 66
ADLS_ACUITY_SCORE: 68
ADLS_ACUITY_SCORE: 22
ADLS_ACUITY_SCORE: 60
ADLS_ACUITY_SCORE: 62
ADLS_ACUITY_SCORE: 66
ADLS_ACUITY_SCORE: 60
ADLS_ACUITY_SCORE: 62
ADLS_ACUITY_SCORE: 68
ADLS_ACUITY_SCORE: 22
ADLS_ACUITY_SCORE: 64
ADLS_ACUITY_SCORE: 30
ADLS_ACUITY_SCORE: 68
ADLS_ACUITY_SCORE: 54
ADLS_ACUITY_SCORE: 66
ADLS_ACUITY_SCORE: 64
ADLS_ACUITY_SCORE: 23
ADLS_ACUITY_SCORE: 66
ADLS_ACUITY_SCORE: 66
ADLS_ACUITY_SCORE: 68
ADLS_ACUITY_SCORE: 64
ADLS_ACUITY_SCORE: 62
ADLS_ACUITY_SCORE: 66
ADLS_ACUITY_SCORE: 64
ADLS_ACUITY_SCORE: 68
ADLS_ACUITY_SCORE: 68
ADLS_ACUITY_SCORE: 60
DOING_ERRANDS_INDEPENDENTLY_DIFFICULTY: NO
ADLS_ACUITY_SCORE: 68
ADLS_ACUITY_SCORE: 68
ADLS_ACUITY_SCORE: 66
ADLS_ACUITY_SCORE: 64
ADLS_ACUITY_SCORE: 64
ADLS_ACUITY_SCORE: 54
ADLS_ACUITY_SCORE: 35
ADLS_ACUITY_SCORE: 66
ADLS_ACUITY_SCORE: 68
ADLS_ACUITY_SCORE: 68
ADLS_ACUITY_SCORE: 64
ADLS_ACUITY_SCORE: 68
ADLS_ACUITY_SCORE: 58
ADLS_ACUITY_SCORE: 22
ADLS_ACUITY_SCORE: 64
ADLS_ACUITY_SCORE: 60
ADLS_ACUITY_SCORE: 66
ADLS_ACUITY_SCORE: 54
ADLS_ACUITY_SCORE: 68
ADLS_ACUITY_SCORE: 66
ADLS_ACUITY_SCORE: 30
ADLS_ACUITY_SCORE: 60
ADLS_ACUITY_SCORE: 68
ADLS_ACUITY_SCORE: 64
ADLS_ACUITY_SCORE: 66
ADLS_ACUITY_SCORE: 34
ADLS_ACUITY_SCORE: 60
ADLS_ACUITY_SCORE: 30
ADLS_ACUITY_SCORE: 62
ADLS_ACUITY_SCORE: 68
ADLS_ACUITY_SCORE: 66
ADLS_ACUITY_SCORE: 58
ADLS_ACUITY_SCORE: 62
ADLS_ACUITY_SCORE: 62
ADLS_ACUITY_SCORE: 64
ADLS_ACUITY_SCORE: 60
ADLS_ACUITY_SCORE: 68
ADLS_ACUITY_SCORE: 68
ADLS_ACUITY_SCORE: 62
ADLS_ACUITY_SCORE: 66
ADLS_ACUITY_SCORE: 30
ADLS_ACUITY_SCORE: 64
ADLS_ACUITY_SCORE: 30
ADLS_ACUITY_SCORE: 64
ADLS_ACUITY_SCORE: 64
ADLS_ACUITY_SCORE: 68
ADLS_ACUITY_SCORE: 62
ADLS_ACUITY_SCORE: 30
ADLS_ACUITY_SCORE: 62
ADLS_ACUITY_SCORE: 58
ADLS_ACUITY_SCORE: 66
ADLS_ACUITY_SCORE: 60
ADLS_ACUITY_SCORE: 66
ADLS_ACUITY_SCORE: 64
ADLS_ACUITY_SCORE: 22
ADLS_ACUITY_SCORE: 68
ADLS_ACUITY_SCORE: 64
ADLS_ACUITY_SCORE: 60
ADLS_ACUITY_SCORE: 61
ADLS_ACUITY_SCORE: 60
ADLS_ACUITY_SCORE: 68
ADLS_ACUITY_SCORE: 66
ADLS_ACUITY_SCORE: 64
ADLS_ACUITY_SCORE: 66
ADLS_ACUITY_SCORE: 68
ADLS_ACUITY_SCORE: 68
ADLS_ACUITY_SCORE: 62
ADLS_ACUITY_SCORE: 64
ADLS_ACUITY_SCORE: 22
ADLS_ACUITY_SCORE: 66
ADLS_ACUITY_SCORE: 64
ADLS_ACUITY_SCORE: 62
ADLS_ACUITY_SCORE: 68
ADLS_ACUITY_SCORE: 62
ADLS_ACUITY_SCORE: 35
ADLS_ACUITY_SCORE: 62
ADLS_ACUITY_SCORE: 66
ADLS_ACUITY_SCORE: 68
ADLS_ACUITY_SCORE: 64
ADLS_ACUITY_SCORE: 28
ADLS_ACUITY_SCORE: 61
ADLS_ACUITY_SCORE: 22
ADLS_ACUITY_SCORE: 66
ADLS_ACUITY_SCORE: 66
ADLS_ACUITY_SCORE: 62
DEPENDENT_IADLS:: SHOPPING;LAUNDRY;TRANSPORTATION
ADLS_ACUITY_SCORE: 64
ADLS_ACUITY_SCORE: 60
ADLS_ACUITY_SCORE: 64
ADLS_ACUITY_SCORE: 60
ADLS_ACUITY_SCORE: 22
ADLS_ACUITY_SCORE: 68
ADLS_ACUITY_SCORE: 60
ADLS_ACUITY_SCORE: 60
ADLS_ACUITY_SCORE: 30
ADLS_ACUITY_SCORE: 64
ADLS_ACUITY_SCORE: 66
ADLS_ACUITY_SCORE: 60
ADLS_ACUITY_SCORE: 54
ADLS_ACUITY_SCORE: 66
ADLS_ACUITY_SCORE: 66
TOILETING: 2-->COMPLETELY DEPENDENT (NOT DEVELOPMENTALLY APPROPRIATE)
ADLS_ACUITY_SCORE: 64
ADLS_ACUITY_SCORE: 66
ADLS_ACUITY_SCORE: 62
ADLS_ACUITY_SCORE: 64
ADLS_ACUITY_SCORE: 68
ADLS_ACUITY_SCORE: 60
ADLS_ACUITY_SCORE: 68
ADLS_ACUITY_SCORE: 66
ADLS_ACUITY_SCORE: 68
ADLS_ACUITY_SCORE: 68
ADLS_ACUITY_SCORE: 64
ADLS_ACUITY_SCORE: 66
ADLS_ACUITY_SCORE: 58
ADLS_ACUITY_SCORE: 66
DOING_ERRANDS_INDEPENDENTLY_DIFFICULTY: NO
ADLS_ACUITY_SCORE: 68
ADLS_ACUITY_SCORE: 64
ADLS_ACUITY_SCORE: 64
ADLS_ACUITY_SCORE: 62
ADLS_ACUITY_SCORE: 68
ADLS_ACUITY_SCORE: 61
ADLS_ACUITY_SCORE: 66
ADLS_ACUITY_SCORE: 66
ADLS_ACUITY_SCORE: 64
ADLS_ACUITY_SCORE: 66
ADLS_ACUITY_SCORE: 68
ADLS_ACUITY_SCORE: 66
ADLS_ACUITY_SCORE: 66
ADLS_ACUITY_SCORE: 62
ADLS_ACUITY_SCORE: 68
ADLS_ACUITY_SCORE: 68
ADLS_ACUITY_SCORE: 62
ADLS_ACUITY_SCORE: 68
ADLS_ACUITY_SCORE: 64
ADLS_ACUITY_SCORE: 64
ADLS_ACUITY_SCORE: 66
ADLS_ACUITY_SCORE: 68
ADLS_ACUITY_SCORE: 66
ADLS_ACUITY_SCORE: 68
ADLS_ACUITY_SCORE: 60
ADLS_ACUITY_SCORE: 64
ADLS_ACUITY_SCORE: 68
ADLS_ACUITY_SCORE: 64
ADLS_ACUITY_SCORE: 64
ADLS_ACUITY_SCORE: 62
ADLS_ACUITY_SCORE: 68
ADLS_ACUITY_SCORE: 68
ADLS_ACUITY_SCORE: 64
ADLS_ACUITY_SCORE: 60
ADLS_ACUITY_SCORE: 66
ADLS_ACUITY_SCORE: 62
ADLS_ACUITY_SCORE: 22
ADLS_ACUITY_SCORE: 66
ADLS_ACUITY_SCORE: 68
ADLS_ACUITY_SCORE: 62
ADLS_ACUITY_SCORE: 62
ADLS_ACUITY_SCORE: 60
ADLS_ACUITY_SCORE: 64
ADLS_ACUITY_SCORE: 61
ADLS_ACUITY_SCORE: 64
ADLS_ACUITY_SCORE: 58
ADLS_ACUITY_SCORE: 64
ADLS_ACUITY_SCORE: 68
WEAR_GLASSES_OR_BLIND: NO
WEAR_GLASSES_OR_BLIND: NO
ADLS_ACUITY_SCORE: 35
ADLS_ACUITY_SCORE: 54
ADLS_ACUITY_SCORE: 64
ADLS_ACUITY_SCORE: 66
ADLS_ACUITY_SCORE: 66
ADLS_ACUITY_SCORE: 68
ADLS_ACUITY_SCORE: 62
ADLS_ACUITY_SCORE: 68
ADLS_ACUITY_SCORE: 30
ADLS_ACUITY_SCORE: 62
ADLS_ACUITY_SCORE: 64
ADLS_ACUITY_SCORE: 66
ADLS_ACUITY_SCORE: 68
ADLS_ACUITY_SCORE: 22
TRANSFERRING: 2-->COMPLETELY DEPENDENT (NOT DEVELOPMENTALLY APPROPRIATE)
ADLS_ACUITY_SCORE: 68
ADLS_ACUITY_SCORE: 60
ADLS_ACUITY_SCORE: 64
ADLS_ACUITY_SCORE: 66
ADLS_ACUITY_SCORE: 62
ADLS_ACUITY_SCORE: 60
ADLS_ACUITY_SCORE: 64
ADLS_ACUITY_SCORE: 66
ADLS_ACUITY_SCORE: 30
TOILETING_ISSUES: NO
ADLS_ACUITY_SCORE: 68
ADLS_ACUITY_SCORE: 68
ADLS_ACUITY_SCORE: 66
ADLS_ACUITY_SCORE: 68
ADLS_ACUITY_SCORE: 66
ADLS_ACUITY_SCORE: 64
ADLS_ACUITY_SCORE: 23
ADLS_ACUITY_SCORE: 66
ADLS_ACUITY_SCORE: 64
ADLS_ACUITY_SCORE: 68
WALKING_OR_CLIMBING_STAIRS: TRANSFERRING DIFFICULTY, DEPENDENT
ADLS_ACUITY_SCORE: 60
ADLS_ACUITY_SCORE: 66
ADLS_ACUITY_SCORE: 60
ADLS_ACUITY_SCORE: 68
ADLS_ACUITY_SCORE: 68
ADLS_ACUITY_SCORE: 30
ADLS_ACUITY_SCORE: 68
ADLS_ACUITY_SCORE: 22
ADLS_ACUITY_SCORE: 62
ADLS_ACUITY_SCORE: 66
ADLS_ACUITY_SCORE: 64
ADLS_ACUITY_SCORE: 60
ADLS_ACUITY_SCORE: 64
ADLS_ACUITY_SCORE: 64
ADLS_ACUITY_SCORE: 62
ADLS_ACUITY_SCORE: 64
ADLS_ACUITY_SCORE: 66
ADLS_ACUITY_SCORE: 64
ADLS_ACUITY_SCORE: 66
ADLS_ACUITY_SCORE: 62
ADLS_ACUITY_SCORE: 23
ADLS_ACUITY_SCORE: 68
ADLS_ACUITY_SCORE: 64
ADLS_ACUITY_SCORE: 64
ADLS_ACUITY_SCORE: 68
ADLS_ACUITY_SCORE: 64
ADLS_ACUITY_SCORE: 62
ADLS_ACUITY_SCORE: 62
ADLS_ACUITY_SCORE: 30
ADLS_ACUITY_SCORE: 64
ADLS_ACUITY_SCORE: 62
ADLS_ACUITY_SCORE: 62
ADLS_ACUITY_SCORE: 64
ADLS_ACUITY_SCORE: 66
ADLS_ACUITY_SCORE: 61
ADLS_ACUITY_SCORE: 64
ADLS_ACUITY_SCORE: 62
ADLS_ACUITY_SCORE: 58
ADLS_ACUITY_SCORE: 54
ADLS_ACUITY_SCORE: 58
ADLS_ACUITY_SCORE: 62
DRESS: 2-->COMPLETELY DEPENDENT
ADLS_ACUITY_SCORE: 68
ADLS_ACUITY_SCORE: 66
ADLS_ACUITY_SCORE: 34
ADLS_ACUITY_SCORE: 54
ADLS_ACUITY_SCORE: 54
ADLS_ACUITY_SCORE: 62
ADLS_ACUITY_SCORE: 62
ADLS_ACUITY_SCORE: 64
ADLS_ACUITY_SCORE: 66
ADLS_ACUITY_SCORE: 68
ADLS_ACUITY_SCORE: 68
ADLS_ACUITY_SCORE: 62
ADLS_ACUITY_SCORE: 22
ADLS_ACUITY_SCORE: 66
ADLS_ACUITY_SCORE: 64
ADLS_ACUITY_SCORE: 64
ADLS_ACUITY_SCORE: 68
ADLS_ACUITY_SCORE: 66
ADLS_ACUITY_SCORE: 64
ADLS_ACUITY_SCORE: 60
ADLS_ACUITY_SCORE: 68
ADLS_ACUITY_SCORE: 22
ADLS_ACUITY_SCORE: 64
ADLS_ACUITY_SCORE: 68
WALKING_OR_CLIMBING_STAIRS: AMBULATION DIFFICULTY, REQUIRES EQUIPMENT;AMBULATION DIFFICULTY, ASSISTANCE 1 PERSON
ADLS_ACUITY_SCORE: 62
ADLS_ACUITY_SCORE: 22
ADLS_ACUITY_SCORE: 68
ADLS_ACUITY_SCORE: 66
ADLS_ACUITY_SCORE: 68
ADLS_ACUITY_SCORE: 66
ADLS_ACUITY_SCORE: 64
ADLS_ACUITY_SCORE: 62
CONCENTRATING,_REMEMBERING_OR_MAKING_DECISIONS_DIFFICULTY: NO
ADLS_ACUITY_SCORE: 62
ADLS_ACUITY_SCORE: 30
ADLS_ACUITY_SCORE: 66
ADLS_ACUITY_SCORE: 23
ADLS_ACUITY_SCORE: 68
ADLS_ACUITY_SCORE: 68
ADLS_ACUITY_SCORE: 66
ADLS_ACUITY_SCORE: 66
ADLS_ACUITY_SCORE: 22
ADLS_ACUITY_SCORE: 64
ADLS_ACUITY_SCORE: 68
ADLS_ACUITY_SCORE: 64
ADLS_ACUITY_SCORE: 68
ADLS_ACUITY_SCORE: 66
ADLS_ACUITY_SCORE: 68
ADLS_ACUITY_SCORE: 60
ADLS_ACUITY_SCORE: 64
ADLS_ACUITY_SCORE: 61
ADLS_ACUITY_SCORE: 62
ADLS_ACUITY_SCORE: 64
ADLS_ACUITY_SCORE: 62
ADLS_ACUITY_SCORE: 58
ADLS_ACUITY_SCORE: 66
ADLS_ACUITY_SCORE: 60
ADLS_ACUITY_SCORE: 64
TOILETING_ISSUES: YES
ADLS_ACUITY_SCORE: 60
ADLS_ACUITY_SCORE: 60
TOILETING: 2-->COMPLETELY DEPENDENT
ADLS_ACUITY_SCORE: 68
ADLS_ACUITY_SCORE: 22
ADLS_ACUITY_SCORE: 68
ADLS_ACUITY_SCORE: 64
ADLS_ACUITY_SCORE: 23
ADLS_ACUITY_SCORE: 58
ADLS_ACUITY_SCORE: 62
ADLS_ACUITY_SCORE: 68
ADLS_ACUITY_SCORE: 68
ADLS_ACUITY_SCORE: 28
ADLS_ACUITY_SCORE: 62
ADLS_ACUITY_SCORE: 64
ADLS_ACUITY_SCORE: 61
ADLS_ACUITY_SCORE: 26
ADLS_ACUITY_SCORE: 62
ADLS_ACUITY_SCORE: 23
ADLS_ACUITY_SCORE: 68
ADLS_ACUITY_SCORE: 64
ADLS_ACUITY_SCORE: 68
ADLS_ACUITY_SCORE: 60
ADLS_ACUITY_SCORE: 54
ADLS_ACUITY_SCORE: 54
ADLS_ACUITY_SCORE: 64
ADLS_ACUITY_SCORE: 68
ADLS_ACUITY_SCORE: 64
ADLS_ACUITY_SCORE: 68
ADLS_ACUITY_SCORE: 60
ADLS_ACUITY_SCORE: 62
ADLS_ACUITY_SCORE: 68
ADLS_ACUITY_SCORE: 23
ADLS_ACUITY_SCORE: 28
ADLS_ACUITY_SCORE: 60
ADLS_ACUITY_SCORE: 62
ADLS_ACUITY_SCORE: 68
ADLS_ACUITY_SCORE: 68
ADLS_ACUITY_SCORE: 66
ADLS_ACUITY_SCORE: 23
ADLS_ACUITY_SCORE: 68
ADLS_ACUITY_SCORE: 68
ADLS_ACUITY_SCORE: 30
ADLS_ACUITY_SCORE: 66
ADLS_ACUITY_SCORE: 64
ADLS_ACUITY_SCORE: 60
ADLS_ACUITY_SCORE: 50
ADLS_ACUITY_SCORE: 68
WALKING_OR_CLIMBING_STAIRS_DIFFICULTY: YES
ADLS_ACUITY_SCORE: 66
ADLS_ACUITY_SCORE: 68
ADLS_ACUITY_SCORE: 68
ADLS_ACUITY_SCORE: 64
ADLS_ACUITY_SCORE: 62
ADLS_ACUITY_SCORE: 64
ADLS_ACUITY_SCORE: 58
ADLS_ACUITY_SCORE: 68
ADLS_ACUITY_SCORE: 66
ADLS_ACUITY_SCORE: 62
ADLS_ACUITY_SCORE: 68
ADLS_ACUITY_SCORE: 66
ADLS_ACUITY_SCORE: 62
ADLS_ACUITY_SCORE: 54
ADLS_ACUITY_SCORE: 62
ADLS_ACUITY_SCORE: 60
ADLS_ACUITY_SCORE: 23
ADLS_ACUITY_SCORE: 68
ADLS_ACUITY_SCORE: 64
ADLS_ACUITY_SCORE: 64
ADLS_ACUITY_SCORE: 60
ADLS_ACUITY_SCORE: 64
ADLS_ACUITY_SCORE: 68
ADLS_ACUITY_SCORE: 68
DRESSING/BATHING_DIFFICULTY: NO
ADLS_ACUITY_SCORE: 64
ADLS_ACUITY_SCORE: 60

## 2022-01-01 ASSESSMENT — VISUAL ACUITY
OU: NOT TESTABLE
OU: BASELINE
OU: NOT TESTABLE
OU: NOT TESTABLE
OU: BASELINE
OU: NOT TESTABLE
OU: BASELINE
OU: NOT TESTABLE
OU: NOT TESTABLE
OU: BASELINE
OU: NOT TESTABLE
OU: NOT TESTABLE
OU: BASELINE
OU: NOT TESTABLE
OU: BASELINE
OU: NOT TESTABLE
OU: BASELINE
OU: BASELINE
OU: NOT TESTABLE
OU: BASELINE
OU: BASELINE
OU: NOT TESTABLE
OU: BASELINE
OU: NOT TESTABLE
OU: BASELINE
OU: NOT TESTABLE
OU: BASELINE

## 2022-01-01 ASSESSMENT — ENCOUNTER SYMPTOMS
CHILLS: 0
WOUND: 1
NAUSEA: 1
DIZZINESS: 0
FEVER: 0
SHORTNESS OF BREATH: 0
HEADACHES: 1

## 2022-01-01 ASSESSMENT — LIFESTYLE VARIABLES: TOBACCO_USE: 1

## 2022-01-17 NOTE — PROGRESS NOTES
Assessment & Plan     Open wound  Right shin draining wound.     Local infection of wound    - cephALEXin (KEFLEX) 500 MG capsule  Dispense: 21 capsule; Refill: 0    PAD, hx delayed wound healing    Brie is a 76-year-old female with a history of PAD, myelodysplastic disorder with chronic anemia who presented to the clinic for right shin nonhealing wound which sustained when she fell and scraped it on a carpet.     She has a history of left leg PAD which vascular center medically manages and nonhealing wounds as a result.  Review of recent vascular center notes does not reveal mention of a right PAD.    Within the last year, she had non healing wound infections in her left leg noted in March, 2021 treated with Bactrim successfully.  Prior to that, in September, 2020, she had a nonhealing wound with cellulitis treated with Keflex excessively.  She has no known history of MRSA.  Reviewed chart for wound cultures and none were noted within the last year.    As the wound on her right shin has been draining purulent drainage significantly with above history, we will cover for secondary infection today with cephalexin.  No significant cellulitis noted.  She does have multiple scabbed over wounds located on her foot.  Unfortunately, she is going to [place], but she is advised to follow-up in about 4 days if this drainage is not decreasing, ASAP if erythema is developing around the wound, swelling or pain is significant.  Currently, her pain is minimal only with touching the open area.      She needs to seek medical attention if symptoms does not get better within 4- 5 days or redness increases    -She needs to make an appointment with the vascular clinic before she travels  so she can be seen when she returns from [place].     - 20 minutes spent doing extensive chart review, patient visit, documentation, education on medications.    Return in about 4 days (around 1/21/2022), or if symptoms worsen or fail to  improve.    Shantell Kumar, CNP  [facility] ENRIQUE Gil is a 76 year old female who presents to clinic today for the following health issues:  Chief Complaint   Patient presents with     Leg Swelling     right leg swollen.      HPI    Brie Olmstead is a 68-year-old-year-old female here with complaints of right shin nonhealing wound sustained when she fell going up stairs and scraped it on the carpet about 1 week ago. The wound has been draining purulent drainage for  few days. She is planning to go to  [place] and she wants to see if antibiotic is needed.     She had similar wound infection back in September 2020 and treated with antibiotics.     Has a history of PAD being monitored with vascular clinic and nonhealing wounds.  Has been treated with Keflex and Bactrim in the past successfully for nonhealing wounds.    .     Review of Systems   Constitutional: Negative for chills and fever.   Skin: Positive for wound (right shin).   Neurological: Negative for dizziness.           Objective    BP (!) 162/67 (BP Location: Right arm, Patient Position: Sitting, Cuff Size: Adult Regular)   Pulse 78   Temp 97.1  F (36.2  C) (Tympanic)   Resp 18   Wt 70.3 kg (155 lb)   SpO2 99%   BMI 27.46 kg/m    Physical Exam  Constitutional:       Appearance: Normal appearance.   Cardiovascular:      Pulses: Normal pulses.   Skin:     Findings: Erythema and lesion (right shin draining wound.  Wound base is completely tan after attempt to remove with wound .  Drainage color is tan.  No bleeding.  No surrounding cellulitis.  Slight swelling of the shin around open area.  Was tender to palpation around open area.) present. No bruising.      Comments: Right foot with fifth toe wound, scabbed and various abrasion appearing wounds on foot as well.   Neurological:      Mental Status: She is alert.   Psychiatric:         Mood and Affect: Mood normal.         Behavior: Behavior normal.          Thought Content: Thought content normal.         Judgment: Judgment normal.

## 2022-01-17 NOTE — PATIENT INSTRUCTIONS
Gently clean wound with warm water and soap    Apply bacitracin ointment and nonadherent dressing    Take antibiotic as prescribed    If  drainage continues after 4 to 5 days , or redness increases around the wound make sure to see a doctor even while you are on vacation    Make sure to take probiotic or yogurt to prevent diarrhea.    Make sure to make a appointment with the vascular clinic when you get back.

## 2022-01-18 NOTE — PROGRESS NOTES
[facility]    Outpatient Physical Therapy Discharge Note  Patient: Louise Benítez  : 1945    Beginning/End Dates of Reporting Period:  10/26/21 to 21    Referring Provider: Dr. Julian    Therapy Diagnosis: chronic low back pain     Client Self Report: My back feels better.  I am not as stiff as I used to be.     Objective Measurements:  Objective Measure: pain: not able to give a number.      Objective Measure: APTA: was 8 with use of both hands on IE  Details: APTA: today was 8 with use of both hands  Objective Measure: trunk AROM remains same at 25% limited throughout with no pain today     Objective Measure: LE strength: hip flexion: R=4+ to 5-/5, L=4+/5, bilateral hip abd 5-/5, Bilateral hip add 4/5, knee flex: B 4+/5, knee ext: Bilateral 5-/5       Goals:  Goal Identifier walk   Goal Description able to walk on even surfaces with less fear of falling as core strength improves    Target Date 22   Date Met      Progress (detail required for progress note): feet hurt when she walks in the house so suggested she wear shoes.  At times she feels it is easier to walk and other times not so easy.  She is still fearful of falling     Goal Identifier housework   Goal Description Able to perform household tasks such as vacuuming, with good technique and pain 0-2/10   Target Date 22   Date Met      Progress (detail required for progress note): she is going to get a cleaning lady so doesn't feel this goal is appropriate anymore.  will discharge goal     Goal Identifier endurance   Goal Description able to do tasks around the house for 15+ minutes without having to take frequent breaks as endurance ans strength improve    Target Date 22   Date Met      Progress (detail required for progress note):  Still needs to take frequent breaks but feels some of the tasks are easier.  Has increased  time to about 20 minutes. Will continue goal     Goal Identifier bend   Goal Description able to bend over and  objects on floor with pain 0-2/10   Target Date 02/19/22   Date Met      Progress (detail required for progress note):       Goal Identifier     Goal Description     Target Date     Date Met      Progress (detail required for progress note):       Goal Identifier     Goal Description     Target Date     Date Met      Progress (detail required for progress note):       Goal Identifier     Goal Description     Target Date     Date Met      Progress (detail required for progress note):       Goal Identifier     Goal Description     Target Date     Date Met      Progress (detail required for progress note):             Plan:  Discharge from therapy.    Discharge:    Reason for Discharge: Pt wanted more therapy but did not call to schedule any more follow-up appts.     Equipment Issued:     Discharge Plan: Patient to continue home program.

## 2022-01-18 NOTE — ADDENDUM NOTE
Encounter addended by: Yomaira Carter, PT on: 1/18/2022 9:35 AM   Actions taken: Episode resolved, Clinical Note Signed

## 2022-02-23 NOTE — TELEPHONE ENCOUNTER
LMTCB to verify when last eye exam was, and need results faxed to [phone]    Pt will also need labs in march.

## 2022-03-01 NOTE — TELEPHONE ENCOUNTER
Med refills sent to [facility] pharmacy per RN refill protocol and Dr Us. F/u appt scheduled in May 2022

## 2022-03-01 NOTE — TELEPHONE ENCOUNTER
Call received from pt, she's wondering why her hydroxychloroquine and Celebrex have not been refilled?     She reports that she goes just once a year to the [facility] to have her eyes checked-this was last done 5/17/21 (see results scanned into her chart 5/19/21). She plans to go again in May.     As for her lab tests, she doesn't understand why she needs to have them done right now. She says that in the past, she has always done them every 6 months, they always coincided with her follow-up appointments with Dr. Us. States she's never needed labs done at the 3-month increment, so she doesn't understand why she would need them now?    Pt would like to have her hydroxychloroquine and Celebrex refilled as soon as possible. If there are any questions for her, please call her on home phone at [phone] to leave detailed message.

## 2022-03-10 NOTE — PROGRESS NOTES
Patient presents with:  Infection: infection of R pinky toe (see 1/17 encounter)  meds were working pt feels it got bad when meds were gone.        Clinical Decision Making:  Had a conversation with the patient stating that she does have a hammertoe deformity and also a crossover toe deformity on the right foot.  I am concerned that with her peripheral artery disease and rheumatoid arthritis history she will continue to have infections.  She was instructed to use a larger rounded toe box shoe to accommodate the foot so that is not rubbing on the hammertoe deformity.  Additionally Duricef for 10 days was given to help with venous stasis ulcer.  Referral to podiatry as written.  Questions were answered patient satisfaction before discharge.  She may return to the walk-in care between today and follow-up with podiatry if any complication or sequela arise.      ICD-10-CM    1. Hammertoe of right foot  M20.41 Orthopedic  Referral   2. PAD (peripheral artery disease) (H)  I73.9 Orthopedic  Referral   3. Delayed wound healing  T14.8XXD Orthopedic  Referral     cefadroxil (DURICEF) 500 MG capsule   4. Crossover toe deformity of right foot  M20.5X1 Orthopedic  Referral   5. Stasis ulcer of lower extremity, right (H)  I83.019 cefadroxil (DURICEF) 500 MG capsule    L97.919        There are no Patient Instructions on file for this visit.    HPI:  Louise Benítez is a 76 year old female who has a known history of rheumatoid arthritis, peripheral artery disease, who presents today for several complaints.  The first complaint is that she has had difficulty with a nonhealing venous stasis ulcer on the right pretibial area.  He was given Keflex for 7 days and had some improvement of the wound until the antibiotic was completed and then it started to re ulcer and cause her some difficulty with pain and not completely closing.    Patient second concern is that she has had pain and ulcers on her  digits of the right foot.  Patient has had pain at the fifth digit on the dorsal aspect over the PIP joint.  Has been rubbing on her shoe ulcerating causing pain.  He also has a crossover toe on the second digit.  She also has had rubbing on the MTP joint and the great toe on her shoe box.     History obtained from chart review and the patient.    Problem List:  2021-05: PAD (peripheral artery disease) (H)  2021-05: Delayed wound healing  2020-02: High risk medication use  2019-10: Compression fracture of L1 vertebra with routine healing  2018-06: Seronegative rheumatoid arthritis of multiple sites (H)  2018-06: Primary osteoarthritis involving multiple joints  2018-06: Bilateral shoulder tendinopathy  2018-06: Macrocytic anemia  2018-06: Polyarthralgia  2017-12: Osteoporosis  2014-12: Patient Counseling: End-Of-Life Issues  Ptosis Of Eyelid  Lower Back Pain  Rheumatoid arthritis (H)      Past Medical History:   Diagnosis Date     Rheumatoid arthritis (H)        Social History     Tobacco Use     Smoking status: Former Smoker     Smokeless tobacco: Never Used   Substance Use Topics     Alcohol use: Yes     Comment: Alcoholic Drinks/day: wine        Review of Systems  As above in HPI otherwise negative.    Vitals:    03/09/22 1809   BP: (!) 159/74   BP Location: Left arm   Patient Position: Sitting   Cuff Size: Adult Regular   Pulse: 93   Resp: 14   SpO2: 98%       General: Patient is resting comfortably no acute distress is afebrile  HEENT: Head is normocephalic atraumatic   Skin: Without rash non-diaphoretic  Musculoskeletal: Flexion of the right foot shows that she has redness and small ulcer on the MTP joint and proximal metatarsal of the right foot.  She also has an ulceration over the DIP joint area of the right small toe (fifth digit) this appears to be a hammertoe deformity with irritation on the dorsal aspect of the toe  He also has crossover deformity on the second digit.   On the right pretibial area there  is a 1 cm diameter venous stasis ulcer.  This is nonfull-thickness.  There is no subcutaneous fat exposed.    Physical Exam      At the end of the encounter, I discussed results, diagnosis, medications. Discussed red flags for immediate return to clinic/ER, as well as indications for follow up if no improvement. Patient understood and agreed to plan. Patient was stable for discharge.

## 2022-03-10 NOTE — PATIENT INSTRUCTIONS
Keep the wounds clean dry and covered.  May apply topical antibiotic  Take the oral antibiotic as written.  Change her shoewear to have an adequate toebox that is both wide and tall enough to not irritate the toes.  You may need to increase the shoe size to do this    Follow-up with podiatry for definitive evaluation and treatment of your nonhealing ulcers and crossover toe deformity and hammertoe.      Patient Education     Venous Leg Ulcer  Arteries carry oxygenated blood from your heart to the rest of your body. Veins return the blood to the heart.   When you sit or stand, blood in the veins in your legs must flow  uphill  to your heart. When you move your legs while walking, the contraction of your leg muscles has a pumping effect on the blood in the veins. The veins have one-way valves that stop the blood from flowing backward.   If you have poor blood flow in your veins (venous insufficiency), the one-way valves are damaged. The blood doesn t flow uphill very well. This raises pressure in the veins, and the tissues in your legs don t get enough oxygen. As the problem gets worse, an area of skin near the ankle turns dark and an ulcer forms. This is called a venous stasis ulcer. These ulcers usually don t hurt unless they become infected.   Venous stasis ulcers are treated with compression wraps, antibiotics that you put on your skin, and special dressings. Sometimes you may need a skin graft. Once the ulcer has healed, you will need to use compression stocking to help the pumping action in your veins. The stockings will also reduce swelling.   Home care  Follow these tips when caring for yourself at home:    Use any special compression dressings or stockings as directed.    If you were told to change your dressing, follow the instructions your healthcare provider gave you. Many different kinds of dressings can be used for this problem, and each is used differently.    Walk regularly. This helps the blood flow  better in your legs.    Maintain a healthy weight. If you are overweight, talk with your provider about a weight loss program.    If you smoke, quit smoking. This will ease your symptoms and lower the chance that the disease will get worse. Join a stop-smoking program or ask your provider for help in quitting.    Check your feet and legs for skin breaks or color changes. Report these to your provider. This could be an early sign of an ulcer.    Wear comfortable, well-fitting shoes and socks. Don t use socks that are tight. If they leave a dent in your leg by the end of the day, they are too tight.    When standing, shift your weight from one leg to the other.    When sitting for long periods, put your feet up. Move your feet and ankles often to get your calf muscles moving. Get up and walk from time to time.  Follow-up care  Follow up with your healthcare provider, or as advised.  Call 911  Call 911 if any of the following occur:     Shortness of breath or painful breathing    Chest pain    Coughing up blood  When to seek medical advice  Call your healthcare provider right away if any of these occur:    Pus or discharge coming from the ulcer    Pain in or around the ulcer    Redness or swelling of your leg around the ulcer    Fever of 100.4 F (38 C) or higher, or as directed by your healthcare provider    Repeated cough  Alanna last reviewed this educational content on 9/1/2019 2000-2021 [employer]. All rights reserved. This information is not intended as a substitute for professional medical care. Always follow your healthcare professional's instructions.           Patient Education     Foot Surgery: Curled Fifth Toe  Hammertoes can make walking painful. With hammertoes, one or more toes curl or bend abnormally. This can be caused by an inherited muscle problem, an abnormal bone length, or poor foot mechanics. The affected joints can rub inside shoes. This causes buildups of dead skin called  corns.    Curled fifth toe  A curled fifth toe is most often inherited. When the fifth toe curls inward, it moves under the next toe. Then the nail of the curled toe starts to face outward. As a result, you may bear weight on the side of your toe instead of the bottom. This can cause corns and painful nails.  There are many nonsurgical treatments available. But if these are not effective, surgery is an option.    Derotation arthroplasty  A wedge of skin and a section of bone are removed to help straighten (derotate) the toe. You can often bear weight on your foot right after surgery. In some cases, you may need to wear a bandage, splint, and surgical shoe for a few weeks. When healed, the bones become connected with scar tissue.  [employer] last reviewed this educational content on 5/1/2018 2000-2021 [employer]. All rights reserved. This information is not intended as a substitute for professional medical care. Always follow your healthcare professional's instructions.           Patient Education     Foot Surgery: Flexible and Rigid Hammertoes  With hammertoes, one or more toes curl or bend abnormally. This can be caused by an inherited muscle problem, an abnormal bone length, or poor foot mechanics. The affected joints can rub inside shoes. This causes buildups of dead skin called corns.  There are many nonsurgical treatments for hammertoes. But if these are not effective, you may want to consider surgery.    Flexible hammertoes  When hammertoes are flexible, you can straighten the buckled joints. Flexible hammertoes may become rigid over time.    Tendon release  This treatment helps release the buckled joint. The bottom (flexor) tendon may be repositioned to the top of the affected toe (flexor tendon transfer). Sometimes, the top or bottom tendon is released but not repositioned (tenotomy).    Rigid hammertoes  Rigid hammertoes are fixed (not flexible). You can't straighten the buckled joints.  Corns, pain, and loss of function may be more severe with rigid hammertoes than with flexible ones.    Arthroplasty  A part of the joint is removed, and the toe is straightened. In some cases, the entire joint may be replaced with an implant. When healed, the bones become connected with scar tissue, making your toe flexible.    Fusion  First the cartilage and some bone on both sides of the joint are removed. Then the toe is straightened and the two bones are held together, often with a pin. The pin is removed after several weeks. Once your foot heals, the toe will be less flexible. But it will be more stable.  Healing after surgery  The severity of your condition, number of toes involved, and type of surgery done will affect your recovery time. Many people are able to walk right after surgery with a special surgical shoe. Full healing can take several weeks. Your healthcare provider can advise you on what to expect after surgery.  Alanna last reviewed this educational content on 5/1/2018 2000-2021 [employer] All rights reserved. This information is not intended as a substitute for professional medical care. Always follow your healthcare professional's instructions.

## 2022-03-14 NOTE — PATIENT INSTRUCTIONS
Start doxycycline 2 times a day for 10 days always take with food to prevent nausea vomiting even the pharmacist tells you not to take it with food    Apply mupirocin ointment to wounds with each dressing change        Soak feet daily in am or bedtime warm water with small amount of antibacterial soap, then rinse with clear water and reapply dressings    Keep legs elevated when sitting/at rest     Call the number noted below to schedule an appointment with podiatry-the last provider placed the referral on 3/9/2022    contact the  Representative at: [phone]    Schedule an appointment with a new PCP for new patient appointment     IN ADDITION-you need to be seen again on Friday, for a wound check     If fever, increase swelling/pain in lower legs, shortness of breath to ER        Patient Education     Cellulitis  Cellulitis is an infection of the deep layers of skin. A break in the skin, such as a cut or scratch, can let bacteria under the skin. If the bacteria get to deep layers of the skin, it can be serious. If not treated, cellulitis can get into the bloodstream and lymph nodes. The infection can then spread throughout the body. This causes serious illness.   Cellulitis causes the affected skin to become red, swollen, warm, and sore. The reddened areas have a visible border. An open sore may leak fluid (pus). You may have a fever, chills, and pain.   Cellulitis is treated with antibiotics taken for 7 to 10 days. An open sore may be cleaned and covered with cool wet gauze. Symptoms should get better 1 to 2 days after treatment is started. Make sure to take all the antibiotics for the full number of days until they are gone. Keep taking the medicine even if your symptoms go away.   Home care  Follow these tips:    Limit the use of the part of your body with cellulitis.     If the infection is on your leg, keep your leg raised while sitting. This helps reduce swelling.    Take all of the antibiotic  medicine exactly as directed until it is gone. Don't miss any doses, especially during the first 7 days. Don t stop taking the medicine when your symptoms get better.    Keep the affected area clean and dry.    Wash your hands with soap and clean, running water before and after touching your skin. Anyone else who touches your skin should also wash his or her hands. Don't share towels.  Follow-up care  Follow up with your healthcare provider, or as advised. If your infection doesn't go away on the first antibiotic, your healthcare provider will prescribe a different one.   When to seek medical advice  Call your healthcare provider right away if any of these occur:    Red areas that spread    Swelling or pain that gets worse    Fluid leaking from the skin (pus)    Fever higher of 100.4  F (38.0  C) or higher after 2 days on antibiotics  Alanna last reviewed this educational content on 8/1/2019 2000-2021 [employer]. All rights reserved. This information is not intended as a substitute for professional medical care. Always follow your healthcare professional's instructions.           Patient Education     Understanding Chronic Venous Insufficiency  Problems with the veins in the legs may lead to chronic venous insufficiency (CVI). CVI means that there is a long-term problem with the veins not being able to pump blood back to your heart. When this happens, blood stays in the legs and causes swelling and aching.   Two problems that may lead to chronic venous insufficiency are:    Damaged valves. Valves keep blood flowing from the legs through the blood vessels and back to the heart. When the valves are damaged, blood does not flow as well.     Deep vein thrombosis (DVT).  Blood clots may form in the deep veins of the legs. This may cause pain, redness, and swelling in the legs. It may also block the flow of blood back to the heart. Seek medical care right away if you have these symptoms.  A blood clot  in the leg can also break off and travel to the lungs. This is called  pulmonary embolism (PE).  In the lungs, the clot can cut off the flow of blood. This may cause chest pain, trouble breathing, sweating, a fast heartbeat, coughing (may cough up blood), and fainting. This is a medical emergency and may cause death. Call 911 if you have these symptoms.  CVI can t be cured, but you can control leg swelling to reduce the likelihood of sores (ulcers).  Recognizing the symptoms  Be aware of the following:    If you stand or sit with your feet down for long periods, your legs may ache or feel heavy.    Swollen ankles are possibly the most common symptom of CVI.    As swelling increases, the skin over your ankles may show red spots or a brownish tinge. The skin may feel leathery or scaly, and may start to itch.    If swelling is not controlled, an ulcer (open wound) may form.  What you can do  Reduce your risk of developing ulcers by doing the following:    Increase blood flow back to your heart by elevating your legs, exercising daily, and wearing elastic stockings.    Boost blood flow in your legs by losing extra weight.    If you must stand or sit in one place for a period of time, keep your blood moving by wiggling your toes, shifting your body position, and rising up on the balls of your feet.    [employer] last reviewed this educational content on 10/1/2019    9232-5829 [employer]. All rights reserved. This information is not intended as a substitute for professional medical care. Always follow your healthcare professional's instructions.           Patient Education     Chronic Venous Insufficiency: Treating Ulcers    If leg swelling because of chronic venous insufficiency isn t controlled, an open wound (ulcer) can form. Although ulcers vary in size and shape, they usually appear on the inside of the ankle.  Treating an ulcer    See your healthcare provider. Ulcers need frequent medical care. Special  dressings may be applied. You may be given antibiotics to fight infection.    Your healthcare provider may prescribe medicines, such as aspirin or pentoxifylline, to help the ulcer heal.    Your healthcare provider may prescribe compression stockings to help with the swelling.     Elevate your legs often to reduce swelling. The ulcer needs oxygen-rich blood to heal. This blood can t reach the ulcer until swelling is reduced.    When to call your healthcare provider  Seek immediate medical attention if:     You have an increase in pain    You develop a fever of 100.4 F (38 C) or higher, or as directed by your healthcare provider    The area around the ulcer becomes red, tender, or both    The ulcer oozes discolored fluid or smells bad    Swelling increases suddenly or the dressing feels tight  Alanna last reviewed this educational content on 10/1/2019    7043-2979 [employer] All rights reserved. This information is not intended as a substitute for professional medical care. Always follow your healthcare professional's instructions.           Patient Education     Venous Leg Ulcer  Arteries carry oxygenated blood from your heart to the rest of your body. Veins return the blood to the heart.   When you sit or stand, blood in the veins in your legs must flow  uphill  to your heart. When you move your legs while walking, the contraction of your leg muscles has a pumping effect on the blood in the veins. The veins have one-way valves that stop the blood from flowing backward.   If you have poor blood flow in your veins (venous insufficiency), the one-way valves are damaged. The blood doesn t flow uphill very well. This raises pressure in the veins, and the tissues in your legs don t get enough oxygen. As the problem gets worse, an area of skin near the ankle turns dark and an ulcer forms. This is called a venous stasis ulcer. These ulcers usually don t hurt unless they become infected.   Venous stasis  ulcers are treated with compression wraps, antibiotics that you put on your skin, and special dressings. Sometimes you may need a skin graft. Once the ulcer has healed, you will need to use compression stocking to help the pumping action in your veins. The stockings will also reduce swelling.   Home care  Follow these tips when caring for yourself at home:    Use any special compression dressings or stockings as directed.    If you were told to change your dressing, follow the instructions your healthcare provider gave you. Many different kinds of dressings can be used for this problem, and each is used differently.    Walk regularly. This helps the blood flow better in your legs.    Maintain a healthy weight. If you are overweight, talk with your provider about a weight loss program.    If you smoke, quit smoking. This will ease your symptoms and lower the chance that the disease will get worse. Join a stop-smoking program or ask your provider for help in quitting.    Check your feet and legs for skin breaks or color changes. Report these to your provider. This could be an early sign of an ulcer.    Wear comfortable, well-fitting shoes and socks. Don t use socks that are tight. If they leave a dent in your leg by the end of the day, they are too tight.    When standing, shift your weight from one leg to the other.    When sitting for long periods, put your feet up. Move your feet and ankles often to get your calf muscles moving. Get up and walk from time to time.  Follow-up care  Follow up with your healthcare provider, or as advised.  Call 911  Call 911 if any of the following occur:     Shortness of breath or painful breathing    Chest pain    Coughing up blood  When to seek medical advice  Call your healthcare provider right away if any of these occur:    Pus or discharge coming from the ulcer    Pain in or around the ulcer    Redness or swelling of your leg around the ulcer    Fever of 100.4 F (38 C) or higher,  or as directed by your healthcare provider    Repeated cough  Alanna last reviewed this educational content on 9/1/2019 2000-2021 [employer]. All rights reserved. This information is not intended as a substitute for professional medical care. Always follow your healthcare professional's instructions.

## 2022-03-14 NOTE — PROGRESS NOTES
Patient presents with:  Leg Swelling: both feet x almost 2 months ago, pain, burning sensation on and off. both feet redness and swollen       Subjective     Louise Benítez is a 76 year old female who presents to clinic today for the following health issues:    HPI       Musculoskeletal problem-leg swellin       Duration: ongoing since 3/9/2022    Last seen 3/9/22, started on duricef, podiatry referral, some improvement, has not been seen by podiatry, referral completed on 3/9/22     Description  Location: bilateral lower leg  2 small eschar on dorsum of great toe with erythema   Bilateral small toes  Anterior mid shin on right ulcerative lesion with thick green eschar   Left leg hit leg on bedframe at hotel a month ago, now wound oozing clear fluid, yellow eschar     Intensity:  moderate    Accompanying signs and symptoms: warmth, swelling, redness and drainage, no fever     History  Previous similar problem: YES  Previous evaluation:  As noted     Precipitating or alleviating factors:  Trauma or overuse: YES- as noted  Aggravating factors include: sitting, standing, walking, climbing stairs and overuse    Therapies tried and outcome: rest/inactivity and elevate         Past Medical History:   Diagnosis Date     Rheumatoid arthritis (H)      Social History     Tobacco Use     Smoking status: Former Smoker     Smokeless tobacco: Never Used   Substance Use Topics     Alcohol use: Yes     Comment: Alcoholic Drinks/day: wine        Current Outpatient Medications   Medication Sig Dispense Refill     atorvastatin (LIPITOR) 40 MG tablet [ATORVASTATIN (LIPITOR) 40 MG TABLET] Take 1 tablet (40 mg total) by mouth at bedtime. 90 tablet 1     baclofen (LIORESAL) 10 MG tablet Take half to one tablet by mouth at bedtime as needed for muscle spasm 90 tablet 0     celecoxib (CELEBREX) 200 MG capsule Take 1 capsule (200 mg) by mouth 2 times daily as needed for moderate pain 120 capsule 0     cholecalciferol, vitamin D3, 50,000  unit Tab [CHOLECALCIFEROL, VITAMIN D3, 50,000 UNIT TAB] Take 1 tablet weekly 12 tablet 0     hydroxychloroquine (PLAQUENIL) 200 MG tablet TAKE TWO TABLETS BY MOUTH DAILY AT BEDTIME 180 tablet 0     mupirocin (BACTROBAN) 2 % external ointment Apply topically daily To wounds on legs and feet with daily dressing changes 22 g 0     omeprazole (PRILOSEC) 20 MG capsule [OMEPRAZOLE (PRILOSEC) 20 MG CAPSULE] Take 1 capsule (20 mg total) by mouth daily before breakfast. 90 capsule 3     No Known Allergies          ROS are negative, except as otherwise noted HPI      Objective    BP (!) 167/74 (BP Location: Right arm, Patient Position: Sitting, Cuff Size: Adult Regular)   Pulse 80   Temp 98.2  F (36.8  C) (Oral)   Resp 24   SpO2 100%   Breastfeeding No   There is no height or weight on file to calculate BMI.  Physical Exam   GENERAL: alert and no distress   Right foot -2 small eschar on dorsum of great toe with erythema,eschar and swelling of small toe  entire foot is swollen, DP+1 mild erythema dorsum of foot    Left foot- swelling erythema of dorsum of foot, DP +1, sweliing eschar of small toe  Right lower leg-Anterior mid shin erythematousm venous stasis changes just below the knee to just above the ankle, tender, ulcerative lesion with thick green eschar, entire lower leg and ankle swelling , no posterior calf tenderness,   Left leg-abrasion on mid lower leg now wound oozing clear fluid, yellow eschar, with surrounding erythema,  swelling of entire lower leg and venous stasis changes,, posterior calf nontender   NEURO: baseline strength and tone,  mentation intact, slow deliberate gait due to leg and foot sweling      Diagnostic Test Results:  Labs reviewed in Epic  No results found for any visits on 03/14/22.        ASSESSMENT/PLAN:      ICD-10-CM    1. Cellulitis of both feet  L03.115 doxycycline hyclate (VIBRAMYCIN) 100 MG capsule    L03.116 mupirocin (BACTROBAN) 2 % external ointment   2. Bilateral cellulitis of  lower leg  L03.116 doxycycline hyclate (VIBRAMYCIN) 100 MG capsule    L03.115 mupirocin (BACTROBAN) 2 % external ointment   3. PAD (peripheral artery disease) (H)  I73.9    4. Stasis ulcer of lower extremity, right (H)  I83.019     L97.919    5. Hammer toes of both feet  M20.41     M20.42              Reviewed medication instructions and side effects. Follow up if experiences side effects.     I reviewed supportive care, otc meds to use if needed, expected course, and signs of concern.  Follow up as needed or if she does not improve within  1-2 days or if worsens in any way.  Reviewed red flag symptoms and is to go to the ER if experiences any of these.     The use of Dragon/[employer] services may have been used to construct the content in this note; any grammatical or spelling errors are non-intentional. Please contact the author of this note directly if you are in need of any clarification.      On the day of the encounter, time spend on chart review, patient visit, review of testing, documentation was 40  minutes          Patient Instructions   Start doxycycline 2 times a day for 10 days always take with food to prevent nausea vomiting even the pharmacist tells you not to take it with food    Apply mupirocin ointment to wounds with each dressing change        Soak feet daily in am or bedtime warm water with small amount of antibacterial soap, then rinse with clear water and reapply dressings    Keep legs elevated when sitting/at rest     Call the number noted below to schedule an appointment with podiatry-the last provider placed the referral on 3/9/2022    contact the [facility] Representative at: [phone]    Schedule an appointment with a new PCP for new patient appointment     IN ADDITION-you need to be seen again on Friday, for a wound check     If fever, increase swelling/pain in lower legs, shortness of breath to ER        Patient Education     Cellulitis  Cellulitis is an infection of the  deep layers of skin. A break in the skin, such as a cut or scratch, can let bacteria under the skin. If the bacteria get to deep layers of the skin, it can be serious. If not treated, cellulitis can get into the bloodstream and lymph nodes. The infection can then spread throughout the body. This causes serious illness.   Cellulitis causes the affected skin to become red, swollen, warm, and sore. The reddened areas have a visible border. An open sore may leak fluid (pus). You may have a fever, chills, and pain.   Cellulitis is treated with antibiotics taken for 7 to 10 days. An open sore may be cleaned and covered with cool wet gauze. Symptoms should get better 1 to 2 days after treatment is started. Make sure to take all the antibiotics for the full number of days until they are gone. Keep taking the medicine even if your symptoms go away.   Home care  Follow these tips:    Limit the use of the part of your body with cellulitis.     If the infection is on your leg, keep your leg raised while sitting. This helps reduce swelling.    Take all of the antibiotic medicine exactly as directed until it is gone. Don't miss any doses, especially during the first 7 days. Don t stop taking the medicine when your symptoms get better.    Keep the affected area clean and dry.    Wash your hands with soap and clean, running water before and after touching your skin. Anyone else who touches your skin should also wash his or her hands. Don't share towels.  Follow-up care  Follow up with your healthcare provider, or as advised. If your infection doesn't go away on the first antibiotic, your healthcare provider will prescribe a different one.   When to seek medical advice  Call your healthcare provider right away if any of these occur:    Red areas that spread    Swelling or pain that gets worse    Fluid leaking from the skin (pus)    Fever higher of 100.4  F (38.0  C) or higher after 2 days on antibiotics  Alanna last reviewed this  educational content on 8/1/2019 2000-2021 [employer]. All rights reserved. This information is not intended as a substitute for professional medical care. Always follow your healthcare professional's instructions.           Patient Education     Understanding Chronic Venous Insufficiency  Problems with the veins in the legs may lead to chronic venous insufficiency (CVI). CVI means that there is a long-term problem with the veins not being able to pump blood back to your heart. When this happens, blood stays in the legs and causes swelling and aching.   Two problems that may lead to chronic venous insufficiency are:    Damaged valves. Valves keep blood flowing from the legs through the blood vessels and back to the heart. When the valves are damaged, blood does not flow as well.     Deep vein thrombosis (DVT).  Blood clots may form in the deep veins of the legs. This may cause pain, redness, and swelling in the legs. It may also block the flow of blood back to the heart. Seek medical care right away if you have these symptoms.  A blood clot in the leg can also break off and travel to the lungs. This is called  pulmonary embolism (PE).  In the lungs, the clot can cut off the flow of blood. This may cause chest pain, trouble breathing, sweating, a fast heartbeat, coughing (may cough up blood), and fainting. This is a medical emergency and may cause death. Call 911 if you have these symptoms.  CVI can t be cured, but you can control leg swelling to reduce the likelihood of sores (ulcers).  Recognizing the symptoms  Be aware of the following:    If you stand or sit with your feet down for long periods, your legs may ache or feel heavy.    Swollen ankles are possibly the most common symptom of CVI.    As swelling increases, the skin over your ankles may show red spots or a brownish tinge. The skin may feel leathery or scaly, and may start to itch.    If swelling is not controlled, an ulcer (open wound)  may form.  What you can do  Reduce your risk of developing ulcers by doing the following:    Increase blood flow back to your heart by elevating your legs, exercising daily, and wearing elastic stockings.    Boost blood flow in your legs by losing extra weight.    If you must stand or sit in one place for a period of time, keep your blood moving by wiggling your toes, shifting your body position, and rising up on the balls of your feet.    Alanna last reviewed this educational content on 10/1/2019    6221-3967 [employer] All rights reserved. This information is not intended as a substitute for professional medical care. Always follow your healthcare professional's instructions.           Patient Education     Chronic Venous Insufficiency: Treating Ulcers    If leg swelling because of chronic venous insufficiency isn t controlled, an open wound (ulcer) can form. Although ulcers vary in size and shape, they usually appear on the inside of the ankle.  Treating an ulcer    See your healthcare provider. Ulcers need frequent medical care. Special dressings may be applied. You may be given antibiotics to fight infection.    Your healthcare provider may prescribe medicines, such as aspirin or pentoxifylline, to help the ulcer heal.    Your healthcare provider may prescribe compression stockings to help with the swelling.     Elevate your legs often to reduce swelling. The ulcer needs oxygen-rich blood to heal. This blood can t reach the ulcer until swelling is reduced.    When to call your healthcare provider  Seek immediate medical attention if:     You have an increase in pain    You develop a fever of 100.4 F (38 C) or higher, or as directed by your healthcare provider    The area around the ulcer becomes red, tender, or both    The ulcer oozes discolored fluid or smells bad    Swelling increases suddenly or the dressing feels tight  Alanna last reviewed this educational content on 10/1/2019     7890-6030 [employer]. All rights reserved. This information is not intended as a substitute for professional medical care. Always follow your healthcare professional's instructions.           Patient Education     Venous Leg Ulcer  Arteries carry oxygenated blood from your heart to the rest of your body. Veins return the blood to the heart.   When you sit or stand, blood in the veins in your legs must flow  uphill  to your heart. When you move your legs while walking, the contraction of your leg muscles has a pumping effect on the blood in the veins. The veins have one-way valves that stop the blood from flowing backward.   If you have poor blood flow in your veins (venous insufficiency), the one-way valves are damaged. The blood doesn t flow uphill very well. This raises pressure in the veins, and the tissues in your legs don t get enough oxygen. As the problem gets worse, an area of skin near the ankle turns dark and an ulcer forms. This is called a venous stasis ulcer. These ulcers usually don t hurt unless they become infected.   Venous stasis ulcers are treated with compression wraps, antibiotics that you put on your skin, and special dressings. Sometimes you may need a skin graft. Once the ulcer has healed, you will need to use compression stocking to help the pumping action in your veins. The stockings will also reduce swelling.   Home care  Follow these tips when caring for yourself at home:    Use any special compression dressings or stockings as directed.    If you were told to change your dressing, follow the instructions your healthcare provider gave you. Many different kinds of dressings can be used for this problem, and each is used differently.    Walk regularly. This helps the blood flow better in your legs.    Maintain a healthy weight. If you are overweight, talk with your provider about a weight loss program.    If you smoke, quit smoking. This will ease your symptoms and lower the  chance that the disease will get worse. Join a stop-smoking program or ask your provider for help in quitting.    Check your feet and legs for skin breaks or color changes. Report these to your provider. This could be an early sign of an ulcer.    Wear comfortable, well-fitting shoes and socks. Don t use socks that are tight. If they leave a dent in your leg by the end of the day, they are too tight.    When standing, shift your weight from one leg to the other.    When sitting for long periods, put your feet up. Move your feet and ankles often to get your calf muscles moving. Get up and walk from time to time.  Follow-up care  Follow up with your healthcare provider, or as advised.  Call 911  Call 911 if any of the following occur:     Shortness of breath or painful breathing    Chest pain    Coughing up blood  When to seek medical advice  Call your healthcare provider right away if any of these occur:    Pus or discharge coming from the ulcer    Pain in or around the ulcer    Redness or swelling of your leg around the ulcer    Fever of 100.4 F (38 C) or higher, or as directed by your healthcare provider    Repeated cough  Alanna last reviewed this educational content on 9/1/2019 2000-2021 [employer]. All rights reserved. This information is not intended as a substitute for professional medical care. Always follow your healthcare professional's instructions.

## 2022-03-21 NOTE — PROGRESS NOTES
Assessment & Plan     (L03.116,  L03.115) Bilateral cellulitis of lower leg  (primary encounter diagnosis)  Comment: Exam findings were discussed, improving and assurance given.   Patient was concerned about recurrence of infection and felt better to continue on the antibiotic for another week.  She states that Keflex had worked better in the past and would like a refill of that.    Potential side effects taking additional abx such as adversely affecting the Gut panda, diarrhea, vag yeast infection and drug resistance discussed. Patient opted to continue taking the antibiotic for another week.     Plan: cephALEXin (KEFLEX) 500 MG capsule                             No follow-ups on file.    Kenny Britton MD  [facility] ENRIQUE Gil is a 76 year old who presents for follow up of Bilateral feet cellulitis, was seen in the [facility] recently, and wondering if she should get additional abx   She reports that the symptoms are improving.       History of Present Illness       Reason for visit:  Check up from last week  Symptom onset:  More than a month  Symptoms include:  Foot pain  Symptom intensity:  Moderate  Symptom progression:  Staying the same  Had these symptoms before:  Yes  Has tried/received treatment for these symptoms:  Yes  Previous treatment was successful:  No  What makes it worse:  Wearing shoes  What makes it better:  ShoelessShe exercises with enough effort to increase her heart rate 9 or less minutes per day.  She exercises with enough effort to increase her heart rate 3 or less days per week. She is missing 7 dose(s) of medications per week.  She is not taking prescribed medications regularly due to other.      Review of Systems         Objective    /68   Pulse 84   Temp 98.5  F (36.9  C)   Resp 18   SpO2 95%   There is no height or weight on file to calculate BMI.     Physical Exam   No distress, well hydrated   Ext: improving and no remaining evidence of  infection.

## 2022-03-24 NOTE — LETTER
3/24/2022         RE: Louise Benítez  [address] [place] [address] [address]  [place] [address]        Dear Colleague,    Thank you for referring your patient, Louise Benítez, to the [facility]. Please see a copy of my visit note below.    FOOT AND ANKLE SURGERY/PODIATRY CONSULT NOTE         ASSESSMENT:   PAD  Stasis ulcer right lower leg  Trophic ulcer fifth toe right foot      TREATMENT:  I recommended the patient continue to take her previously prescribed Keflex as recommended.  A dressing was applied to the small ulceration in the anterior of the right lower leg today which included Medihoney.  The patient was placed in a postoperative shoe today.  She has been referred to wound care clinic for continued local wound care.  She is to return to podiatry as needed.        HPI:Louise Benítez presented to the clinic today complaining of a small wound on the top of the fifth toe right foot and the wound on the anterior aspect of the right leg.  The patient indicated she has had these wounds for several weeks.  She has a history of PAD.  She has no pain.  She mentioned that she has not worn a pair of shoes for several years to prevent irritation to her feet.  She stated that she has significant swelling of both lower extremities.  The swelling prevents her from wearing her normal shoe gear.  She stated that the ulcer on the anterior aspect the right leg has just started weeping recently.  She has no complaints of any pain or discomfort.    Past Medical History:   Diagnosis Date     Rheumatoid arthritis (H)        Social History     Socioeconomic History     Marital status:      Spouse name: Not on file     Number of children: Not on file     Years of education: Not on file     Highest education level: Not on file   Occupational History     Not on file   Tobacco Use     Smoking status: Former Smoker     Smokeless tobacco: Never Used   Substance and Sexual Activity     Alcohol use: Yes     Comment:  Alcoholic Drinks/day: wine      Drug use: Never     Sexual activity: Not on file   Other Topics Concern     Not on file   Social History Narrative     Not on file     Social Determinants of Health     Financial Resource Strain: Not on file   Food Insecurity: Not on file   Transportation Needs: Not on file   Physical Activity: Not on file   Stress: Not on file   Social Connections: Not on file   Intimate Partner Violence: Not on file   Housing Stability: Not on file        No Known Allergies       Current Outpatient Medications:      atorvastatin (LIPITOR) 40 MG tablet, [ATORVASTATIN (LIPITOR) 40 MG TABLET] Take 1 tablet (40 mg total) by mouth at bedtime., Disp: 90 tablet, Rfl: 1     baclofen (LIORESAL) 10 MG tablet, Take half to one tablet by mouth at bedtime as needed for muscle spasm, Disp: 90 tablet, Rfl: 0     celecoxib (CELEBREX) 200 MG capsule, Take 1 capsule (200 mg) by mouth 2 times daily as needed for moderate pain, Disp: 120 capsule, Rfl: 0     cephALEXin (KEFLEX) 500 MG capsule, Take 1 capsule (500 mg) by mouth 3 times daily for 7 days, Disp: 21 capsule, Rfl: 0     cholecalciferol, vitamin D3, 50,000 unit Tab, [CHOLECALCIFEROL, VITAMIN D3, 50,000 UNIT TAB] Take 1 tablet weekly, Disp: 12 tablet, Rfl: 0     doxycycline hyclate (VIBRAMYCIN) 100 MG capsule, Take 1 capsule (100 mg) by mouth 2 times daily for 10 days, Disp: 20 capsule, Rfl: 0     hydroxychloroquine (PLAQUENIL) 200 MG tablet, TAKE TWO TABLETS BY MOUTH DAILY AT BEDTIME, Disp: 180 tablet, Rfl: 0     mupirocin (BACTROBAN) 2 % external ointment, Apply topically daily To wounds on legs and feet with daily dressing changes, Disp: 22 g, Rfl: 0     omeprazole (PRILOSEC) 20 MG capsule, [OMEPRAZOLE (PRILOSEC) 20 MG CAPSULE] Take 1 capsule (20 mg total) by mouth daily before breakfast., Disp: 90 capsule, Rfl: 3    Current Facility-Administered Medications:      denosumab (PROLIA) injection 60 mg, 60 mg, Subcutaneous, Q6 Months, Radu Ordonez MD     No  "family history on file.     Social History     Socioeconomic History     Marital status:      Spouse name: Not on file     Number of children: Not on file     Years of education: Not on file     Highest education level: Not on file   Occupational History     Not on file   Tobacco Use     Smoking status: Former Smoker     Smokeless tobacco: Never Used   Substance and Sexual Activity     Alcohol use: Yes     Comment: Alcoholic Drinks/day: wine      Drug use: Never     Sexual activity: Not on file   Other Topics Concern     Not on file   Social History Narrative     Not on file     Social Determinants of Health     Financial Resource Strain: Not on file   Food Insecurity: Not on file   Transportation Needs: Not on file   Physical Activity: Not on file   Stress: Not on file   Social Connections: Not on file   Intimate Partner Violence: Not on file   Housing Stability: Not on file        Review of Systems - Patient denies fever, chills, rash,  stiffness, limping, numbness, weakness, heart burn, blood in stool, chest pain with activity, calf pain when walking, shortness of breath with activity, chronic cough, easy bleeding/bruising,  excessive thirst, fatigue, depression, anxiety.  Patient admits to draining wound right leg and small pink wound on the dorsal aspect of the fifth toe right foot.      OBJECTIVE:  Appearance: alert, well appearing, and in no distress and oriented to person, place, and time.    Pulse 89   Ht 1.6 m (5' 3\")   Wt 70.3 kg (155 lb)   SpO2 95%   BMI 27.46 kg/m       Body mass index is 27.46 kg/m .     General appearance: Patient is alert and fully cooperative with history & exam.  No sign of distress is noted during the visit.  Psychiatric: Affect is pleasant & appropriate.  Patient appears motivated to improve health.  Respiratory: Breathing is regular & unlabored while sitting.  HEENT: Hearing is intact to spoken word.  Speech is clear.  No gross evidence of visual impairment that would " impact ambulation.    Vascular: Dorsalis pedis and posterior tibial pulses are non-palpable. There is no pedal hair growth bilaterally.  CFT > 3 sec from anterior tibial surface to distal digits bilaterally. There is no appreciable edema noted.  Dermatologic: Small round draining wound on the anterior aspect the right lower extremity.  There is a small opening on the dorsal aspect fifth toe right foot.  No odor noted.  Wound appears to be limited to the breakdown of the skin.  No fluctuance noted.  Turgor and texture are within normal limits. No coloration or temperature changes. No primary or secondary lesions noted.  Neurologic: All epicritic and proprioceptive sensations are grossly intact bilaterally.  Musculoskeletal: All active and passive ankle, subtalar, midtarsal, and 1st MPJ range of motion are grossly intact without pain or crepitus, with the exception of none. Manual muscle strength is within normal limits bilaterally. All dorsiflexors, plantarflexors, invertors, evertors are intact bilaterally. Tenderness present to the fifth toe right foot on palpation. Tenderness to the right ankle with range of motion. Calf is soft/non-tender without warmth/induration    Imaging:       No images are attached to the encounter or orders placed in the encounter.     No results found.   No results found.       Nabeel Serrano DPM  [facility] Foot & Ankle Surgery/Podiatry         Again, thank you for allowing me to participate in the care of your patient.        Sincerely,        Nabeel Degroot DPM

## 2022-03-24 NOTE — PROGRESS NOTES
FOOT AND ANKLE SURGERY/PODIATRY CONSULT NOTE         ASSESSMENT:   PAD  Stasis ulcer right lower leg  Trophic ulcer fifth toe right foot      TREATMENT:  I recommended the patient continue to take her previously prescribed Keflex as recommended.  A dressing was applied to the small ulceration in the anterior of the right lower leg today which included Medihoney.  The patient was placed in a postoperative shoe today.  She has been referred to wound care clinic for continued local wound care.  She is to return to podiatry as needed.        HPI:Louise Benítez presented to the clinic today complaining of a small wound on the top of the fifth toe right foot and the wound on the anterior aspect of the right leg.  The patient indicated she has had these wounds for several weeks.  She has a history of PAD.  She has no pain.  She mentioned that she has not worn a pair of shoes for several years to prevent irritation to her feet.  She stated that she has significant swelling of both lower extremities.  The swelling prevents her from wearing her normal shoe gear.  She stated that the ulcer on the anterior aspect the right leg has just started weeping recently.  She has no complaints of any pain or discomfort.    Past Medical History:   Diagnosis Date     Rheumatoid arthritis (H)        Social History     Socioeconomic History     Marital status:      Spouse name: Not on file     Number of children: Not on file     Years of education: Not on file     Highest education level: Not on file   Occupational History     Not on file   Tobacco Use     Smoking status: Former Smoker     Smokeless tobacco: Never Used   Substance and Sexual Activity     Alcohol use: Yes     Comment: Alcoholic Drinks/day: wine      Drug use: Never     Sexual activity: Not on file   Other Topics Concern     Not on file   Social History Narrative     Not on file     Social Determinants of Health     Financial Resource Strain: Not on file   Food  Insecurity: Not on file   Transportation Needs: Not on file   Physical Activity: Not on file   Stress: Not on file   Social Connections: Not on file   Intimate Partner Violence: Not on file   Housing Stability: Not on file        No Known Allergies       Current Outpatient Medications:      atorvastatin (LIPITOR) 40 MG tablet, [ATORVASTATIN (LIPITOR) 40 MG TABLET] Take 1 tablet (40 mg total) by mouth at bedtime., Disp: 90 tablet, Rfl: 1     baclofen (LIORESAL) 10 MG tablet, Take half to one tablet by mouth at bedtime as needed for muscle spasm, Disp: 90 tablet, Rfl: 0     celecoxib (CELEBREX) 200 MG capsule, Take 1 capsule (200 mg) by mouth 2 times daily as needed for moderate pain, Disp: 120 capsule, Rfl: 0     cephALEXin (KEFLEX) 500 MG capsule, Take 1 capsule (500 mg) by mouth 3 times daily for 7 days, Disp: 21 capsule, Rfl: 0     cholecalciferol, vitamin D3, 50,000 unit Tab, [CHOLECALCIFEROL, VITAMIN D3, 50,000 UNIT TAB] Take 1 tablet weekly, Disp: 12 tablet, Rfl: 0     doxycycline hyclate (VIBRAMYCIN) 100 MG capsule, Take 1 capsule (100 mg) by mouth 2 times daily for 10 days, Disp: 20 capsule, Rfl: 0     hydroxychloroquine (PLAQUENIL) 200 MG tablet, TAKE TWO TABLETS BY MOUTH DAILY AT BEDTIME, Disp: 180 tablet, Rfl: 0     mupirocin (BACTROBAN) 2 % external ointment, Apply topically daily To wounds on legs and feet with daily dressing changes, Disp: 22 g, Rfl: 0     omeprazole (PRILOSEC) 20 MG capsule, [OMEPRAZOLE (PRILOSEC) 20 MG CAPSULE] Take 1 capsule (20 mg total) by mouth daily before breakfast., Disp: 90 capsule, Rfl: 3    Current Facility-Administered Medications:      denosumab (PROLIA) injection 60 mg, 60 mg, Subcutaneous, Q6 Months, Radu Ordonez MD     No family history on file.     Social History     Socioeconomic History     Marital status:      Spouse name: Not on file     Number of children: Not on file     Years of education: Not on file     Highest education level: Not on file  "  Occupational History     Not on file   Tobacco Use     Smoking status: Former Smoker     Smokeless tobacco: Never Used   Substance and Sexual Activity     Alcohol use: Yes     Comment: Alcoholic Drinks/day: wine      Drug use: Never     Sexual activity: Not on file   Other Topics Concern     Not on file   Social History Narrative     Not on file     Social Determinants of Health     Financial Resource Strain: Not on file   Food Insecurity: Not on file   Transportation Needs: Not on file   Physical Activity: Not on file   Stress: Not on file   Social Connections: Not on file   Intimate Partner Violence: Not on file   Housing Stability: Not on file        Review of Systems - Patient denies fever, chills, rash,  stiffness, limping, numbness, weakness, heart burn, blood in stool, chest pain with activity, calf pain when walking, shortness of breath with activity, chronic cough, easy bleeding/bruising,  excessive thirst, fatigue, depression, anxiety.  Patient admits to draining wound right leg and small pink wound on the dorsal aspect of the fifth toe right foot.      OBJECTIVE:  Appearance: alert, well appearing, and in no distress and oriented to person, place, and time.    Pulse 89   Ht 1.6 m (5' 3\")   Wt 70.3 kg (155 lb)   SpO2 95%   BMI 27.46 kg/m       Body mass index is 27.46 kg/m .     General appearance: Patient is alert and fully cooperative with history & exam.  No sign of distress is noted during the visit.  Psychiatric: Affect is pleasant & appropriate.  Patient appears motivated to improve health.  Respiratory: Breathing is regular & unlabored while sitting.  HEENT: Hearing is intact to spoken word.  Speech is clear.  No gross evidence of visual impairment that would impact ambulation.    Vascular: Dorsalis pedis and posterior tibial pulses are non-palpable. There is no pedal hair growth bilaterally.  CFT > 3 sec from anterior tibial surface to distal digits bilaterally. There is no appreciable edema " noted.  Dermatologic: Small round draining wound on the anterior aspect the right lower extremity.  There is a small opening on the dorsal aspect fifth toe right foot.  No odor noted.  Wound appears to be limited to the breakdown of the skin.  No fluctuance noted.  Turgor and texture are within normal limits. No coloration or temperature changes. No primary or secondary lesions noted.  Neurologic: All epicritic and proprioceptive sensations are grossly intact bilaterally.  Musculoskeletal: All active and passive ankle, subtalar, midtarsal, and 1st MPJ range of motion are grossly intact without pain or crepitus, with the exception of none. Manual muscle strength is within normal limits bilaterally. All dorsiflexors, plantarflexors, invertors, evertors are intact bilaterally. Tenderness present to the fifth toe right foot on palpation. Tenderness to the right ankle with range of motion. Calf is soft/non-tender without warmth/induration    Imaging:       No images are attached to the encounter or orders placed in the encounter.     No results found.   No results found.       Nabeel Serrano DPM  [facility] Foot & Ankle Surgery/Podiatry

## 2022-03-24 NOTE — PATIENT INSTRUCTIONS
What is a post surgical shoe?    A post surgical shoe is a medical shoe used to protect the foot and toes after an injury or surgery. It is also called a postop shoe, rigid sole shoe, or hard sole shoe. It looks like on oversized shoe with a flat, hard sole, fabric or mesh sides, and adjustable straps. The shoe is open in the front, where your toes go. The shoe helps change how your foot carries weight. This can help decrease pain and increase movement after an injury or surgery so you can heal.                How do I put on the post surgical shoe?    Sit down and place your foot comfortably in the shoe.    Close the fabric or mesh sides over the top of your foot.    Tighten the straps of the shoe so they are snug but not too tight. The shoe should limit movement but not cut off your blood flow.    Stand up and take a few steps to practice walking.    What else do I need to know?    Check your foot and toes often. Check your foot and toes for redness and swelling. If your toes are red, swollen, numb, or tingly, loosen your straps. Over time, the swelling from the injury or surgery will decrease. When this happens, you may need to tighten the straps.    Be careful when you walk on wet surfaces. The shoe may be slippery.    Ask about removing the shoe to bathe. Your provider may want you to leave the shoe on when you bathe. Cover it with a plastic bag and tape the bag closed around your leg.  When should I call my doctor?    You have pain or discomfort that does not go away   Driving a Car    You cannot drive while you are wearing a cast or walker boot on the foot that you use to drive. Your surgeon or physiotherapist will tell you when the cast or boot is no longer needed.     Make sure to wear the shoe for the prescribed time or until the doctor tells you to discontinue it s use.

## 2022-04-04 NOTE — LETTER
To:  Louise Benítez  [address] [place] [address] [address]  [place] [address]      [phone]      April 22, 2022      Dear Ms. Benítez:       Time goes fast, and a clinic visit to look at your medical issues and medications is overdue.  I have attempted to reach you by phone and have been unsuccessful so I am sending you a letter, please call us at [phone] to schedule an appointment and we can discuss your medication request.     Please make a clinic  appointment  in the near future and bring all your pill bottles with you.  If you have already made an appointment then I apologize for overlooking that fact, and please disregard this letter.    I am looking forward to seeing you soon.    Sincerely,    PRETTY Oglesby with Dr. Britton  [facility]

## 2022-04-04 NOTE — TELEPHONE ENCOUNTER
Patients pharmacy faxed a request for a refill on cephalexin  Lorrie Rodriguez MA on 4/4/2022 at 8:37 AM

## 2022-04-05 NOTE — TELEPHONE ENCOUNTER
Appropriate refill request  Please call patient for update status and assist in  Scheduling an appointment as appropriate

## 2022-04-05 NOTE — TELEPHONE ENCOUNTER
Routing refill request to provider for review/approval because:  Drug not on the G refill protocol     Last Written Prescription Date:  1/17/22  Last Fill Quantity: 21 capsule,  # refills: 0   Last office visit provider:  3/21/22     Requested Prescriptions   Pending Prescriptions Disp Refills     cephALEXin (KEFLEX) 500 MG capsule 21 capsule 0     Sig: Take 1 capsule (500 mg) by mouth 3 times daily       There is no refill protocol information for this order          Kylie Lovell 04/04/22 8:31 PM

## 2022-04-22 NOTE — TELEPHONE ENCOUNTER
Writer called and LMTCB to schedule an appointment. Letter sent.    76 year old male with hx of CAD s/p stent few months ago on DAPT, HTN, HLD, DM2, BPH, TIA, gerd, admitted for sigmoid diverticulitis with pelvic abscess    1.Sigmoid diverticulitis with pelvic abscess   -S/p IR drainage   -Abx deescalated to PO augmentin duration per surgical team   -Abdominal fluid culture with gram variable rods/ alpha hemolytic strep   -Trend wbc and temp curve   -Tolerating low fiber diet  -Oxycodone prn for pain control   -Sx follow up     2. CAD s/p stents   -Continue asprin and plavix  -Continue toprol and zocor  -Resume all meds on discharge      3.HTN  -Continue toprol xl 100mg   -Resume losartan 100mg qd  -Hold home dose of hctz in the setting of hyponatremia   -BP has been acceptable on above regimen     4.HLD  -Continue zocor 40mg     5.DM2  -Continue SS for now  -A1C 6.6 at goal for age   -Continue to monitor FS, have been stable thus far    6.Gerd  -Continue protonix 40mg qd    7.Hyponatremia  -Resolved post IVF  -No mental status changes     8.Microcytic anemia   -Iron studies consistent with iron deficiency though MCV on lower end, given nationality/heritage would refer to outpt PMD for electrophoresis r/o beta thalassemia minor prior to starting iron sulfate  -Continue to trend h/h  -Stable thus far    9.Mild RASHAUN yesterday  -Resolved post IVF  -Avoid nephrotoxic agents    10. Headaches, chronic  -Pt s/p MRI brain 7/8/17 with findings of:  Wedge-shaped area of abnormal to prolongation in the right   cerebellum with associated area of enhancement. This finding is   suspicious for subacute infarct though clinical correlation and close and   follow-up recommended.    Serpiginous area of abnormal enhancement is seen involving the right   cerebellum which could be compatible underlying vascular malformation CT   angiogram can be done to better evaluate this finding.    Numerous areas of susceptibility are seen in the posterior fossa   supratentorial region which could be compatible areas of old hemorrhage   possibly secondary to amyloid angiopathy. Clinical correlation continued   close interval follow-up recommended    obtained outpt PMD records, documenting headaches secondary to tension headaches, pt currently without any neurologic defecits, oupt PMD follow up onm discharge with Dr. Asa Kingston [phone]    Hep sq for dvt ppx

## 2022-05-03 NOTE — PROGRESS NOTES
"      Rheumatology follow-up visit note     Louise is a 76 year old female presents today for follow-up.    Louise was seen today for recheck.    Diagnoses and all orders for this visit:    Seronegative rheumatoid arthritis of multiple sites (H)  -     hydroxychloroquine (PLAQUENIL) 200 MG tablet; TAKE TWO TABLETS BY MOUTH DAILY AT BEDTIME  -     ALT; Standing  -     Albumin level; Standing  -     CBC with platelets; Standing  -     Creatinine; Standing    Primary osteoarthritis involving multiple joints    High risk medication use  -     ALT; Standing  -     Albumin level; Standing  -     CBC with platelets; Standing  -     Creatinine; Standing    Myelodysplastic disease (H)  -     ALT; Standing  -     Albumin level; Standing  -     CBC with platelets; Standing  -     Creatinine; Standing    Polyarthralgia        She has well-controlled seronegative rheumatoid arthritis on hydroxychloroquine, original diagnosis made elsewhere, residual pain likely due to osteoarthritis, chronic discoloration of her right first MTP approximately 4 to 6 weeks after the November right first MTP intra-articular corticosteroid injection, unlikely reflection of a septic arthritis.  She takes Celebrex typically once a day at 200 mg on as-needed basis.  Major side effects including GI, renal and hepatic to name a few were discussed.  Follow up in 3 months.    HPI    Louise Benítez is a 76 year old female is here for follow-up of seronegative rheumatoid arthritis diagnosed elsewhere been on hydroxychloroquine for several years recent eye examination without maculopathy.  She has comorbidities including myelodysplastic disease.  She has chronic anemia.  On her previous visit she had a corticosteroid injection into the right first MTP.  Approximately 6 weeks after that the pain returned and it has been \"red\" since.  As noted examination section he she does have hyperemia around the first MTP extending to the proximal big toe and further " "proximally into the midfoot.  This is without significant tenderness.  This is remained unchanged over the past 4+ months.  She follows up in heme-onc for her myelodysplastic syndrome.  She reports ongoing pain such as in her lower back.       On her previous visit she had noted that it was 20 years ago when she developed pain stiffness and found to have elevated sed rate.  A diagnosis of polymyalgia rheumatica was made.  Thereafter she was felt to have rheumatoid arthritis.  Initially she followed up to the clinic in [place] where she was given methotrexate and azathioprine.  She recalls that initially the knees used to troubles her quite a bit.  She remembers having \"gallons of fluid\" removed from her knees.  These have not troubled her of late.  Meanwhile she developed complications from prednisone treatment.  She had AVN bilaterally.  That resulted in replacement of hip joints in 2000 and 2003.  She had been following up at [facility] for several years.  She was on hydroxychloroquine, methotrexate and azathioprine having been discontinued.  She is also been taking Celebrex on fairly regular basis.  She takes this once daily usually but sometimes 2 times.  This is for her widespread osteoarthritis.  Recently she was seen at spine clinic and had shoulder injections.  She has had lumbar spondylosis.  She has noted that it is hard for her to define the level of pain.  She would call this as \"medium\".  She noted severe pain in her shoulders however.  She had those injected 48 hours ago.  She is able to describe herself otherwise in good health apart from chronic anemia.  She has been seen in hematology.  This is microcytic.  Most recent labs showed 8.7.  She was asked to consider bone marrow biopsy.  She has chosen to defer that.  She has noted that she is able to walk couple of miles a day she cleans her own house and does a good job of it she feels.  She does not recognize her psoriasis is at " herself or the family.  No history of ulcerative colitis or Crohn's disease.   Further historical information and ADL limitations as noted in the multidimensional health assessment questionnaire attached in the EMR      DETAILED EXAMINATION  05/03/22  :    Vitals:    05/03/22 1034   BP: 122/80   Pulse: 80     Alert oriented. Head including the face is examined for malar rash, heliotropes, scarring, lupus pernio. Eyes examined for redness such as in episcleritis/scleritis, periorbital lesions.   Neck/ Face examined for parotid gland swelling, range of motion of neck.  Left upper and lower and right upper and lower extremities examined for tenderness, swelling, warmth of the appendicular joints, range of motion, edema, rash.  Some of the important findings included: she does not have evidence of synovitis in the palpable joints of the upper extremities.  No significant deformities of the digits.  Marked Heberden nodes.  Range of motion of the shoulders  show limited abduction to 90 degrees.  No JLT effusion or warmth of the knees.  she does not have dactylitis of the digits.  She has hyperemia of her left big toe without warmth, callus formation.     Patient Active Problem List    Diagnosis Date Noted     PAD (peripheral artery disease) (H) 05/03/2021     Priority: Medium     Delayed wound healing 05/03/2021     Priority: Medium     High risk medication use 02/27/2020     Priority: Medium     Compression fracture of L1 vertebra with routine healing 10/07/2019     Priority: Medium     Seronegative rheumatoid arthritis of multiple sites (H) 06/13/2018     Priority: Medium     Primary osteoarthritis involving multiple joints 06/13/2018     Priority: Medium     Bilateral shoulder tendinopathy 06/13/2018     Priority: Medium     Macrocytic anemia 06/13/2018     Priority: Medium     Polyarthralgia 06/13/2018     Priority: Medium     Osteoporosis 12/07/2017     Priority: Medium     Ptosis Of Eyelid      Priority: Medium      Created by Conversion  Replacement Utility updated for latest IMO load         Patient Counseling: End-Of-Life Issues 12/28/2014     Priority: Medium     Lower Back Pain      Priority: Medium     Created by Conversion         Past Surgical History:   Procedure Laterality Date     IR LUMBAR EPIDURAL STEROID INJECTION  8/15/2012      Past Medical History:   Diagnosis Date     Rheumatoid arthritis (H)      No Known Allergies  Current Outpatient Medications   Medication Sig Dispense Refill     baclofen (LIORESAL) 10 MG tablet Take half to one tablet by mouth at bedtime as needed for muscle spasm 90 tablet 0     celecoxib (CELEBREX) 200 MG capsule Take 1 capsule (200 mg) by mouth 2 times daily as needed for moderate pain 120 capsule 0     cholecalciferol, vitamin D3, 50,000 unit Tab [CHOLECALCIFEROL, VITAMIN D3, 50,000 UNIT TAB] Take 1 tablet weekly 12 tablet 0     hydroxychloroquine (PLAQUENIL) 200 MG tablet TAKE TWO TABLETS BY MOUTH DAILY AT BEDTIME 180 tablet 0     mupirocin (BACTROBAN) 2 % external ointment Apply topically daily To wounds on legs and feet with daily dressing changes 22 g 0     atorvastatin (LIPITOR) 40 MG tablet [ATORVASTATIN (LIPITOR) 40 MG TABLET] Take 1 tablet (40 mg total) by mouth at bedtime. (Patient not taking: Reported on 5/3/2022) 90 tablet 1     omeprazole (PRILOSEC) 20 MG capsule [OMEPRAZOLE (PRILOSEC) 20 MG CAPSULE] Take 1 capsule (20 mg total) by mouth daily before breakfast. 90 capsule 3     family history is not on file.  Social Connections: Not on file          WBC Count   Date Value Ref Range Status   11/03/2021 9.1 4.0 - 11.0 10e3/uL Final     RBC Count   Date Value Ref Range Status   11/03/2021 2.37 (L) 3.80 - 5.20 10e6/uL Final     Hemoglobin   Date Value Ref Range Status   11/03/2021 8.1 (L) 11.7 - 15.7 g/dL Final     Hematocrit   Date Value Ref Range Status   11/03/2021 25.7 (L) 35.0 - 47.0 % Final     MCV   Date Value Ref Range Status   11/03/2021 108 (H) 78 - 100 fL Final      MCH   Date Value Ref Range Status   11/03/2021 34.2 (H) 26.5 - 33.0 pg Final     Platelet Count   Date Value Ref Range Status   11/03/2021 562 (H) 150 - 450 10e3/uL Final     % Lymphocytes   Date Value Ref Range Status   06/13/2018 23 20 - 40 % Final     ALT   Date Value Ref Range Status   11/03/2021 13 0 - 45 U/L Final     Albumin   Date Value Ref Range Status   11/03/2021 4.0 3.5 - 5.0 g/dL Final     Alkaline Phosphatase   Date Value Ref Range Status   10/07/2019 71 45 - 120 U/L Final     Creatinine   Date Value Ref Range Status   11/03/2021 0.68 0.60 - 1.10 mg/dL Final     GFR Estimate   Date Value Ref Range Status   11/03/2021 85 >60 mL/min/1.73m2 Final     Comment:     As of July 11, 2021, eGFR is calculated by the CKD-EPI creatinine equation, without race adjustment. eGFR can be influenced by muscle mass, exercise, and diet. The reported eGFR is an estimation only and is only applicable if the renal function is stable.   04/20/2021 >60 >60 mL/min/1.73m2 Final     GFR Estimate If Black   Date Value Ref Range Status   04/20/2021 >60 >60 mL/min/1.73m2 Final     Erythrocyte Sedimentation Rate   Date Value Ref Range Status   03/17/2021 32 (H) 0 - 20 mm/hr Final     CRP   Date Value Ref Range Status   03/17/2021 0.1 0.0 - 0.8 mg/dL Final

## 2022-05-12 PROBLEM — S06.5XAA SDH (SUBDURAL HEMATOMA) (H): Status: ACTIVE | Noted: 2022-01-01

## 2022-05-12 NOTE — PLAN OF CARE
Major Shift Events:   Neuros: A+Ox4, intact. Q1h neuro checks. Unable to do heel to shin d/t pelvic/leg pain from fall. No hip precautions per NCC. Follows all other commands. Denies numbness/tingling. STAT head CT. PRN morphine, oxy, and Tylenol for pelvic and shoulder pain; heating pad in use.   CV: SR with PVCs. Tmax 99.2. SBP <140 without intervention. Cuff on R forearm per pt preference; lower MAP readings suspect d/t cuff placement.   Pulm: 2L NC for sats in 80%'s while resting.  GI/: Regular diet. Vasquez in upon admission (bag leaked); removed and external urinary catheter in place. 100mL out via Vasquez upon admission; due to void via purewick. No BM.     Stroke education booklet provided. Encouraging/educating pt on importance of wearing PCDs. Awaiting delivery of PCD sleeves.    Plan: Collect UA with next void. Continue to monitor; notify MD with changes/concerns.    Hours of care: 15:30 (time of admission) - 19:30     For vital signs and complete assessments, please see documentation flowsheets.

## 2022-05-12 NOTE — ED TRIAGE NOTES
Pt came in via EMS from home for a fall. Pt was fixing dinner and when she turned around, her body turned but not her feet. Pt thinks she hits her head. Pt did not lose conscious. Pt is NOT on blood thinners. Hx of bilateral hip replacement.
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
## 2022-05-12 NOTE — TELEPHONE ENCOUNTER
Pt fell at home at 6pm tonight and landed on her bottom and is having shooting pain in her groin area     Rates pain 12/10    Per protocol - Go to ED now     Care advice given per protocol and when to call back. Pt verbalized understanding and agrees to plan of care.    Alea Hobson RN  [place] Nurse Advisor  11:10 PM 5/11/2022      COVID 19 Nurse Triage Plan/Patient Instructions    Please be aware that novel coronavirus (COVID-19) may be circulating in the community. If you develop symptoms such as fever, cough, or SOB or if you have concerns about the presence of another infection including coronavirus (COVID-19), please contact your health care provider or visit [email or web address].     Disposition/Instructions    ED Visit recommended. Follow protocol based instructions.     Bring Your Own Device:  Please also bring your smart device(s) (smart phones, tablets, laptops) and their charging cables for your personal use and to communicate with your care team during your visit.    Thank you for taking steps to prevent the spread of this virus.  o Limit your contact with others.  o Wear a simple mask to cover your cough.  o Wash your hands well and often.    Resources    [facility]: About COVID-19: [email or web address]    CDC: What to Do If You're Sick: www.cdc.gov/coronavirus/2019-ncov/about/steps-when-sick.html    CDC: Ending Home Isolation: www.cdc.gov/coronavirus/2019-ncov/hcp/disposition-in-home-patients.html     CDC: Caring for Someone: www.cdc.gov/coronavirus/2019-ncov/if-you-are-sick/care-for-someone.html     [facility]: Interim Guidance for Hospital Discharge to Home: www.health.[facility].mn.us/diseases/coronavirus/hcp/hospdischarge.pdf    [facility] clinical trials (COVID-19 research studies): [email or web address]     Below are the COVID-19 hotlines at the [place] Department of [facility] ([facility]). Interpreters are available.   o For health questions: Call  [phone] or 1-587.118.2165 (7 a.m. to 7 p.m.)  o For questions about schools and childcare: Call 085-220-3215 or 1-543.944.6158 (7 a.m. to 7 p.m.)                                   Reason for Disposition    [1] SEVERE pelvic pain AND [2] present > 1 hour    Additional Information    Negative: Shock suspected (e.g., cold/pale/clammy skin, too weak to stand, low BP, rapid pulse)    Negative: Passed out (i.e., lost consciousness, collapsed and was not responding)    Negative: Sounds like a life-threatening emergency to the triager    Negative: Menstrual cramps is main concern    Negative: Abdominal (stomach) pain is main concern    Negative: Vulvar (external genital area) pain or symptoms (e.g., dryness, sores, redness, blisters, bumps) is main concern    Negative: Rectal pain is main concern    Negative: Hip pain is main concern    Negative: Urination pain is main concern (pain with passing urine)    Negative: [1] Pelvic pain AND [2] pregnant < 20 weeks    Negative: [1] Pelvic pain AND [2] pregnant > 20 weeks    Negative: Postpartum (from 0 to 6 weeks after delivery)    Negative: Pain associated with known hernia or new-onset hernia suspected (reducible bulge in groin/abdomen)    Negative: Followed an abdomen (stomach) injury    Negative: Followed a genital area injury    Negative: [1] SEVERE pelvic pain (e.g., excruciating) AND [2] vomiting    Protocols used: PELVIC PAIN - FEMALE-A-
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
## 2022-05-12 NOTE — PHARMACY-ADMISSION MEDICATION HISTORY
Pharmacy Note - Admission Medication History    Pertinent Provider Information: Last dose of Aranesp on 5/10/22     ______________________________________________________________________    Prior To Admission (PTA) med list completed and updated in EMR.       Current Facility-Administered Medications for the 5/12/22 encounter (Hospital Encounter)   Medication     denosumab (PROLIA) injection 60 mg     PTA Med List   Medication Sig Last Dose     baclofen (LIORESAL) 10 MG tablet Take half to one tablet by mouth at bedtime as needed for muscle spasm Past Week at Unknown time     celecoxib (CELEBREX) 200 MG capsule Take 1 capsule (200 mg) by mouth 2 times daily as needed for moderate pain 5/12/2022 at am     cholecalciferol, vitamin D3, 50,000 unit Tab [CHOLECALCIFEROL, VITAMIN D3, 50,000 UNIT TAB] Take 1 tablet weekly Past Week at Unknown time     darbepoetin anthony (ARANESP) 300 MCG/0.6ML injection Inject 300 mcg Subcutaneous every 21 days Last taken 5/10/22 5/10/2022 at am     multivitamin w/minerals (THERA-VIT-M) tablet Take 1 tablet by mouth daily 5/11/2022 at Unknown time     vitamin B-12 (CYANOCOBALAMIN) 500 MCG tablet  Past Week at Unknown time       Information source(s): Patient and CareEverywhere/[facility]  Method of interview communication: in-person    Summary of Changes to PTA Med List  New: MVI, B12, Aranesp  Discontinued: Atorvastatin (no longer taking)  Changed: none    Patient was asked about OTC/herbal products specifically.  PTA med list reflects this.    In the past week, patient estimated taking medication this percent of the time:  greater than 90%.    Allergies were reviewed, assessed, and updated with the patient.      Patient does not anticipate needing any multi-use medications during admission.    The information provided in this note is only as accurate as the sources available at the time of the update(s).    Thank you for the opportunity to participate in the care of this patient.    Lucía  RADHA Virk, [facility]  5/12/2022 9:25 AM

## 2022-05-12 NOTE — ED NOTES
Bed: [facility]-03  Expected date: 5/12/22  Expected time: 7:03 AM  Means of arrival: Ambulance  Comments:  Eligio Tomas F--fall/hip pain

## 2022-05-12 NOTE — PROVIDER NOTIFICATION
NCC notified lab informed RN that A1C unable to be obtained with current lab draw. Per lab, this is not something that can be re-drawn as pt's levels are too low.     Per NCC, will add on to tomorrow's labs.

## 2022-05-12 NOTE — PLAN OF CARE
Admitted/transferred from: [facility]; arrived to  at 15:30.   Reason for admission/transfer: Fall, leading to SDH.  2 RN skin assessment: completed by Lianne AGOSTO RN and Jori ANAND RN.  Result of skin assessment and interventions/actions: abrasion noted on R shin; blister on R heel. Bruising on R hand.   Height, weight, drug calc weight: Done  Patient belongings (see Flowsheet)   MDRO education added to care planN/A  ?

## 2022-05-12 NOTE — PHARMACY-ADMISSION MEDICATION HISTORY
Admission Medication History Completed by Pharmacy    See [facility] Admission Navigator for allergy information, preferred outpatient pharmacy, prior to admission medications and immunization status.     Medication history completed at [facility] on 5/12/22, please see note from that date.

## 2022-05-12 NOTE — CONSULTS
[facility]       NEUROSURGERY CONSULTATION    Reason for Consultation: Subdural hematoma    HPI: Louise Benítez is a 76 year old female who was seen in the ED for hip pain following a fall the night of the 11th. Possible head strike during fall but no loss of consciousness, no neck pain. She is not currently on any blood thinners. Work up in ED identified left-sided subdural hematoma.      PAST MEDICAL HISTORY:   Past Medical History:   Diagnosis Date    Rheumatoid arthritis (H)        PAST SURGICAL HISTORY:   Past Surgical History:   Procedure Laterality Date    IR LUMBAR EPIDURAL STEROID INJECTION  8/15/2012       FAMILY HISTORY: No family history on file.    SOCIAL HISTORY:   Social History     Tobacco Use    Smoking status: Former Smoker    Smokeless tobacco: Never Used   Substance Use Topics    Alcohol use: Yes     Comment: Alcoholic Drinks/day: wine        MEDICATIONS:  Facility-Administered Medications Prior to Admission   Medication Dose Route Frequency Provider Last Rate Last Admin    denosumab (PROLIA) injection 60 mg  60 mg Subcutaneous Q6 Months Radu Ordonez MD         Medications Prior to Admission   Medication Sig Dispense Refill Last Dose    baclofen (LIORESAL) 10 MG tablet Take half to one tablet by mouth at bedtime as needed for muscle spasm 90 tablet 0 Past Week at Unknown time    celecoxib (CELEBREX) 200 MG capsule Take 1 capsule (200 mg) by mouth 2 times daily as needed for moderate pain 100 capsule 0 5/12/2022 at Unknown time    cholecalciferol, vitamin D3, 50,000 unit Tab [CHOLECALCIFEROL, VITAMIN D3, 50,000 UNIT TAB] Take 1 tablet weekly 12 tablet 0 Past Week at Unknown time    multivitamin w/minerals (THERA-VIT-M) tablet Take 1 tablet by mouth daily   5/11/2022 at Unknown time    vitamin B-12 (CYANOCOBALAMIN) 500 MCG tablet    Past Week at Unknown time    darbepoetin anthony (ARANESP) 300 MCG/0.6ML injection Inject 300 mcg Subcutaneous every  21 days Last taken 5/10/22   5/10/2022    hydroxychloroquine (PLAQUENIL) 200 MG tablet TAKE TWO TABLETS BY MOUTH DAILY AT BEDTIME 180 tablet 0 5/10/2022    mupirocin (BACTROBAN) 2 % external ointment Apply topically daily To wounds on legs and feet with daily dressing changes 22 g 0 5/10/2022       Allergies:  No Known Allergies    ROS: 10 point ROS were all negative except for pertinent positives noted in my HPI.    Physical exam:   Blood pressure 137/57, pulse 79, temperature 99.2  F (37.3  C), temperature source Axillary, resp. rate 20, SpO2 95 %, not currently breastfeeding.  CV: HR and BP as noted above  PULM: breathing comfortably on room air  NEUROLOGIC:  -- Awake; Alert; oriented x 3  -- Follows commands briskly  -- Speech fluent, spontaneous. No aphasia or dysarthria.  -- no gaze preference. No apparent hemineglect.  Cranial Nerves:  -- visual fields full to confrontation, PERRL 3-2mm bilat and brisk, extraocular movements intact  -- face symmetrical, tongue midline  -- sensory V1-V3 intact bilaterally  -- hearing grossly intact bilat  -- Trapezii 5/5 strength bilat symmetric    Motor:  Normal bulk / tone; no tremor, rigidity, or bradykinesia.  No muscle wasting or fasciculations  No Pronator Drift    Sensory:  Intact to LT x 4 extremities     Gait: Deferred    LABS:  Recent Labs   Lab 05/12/22  1549 05/12/22  0737   NA  --  138   POTASSIUM  --  4.0   CHLORIDE  --  106   CO2  --  24   ANIONGAP  --  8   * 107   BUN  --  19   CR  --  0.69   MENDEZ  --  9.2       Recent Labs   Lab 05/12/22  0737   WBC 9.6   RBC 2.38*   HGB 7.8*   HCT 24.2*   *   MCH 32.8   MCHC 32.2   RDW 25.4*          IMAGING:    CT Head/CT Cervical Spine (5/12)  IMPRESSION:  HEAD CT:  1.  Acute left cerebral convexity subdural hematoma measuring 12 mm in thickness. Trace acute subdural hematoma along the anterior left and interhemispheric fissure.  2.  Mass effect includes 4 mm or midline shift.  3.  No additional acute  intracranial abnormality.  4.  Mild presumed chronic small vessel ischemic changes and moderate generalized volume loss.     CERVICAL SPINE CT:  1.  No CT evidence for acute fracture or post traumatic subluxation    XR Pelvis (5/12)    IMPRESSION: Bilateral total hip replacements. Bones are demineralized. There is no radiographic evidence of an acute fracture. No dislocation. Right femur is intact.    ASSESSMENT:  76 year old female with traumatic SDH measuring 12 mm greatest width, 4 mm midline shift, who is neurologically intact. Given she is asymptomatic, intact exam, with <5mm midline shift, there is presently no indication for surgery. Will continue to monitor closely and receive standard SDH management.    RECOMMENDATIONS:  No neurosurgical intervention indicated at this time  Repeat head CT in 6 hours from the time of first acquisition  HOB > 30 degrees  Q2H neuro exams   Pain control  Keppra 750 BID for seizure ppx  Maintain SBP < 140  Platelets > 100,000  INR < 1.5  Hemoglobin > 7    The patient was discussed with Dr. Blount, neurosurgery chief resident, and Dr. Moran, neurosurgery staff, and they agree with the above.    Chuy Stein, MS4  [employer]    Bruce Graves MD  Neurosurgery Resident, PGY-2    I have discussed this patient with the resident and formulated a plan and agree with this note.  AMP

## 2022-05-13 NOTE — PROGRESS NOTES
Neurocritical Care Progress Note    Reason for critical care admission: s/p fall and subdural hematoma  Admitting Team: BRYAN  Date of Service:  05/13/2022  Date of Admission:  5/12/2022  Hospital Day: 2    Assessment/Plan  Louise Benítez is a 76 year old female with a past medical history of rheumatoid arthritis with polyarthralgia on disease modifying regiment daily with plaquenil and celebrix, myelodysplastic syndrome, osteoporosis, chronic microcytic anemia, PAD, lumbar spondylosis, a admitted on 5/12/2022 for findings of L frontoparietal SDH w/ midline shift following fall at home, without evidence of acute periprosthetic fracture or acute pelvic fracture fractures, not on any anti-platelets or blood thinning medications     Neuro  #L Frontoparietal SDH w/ midline shift  ICH 0  NIH 2  -Repeat Head CT now  -Neurochecks every 1 hr for first 24 hours  -Keppra 500 mg BID for 7 days per TBI protocals  -HOB > 30   -SBP goal < 160 mmHg   -PT/OT/SLP when able     #Analgesics & sedation  -Tylenol 650 mg PRN  -Morphine 1 mg Q4 PRN (second line)  -Oxycodone 5 mg Q4 PRN     CV  #h/o PAD  -Previously mentioned s/e to statin, LDL 47  -Cardiac monitoring  -SBP goal < 160 mmHg  -PRN labetalol and hydralazine     Resp  #ANNALEE  Oxygen/vent: RA  -Continuous pulse ox  -Maintain O2 saturations greater than 92%     GI  #ANNALEE  Diet: NPO until bedside nursing, then can have full diet  Last BM: PTA  GI prophylaxis: None  -Bowel regimen: can add scheduled senna-docusate and Miralax if needed     Renal/  #ANNALEE  -Daily BMP  -IV fluids: None needed  -Electrolyte replacement protocol     Endo  #ANNALEE  -Monitor glucose levels with daily labs, no h/o DM     Heme  #Chronic Microcytic Anemia (7.8)  #h/o Myelodysplastic Disease  -On darbepoetin injections PTA   -Daily CBC  -Hgb goal >7, plt goal >50k  -Transfuse to meet Hgb and plt goals     ID  #h/o R sided 1st metatarsal foot ulcer (present on admission)  -on Mupirocin cream PTA, restarted  -Daily  CBC, afebrile and no leukocytosis  -Follow temperature curve     MSK  #Seronegative Rheumatoid Arthritis  #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  Code: Full code, confirmed at bedside. Family not updated yet, plan to be bedside  Dispo: transfer to floor    Neurocritical care time:  I spent 42 minutes bedside and on the inpatient unit today managing the neurocritical care of Louise Benítez in relation to the issues listed in this note independent of procedures performed this day.     Jus Jones MD  Neurocritical Care  Vascular Neurology  Text Page - [phone]      24 Hour Vital Signs Summary:  Temp: 98.1  F (36.7  C) Temp  Min: 98.1  F (36.7  C)  Max: 99.2  F (37.3  C)  Resp: 14 Resp  Min: 14  Max: 20  SpO2: 90 % SpO2  Min: 90 %  Max: 99 %  Pulse: 80 Pulse  Min: 69  Max: 86    No data recorded  BP: 106/50 Systolic (24hrs), Av , Min:91 , Max:138   Diastolic (24hrs), Av, Min:40, Max:63        Respiratory monitoring:   Resp: 14      I/O last 3 completed shifts:  In: 570 [P.O.:570]  Out: 525 [Urine:525]    Current Medications:    cephALEXin  500 mg Oral Q6H RUPA     hydroxychloroquine  400 mg Oral or Feeding Tube At Bedtime     levETIRAcetam  500 mg Oral or Feeding Tube BID     magnesium oxide  400 mg Oral BID     multivitamin w/minerals  1 tablet Oral Daily     mupirocin   Topical BID       PRN Medications:  acetaminophen, hydrALAZINE, labetalol, morphine, naloxone **OR** naloxone **OR** naloxone **OR** naloxone, oxyCODONE    Infusions:      No Known Allergies    Physical Examination:  Vitals: /50   Pulse 80   Temp 98.1  F (36.7  C) (Oral)   " Resp 14   Ht 1.626 m (5' 4\")   Wt 68.8 kg (151 lb 10.8 oz)   SpO2 90%   BMI 26.04 kg/m    General: Adult female patient, sitting in chair, NAD  HEENT: Normocephalic, atraumatic, no icterus, oral cavity/oropharynx pink and moist  Cardiac: Extremities warm and well perfused  Chest: No accessory use of muscles, breathing comfortably on RA  Abdomen: No masses or ecchymosis appreciated, soft  Extremities: Warm, no edema, distal pulses +2,   Skin: No rash or lesion  Psych: Calm and cooperative  Neuro:  Mental status: Awake, alert, attentive, oriented to self, time, place, and circumstance. Language is fluent and coherent with intact comprehension of complex commands, naming and repetition.  Cranial nerves: VFF, PERRL, conjugate gaze, EOMI, pupils round and reactive, facial sensation intact, face symmetric, shoulder shrug strong, tongue midline, no dysarthria.   Motor: Normal bulk and tone. No abnormal movements. Strength antigravity in upper extremities, antigravity in lower extremities with slight drift bilaterally unable to keep heels off the bed for greater than 5 seconds (as limited by  due to bilateral hip and joint pain), baseline polyarthralgia of shoulders (r>L) and hips b/l  Sensory: Intact to light touch x 4 extremities, no extinction  Coordination: FNF without ataxia or dysmetria.   Gait: KELLY, deferred.      Labs:  Recent Labs   Lab 05/13/22  0603 05/12/22  1729 05/12/22  0737    140 138   POTASSIUM 3.8 3.8 4.0   CHLORIDE 107 108 106   CO2 26 25 24   BUN 16 17 19   CR 0.64 0.61 0.69   MENDEZ 8.5 8.8 9.2   BILITOTAL  --  1.3 1.5*   ALKPHOS  --  69 70   ALT  --  23 19   AST  --  38 40       Recent Labs   Lab 05/13/22  0603 05/12/22  1729 05/12/22  0737   WBC 8.8 8.9 9.6   HGB 7.1* 7.4* 7.8*    348 355       No results for input(s): PH, PCO2, PO2, HCO3 in the last 168 hours.    All cultures:  No results for input(s): CULTURE in the last 168 hours.    Imaging: repeat head CT w/ no new changes, " follow up head CT in 1 week per NSG

## 2022-05-13 NOTE — PROGRESS NOTES
[facility]    Medicine Transfer Accept Note - Hospitalist Service, GOLD TEAM 12    Date of Admission:  5/12/2022    Assessment & Plan        Louise Benítez is a 75 yo female with a history of rheumatoid arthritis, myelodysplastic syndrome, chronic microcytic anemia, osteoporosis, peripheral arterial disease, and lumbar spondylosis admitted on 5/12/2022 to Neuro ICU for a new subdural hematoma following a fall at home.  She has been medically cleared per neurology and neurosurgery and is transferring out of the ICU to medicine for further evaluation and disposition.    # Left Frontoparietal Subdural Hematoma with Midline Shift  Clinically stable throughout ICU monitoring and medically stable for discharge per Neuro ICU team but will require additional evaluation for home safety and placement needs. Transfer to medicine 5/13/2022   - Neurosurgery consulted, following peripherally  - Neuro ICU handoff to medicine  - Continue keppra 7 days per protocol  - Hold anticoagulation, antiplatelet agents, and fibrinolytics at present per neurosurgery   - Holding NSAIDs until neurosurgery follow up  - Follow up with Neurosurgery in clinic with repeat Head CT in 1 week  - SBP goal <160  - SLP cleared 5/13/2022   - PT Recommending TCU  - OT consult pending  - May discontinue HOB >30 deg per neuro ICU   - Restart and talk to neuro and neurosurg if any clinical worsening  - Continue falls precautions  - Discontinue telemetry  - Discontinue morphine PRN given resolution of headache       # Myelodysplastic Syndrome  # Chronic Microcytic Anemia  Slight decrease from baseline around HgB 8, currently around 7. HgB>7 and Plt>50 goal.  - Continue home darbepoetin injections q21 days   - Last on 5/10    # Seronegative Rheumatoid Arthritis  # Osteoporosis  # Bilateral Avascular Necrosis of the Hips s/p Bilateral Hip Replacements (2000 and 2003)  Hip pain on admission but no acute injury noted per  "ICU evaluation.  Continuing pain medication PRN as noted below  - Continue home plaquenil 400 mg qHS  - Continue home denosumab injections   - Last on 5/1  - Holding home celocoxib per neuro ICU through Neurosurgery follow up in 1 week  - Home baclofen was per neuro ICU for mental status evaluation but may restart  - Acetaminophen PRN with oxycodone PRN for breakthrough pain    # R 1st Metatarsal Foot Ulcer - Present on Admission  No acute changes was on a course of cephalexin PTA from PCP  - Continue PTA cephalexin for 7 day course  - Continue home Mupirocin    # Peripheral Arterial Disease  No acute issues, does not take a statin.       Diet: Regular Diet Adult    DVT Prophylaxis: Pneumatic Compression Devices until 48 hour stability per NeuroICU  Vasquez Catheter: Not present  Central Lines: None  Cardiac Monitoring: None  Code Status: Full Code      Disposition Plan   Expected Discharge:    Anticipated discharge location: home;home with help/services (SW discussed possible TCU recs. Pt indicated that she would rather go home with [facility]. If TCU, would like [facility] ref.)    Delays:            The patient's care was discussed with the Bedside Nurse, Patient and Patient's Family.    Alejandro Cope DO   Hospitalist Service, GOLD TEAM [address] [place] [address]  Securely message with the [employer] Web Console (learn more here)  Text page via [employer] Paging/Directory         Clinically Significant Risk Factors Present on Admission              # Overweight: Estimated body mass index is 26.04 kg/m  as calculated from the following:    Height as of this encounter: 1.626 m (5' 4\").    Weight as of this encounter: 68.8 kg (151 lb 10.8 oz).      ______________________________________________________________________    Interval History      Transferring out of the neuro ICU after being admitted for subdural hematoma. No new symptoms. Says that PT didn't really work with her so not sure why they said " TCU. She would prefer to go home if she is able to ambulate.     Four point ROS completed and negative unless listed above     The remainder of a 4 point ROS is negative unless otherwise noted above.    Data reviewed today: I reviewed all medications, new labs and imaging results over the last 24 hours.    Physical Exam   Vital Signs: Temp: 98.6  F (37  C) Temp src: Oral BP: 127/56 Pulse: 96   Resp: 14 SpO2: 93 % O2 Device: Nasal cannula Oxygen Delivery: 1 LPM  Weight: 151 lbs 10.82 oz     General: Adult female patient, sitting in chair, NAD  HEENT: Normocephalic, atraumatic, no icterus, oral cavity/oropharynx pink and moist  Cardiac: Extremities warm and well perfused  Chest: No accessory use of muscles, breathing comfortably on RA  Abdomen: No masses or ecchymosis appreciated, soft  Extremities: Warm, no edema, distal pulses +2,   Skin: No rash or lesion  Psych: Calm and cooperative  Neuro: non focal neuro exam

## 2022-05-13 NOTE — PLAN OF CARE
Deferral - SLP orders received for swallow eval as part of stroke order set. Chart reviewed and discussed with RN. Per RN, no acute changes in swallow function or speech/language since admission. Pt tolerating a regular diet/thin liquids without c/f aspiration. SLP will defer evaluation at this time and complete orders, will page MD. Please reconsult SLP with changes in swallow function or communication skills.

## 2022-05-13 NOTE — PLAN OF CARE
Major Shift Events:   Neuro: Intact besides BLE weakness/pain from fall. Denies numbness/tingling/headache. PRN oxy/tylenol given for pain.  CV: SR w/ frequent PVC's. SBP<140 without intervention.   Pulm: 2L NC for desaturation while sleeping. LS clear.  GI/: Regular diet. Fluids encouraged. Straight cath x1 for 525 mL.  Plan: Continue to monitor neuro status and UOP.  For vital signs and complete assessments, please see documentation flowsheets.

## 2022-05-13 NOTE — PLAN OF CARE
Major Shift Events:  Alert and oriented, c/o pain to right shoulder, hip, groin, and buttock, somewhat relieved with prn oxy.  Lidocaine patches on right lower back.  SR with PVC's, bp's within limits, afebrile.  1L NC to keep sats >92.  Regular diet, tolerating well.  Void and BMx1.  Extremely weak with therapy, transferring to chair and commode.  Plan: Transfer to floor when bed is available.  For vital signs and complete assessments, please see documentation flowsheets.

## 2022-05-13 NOTE — PROGRESS NOTES
05/13/22 1000   Quick Adds   Type of Visit Initial PT Evaluation   Living Environment   People in Home spouse   Current Living Arrangements house   Home Accessibility stairs to enter home;stairs within home   Number of Stairs, Main Entrance 4   Stair Railings, Main Entrance railing on right side (ascending);other (see comments)  (rail only for 2 steps)   Number of Stairs, Within Home, Primary greater than 10 stairs   Stair Railings, Within Home, Primary railing on left side (ascending)   Transportation Anticipated car, drives self;family or friend will provide   Living Environment Comments spouse can assist as needed   Self-Care   Usual Activity Tolerance moderate   Current Activity Tolerance poor   Regular Exercise No   Equipment Currently Used at Home none  (has FWW but does not use it)   Fall history within last six months yes   Number of times patient has fallen within last six months 4   Activity/Exercise/Self-Care Comment IND with functional mobility for household distances, got some help from  for ADLs   General Information   Onset of Illness/Injury or Date of Surgery 05/12/22   Referring Physician Luis Alberto Cueto CNP   Patient/Family Therapy Goals Statement (PT) return home   Pertinent History of Current Problem (include personal factors and/or comorbidities that impact the POC) Louise Benítez is a 76 year old female with a past medical history of rheumatoid arthritis with polyarthralgia on disease modifying regiment daily with plaquenil and celebrix, myelodysplastic syndrome, osteoporosis, chronic microcytic anemia, PAD, lumbar spondylosis, a admitted on 5/12/2022 for findings of L frontoparietal SDH w/ midline shift following fall at home, without evidence of acute periprosthetic fracture or acute pelvic fracture fractures, not on any anti-platelets or blood thinning medications   Cognition   Orientation Status (Cognition) oriented x 4   Pain Assessment   Patient Currently in Pain Yes, see Vital  Sign flowsheet   Posture    Posture Forward head position;Protracted shoulders;Kyphosis   Range of Motion (ROM)   ROM Comment B LE grossly WFL   Strength (Manual Muscle Testing)   Strength Comments demonstrated antigravity strength B LE   Bed Mobility   Comment, (Bed Mobility) supine > sit max assist   Transfers   Comment, (Transfers) sit > stand mod assist   Balance   Balance Comments required UE support and mod assist to maintain standing balance   Sensory Examination   Sensory Perception patient reports no sensory changes   Clinical Impression   Criteria for Skilled Therapeutic Intervention Yes, treatment indicated   PT Diagnosis (PT) impaired functional mobility s/p fall and subsequent SDH   Influenced by the following impairments pain,  impaired balance, decreased activity tolerance   Functional limitations due to impairments impaired bed mobility, impaired transfers, impaired gait   Clinical Presentation (PT Evaluation Complexity) Stable/Uncomplicated   Clinical Presentation Rationale clinical judgment   Clinical Decision Making (Complexity) low complexity   Planned Therapy Interventions (PT) balance training;bed mobility training;gait training;neuromuscular re-education;stair training;transfer training;progressive activity/exercise   Risk & Benefits of therapy have been explained evaluation/treatment results reviewed;care plan/treatment goals reviewed   PT Discharge Planning   PT Discharge Recommendation (DC Rec) Transitional Care Facility   PT Rationale for DC Rec dependence with functional mobility, patient unable to ambulate functional distance   PT Brief overview of current status supine > sit max assist, bed > chair with mod assist.   Total Evaluation Time   Total Evaluation Time (Minutes) 9   Physical Therapy Goals   PT Frequency 6x/week   PT Predicted Duration/Target Date for Goal Attainment 05/20/22   PT Goals Bed Mobility;Transfers;Gait;Stairs   PT: Bed Mobility Independent;Supine to/from sit   PT:  Transfers Modified independent;Sit to/from stand;Assistive device  (FWW)   PT: Gait Modified independent;Rolling walker;150 feet   PT: Stairs Greater than 10 stairs;Rail on left

## 2022-05-13 NOTE — CONSULTS
Care Management Initial Consult    General Information  Assessment completed with: Patient,  (Louise)  Type of CM/SW Visit: Initial Assessment    Primary Care Provider verified and updated as needed: Yes (Hasn't seen PCP for 5 yrs. Has many Specialists that she sees.)   Readmission within the last 30 days: no previous admission in last 30 days         Advance Care Planning: Advance Care Planning Reviewed: verified with patient (Pt stated that she does have a HCD. Currently not in EPIC)          Communication Assessment  Patient's communication style: spoken language (English or Bilingual)    Hearing Difficulty or Deaf: no   Wear Glasses or Blind: no    Cognitive  Cognitive/Neuro/Behavioral: WDL  Level of Consciousness: alert  Arousal Level: opens eyes spontaneously  Orientation: oriented x 4  Mood/Behavior: calm, cooperative  Best Language: 0 - No aphasia  Speech: clear, spontaneous, logical    Living Environment:   People in home: spouse   (Jd Benítez)  Current living Arrangements: house      Able to return to prior arrangements: yes       Family/Social Support:  Care provided by: self, spouse/significant other  Provides care for: spouse  Marital Status:   , Children, Neighbor, Other (specify) (Pt has many friends and family in her active support system)   (Jd)       Description of Support System: Supportive, Involved    Support Assessment: Adequate family and caregiver support, Adequate social supports    Current Resources:   Patient receiving home care services: No     Community Resources: None  Equipment currently used at home: grab bar, toilet, grab bar, tub/shower, raised toilet seat, walker, rolling  Supplies currently used at home:      Employment/Financial:  Employment Status: retired        Financial Concerns: No concerns identified   Referral to Financial Worker: No       Lifestyle & Psychosocial Needs:  Social Determinants of Health     Tobacco Use: Medium Risk     Smoking Tobacco Use:  Former Smoker     Smokeless Tobacco Use: Never Used   Alcohol Use: Not on file   Financial Resource Strain: Not on file   Food Insecurity: Not on file   Transportation Needs: Not on file   Physical Activity: Not on file   Stress: Not on file   Social Connections: Not on file   Intimate Partner Violence: Not on file   Depression: Not at risk     PHQ-2 Score: 0   Housing Stability: Not on file       Functional Status:  Prior to admission patient needed assistance:              Mental Health Status:  Mental Health Status: No Current Concerns       Chemical Dependency Status:  Chemical Dependency Status: No Current Concerns             Values/Beliefs:  Spiritual, Cultural Beliefs, Caodaism Practices, Values that affect care: yes  Description of Beliefs that Will Affect Care:  (PRATIK Ramirez)    Cultural/Caodaism Practices Patient Routinely Participates In: ceremony, matriarchal society, prayer, other (see comments) (Common Liturgical Practices: i.e. Communion, Hymnals, etc.)       Additional Information:  Louise Benítez is a 76 year old female with a past medical history of rheumatoid arthritis with polyarthralgia on disease modifying regiment daily with plaquenil and celebrix, myelodysplastic syndrome, osteoporosis, chronic microcytic anemia, PAD, lumbar spondylosis, a admitted on 5/12/2022 for findings of L frontoparietal SDH w/ midline shift following fall at home, without evidence of acute periprosthetic fracture or acute pelvic fracture fractures, not on any anti-platelets or blood thinning medications.    SW went to conduct Initial assessment with the Pt, who was sitting in a lounge chair in her room. Pt was alert, orientated and pleasant. Pt was able to answer all questions and was able to ask appropriate questions. Pt talked a lot about her family and friends. She asked that her two daughters be added to emergency contact list on Face Sheet. SW obliged.     SW talked to Pt about HCD. Pt stated that she  indeed has one. SW also informed about family FMLA need if necessary. Pt discussed her past present and future life events, including her six grandchildren who seem to play a large role in her life. SW listened and offered support, put SW contact information on room whiteboard and encouraged to call with any additional questions or concerns.     SW will continue to f/u with Pt for additional discharge/POC needs.     Antionette Sweeney, JOSÉ MIGUELW, LSW  ICU   PH#: [phone]  Pager#: [phone]  [facility]

## 2022-05-13 NOTE — PROGRESS NOTES
[facility], [place]   Neurosurgery Progress Note:    Assessment: Louise Benítez is a 76 year old female who suffered a ground-level fall with headstrike, found to have a 12 mm left frontal convexity subdural hematoma, stable on repeat head CT.  She remains neurologically intact on examination    Plan:  - Repeat head CT in 1 week with Neurosurgery clinic followup at that time  - Continue holding anticoagulating/antiplatelet/fibrinolytic medications  - Remainder of cares per primary team    Neurosurgery to follow peripherally at this time.  Please contact the Neurosurgery on-call resident with any questions or concerns.    -----------------------------------  Christopher Martinez MD  Neurosurgery PGY-3    Interval History/Subjective: No acute events overnight.    Objective:   Temp:  [98.1  F (36.7  C)-99.2  F (37.3  C)] 98.1  F (36.7  C)  Pulse:  [69-86] 80  Resp:  [14-20] 14  BP: ()/(40-69) 106/50  SpO2:  [90 %-99 %] 90 %  I/O last 3 completed shifts:  In: 570 [P.O.:570]  Out: 525 [Urine:525]    Gen: Appears comfortable, NAD, lying in bed  Neurologic:  - Alert & Oriented to person, place, time, and situation  - Follows commands briskly  - Speech fluent, spontaneous. No aphasia or dysarthria.  - No gaze preference. No apparent hemineglect.  - PERRL, EOMI  - Strong brow raise, eye closure, and smile  - Face symmetric with sensation intact to light touch  - Trapezii and sternocleidomastoid muscles 5/5 bilaterally  - No pronator drift     Del Tr Bi WE WF Gr   R 5 5 5 5 5 5   L 5 5 5 5 5 5    HF KE KF DF PF EHL   R 5 5 5 5 5 5   L 5 5 5 5 5 5     Sensation intact and symmetric to light touch throughout    LABS  Recent Labs   Lab Test 05/13/22  0603 05/12/22  1729 05/12/22  0737   WBC 8.8 8.9 9.6   HGB 7.1* 7.4* 7.8*   * 105* 102*    348 355       Recent Labs   Lab Test 05/13/22  0603 05/12/22  2116 05/12/22  1729 05/12/22  1549 05/12/22  0737     --  140  --  138    POTASSIUM 3.8  --  3.8  --  4.0   CHLORIDE 107  --  108  --  106   CO2 26  --  25  --  24   BUN 16  --  17  --  19   CR 0.64  --  0.61  --  0.69   ANIONGAP 6  --  7  --  8   MENDEZ 8.5  --  8.8  --  9.2   * 113* 88   < > 107    < > = values in this interval not displayed.       IMAGING  Recent Results (from the past 24 hour(s))   CT Head w/o Contrast    Narrative    EXAM: CT HEAD W/O CONTRAST, CT CERVICAL SPINE W/O CONTRAST  LOCATION: [facility]  DATE/TIME: 5/12/2022 9:32 AM    INDICATION: Cerebral hemorrhage suspected  COMPARISON: None.  TECHNIQUE:   1) Routine CT Head without IV contrast. Multiplanar reformats. Dose reduction techniques were used.  2) Routine CT Cervical Spine without IV contrast. Multiplanar reformats. Dose reduction techniques were used.    FINDINGS:   HEAD CT:   INTRACRANIAL CONTENTS: Acute left cerebral convexity subdural hematoma measures up to 12 mm in thickness overlying the anterior left frontal convexity. Trace acute subdural hematoma along the anterior hemispheric falx no additional intracranial   hemorrhage or abnormal extra-axial fluid collection. Mass effect includes partial effacement of the left frontal sulci and 4 mm rightward midline shift. No herniation. No acute large artery territory infarction. Mild presumed chronic small vessel   ischemic changes. Moderate generalized volume loss. No hydrocephalus.     VISUALIZED ORBITS/SINUSES/MASTOIDS: No intraorbital abnormality. No paranasal sinus mucosal disease. No middle ear or mastoid effusion.    BONES/SOFT TISSUES: No acute abnormality.    CERVICAL SPINE CT:   VERTEBRA:  No fracture or posttraumatic subluxation. Normal craniocervical and atlantoaxial alignment. Mild reversal of normal cervical lordosis. No displaced fracture. Vertebral body heights are maintained. No aggressive sclerotic or lytic osseous   lesion. Multilevel cervical spondylosis.    CANAL/FORAMINA: Moderate neural foraminal stenosis on  the left at C3-C4 and on the right at C4-C5 and bilaterally at C5-C6    PARASPINAL: No extraspinal abnormality. Visualized lung fields are clear.       Impression    IMPRESSION:  HEAD CT:  1.  Acute left cerebral convexity subdural hematoma measuring 12 mm in thickness. Trace acute subdural hematoma along the anterior left and interhemispheric fissure.  2.  Mass effect includes 4 mm or midline shift.  3.  No additional acute intracranial abnormality.  4.  Mild presumed chronic small vessel ischemic changes and moderate generalized volume loss.    CERVICAL SPINE CT:  1.  No CT evidence for acute fracture or post traumatic subluxation.    Findings discussed with Dr. Kourtney Craig at 9:43 AM on 05/12/2022.   CT Cervical Spine w/o Contrast    Narrative    EXAM: CT HEAD W/O CONTRAST, CT CERVICAL SPINE W/O CONTRAST  LOCATION: [facility]  DATE/TIME: 5/12/2022 9:32 AM    INDICATION: Cerebral hemorrhage suspected  COMPARISON: None.  TECHNIQUE:   1) Routine CT Head without IV contrast. Multiplanar reformats. Dose reduction techniques were used.  2) Routine CT Cervical Spine without IV contrast. Multiplanar reformats. Dose reduction techniques were used.    FINDINGS:   HEAD CT:   INTRACRANIAL CONTENTS: Acute left cerebral convexity subdural hematoma measures up to 12 mm in thickness overlying the anterior left frontal convexity. Trace acute subdural hematoma along the anterior hemispheric falx no additional intracranial   hemorrhage or abnormal extra-axial fluid collection. Mass effect includes partial effacement of the left frontal sulci and 4 mm rightward midline shift. No herniation. No acute large artery territory infarction. Mild presumed chronic small vessel   ischemic changes. Moderate generalized volume loss. No hydrocephalus.     VISUALIZED ORBITS/SINUSES/MASTOIDS: No intraorbital abnormality. No paranasal sinus mucosal disease. No middle ear or mastoid effusion.    BONES/SOFT TISSUES: No acute  abnormality.    CERVICAL SPINE CT:   VERTEBRA:  No fracture or posttraumatic subluxation. Normal craniocervical and atlantoaxial alignment. Mild reversal of normal cervical lordosis. No displaced fracture. Vertebral body heights are maintained. No aggressive sclerotic or lytic osseous   lesion. Multilevel cervical spondylosis.    CANAL/FORAMINA: Moderate neural foraminal stenosis on the left at C3-C4 and on the right at C4-C5 and bilaterally at C5-C6    PARASPINAL: No extraspinal abnormality. Visualized lung fields are clear.       Impression    IMPRESSION:  HEAD CT:  1.  Acute left cerebral convexity subdural hematoma measuring 12 mm in thickness. Trace acute subdural hematoma along the anterior left and interhemispheric fissure.  2.  Mass effect includes 4 mm or midline shift.  3.  No additional acute intracranial abnormality.  4.  Mild presumed chronic small vessel ischemic changes and moderate generalized volume loss.    CERVICAL SPINE CT:  1.  No CT evidence for acute fracture or post traumatic subluxation.    Findings discussed with Dr. Kourtney Craig at 9:43 AM on 05/12/2022.   XR Femur Right 2 Views    Narrative    EXAM: XR PELVIS 1/2 VW, XR FEMUR RIGHT 2 VIEW  LOCATION: [facility]  DATE/TIME: 5/12/2022 9:40 AM    INDICATION: pain after fall in right buttock and groin, right hip replacement by [facility] 20 years ago  COMPARISON: None.      Impression    IMPRESSION: Bilateral total hip replacements. Bones are demineralized. There is no radiographic evidence of an acute fracture. No dislocation. Right femur is intact.    Atherosclerotic vascular calcifications.   XR Pelvis 1/2 Views    Narrative    EXAM: XR PELVIS 1/2 VW, XR FEMUR RIGHT 2 VIEW  LOCATION: [facility]  DATE/TIME: 5/12/2022 9:40 AM    INDICATION: pain after fall in right buttock and groin, right hip replacement by [facility] 20 years ago  COMPARISON: None.      Impression    IMPRESSION: Bilateral total hip  replacements. Bones are demineralized. There is no radiographic evidence of an acute fracture. No dislocation. Right femur is intact.    Atherosclerotic vascular calcifications.   XR Chest 1 View    Narrative    EXAM: XR CHEST 1 VIEW  LOCATION: [facility]  DATE/TIME: 5/12/2022 9:47 AM    INDICATION: Diffuse right pelvic pain and subdural hematoma after a traumatic injury during a mechanical fall.  COMPARISON: 5/10/2021      Impression    IMPRESSION: Borderline cardiac enlargement. Normal pulmonary vascularity. Lungs are clear. No pneumothorax. Chronic erosive arthritis both glenohumeral joints.   CT Pelvis Bone wo Contrast    Narrative    EXAM: CT PELVIS BONE WO CONTRAST  LOCATION: [facility]  DATE/TIME: 5/12/2022 10:49 AM    INDICATION: Right hip pain and fall.  COMPARISON: None.  TECHNIQUE: CT scan of the pelvis was performed without IV contrast. Multiplanar reformats were obtained. Dose reduction techniques were used.  CONTRAST: None.    FINDINGS:    Bones are demineralized. There is change of bilateral total hip replacements with associated artifact at the bone metallic interface. There is no evidence of an acute fracture involving the right hip, proximal femur or pelvis. Bones are demineralized.    Moderate arthritic changes SI joints. Posterior decompressive changes of the visualized lumbar spine.    There is a moderate-sized left inguinal hernia containing nondilated loops of bowel. Atherosclerotic vascular calcifications.    Soft tissue contusion along the lateral aspect of the right hip.      Impression    IMPRESSION:  1.  Bilateral total hip replacements.    2.  No evidence of an acute periprosthetic fracture. No evidence of an acute pelvic fracture or proximal femoral fracture.    3.  Soft tissue contusion along the lateral aspect of the right hip.    4.  Bones are demineralized, which limits detection for nondisplaced fractures.            CT Head w/o  Contrast    Narrative    Exam: CT HEAD W/O CONTRAST  5/12/2022 4:39 PM    History: Headache, intracranial hemorrhage suspected.    Comparison:  CT head from same day at 0926 hours.    Technique: Using multidetector thin collimation helical acquisition  technique, axial, coronal and sagittal CT images from the skull base  to the vertex were obtained without intravenous contrast.     Findings:    Evolving left frontotemporal convexity subdural hemorrhage with  unchanged maximum thickness of 10 mm (series 3 image 22) and mild  associated mass effect and 5 mm rightward midline shift. Hyperdensity  along the falx an anterior distress, likely representing subdural  hemorrhages. No new intracranial hemorrhage. Mild generalized  parenchymal volume loss. The ventricles are proportionate to the  cerebral sulci. The gray-white differentiation of the cerebral  hemispheres is preserved.    Mild scattered mucosal thickening in the visualized paranasal sinuses.  The mastoid air cells are clear. No acute abnormality of the orbits.      Impression    Impression: Unchanged size of evolving left convexity subdural  hematoma with maximum thickness of 10 mm. Unchanged 5 mm rightward  midline shift. Unchanged hyperdensity and mild thickening along the  falx and tentorial leaflets, likely representing redistribution of  subdural hemorrhage. No new intracranial findings.    I have personally reviewed the examination and initial interpretation  and I agree with the findings.    BAILEY SPAIN MD         SYSTEM ID:  [id number]     I have seen this patient with the resident and formulated a plan and agree with this note.  AMP

## 2022-05-14 NOTE — PLAN OF CARE
Problem: Plan of Care - These are the overarching goals to be used throughout the patient stay.    Goal: Plan of Care Review/Shift Note  Description: The Plan of Care Review/Shift note should be completed every shift.  The Outcome Evaluation is a brief statement about your assessment that the patient is improving, declining, or no change.  This information will be displayed automatically on your shift note.  Outcome: Ongoing, Progressing   Goal Outcome Evaluation:      Major Shift Events:  Alert and oriented x4. Follows commands. Calls appropriately. VSS. SR. Afebrile. 1 L nc. Jeanmarie.   Plan: Continue with POC. Notify team of any changes or concerns.  For vital signs and complete assessments, please see documentation flowsheets.

## 2022-05-14 NOTE — PROGRESS NOTES
05/13/22 1600   Quick Adds   Type of Visit Initial Occupational Therapy Evaluation   Living Environment   People in Home spouse   Current Living Arrangements house   Home Accessibility stairs to enter home;stairs within home   Number of Stairs, Main Entrance 4   Stair Railings, Main Entrance railing on right side (ascending)  (railings for 2 steps only)   Number of Stairs, Within Home, Primary greater than 10 stairs   Stair Railings, Within Home, Primary railing on right side (ascending)   Living Environment Comments Pt lives in 2-level home with basement. Bed/bathroom on 2nd level but does have bed/bathroom on main floor. Laundry room in basement.   Self-Care   Usual Activity Tolerance moderate   Current Activity Tolerance poor   Regular Exercise No   Equipment Currently Used at Home grab bar, toilet;grab bar, tub/shower   Fall history within last six months yes   Number of times patient has fallen within last six months 4   Activity/Exercise/Self-Care Comment Pt recevied assistance with LB dressing & laundry from    Instrumental Activities of Daily Living (IADL)   IADL Comments  would assist with laundry, pt doesn't like to cook & clean.   General Information   Onset of Illness/Injury or Date of Surgery 05/12/22   Referring Physician Alejandro Cope, DO   Patient/Family Therapy Goal Statement (OT) go home   Existing Precautions/Restrictions fall   Left Upper Extremity (Weight-bearing Status) full weight-bearing (FWB)   Right Upper Extremity (Weight-bearing Status) full weight-bearing (FWB)   Left Lower Extremity (Weight-bearing Status) full weight-bearing (FWB)   Right Lower Extremity (Weight-bearing Status) full weight-bearing (FWB)   Heart Disease Risk Factors Medical history   General Observations and Info Pt presents with overall generalized weakness & deconditioning.   Cognitive Status Examination   Orientation Status orientation to person, place and time  (lacks insight to deficits)   Sensory    Sensory Quick Adds No deficits were identified   Range of Motion Comprehensive   Comment, General Range of Motion limited ROM in tamia shoulder   Strength Comprehensive (MMT)   Comment, General Manual Muscle Testing (MMT) Assessment proximla<distal   Bed Mobility   Comment (Bed Mobility) sit>supine mod-maxAx2   Transfers   Transfer Comments STS maxA   Activities of Daily Living   BADL Assessment/Intervention   (socks: maxA)   Clinical Impression   Criteria for Skilled Therapeutic Interventions Met (OT) Yes, treatment indicated   OT Diagnosis Per H&P: 77 yo female with a history of rheumatoid arthritis, myelodysplastic syndrome, chronic microcytic anemia, osteoporosis, peripheral arterial disease, and lumbar spondylosis admitted on 5/12/2022 to Neuro ICU for a new subdural hematoma following a fall at home.   Influenced by the following impairments decreased IND with ADLs & mobility   OT Problem List-Impairments impacting ADL problems related to;activity tolerance impaired;cognition;mobility;range of motion (ROM);strength;pain   Assessment of Occupational Performance 5 or more Performance Deficits   Identified Performance Deficits dressing, g/h, strength, transfers, cognition   Planned Therapy Interventions (OT) ADL retraining;balance training;cognition;bed mobility training;ROM;strengthening;transfer training   Clinical Decision Making Complexity (OT) low complexity   Risk & Benefits of therapy have been explained evaluation/treatment results reviewed   Clinical Impression Comments Pt is appropriate for skilled OT services to address deficits in fucntional tasks & mobility.   OT Discharge Planning   OT Discharge Recommendation (DC Rec) Transitional Care Facility   OT Rationale for DC Rec Pt is well below baseline with functional tasks & mobility. Recommend skilled OT services at TCU to maximize rehab potential.   OT Brief overview of current status STS mod-maxA   Total Evaluation Time (Minutes)   Total Evaluation  Time (Minutes) 10   OT Goals   Therapy Frequency (OT) 5 times/wk   OT Predicted Duration/Target Date for Goal Attainment 06/03/22   OT Goals Hygiene/Grooming;Upper Body Dressing;Lower Body Dressing;Toilet Transfer/Toileting;Cognition;OT Goal 1   OT: Hygiene/Grooming supervision/stand-by assist;while standing   OT: Upper Body Dressing Supervision/stand-by assist   OT: Lower Body Dressing Supervision/stand-by assist;using adaptive equipment   OT: Toilet Transfer/Toileting Supervision/stand-by assist   OT: Cognitive Patient/caregiver will verbalize understanding of cognitive assessment results/recommendations as needed for safe discharge planning   OT: Goal 1 Pt to demo IND with tamia UE HEP to increase ease with ADLs & mobility.   Psychosocial Support   Trust Relationship/Rapport care explained;choices provided;emotional support provided;empathic listening provided;questions answered;questions encouraged;reassurance provided;thoughts/feelings acknowledged   Family/Support System Care presence promoted

## 2022-05-14 NOTE — PROGRESS NOTES
Major Shift Events: pt. Transferred to 6B. Before transfer blood started and tempo raised to 100.9 after 15 minutes. Blood bank and MD notified. Tylenol given and blood transfusion restarted. Pt. Had no other symptoms of reaction. Only peed once during shift and bolus ordered and started. IS given and instruction provided since she was still desatting without 02.  Plan: continue current plan  For vital signs and complete assessments, please see documentation flowsheets. '

## 2022-05-14 NOTE — PROGRESS NOTES
"Transferred to  at 1845. She is A&)x4. Neuros intact. Denies pain. /44   Pulse 77   Temp 99.4  F (37.4  C) (Oral)   Resp 16   Ht 1.626 m (5' 4\")   Wt 75.5 kg (166 lb 7.2 oz)   SpO2 95%   BMI 28.57 kg/m    Blood is running. Eating vanilla ice cream.  at bedside.     Belongings: clothes, watch, rings, phone, . Glasses.  "

## 2022-05-14 NOTE — PROGRESS NOTES
[facility]    Medicine Transfer Accept Note - Hospitalist Service, GOLD TEAM 12    Date of Admission:  5/12/2022    Assessment & Plan        Louise Benítez is a 77 yo female with a history of rheumatoid arthritis, myelodysplastic syndrome, chronic microcytic anemia, osteoporosis, peripheral arterial disease, and lumbar spondylosis admitted on 5/12/2022 to Neuro ICU for a new subdural hematoma following a fall at home.  She has been medically cleared per neurology and neurosurgery and is transferring out of the ICU to medicine for further evaluation and disposition. She was evaluated by PT and OT who felt she should go to a rehab facility. Patient is currently open to the idea but is hopeful she will get strong enough to go home quickly.    Left Frontoparietal Subdural Hematoma with Midline Shift  Clinically stable throughout ICU monitoring and medically stable for discharge per Neuro ICU team but will require additional evaluation for home safety and placement needs. Transfer to medicine 5/13/2022   - Neurosurgery consulted, following peripherally  - Neuro ICU handoff to medicine  - Continue keppra 7 days per protocol  - Hold anticoagulation, antiplatelet agents, and fibrinolytics at present per neurosurgery   - Holding NSAIDs until neurosurgery follow up  - Follow up with Neurosurgery in clinic with repeat Head CT in 1 week  - SBP goal <160  - SLP cleared 5/13/2022   - PT Recommending TCU  - OT consult pending  - May discontinue HOB >30 deg per neuro ICU   - Restart and talk to neuro and neurosurg if any clinical worsening  - Continue falls precautions    Myelodysplastic Syndrome  Chronic Microcytic Anemia  Baseline around 8 now 6.6. Likely due to her recent bleed, MDS and labs sticks.  HgB>7 and Plt>50 goal.  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  Continuing pain medication PRN as noted below  - Continue home plaquenil 400 mg qHS  - Continue home denosumab injections   - Last on 5/1  - Holding home celocoxib per neuro ICU through Neurosurgery follow up in 1 week  - Home baclofen was per neuro ICU for mental status evaluation but may restart  - Acetaminophen PRN with oxycodone PRN for breakthrough pain    R 1st Metatarsal Foot Ulcer - Present on Admission  No acute changes was on a course of cephalexin PTA from PCP  - Continue PTA cephalexin for 7 day course  - Continue home Mupirocin    Peripheral Arterial Disease  No acute issues, does not take a statin.       Diet: Regular Diet Adult    DVT Prophylaxis: Pneumatic Compression Devices until 48 hour stability per NeuroICU  Vasquez Catheter: Not present  Central Lines: None  Cardiac Monitoring: None  Code Status: Full Code      Disposition Plan   Expected Discharge:    Anticipated discharge location: home;home with help/services (SW discussed possible TCU recs. Pt indicated that she would rather go home with [facility]. If TCU, would like [facility] ref.)    Delays:            The patient's care was discussed with the Bedside Nurse, Patient and Patient's Family.    Alejandro Cope DO   Hospitalist Service, GOLD TEAM [address] [place] [address]  Securely message with the Vocera Web Console (learn more here)  Text page via [employer] Paging/Directory         Clinically Significant Risk Factors Present on Admission              # Overweight: Estimated body mass index is 28.57 kg/m  as calculated from the following:    Height as of this encounter: 1.626 m (5' 4\").    Weight as of this encounter: 75.5 kg (166 lb 7.2 oz).      ______________________________________________________________________    Interval History      No acute events overnight. She is feeling a " little stronger today. Hgb is lower but no signs of bleeding.     Four point ROS completed and negative unless listed above     The remainder of a 4 point ROS is negative unless otherwise noted above.    Data reviewed today: I reviewed all medications, new labs and imaging results over the last 24 hours.    Physical Exam   Vital Signs: Temp: 99.7  F (37.6  C) Temp src: Oral BP: 93/62 Pulse: 77   Resp: 16 SpO2: 97 % O2 Device: Nasal cannula Oxygen Delivery: 1 LPM  Weight: 166 lbs 7.16 oz     General: Adult female patient, sitting in bed, NAD  HEENT: Normocephalic, atraumatic, no icterus, oral cavity/oropharynx pink and moist  Cardiac: Extremities warm and well perfused  Chest: No accessory use of muscles, breathing comfortably on RA  Abdomen: No masses or ecchymosis appreciated, soft  Extremities: Warm, no edema, distal pulses +2,   Skin: No rash or lesion  Psych: Calm and cooperative  Neuro: non focal neuro exam

## 2022-05-15 NOTE — PROGRESS NOTES
[facility]    Medicine Progress Note - Hospitalist Service, GOLD TEAM 6    Date of Admission:  5/12/2022    Assessment & Plan        Louise Benítez is a 75 yo female with a history of rheumatoid arthritis, myelodysplastic syndrome, chronic microcytic anemia, osteoporosis, peripheral arterial disease, and lumbar spondylosis admitted on 5/12/2022 to Neuro ICU for a new subdural hematoma following a fall at home.  She was medically cleared per neurology and neurosurgery and transferred out of ICU 5/13/22. She was evaluated by PT and OT who felt she should go to a rehab facility. Patient is currently open to the idea but is hopeful she will get strong enough to go home quickly.     Fall   Left Frontoparietal Subdural Hematoma with Midline Shift  Presented after fall at home, found to have acute left cerebral convexity subdural heamotma measuring 12 mm in thickness, trace acute subdural hematoma along the anterior left and interhemispheric fissure, mass effect includes 4 mm of midline shift, serial head CT after 6 hours with stable subdural hematoma. CT pelvis with soft tissue contusion along lateral aspect of right hip, no evidence of acute periprosthetic fracture. Clinically stable throughout ICU monitoring and medically stable for discharge per Neuro ICU team but will require additional evaluation for home safety and placement needs. Transfer to medicine 5/13/2022   - Neurosurgery consulted, following peripherally  - Continue keppra 7 days per protocol  - Hold anticoagulation, antiplatelet agents, and fibrinolytics at present per neurosurgery              - Holding NSAIDs until neurosurgery follow up  - Follow up with Neurosurgery in clinic with repeat Head CT in 1 week  - SBP goal <160  - SLP cleared 5/13/2022   - PT and OT recommending TCU  - May discontinue HOB >30 deg per neuro ICU              - Restart and talk to neuro and neurosurg if any clinical worsening  -  Continue falls precautions  - Social work consulted for TCU placement      Myelodysplastic Syndrome  Chronic Microcytic Anemia  Baseline around 8, dropped to 6.6 on 5/14. Likely due to her recent bleed, MDS and labs sticks. Received 1 unit pRBC 5/14 with appropriate hgb response.   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  - Continue home plaquenil 400 mg qHS  - Continue home denosumab injections              - Last on 5/1  - Holding home celocoxib per neuro ICU through Neurosurgery follow up in 1 week  - Resumed PTA baclofen 5-10 mg HS   - Acetaminophen PRN with oxycodone PRN for breakthrough pain     R 1st Metatarsal Foot Ulcer - Present on Admission  No acute changes was on a course of cephalexin PTA from PCP  - Continue PTA cephalexin for 7 day course  - Continue home Mupirocin     Peripheral Arterial Disease  No acute issues, does not take a statin.       Diet: Regular Diet Adult    DVT Prophylaxis: Pneumatic Compression Devices and Ambulate every shift  Vasquez Catheter: Not present  Central Lines: None  Cardiac Monitoring: None  Code Status: Full Code      Disposition Plan   Expected Discharge: 05/16/2022   Anticipated discharge location: home;home with help/services (SW discussed possible TCU recs. Pt indicated that she would rather go home with [facility]. If TCU, would like [facility] ref.)    Delays:        The patient's care was discussed with the Attending Physician, Dr. Delgadillo, Bedside Nurse and Patient.    EDUARDO Qureshi  Hospitalist Service, GOLD TEAM [address] [place] [address]  Securely message with the Vocera Web  "Console (learn more here)  Text page via [facility] Paging/Directory   Please see signed in provider for up to date coverage information      Clinically Significant Risk Factors Present on Admission             # Overweight: Estimated body mass index is 28.57 kg/m  as calculated from the following:    Height as of this encounter: 1.626 m (5' 4\").    Weight as of this encounter: 75.5 kg (166 lb 7.2 oz).      ______________________________________________________________________    Interval History   No acute events overnight. Patient sitting in chair. Reports pelvic pain \"where I fell.\" Otherwise denies pain. Reports \"my arms do not work because I need should surgery.\"     Patient denies chest pain, SOB, dyspnea, nausea, vomiting, abdominal pain, constipation, diarrhea, numbness, tingling.     Hgb responded appropriately to pRBC tx.     Data reviewed today: I reviewed all medications, new labs and imaging results over the last 24 hours.     Physical Exam   Blood pressure (!) 154/50, pulse 74, temperature 99  F (37.2  C), temperature source Oral, resp. rate 16, height 1.626 m (5' 4\"), weight 75.5 kg (166 lb 7.2 oz), SpO2 96 %, not currently breastfeeding.  GENERAL: Alert, sitting in chair. NAD.   HEENT: Anicteric sclera. Mucous membranes moist and without lesions.   CV: RRR. S1, S2. No murmurs appreciated.   RESPIRATORY: Effort normal on RA. Lungs with mild crackle at left base, otherwise CTAB.    GI: Abdomen soft and non distended, bowel sounds present. No tenderness, rebound, guarding.   MUSCULOSKELETAL: No joint swelling or tenderness. Moves all extremities.   NEUROLOGICAL: No focal deficits.    EXTREMITIES: No peripheral edema. Intact bilateral pedal pulses.   SKIN: No jaundice. No rashes.       Data   Recent Labs   Lab 05/15/22  0556 05/15/22  0158 05/14/22  1051 05/13/22  0603 05/12/22  2116 05/12/22  1729 05/12/22  1549 05/12/22  0737   WBC 8.6  --  8.3 8.8  --  8.9  --  9.6   HGB 7.8* 7.6* 6.6* 7.1*  --  7.4*  --  " 7.8*   *  --  103* 105*  --  105*  --  102*     --  314 322  --  348  --  355   INR  --   --   --   --   --  1.15  --   --      --  137 139  --  140  --  138   POTASSIUM 4.2  --  4.3 3.8  --  3.8  --  4.0   CHLORIDE 104  --  104 107  --  108  --  106   CO2 27  --  27 26  --  25  --  24   BUN 13  --  15 16  --  17  --  19   CR 0.58  --  0.63 0.64  --  0.61  --  0.69   ANIONGAP 5  --  6 6  --  7  --  8   MENDEZ 8.8  --  8.7 8.5  --  8.8  --  9.2   GLC 95  --  113* 100*   < > 88   < > 107   ALBUMIN  --   --   --   --   --  3.6  --  4.0   PROTTOTAL  --   --   --   --   --  6.4*  --  6.3   BILITOTAL  --   --   --   --   --  1.3  --  1.5*   ALKPHOS  --   --   --   --   --  69  --  70   ALT  --   --   --   --   --  23  --  19   AST  --   --   --   --   --  38  --  40    < > = values in this interval not displayed.       Medications       cephALEXin  500 mg Oral Q6H RUPA     hydroxychloroquine  400 mg Oral or Feeding Tube At Bedtime     levETIRAcetam  500 mg Oral or Feeding Tube BID     lidocaine  3 patch Transdermal Q24H    And     lidocaine   Transdermal Q8H     multivitamin w/minerals  1 tablet Oral Daily     mupirocin   Topical BID

## 2022-05-15 NOTE — PROGRESS NOTES
"BP (!) 154/50 (BP Location: Left arm)   Pulse 74   Temp 99  F (37.2  C) (Oral)   Resp 16   Ht 1.626 m (5' 4\")   Wt 75.5 kg (166 lb 7.2 oz)   SpO2 96%   BMI 28.57 kg/m     Denies pain except with activity. Appetite is fair. Neuros intact, A&Ox4. Adequate void. No BM, will give miralax and senna. Up to chair x2 today with walker, gait belt and assist of two. Moves very slowly and needs encouragement for transfers. Up to commode to void.  in room at bedside. Continue with cares and anticipate discharge to rehab this week.   "

## 2022-05-15 NOTE — PROVIDER NOTIFICATION
"Provider notified (name): Jonathon ([id number])  Reason for notification: \"5B rm 5235-2 pt LANG, Lana RN [id number]. Pt received blood this evening, original hgb recheck was scheduled at 0015 but moved to 0600. Do you want me to schedule another hgb check for tonight? Thanks\"  Response from provider:  Hemoglobin and hematocrit ordered for 0015  "

## 2022-05-15 NOTE — PROGRESS NOTES
Reason for transfer: Need for lower level of care  Transferred from: 4A  2 RN skin assessment completed by: Lana BARKER and Kathleen ANAND  Bed algorithm reevaluated: Yes  Was Pulsate ordered?: No         RN Decompensation Assessment (Repeat at 12 hours)        Mentation:  Exam is notable for normal (baseline) mental status    Respiratory mechanics and oxygenation:  Exam is notable for stable oxygen requirements    Cardiovascular/perfusion:   Exam is notable for normal/unchanged cardiovascular/perfusion assessment    Overall estimation of the patient s condition/stability (1 = stable patient, 7 = appears very sick)? 2

## 2022-05-15 NOTE — PLAN OF CARE
Goal Outcome Evaluation:    Plan of Care Reviewed With: patient     Overall Patient Progress: improving    Outcome Evaluation: transferred from ICU    Assumed cares [id number]    Pt sleeping between cares. Not OOB this shift, turns with assist of 2 in bed. No endorsements of nausea overnight. Denied any pain overnight. No BM this shift, intermittently used the purewick overnight to void. VSS on 1L NC overnight. A&Ox4, able to make needs known.     Continue to provide care per poc.

## 2022-05-16 NOTE — PROGRESS NOTES
[facility]    Medicine Progress Note - Hospitalist Service, GOLD TEAM 6    Date of Admission:  5/12/2022    Assessment & Plan        Louise Benítez is a 75 yo female with a history of rheumatoid arthritis, myelodysplastic syndrome, chronic microcytic anemia, osteoporosis, peripheral arterial disease, and lumbar spondylosis admitted on 5/12/2022 to Neuro ICU for a new subdural hematoma following a fall at home.  She was medically cleared per neurology and neurosurgery and transferred out of ICU 5/13/22. She was evaluated by PT and OT who felt she should go to a rehab facility. Patient is currently open to the idea but is hopeful she will get strong enough to go home quickly.     Fall   Left Frontoparietal Subdural Hematoma with Midline Shift  Presented after fall at home, found to have acute left cerebral convexity subdural heamotma measuring 12 mm in thickness, trace acute subdural hematoma along the anterior left and interhemispheric fissure, mass effect includes 4 mm of midline shift, serial head CT after 6 hours with stable subdural hematoma. CT pelvis with soft tissue contusion along lateral aspect of right hip, no evidence of acute periprosthetic fracture. Clinically stable throughout ICU monitoring and medically stable for discharge per Neuro ICU team but will require additional evaluation for home safety and placement needs. Transfer to medicine 5/13/2022   - Neurosurgery consulted, following peripherally  - Continue keppra 7 days per protocol  - Hold anticoagulation, antiplatelet agents, and fibrinolytics at present per neurosurgery              - Holding NSAIDs until neurosurgery follow up  - Follow up with Neurosurgery in clinic with repeat Head CT in 1 week  - SBP goal <160  - SLP cleared 5/13/2022   - PT and OT recommending TCU  - May discontinue HOB >30 deg per neuro ICU              - Restart and talk to neuro and neurosurg if any clinical worsening  -  Continue falls precautions  - Social work consulted for TCU placement      Myelodysplastic Syndrome  Chronic Microcytic Anemia  Baseline around 8, dropped to 6.6 on 5/14. Likely due to her recent bleed, MDS and labs sticks. Received 1 unit pRBC 5/14 with appropriate hgb response.   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  - Continue home plaquenil 400 mg qHS  - Continue home denosumab injections              - Last on 5/1  - Holding home celocoxib per neuro ICU through Neurosurgery follow up in 1 week  - Resumed PTA baclofen 5-10 mg HS    - Lidocaine patches PRN   - Acetaminophen PRN with oxycodone PRN for breakthrough pain     R 1st Metatarsal Foot Ulcer - Present on Admission  No acute changes was on a course of cephalexin PTA from PCP  - Continue PTA cephalexin for 7 day course  - Continue home Mupirocin     Peripheral Arterial Disease  No acute issues, does not take a statin.       Diet: Regular Diet Adult    DVT Prophylaxis: Pneumatic Compression Devices  Vasquez Catheter: Not present  Central Lines: None  Cardiac Monitoring: None  Code Status: Full Code      Disposition Plan   Expected Discharge: 05/16/2022     Anticipated discharge location: TCU  Delays: TCU placement        The patient's care was discussed with the Attending Physician, Dr. Delgadillo, Bedside Nurse, Patient and Patient's Family.    EDUARDO Qureshi  Hospitalist Service, GOLD TEAM [address] [place] [address]  Securely message with the Vocera Web Console (learn more here)  Text page via [facility] Paging/Directory   Please see signed in provider for up to date  "coverage information      Clinically Significant Risk Factors Present on Admission                 ______________________________________________________________________    Interval History   Patient reports she is doing okay today.  No acute events overnight.  Inquiring about quotes how to improve a bruise\".  Notes bruise on right posterior leg is painful, and also reports pain on right lower back.  Denies weakness or numbness.  Reports she does not feel steady on her feet, would like to work with PT.  Currently she is agreeable to short TCU stay prior to returning home.  Endorses mild posterior headache.  Denies chest pain, shortness of breath, abdominal pain, nausea, constipation, diarrhea, rash.    Data reviewed today: I reviewed all medications, new labs and imaging results over the last 24 hours.     Physical Exam   Vital Signs: Temp: 98.2  F (36.8  C) Temp src: Oral BP: (!) 145/67 Pulse: 84   Resp: 16 SpO2: 92 % O2 Device: None (Room air)    Weight: 143 lbs 1.6 oz  GENERAL: Alert, sitting in chair. NAD.   HEENT: Anicteric sclera. Mucous membranes moist and without lesions.   CV: RRR. S1, S2. No murmurs appreciated.   RESPIRATORY: Effort normal on RA. Lungs with mild crackle at left base, otherwise CTAB.    GI: Abdomen soft and non distended, bowel sounds present. No tenderness, rebound, guarding.   MUSCULOSKELETAL: No joint swelling or tenderness. Moves all extremities.   NEUROLOGICAL: No focal deficits.    EXTREMITIES: No peripheral edema. Intact bilateral pedal pulses.   SKIN: No jaundice. No rashes. Ecchymosis of right posterior thigh, stable.     Data   Recent Labs   Lab 05/16/22  0731 05/15/22  0556 05/15/22  0158 05/14/22  1051 05/12/22  2116 05/12/22  1729 05/12/22  1549 05/12/22  0737   WBC 6.4 8.6  --  8.3   < > 8.9  --  9.6   HGB 7.3* 7.8* 7.6* 6.6*   < > 7.4*  --  7.8*   * 104*  --  103*   < > 105*  --  102*    355  --  314   < > 348  --  355   INR  --   --   --   --   --  1.15  --   " --     136  --  137   < > 140  --  138   POTASSIUM 4.2 4.2  --  4.3   < > 3.8  --  4.0   CHLORIDE 103 104  --  104   < > 108  --  106   CO2 28 27  --  27   < > 25  --  24   BUN 12 13  --  15   < > 17  --  19   CR 0.59 0.58  --  0.63   < > 0.61  --  0.69   ANIONGAP 6 5  --  6   < > 7  --  8   MENDEZ 8.9 8.8  --  8.7   < > 8.8  --  9.2   * 95  --  113*   < > 88   < > 107   ALBUMIN  --   --   --   --   --  3.6  --  4.0   PROTTOTAL  --   --   --   --   --  6.4*  --  6.3   BILITOTAL  --   --   --   --   --  1.3  --  1.5*   ALKPHOS  --   --   --   --   --  69  --  70   ALT  --   --   --   --   --  23  --  19   AST  --   --   --   --   --  38  --  40    < > = values in this interval not displayed.       Medications       cephALEXin  500 mg Oral Q6H RUPA     hydroxychloroquine  400 mg Oral or Feeding Tube At Bedtime     levETIRAcetam  500 mg Oral or Feeding Tube BID     lidocaine  3 patch Transdermal Q24H    And     lidocaine   Transdermal Q8H     multivitamin w/minerals  1 tablet Oral Daily     mupirocin   Topical BID     polyethylene glycol  17 g Oral Daily     senna-docusate  1-2 tablet Oral At Bedtime

## 2022-05-16 NOTE — PROGRESS NOTES
Care Management Follow Up    Length of Stay (days): 4    Expected Discharge Date: 05/16/2022     Concerns to be Addressed: Discharge planning       Patient plan of care discussed at interdisciplinary rounds: Yes    Anticipated Discharge Disposition: TCU recommended     Anticipated Discharge Services: May need transportation to TCU   Anticipated Discharge DME: NA    Patient/family educated on Medicare website which has current facility and service quality ratings:  Yes  Education Provided on the Discharge Plan:  Yes  Patient/Family in Agreement with the Plan:  Yes    Referrals Placed by CM/SW: See below   Private pay costs discussed: transportation costs    Additional Information:  Pt has TCU recs. SW met w/ pt in her room to provide Medicare/gov TCU list. SW provided education about TCU and requested pt review the list and identify 4-5 TCUs for SW to send referrals to. Pt agreeable. SW provided pt w/ 5B RNCC contact info if questions come up. SW/RNCC will f/u w/ pt tomorrow to gather TCU preferences and send referrals.     Elian KWAN, [facility]   Phone: [phone]  Pager: [phone]

## 2022-05-16 NOTE — PLAN OF CARE
Goal Outcome Evaluation:    Plan of Care Reviewed With: patient     Overall Patient Progress: no change    Outcome Evaluation: No acute changes overnight.    Assumed cares [id number]    Pt sleeping between cares. Up with Ax2 using gait belt and walker to the bedside commode. A&Ox4, able to make needs known. No endorsements of nausea overnight. R PIV SL. Intermittent use of the purewick overnight, no BM given senna and miralax.      Continue to provide care per poc.

## 2022-05-16 NOTE — CONSULTS
Stroke Education Note    The following information has been reviewed with the patient and family:    1. Warning signs of stroke    2. Calling 911 if having warning signs of stroke    3. All modifiable risk factors: hypertension, CAD, atrial fib, diabetes, hypercholesterolemia, smoking, substance abuse, diet, physical inactivity, obesity, sleep apnea.    4. Patient's risk factors for stroke which include: perpheral artery disease, fall    5. Follow-up plan for after discharge    6. Discharge medications which include: none    In addition, the above information was given to the patient and family in writing as a part of the [facility] Stroke Class Handout.    Learner's response to risk factors / lifestyle modification education: Activation     Cheri Smith RN   PLC teaching at bedside with patient and . Educated why we do stroke teaching in the situation of the fall resulting in a subdural hematoma as they were a little surprised by stroke class. Both verbalized understanding and grateful for an individualized class based on their situation.

## 2022-05-16 NOTE — PLAN OF CARE
"Cares 0700 to 1900    /86 (BP Location: Right arm)   Pulse 75   Temp 99.3  F (37.4  C) (Oral)   Resp 16   Ht 1.626 m (5' 4\")   Wt 64.9 kg (143 lb 1.6 oz)   SpO2 91%   BMI 24.56 kg/m       Pain: Constant pain in R shoulder, R buttock, hip, & groin. Oxycodone given 1x with intermittent relief, pt declined pain meds after oxycodone. Lidocaine patches applied to R shoulder & hip.   Neuros: A&o x4. Neuros q4h, intact. Forgetful. Calls appropriately.   Cardiac: WDL  Resp: WDL  GI/: C/o constipation, miralax given. Fair intake. Continent for urine.   Access: R PIV SL.   Skin: Bruising on BL shins, scab on R heel, partial thickness wound on R shin. Dressings changed.   Activity: Assist x2 for transfer to bedside commode. On bed alarms for hx of falls.     POC: Waiting on TCU placement.     Goal Outcome Evaluation:  Plan of Care Reviewed With: patient, spouse, daughter   Overall Patient Progress: no change  Outcome Evaluation: Neuros intact. Encourage activity. Manage pain.      "

## 2022-05-17 NOTE — PROGRESS NOTES
[facility]    Medicine Progress Note - Hospitalist Service, GOLD TEAM 6    Date of Admission:  5/12/2022    Assessment & Plan        Louise Benítez is a 77 yo female with a history of rheumatoid arthritis, myelodysplastic syndrome, chronic microcytic anemia, osteoporosis, peripheral arterial disease, and lumbar spondylosis admitted on 5/12/2022 to Neuro ICU for a new subdural hematoma following a fall at home.  She was medically cleared per neurology and neurosurgery and transferred out of ICU 5/13/22. She was evaluated by PT and OT who felt she should go to a rehab facility. Patient is currently open to the idea but is hopeful she will get strong enough to go home quickly.     #Fall   #Left Frontoparietal Subdural Hematoma with Midline Shift  Presented after fall at home, found to have acute left cerebral convexity subdural heamotma measuring 12 mm in thickness, trace acute subdural hematoma along the anterior left and interhemispheric fissure, mass effect includes 4 mm of midline shift. erial head CT after 6 hours with stable subdural hematoma. CT pelvis with soft tissue contusion along lateral aspect of right hip, no evidence of acute periprosthetic fracture. Remained clinically stable throughout ICU monitoring and medically stable for discharge per Neuro ICU team but will require additional evaluation for home safety and placement needs. Transfer to medicine 5/13/2022.   -Neurosurgery consulted, following peripherally  -Continue keppra 7 days per protocol  -Hold anticoagulation, antiplatelet agents, and fibrinolytics at present per neurosurgery              -Holding NSAIDs until neurosurgery follow up  -SBP goal <160  -SLP cleared 5/13/2022   -May discontinue HOB >30 deg per neuro ICU              -Restart and talk to neuro and neurosurg if any clinical worsening  -Continue falls precautions  -Follow up with Neurosurgery in clinic with repeat Head CT in 1  week  -PT and OT recommending TCU  -Social work consulted for TCU placement      #Myelodysplastic Syndrome  #Chronic Microcytic Anemia  Baseline hgb ~8, dropped to 6.6 on 5/14 requiring 1 unit of PRBC. Likely due to her recent bleed, MDS and labs.   -Goal Hgb >7 and Plt >50 goal  -Continue home darbepoetin injections q21 days              -Last on 5/10    #Thrombocytosis: Platelet count of 511k, previously normal, per care everywhere baseline is ~530-550k.  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  -Continue home plaquenil 400 mg qHS  -Continue home denosumab injections              -Last on 5/1  -Holding home Celocoxib per neuro ICU through Neurosurgery follow up in 1 week  -Resumed PTA baclofen 5-10 mg HS    -Lidocaine patches PRN   -Acetaminophen PRN with oxycodone PRN for breakthrough pain     #R 1st Metatarsal Foot Ulcer - Present on Admission: No acute changes was on a course of cephalexin PTA from PCP  -Continue PTA cephalexin for 7 day course  -Continue home Mupirocin     #Peripheral Arterial Disease: No acute issues, does not take a statin.       Diet: Regular Diet Adult    DVT Prophylaxis: VTE Prophylaxis contraindicated due to acute bleed  Vasquez Catheter: Not present  Central Lines: None  Cardiac Monitoring: None  Code Status: Full Code      Disposition Plan   Expected Discharge:  1-2 days   Anticipated discharge location: home;home with help/services (SW discussed possible TCU recs. Pt indicated that she would rather go home with [facility]. If TCU, would like [facility] ref.)    Delays:     TCU vs home w/ HC pending progress w/ therapy        The patient's care was discussed with the Attending Physician, Dr. Palacios, Bedside Nurse, Care  Coordinator/ and Patient.    Anamaria Strauss PA-C  Hospitalist Service, GOLD TEAM 6  [facility]  Securely message with the Vocera Web Console (learn more here)  Text page via [facility] Paging/Directory   Please see signed in provider for up to date coverage information      Clinically Significant Risk Factors Present on Admission                    ______________________________________________________________________    Interval History   The patient notes she is feeling ok physically. She is very tired. She notes that she hasn't slept much since being here. Roommate and nurses kept her awake. She would like to see therapists today. She would like to talk to SW about options for discharge. Ideally has a TCU she would like.     Data reviewed today: I reviewed all medications, new labs and imaging results over the last 24 hours. I personally reviewed no images or EKG's today.    Physical Exam   Vital Signs: Temp: 98.2  F (36.8  C) Temp src: Oral BP: (!) 169/54 Pulse: 78   Resp: 16 SpO2: 96 % O2 Device: None (Room air)    Weight: 144 lbs 9.6 oz  GENERAL: Alert, sitting up comfortably in chair.   HEENT: Anicteric sclera. Mucous membranes moist and without lesions.   CV: RRR. S1, S2. No murmurs appreciated.   RESPIRATORY: Effort normal on RA. Lungs CTAB with no wheezing, rales, rhonchi.   GI: Abdomen soft and non distended, bowel sounds present. No tenderness, rebound, guarding.   MUSCULOSKELETAL: Moves all extremities.   NEUROLOGICAL: No focal deficits.   EXTREMITIES: Intact bilateral pedal pulses.   SKIN: No jaundice. No rashes.       Data   Reviewed BMP, mag, phos, glucose, CBC

## 2022-05-17 NOTE — PROGRESS NOTES
Pain is controlled today. Received tylenol and oxycodone x1 with good effect. Up to chair this morning for about 3 hours. Back/groin/buttocks pain improved when sitting up. Continues to have great difficutly actually moving legs to walk but is easily able to stand and bear weight on her feet. Needs maximal assist to pivot transfer to chair or commode.  Appetite is good. Voiding adequately. BMx1.Plan is for TCU placement. Continue with cares, encourage nutrition and activity as tolerated.

## 2022-05-17 NOTE — PLAN OF CARE
"    BP (!) 169/54 (BP Location: Right arm)   Pulse 78   Temp 98.2  F (36.8  C) (Oral)   Resp 16   Ht 1.626 m (5' 4\")   Wt 65.6 kg (144 lb 9.6 oz)   SpO2 96%   BMI 24.82 kg/m   VSS on RA.  Patient alert/ox3. Forgetful and anxious.  Patient attempted trial with purewick x2, but prefers to have removed immediately after urination.  Patient pain somewhat controlled with prn oxy, but declined more than one dose this shift. Needs encouragement with repositioning and education on purpose. BP elevated this am, pharmacy notified ordered up labetalol. Await from pharmacy. Patient unable to pivot transfer when up with assist of 2 to BS.  Patient froze and attempted with bedpan and then to external catheter.  Continue to monitor pain and electrolytes. Notify provider with changes  "

## 2022-05-18 NOTE — PROGRESS NOTES
[facility]    Medicine Progress Note - Hospitalist Service, GOLD TEAM 6    Date of Admission:  5/12/2022    Assessment & Plan        Louise Benítez is a 77 yo female with a history of rheumatoid arthritis, myelodysplastic syndrome, chronic microcytic anemia, osteoporosis, peripheral arterial disease, and lumbar spondylosis admitted on 5/12/2022 to Neuro ICU for a new subdural hematoma following a fall at home.  She was medically cleared per neurology and neurosurgery and transferred out of ICU 5/13/22. She was evaluated by PT and OT who felt she should go to a rehab facility. Patient is currently open to the idea but is hopeful she will get strong enough to go home quickly.     #Fall   #Left Frontoparietal Subdural Hematoma with Midline Shift  Presented after fall at home, found to have acute left cerebral convexity subdural heamotma measuring 12 mm in thickness, trace acute subdural hematoma along the anterior left and interhemispheric fissure, mass effect includes 4 mm of midline shift. erial head CT after 6 hours with stable subdural hematoma. CT pelvis with soft tissue contusion along lateral aspect of right hip, no evidence of acute periprosthetic fracture. Remained clinically stable throughout ICU monitoring and medically stable for discharge per Neuro ICU team but will require additional evaluation for home safety and placement needs. Transfer to medicine 5/13/2022.   -Neurosurgery consulted, following peripherally  -Continue keppra 7 days per protocol  -Hold anticoagulation, antiplatelet agents, and fibrinolytics at present per neurosurgery              -Holding NSAIDs until neurosurgery follow up  -SBP goal <160  -SLP cleared 5/13/2022   -May discontinue HOB >30 deg per neuro ICU              -Restart and talk to neuro and neurosurg if any clinical worsening  -Continue falls precautions  -Follow up with Neurosurgery in clinic with repeat Head CT in 1  week  -PT and OT recommending TCU  -Social work consulted for TCU placement      #Myelodysplastic Syndrome  #Chronic Microcytic Anemia  Baseline hgb ~8, dropped to 6.6 on 5/14 requiring 1 unit of PRBC. Likely due to her recent bleed, MDS and labs.   -Goal Hgb >7 and Plt >50 goal  -Continue home darbepoetin injections q21 days              -Last on 5/10     #Thrombocytosis: Platelet count of 511k, previously normal, per care everywhere baseline is ~530-550k.  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 Continues to have left groin pain & right buttock pain w/ ambulation limiting movement.  -Ortho consult to r/o occult fracture  -Continue home plaquenil 400 mg qHS  -Continue home denosumab injections              -Last on 5/1  -Holding home Celocoxib per neuro ICU through Neurosurgery follow up in 1 week  -Resumed PTA baclofen 5-10 mg HS    -Lidocaine patches PRN   -Acetaminophen PRN with oxycodone PRN for breakthrough pain     #R 1st Metatarsal Foot Ulcer - Present on Admission: No acute changes was on a course of cephalexin PTA from PCP  -Continue PTA cephalexin for 7 day course  -Continue home Mupirocin     #Peripheral Arterial Disease: No acute issues, does not take a statin.       Diet: Regular Diet Adult    DVT Prophylaxis: Pneumatic Compression Devices and Ambulate every shift  Vasquez Catheter: Not present  Central Lines: None  Cardiac Monitoring: None  Code Status: Full Code      Disposition Plan   Expected Discharge:  1-2 days   Anticipated discharge location: home;home with help/services (SW discussed possible TCU recs. Pt indicated that she would rather go home with [facility]. If TCU, would like [facility] ref.)    Delays:     placement, ortho consult         The patient's care was discussed with the Attending Physician, Dr. Palacios, Bedside Nurse, Care Coordinator/ and Patient.    Anamaria Strauss PA-C  Hospitalist Service, GOLD TEAM [address] [place] [address]  Securely message with the Vocera Web Console (learn more here)  Text page via [facility] Paging/Directory   Please see signed in provider for up to date coverage information      Clinically Significant Risk Factors Present on Admission                    ______________________________________________________________________    Interval History   The patient notes pain in her left groin with movement. Difficulty ambulating due to that and right buttock pain. No new fevers or chills. No other acute symptoms.     Data reviewed today: I reviewed all medications, new labs and imaging results over the last 24 hours. I personally reviewed no images or EKG's today.    Physical Exam   Vital Signs: Temp: 97.5  F (36.4  C) Temp src: Oral BP: (!) 155/52 Pulse: 80   Resp: 18 SpO2: 94 % O2 Device: None (Room air)    Weight: 137 lbs 12.8 oz  GENERAL: Alert and oriented x 3. Sitting comfortably in chair.   HEENT: Anicteric sclera. Mucous membranes moist and without lesions.   CV: RRR. S1, S2. No murmurs appreciated.   RESPIRATORY: Effort normal on RA. Lungs CTAB with no wheezing, rales, rhonchi.   GI: Abdomen soft and non distended, bowel sounds present. No tenderness, rebound, guarding.   MUSCULOSKELETAL: Moves all extremities.   NEUROLOGICAL: No focal deficits.   EXTREMITIES: No peripheral edema.   SKIN: No jaundice. No rashes.       Data   Reviewed BMP, mag, phos, CBC, glucose

## 2022-05-18 NOTE — PROGRESS NOTES
Neuros are intact. Moving better today when transferring to chair and commode. Pain well controlled with prn oxycodone. VSS. Voiding adequately. No BM. States that she thinks she had a BM yesterday but feels constipated. Dulcolax suppository ordered. Appetite is fair to good.  at bedside. Plan for transfer to TCU this week.

## 2022-05-18 NOTE — PLAN OF CARE
"  SHIFT: 4879-6126    SITUATION: 76 year old female with history of rheumatoid arthritis, myelodysplastic  Syndrome, chronic microcytic anemia, osteoporosis peripheral arterial disease, and lumbar spondylosis who was admitted on 5-12-22 to neuro ICU for new subdural hematoma following a fall at home.      VITALS:  BP (!) 155/52 (BP Location: Right arm)   Pulse 80   Temp 97.5  F (36.4  C) (Oral)   Resp 18   Ht 1.626 m (5' 4\")   Wt 62.5 kg (137 lb 12.8 oz)   SpO2 94%   BMI 23.65 kg/m  VSS except desats to 88-92   30% of shift.     PAIN: Current pain regimen reported effective, with right shoulder, right buttock, hip, and groin. Lidocaine patches to right shoulder and right hip.    CARDIAC: WNL  intermittent hypertension- PRNs for SBP> 160    NEURO:  Alert, oriented x4, forgetful, BLE weakness/pain. Denies tingling or numbness    RESPIRATORY: Lungs clear,  SOB on exertion    GI/: BM today, declined scheduled senna    DIET: Tolerating regular diet, poor appetite    ACTIVITY: Assist of 1-2 pivot transfer from bed to BSC, Needs encouragement with bedside positioning, easily fatigued    LINES/DRAINS: RUE PIV infiltrated and removed. Currently no IV access    SKIN:  warm, elaine, Scattered bruising from fall,abrasion noted on R shin;scab on R heel. Bruising on R hand, scab right toe,heel, and partial thickness wound on right shin.     NOTABLE LABS:    EVENTS OF SHIFT: Patient continues to have intermittent requests for assistance with toileting and then decides to use purwick external catheter.  Overnight, Sats dip to  88 max low but quickly return to mid 90s on RA. Patient mouth breather. One dose PRN oxycodone and tylenol administered for pain. Lidocaine patches to injured side right.RUE PIV was pulled out of skin and infiltrated and removed.    PLAN:   Awaiting transitional care placement and ongoing PT/OT. Patient prefers [facility] if  Unable to manage at home with [facility]     Goal Outcome Evaluation:  Plan " of Care Reviewed With: patient  Overall Patient Progress: no change  Outcome Evaluation: Neuros intact

## 2022-05-19 NOTE — PROGRESS NOTES
[facility]    Medicine Progress Note - Hospitalist Service, GOLD TEAM 6    Date of Admission:  5/12/2022    Assessment & Plan        Louise Benítez is a 77 yo female with a history of rheumatoid arthritis, myelodysplastic syndrome, chronic microcytic anemia, osteoporosis, peripheral arterial disease, and lumbar spondylosis admitted on 5/12/2022 to Neuro ICU for a new subdural hematoma following a fall at home.  She was medically cleared per neurology and neurosurgery and transferred out of ICU 5/13/22. She was evaluated by PT and OT with recommendations to discharge to rehab facility.      #Fall   #Left Frontoparietal Subdural Hematoma with Midline Shift  Presented after fall at home, found to have acute left cerebral convexity subdural heamotma measuring 12 mm in thickness, trace acute subdural hematoma along the anterior left and interhemispheric fissure, mass effect includes 4 mm of midline shift. erial head CT after 6 hours with stable subdural hematoma. CT pelvis with soft tissue contusion along lateral aspect of right hip, no evidence of acute periprosthetic fracture. Remained clinically stable throughout ICU monitoring and medically stable for discharge per Neuro ICU team but will require additional evaluation for home safety and placement needs. Transfer to medicine 5/13/2022.   -Neurosurgery consulted, following peripherally  -Continue keppra 7 days per protocol  -Hold anticoagulation, antiplatelet agents, and fibrinolytics at present per neurosurgery              -Holding NSAIDs until neurosurgery follow up  -SBP goal <160  -SLP cleared 5/13/2022   -May discontinue HOB >30 deg per neuro ICU              -Restart and talk to neuro and neurosurg if any clinical worsening  -Continue falls precautions  -Follow up with Neurosurgery in clinic with repeat Head CT in 1 week  -PT and OT recommending TCU  -Social work consulted for TCU placement      #Myelodysplastic  Syndrome  #Chronic Microcytic Anemia  Baseline hgb ~8, dropped to 6.6 on 5/14 requiring 1 unit of PRBC. Likely due to her recent bleed, MDS and labs. Now stable at 7.8.   -Goal Hgb >7 and Plt >50 goal  -Continue home darbepoetin injections q21 days              -Last on 5/10     #Thrombocytosis: Platelet count of 515k, previously normal, per care everywhere baseline is ~530-550k.  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 Continues to have left groin pain & right buttock pain w/ ambulation limiting movement.  -Discussed with ortho & reviewed imaging, no obvious fracture, as pain is overall improving, less likely an acute fracture, if pain continues in the next 1-2 weeks, recommend outpatient follow up with ortho  -Continue home plaquenil 400 mg qHS  -Continue home denosumab injections              -Last on 5/1  -Holding home Celocoxib per neuro ICU through Neurosurgery follow up in 1 week  -Resumed PTA baclofen 5-10 mg HS    -Lidocaine patches PRN   -Acetaminophen PRN with oxycodone PRN for breakthrough pain     #R 1st Metatarsal Foot Ulcer - Present on Admission: No acute changes was on a course of cephalexin PTA from PCP which was completed 5/19. Continue home Mupirocin.     #Peripheral Arterial Disease: No acute issues, does not take a statin.       Diet: Regular Diet Adult    DVT Prophylaxis: Pneumatic Compression Devices and Ambulate every shift, no pharm w/ recent SHD  Vasquez Catheter: Not present  Central Lines: None  Cardiac Monitoring: None  Code Status: Full Code      Disposition Plan   Expected Discharge:  today   Anticipated discharge location: home;home with help/services (SW discussed possible TCU recs. Pt indicated that she would rather  go home with [facility]. If TCU, would like [facility] ref.)    Delays:     TCU        The patient's care was discussed with the Attending Physician, Dr. Palacios, Bedside Nurse, Care Coordinator/, Patient and Patient's Family.    Anamaria Strauss PA-C  Hospitalist Service, GOLD TEAM [address] [place] [address]  Securely message with the Vocera Web Console (learn more here)  Text page via [facility] Paging/Directory   Please see signed in provider for up to date coverage information      Clinically Significant Risk Factors Present on Admission                    ______________________________________________________________________    Interval History   The patient notes that she was able to take about 6 steps today. She continues to have pain in left groin with ambulating. She didn't have pain medication before therapy. She denies any new numbness/tingling/symptoms. Overall feels okay.    Data reviewed today: I reviewed all medications, new labs and imaging results over the last 24 hours. I personally reviewed no images or EKG's today.    Physical Exam   Vital Signs: Temp: 98.2  F (36.8  C) Temp src: Oral BP: (!) 142/44 Pulse: 67   Resp: 16 SpO2: 96 % O2 Device: None (Room air)    Weight: 138 lbs 14.4 oz  GENERAL: Alert, lying comfortably in bed.   HEENT: Anicteric sclera. Mucous membranes moist and without lesions.   RESPIRATORY: Effort normal on RA.   MUSCULOSKELETAL: Moves all extremities.   NEUROLOGICAL: No focal deficits.   EXTREMITIES: No peripheral edema.   SKIN: No jaundice. No rashes.       Data   Reviewed BMP, mag, phos, CBC

## 2022-05-19 NOTE — PROVIDER NOTIFICATION
"Provider notified (name): Jax ([id number])  Reason for notification: \"5B rm 5234-2 pt LANG, Lana RN [id number]. Pt has BP of 167/55, okay to give PRN. Thanks!\"  Response from provider:  Okay to give  "

## 2022-05-19 NOTE — PLAN OF CARE
Goal Outcome Evaluation:    Plan of Care Reviewed With: patient     Overall Patient Progress: no change    Outcome Evaluation: Neuros intact. pain being managed with PRN oxy.    Assumed cares 1099-3352    Pt sleeping between cares. Up with assist x2 using gait belt and walker.No BM this shift, given senna and suppository was offered, but pt did not want at this time. Overnight intermittently uses purewick. Given PRN labetalol x1 for BP of 165/58. No endorsement of nausea overnight. Did endorse some shoulder pain, given PRN oxy and tylenol with relief noted. L PIV SL.    Continue to provide care per poc.

## 2022-05-19 NOTE — PLAN OF CARE
"Shift: 3126-6126    BP (!) 143/60 (BP Location: Right arm)   Pulse 69   Temp 98.4  F (36.9  C) (Oral)   Resp 18   Ht 1.626 m (5' 4\")   Wt 63 kg (138 lb 14.4 oz)   SpO2 96%   BMI 23.84 kg/m      Activity: A x2 with gait belt and walker  Neuros: A&Ox4; calls appropriately  Cardiac: HTN; denies cardiac chest pain  Respiratory: WDL, sats 96% on RA; denies shortness of breath  GI/: BS hypoactive; +flatus; 1 BM on shift  Diet: regular, fair appetite  Skin: generalized bruising on (R) forearm, hip, shin (L) shoulder, forearm, shin; scab on (R) shin covered with primapore to prevent pt from itching; mepilex applied on (R) heel for comfort  Lines: (L) PIV saline locked  Pain/nausea: Pt complained of hip pain 8/10, PRN oxy given 2x, PRN tylenol given 2x- good relief  New changes this shift: PT/OT visited; Vital signs changed from Q2 to Q8; neuro checks changed from Q4 to Q8; SCDs ordered  Plan: Continue POC, discharge to TCU 5/20    "

## 2022-05-19 NOTE — PROVIDER NOTIFICATION
"Provider notified (name): Jax ([id number])  Reason for notification: \"HIGH 5B rm 5234-2 pt Lana CROFT RN [id number]. Please place order for PIV, pt has no access and needs IV BP meds. Thanks!      "

## 2022-05-19 NOTE — PROGRESS NOTES
Care Management Discharge Note    Discharge Date: 05/16/2022       Discharge Disposition: TCU    Discharge Services:  none    Discharge DME:  2WW    Discharge Transportation:  will provide    Private pay costs discussed: Not applicable    PAS Confirmation Code: [id number]   Patient/family educated on Medicare website which has current facility and service quality ratings:  Yes     Education Provided on the Discharge Plan:  Yes  Persons Notified of Discharge Plans: Pt,   Patient/Family in Agreement with the Plan:  Yes    Handoff Referral Completed: No    Additional Information:  Pt is medically ready for discharge to a TCU. [facility] and [facility] bed [facility] available today. After speaking with the Pt, her  Jd and the provider at the bedside the Pt will DC tomorrow at 9:30am. Her  will transport her to the TCU    [facility]    [address], [place] [address]  Phone: [phone]  Fax: [phone]    MariaD el Carmen Arango RN    [facility]  [phone]

## 2022-05-19 NOTE — PROGRESS NOTES
"CLINICAL NUTRITION SERVICES - ASSESSMENT NOTE     Nutrition Prescription    RECOMMENDATIONS FOR MDs/PROVIDERS TO ORDER:  Regular diet as tolerated     Malnutrition Status:    Moderate malnutrition in the context of acute presentation     Recommendations already ordered by Registered Dietitian (RD):  Ensure max protein once per day to optimize intake     Future/Additional Recommendations:  PO improvement        REASON FOR ASSESSMENT  Louise Benítez is a/an 76 year old female assessed by the dietitian for LOS x7    Chart reviewed:   PMH: Rheumatoid arthritis, myelodysplastic syndrome, chronic microcytic anemia, osteoporosis, peripheral arterial disease, and lumbar spondylosis    - Admitted on 5/12/2022 to Neuro ICU for a new subdural hematoma following a fall at home.    - Transferred out of ICU 5/13/22.  #Fall   #Left Frontoparietal Subdural Hematoma with Midline Shift  - Plan for TCU    NUTRITION HISTORY  Unable to see patient. Busy with providers     CURRENT NUTRITION ORDERS  Diet: Regular  Intake/Tolerance: fair to good today. Per flow sheet review, patient is consuming 50-75% of meals.     LABS  BMP unremarkable    MEDICATIONS  Bowel regimen  Thera-vit-M    ANTHROPOMETRICS  Height: 162.6 cm (5' 4\")  Most Recent Weight: 63 kg (138 lb 14.4 oz) on 5/18 -> bed scale   Admit wt: 68.8 kg (151 lb 10.8 oz) on 5/12/22 -> bed scale   IBW:  54.5 kg ( 116% IBW)  BMI: 23.84 kg /m2 Normal BMI  Weight History: wt loss 5.8 kg since admit ( 1 week ?? Error in measurement with bed scale)   Wt Readings from Last 10 Encounters:   05/18/22 63 kg (138 lb 14.4 oz)   05/12/22 72.6 kg (160 lb)   03/24/22 70.3 kg (155 lb)   01/17/22 70.3 kg (155 lb)   04/21/21 72.6 kg (160 lb)   03/30/21 72.6 kg (160 lb)   09/22/20 70.3 kg (155 lb)   09/08/20 71.7 kg (158 lb)   02/27/20 72.6 kg (160 lb)   12/05/19 72.6 kg (160 lb)       Dosing Weight: 63 kg most recent wt on 5/18/22    ASSESSED NUTRITION NEEDS  Estimated Energy Needs: 8284-7701 " kcals/day (25 - 30 kcals/kg)  Justification: Maintenance  Estimated Protein Needs: 76+  grams protein/day (1.2 +  grams of pro/kg)  Justification: Hypercatabolism with critical illness  Estimated Fluid Needs: (1 mL/kcal)   Justification: Maintenance    PHYSICAL FINDINGS  See malnutrition section below.    MALNUTRITION  % Intake: < 75% for > 7 days (moderate)  % Weight Loss: > 2% in 1 week (severe)  Subcutaneous Fat Loss: Unable to assess  Muscle Loss: Unable to assess  Fluid Accumulation/Edema: None noted  Malnutrition Diagnosis: Moderate malnutrition in the context of acute presentation     NUTRITION DIAGNOSIS  Inadequate oral intake related to low appetite as evidenced by patient is consuming mostly 50% of meals      INTERVENTIONS  Implementation  Nutrition Education: Not appropriate at this time due to patient condition   Medical food supplement therapy     Goals  Patient to consume % of nutritionally adequate meal trays TID, or the equivalent with supplements/snacks.     Monitoring/Evaluation  Progress toward goals will be monitored and evaluated per protocol.    Jr Chavez RD/KERRY  Pager [phone]

## 2022-05-20 NOTE — PLAN OF CARE
Occupational Therapy Discharge Summary    Reason for therapy discharge:    Discharged to transitional care facility.    Progress towards therapy goal(s). See goals on Care Plan in [facility] electronic health record for goal details.  Goals partially met.  Barriers to achieving goals:   discharge from facility.    Therapy recommendation(s):    Continued therapy is recommended.  Rationale/Recommendations:  to maximize IND, safety, and strength completing ADL/IADLs prior to return to living arrangements.

## 2022-05-20 NOTE — PLAN OF CARE
Discharged to: TCU  Transportation: Ride from   Time: 0930  Prescriptions: Oxy script sent with pt in packet  Belongings: Packed and sent with pt  PIV/Access: L PIV removed  Care Plan and Education discontinued: Yes  Paperwork: Packet given to  for the facility. Nurse to nurse report given to accepting facility.

## 2022-05-20 NOTE — DISCHARGE SUMMARY
[facility]  Hospitalist Discharge Summary      Date of Admission:  5/12/2022  Date of Discharge:  5/20/2022  Discharging Provider: Anamaria Strauss PA-C/Dr. 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 Please see your primary care provider in the next 2 weeks to go over   the hospitalization, med changes, labs  2. Follow up with Neurosurgery within 1 week of discharge to discuss   further recommendations on brain bleed, directions on celebrex  3. Follow up with Ortho in 1-2 weeks if hip pain doesn't improve with   movement/mobilization  4. Follow up with Rheumatology as scheduled             Unresulted Labs Ordered in the Past 30 Days of this Admission     Date and Time Order Name Status Description    5/14/2022  6:00 PM Prepare red blood cells (unit) Preliminary       These results will be followed up by Hematology    Discharge Disposition   Discharged to short-term care facility- Rehab  Condition at discharge: Stable    Hospital Course   Louise Benítez is a 77 yo female with a history of rheumatoid arthritis, myelodysplastic syndrome, chronic microcytic anemia, osteoporosis, peripheral arterial disease, and lumbar spondylosis admitted on 5/12/2022 to Neuro ICU for a new subdural hematoma following a fall at home.  She was medically cleared per neurology and neurosurgery and transferred out of ICU 5/13/22. She was  evaluated by PT and OT with recommendations to discharge to rehab facility.      #Fall   #Left Frontoparietal Subdural Hematoma with Midline Shift  Presented after fall at home, found to have acute left cerebral convexity subdural heamotma measuring 12 mm in thickness, trace acute subdural hematoma along the anterior left and interhemispheric fissure, mass effect includes 4 mm of midline shift. erial head CT after 6 hours with stable subdural hematoma. CT pelvis with soft tissue contusion along lateral aspect of right hip, no evidence of acute periprosthetic fracture. Remained clinically stable throughout ICU monitoring and medically stable for discharge per Neuro ICU team but will require additional evaluation for home safety and placement needs. PT/OT followed, recommended TCU. Completed 7 day course of keppra 5/19. Will plan to hold anticoagulation, antiplatelet agents, and fibrinolytics at present per neurosurgery (celebrex especially) until neurosurgery follow up. Blood pressure goal is <160 which she has maintained. Recommend follow up with Neurosurgery in clinic with repeat Head CT in 1 week (referral placed, has appt 6/4).     #Myelodysplastic Syndrome  #Chronic Microcytic Anemia  Baseline hgb ~8, dropped to 6.6 on 5/14 requiring 1 unit of PRBC. Likely due to her recent bleed, MDS and labs. Now stable at 9.2. On discharge, continue OP f/u and home darbepoetin injections q21 days.     #Thrombocytosis: Platelet count of 515k, previously normal, per care everywhere baseline is ~530-550k. Continue OP f/u.     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 Continues to have left groin pain & right buttock pain w/ ambulation limiting movement. Discussed with ortho &  reviewed imaging, no obvious fracture, as pain is overall improving, less likely an acute fracture, if pain continues in the next 1-2 weeks, recommend outpatient follow up with ortho. On dishcarge, follow up with rheumatology, continue home plaquenil 400 mg at bedtime, denosumab injections, baclofen at bedtime. Hold home Celocoxib per neuro ICU through Neurosurgery follow up in 1 week. Prescribed tylenol and oxycodone for pain on discharge.      #R 1st Metatarsal Foot Ulcer - Present on Admission: No acute changes was on a course of cephalexin PTA from PCP which was completed 5/19. Continue home Mupirocin.     #Peripheral Arterial Disease: No acute issues, does not take a statin.    Consultations This Hospital Stay   PHARMACY IP CONSULT  PHYSICAL THERAPY ADULT IP CONSULT  OCCUPATIONAL THERAPY ADULT IP CONSULT  CARE MANAGEMENT / SOCIAL WORK IP CONSULT  SPEECH LANGUAGE PATH ADULT IP CONSULT  PATIENT LEARNING CENTER IP CONSULT  NEUROSURGERY ADULT IP CONSULT  NURSING TO CONSULT FOR VASCULAR ACCESS CARE IP CONSULT  ORTHOPAEDIC SURGERY ADULT/PEDS IP CONSULT  NURSING TO CONSULT FOR VASCULAR ACCESS CARE IP CONSULT  PHYSICAL THERAPY ADULT IP CONSULT  OCCUPATIONAL THERAPY ADULT IP CONSULT  NURSING TO CONSULT FOR VASCULAR ACCESS CARE IP CONSULT    Code Status   Full Code    Time Spent on this Encounter   I, Anamaria Strauss PA-C, personally saw the patient today and spent greater than 30 minutes discharging this patient.       Anamaira Strauss PA-C  [facility] [address] [address]  Phone: [phone]  ______________________________________________________________________    Physical Exam   Vital Signs: Temp: 98.1  F (36.7  C) Temp src: Oral BP: 139/60 Pulse: 88   Resp: 20 SpO2: 93 % O2 Device: None (Room air)    Weight: 133 lbs 4.8 oz  GENERAL: Alert, sitting in chair getting ready to leave.   HEENT: Anicteric sclera. Mucous membranes moist and without lesions.    RESPIRATORY: Effort normal on RA.   MUSCULOSKELETAL: Moves all extremities.   NEUROLOGICAL: No focal deficits.   EXTREMITIES: No peripheral edema.   SKIN: No jaundice. No rashes.          Primary Care Physician   Desire Lua    Discharge Orders      Neurosurgery Referral      General info for SNF    Length of Stay Estimate: Short Term Care: Estimated # of Days <30  Condition at Discharge: Stable  Level of care:board and care  Rehabilitation Potential: Good  Admission H&P remains valid and up-to-date: Yes  Recent Chemotherapy: N/A  Use Nursing Home Standing Orders: Yes     Follow Up and recommended labs and tests    Please set up an appointment with:  1. Please see your primary care provider in the next 2 weeks to go over the hospitalization, med changes, labs  2. Follow up with Neurosurgery within 1 week of discharge to discuss further recommendations on brain bleed, directions on celebrex  3. Follow up with Ortho in 1-2 weeks if hip pain doesn't improve with movement/mobilization  4. Follow up with Rheumatology as scheduled     Mantoux instructions    Give two-step Mantoux (PPD) Per Facility Policy Yes     Reason for your hospital stay    Dear Louise,    Your were hospitalized at [facility] with a subdural hematoma (bleed in your brain) after a fall and treated with keppra for seizure prophylaxis and time.  Over your hospitalization your condition improved and today you are ready to be discharged home. You should continue to improve but if you develop fever, shortness of breath, light headedness, chest pain or other medical concerns please seek medical attention.     We are suggesting the following medication changes:  1. Hold Celebrex until follow up with Neurosurgery as an outpatient  2. Tylenol and oxycodone as needed, recommend you take oxycodone 30-60 minutes prior to work with therapy  3. Miralax and senna for stool softeners    Please set up an appointment with:  1. Please  see your primary care provider in the next 2 weeks to go over the hospitalization, med changes, labs  2. Follow up with Neurosurgery within 1 week of discharge to discuss further recommendations on brain bleed, directions on celebrex  3. Follow up with Ortho in 1-2 weeks if hip pain doesn't improve with movement/mobilization  4. Follow up with Rheumatology as scheduled    It was a pleasure meeting you. Thank you for allowing me and my team the privilege of caring for you today. You are the reason we are here, and I truly hope we provided you with the excellent service you deserve. Please let us know if there is anything else we can do for you so that we can be sure you are leaving completely satisfied with your care experience.    Your hospital unit at the time of discharge is [unfilled] so if you have any questions please call the hospital at [phone] and ask to talk to a nurse on [unfilled].    Take care!  Anamaria Strauss PA-C     Activity - Up with nursing assistance     Full Code     Physical Therapy Adult Consult    Evaluate and treat as clinically indicated.    Reason:  weakness, deconditioning     Occupational Therapy Adult Consult    Evaluate and treat as clinically indicated.    Reason:  weakness, deconditioning     Fall precautions     Diet    Follow this diet upon discharge: Orders Placed This Encounter      Snacks/Supplements Adult: Other; Ensure max protein at 2:00 pm daily; Between Meals      Regular Diet Adult       Significant Results and Procedures   Most Recent 3 CBC's:Recent Labs   Lab Test 05/20/22  0634 05/19/22  0541 05/18/22  0642   WBC 6.7 7.4 6.5   HGB 9.2* 7.8* 8.4*   * 102* 103*   * 515* 541*     Most Recent 3 BMP's:Recent Labs   Lab Test 05/20/22  0634 05/19/22  0541 05/18/22  0642    138 138   POTASSIUM 4.2 4.2 4.0   CHLORIDE 104 104 104   CO2 25 26 26   BUN 17 16 15   CR 0.60 0.60 0.59   ANIONGAP 9 8 8   MENDEZ 9.5 9.0 9.2   * 94 103*     Most Recent 3  INR's:Recent Labs   Lab Test 05/12/22  1729   INR 1.15     Most Recent INR's and Anticoagulation Dosing History:  Anticoagulation Dose History     Recent Dosing and Labs Latest Ref Rng & Units 5/12/2022    INR 0.85 - 1.15 1.15      ,   Results for orders placed or performed during the hospital encounter of 05/12/22   CT Head w/o Contrast    Narrative    Exam: CT HEAD W/O CONTRAST  5/12/2022 4:39 PM    History: Headache, intracranial hemorrhage suspected.    Comparison:  CT head from same day at 0926 hours.    Technique: Using multidetector thin collimation helical acquisition  technique, axial, coronal and sagittal CT images from the skull base  to the vertex were obtained without intravenous contrast.     Findings:    Evolving left frontotemporal convexity subdural hemorrhage with  unchanged maximum thickness of 10 mm (series 3 image 22) and mild  associated mass effect and 5 mm rightward midline shift. Hyperdensity  along the falx an anterior distress, likely representing subdural  hemorrhages. No new intracranial hemorrhage. Mild generalized  parenchymal volume loss. The ventricles are proportionate to the  cerebral sulci. The gray-white differentiation of the cerebral  hemispheres is preserved.    Mild scattered mucosal thickening in the visualized paranasal sinuses.  The mastoid air cells are clear. No acute abnormality of the orbits.      Impression    Impression: Unchanged size of evolving left convexity subdural  hematoma with maximum thickness of 10 mm. Unchanged 5 mm rightward  midline shift. Unchanged hyperdensity and mild thickening along the  falx and tentorial leaflets, likely representing redistribution of  subdural hemorrhage. No new intracranial findings.    I have personally reviewed the examination and initial interpretation  and I agree with the findings.    BAILEY SPAIN MD         SYSTEM ID:  [id number]       Discharge Medications   Current Discharge Medication List      START taking these  medications    Details   acetaminophen (TYLENOL) 325 MG tablet 2 tablets (650 mg) by Oral or Feeding Tube route every 4 hours as needed for mild pain or fever  Qty: 60 tablet, Refills: 0    Associated Diagnoses: SDH (subdural hematoma) (H)      oxyCODONE (ROXICODONE) 5 MG tablet 1 tablet (5 mg) by Oral or Feeding Tube route every 4 hours as needed for moderate to severe pain Take 30-60 minutes prior to work with therapy  Qty: 12 tablet, Refills: 0    Associated Diagnoses: SDH (subdural hematoma) (H)      polyethylene glycol (MIRALAX) 17 GM/Dose powder Take 17 g by mouth daily  Qty: 510 g, Refills: 0    Associated Diagnoses: SDH (subdural hematoma) (H)      senna-docusate (SENOKOT-S/PERICOLACE) 8.6-50 MG tablet Take 1-2 tablets by mouth At Bedtime    Associated Diagnoses: SDH (subdural hematoma) (H)         CONTINUE these medications which have NOT CHANGED    Details   baclofen (LIORESAL) 10 MG tablet Take half to one tablet by mouth at bedtime as needed for muscle spasm  Qty: 90 tablet, Refills: 0    Associated Diagnoses: Myofascial muscle pain      cholecalciferol, vitamin D3, 50,000 unit Tab [CHOLECALCIFEROL, VITAMIN D3, 50,000 UNIT TAB] Take 1 tablet weekly  Qty: 12 tablet, Refills: 0    Associated Diagnoses: Vitamin deficiency      multivitamin w/minerals (THERA-VIT-M) tablet Take 1 tablet by mouth daily      vitamin B-12 (CYANOCOBALAMIN) 500 MCG tablet       darbepoetin anthony (ARANESP) 300 MCG/0.6ML injection Inject 300 mcg Subcutaneous every 21 days Last taken 5/10/22      hydroxychloroquine (PLAQUENIL) 200 MG tablet TAKE TWO TABLETS BY MOUTH DAILY AT BEDTIME  Qty: 180 tablet, Refills: 0    Associated Diagnoses: Seronegative rheumatoid arthritis of multiple sites (H)      mupirocin (BACTROBAN) 2 % external ointment Apply topically daily To wounds on legs and feet with daily dressing changes  Qty: 22 g, Refills: 0    Associated Diagnoses: Cellulitis of both feet; Bilateral cellulitis of lower leg         STOP  taking these medications       celecoxib (CELEBREX) 200 MG capsule Comments:   Reason for Stopping:             Allergies   No Known Allergies

## 2022-05-20 NOTE — DISCHARGE INSTRUCTIONS
You were admitted to the hospital for after having a fall at home, we did many tests and found you had a brain bleed. We had Physical therapy work with you which helped you get stronger. At this point we believe it is safe for you continue your recovery outside the hospital while you continue your recovery at rehab (TCU) with the Physical therapists. Remember to follow up with your primary doctor within the next 1-2 weeks like we discussed. If you notice symptoms such as confusion, severe sudden headache, dizzyness or having any falls or near falls at home, call your doctor or come to the ER right away like we discussed.     SOMEONE WILL CALL YOU TO MAKE AN APPOINTMENT AT THE [facility] WITH EITHER:  -- DR VIVEK BORREGO  -- DR YENI MENDOZA    [facility]  Address:   [address] [place] [address] [address]  [place] [address]  Phone: [phone]

## 2022-05-20 NOTE — PLAN OF CARE
Physical Therapy Discharge Summary    Reason for therapy discharge:    Discharged to transitional care facility.    Progress towards therapy goal(s). See goals on Care Plan in [facility] electronic health record for goal details.  Goals not met.  Barriers to achieving goals:   limited tolerance for therapy and discharge from facility.    Therapy recommendation(s):    Continued therapy is recommended.  Rationale/Recommendations:  to improve functional strength and activity tolerance.

## 2022-05-20 NOTE — PLAN OF CARE
Goal Outcome Evaluation:    Plan of Care Reviewed With: patient     Overall Patient Progress: improving    Outcome Evaluation: Neuros intact.    Assumed cares [id number]    Pt sleeping intermittently between cares. Endorsed pain in right shoulder and hip, given PRN oxy and tylenol with relief noted. No endorsements of nausea overnight. L PIV SL. Up with Ax2 to bedside commode. Intermittently uses purewick overnight, no BM this shift. A&Ox4, able to make needs known.     Continue to provide care per poc.

## 2022-06-01 NOTE — TELEPHONE ENCOUNTER
[facility] Call Center    Phone Message    May a detailed message be left on voicemail: yes     Reason for Call: Other: patient has CT scan prior and appt with Kaelyn this Friday at 9 am. It is difficult for patient to walk and she does not want to come into [facility]. Can she do a virtual visit with Kaelyn and do the CT scan in [place] instead? Please contact patient advise.     Action Taken: Message routed to:  [facility] neurosurgery    Travel Screening: Not Applicable

## 2022-06-01 NOTE — CONFIDENTIAL NOTE
Fall 3 weeks ago with CT scan and was diagnosed with a brain bleed.  Patient is requesting to change her appointment for the CT from the [facility] to [facility] or [facility]  Triager transferred patient to scheduling to make an appointment

## 2022-06-01 NOTE — TELEPHONE ENCOUNTER
Writer routed to OLGA Cerrato.   Please return call regarding patient appointment with imaging. Requesting to be virtual.     Magali Saxena LPN  Neurosurgery

## 2022-06-03 NOTE — TELEPHONE ENCOUNTER
Reason for Call:  Home Health Care    [facility] with [facility] called regarding (reason for call): Verify PCP will follow by phone and fax    Patient has been discharged from TCU.    First meeting with patient is today for Eval and Treat.  Need to confirm PCP will follow patient by phone and fax    Pt Provider: Dr. James    Phone Number Homecare Nurse can be reached at: [phone]    Can we leave a detailed message on this number? YES voice mail is secure and confidential.     Phone number patient can be reached at: Home number on file [phone] (home)    Call taken on 6/3/2022 at 8:55 AM by Louise Ureña

## 2022-06-05 NOTE — TELEPHONE ENCOUNTER
FUTURE VISIT INFORMATION      FUTURE VISIT INFORMATION:    Date: 6/10/2022    Time: 230pm    [place] [facility]  REFERRAL INFORMATION:    Referring provider:  Dr. Palacios    Referring providers clinic:  [facility]     Reason for visit/diagnosis  ED Follow-up     RECORDS REQUESTED FROM:       Clinic name Comments Records Status Imaging Status   Internal ED Visit/Admission-5/12/2022    CT Head-6/6/2022, 5/12/2022    CT Cervical Spine-5/12/2022 Epic PACS

## 2022-06-06 NOTE — TELEPHONE ENCOUNTER
Author called and notified provider per MD approval as listed below via voicemail or verbally. Task complete.

## 2022-06-08 NOTE — TELEPHONE ENCOUNTER
Reason for Call: Request for an order or referral:    Order or referral being requested: HC PT ORDERS    ONCE THIS WEEK  3 TIMES FOR A WEEK  TWICE A WEEK FOR 2 WEEKS  ONCE A WEEK FOR A WEEK   FOR strength, balance, transfers, gait, pain mgm, fall prevention     Does PCP approve above PT HC orders?  Thanks    Date needed: as soon as possible    Has the patient been seen by the PCP for this problem? YES    Phone number Ana Laura can be reached at:  [phone]    Best Time:  daytime    Can we leave a detailed message on this number?  YES    Call taken on 6/8/2022 at 11:40 AM by Kylie Rodriguez CMA. TC

## 2022-06-10 NOTE — LETTER
6/10/2022       RE: Louise Benítez  [address]  [place] [address]     Dear Colleague,    Thank you for referring your patient, Louise Benítez, to the [facility] [place] [facility]. Please see a copy of my visit note below.      [facility]  Department of Neurosurgery      Name: Louise Benítez  MRN: [id number]  Age: 76 year old  : 1945  Referring provider: Briana Moran  06/10/2022      Chief Complaint:   SDH, secondary to fall on 2022  Post hospital discharge follow up      History of Present Illness:   Louise Benítez is a 75 yo female with a history of rheumatoid arthritis, myelodysplastic syndrome, chronic microcytic anemia, osteoporosis, peripheral arterial disease, and lumbar spondylosis admitted on 2022 to Neuro ICU for a new subdural hematoma following a fall at home. She was managed non operatively and was discharged to a rehab facility on 2022.     Today, I had a phone visit with the patient. Per patient, she was discharged from the rehab after a week and is at home now. She is using a walker for ambulation and also works with PT regularly. She denies any headache, vision or speech changes, n/t/w in arms or legs, balance issues since hospital discharge. Overall she feels fine. Patient is not on ASA or blood thinners.       Review of Systems:   Pertinent items are noted in HPI or as in patient entered ROS below, remainder of complete ROS is negative.   No flowsheet data found.     Physical Exam:   N/A (Telephone visit)    Imagin2022 Head CT:  IMPRESSION:  1.  Temporal evolution of left-sided subdural hematoma now predominantly hypodense with respect to the adjacent brain parenchyma and decreased in size measuring 1.0 cm. No new or worsening mass effect. Persistent 0.4 cm rightward shift of the septum  pellucidum    Assessment:  SDH, secondary to fall on 2022  Post  hospital discharge follow up    Plan:  Overall patient has been doing well since hospital discharge. No new concerns. We discussed her recent head CT results. It shows evolving left SDH and persistent 0.4 cm rightward shift of the septum  Pellucidum. We will plan for a follow-up head CT in 4 weeks. If that is stable, no additional follow up needed in NSGY clinic.       I spent 20 minutes on patient care activities related to this encounter on the date of service, including time spent reviewing the chart, obtaining history and examination and in counseling the patient, and in documentation in the electronic medical record.      Kaelyn CHOU, CNP  Department of Neurosurgery

## 2022-06-10 NOTE — PROGRESS NOTES
Louise is a 76 year old who is being evaluated via a billable telephone visit.      What phone number would you like to be contacted at?[phone]    How would you like to obtain your AVS?  Mail a copy  Phone call duration: 25 minutes    [facility]  Department of Neurosurgery      Name: Louise Benítez  MRN: [id number]  Age: 76 year old  : 1945  Referring provider: Briana Moran  06/10/2022      Chief Complaint:   SDH, secondary to fall on 2022  Post hospital discharge follow up      History of Present Illness:   Louise Benítez is a 77 yo female with a history of rheumatoid arthritis, myelodysplastic syndrome, chronic microcytic anemia, osteoporosis, peripheral arterial disease, and lumbar spondylosis admitted on 2022 to Neuro ICU for a new subdural hematoma following a fall at home. She was managed non operatively and was discharged to a rehab facility on 2022.     Today, I had a phone visit with the patient. Per patient, she was discharged from the rehab after a week and is at home now. She is using a walker for ambulation and also works with PT regularly. She denies any headache, vision or speech changes, n/t/w in arms or legs, balance issues since hospital discharge. Overall she feels fine. Patient is not on ASA or blood thinners.       Review of Systems:   Pertinent items are noted in HPI or as in patient entered ROS below, remainder of complete ROS is negative.   No flowsheet data found.     Physical Exam:   N/A (Telephone visit)    Imagin2022 Head CT:  IMPRESSION:  1.  Temporal evolution of left-sided subdural hematoma now predominantly hypodense with respect to the adjacent brain parenchyma and decreased in size measuring 1.0 cm. No new or worsening mass effect. Persistent 0.4 cm rightward shift of the septum  pellucidum    Assessment:  SDH, secondary to fall on 2022  Post hospital discharge follow up    Plan:  Overall patient has been doing well since  hospital discharge. No new concerns. We discussed her recent head CT results. It shows evolving left SDH and persistent 0.4 cm rightward shift of the septum  Pellucidum. We will plan for a follow-up head CT in 4 weeks. If that is stable, no additional follow up needed in NSGY clinic.          I spent 20 minutes on patient care activities related to this encounter on the date of service, including time spent reviewing the chart, obtaining history and examination and in counseling the patient, and in documentation in the electronic medical record.      Kaelyn CHOU CNP  Department of Neurosurgery

## 2022-06-10 NOTE — H&P (VIEW-ONLY)
Louise is a 76 year old who is being evaluated via a billable telephone visit.      What phone number would you like to be contacted at?[phone]    How would you like to obtain your AVS?  Mail a copy  Phone call duration: 25 minutes    [facility]  Department of Neurosurgery      Name: Louise Benítez  MRN: [id number]  Age: 76 year old  : 1945  Referring provider: Briana Moran  06/10/2022      Chief Complaint:   SDH, secondary to fall on 2022  Post hospital discharge follow up      History of Present Illness:   Louise Benítez is a 75 yo female with a history of rheumatoid arthritis, myelodysplastic syndrome, chronic microcytic anemia, osteoporosis, peripheral arterial disease, and lumbar spondylosis admitted on 2022 to Neuro ICU for a new subdural hematoma following a fall at home. She was managed non operatively and was discharged to a rehab facility on 2022.     Today, I had a phone visit with the patient. Per patient, she was discharged from the rehab after a week and is at home now. She is using a walker for ambulation and also works with PT regularly. She denies any headache, vision or speech changes, n/t/w in arms or legs, balance issues since hospital discharge. Overall she feels fine. Patient is not on ASA or blood thinners.       Review of Systems:   Pertinent items are noted in HPI or as in patient entered ROS below, remainder of complete ROS is negative.   No flowsheet data found.     Physical Exam:   N/A (Telephone visit)    Imagin2022 Head CT:  IMPRESSION:  1.  Temporal evolution of left-sided subdural hematoma now predominantly hypodense with respect to the adjacent brain parenchyma and decreased in size measuring 1.0 cm. No new or worsening mass effect. Persistent 0.4 cm rightward shift of the septum  pellucidum    Assessment:  SDH, secondary to fall on 2022  Post hospital discharge follow up    Plan:  Overall patient has been doing well since  hospital discharge. No new concerns. We discussed her recent head CT results. It shows evolving left SDH and persistent 0.4 cm rightward shift of the septum  Pellucidum. We will plan for a follow-up head CT in 4 weeks. If that is stable, no additional follow up needed in NSGY clinic.          I spent 20 minutes on patient care activities related to this encounter on the date of service, including time spent reviewing the chart, obtaining history and examination and in counseling the patient, and in documentation in the electronic medical record.      Kaelyn CHOU CNP  Department of Neurosurgery

## 2022-06-22 PROBLEM — I61.9 ICH (INTRACEREBRAL HEMORRHAGE) (H): Status: ACTIVE | Noted: 2022-01-01

## 2022-06-22 PROBLEM — S06.5XAA SUBDURAL HEMATOMA (H): Status: ACTIVE | Noted: 2022-01-01

## 2022-06-22 NOTE — TELEPHONE ENCOUNTER
Home care nurse Mark [phone] ; calling to report  Patient information to PCP  Call transferred to   for direct call back from on call to home care RN  Mitzi Burt RN  [facility]      Reason for Disposition    Nursing judgment    Protocols used: NO GUIDELINE OR REFERENCE [id number]

## 2022-06-22 NOTE — ED NOTES
Bed: ED21  Expected date:   Expected time:   Means of arrival:   Comments:  [facility] 76 f- fall, AMS, hypertension

## 2022-06-22 NOTE — ED TRIAGE NOTES
Unwitnessed fall at home onto carpet. Patient does not remember fall. At home nurse states she's been declining since fall. Patient reports 12/10 head pain. A/O x4 for EMS and ED staff.  on the way. 8 mg of Zofran from EMS

## 2022-06-22 NOTE — PROGRESS NOTES
Patient intubated in ED for airway protection. 7.5 ETT secured 25 cm at the teeth. Color change noted colorimetric CO2 detector with bilateral breath sounds. Slightly diminished left side compared to right; ETT pulled back 1 cm. Easy to bag, no complications during procedure. Initial vent settings: VC 22 / 400 / +5 / 30%.    Sanchez Bustamante, RT

## 2022-06-22 NOTE — INTERVAL H&P NOTE
"I have reviewed the surgical (or preoperative) H&P that is linked to this encounter, and examined the patient. There are no significant changes    Clinical Conditions Present on Arrival:  Clinically Significant Risk Factors Present on Admission                   # Cachexia: Estimated body mass index is 22.83 kg/m  as calculated from the following:    Height as of 5/12/22: 1.626 m (5' 4\").    Weight as of this encounter: 60.3 kg (133 lb).       "

## 2022-06-22 NOTE — ED PROVIDER NOTES
"    [place] EMERGENCY DEPARTMENT ([facility])  6/22/22    History     Chief Complaint   Patient presents with     Fall     The history is provided by the patient, medical records and the EMS personnel.     Louise Benítez is a 76 year old female with a history of myelodysplastic syndrome, rheumatoid arthritis, chronic myocytic anemia, and recent SDH who presents to the Emergency Department via EMS following an unwitnessed fall. Per EMS, patient had an unwitnessed fall onto carpet. Her home nurse had been leaving, heard the fall, and went back to find the patient on the floor. She then had to help the patient up. Home nurse notes patient has been more \"out of it\" since her fall about 4 weeks ago. Patient reports she hit her head and now has frontal and occipital head pain. She does not remember the fall or why she got up. Patient reports nausea. No chest pain or shortness of breath. Patient is not on anticoagulation.    Per chart review, patient presented to [facility] ED on 5/12 after a mechanical fall at home onto her buttocks. She was found to have acute left cerebral convexity subdural hematoma 12mm in thickness and trace acute subdural hematoma along the anterior left and interhemispheric fissure, as well as 4mm midline shift. Patient was admitted to the neuro ICU. Serial head CT was stable. She was placed on 7 day course of Keppra, finishing 5/19. Patient was seen by PT/OT and recommended to discharge to TCU. She was discharged to rehab facility on 5/20. Patient had follow-up CT on 6/6 with evolving left SDH and persistent 4mm rightward shift.     CT HEAD W/O CONTRAST 6/6/2022  IMPRESSION:  1.  Temporal evolution of left-sided subdural hematoma now predominantly hypodense with respect to the adjacent brain parenchyma and decreased in size measuring 1.0 cm. No new or worsening mass effect. Persistent 0.4 cm rightward shift of the septum  pellucidum    Past Medical History  Past Medical History: "   Diagnosis Date     Rheumatoid arthritis (H)      Past Surgical History:   Procedure Laterality Date     IR LUMBAR EPIDURAL STEROID INJECTION  8/15/2012     No current outpatient medications on file.    No Known Allergies  Family History  History reviewed. No pertinent family history.  Social History   Social History     Tobacco Use     Smoking status: Former Smoker     Smokeless tobacco: Never Used   Substance Use Topics     Alcohol use: Yes     Comment: Alcoholic Drinks/day: wine      Drug use: Never      Past medical history, past surgical history, medications, allergies, family history, and social history were reviewed with the patient. No additional pertinent items.       Review of Systems   Respiratory: Negative for shortness of breath.    Cardiovascular: Negative for chest pain.   Gastrointestinal: Positive for nausea.   Neurological: Positive for headaches.   All other systems reviewed and are negative.    A complete review of systems was performed with pertinent positives and negatives noted in the HPI, and all other systems negative.    Physical Exam   BP: (!) 180/82  Pulse: 102  Temp: 98  F (36.7  C)  Resp: 20  Weight: 60.3 kg (133 lb)  SpO2: 96 %  Physical Exam  Vitals and nursing note reviewed.   Constitutional:       Appearance: She is not ill-appearing or toxic-appearing.   HENT:      Head: Normocephalic and atraumatic.      Right Ear: External ear normal.      Left Ear: External ear normal.      Nose: Nose normal.   Eyes:      Extraocular Movements: Extraocular movements intact.      Conjunctiva/sclera: Conjunctivae normal.   Cardiovascular:      Rate and Rhythm: Normal rate and regular rhythm.   Pulmonary:      Effort: Pulmonary effort is normal.      Breath sounds: Normal breath sounds.   Abdominal:      General: There is no distension.      Palpations: Abdomen is soft.      Tenderness: There is no abdominal tenderness.   Musculoskeletal:         General: No deformity or signs of injury.       Cervical back: Neck supple. No rigidity.   Skin:     General: Skin is warm.      Findings: No rash.   Neurological:      General: No focal deficit present.      Mental Status: She is alert. Mental status is at baseline.      Cranial Nerves: Cranial nerve deficit present.      Comments: Initially small facial droop noted, most notable at the eye.    Called back to room 10-15 minutes later, now full facial droop that cannot be overcome, blown R pupil   Psychiatric:         Mood and Affect: Mood normal.         Behavior: Behavior normal.       ED Course   5:22 PM  The patient was seen and examined by Krystle Sanchez MD in Room ED21.     [facility]    -Intubation    Date/Time: 6/22/2022 8:29 PM  Performed by: Krystle Sanchez MD  Authorized by: Krystle Sanchez MD     Emergent condition/consent implied      PRE-PROCEDURE DETAILS     Patient status:  Altered mental status    Mallampati score:  II    Pretreatment meds: etomidate.    Paralytics:  Rocuronium      PROCEDURE DETAILS     Preoxygenation:  Bag valve mask    CPR in progress: no      Intubation method:  Oral    Oral intubation technique:  Video-assisted    Laryngoscope blade:  Mac 4    Tube size (mm):  7.5    Tube type:  Cuffed    Number of attempts:  2    Ventilation between attempts: yes      Cricoid pressure: no      Tube visualized through cords: yes      PLACEMENT ASSESSMENT     ETT to lip:  26    ETT to teeth:  25    Breath sounds:  Reduced on right    Placement verification: chest rise, condensation, CXR verification, direct visualization and colorimetric ETCO2 device      CXR findings:  ETT in proper place    PROCEDURE  Describe Procedure: Given dimished breath sounds on R, tube withdrawn 1cm to 25 at the lip, 24 at the teeth         A consult was attained from the neurosurgery service. The case was discussed with residents from that service. The consulting service's recommendations were provided.     Critical  Care Addendum    My initial assessment, based on my review of nursing observations, review of vital signs, focused history, physical exam, review of cardiac rhythm monitor and interpretation of imaging , established that Louise Benítez has severe hemorrhage and severe hypertension, which requires immediate intervention, and therefore she is critically ill.     After the initial assessment, the care team initiated multiple lab tests, initiated medication therapy with IV cardene, propofol, labetalol, keppra, performed advanced airway management and consulted with neurosurgery to provide stabilization care. Due to the critical nature of this patient, I reassessed nursing observations, vital signs, physical exam, mental status, neurologic status and respiratory status multiple times prior to her disposition.     Time also spent performing documentation, discussion with family to obtain medical information for decision making, reviewing test results, discussion with consultants and coordination of care.     Critical care time (excluding teaching time and procedures): 80 minutes.   The medical record was reviewed and interpreted.  Current labs reviewed and interpreted.  Current images reviewed and interpreted: large SDH with midline shift, signs of active bleeding and early herniation.              Results for orders placed or performed during the hospital encounter of 06/22/22   Head CT w/o contrast     Status: Abnormal   Result Value Ref Range    Radiologist flags (Urgent)      Acute subdural hematomas with mass effect as described    Narrative    CT HEAD W/O CONTRAST 6/22/2022 6:03 PM    History: fall, loc     Comparison: 5/12/2022    Technique: Using multidetector thin collimation helical acquisition  technique, axial, coronal and sagittal CT images from the skull base  to the vertex were obtained without intravenous contrast.   (topogram) image(s) also obtained and reviewed.    Findings:   Mixed density  subdural collection along the left cerebral convexity  and middle cranial fossa measuring up to 2.3 cm in maximal thickness.  Heterogeneity of the fluid may represent hyperacute or active  hemorrhage mixed with acute hemorrhage. There is moderate diffuse  sulcal effacement particularly in the left frontal region. There is up  to 12 mm of rightward midline shift. Slight medialization of the left  temporal lobe uncus (series 6, image 34) resulting in asymmetry of the  suprasellar cistern. The left occipital horn is completely effaced.    Hyperdense collection along the right frontal convexity is new from  prior and measures up to 7 mm in maximal thickness.    No subarachnoid hemorrhage. Gray/white matter differentiation in both  cerebral hemispheres is preserved. Ventricles are proportionate to the  cerebral sulci.     The bony calvaria and the bones of the skull base are normal. The  visualized portions of the paranasal sinuses and mastoid air cells are  clear.      Impression    Impression:  1. Active hemorrhage superimposed upon Acute and chronic left-sided  mixed density subdural measuring up to 2.3 cm in maximum thickness  with 12 mm of rightward midline shift and trace left-sided uncal  herniation.  2. Acute subdural along the right frontal convexity measuring up to 7  mm in maximum thickness.    [Urgent Result: Acute subdural hematomas with mass effect as described  above.] Concern for active hemorrhage.    Finding was identified on 6/22/2022 6:04 PM.     Dr. Sanchez was contacted by Dr. Leung and Dr. Lindquist at  6/22/2022 6:09 PM and was again contacted at 6:30 PM and verbalized  understanding of the urgent finding.      I have personally reviewed the examination and initial interpretation  and I agree with the findings.    BONI LINDQIUST MD         SYSTEM ID:  [id number]   Cervical spine CT w/o contrast     Status: None    Narrative    CT CERVICAL SPINE W/O CONTRAST 6/22/2022 6:04 PM    Provided  History: fall, head and neck pain, Fall, head and neck pain    Comparison: 5/12/2022    Technique: Using multidetector thin collimation helical acquisition  technique, axial, coronal and sagittal CT images through the cervical  spine were obtained without intravenous contrast.     Findings:  There is grade 1 anterolisthesis of C3 and C4 with mild widening of  the anterior cervical disc space, which is unchanged since 5/12/2022.  Mild grade 1 retrolisthesis of C5 on C6. Normal cervical lordosis. No  acute fracture or subluxation. No prevertebral edema. There is disc  height narrowing at C5-6 and C6-7. Multilevel degenerative changes of  cervical spine. The findings on a level by level basis are as follows:    C2-3:  No spinal canal or neural foraminal stenosis.    C3-4:  Moderate left neuroforaminal stenosis.    C4-5:  Moderate right neuroforaminal stenosis.    C5-6:  Bilateral moderate right greater than left neuroforaminal  stenosis    C6-7:  No spinal canal or neural foraminal stenosis.    C7-T1:  No spinal canal or neural foraminal stenosis.     No abnormality of the paraspinous soft tissues.      Impression    Impression:   No acute fracture or traumatic subluxation.    I have personally reviewed the examination and initial interpretation  and I agree with the findings.    BONI LINDQUIST MD         SYSTEM ID:  [id number]   XR Chest Port 1 View     Status: None    Narrative    EXAM: XR CHEST PORT 1 VIEW  6/22/2022 6:39 PM      HISTORY: Intubation; chief complaint unwitnessed fall    COMPARISON: 5/12/2022    FINDINGS: Single view of the chest. ET tube 2.5 cm above the leo.  Enteric tube tip and sidehole in stomach. Trachea is midline. Cardiac  mediastinal silhouette is within normal limits. Left lower lobe  atelectasis suspected. No focal consolidative opacity, pleural  effusion, or pneumothorax. Mild degenerative changes in the  glenohumeral joint is partially visualized.      Impression    IMPRESSION:  ET  tube is 2.5 cm above the leo. No acute consolidative airspace  disease. Left lower lobe atelectasis suspected.    I have personally reviewed the examination and initial interpretation  and I agree with the findings.    ROYA EDMOND MD         SYSTEM ID:  [id number]   [place] Draw     Status: None (In process)    Narrative    The following orders were created for panel order [place] Draw.  Procedure                               Abnormality         Status                     ---------                               -----------         ------                     Extra Red Top Tube[[id number]]                                                          Extra Green Top (Lithium...[[id number]]                      Final result               Extra Purple Top Tube[[id number]]                                                       Extra Blood Bank Purple ...[[id number]]                                                   Please view results for these tests on the individual orders.   CBC with platelets and differential     Status: Abnormal   Result Value Ref Range    WBC Count 15.2 (H) 4.0 - 11.0 10e3/uL    RBC Count 2.26 (L) 3.80 - 5.20 10e6/uL    Hemoglobin 7.1 (L) 11.7 - 15.7 g/dL    Hematocrit 22.2 (L) 35.0 - 47.0 %    MCV 98 78 - 100 fL    MCH 31.4 26.5 - 33.0 pg    MCHC 32.0 31.5 - 36.5 g/dL    RDW 26.1 (H) 10.0 - 15.0 %    Platelet Count 464 (H) 150 - 450 10e3/uL    % Neutrophils 91 %    % Lymphocytes 4 %    % Monocytes 3 %    % Eosinophils 0 %    % Basophils 0 %    % Immature Granulocytes 2 %    NRBCs per 100 WBC 1 (H) <1 /100    Absolute Neutrophils 13.7 (H) 1.6 - 8.3 10e3/uL    Absolute Lymphocytes 0.7 (L) 0.8 - 5.3 10e3/uL    Absolute Monocytes 0.4 0.0 - 1.3 10e3/uL    Absolute Eosinophils 0.0 0.0 - 0.7 10e3/uL    Absolute Basophils 0.1 0.0 - 0.2 10e3/uL    Absolute Immature Granulocytes 0.3 <=0.4 10e3/uL    Absolute NRBCs 0.1 10e3/uL   Extra Green Top (Lithium Heparin) Tube     Status: None   Result Value Ref Range     Hold Specimen [facility]    INR     Status: Normal   Result Value Ref Range    INR 1.08 0.85 - 1.15   Partial thromboplastin time     Status: Normal   Result Value Ref Range    aPTT 27 22 - 38 Seconds   Fibrinogen activity     Status: Normal   Result Value Ref Range    Fibrinogen Activity 267 170 - 490 mg/dL   EKG 12 lead     Status: None   Result Value Ref Range    Systolic Blood Pressure  mmHg    Diastolic Blood Pressure  mmHg    Ventricular Rate 89 BPM    Atrial Rate 89 BPM    MN Interval 156 ms    QRS Duration 92 ms     ms    QTc 455 ms    P Axis 25 degrees    R AXIS -19 degrees    T Axis 29 degrees    Interpretation ECG       Sinus rhythm with Premature atrial complexes with Aberrant conduction  Nonspecific ST abnormality  Abnormal ECG  Unconfirmed report - interpretation of this ECG is computer generated - see medical record for final interpretation  Confirmed by - EMERGENCY ROOM, PHYSICIAN ([id number]),  CHRISTIANA DA SILVA ([id number]) on 6/22/2022 8:26:27 PM     Prepare red blood cells (unit)     Status: None (Preliminary result)   Result Value Ref Range    UNIT ABO/RH O Neg     Unit Number [id number]     Unit Status Issued     Blood Component Type Red Blood Cells     Product Code [id number]     ISSUE DATE AND TIME [id number]     CODING SYSTEM [id number]     UNIT TYPE ISBT 9500    Prepare red blood cells (unit)     Status: None (Preliminary result)   Result Value Ref Range    UNIT ABO/RH O Neg     Unit Number [id number]     Unit Status Issued     Blood Component Type Red Blood Cells     Product Code [id number]     ISSUE DATE AND TIME [id number]     CODING SYSTEM [id number]     UNIT TYPE ISBT 9500    CBC with platelets differential     Status: Abnormal    Narrative    The following orders were created for panel order CBC with platelets differential.  Procedure                               Abnormality         Status                     ---------                               -----------         ------                      CBC with platelets and d...[[id number]]  Abnormal            Final result                 Please view results for these tests on the individual orders.   ABO/Rh type and screen     Status: None (In process)    Narrative    The following orders were created for panel order ABO/Rh type and screen.  Procedure                               Abnormality         Status                     ---------                               -----------         ------                     Adult Type and Screen[[id number]]                            In process                   Please view results for these tests on the individual orders.   ABO/Rh type and screen *Canceled*     Status: None ()    Narrative    The following orders were created for panel order ABO/Rh type and screen.  Procedure                               Abnormality         Status                     ---------                               -----------         ------                       Please view results for these tests on the individual orders.     Medications   niCARdipine 40 mg in 200 mL NS (CARDENE) infusion (0 mg/hr Intravenous Stopped 6/22/22 1850)   propofol (DIPRIVAN) infusion (0 mcg/kg/min × 60.3 kg Intravenous Stopped 6/22/22 1938)   levETIRAcetam (KEPPRA) 2,000 mg in sodium chloride 0.9 % 100 mL intermittent infusion ( Intravenous Auto Hold 6/22/22 1926)   bupivacaine 0.5 % -  EPINEPHrine 1:200,000 injection (6 mLs Intradermal Given 6/22/22 1930)   hemostatic matrix (SURGIFLO) kit (5 mLs Topical Given 6/22/22 1945)   labetalol (NORMODYNE/TRANDATE) injection 10 mg (10 mg Intravenous Given 6/22/22 1810)   etomidate (AMIDATE) injection 20 mg (20 mg Intravenous Given 6/22/22 1820)   rocuronium injection 100 mg (100 mg Intravenous Given 6/22/22 1820)   levETIRAcetam (KEPPRA) intermittent infusion 1,000 mg (1,000 mg Intravenous Given 6/22/22 2012)        Assessments & Plan (with Medical Decision Making)   MDM:    76 year old F who presented with severe  headache, nausea after an unwitnessed fall.  Did have signs of early facial droop on initial exam, I was then called emergently to bedside 10-15 minutes after my initial assessment because the patient now had a profound facial droop and large, dilated R sided pupil.  She was taken to CT stat, images on my read show large L sided SDH with significant midline shift.  Bps are elevated so I ordered IV labetalol while cardene drip is prepared.  Coming back from CT she is now less alert, not responding appropriately to questions, and not tolerating her secretions, concern for impending airway compromise.  NSGY beside and agreed with plan, intubated as described in the attached procedure note.    Called by radiology, concern for continued active bleeding and early uncal herniation.  Systolic BP <140 achieved.   HOB was elevated post intubation and confirmation of no cervical spine fracture and hyperventilated on the vent.  Keppra given.  I was beside the entire time she was in the ED, re-evaluating and titrating medications, discussing with consultants and family members.  Will be admitted to neurosurgery.  Taken emergently to the OR for decompression.      I have reviewed the nursing notes. I have reviewed the findings, diagnosis, plan and need for follow up with the patient.    Current Discharge Medication List          Final diagnoses:   Acute encephalopathy   Subdural hematoma (H)   Midline shift of brain due to hematoma (H)     I, Alexandra Davidson, am serving as a trained medical scribe to document services personally performed by Krystle Sanchez MD, based on the provider's statements to me.     I, Krystle Sanchez MD, was physically present and have reviewed and verified the accuracy of this note documented by Alexandra Davidson.    --  Krystle Sanchez MD  [facility] EMERGENCY DEPARTMENT  6/22/2022

## 2022-06-22 NOTE — LETTER
[facility] [address] [address]  [phone]    FACSIMILE TRANSMITTAL SHEET    TO: [employer]  FAX NUMBER: [phone]  PHONE NUMBER:     FROM: RACHEL Chase  PHONE: [phone]  DATE: 08/16/22  NUMBER OF PAGES:     _____URGENT _____REVIEW ONLY _____PLEASE COMMENT    X PLEASE REPLY    NOTES/COMMENTS:   Please review for LTC placement with hospice services. Pt is med ready and interested in [facility]. Thanks!     Ally Harris/Char Escalante  [phone]                                    IF YOU DID NOT RECEIVE THE CORRECT NUMBER OF PAGES OR THE FAX DID NOT COME THROUGH CLEARLY, PLEASE CALL THE SENDER     CONFIDENTIALITY STATEMENT: Confidential information that may accompany this transmission contains protected health information under state and federal law and is legally privileged. This information is intended only for the use of the individual or entity named above and may be used only for carrying out treatment, payment or other healthcare operations. The recipient or person responsible for delivering this information is prohibited by law from disclosing this information without proper authorization to any other party, unless required to do so by law or regulation. If you are not the intended recipient, you are hereby notified that any review, dissemination, distribution, or copying of this message is strictly prohibited. If you have received this communication in error, please destroy the materials and contact us immediately by calling the number listed above. No response indicates that the information was received by the appropriate authorized party

## 2022-06-22 NOTE — ANESTHESIA PREPROCEDURE EVALUATION
Anesthesia Pre-Procedure Evaluation    Patient: Louise Benítez   MRN: [id number] : 1945        Procedure : Procedure(s):  CRANIOTOMY LEFT for hematoma, possible craniectomy          Past Medical History:   Diagnosis Date     Rheumatoid arthritis (H)       Past Surgical History:   Procedure Laterality Date     IR LUMBAR EPIDURAL STEROID INJECTION  8/15/2012      No Known Allergies   Social History     Tobacco Use     Smoking status: Former Smoker     Smokeless tobacco: Never Used   Substance Use Topics     Alcohol use: Yes     Comment: Alcoholic Drinks/day: wine       Wt Readings from Last 1 Encounters:   22 60.3 kg (133 lb)        Anesthesia Evaluation            ROS/MED HX  ENT/Pulmonary:     (+) tobacco use, Past use,     Neurologic:       Cardiovascular:     (+) -Peripheral Vascular Disease----    METS/Exercise Tolerance:     Hematologic:     (+) anemia,     Musculoskeletal:   (+) arthritis,     GI/Hepatic:       Renal/Genitourinary:       Endo:       Psychiatric/Substance Use:       Infectious Disease:       Malignancy:       Other:            Physical Exam    Airway   unable to assess          Respiratory Devices and Support    ETT:      Dental    unable to assess        Cardiovascular    unable to assess         Pulmonary    Unable to assess               OUTSIDE LABS:  CBC:   Lab Results   Component Value Date    WBC 6.7 2022    WBC 7.4 2022    HGB 9.2 (L) 2022    HGB 7.8 (L) 2022    HCT 29.8 (L) 2022    HCT 25.4 (L) 2022     (H) 2022     (H) 2022     BMP:   Lab Results   Component Value Date     2022     2022    POTASSIUM 4.2 2022    POTASSIUM 4.2 2022    CHLORIDE 104 2022    CHLORIDE 104 2022    CO2 25 2022    CO2 26 2022    BUN 17 2022    BUN 16 2022    CR 0.60 2022    CR 0.60 2022     (H) 2022    GLC 94 2022     COAGS:   Lab  Results   Component Value Date    INR 1.15 05/12/2022     POC: No results found for: BGM, HCG, HCGS  HEPATIC:   Lab Results   Component Value Date    ALBUMIN 3.6 05/12/2022    PROTTOTAL 6.4 (L) 05/12/2022    ALT 23 05/12/2022    AST 38 05/12/2022    ALKPHOS 69 05/12/2022    BILITOTAL 1.3 05/12/2022     OTHER:   Lab Results   Component Value Date    A1C  05/13/2022      Comment:      Unable to calculate % HBA1C due to low HGB and/or Low HGBA1C.    Normal <5.7%   Prediabetes 5.7-6.4%    Diabetes 6.5% or higher     Note: Adopted from ADA consensus guidelines.    MENDEZ 9.5 05/20/2022    PHOS 4.1 05/20/2022    MAG 2.3 05/20/2022    TSH 21.77 (H) 03/17/2021    CRP 0.1 03/17/2021    SED 32 (H) 03/17/2021       Anesthesia Plan    ASA Status:  4, emergent       Anesthesia Type: General.     - Airway: ETT   Induction: Intravenous, Propofol.   Maintenance: Balanced.   Techniques and Equipment:     - Lines/Monitors: Arterial Line, 2nd IV, Central Line     - Drips/Meds: Epinephrine, Norepi, Phenylephrine     Consents    Anesthesia Plan(s) and associated risks, benefits, and realistic alternatives discussed. Questions answered and patient/representative(s) expressed understanding.    - Discussed:     - Discussed with:  Implied consent/emergency      - Extended Intubation/Ventilatory Support Discussed: Yes.    Use of blood products discussed: Yes.     - Discussed with: Implied consent/Emergency.     Postoperative Care    Pain management: IV analgesics.        Comments:                Juan Diego Jones MD

## 2022-06-23 NOTE — ED NOTES
"Patient arrives via EMS with complaints of Fall - unwitnessed unknown LOC, onto carpeted floor. Home nurse was leaving, called 911, found to be \"acting different\" for nurse per EMS, A/O x4 for EMS x1 emesis PTA.     1519:   Upon arrival patient reports 10/10 HA and nausea. Patient A/O x4, equal , push, pulls bilaterally. Pupils found to be 4 and PERRLA.     1525:   ED Provider at bedside, order for C collar. Patient states she is nauseas    1530:   C collar in place. Patient resting with ice pack on head/over eyes.    1540:   Droop in right eye noted. Right pupil 5-6, left pupil pin point. Notification to ED provider. CT called and patient is ready for CT. Nurse to CT with patient       1558:   Return from CT patient confused on date, oriented to situation, and president.     1800:   Patient started to desat into 80's, NC applied, worsening HTN. Provider paged to bedside.     1803:   Patient lethargic, arousable to painful stimuli, does not follow commands. x1 emesis, suction to patient      1810:  Neurosurgery at bedside, patient lethargic able to make slight thumbs up for provider. RSI kit at bedside.     1822:   Patient intubated with 7.5mm     " [General Appearance - Well Developed] : well developed [Normal Appearance] : normal appearance [Well Groomed] : well groomed [General Appearance - Well Nourished] : well nourished [No Deformities] : no deformities [General Appearance - In No Acute Distress] : no acute distress [Normal Conjunctiva] : the conjunctiva exhibited no abnormalities [Eyelids - No Xanthelasma] : the eyelids demonstrated no xanthelasmas [No Oral Pallor] : no oral pallor [Normal Oral Mucosa] : normal oral mucosa [No Oral Cyanosis] : no oral cyanosis [Normal Jugular Venous V Waves Present] : normal jugular venous V waves present [Normal Jugular Venous A Waves Present] : normal jugular venous A waves present [No Jugular Venous Bar A Waves] : no jugular venous bar A waves [Respiration, Rhythm And Depth] : normal respiratory rhythm and effort [Auscultation Breath Sounds / Voice Sounds] : lungs were clear to auscultation bilaterally [Exaggerated Use Of Accessory Muscles For Inspiration] : no accessory muscle use [Heart Rate And Rhythm] : heart rate and rhythm were normal [Heart Sounds] : normal S1 and S2 [Systolic grade ___/6] : A grade [unfilled]/6 systolic murmur was heard. [Bowel Sounds] : normal bowel sounds [Abdomen Soft] : soft [Abdomen Tenderness] : non-tender [Abnormal Walk] : normal gait [Abdomen Mass (___ Cm)] : no abdominal mass palpated [Nail Clubbing] : no clubbing of the fingernails [Gait - Sufficient For Exercise Testing] : the gait was sufficient for exercise testing [Petechial Hemorrhages (___cm)] : no petechial hemorrhages [Cyanosis, Localized] : no localized cyanosis [Skin Color & Pigmentation] : normal skin color and pigmentation [No Venous Stasis] : no venous stasis [] : no rash [Skin Lesions] : no skin lesions [No Xanthoma] : no  xanthoma was observed [No Skin Ulcers] : no skin ulcer [Oriented To Time, Place, And Person] : oriented to person, place, and time [Affect] : the affect was normal [Mood] : the mood was normal [No Anxiety] : not feeling anxious

## 2022-06-23 NOTE — PROGRESS NOTES
Extubated to 3lpm NC without any immediate complications. bilateral BS present, SpO2 100. Will continue to assess and monitor as needed.

## 2022-06-23 NOTE — H&P
"[facility]       NEUROSURGERY H&P NOTE    This consultation was requested by Dr. Sanchez from the Emergency Medicine service.    Paged STAT by Dr. Sanchez at 6:04 PM.  Patient seen in ED at 6:07 PM.  Plan discussed with patient's .  Discussed with Dr. Blount 6:21 PM.  Discussed with Dr. Rice 6:28 PM.  Discussed with Dr. Harris 6:32 PM.    Reason for Consultation  Acute subdural hematoma    HPI:  Louise Benítez is a 76 year old female with history of myelodysplastic syndrome with frequent transfusions, osteoporosis, rheumatoid arthritis, peripheral arterial disease, lumbar spondylosis, chronic microcytic anemia, and family (cousin) history of malignant hyperthermia who presents with unwitnessed ground-level fall at home and finding of acute 2 cm left subdural hematoma with 12 mm rightward midline shift.    In mid-May, the patient had a fall at home and suffered a left-sided subdural hematoma that was managed nonoperatively.  She was followed up in Neurosurgery clinic by Kaelyn Leal, and last head CT on 6/6 demonstrated expected shrinkage and evolution of her subdural hematoma, which then appeared subacute/chronic.  She remained in rehab and discharged a week ago.    The patient's  provides additional history.  Since arriving at home a week ago, Ms. Benítez has been doing well and increasing in her mobility at home.  Earlier this afternoon, her  had stepped into the other room when he heard a \"thump\" and found the patient on the ground next to her chair after presumably attempting to stand.  He then called EMS after finding her with AMS, and she was brought to the [facility] ED.    At the time of evaluation, the patient's  reports that she has not been taking any blood thinners.    PAST MEDICAL HISTORY:   Past Medical History:   Diagnosis Date     Family history of malignant hyperthermia     Cousin     Rheumatoid arthritis (H)        PAST " SURGICAL HISTORY:   Past Surgical History:   Procedure Laterality Date     IR LUMBAR EPIDURAL STEROID INJECTION  8/15/2012       FAMILY HISTORY:   Family History   Problem Relation Age of Onset     Malignant Hyperthermia Cousin        SOCIAL HISTORY:   Social History     Tobacco Use     Smoking status: Former Smoker     Smokeless tobacco: Never Used   Substance Use Topics     Alcohol use: Yes     Comment: Alcoholic Drinks/day: wine        MEDICATIONS:  No current outpatient medications on file.       Allergies:  No Known Allergies    ROS: 10 point ROS of systems including Constitutional, Eyes, Respiratory, Cardiovascular, Gastroenterology, Genitourinary, Integumentary, Muscularskeletal, Psychiatric were all negative except for pertinent positives noted in my HPI.    Physical exam:   Blood pressure 112/49, pulse 66, temperature 98  F (36.7  C), resp. rate 22, weight 60.3 kg (133 lb), SpO2 95 %, not currently breastfeeding.  CV: RRR on telemetry  PULM: breathing comfortably on nasal cannula  ABD: soft, non-distended  NEUROLOGIC:  - Eyes open spontaneously, spontaneous purposeful movements of L hemibody, not speaking or vocalizing (GCS 10 [E4 V1 M5])  - Right pupil 5mm and reactive; L pupil 2mm and reactive  - Tracks, blinks to threat  - Localize LUE, withdraw LLE; spontaneous and purposeful L hemibody  - Small withdrawal of both RUE and RLE; no spontaneous movement of L hemobody    IMAGING:  CT head 6/22/22:  1. Active hemorrhage superimposed upon Acute and chronic left-sided  mixed density subdural measuring up to 2.3 cm in maximum thickness  with 12 mm of rightward midline shift and trace left-sided uncal  herniation.  2. Acute subdural along the right frontal convexity measuring up to 7  mm in maximum thickness.    CT cervical spine 6/22/22:  No acute fracture or traumatic subluxation.    LABS:   Lab Results   Component Value Date    WBC 15.2 06/22/2022     Lab Results   Component Value Date    RBC 2.26 06/22/2022      Lab Results   Component Value Date    HGB 7.9 06/22/2022    HGB 7.1 06/22/2022     Lab Results   Component Value Date    HCT 22.2 06/22/2022     Lab Results   Component Value Date    MCV 98 06/22/2022     Lab Results   Component Value Date    MCH 31.4 06/22/2022     Lab Results   Component Value Date    MCHC 32.0 06/22/2022     Lab Results   Component Value Date    RDW 26.1 06/22/2022     Lab Results   Component Value Date     06/22/2022       Last Comprehensive Metabolic Panel:  Sodium   Date Value Ref Range Status   05/20/2022 138 133 - 144 mmol/L Final     Sodium POCT   Date Value Ref Range Status   06/22/2022 138 133 - 144 mmol/L Final     Potassium   Date Value Ref Range Status   05/20/2022 4.2 3.4 - 5.3 mmol/L Final     Potassium POCT   Date Value Ref Range Status   06/22/2022 4.1 3.5 - 5.0 mmol/L Final     Chloride   Date Value Ref Range Status   05/20/2022 104 94 - 109 mmol/L Final     Carbon Dioxide (CO2)   Date Value Ref Range Status   05/20/2022 25 20 - 32 mmol/L Final     Anion Gap   Date Value Ref Range Status   05/20/2022 9 3 - 14 mmol/L Final     Glucose   Date Value Ref Range Status   05/20/2022 114 (H) 70 - 99 mg/dL Final     Glucose Whole Blood POCT   Date Value Ref Range Status   06/22/2022 131 (H) 70 - 99 mg/dL Final     Urea Nitrogen   Date Value Ref Range Status   05/20/2022 17 7 - 30 mg/dL Final     Creatinine   Date Value Ref Range Status   05/20/2022 0.60 0.52 - 1.04 mg/dL Final     GFR Estimate   Date Value Ref Range Status   05/20/2022 >90 >60 mL/min/1.73m2 Final     Comment:     Effective December 21, 2021 eGFRcr in adults is calculated using the 2021 CKD-EPI creatinine equation which includes age and gender (Sandra ozuna al., NEJM, DOI: 10.1056/JOEJqd6295801)   04/20/2021 >60 >60 mL/min/1.73m2 Final     Calcium   Date Value Ref Range Status   05/20/2022 9.5 8.5 - 10.1 mg/dL Final       INR   Date Value Ref Range Status   06/22/2022 1.08 0.85 - 1.15 Final      aPTT   Date Value Ref  Range Status   06/22/2022 27 22 - 38 Seconds Final        ASSESSMENT:  76 year old female not on anticoagulation with myelodysplastic syndrome and chronic anemia, with history of traumatic left SDH 1 month ago, found to have new traumatic left SDH causing 12 mm midline shift with corresponding global aphasia, decreased level of consciousness, and right-sided motor change.    RECOMMENDATIONS:  - Emergent OR for left craniotomy vs craniectomy for subdural hematoma evacuation  - Transfuse 2u pRBCs STAT  - Coags and type and screen ordered STAT  - Q1h neuro checks  - Goal platelets >100k, INR <1.5  - No anticoagulating/antiplatelet/fibrinolytic medications  - Avoid sedating medications that would alter a neurological examination    The patient was discussed with Jailyn Blount and Dwayne, neurosurgery chief residents, and they agree with the above.    Christopher Martinez M.D.  Neurosurgery Resident, PGY-3    Please contact neurosurgery resident on call with questions.    Dial * * [phone], enter [phone] when prompted.     Comorbid conditions:  Clinically Significant Risk Factors Present on Admission                # Coma: based on GCS score of <8       # Compression of brain

## 2022-06-23 NOTE — ANESTHESIA POSTPROCEDURE EVALUATION
Patient: Louise Benítez    Procedure: Procedure(s):  CRANIOTOMY LEFT for hematoma       Anesthesia Type:  General    Note:  Disposition: Inpatient; ICU            ICU Sign Out: Anesthesiologist/ICU physician sign out WAS performed   Postop Pain Control: Uneventful            Sign Out: Well controlled pain   PONV: No   Neuro/Psych: Uneventful            Sign Out: Other neuro status   Airway/Respiratory: Uneventful            Sign Out: AIRWAY IN SITU/Resp. Support               Airway in situ/Resp. Support: ETT                 Reason: Planned Pre-op   CV/Hemodynamics: Uneventful            Sign Out: Acceptable CV status; No obvious hypovolemia; No obvious fluid overload   Other NRE: NONE   DID A NON-ROUTINE EVENT OCCUR? No           Last vitals:  Vitals:    06/23/22 0415 06/23/22 0500 06/23/22 0600   BP:      Pulse: 68 87 74   Resp: 12 19 14   Temp: 37.7  C (99.9  F)  37.7  C (99.9  F)   SpO2: 100% 100% 100%       Electronically Signed By: Juan Diego Jones MD  June 23, 2022  6:12 AM

## 2022-06-23 NOTE — PLAN OF CARE
SLP: Clinical swallow eval orders received. Pt is currently intubated, however RN reports plans for possible extubation later today. Will follow-up as appropriate.

## 2022-06-23 NOTE — PLAN OF CARE
Major Shift Events:  Admitted to NSICU post op for monitoring.   Neuro: Off prop since 0100. Opens eyes initially upon arousal, keeps eyes closed aside. Localizes RLE and RUE. Moves LUE and LLE independently, good strength in both. Pupils bilat 3, sluggish.   Cardiac: Sinus. SBP <140, MAP 65<. Needed hydralazine 10 mg x 2 overnight.   Resp: off vent settings 0500, pressure supporting 40% 5/5 since then   /GI: OG clamped, ok to use. Vasquez in place. 40-60 mL out q1h.   ID: ceftriaxone started.   Plan: Hopeful for extubation this AM, monitor vitals  For vital signs and complete assessments, please see documentation flowsheets.    Problem: Plan of Care - These are the overarching goals to be used throughout the patient stay.    Goal: Absence of Hospital-Acquired Illness or Injury  Intervention: Prevent Skin Injury  Recent Flowsheet Documentation  Taken 6/23/2022 0600 by Silvio Min RN  Body Position:   left   turned  Taken 6/23/2022 0400 by Silvio Min, RN  Body Position:   right   turned  Taken 6/23/2022 0200 by Silvio Min, RN  Body Position:   left   turned  Taken 6/23/2022 0000 by Silvio Min RN  Body Position:   right   turned  Intervention: Prevent and Manage VTE (Venous Thromboembolism) Risk  Recent Flowsheet Documentation  Taken 6/23/2022 0400 by Silvio Min RN  Range of Motion: active ROM (range of motion) encouraged  VTE Prevention/Management: (Device ordered) other (see comments)  Activity Management: ROM, active encouraged  Taken 6/23/2022 0000 by Silvio Min RN  Range of Motion: active ROM (range of motion) encouraged  VTE Prevention/Management: (Device ordered) other (see comments)  Activity Management: ROM, active encouraged     Problem: Adjustment to Surgery (Craniotomy/Craniectomy/Cranioplasty)  Goal: Optimal Coping with Surgery  Outcome: Ongoing, Not Progressing

## 2022-06-23 NOTE — CONSULTS
[facility]    Consult note: Trauma Service       Date of Admission:  6/22/2022    Trauma mechanism: Unwitnessed fall  Time/date of injury: 6/22/22  Known Injuries:  1. Acute 2 cm left subdural hematoma with 12 mm rightward midline shift    Procedure:  1. Left acute SDH Dr. Harris 6/22/22      Assessment: Louise Benítez 76 year old with a PMH of myelodysplastic syndrome with frequent transfusions, osteoporosis, rheumatoid arthritis, peripheral arterial disease, lumbar spondylosis, chronic microcytic anemia, and family (cousin) history of malignant hyperthermia, s/p unwitnessed ground level fall.    Neuro/Pain/Psych:  # Unwitnessed fall   # Acute on Chronic L SDH + New Acute R SDH w/ increased rightward midline shift and trace L uncal herniation s/p L craniotomy (6/22) w/ hematoma evacuation  # Fall in May resulting in frontoparietal SDH with 12mm midline shift   - Initial Head CT: Active hemorrhage superimposed upon Acute and chronic left-sided mixed density subdural measuring up to 2.3 cm in maximum thickness with 12 mm of rightward midline shift and trace left-sided uncal herniation. Acute subdural along the right frontal convexity measuring up to 7 mm in maximum thickness.  - Head CT today: Reduced size of subdural fluid collection deep to the left frontoparietal craniotomy. Reduced mass effect and left to right midline shift now measuring 7 mm previously 13 mm when measured in a similar fashion. Similar appearance of SDH along the anterior falx and extending along the right anterior cranial fossa. Unchanged appearance of small subdural hemorrhage along the  bilateral tentorial leaflets  - Neurosurgery following and Neuro[facility] Primary     Managed by NeuroCrit:  #Analgesics & sedation  -Propofol gtt, will attempt to wean  -Tylenol 650mg Q4h prn  -Oxycodone 5-10mg Q4h prn   -PTA dosing had been prescribed at most recent hospitalization  -PTA Baclofen 5mg at bedtime  prn for muscle spasms    Pulmonary:  # Former tobacco use  # Acute Hypoxic Respiratory Failure  - Pt intubated, extubated right before exam  - CXR 6/22: ET tube is in the mid thoracic trachea. Decreased perihilar pulmonary opacities.    Cardiovascular:    # Hypertension   # PAD  # Acute Bradycardia  - Trop on admission 18, with elevated AST 42.  - Nicardipine and prns managed by NeuroCrit    Renal/ Fluids/Electrolytes:  # Hyponatremia  # Lactic Acidosis, improved with fluids   - LA: 4.3 ?2.3 ? 1.6  - NS for IV fluid hydration.   - electrolyte replacement protocol in place.     Endocrine:  # Stress hyperglycemia   - No management indication.    Infectious disease:   # Leukocytosis   # UTI   - WBC: 15.2?17.3?14.1  - Fever, TMax: 100.8  - UA: Leukocyte esterase small, WBC 66, RBC: 25,  UC pending.  - Antibiotics: CTX    Hematology:    # Chronic Microcytic Anemia   # Myelodysplastic Syndrome  # Thrombocytosis   # Acute blood loss anemia   - Hgb 8.7? 7.7 ? 7.1  - Recommend: Threshold for transfusion if hgb <7.0 or signs/symptoms of hypoperfusion for Trauma, Neurosurgery usually has higher threshold.    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    Skin:  # R 1st Metatarsal Foot Ulcer, last admission  # Left Shin Ulcer  - dilgent cares to prevent skin breakdown and wound formation.        Plan:  - Tertiary exam completed. No additional injuries notes.  - No further trauma workup needed. Trauma will sign off. Please contact with any questions or concerns. Job code [id number]       Cheri Escalera PA-C  To contact the trauma service use job code pager [phone],    Numeric texts or alpha text through [facility]        Chief Complaint   Fall/ Acute subdural hematoma    History of Present Illness   Louise Benítez is a 76 year old female who presented on 22 s/p unwitnessed fall. Patient has a PMH sig for myelodysplastic syndrome with frequent transfusions, osteoporosis, rheumatoid arthritis, peripheral arterial disease, lumbar spondylosis, chronic microcytic anemia, and family (cousin) history of malignant hyperthermia, on work-up patient was found to have an acute 2 cm left subdural hematoma with 12 mm rightward midline shift.    Patient had a recent admission after a fall at home and suffered a left-sided subdural hematoma that was managed nonoperatively.  She was followed up in Neurosurgery clinic by Kaelyn Leal, and last head CT on  demonstrated expected shrinkage and evolution of her subdural hematoma, which then appeared subacute/chronic. She remained in TCU and discharged a week ago.    Neurosurgery preformed a left craniotomy for SDH evacuation. Trauma was consulted to preform tertiary exam. She was extubated right before my exam.     Past Medical History    I have reviewed this patient's medical history and updated it with pertinent information if needed.   Past Medical History:   Diagnosis Date     Family history of malignant hyperthermia     Cousin     Rheumatoid arthritis (H)        Past Surgical History   I have reviewed this patient's surgical history and updated it with pertinent information if needed.  Past Surgical History:   Procedure Laterality Date     CRANIOTOMY Left 2022    Procedure: CRANIOTOMY LEFT for hematoma;  Surgeon: Ayden Harris MD;  Location: [facility] OR     IR LUMBAR EPIDURAL STEROID INJECTION  8/15/2012     Prior to Admission Medications   Prior to Admission Medications   Prescriptions Last Dose Informant Patient Reported? Taking?   acetaminophen (TYLENOL) 325 MG tablet Unknown at Unknown time  No Yes   Si tablets (650 mg)  by Oral or Feeding Tube route every 4 hours as needed for mild pain or fever   baclofen (LIORESAL) 10 MG tablet Unknown at Unknown time  No Yes   Sig: Take 0.5 tablets (5 mg) by mouth nightly as needed for muscle spasms (for muscle spasm)   cholecalciferol, vitamin D3, 50,000 unit Tab Unknown at Unknown time  No Yes   Sig: [CHOLECALCIFEROL, VITAMIN D3, 50,000 UNIT TAB] Take 1 tablet weekly   darbepoetin anthony (ARANESP) 300 MCG/0.6ML injection Unknown at Unknown time  Yes Yes   Sig: Inject 300 mcg Subcutaneous every 21 days Last taken 5/10/22   hydroxychloroquine (PLAQUENIL) 200 MG tablet Unknown at Unknown time  No Yes   Sig: TAKE TWO TABLETS BY MOUTH DAILY AT BEDTIME   multivitamin w/minerals (THERA-VIT-M) tablet Unknown at Unknown time  Yes Yes   Sig: Take 1 tablet by mouth daily   mupirocin (BACTROBAN) 2 % external ointment Unknown at Unknown time  No Yes   Sig: Apply topically daily To wounds on legs and feet with daily dressing changes   oxyCODONE (ROXICODONE) 5 MG tablet Unknown at Unknown time  No Yes   Si tablet (5 mg) by Oral or Feeding Tube route every 4 hours as needed for moderate to severe pain Take 30-60 minutes prior to work with therapy   polyethylene glycol (MIRALAX) 17 GM/Dose powder Unknown at Unknown time  No Yes   Sig: Take 17 g by mouth daily   senna-docusate (SENOKOT-S/PERICOLACE) 8.6-50 MG tablet Unknown at Unknown time  No Yes   Sig: Take 1-2 tablets by mouth At Bedtime   vitamin B-12 (CYANOCOBALAMIN) 500 MCG tablet Unknown at Unknown time  No Yes   Sig: Take 1 tablet (500 mcg) by mouth daily      Facility-Administered Medications Last Administration Doses Remaining   denosumab (PROLIA) injection 60 mg None recorded         Allergies   No Known Allergies    Social History   Social History     Socioeconomic History     Marital status:      Spouse name: Not on file     Number of children: Not on file     Years of education: Not on file     Highest education level: Not on file    Occupational History     Not on file   Tobacco Use     Smoking status: Former Smoker     Smokeless tobacco: Never Used   Substance and Sexual Activity     Alcohol use: Yes     Comment: Alcoholic Drinks/day: wine      Drug use: Never     Sexual activity: Not on file   Other Topics Concern     Not on file   Social History Narrative     Not on file     Social Determinants of Health     Financial Resource Strain: Not on file   Food Insecurity: Not on file   Transportation Needs: Not on file   Physical Activity: Not on file   Stress: Not on file   Social Connections: Not on file   Intimate Partner Violence: Not on file   Housing Stability: Not on file       Family History   I have reviewed this patient's family history and updated it with pertinent information if needed.   Family History   Problem Relation Age of Onset     Malignant Hyperthermia Cousin          Review of Systems Pt somnolent, post extubation so limited, please refer to     Physical Exam   Temp: (!) 100.8  F (38.2  C) Temp src: Bladder BP: 122/47 Pulse: 76   Resp: 9 SpO2: 100 % O2 Device: Mechanical Ventilator    Vital Signs with Ranges  Temp:  [95  F (35  C)-100.8  F (38.2  C)] 100.8  F (38.2  C)  Pulse:  [] 76  Resp:  [0-29] 9  BP: ()/(35-90) 122/47  MAP:  [62 mmHg-93 mmHg] 84 mmHg  Arterial Line BP: (102-160)/(32-68) 153/47  FiO2 (%):  [30 %-60 %] 40 %  SpO2:  [91 %-100 %] 100 % 158 lbs 1.12 oz      [place] Coma Scale - Total 9/15  Eye Response (E): 4  4= spontaneous,  3= to verbal/voice, 2=  to pain, 1= No response   Verbal Response (V): 1   5= Orientated, converses,  4= Confused, converses, 3= Inappropriate words,  2= Incomprehensible sounds,  1=No response   Motor Response (M): 4   6= Obeys commands, 5= Localizes to pain, 4= Withdrawal to pain, 3=Fexion to pain, 2= Extension to pain, 1= No response    General: somnolent,   Head: post-op changes   Eyes: pupils 3mm, corneas and conjunctivae clear  Neck: Trachea midline. No midline  posterior tenderness,   Chest/Pulmonary: upper airway secretions, normal respiratory rate and rhythm, bilateral clear breath sounds,  Cardiovascular: S1, S2,  normal and regular rate and rhythm,   Abdomen: soft, non-tender, no guarding,   : normal external genitalia, pelvis stable to lateral compression, taylor in place, urine yellow and clear  Musculoskel/Extremities: hands in mitts, ecchymosis to bilateral forearms, no edema, erythema, ecchymosis,+ PP. - edema.    Skin: skin warm and dry.   Neuro: PERRL, Somnolent,     Data   Results for orders placed or performed during the hospital encounter of 06/22/22 (from the past 24 hour(s))   EKG 12 lead   Result Value Ref Range    Systolic Blood Pressure  mmHg    Diastolic Blood Pressure  mmHg    Ventricular Rate 89 BPM    Atrial Rate 89 BPM    AL Interval 156 ms    QRS Duration 92 ms     ms    QTc 455 ms    P Axis 25 degrees    R AXIS -19 degrees    T Axis 29 degrees    Interpretation ECG       Sinus rhythm with Premature atrial complexes with Aberrant conduction  Nonspecific ST abnormality  Abnormal ECG  Unconfirmed report - interpretation of this ECG is computer generated - see medical record for final interpretation  Confirmed by - EMERGENCY ROOM, PHYSICIAN ([id number]),  CHRISTIANA DA SILVA ([id number]) on 6/22/2022 8:26:27 PM     Head CT w/o contrast   Result Value Ref Range    Radiologist flags (Urgent)      Acute subdural hematomas with mass effect as described    Narrative    CT HEAD W/O CONTRAST 6/22/2022 6:03 PM    History: fall, loc     Comparison: 5/12/2022    Technique: Using multidetector thin collimation helical acquisition  technique, axial, coronal and sagittal CT images from the skull base  to the vertex were obtained without intravenous contrast.   (topogram) image(s) also obtained and reviewed.    Findings:   Mixed density subdural collection along the left cerebral convexity  and middle cranial fossa measuring up to 2.3 cm in maximal  thickness.  Heterogeneity of the fluid may represent hyperacute or active  hemorrhage mixed with acute hemorrhage. There is moderate diffuse  sulcal effacement particularly in the left frontal region. There is up  to 12 mm of rightward midline shift. Slight medialization of the left  temporal lobe uncus (series 6, image 34) resulting in asymmetry of the  suprasellar cistern. The left occipital horn is completely effaced.    Hyperdense collection along the right frontal convexity is new from  prior and measures up to 7 mm in maximal thickness.    No subarachnoid hemorrhage. Gray/white matter differentiation in both  cerebral hemispheres is preserved. Ventricles are proportionate to the  cerebral sulci.     The bony calvaria and the bones of the skull base are normal. The  visualized portions of the paranasal sinuses and mastoid air cells are  clear.      Impression    Impression:  1. Active hemorrhage superimposed upon Acute and chronic left-sided  mixed density subdural measuring up to 2.3 cm in maximum thickness  with 12 mm of rightward midline shift and trace left-sided uncal  herniation.  2. Acute subdural along the right frontal convexity measuring up to 7  mm in maximum thickness.    [Urgent Result: Acute subdural hematomas with mass effect as described  above.] Concern for active hemorrhage.    Finding was identified on 6/22/2022 6:04 PM.     Dr. Sanchez was contacted by Dr. Leung and Dr. Lindquist at  6/22/2022 6:09 PM and was again contacted at 6:30 PM and verbalized  understanding of the urgent finding.      I have personally reviewed the examination and initial interpretation  and I agree with the findings.    BONI LINDQUIST MD         SYSTEM ID:  [id number]   Cervical spine CT w/o contrast    Narrative    CT CERVICAL SPINE W/O CONTRAST 6/22/2022 6:04 PM    Provided History: fall, head and neck pain, Fall, head and neck pain    Comparison: 5/12/2022    Technique: Using multidetector thin collimation  helical acquisition  technique, axial, coronal and sagittal CT images through the cervical  spine were obtained without intravenous contrast.     Findings:  There is grade 1 anterolisthesis of C3 and C4 with mild widening of  the anterior cervical disc space, which is unchanged since 5/12/2022.  Mild grade 1 retrolisthesis of C5 on C6. Normal cervical lordosis. No  acute fracture or subluxation. No prevertebral edema. There is disc  height narrowing at C5-6 and C6-7. Multilevel degenerative changes of  cervical spine. The findings on a level by level basis are as follows:    C2-3:  No spinal canal or neural foraminal stenosis.    C3-4:  Moderate left neuroforaminal stenosis.    C4-5:  Moderate right neuroforaminal stenosis.    C5-6:  Bilateral moderate right greater than left neuroforaminal  stenosis    C6-7:  No spinal canal or neural foraminal stenosis.    C7-T1:  No spinal canal or neural foraminal stenosis.     No abnormality of the paraspinous soft tissues.      Impression    Impression:   No acute fracture or traumatic subluxation.    I have personally reviewed the examination and initial interpretation  and I agree with the findings.    BONI LINDQUIST MD         SYSTEM ID:  [id number]   CBC with platelets differential    Narrative    The following orders were created for panel order CBC with platelets differential.  Procedure                               Abnormality         Status                     ---------                               -----------         ------                     CBC with platelets and d...[[id number]]  Abnormal            Final result                 Please view results for these tests on the individual orders.   CBC with platelets and differential   Result Value Ref Range    WBC Count 15.2 (H) 4.0 - 11.0 10e3/uL    RBC Count 2.26 (L) 3.80 - 5.20 10e6/uL    Hemoglobin 7.1 (L) 11.7 - 15.7 g/dL    Hematocrit 22.2 (L) 35.0 - 47.0 %    MCV 98 78 - 100 fL    MCH 31.4 26.5 - 33.0 pg     MCHC 32.0 31.5 - 36.5 g/dL    RDW 26.1 (H) 10.0 - 15.0 %    Platelet Count 464 (H) 150 - 450 10e3/uL    % Neutrophils 91 %    % Lymphocytes 4 %    % Monocytes 3 %    % Eosinophils 0 %    % Basophils 0 %    % Immature Granulocytes 2 %    NRBCs per 100 WBC 1 (H) <1 /100    Absolute Neutrophils 13.7 (H) 1.6 - 8.3 10e3/uL    Absolute Lymphocytes 0.7 (L) 0.8 - 5.3 10e3/uL    Absolute Monocytes 0.4 0.0 - 1.3 10e3/uL    Absolute Eosinophils 0.0 0.0 - 0.7 10e3/uL    Absolute Basophils 0.1 0.0 - 0.2 10e3/uL    Absolute Immature Granulocytes 0.3 <=0.4 10e3/uL    Absolute NRBCs 0.1 10e3/uL   Comprehensive metabolic panel   Result Value Ref Range    Sodium 137 136 - 145 mmol/L    Potassium 3.7 3.4 - 4.5 mmol/L    Creatinine 0.53 0.51 - 0.95 mg/dL    Urea Nitrogen 15.6 8.0 - 23.0 mg/dL    Chloride 103 98 - 107 mmol/L    Carbon Dioxide (CO2) 18 (L) 22 - 29 mmol/L    Anion Gap 16 (H) 7 - 15 mmol/L    Glucose 167 (H) 70 - 99 mg/dL    Calcium 9.2 8.8 - 10.2 mg/dL    Protein Total 6.2 (L) 6.4 - 8.3 g/dL    Albumin 4.0 3.5 - 5.2 g/dL    Bilirubin Total 0.8 <=1.2 mg/dL    Alkaline Phosphatase 86 35 - 104 U/L    AST 42 (H) 10 - 35 U/L    ALT 16 10 - 35 U/L    GFR Estimate >90 >60 mL/min/1.73m2   Troponin T, High Sensitivity   Result Value Ref Range    Troponin T, High Sensitivity 18 (H) <=14 ng/L   [place] Draw    Narrative    The following orders were created for panel order [place] Draw.  Procedure                               Abnormality         Status                     ---------                               -----------         ------                     Extra Red Top Tube[[id number]]                                                          Extra Green Top (Lithium...[[id number]]                      Final result               Extra Purple Top Tube[[id number]]                                                       Extra Blood Bank Purple ...[[id number]]                                                   Please view results for these  tests on the individual orders.   Extra Green Top (Lithium Heparin) Tube   Result Value Ref Range    Hold Specimen [facility]    XR Chest Port 1 View    Narrative    EXAM: XR CHEST PORT 1 VIEW  6/22/2022 6:39 PM      HISTORY: Intubation; chief complaint unwitnessed fall    COMPARISON: 5/12/2022    FINDINGS: Single view of the chest. ET tube 2.5 cm above the leo.  Enteric tube tip and sidehole in stomach. Trachea is midline. Cardiac  mediastinal silhouette is within normal limits. Left lower lobe  atelectasis suspected. No focal consolidative opacity, pleural  effusion, or pneumothorax. Mild degenerative changes in the  glenohumeral joint is partially visualized.      Impression    IMPRESSION:  ET tube is 2.5 cm above the leo. No acute consolidative airspace  disease. Left lower lobe atelectasis suspected.    I have personally reviewed the examination and initial interpretation  and I agree with the findings.    ROYA EDMOND MD         SYSTEM ID:  [id number]   INR   Result Value Ref Range    INR 1.08 0.85 - 1.15   Partial thromboplastin time   Result Value Ref Range    aPTT 27 22 - 38 Seconds   Fibrinogen activity   Result Value Ref Range    Fibrinogen Activity 267 170 - 490 mg/dL   ABO/Rh type and screen *Canceled*    Narrative    The following orders were created for panel order ABO/Rh type and screen.  Procedure                               Abnormality         Status                     ---------                               -----------         ------                       Please view results for these tests on the individual orders.   ABO/Rh type and screen    Narrative    The following orders were created for panel order ABO/Rh type and screen.  Procedure                               Abnormality         Status                     ---------                               -----------         ------                     Adult Type and Screen[[id number]]                            Final result                  Please view results for these tests on the individual orders.   Adult Type and Screen   Result Value Ref Range    ABO/RH(D) AB NEG     Antibody Screen Negative Negative    SPECIMEN EXPIRATION DATE 20220625235900    Prepare red blood cells (unit)   Result Value Ref Range    CROSSMATCH Compatible     UNIT ABO/RH A Neg     Unit Number [id number]     Unit Status Ready     Blood Component Type Red Blood Cells     Product Code X0917T43     CODING SYSTEM [id number]     UNIT TYPE ISBT 0600    Prepare red blood cells (unit)   Result Value Ref Range    CROSSMATCH Compatible     UNIT ABO/RH A Neg     Unit Number [id number]     Unit Status Ready     Blood Component Type Red Blood Cells     Product Code G1566L38     CODING SYSTEM [id number]     UNIT TYPE ISBT 0600    Prepare red blood cells (unit)   Result Value Ref Range    UNIT ABO/RH O Neg     Unit Number [id number]     Unit Status Issued     Blood Component Type Red Blood Cells     Product Code U2389J94     ISSUE DATE AND TIME 20220622193900     CODING SYSTEM [id number]     UNIT TYPE ISBT 9500    Prepare red blood cells (unit)   Result Value Ref Range    UNIT ABO/RH O Neg     Unit Number [id number]     Unit Status Issued     Blood Component Type Red Blood Cells     Product Code E6788W66     ISSUE DATE AND TIME [id number]     CODING SYSTEM [id number]     UNIT TYPE ISBT 9500    -Intubation    Narrative    Krystle Sanchez MD     6/22/2022  8:34 PM  [facility]    -Intubation    Date/Time: 6/22/2022 8:29 PM  Performed by: Krystle Sanchez MD  Authorized by: Krystle Sanchez MD     Emergent condition/consent implied      PRE-PROCEDURE DETAILS     Patient status:  Altered mental status    Mallampati score:  II    Pretreatment meds: etomidate.    Paralytics:  Rocuronium      PROCEDURE DETAILS     Preoxygenation:  Bag valve mask    CPR in progress: no      Intubation method:  Oral    Oral intubation technique:  Video-assisted     Laryngoscope blade:  Mac 4    Tube size (mm):  7.5    Tube type:  Cuffed    Number of attempts:  2    Ventilation between attempts: yes      Cricoid pressure: no      Tube visualized through cords: yes      PLACEMENT ASSESSMENT     ETT to lip:  26    ETT to teeth:  25    Breath sounds:  Reduced on right    Placement verification: chest rise, condensation, CXR verification,   direct visualization and colorimetric ETCO2 device      CXR findings:  ETT in proper place    PROCEDURE  Describe Procedure: Given dimished breath sounds on R, tube withdrawn 1cm   to 25 at the lip, 24 at the teeth   Arterial Panel POCT   Result Value Ref Range    pH Arterial POCT 7.33 (L) 7.35 - 7.45    pCO2 Arterial POCT 40 35 - 45 mm Hg    pO2 Arterial POCT 207 (H) 80 - 105 mm Hg    Bicarbonate Arterial POCT 21 21 - 28 mmol/L    Sodium POCT 138 133 - 144 mmol/L    Potassium POCT 4.1 3.5 - 5.0 mmol/L    Hemoglobin POCT 7.9 (L) 11.7 - 15.7 g/dL    Glucose Whole Blood POCT 131 (H) 70 - 99 mg/dL    Calcium, Ionized Whole Blood POCT 4.8 4.4 - 5.2 mg/dL    Base Excess/Deficit (+/-) POCT -4.3 -9.6 - 2.0 mmol/L    FIO2 POCT 50.0 %    Lactic Acid POCT 4.0 (HH) <=2.0 mmol/L   CT Head w/o Contrast    Narrative    CT HEAD W/O CONTRAST 6/22/2022 10:16 PM    History: s/p SDH evacuation   ICD-10:    Comparison: CT of the head dated 6/22/2022 at 1756    Technique: Using multidetector thin collimation helical acquisition  technique, axial, coronal and sagittal CT images from the skull base  to the vertex were obtained without intravenous contrast.   (topogram) image(s) also obtained and reviewed.    Findings: Postsurgical changes of left frontoparietal craniotomy with  subdural hematoma evacuation. Left subdural drain in place with the  tip in the left anterior fossa. Postsurgical subdural pneumocephalus.  Heterogeneous fluid collection overlying the left cerebral convexity  measuring up to 19 mm. Stable appearance of the subdural hematoma  overlying the  right cerebral convexity measuring up to 7 mm. Stable  appearance of the subdural hematoma overlying the anterior falx  measuring up to 6 mm. There is 8 mm rightward midline shift. Decreased  effacement of the left lateral ventricle. Largely stable medialization  of the left temporal uncus without herniation. Gray/white matter  differentiation in both cerebral hemispheres is preserved. . The basal  cisterns are clear.    The visualized portions of the paranasal sinuses and mastoid air cells  are clear.      Impression    Impression:  1. Postsurgical changes of left frontoparietal craniotomy and subdural  hematoma evacuation.  2. Decreased size of the heterogeneous fluid collection overlying the  left cerebral convexity.  3. Decreased rightward midline shift. Decreased effacement of the left  lateral ventricle.  4. Stable appearance of the subdural hematomas overlying the right  cerebral convexity and anterior falx.    I have personally reviewed the examination and initial interpretation  and I agree with the findings.    OSVALDO CONNOLLY MD         SYSTEM ID:  [id number]   Glucose by meter   Result Value Ref Range    GLUCOSE BY METER POCT 115 (H) 70 - 99 mg/dL   XR Chest Port 1 View    Narrative    EXAM: XR Chest 1 view 6/22/2022 10:35 PM      HISTORY: ETT positioning after transfer from OSH.    COMPARISON: Same day radiograph at 1828.     TECHNIQUE: Frontal view of the chest.    FINDINGS: ET tube is in the mid thoracic trachea. Enteric tube is  subdiaphragmatic with tip collimated from the study. Sidehole past the  GE junction.  Trachea is midline. Cardiac mediastinal silhouette is stable.  Decreased perihilar pulmonary opacities. No pleural effusions. No  pneumothoraces.      Impression    IMPRESSION:   1. ET tube is in the mid thoracic trachea.  2. Decreased perihilar pulmonary opacities.    I have personally reviewed the examination and initial interpretation  and I agree with the findings.    ROYA EDMOND MD          SYSTEM ID:  [id number]   Comprehensive metabolic panel   Result Value Ref Range    Sodium 137 136 - 145 mmol/L    Potassium 4.5 3.4 - 4.5 mmol/L    Creatinine 0.54 0.51 - 0.95 mg/dL    Urea Nitrogen 15.8 8.0 - 23.0 mg/dL    Chloride 105 98 - 107 mmol/L    Carbon Dioxide (CO2) 21 (L) 22 - 29 mmol/L    Anion Gap 11 7 - 15 mmol/L    Glucose 120 (H) 70 - 99 mg/dL    Calcium 8.4 (L) 8.8 - 10.2 mg/dL    Protein Total 5.1 (L) 6.4 - 8.3 g/dL    Albumin 3.3 (L) 3.5 - 5.2 g/dL    Bilirubin Total 0.5 <=1.2 mg/dL    Alkaline Phosphatase 67 35 - 104 U/L    AST 34 10 - 35 U/L    ALT 13 10 - 35 U/L    GFR Estimate >90 >60 mL/min/1.73m2   CBC with Platelets & Differential    Narrative    The following orders were created for panel order CBC with Platelets & Differential.  Procedure                               Abnormality         Status                     ---------                               -----------         ------                     CBC with platelets and d...[[id number]]  Abnormal            Final result                 Please view results for these tests on the individual orders.   Magnesium   Result Value Ref Range    Magnesium 1.8 1.7 - 2.3 mg/dL   Phosphorus   Result Value Ref Range    Phosphorus 3.4 2.5 - 4.5 mg/dL   CBC with platelets and differential   Result Value Ref Range    WBC Count 17.3 (H) 4.0 - 11.0 10e3/uL    RBC Count 2.46 (L) 3.80 - 5.20 10e6/uL    Hemoglobin 7.7 (L) 11.7 - 15.7 g/dL    Hematocrit 23.7 (L) 35.0 - 47.0 %    MCV 96 78 - 100 fL    MCH 31.3 26.5 - 33.0 pg    MCHC 32.5 31.5 - 36.5 g/dL    RDW 21.3 (H) 10.0 - 15.0 %    Platelet Count 383 150 - 450 10e3/uL    % Neutrophils 88 %    % Lymphocytes 4 %    % Monocytes 5 %    % Eosinophils 0 %    % Basophils 1 %    % Immature Granulocytes 2 %    NRBCs per 100 WBC 1 (H) <1 /100    Absolute Neutrophils 15.4 (H) 1.6 - 8.3 10e3/uL    Absolute Lymphocytes 0.7 (L) 0.8 - 5.3 10e3/uL    Absolute Monocytes 0.8 0.0 - 1.3 10e3/uL    Absolute Eosinophils  0.0 0.0 - 0.7 10e3/uL    Absolute Basophils 0.1 0.0 - 0.2 10e3/uL    Absolute Immature Granulocytes 0.3 <=0.4 10e3/uL    Absolute NRBCs 0.1 10e3/uL   Hemoglobin A1c   Result Value Ref Range    Hemoglobin A1C 5.5 <5.7 %   Ionized Calcium   Result Value Ref Range    Calcium Ionized 4.7 4.4 - 5.2 mg/dL   Lactic acid whole blood   Result Value Ref Range    Lactic Acid 4.3 (HH) 0.7 - 2.0 mmol/L   Blood gas arterial   Result Value Ref Range    pH Arterial 7.37 7.35 - 7.45    pCO2 Arterial 39 35 - 45 mm Hg    pO2 Arterial 152 (H) 80 - 105 mm Hg    FIO2 40     Bicarbonate Arterial 22 21 - 28 mmol/L    Base Excess/Deficit (+/-) -3.0 -9.0 - 1.8 mmol/L   Asymptomatic COVID-19 Virus (Coronavirus) by PCR Nasopharyngeal    Specimen: Nasopharyngeal; Swab   Result Value Ref Range    SARS CoV2 PCR Negative Negative, Testing sent to reference lab. Results will be returned via unsolicited result    Narrative    Testing was performed using the Xpert Xpress SARS-CoV-2 Assay on the  Cepheid Gene-Xpert Instrument Systems. Additional information about  this Emergency Use Authorization (EUA) assay can be found via the Lab  Guide. This test should be ordered for the detection of SARS-CoV-2 in  individuals who meet SARS-CoV-2 clinical and/or epidemiological  criteria. Test performance is unknown in asymptomatic patients. This  test is for in vitro diagnostic use under the FDA EUA for  laboratories certified under CLIA to perform high complexity testing.  This test has not been FDA cleared or approved. A negative result  does not rule out the presence of PCR inhibitors in the specimen or  target RNA in concentration below the limit of detection for the  assay. The possibility of a false negative should be considered if  the patient's recent exposure or clinical presentation suggests  COVID-19. This test was validated by the [facility]. This laboratory is certified under  the Clinical Laboratory  Improvement Amendments of 1988 (CLIA-88) as  qualified to perform high complexity laboratory testing.     TEG with Heparinase   Result Value Ref Range    R (Time until clot forms) 6.1 5.0 - 10.0 Minute    K ( Time to Spec. clot strength) 1.9 1.0 - 3.0 Minute    Angle (Rate of Clot Growth) 69.5 53.0 - 72.0 Degrees    MA ( Maximum Clot Strength) 75.8 (H) 50.0 - 70.0 mm    CI (coagulation index) 2.1 -3.0 - 3.0    G (actual clot strength) 15.7 (H) 4.5 - 11.0 Kd/sc    LY30 (lysis at 30 minutes) 0.0 0.0 - 8.0 %    LY60 (lysis at 60 minutes) 0.0 0.0 - 15.0 %   INR   Result Value Ref Range    INR 1.28 (H) 0.85 - 1.15   Partial thromboplastin time   Result Value Ref Range    aPTT 71 (H) 22 - 38 Seconds   Fibrinogen activity   Result Value Ref Range    Fibrinogen Activity 197 170 - 490 mg/dL   UA with Microscopic reflex to Culture    Specimen: Urine, Vasquez Catheter   Result Value Ref Range    Color Urine Yellow Colorless, Straw, Light Yellow, Yellow    Appearance Urine Slightly Cloudy (A) Clear    Glucose Urine Negative Negative mg/dL    Bilirubin Urine Negative Negative    Ketones Urine Negative Negative mg/dL    Specific Gravity Urine 1.033 1.003 - 1.035    Blood Urine Negative Negative    pH Urine 5.5 5.0 - 7.0    Protein Albumin Urine 30  (A) Negative mg/dL    Urobilinogen Urine Normal Normal, 2.0 mg/dL    Nitrite Urine Negative Negative    Leukocyte Esterase Urine Small (A) Negative    Mucus Urine Present (A) None Seen /LPF    Uric Acid Crystals Urine Few (A) None Seen /HPF    RBC Urine 25 (H) <=2 /HPF    WBC Urine 66 (H) <=5 /HPF    Squamous Epithelials Urine 1 <=1 /HPF    Transitional Epithelials Urine <1 <=1 /HPF    Narrative    Urine Culture ordered based on laboratory criteria   Prepare red blood cells (unit)   Result Value Ref Range    CROSSMATCH Compatible     UNIT ABO/RH AB Neg     Unit Number [id number]     Unit Status Issued     Blood Component Type Red Blood Cells     Product Code [id number]     CODING SYSTEM  [id number]     UNIT TYPE ISBT 2800     ISSUE DATE AND TIME [id number]    Lactic acid whole blood   Result Value Ref Range    Lactic Acid 2.3 (H) 0.7 - 2.0 mmol/L   Basic metabolic panel   Result Value Ref Range    Creatinine 0.51 0.51 - 0.95 mg/dL    Sodium 135 (L) 136 - 145 mmol/L    Potassium 4.4 3.4 - 4.5 mmol/L    Urea Nitrogen 14.3 8.0 - 23.0 mg/dL    Chloride 104 98 - 107 mmol/L    Carbon Dioxide (CO2) 21 (L) 22 - 29 mmol/L    Anion Gap 10 7 - 15 mmol/L    Glucose 119 (H) 70 - 99 mg/dL    GFR Estimate >90 >60 mL/min/1.73m2    Calcium 8.5 (L) 8.8 - 10.2 mg/dL   CBC with platelets   Result Value Ref Range    WBC Count 14.1 (H) 4.0 - 11.0 10e3/uL    RBC Count 2.75 (L) 3.80 - 5.20 10e6/uL    Hemoglobin 8.7 (L) 11.7 - 15.7 g/dL    Hematocrit 25.9 (L) 35.0 - 47.0 %    MCV 94 78 - 100 fL    MCH 31.6 26.5 - 33.0 pg    MCHC 33.6 31.5 - 36.5 g/dL    RDW 19.9 (H) 10.0 - 15.0 %    Platelet Count 376 150 - 450 10e3/uL   Magnesium   Result Value Ref Range    Magnesium 2.6 (H) 1.7 - 2.3 mg/dL   Phosphorus   Result Value Ref Range    Phosphorus 3.6 2.5 - 4.5 mg/dL   CT Head w/o Contrast    Narrative    CT HEAD W/O CONTRAST 6/23/2022 4:57 AM    History: SDH, follow up   ICD-10:    Comparison: 6/22/22    Technique: Using multidetector thin collimation helical acquisition  technique, axial, coronal and sagittal CT images from the skull base  to the vertex were obtained without intravenous contrast.   (topogram) image(s) also obtained and reviewed.    Findings: Postsurgical changes of left frontal parietotemporal  craniotomy with drain in place. There is reduced size of the subdural  fluid collection measuring up to 10 mm, previously 15 mm deep to the  craniotomy . There is reduced mass effect on the left cerebral.  Reduced left to right midline shift now measuring 7 mm, previously 13  mm when measured at a similar fashion. Similar appearance of subdural  hemorrhage along the anterior falx extending along the left  frontal  lobe measuring up to 7 mm, previously 7 mm. Additional unchanged  subdural blood along the bilateral tentorial leaflets. Normalization  of left medial temporal lobe. Gray/white matter differentiation in  both cerebral hemispheres is preserved. Ventricles are proportionate  to the cerebral sulci. The basal cisterns are clear.    The bony calvaria and the bones of the skull base are normal. The  visualized portions of the paranasal sinuses and mastoid air cells are  clear.      Impression    Impression:    1. Reduced size of subdural fluid collection deep to the left  frontoparietal craniotomy.  2. Reduced mass effect and left to right midline shift now measuring 7  mm previously 13 mm when measured in a similar fashion.  3. Similar appearance of subdural hemorrhage along the anterior falx  and extending along the right anterior cranial fossa.  4. Unchanged appearance of small subdural hemorrhage along the  bilateral tentorial leaflets.     I have personally reviewed the examination and initial interpretation  and I agree with the findings.    OSVALDO CONNOLLY MD         SYSTEM ID:  [id number]   Lactic acid whole blood   Result Value Ref Range    Lactic Acid 1.6 0.7 - 2.0 mmol/L       Studies:  CT Head w/o Contrast   Final Result   Impression:      1. Reduced size of subdural fluid collection deep to the left   frontoparietal craniotomy.   2. Reduced mass effect and left to right midline shift now measuring 7   mm previously 13 mm when measured in a similar fashion.   3. Similar appearance of subdural hemorrhage along the anterior falx   and extending along the right anterior cranial fossa.   4. Unchanged appearance of small subdural hemorrhage along the   bilateral tentorial leaflets.       I have personally reviewed the examination and initial interpretation   and I agree with the findings.      OSVALDO CONNOLLY MD            SYSTEM ID:  [id number]      XR Chest Port 1 View   Final Result   IMPRESSION:    1. ET tube  is in the mid thoracic trachea.   2. Decreased perihilar pulmonary opacities.      I have personally reviewed the examination and initial interpretation   and I agree with the findings.      ROYA EDMOND MD            SYSTEM ID:  [id number]      CT Head w/o Contrast   Final Result   Impression:   1. Postsurgical changes of left frontoparietal craniotomy and subdural   hematoma evacuation.   2. Decreased size of the heterogeneous fluid collection overlying the   left cerebral convexity.   3. Decreased rightward midline shift. Decreased effacement of the left   lateral ventricle.   4. Stable appearance of the subdural hematomas overlying the right   cerebral convexity and anterior falx.      I have personally reviewed the examination and initial interpretation   and I agree with the findings.      OSVALDO CONNOLLY MD            SYSTEM ID:  [id number]      XR Chest Port 1 View   Final Result   IMPRESSION:   ET tube is 2.5 cm above the leo. No acute consolidative airspace   disease. Left lower lobe atelectasis suspected.      I have personally reviewed the examination and initial interpretation   and I agree with the findings.      ROYA EDMOND MD            SYSTEM ID:  [id number]      Cervical spine CT w/o contrast   Final Result   Impression:    No acute fracture or traumatic subluxation.      I have personally reviewed the examination and initial interpretation   and I agree with the findings.      BONI LINDQUIST MD            SYSTEM ID:  [id number]      Head CT w/o contrast   Final Result   Abnormal   Impression:   1. Active hemorrhage superimposed upon Acute and chronic left-sided   mixed density subdural measuring up to 2.3 cm in maximum thickness   with 12 mm of rightward midline shift and trace left-sided uncal   herniation.   2. Acute subdural along the right frontal convexity measuring up to 7   mm in maximum thickness.      [Urgent Result: Acute subdural hematomas with mass effect as described    above.] Concern for active hemorrhage.      Finding was identified on 6/22/2022 6:04 PM.       Dr. Sanchez was contacted by Dr. Leung and Dr. Lindquist at   6/22/2022 6:09 PM and was again contacted at 6:30 PM and verbalized   understanding of the urgent finding.        I have personally reviewed the examination and initial interpretation   and I agree with the findings.      BONI LINDQUIST MD            SYSTEM ID:  [id number]

## 2022-06-23 NOTE — CONSULTS
Neurocritical Care Consultation    Reason for critical care admission: Bilateral SDH s/p Left Craniotomy and Evacuation  Admitting Team: Neurosurgery  Date of Service:  06/22/2022  Date of Admission:  6/22/2022  Hospital Day: 1    Assessment/Plan  Louise Benítez is a 76 year old female w/ PMHx myelodysplastic syndrome, seronegative RA, chronic microcytic anemia, PAD, and recent L SDH (5/12/2022) who was admitted from her TCU on 6/22/2022 after an unwitnessed fall. Was subsequently found to have an acute on chronic L SDH, a new acute R SDH, and increased midline shift w/ trace L uncal herniation now s/p L craniotomy w/ hematoma evacuation    Neuro  #Acute on Chronic L SDH + New Acute R SDH w/ increased rightward midline shift and trace L uncal herniation s/p L craniotomy (6/22) w/ hematoma evacuation  #Recent L SDH 2/2 mechanical fall not requiring intervention (hospitalized 5/12-5/20/2022)  -Neurochecks Q1h  -HOB > 30   -SBP goal < 140 mmHg; MAP > 65   - Nicardipine gtt   - PRN Hydralazine/Labetalol  -Keppra 750mg for 7d (ends 6/28)   - s/p IV Keppra 1g load  -Hold all anti-platelets and anti-coagulation given active bleed  -Post-procedures CTH  -Repeat CTH in the AM  -PT/OT/SLP    #Reported FHx Malignant Hyperthermia in a cousin and so is cautious with anesthesia prior to surgery    #Analgesics & sedation  - Propofol gtt, will attempt to wean  - Tylenol 650mg Q4h prn  - Oxycodone 5-10mg Q4h prn    - PTA dosing had been prescribed at most recent hospitalization  - PTA Baclofen 5mg at bedtime prn for muscle spasms    CV  #Elevated BP on admission  Pt not reported to have HTN at baseline  -Cardiac monitoring  -SBP goal < 140 mmHg   - Nicardipine gtt   - PRN Hydralazine/Labetalol    #Bradycardia  PT's HR in the 50's post-op; can find no hx of this in the chart.   -Continue to monitor  -Avoid Labetalol while HR is low    #PAD  Seen previously by Vascular Surgery who labeled her as asymptomatic. Not currently on a  statin given most recent LDL 10 (5/13/2022)    Resp  #Mechanical Ventilation, intubated for airway protection  -Continuous pulse ox  -Maintain O2 saturations greater than 92%    #Former Tobacco Use    GI  #GERD  Not on PTA medications    Diet: NPO   Last BM: PTA  GI prophylaxis: Pepcid 20mg BID while intubated  Bowel regimen: PRN senna-docusate and Miralax    Renal/  #Lactic Acidosis  -Repeat lactate  -Daily BMP  -IV fluids: Plasmalyte 75 ml/hr  -Electrolyte replacement protocol    Endo  #ANNALEE  -Hgb A1c  -Monitor glucose levels    Rheum  #Seronegative Rheumatoid Arthritis  - PTA Plaquenil 400mg Qhs    Heme/Onc  #Chronic Microcytic Anemia, likely 2/2 myelodysplastic syndrome  Baseline Hgb ~8.0  -s/p 2u RBC (6/22) for Hgb 7.1 prior to craniotomy  -PTA Darbepoetin Inj 60mg Q3wks (last dose 6/14; next due 7/5)  -PTA Vit B12 500mcg daily  -Daily CBC  -Hgb goal > 8 in acute period post-surgery, plt goal > 50k  -Transfuse to meet Hgb and plt goals    #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  Pt currently follows with Dr Rex Bailey at [facility]. Was last seen on 6/14/2022 at which time no evidence of disease progression was noted. The pt has never been treated for her myelodysplastic syndrome and will plan to continue surveillance for now    ID  #UTI  -IV Ceftriaxone 1g daily for 7d (ends 6/29)  -UCx pending  -Daily CBC  -Follow temperature curve    MSK  #Chronic Back Pain w/ lumbar spondylosis and hx of lumbar compression fracture  #Polymyalgia Rheumatica w/ hx of GCA  #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, 2003)  -PTA Baclofen 5mg at bedtime prn for muscle spasm    #Osteoporosis  -Hold PTA Denosumab Inj 60mg  -Hold Ca/Vit D3    #Left Shin Ulcer  #Right Forearm Skin Tear  -WOC consult    #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  Has been assessed by Rheumatology and felt to be benign    ICU Check List  Lines/tubes/drains: PIV x3, A-pine, CVC, OG, Vasquez, ETT, L Scalp drain  FEN: NPO; Plasmalyte 75ml/hr  PPX: DVT - SCDs; GI - Pepcid.  Code: FULL (presumed pending discussion with pt/medical decision maker)  Dispo: ICU - NCC    Clinically Significant Risk Factors Present on Admission                # Coma: based on GCS score of <8         The patient to be formally staffed in the AM with the NCC attending, Dr. Rodolfo Tapia MD  Neurology PGY2    History of Present Illness:  Louise Benítez is a 76 year old female w/ PMHx myelodysplastic syndrome, seronegative RA, chronic microcytic anemia, PAD, and recent L SDH (5/12/2022) who was admitted from her TCU on 6/22/2022 after an unwitnessed fall. Was subsequently found to have an acute on chronic L SDH, a new acute R SDH, and increased midline shift w/ trace L uncal herniation now s/p L craniotomy w/ hematoma evacuation    The pt had been recently hospitalized at [facility] from 5/12 - 5/20/2022 after suffering a mechanical fall at home, producing a left cerebral convexity SDH (max thickness 12mm) and 4mm rightward midline shift. Initial exam exhibited no focal neurologic deficits. Repeat CT imaging exhibited stability and so no intervention was performed. The pt received 7d of prophylactic Keppra and was ultimately discharged to a TCU. The pt had a follow-up CTH on 6/6 which showed only evolution of the L SDH and persistent 4mm rightward shift. She was most recently seen via a virtual visit with Neurosurgery on 6/10/2022 at which time she was noted to be ambulating using a walker and denied any HA, vision/speech deficits, weakness, numbness/tingling, or balance issues since discharge    The pt then had an unwitnessed fall at her TCU earlier today. The pt herself does not recall the details of this fall, but one of the nurses heard a thump and found the pt on the carpeted floor. Pt does  "believe she hit her head and is complaining of a new frontal and occipital HA. Afterwards, the pt was reported to be \"more out of it\" than prior and so was brought to [facility] ED. Per report, the pt arrived A&Ox4 with equal movement in all extremities and equal/reactive pupils. However, around 15:40 she was noted to have a R eyelid droop and her L pupil had become pinpoint. Pt was taken for CTH which revealed an acute on chronic L SDH (max thickness 23mm) as well as a new acute R SDH with increased rightward shift now to 12mm and trace L uncal herniation. The pt continued to become increasingly confused and lethargic with elevations in blood pressure. She was intubated for airway protection and then taken emergently to the OR for a L craniotomy w/ hematoma evacuation w/ plan to admit to the Neuro ICU    Post-operatively, the pt arrived to the Neuro ICU afebrile and hemodynamically stable. She was intubated and sedated with Propofol 85 mcg/kg/min. Exam was notable for spontaneous movement in the BLE that did not appear to be posturing but was not reproducible with noxious stimuli. Otherwise, the pt would not rouse to noxious stimuli. Pupils were 2mm equal and reactive to light. Had a pos cough reflex. Plan to wean from Propofol as soon as possible to acquire a more accurate neuro exam.    No Known Allergies    PAST MEDICAL HISTORY:   Past Medical History:   Diagnosis Date     Family history of malignant hyperthermia     Cousin     Rheumatoid arthritis (H)        PAST SURGICAL HISTORY:   Past Surgical History:   Procedure Laterality Date     IR LUMBAR EPIDURAL STEROID INJECTION  8/15/2012       FAMILY HISTORY: I have reviewed this patient's family history and updated it with pertinent information if needed.  Family History   Problem Relation Age of Onset     Malignant Hyperthermia Cousin        SOCIAL HISTORY:   Social History     Tobacco Use     Smoking status: Former Smoker     Smokeless tobacco: Never Used "   Substance Use Topics     Alcohol use: Yes     Comment: Alcoholic Drinks/day: wine        ROS: Review of systems not obtained due to patient factors - intubation    Current Medications:    [Auto Hold] levETIRAcetam  2,000 mg Intravenous Once       PRN Medications:  bupivacaine 0.5 % -  EPINEPHrine 1:200,000, hemostatic matrix    Infusions:    niCARdipine Stopped (06/22/22 1850)     propofol (DIPRIVAN) infusion Stopped (06/22/22 1938)       Physical Examination:  Vitals: /49   Pulse 66   Temp 98  F (36.7  C)   Resp 22   Wt 60.3 kg (133 lb)   SpO2 95%   BMI 22.83 kg/m    General: Adult female patient, lying in bed, NAD   HEENT: Dressing covering site of left craniotomy; scalp drain in place  Cardiac: Appears well-perfused  Chest: Mechanically intubated  Abdomen: Soft, non-distended  Extremities: Warm, no edema, distal pulses +2, well perfused  Skin: Skin tear on the R forearm with surrounding bruising. Bandage covering a wound on the L shin. R 1st metatarsal joint redness    Neuro: Pt's initial exam was performed while on Propofol 85 mcg/kg/min  Mental status: Does not arouse to noxious stimuli or follow commands  Cranial nerves: Pupils are 2mm equal and reactive to light. Pos cough reflex. Absent corneal and doll's eye reflex  Motor: Normal bulk and tone. No abnormal movements. Exhibits some spontaneous movement (2/5) in the BLE but did not withdraw in any extremity to noxious stimuli   Sensory: Fails to withdraw to noxious stimuli in any extremity  Coordination: Unable to assess  Gait: Unable to assess    Labs:  Recent Labs   Lab 06/22/22 2057      POTASSIUM 4.1       Recent Labs   Lab 06/22/22 2057 06/22/22 1817   WBC  --  15.2*   HGB 7.9* 7.1*   PLT  --  464*       Recent Labs   Lab 06/22/22 2057   PH 7.33*   PCO2 40   PO2 207*   HCO3 21       All cultures:  No results for input(s): CULTURE in the last 168 hours.    Imaging:    All relevant imaging and laboratory values personally  reviewed.

## 2022-06-23 NOTE — ANESTHESIA PROCEDURE NOTES
Central Line/PA Catheter Placement    Pre-Procedure   Staff -        Anesthesiologist:  Juan Diego Jones MD       Resident/Fellow: Kristin Ramsay MD       Performed By: resident and anesthesiologist       Location: OR       Pre-Anesthestic Checklist: patient identified, IV checked, site marked, risks and benefits discussed, informed consent, monitors and equipment checked, pre-op evaluation and at physician/surgeon's request  Timeout:       Correct Patient: Yes        Correct Procedure: Yes        Correct Site: Yes        Correct Position: Yes        Correct Laterality: Yes   Line Placement:   This line was placed Post Induction    Procedure   Procedure: central line       Laterality: left       Insertion Site: femoral.       Patient Position: supine  Sterile Prep        All elements of maximal sterile barrier technique followed       Patient Prep/Sterile Barriers: draped, hand hygiene, gloves , hat , mask , draped, gown, sterile gel and probe cover       Skin prep: Chloraprep  Insertion/Injection        Technique: ultrasound guided and Seldinger Technique        1. Ultrasound was used to evaluate the access site.       2. Vein evaluated via ultrasound for patency/adequacy.       3. Using real-time ultrasound the needle/catheter was observed entering the artery/vein.       Type: CVC       Catheter Size: 7 Fr       Catheter Length: 20       Number of Lumens: double lumen  Narrative         Secured by: suture       Tegaderm dressing used.       Complications: None apparent,        blood aspirated from all lumens,        All lumens flushed: Yes       Verification method: Placement to be verified post-op

## 2022-06-23 NOTE — CONSULTS
Care Management Initial Consult    General Information  Assessment completed with: Spouse or significant other, Spouse- Jd  Type of CM/SW Visit: Initial Assessment    Primary Care Provider verified and updated as needed: Yes   Readmission within the last 30 days: current reason for admission unrelated to previous admission   Return Category: New Diagnosis        Communication Assessment  Patient's communication style: spoken language (English or Bilingual)    Hearing Difficulty or Deaf: no   Wear Glasses or Blind: no    Cognitive  Cognitive/Neuro/Behavioral: .WDL except  Level of Consciousness: lethargic  Arousal Level: arouses to pain  Orientation: other (see comments) (KELLY)  Mood/Behavior: withdrawn  Best Language:  (KELLY)  Speech: endotracheal tube    Living Environment:   People in home: spouse  Jd  Current living Arrangements: house      Able to return to prior arrangements:       Family/Social Support:  Care provided by: self, spouse/significant other  Provides care for: no one  Marital Status:   , Children  Jd       Description of Support System: Supportive, Involved       Current Resources:   Patient receiving home care services: Yes-  RN, PT and OT from [facility].  Skilled Home Care Services: Skilled Nursing  Community Resources:  None  Equipment currently used at home: walker, standard, grab bar, toilet  Supplies currently used at home: None    Employment/Financial:  Employment Status: retired        Financial Concerns: No concerns identified      Lifestyle & Psychosocial Needs:  Social Determinants of Health     Tobacco Use: Medium Risk     Smoking Tobacco Use: Former Smoker     Smokeless Tobacco Use: Never Used   Alcohol Use: Not on file   Financial Resource Strain: Not on file   Food Insecurity: Not on file   Transportation Needs: Not on file   Physical Activity: Not on file   Stress: Not on file   Social Connections: Not on file   Intimate Partner Violence: Not on file    Depression: Not at risk     PHQ-2 Score: 0   Housing Stability: Not on file       Functional Status:  Prior to admission patient needed assistance:   Dependent ADLs:: Ambulation-walker  Dependent IADLs:: Shopping, Laundry, Transportation     Additional Information:  Pt is s/p L craniotomy w/ hematoma evacuation on 6/22/22.    RNCC visited pt and spouse to introduce RNCC role and complete assessment.  Pt is intubated and spoke only with pt spouse.  Per discussion with spouse and chart review, pt was discharge to rehab recently on 5/22/22.  Pt spouse stated pt was at rehab facility only for a week and was discharged to home with home RN, PT and OT.  Pt uses walker for mobility.  Pt spouse stated pt was progressing well at home and not sure how she fell.   Pt had home RN visit prior her fall and she was doing good.  Pt spouse stated pt receives home care service from [facility] and very happy with the service she is receiving.  Pt spouse stated they are not happy with [facility] rehab and pt doesn't want to go back their if she needs rehab.  RNCC informed pt spouse that RNCC/SW will cont to follow up and SW will provide them with the lew of rehab facility if rehab is recommended.  Pt spouse agreed.  Pt spouse stated their dtrs would like to get update and request if the providers can call them.  RNCC shared pt spouse request with Neuro surgery team.      Kyle Johnston RN, PHN, BSN  4A and 4E/ ICU  Care Coordinator  Phone: [phone]  Pager: [phone]    To contact the weekend RNCC  [place] (0800 - 1630) Saturday and Sunday    Units: 4A, 4C, 4E, 5A and 5B- Pager 1: [phone]    Units: 6A, 6B, 6C, 6D- Pager 2: [phone]    Units: 7A, 7B, 7C, 7D, and 5C-Pager 3: [phone]

## 2022-06-23 NOTE — PROGRESS NOTES
"CLINICAL NUTRITION SERVICES - ASSESSMENT NOTE     Nutrition Prescription    RECOMMENDATIONS FOR MDs/PROVIDERS TO ORDER:  - Total daily fluids/adjustments per MD   - EXTUB and diet vs TFs via current access/OGT. Please consult Nutrition Services Adult IP Consult with reason Registered Dietitian to Order TF per Medical Nutrition Therapy Guidelines if EN becomes POC.  - Electrolyte replacement per protocol as indicated    - Bowel regimen as appro     Malnutrition Status:    - Patient does not meet two of the established criteria necessary for diagnosing malnutrition but is at risk for malnutrition    Recommendations already ordered by Registered Dietitian (RD):  - None currently, nutrition POC pending     Future/Additional Recommendations:  - EXT and diet vs TF via OGT    - If EN: rec Shonda Li Peptide 1.5 @ 45 mL/hr (1080 mL/day) to provide 1661 kcal (28 kcal/kg), 80 g protein (1.4 g/kg), 149 g CHO, 10 g fiber, 83 g fat (40% from MCTs), and 756 mL free water daily   - Prosource x 2 = 1741 kcals (30 kcals/kg) and 102 gm PRO (1.7 gm/kg)  -  Initiate @ 15 ml/hr and advance by 10 ml q4hr as tolerated  - Do not start or advance until lytes (Mg++,K+) WNL and phos>2.0  - Recommend 30-60 ml q4hr fluid flushes for tube patency. Additional fluids and/or adjustments per MD.    - Order multivitamin/mineral (15 ml/day via FT) to help ensure micronutrient needs being met with suspected hypermetabolic demands and potential interruptions to TF infusions.  - Elevated HOB with gastric feeds         REASON FOR ASSESSMENT  Louise Benítez is a/an 76 year old female assessed by the dietitian for MST -- \"unsure weight loss\" --> none. Assessed for nutrition risk monitor     Medical History: PMHx myelodysplastic syndrome, seronegative RA, chronic microcytic anemia, PAD, and recent L SDH (5/12/2022) who was admitted from her TCU on 6/22/2022 after an unwitnessed fall.    NUTRITION HISTORY  Nutrition/Diet History  Typical Intake " "(Food/Fluid/EN/PN): (Eating well PTA with no issues per family at bedside)  Food Allergy(s)/Intolerance(s): None known  Vitamin/Mineral Supplements: Occational MVI but not daily  Herbal/Other Supplements: none    CURRENT NUTRITION ORDERS  Nutrition  Diet/Nutrition Received: NPO     LABS  Na+ 135 (L)  Mg+ 2.6 (H)  Lactic Acid trends: 4.3, 2.3, 1.6      MEDICATIONS  B12   Keppra   IVF NaCl @ 75 ml/hr      ANTHROPOMETRICS  Height: 0 cm (Data Unavailable)   Ht Readings from Last 2 Encounters:   05/12/22 1.626 m (5' 4\")   03/24/22 1.6 m (5' 3\")   64\"   IBW 55 kg   Most Recent Weight: 71.7 kg (158 lb 1.1 oz)    Wt Readings from Last 8 Encounters:   06/22/22 71.7 kg (158 lb 1.1 oz)   05/19/22 60.5 kg (133 lb 4.8 oz)   05/12/22 72.6 kg (160 lb)   03/24/22 70.3 kg (155 lb)   01/17/22 70.3 kg (155 lb)   04/21/21 72.6 kg (160 lb)   03/30/21 72.6 kg (160 lb)   09/22/20 70.3 kg (155 lb)   No weight loss noted     Dosing weight: 59 kg (adjusted based on current weight of 72 kg and IBW of 55 kg)    ASSESSED NUTRITION NEEDS  Estimated Energy Needs: 1594-2131 kcals/day (25 - 30 kcals/kg)  Justification: Maintenance  Estimated Protein Needs:  grams protein/day (1.5 - 2 grams of pro/kg)  Justification: Increased needs  Estimated Fluid Needs: (1 mL/kcal)   Justification: Maintenance and Per provider pending fluid status     PHYSICAL FINDINGS  See malnutrition section below.  Physical Findings  Overall Physical Appearance: Pt on vent  Enteral Access Devices: orogastric tube    MALNUTRITION  % Intake: No decreased intake noted  % Weight Loss: None noted  Subcutaneous Fat Loss: None observed  Muscle Loss: Upper arm (bicep, tricep), Lower arm  (forearm), Upper leg (quadricep, hamstring), Patellar region and Posterior calf: moderate. Overall poor tone/sarcopenic   Fluid Accumulation/Edema: None noted  Malnutrition Diagnosis: Patient does not meet two of the established criteria necessary for diagnosing malnutrition but is at risk for " malnutrition     NUTRITION DIAGNOSIS  Inadequate protein-energy intake related to inability to take oral PO/vented as evidenced by NPO and meeting 0% kcal/PRO needs at present        INTERVENTIONS  Implementation  Nutrition Education: Provided education on role of RD and nutrition POC with family at bedside    Enteral Nutrition - recs made pending nutrition POC     Goals  Diet adv v nutrition support within 24-48 hours      Monitoring/Evaluation  Progress toward goals will be monitored and evaluated per protocol.       Jyoti Xavier RD, LD, [facility]  Neuro ICU  Pager: [phone]

## 2022-06-23 NOTE — PROGRESS NOTES
[facility], [place]   06/23/2022  Neurosurgery Progress Note:    Assessment:  Ms. Benítez is a 76-year-old female with PMHx myelodysplastic syndrome, seronegative RA, chronic microcytic anemia, PAD, and recent L SDH (5/12/2022) who arrived to [facility] via EMS after an unwitnessed fall at home.  Shortly after arriving, the patient was complaining of a significant headache and nausea, and then her right pupil dilated.  She was no longer protecting her airway, so she was sedated and intubated.  Her right eye pupil then returned to a normal caliber.  A head CT demonstrated a left acute subdural approximately 2.3 cm thick with 12 mm of rightward midline shift.  She was taken to the OR on 6/22 for emergent L craniotomy for SDH evacuation. Postoperatively, her exam continued to improve on day 1.          Plan:  - Serial neuro exams  - Pain control  - HOB > 30 degrees  - Extubate as able  - VIOLA to thumbprint suction  - Keppra 7d  - Bowel regimen  - PRN antiemetics  - IVF while NPO  - PT/OT  - SCDs for DVT proph    -----------------------------------  Emily Ball MD  Neurosurgery PGY2    Please contact neurosurgery resident on call with questions.    Dial * * [phone], enter [phone] when prompted.=  -----------------------------------    Interval History: Taken to the OR yesterday for SDH evacuation. Remained intubated postoperatively. Continuously improving neurological exam throughout the night. Head CT obtained this morning, showing improved midline shift.     Objective:   Temp:  [95  F (35  C)-100  F (37.8  C)] 100  F (37.8  C)  Pulse:  [] 88  Resp:  [0-29] 16  BP: ()/(35-90) 122/47  MAP:  [62 mmHg-93 mmHg] 71 mmHg  Arterial Line BP: (102-160)/(36-68) 134/40  FiO2 (%):  [30 %-60 %] 40 %  SpO2:  [91 %-100 %] 100 %  I/O last 3 completed shifts:  In: 4409 [I.V.:3259; IV Piggyback:100]  Out: 782 [Urine:450; Drains:132; Blood:200]    Gen: Appears comfortable, NAD  Wound: dressing clean,  dry, intact  Neurologic:  Opens eyes to stimulation, not following commands  PEERL, corneal and cough present  Localizing BUE, brisker on the left than on the right  Withdrawing BLE        LABS:  Recent Labs   Lab 06/23/22  0424 06/22/22 2257 06/22/22 2232 06/22/22 2057 06/22/22  1818   * 137  --  138 137   POTASSIUM 4.4 4.5  --  4.1 3.7   CHLORIDE 104 105  --   --  103   CO2 21* 21*  --   --  18*   ANIONGAP 10 11  --   --  16*   * 120* 115* 131* 167*   BUN 14.3 15.8  --   --  15.6   CR 0.51 0.54  --   --  0.53   MENDEZ 8.5* 8.4*  --   --  9.2       Recent Labs   Lab 06/23/22 0424   WBC 14.1*   RBC 2.75*   HGB 8.7*   HCT 25.9*   MCV 94   MCH 31.6   MCHC 33.6   RDW 19.9*          IMAGING:  Recent Results (from the past 24 hour(s))   Head CT w/o contrast   Result Value    Radiologist flags (Urgent)     Acute subdural hematomas with mass effect as described    Narrative    CT HEAD W/O CONTRAST 6/22/2022 6:03 PM    History: fall, loc     Comparison: 5/12/2022    Technique: Using multidetector thin collimation helical acquisition  technique, axial, coronal and sagittal CT images from the skull base  to the vertex were obtained without intravenous contrast.   (topogram) image(s) also obtained and reviewed.    Findings:   Mixed density subdural collection along the left cerebral convexity  and middle cranial fossa measuring up to 2.3 cm in maximal thickness.  Heterogeneity of the fluid may represent hyperacute or active  hemorrhage mixed with acute hemorrhage. There is moderate diffuse  sulcal effacement particularly in the left frontal region. There is up  to 12 mm of rightward midline shift. Slight medialization of the left  temporal lobe uncus (series 6, image 34) resulting in asymmetry of the  suprasellar cistern. The left occipital horn is completely effaced.    Hyperdense collection along the right frontal convexity is new from  prior and measures up to 7 mm in maximal thickness.    No  subarachnoid hemorrhage. Gray/white matter differentiation in both  cerebral hemispheres is preserved. Ventricles are proportionate to the  cerebral sulci.     The bony calvaria and the bones of the skull base are normal. The  visualized portions of the paranasal sinuses and mastoid air cells are  clear.      Impression    Impression:  1. Active hemorrhage superimposed upon Acute and chronic left-sided  mixed density subdural measuring up to 2.3 cm in maximum thickness  with 12 mm of rightward midline shift and trace left-sided uncal  herniation.  2. Acute subdural along the right frontal convexity measuring up to 7  mm in maximum thickness.    [Urgent Result: Acute subdural hematomas with mass effect as described  above.] Concern for active hemorrhage.    Finding was identified on 6/22/2022 6:04 PM.     Dr. Sanchez was contacted by Dr. Leung and Dr. Lindquist at  6/22/2022 6:09 PM and was again contacted at 6:30 PM and verbalized  understanding of the urgent finding.      I have personally reviewed the examination and initial interpretation  and I agree with the findings.    BONI LINDQUIST MD         SYSTEM ID:  [id number]   Cervical spine CT w/o contrast    Narrative    CT CERVICAL SPINE W/O CONTRAST 6/22/2022 6:04 PM    Provided History: fall, head and neck pain, Fall, head and neck pain    Comparison: 5/12/2022    Technique: Using multidetector thin collimation helical acquisition  technique, axial, coronal and sagittal CT images through the cervical  spine were obtained without intravenous contrast.     Findings:  There is grade 1 anterolisthesis of C3 and C4 with mild widening of  the anterior cervical disc space, which is unchanged since 5/12/2022.  Mild grade 1 retrolisthesis of C5 on C6. Normal cervical lordosis. No  acute fracture or subluxation. No prevertebral edema. There is disc  height narrowing at C5-6 and C6-7. Multilevel degenerative changes of  cervical spine. The findings on a level by  level basis are as follows:    C2-3:  No spinal canal or neural foraminal stenosis.    C3-4:  Moderate left neuroforaminal stenosis.    C4-5:  Moderate right neuroforaminal stenosis.    C5-6:  Bilateral moderate right greater than left neuroforaminal  stenosis    C6-7:  No spinal canal or neural foraminal stenosis.    C7-T1:  No spinal canal or neural foraminal stenosis.     No abnormality of the paraspinous soft tissues.      Impression    Impression:   No acute fracture or traumatic subluxation.    I have personally reviewed the examination and initial interpretation  and I agree with the findings.    BONI LINDQUIST MD         SYSTEM ID:  [id number]   XR Chest Port 1 View    Narrative    EXAM: XR CHEST PORT 1 VIEW  6/22/2022 6:39 PM      HISTORY: Intubation; chief complaint unwitnessed fall    COMPARISON: 5/12/2022    FINDINGS: Single view of the chest. ET tube 2.5 cm above the leo.  Enteric tube tip and sidehole in stomach. Trachea is midline. Cardiac  mediastinal silhouette is within normal limits. Left lower lobe  atelectasis suspected. No focal consolidative opacity, pleural  effusion, or pneumothorax. Mild degenerative changes in the  glenohumeral joint is partially visualized.      Impression    IMPRESSION:  ET tube is 2.5 cm above the leo. No acute consolidative airspace  disease. Left lower lobe atelectasis suspected.    I have personally reviewed the examination and initial interpretation  and I agree with the findings.    ROYA EDMOND MD         SYSTEM ID:  [id number]   CT Head w/o Contrast    Narrative    CT HEAD W/O CONTRAST 6/22/2022 10:16 PM    History: s/p SDH evacuation   ICD-10:    Comparison: CT of the head dated 6/22/2022 at 1756    Technique: Using multidetector thin collimation helical acquisition  technique, axial, coronal and sagittal CT images from the skull base  to the vertex were obtained without intravenous contrast.   (topogram) image(s) also obtained and  reviewed.    Findings: Postsurgical changes of left frontoparietal craniotomy with  subdural hematoma evacuation. Left subdural drain in place with the  tip in the left anterior fossa. Postsurgical subdural pneumocephalus.  Heterogeneous fluid collection overlying the left cerebral convexity  measuring up to 19 mm. Stable appearance of the subdural hematoma  overlying the right cerebral convexity measuring up to 7 mm. Stable  appearance of the subdural hematoma overlying the anterior falx  measuring up to 6 mm. There is 8 mm rightward midline shift. Decreased  effacement of the left lateral ventricle. Largely stable medialization  of the left temporal uncus without herniation. Gray/white matter  differentiation in both cerebral hemispheres is preserved. . The basal  cisterns are clear.    The visualized portions of the paranasal sinuses and mastoid air cells  are clear.      Impression    Impression:  1. Postsurgical changes of left frontoparietal craniotomy and subdural  hematoma evacuation.  2. Decreased size of the heterogeneous fluid collection overlying the  left cerebral convexity.  3. Decreased rightward midline shift. Decreased effacement of the left  lateral ventricle.  4. Stable appearance of the subdural hematomas overlying the right  cerebral convexity and anterior falx.    I have personally reviewed the examination and initial interpretation  and I agree with the findings.    OSVALDO CONNOLLY MD         SYSTEM ID:  [id number]   XR Chest Port 1 View    Narrative    EXAM: XR Chest 1 view 6/22/2022 10:35 PM      HISTORY: ETT positioning after transfer from OSH.    COMPARISON: Same day radiograph at 1828.     TECHNIQUE: Frontal view of the chest.    FINDINGS: ET tube is in the mid thoracic trachea. Enteric tube is  subdiaphragmatic with tip collimated from the study. Sidehole past the  GE junction.  Trachea is midline. Cardiac mediastinal silhouette is stable.  Decreased perihilar pulmonary opacities. No pleural  effusions. No  pneumothoraces.      Impression    IMPRESSION:   1. ET tube is in the mid thoracic trachea.  2. Decreased perihilar pulmonary opacities.    I have personally reviewed the examination and initial interpretation  and I agree with the findings.    ROYA EDMOND MD         SYSTEM ID:  [id number]   CT Head w/o Contrast    Narrative    CT HEAD W/O CONTRAST 6/23/2022 4:57 AM    History: SDH, follow up   ICD-10:    Comparison: 6/22/22    Technique: Using multidetector thin collimation helical acquisition  technique, axial, coronal and sagittal CT images from the skull base  to the vertex were obtained without intravenous contrast.   (topogram) image(s) also obtained and reviewed.    Findings: Postsurgical changes of left frontal parietotemporal  craniotomy with drain in place. There is reduced size of the subdural  fluid collection measuring up to 10 mm, previously 15 mm deep to the  craniotomy . There is reduced mass effect on the left cerebral.  Reduced left to right midline shift now measuring 7 mm, previously 13  mm when measured at a similar fashion. Similar appearance of subdural  hemorrhage along the anterior falx extending along the left frontal  lobe measuring up to 7 mm, previously 7 mm. Additional unchanged  subdural blood along the bilateral tentorial leaflets. Normalization  of left medial temporal lobe. Gray/white matter differentiation in  both cerebral hemispheres is preserved. Ventricles are proportionate  to the cerebral sulci. The basal cisterns are clear.    The bony calvaria and the bones of the skull base are normal. The  visualized portions of the paranasal sinuses and mastoid air cells are  clear.      Impression    Impression:    1. Reduced size of subdural fluid collection deep to the left  frontoparietal craniotomy.  2. Reduced mass effect and left to right midline shift now measuring 7  mm previously 13 mm when measured in a similar fashion.  3. Similar appearance of  subdural hemorrhage along the anterior falx  and extending along the right anterior cranial fossa.  4. Unchanged appearance of small subdural hemorrhage along the  bilateral tentorial leaflets.     I have personally reviewed the examination and initial interpretation  and I agree with the findings.    OSVALDO CONNOLLY MD         SYSTEM ID:  [id number]

## 2022-06-23 NOTE — BRIEF OP NOTE
"[facility]    Brief Operative Note    Pre-operative diagnosis: Subdural hematoma (H) [S06.5X9A]  Post-operative diagnosis Same as pre-operative diagnosis    Procedure: Procedure(s):  CRANIOTOMY LEFT for hematoma, possible craniectomy  Surgeon: Surgeon(s) and Role:     * Ayden Harris MD - Primary     * Uziel Rice MD - Resident - Assisting     * Christopher Martinez MD - Resident - Assisting     * Emily Segal MD - Resident - Assisting  Anesthesia: General   Estimated Blood Loss: 200 ml    Drains: Giovanni-Pickard subdural  Specimens: * No specimens in log *  Findings:   Active cortical bleeding, contused brain, hematoma evacuated. .  Complications: None.  Implants:   Implant Name Type Inv. Item Serial No.  Lot No. LRB No. Used Action   GRAFT DURAGEN 3X3\"  - [id number] Bone/Tissue/Biologic GRAFT DURAGEN 3X3\"   [employer] [id number] Left 2 Implanted   IMP SCR SYN MATRIX LOW PRO 1.5X03MM SELF DRILL 04.503.103.01 - [id number] Metallic Hardware/Johnson IMP SCR SYN MATRIX LOW PRO 1.5X03MM SELF DRILL [id number]  SYNTHES-STRATEC NONE Left 12 Implanted   IMP BUR HOLE COVER 17MM LOW PROFILE .527 - [id number] Metallic Hardware/Johnson IMP BUR HOLE COVER 17MM LOW PROFILE .527  SYNTHES[employer]ATEC NONE Left 1 Implanted   IMP BUR HOLE COVER FOR SHUNT 17MM LOW PROFILE .554 - [id number] Metallic Hardware/Johnson IMP BUR HOLE COVER FOR SHUNT 17MM LOW PROFILE .554  SYNTHES-STRATEC NONE Left 1 Implanted     Closure: jorge l Ball MD  Neurosurgery PGY2    Please contact neurosurgery resident on call with questions.    Dial * * [phone], [phone] when prompted.      "

## 2022-06-23 NOTE — CARE PLAN
Admitted/transferred from: OR  Reason for admission/transfer: Close monitoring of neuro status   2 RN skin assessment: completed by Silvio RN and Ann Marie RN   Result of skin assessment and interventions/actions: Document of skin status, dressings applied, WOC consulted   Height, weight, drug calc weight: Done  Patient belongings (see Flowsheet)  MDRO education added to care plan N/A  ?

## 2022-06-23 NOTE — CONSULTS
[facility]  WO Nurse Inpatient Assessment     Consulted for: Left Lower extremity and Right forearm wounds    Patient History (according to provider note(s):      76-year-old female with PMHx myelodysplastic syndrome, seronegative RA, chronic microcytic anemia, PAD, and recent L SDH (5/12/2022) who arrived to [facility] via EMS after an unwitnessed fall at home.  s/p 6/22 emergent L craniotomy for SDH evacuationa left acute subdural hematoma     Areas Assessed:      Areas visualized during today's visit: Focused:    Wound location: R arm and Left shin        Last photo: 6/23/22  Wound due to: Trauma from wheelchair foot rest pins.    Wound history/plan of care: occurred approximately 5 weeks ago at care facility.  Spouse has been leaving open to air.   Wound base: 50 % yellow fibrin, , 45 % dry brown slough, 5% granulation     Palpation of the wound bed: normal      Drainage: small     Description of drainage: serosanguinous     Measurements (length x width x depth, in cm): 3.2  x 2.8  x  0.2 cm      Tunneling: N/A     Undermining: N/A  Periwound skin: Intact and fragile      Color: normal and consistent with surrounding tissue      Temperature: normal   Odor: none  Pain: no grimacing or signs of discomfort,   Pain interventions prior to dressing change: N/A  Treatment goal: Heal  and Remove necrotic tissue  STATUS: initial assessment  Supplies ordered: gathered, at bedside and ordered [facility]    Wound location: Right forearm      Last photo: 6/23/22  Wound due to: Skin Tear  Wound history/plan of care: wounds not present prior to fall, present on admission.  currently with mepilex border dressing.    Wound base: 100 % dermis,      Palpation of the wound bed: normal      Drainage: small     Description of drainage: serosanguinous     Measurements (length x width x depth, in cm): 1.2  x 1  x  0.2 cm      Periwound skin: peeling edges, purple bruising          Temperature:  normal   Odor: none  Pain: no grimacing or signs of discomfort,   Pain interventions prior to dressing change: n/a  Treatment goal: Heal   STATUS: initial assessment  Supplies ordered: gathered  Treatment Plan:     Left lower extremity wound:  Every other day  Cleanse with wound cleanser and gauze.    Paint periwound with no sting barrier film.   Cover wound bed with medihoney, ([id number]) applying nickel thick.   Cover medihoney with vaseline gauze.   Cover vaseline gauze with mepilex border dressing.     Right arm skin tear: every third day  cleanse wound(s) with microklenz spray and gauze.  Pat dry   Cover skin tear with vaseline gauze.   Cover vaseline gauze with mepilex border dressing.     Orders: Written    RECOMMEND PRIMARY TEAM ORDER: None, at this time  Education provided: plan of care and wound progress  Discussed plan of care with: Family  WOC nurse follow-up plan: weekly  Notify WOC if wound(s) deteriorate.  Nursing to notify the Provider(s) and re-consult the WOC Nurse if new skin concern.    DATA:     Current support surface: Standard  Low air loss mattress  BMI: Body mass index is 27.13 kg/m .   Active diet order: Orders Placed This Encounter      NPO for Medical/Clinical Reasons Except for: No Exceptions     Output: I/O last 3 completed shifts:  In: 4409 [I.V.:3259; IV Piggyback:100]  Out: 782 [Urine:450; Drains:132; Blood:200]     Labs: Recent Labs   Lab 06/23/22  0424 06/22/22  2336 06/22/22  2257   ALBUMIN  --   --  3.3*   HGB 8.7*  --  7.7*   INR  --  1.28*  --    WBC 14.1*  --  17.3*   A1C  --   --  5.5     Pressure injury risk assessment:   Sensory Perception: 2-->very limited  Moisture: 4-->rarely moist  Activity: 2-->chairfast  Mobility: 2-->very limited  Nutrition: 2-->probably inadequate  Friction and Shear: 2-->potential problem  Leobardo Score: 14

## 2022-06-23 NOTE — PROGRESS NOTES
Neurocritical Care Progress Note    Reason for critical care admission: Bilateral SDH  Admitting Team: Neurosurgery  Date of Service:  06/23/2022  Date of Admission:  6/22/2022  Hospital Day: 2    Assessment/Plan  Louise Benítez is a 76 year old female w/ PMHx myelodysplastic syndrome, seronegative RA, chronic microcytic anemia, PAD, and recent L SDH (5/12/2022) who was admitted from her TCU on 6/22/2022 after an unwitnessed fall. Was subsequently found to have an acute on chronic L SDH, a new acute R SDH, and increased midline shift w/ trace L uncal herniation now s/p L craniotomy w/ hematoma evacuation     Neuro  #Acute on Chronic L SDH + New Acute R SDH w/ increased rightward midline shift and trace L uncal herniation s/p L craniotomy (6/22) w/ hematoma evacuation  #Recent L SDH 2/2 mechanical fall not requiring intervention (hospitalized 5/12-5/20/2022)  -Neurochecks Q1h  -HOB > 30   -SBP goal < 140 mmHg; MAP > 65 mmHg  -Nicardipine infusion  -PRN Hydralazine/Labetalol  -Keppra 750mg for 7d (ends 6/28)  -s/p IV Keppra 1g load  -Hold all anti-platelets and anti-coagulation given active bleed  -Post-procedures CTH  -Repeat CTH in the AM  -PT/OT/SLP     #Reported FHx Malignant Hyperthermia in a cousin and so is cautious with anesthesia prior to surgery     #Analgesics & sedation  -Propofol gtt, will attempt to wean  -Tylenol 650mg Q4h prn  -Oxycodone 5-10mg Q4h prn   -PTA dosing had been prescribed at most recent hospitalization  -PTA Baclofen 5mg at bedtime prn for muscle spasms     CV  #HTN on admission  -cardiac monitoring  -SBP goal < 140 mmHg  -nicardipine infusion  -PRN Hydralazine/Labetalol     #Bradycardia  -cardiac monitoring      #PAD  No statin, recent LDL 10 (5/13/2022)     Resp  #former tobacco Use  #acute hypoxic respiratory failure  PSV: 5/5/40%  -Continuous pulse ox  -Maintain O2 saturations greater than 92%     GI  #hx GERD  Diet: NPO pending extubation, will need TF if not extubated today  Last  BM: PTA  GI prophylaxis: Pepcid 20mg BID while intubated  Bowel regimen: PRN senna-docusate and Miralax     Renal/  #Lactic Acidosis  #hyponatremia  -Repeat lactate  -Daily BMP  -IV fluids: NS 75 ml/hr  -Electrolyte replacement protocol  -repeat LA today     Endo  #ANNALEE  -Hgb A1c  -Monitor glucose levels     Rheum  #Seronegative Rheumatoid Arthritis  -continue PTA Plaquenil 400mg at bedtime      Heme/Onc  #Chronic Microcytic Anemia, likely 2/2 myelodysplastic syndrome  Baseline Hgb ~8.0  -s/p 2u RBC (6/22) for Hgb 7.1 prior to craniotomy  -PTA Darbepoetin Inj 60mg Q3wks (last dose 6/14; next due 7/5)  -PTA Vit B12 500mcg daily  -Daily CBC  -Hgb goal > 8 in acute period post-surgery, plt goal > 50k  -Transfuse to meet Hgb and plt goals     #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  -follow up as out patient [facility]      ID  #UTI  -IV Ceftriaxone 1g daily for 7d (ends 6/29)  -UCx pending  -Daily CBC  -Follow temperature curve     MSK  #Chronic Back Pain w/ lumbar spondylosis and hx of lumbar compression fracture  #Polymyalgia Rheumatica w/hx of GCA  #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, 2003)  -PTA Baclofen 5mg at bedtime prn for muscle spasm     #Osteoporosis  -Hold PTA Denosumab Inj 60mg  -Hold Ca/Vit D3     #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   -benign per Rheum     Skin  #Left Shin Ulcer  #Right Forearm Skin Tear  -WOC consult, appreciate recs     ICU Check List  Lines/tubes/drains: PIV x3, A-pine, CVC - remove, OG, Vasquez, ETT, L Scalp drain  FEN: NPO; NS 75 mL/hr  PPX: DVT - SCDs; GI - Pepcid  Code: FULL CODE  Dispo: ICU - NCC    Clinically Significant Risk Factors Present on Admission             # Hypoalbuminemia: Albumin = 3.3 g/dL (Ref range: 3.5 - 5.2 g/dL) on admission, will monitor as appropriate  # Coagulation Defect: INR = 1.28 (Ref range: 0.85 - 1.15) and/or PTT = 71 Seconds (Ref range: 22 - 38 Seconds) on  admission, will monitor for bleeding   # Coma: based on GCS score of <8         TIME SPENT ON THIS ENCOUNTER   I spent 38 minutes of critical care time on the unit/floor managing the care of Louise Benítez. Upon evaluation, this patient had a high probability of imminent or life-threatening deterioration due to SDH s/p crani, acute hypoxic respiratory failure, which required my direct attention, intervention, and personal management. Greater than 50% of my time was spent at the bedside counseling the patient and/or coordinating care regarding services listed in this note.    The patient was seen and discussed with the NCC attending, Dr. Lucy Reynolds.    John Denny, APRN, CNP  Neurocritical Care  *[phone]  Text Page    24 Hour Vital Signs Summary:  Temp: 99.9  F (37.7  C) Temp  Min: 95  F (35  C)  Max: 99.9  F (37.7  C)  Resp: 13 Resp  Min: 0  Max: 29  SpO2: 100 % SpO2  Min: 91 %  Max: 100 %  Pulse: 76 Pulse  Min: 49  Max: 106    No data recorded  BP: 122/47 Systolic (24hrs), Av , Min:85 , Max:180   Diastolic (24hrs), Av, Min:35, Max:90    Respiratory monitoring:   Vent Mode: (S) CPAP/PS  (Continuous positive airway pressure with Pressure Support)  FiO2 (%): 40 %  Resp Rate (Set): 12 breaths/min  Tidal Volume (Set, mL): 390 mL  PEEP (cm H2O): 5 cmH2O  Pressure Support (cm H2O): 5 cmH2O  Resp: 13    I/O last 3 completed shifts:  In: 4409 [I.V.:3259; IV Piggyback:100]  Out: 782 [Urine:450; Drains:132; Blood:200]    Current Medications:    cefTRIAXone  1 g Intravenous Q24H     cyanocobalamin  500 mcg Oral Daily     famotidine  20 mg Oral or Feeding Tube BID     hydroxychloroquine  400 mg Oral or Feeding Tube At Bedtime     levETIRAcetam  750 mg Oral or Feeding Tube BID     sodium chloride (PF)  3 mL Intracatheter Q8H       PRN Medications:  [START ON 2022] acetaminophen, baclofen, bisacodyl, hydrALAZINE, labetalol, lidocaine 4%, lidocaine 4%, lidocaine (buffered or not buffered), lidocaine  (buffered or not buffered), melatonin, naloxone **OR** naloxone **OR** naloxone **OR** naloxone, ondansetron **OR** ondansetron, oxyCODONE, polyethylene glycol, senna-docusate, sodium chloride (PF), sodium chloride (PF), sodium chloride (PF)    Infusions:    niCARdipine       propofol (DIPRIVAN) infusion Stopped (06/23/22 0100)     sodium chloride 75 mL/hr at 06/23/22 0753       No Known Allergies    Physical Examination:  Vitals: /47   Pulse 76   Temp 99.9  F (37.7  C)   Resp 13   Wt 71.7 kg (158 lb 1.1 oz)   SpO2 100%   BMI 27.13 kg/m    General: Adult female patient, lying in bed, critically-ill  HEENT: Surgical incision CDI, no icterus, oral cavity/oropharynx pink and moist  Cardiac: RRR per monitor  Chest: unlabored, expansion symmetric, no retractions or use of accessory muscles, synchronous with ventilator  Abdomen: Soft, non-distended  Extremities: Warm, no edema, distal pulses +2, well perfused  Skin: No rash or lesion  Psych: Calm  Neuro: Lethargic, unable to assess orientation, exam limited by ETT, PERRL +2 mm, face symmetric, movements intentional, intermittently follows commands, no abnormal movements, moves all extremities, gait and coordination deferred.       NIHSS  1a. Level of Consciousness 1-->Not alert, but arousable by minor stimulation to obey, answer, or respond   1b. LOC Questions 2-->Answers neither question correctly   1c. LOC Commands 1-->Performs one task correctly   2.   Best Gaze 0-->Normal   3.   Visual 0-->No visual loss   4.   Facial Palsy 0-->Normal symmetrical movements   5a. Motor Arm, Left 1-->Drift, limb holds 90 (or 45) degrees, but drifts down before full 10 seconds, does not hit bed or other support   5b. Motor Arm, Right 1-->Drift, limb holds 90 (or 45) degrees, but drifts down before full 10 secs, does not hit bed or other support   6a. Motor Leg, Left 1-->Drift, leg falls by the end of the 5-sec period but does not hit bed   6b. Motor Leg, right 1-->Drift, leg  falls by the end of the 5-sec period but does not hit bed   7.   Limb Ataxia 0-->Absent   8.   Sensory 0-->Normal, no sensory loss   9.   Best Language 0-->No aphasia, normal   10. Dysarthria (UN) Intubated or other physical barrier   11. Extinction and Inattention  0-->No abnormality   Total 8 (06/23/22 1058)       Labs and imaging:    Results for orders placed or performed during the hospital encounter of 06/22/22 (from the past 24 hour(s))   Extra Tube    Narrative    The following orders were created for panel order Extra Tube.  Procedure                               Abnormality         Status                     ---------                               -----------         ------                     Extra Blue Top Tube[[id number]]                              Final result                 Please view results for these tests on the individual orders.   Extra Blue Top Tube   Result Value Ref Range    Hold Specimen [facility]    EKG 12 lead   Result Value Ref Range    Systolic Blood Pressure  mmHg    Diastolic Blood Pressure  mmHg    Ventricular Rate 89 BPM    Atrial Rate 89 BPM    OH Interval 156 ms    QRS Duration 92 ms     ms    QTc 455 ms    P Axis 25 degrees    R AXIS -19 degrees    T Axis 29 degrees    Interpretation ECG       Sinus rhythm with Premature atrial complexes with Aberrant conduction  Nonspecific ST abnormality  Abnormal ECG  Unconfirmed report - interpretation of this ECG is computer generated - see medical record for final interpretation  Confirmed by - EMERGENCY ROOM, PHYSICIAN ([id number]),  CHRISTIANA DA SILVA ([id number]) on 6/22/2022 8:26:27 PM     Head CT w/o contrast   Result Value Ref Range    Radiologist flags (Urgent)      Acute subdural hematomas with mass effect as described    Narrative    CT HEAD W/O CONTRAST 6/22/2022 6:03 PM    History: fall, loc     Comparison: 5/12/2022    Technique: Using multidetector thin collimation helical acquisition  technique, axial, coronal and sagittal CT images  from the skull base  to the vertex were obtained without intravenous contrast.   (topogram) image(s) also obtained and reviewed.    Findings:   Mixed density subdural collection along the left cerebral convexity  and middle cranial fossa measuring up to 2.3 cm in maximal thickness.  Heterogeneity of the fluid may represent hyperacute or active  hemorrhage mixed with acute hemorrhage. There is moderate diffuse  sulcal effacement particularly in the left frontal region. There is up  to 12 mm of rightward midline shift. Slight medialization of the left  temporal lobe uncus (series 6, image 34) resulting in asymmetry of the  suprasellar cistern. The left occipital horn is completely effaced.    Hyperdense collection along the right frontal convexity is new from  prior and measures up to 7 mm in maximal thickness.    No subarachnoid hemorrhage. Gray/white matter differentiation in both  cerebral hemispheres is preserved. Ventricles are proportionate to the  cerebral sulci.     The bony calvaria and the bones of the skull base are normal. The  visualized portions of the paranasal sinuses and mastoid air cells are  clear.      Impression    Impression:  1. Active hemorrhage superimposed upon Acute and chronic left-sided  mixed density subdural measuring up to 2.3 cm in maximum thickness  with 12 mm of rightward midline shift and trace left-sided uncal  herniation.  2. Acute subdural along the right frontal convexity measuring up to 7  mm in maximum thickness.    [Urgent Result: Acute subdural hematomas with mass effect as described  above.] Concern for active hemorrhage.    Finding was identified on 6/22/2022 6:04 PM.     Dr. Sanchez was contacted by Dr. Leung and Dr. Lindquist at  6/22/2022 6:09 PM and was again contacted at 6:30 PM and verbalized  understanding of the urgent finding.      I have personally reviewed the examination and initial interpretation  and I agree with the findings.    BONI LINDQUIST,  MD         SYSTEM ID:  [id number]   Cervical spine CT w/o contrast    Narrative    CT CERVICAL SPINE W/O CONTRAST 6/22/2022 6:04 PM    Provided History: fall, head and neck pain, Fall, head and neck pain    Comparison: 5/12/2022    Technique: Using multidetector thin collimation helical acquisition  technique, axial, coronal and sagittal CT images through the cervical  spine were obtained without intravenous contrast.     Findings:  There is grade 1 anterolisthesis of C3 and C4 with mild widening of  the anterior cervical disc space, which is unchanged since 5/12/2022.  Mild grade 1 retrolisthesis of C5 on C6. Normal cervical lordosis. No  acute fracture or subluxation. No prevertebral edema. There is disc  height narrowing at C5-6 and C6-7. Multilevel degenerative changes of  cervical spine. The findings on a level by level basis are as follows:    C2-3:  No spinal canal or neural foraminal stenosis.    C3-4:  Moderate left neuroforaminal stenosis.    C4-5:  Moderate right neuroforaminal stenosis.    C5-6:  Bilateral moderate right greater than left neuroforaminal  stenosis    C6-7:  No spinal canal or neural foraminal stenosis.    C7-T1:  No spinal canal or neural foraminal stenosis.     No abnormality of the paraspinous soft tissues.      Impression    Impression:   No acute fracture or traumatic subluxation.    I have personally reviewed the examination and initial interpretation  and I agree with the findings.    BONI LINDQUIST MD         SYSTEM ID:  [id number]   CBC with platelets differential    Narrative    The following orders were created for panel order CBC with platelets differential.  Procedure                               Abnormality         Status                     ---------                               -----------         ------                     CBC with platelets and d...[[id number]]  Abnormal            Final result                 Please view results for these tests on the individual orders.    CBC with platelets and differential   Result Value Ref Range    WBC Count 15.2 (H) 4.0 - 11.0 10e3/uL    RBC Count 2.26 (L) 3.80 - 5.20 10e6/uL    Hemoglobin 7.1 (L) 11.7 - 15.7 g/dL    Hematocrit 22.2 (L) 35.0 - 47.0 %    MCV 98 78 - 100 fL    MCH 31.4 26.5 - 33.0 pg    MCHC 32.0 31.5 - 36.5 g/dL    RDW 26.1 (H) 10.0 - 15.0 %    Platelet Count 464 (H) 150 - 450 10e3/uL    % Neutrophils 91 %    % Lymphocytes 4 %    % Monocytes 3 %    % Eosinophils 0 %    % Basophils 0 %    % Immature Granulocytes 2 %    NRBCs per 100 WBC 1 (H) <1 /100    Absolute Neutrophils 13.7 (H) 1.6 - 8.3 10e3/uL    Absolute Lymphocytes 0.7 (L) 0.8 - 5.3 10e3/uL    Absolute Monocytes 0.4 0.0 - 1.3 10e3/uL    Absolute Eosinophils 0.0 0.0 - 0.7 10e3/uL    Absolute Basophils 0.1 0.0 - 0.2 10e3/uL    Absolute Immature Granulocytes 0.3 <=0.4 10e3/uL    Absolute NRBCs 0.1 10e3/uL   Comprehensive metabolic panel   Result Value Ref Range    Sodium 137 136 - 145 mmol/L    Potassium 3.7 3.4 - 4.5 mmol/L    Creatinine 0.53 0.51 - 0.95 mg/dL    Urea Nitrogen 15.6 8.0 - 23.0 mg/dL    Chloride 103 98 - 107 mmol/L    Carbon Dioxide (CO2) 18 (L) 22 - 29 mmol/L    Anion Gap 16 (H) 7 - 15 mmol/L    Glucose 167 (H) 70 - 99 mg/dL    Calcium 9.2 8.8 - 10.2 mg/dL    Protein Total 6.2 (L) 6.4 - 8.3 g/dL    Albumin 4.0 3.5 - 5.2 g/dL    Bilirubin Total 0.8 <=1.2 mg/dL    Alkaline Phosphatase 86 35 - 104 U/L    AST 42 (H) 10 - 35 U/L    ALT 16 10 - 35 U/L    GFR Estimate >90 >60 mL/min/1.73m2   Troponin T, High Sensitivity   Result Value Ref Range    Troponin T, High Sensitivity 18 (H) <=14 ng/L   [place] Draw    Narrative    The following orders were created for panel order [place] Draw.  Procedure                               Abnormality         Status                     ---------                               -----------         ------                     Extra Red Top Tube[[id number]]                                                          Extra Green Top  (Lithium...[[id number]]                      Final result               Extra Purple Top Tube[[id number]]                                                       Extra Blood Bank Purple ...[[id number]]                                                   Please view results for these tests on the individual orders.   Extra Green Top (Lithium Heparin) Tube   Result Value Ref Range    Hold Specimen [facility]    XR Chest Port 1 View    Narrative    EXAM: XR CHEST PORT 1 VIEW  6/22/2022 6:39 PM      HISTORY: Intubation; chief complaint unwitnessed fall    COMPARISON: 5/12/2022    FINDINGS: Single view of the chest. ET tube 2.5 cm above the leo.  Enteric tube tip and sidehole in stomach. Trachea is midline. Cardiac  mediastinal silhouette is within normal limits. Left lower lobe  atelectasis suspected. No focal consolidative opacity, pleural  effusion, or pneumothorax. Mild degenerative changes in the  glenohumeral joint is partially visualized.      Impression    IMPRESSION:  ET tube is 2.5 cm above the leo. No acute consolidative airspace  disease. Left lower lobe atelectasis suspected.    I have personally reviewed the examination and initial interpretation  and I agree with the findings.    ROYA EDMOND MD         SYSTEM ID:  [id number]   INR   Result Value Ref Range    INR 1.08 0.85 - 1.15   Partial thromboplastin time   Result Value Ref Range    aPTT 27 22 - 38 Seconds   Fibrinogen activity   Result Value Ref Range    Fibrinogen Activity 267 170 - 490 mg/dL   ABO/Rh type and screen *Canceled*    Narrative    The following orders were created for panel order ABO/Rh type and screen.  Procedure                               Abnormality         Status                     ---------                               -----------         ------                       Please view results for these tests on the individual orders.   ABO/Rh type and screen    Narrative    The following orders were created for panel order ABO/Rh type  and screen.  Procedure                               Abnormality         Status                     ---------                               -----------         ------                     Adult Type and Screen[[id number]]                            Final result                 Please view results for these tests on the individual orders.   Adult Type and Screen   Result Value Ref Range    ABO/RH(D) AB NEG     Antibody Screen Negative Negative    SPECIMEN EXPIRATION DATE [id number]    Prepare red blood cells (unit)   Result Value Ref Range    CROSSMATCH Compatible     UNIT ABO/RH A Neg     Unit Number [id number]     Unit Status Ready     Blood Component Type Red Blood Cells     Product Code P0889X63     CODING SYSTEM [id number]     UNIT TYPE ISBT 0600    Prepare red blood cells (unit)   Result Value Ref Range    CROSSMATCH Compatible     UNIT ABO/RH A Neg     Unit Number [id number]     Unit Status Ready     Blood Component Type Red Blood Cells     Product Code D2292A28     CODING SYSTEM [id number]     UNIT TYPE ISBT 0600    Prepare red blood cells (unit)   Result Value Ref Range    UNIT ABO/RH O Neg     Unit Number [id number]     Unit Status Issued     Blood Component Type Red Blood Cells     Product Code W6145O00     ISSUE DATE AND TIME [id number]     CODING SYSTEM [id number]     UNIT TYPE ISBT 9500    Prepare red blood cells (unit)   Result Value Ref Range    UNIT ABO/RH O Neg     Unit Number [id number]     Unit Status Issued     Blood Component Type Red Blood Cells     Product Code V5140F16     ISSUE DATE AND TIME [id number]     CODING SYSTEM [id number]     UNIT TYPE ISBT 9500    -Intubation    Narrative    Krystle Sanchez MD     6/22/2022  8:34 PM  [facility]    -Intubation    Date/Time: 6/22/2022 8:29 PM  Performed by: Krystle Sanchez MD  Authorized by: Krystle Sanchez MD     Emergent condition/consent implied      PRE-PROCEDURE DETAILS     Patient  status:  Altered mental status    Mallampati score:  II    Pretreatment meds: etomidate.    Paralytics:  Rocuronium      PROCEDURE DETAILS     Preoxygenation:  Bag valve mask    CPR in progress: no      Intubation method:  Oral    Oral intubation technique:  Video-assisted    Laryngoscope blade:  Mac 4    Tube size (mm):  7.5    Tube type:  Cuffed    Number of attempts:  2    Ventilation between attempts: yes      Cricoid pressure: no      Tube visualized through cords: yes      PLACEMENT ASSESSMENT     ETT to lip:  26    ETT to teeth:  25    Breath sounds:  Reduced on right    Placement verification: chest rise, condensation, CXR verification,   direct visualization and colorimetric ETCO2 device      CXR findings:  ETT in proper place    PROCEDURE  Describe Procedure: Given dimished breath sounds on R, tube withdrawn 1cm   to 25 at the lip, 24 at the teeth   Arterial Panel POCT   Result Value Ref Range    pH Arterial POCT 7.33 (L) 7.35 - 7.45    pCO2 Arterial POCT 40 35 - 45 mm Hg    pO2 Arterial POCT 207 (H) 80 - 105 mm Hg    Bicarbonate Arterial POCT 21 21 - 28 mmol/L    Sodium POCT 138 133 - 144 mmol/L    Potassium POCT 4.1 3.5 - 5.0 mmol/L    Hemoglobin POCT 7.9 (L) 11.7 - 15.7 g/dL    Glucose Whole Blood POCT 131 (H) 70 - 99 mg/dL    Calcium, Ionized Whole Blood POCT 4.8 4.4 - 5.2 mg/dL    Base Excess/Deficit (+/-) POCT -4.3 -9.6 - 2.0 mmol/L    FIO2 POCT 50.0 %    Lactic Acid POCT 4.0 (HH) <=2.0 mmol/L   CT Head w/o Contrast    Narrative    CT HEAD W/O CONTRAST 6/22/2022 10:16 PM    History: s/p SDH evacuation   ICD-10:    Comparison: CT of the head dated 6/22/2022 at 1756    Technique: Using multidetector thin collimation helical acquisition  technique, axial, coronal and sagittal CT images from the skull base  to the vertex were obtained without intravenous contrast.   (topogram) image(s) also obtained and reviewed.    Findings: Postsurgical changes of left frontoparietal craniotomy with  subdural  hematoma evacuation. Left subdural drain in place with the  tip in the left anterior fossa. Postsurgical subdural pneumocephalus.  Heterogeneous fluid collection overlying the left cerebral convexity  measuring up to 19 mm. Stable appearance of the subdural hematoma  overlying the right cerebral convexity measuring up to 7 mm. Stable  appearance of the subdural hematoma overlying the anterior falx  measuring up to 6 mm. There is 8 mm rightward midline shift. Decreased  effacement of the left lateral ventricle. Largely stable medialization  of the left temporal uncus without herniation. Gray/white matter  differentiation in both cerebral hemispheres is preserved. . The basal  cisterns are clear.    The visualized portions of the paranasal sinuses and mastoid air cells  are clear.      Impression    Impression:  1. Postsurgical changes of left frontoparietal craniotomy and subdural  hematoma evacuation.  2. Decreased size of the heterogeneous fluid collection overlying the  left cerebral convexity.  3. Decreased rightward midline shift. Decreased effacement of the left  lateral ventricle.  4. Stable appearance of the subdural hematomas overlying the right  cerebral convexity and anterior falx.    I have personally reviewed the examination and initial interpretation  and I agree with the findings.    OSVALDO CONNOLLY MD         SYSTEM ID:  [id number]   Glucose by meter   Result Value Ref Range    GLUCOSE BY METER POCT 115 (H) 70 - 99 mg/dL   XR Chest Port 1 View    Narrative    EXAM: XR Chest 1 view 6/22/2022 10:35 PM      HISTORY: ETT positioning after transfer from OSH.    COMPARISON: Same day radiograph at 1828.     TECHNIQUE: Frontal view of the chest.    FINDINGS: ET tube is in the mid thoracic trachea. Enteric tube is  subdiaphragmatic with tip collimated from the study. Sidehole past the  GE junction.  Trachea is midline. Cardiac mediastinal silhouette is stable.  Decreased perihilar pulmonary opacities. No pleural  effusions. No  pneumothoraces.      Impression    IMPRESSION:   1. ET tube is in the mid thoracic trachea.  2. Decreased perihilar pulmonary opacities.    I have personally reviewed the examination and initial interpretation  and I agree with the findings.    ROYA EDMOND MD         SYSTEM ID:  [id number]   Comprehensive metabolic panel   Result Value Ref Range    Sodium 137 136 - 145 mmol/L    Potassium 4.5 3.4 - 4.5 mmol/L    Creatinine 0.54 0.51 - 0.95 mg/dL    Urea Nitrogen 15.8 8.0 - 23.0 mg/dL    Chloride 105 98 - 107 mmol/L    Carbon Dioxide (CO2) 21 (L) 22 - 29 mmol/L    Anion Gap 11 7 - 15 mmol/L    Glucose 120 (H) 70 - 99 mg/dL    Calcium 8.4 (L) 8.8 - 10.2 mg/dL    Protein Total 5.1 (L) 6.4 - 8.3 g/dL    Albumin 3.3 (L) 3.5 - 5.2 g/dL    Bilirubin Total 0.5 <=1.2 mg/dL    Alkaline Phosphatase 67 35 - 104 U/L    AST 34 10 - 35 U/L    ALT 13 10 - 35 U/L    GFR Estimate >90 >60 mL/min/1.73m2   CBC with Platelets & Differential    Narrative    The following orders were created for panel order CBC with Platelets & Differential.  Procedure                               Abnormality         Status                     ---------                               -----------         ------                     CBC with platelets and d...[[id number]]  Abnormal            Final result                 Please view results for these tests on the individual orders.   Magnesium   Result Value Ref Range    Magnesium 1.8 1.7 - 2.3 mg/dL   Phosphorus   Result Value Ref Range    Phosphorus 3.4 2.5 - 4.5 mg/dL   CBC with platelets and differential   Result Value Ref Range    WBC Count 17.3 (H) 4.0 - 11.0 10e3/uL    RBC Count 2.46 (L) 3.80 - 5.20 10e6/uL    Hemoglobin 7.7 (L) 11.7 - 15.7 g/dL    Hematocrit 23.7 (L) 35.0 - 47.0 %    MCV 96 78 - 100 fL    MCH 31.3 26.5 - 33.0 pg    MCHC 32.5 31.5 - 36.5 g/dL    RDW 21.3 (H) 10.0 - 15.0 %    Platelet Count 383 150 - 450 10e3/uL    % Neutrophils 88 %    % Lymphocytes 4 %    %  Monocytes 5 %    % Eosinophils 0 %    % Basophils 1 %    % Immature Granulocytes 2 %    NRBCs per 100 WBC 1 (H) <1 /100    Absolute Neutrophils 15.4 (H) 1.6 - 8.3 10e3/uL    Absolute Lymphocytes 0.7 (L) 0.8 - 5.3 10e3/uL    Absolute Monocytes 0.8 0.0 - 1.3 10e3/uL    Absolute Eosinophils 0.0 0.0 - 0.7 10e3/uL    Absolute Basophils 0.1 0.0 - 0.2 10e3/uL    Absolute Immature Granulocytes 0.3 <=0.4 10e3/uL    Absolute NRBCs 0.1 10e3/uL   Ionized Calcium   Result Value Ref Range    Calcium Ionized 4.7 4.4 - 5.2 mg/dL   Lactic acid whole blood   Result Value Ref Range    Lactic Acid 4.3 (HH) 0.7 - 2.0 mmol/L   Blood gas arterial   Result Value Ref Range    pH Arterial 7.37 7.35 - 7.45    pCO2 Arterial 39 35 - 45 mm Hg    pO2 Arterial 152 (H) 80 - 105 mm Hg    FIO2 40     Bicarbonate Arterial 22 21 - 28 mmol/L    Base Excess/Deficit (+/-) -3.0 -9.0 - 1.8 mmol/L   Asymptomatic COVID-19 Virus (Coronavirus) by PCR Nasopharyngeal    Specimen: Nasopharyngeal; Swab   Result Value Ref Range    SARS CoV2 PCR Negative Negative, Testing sent to reference lab. Results will be returned via unsolicited result    Narrative    Testing was performed using the Xpert Xpress SARS-CoV-2 Assay on the  Cepheid Gene-Xpert Instrument Systems. Additional information about  this Emergency Use Authorization (EUA) assay can be found via the Lab  Guide. This test should be ordered for the detection of SARS-CoV-2 in  individuals who meet SARS-CoV-2 clinical and/or epidemiological  criteria. Test performance is unknown in asymptomatic patients. This  test is for in vitro diagnostic use under the FDA EUA for  laboratories certified under CLIA to perform high complexity testing.  This test has not been FDA cleared or approved. A negative result  does not rule out the presence of PCR inhibitors in the specimen or  target RNA in concentration below the limit of detection for the  assay. The possibility of a false negative should be considered if  the  patient's recent exposure or clinical presentation suggests  COVID-19. This test was validated by the [facility]. This laboratory is certified under  the Clinical Laboratory Improvement Amendments of 1988 (CLIA-88) as  qualified to perform high complexity laboratory testing.     TEG with Heparinase   Result Value Ref Range    R (Time until clot forms) 6.1 5.0 - 10.0 Minute    K ( Time to Spec. clot strength) 1.9 1.0 - 3.0 Minute    Angle (Rate of Clot Growth) 69.5 53.0 - 72.0 Degrees    MA ( Maximum Clot Strength) 75.8 (H) 50.0 - 70.0 mm    CI (coagulation index) 2.1 -3.0 - 3.0    G (actual clot strength) 15.7 (H) 4.5 - 11.0 Kd/sc    LY30 (lysis at 30 minutes) 0.0 0.0 - 8.0 %    LY60 (lysis at 60 minutes) 0.0 0.0 - 15.0 %   INR   Result Value Ref Range    INR 1.28 (H) 0.85 - 1.15   Partial thromboplastin time   Result Value Ref Range    aPTT 71 (H) 22 - 38 Seconds   Fibrinogen activity   Result Value Ref Range    Fibrinogen Activity 197 170 - 490 mg/dL   UA with Microscopic reflex to Culture    Specimen: Urine, Vasquez Catheter   Result Value Ref Range    Color Urine Yellow Colorless, Straw, Light Yellow, Yellow    Appearance Urine Slightly Cloudy (A) Clear    Glucose Urine Negative Negative mg/dL    Bilirubin Urine Negative Negative    Ketones Urine Negative Negative mg/dL    Specific Gravity Urine 1.033 1.003 - 1.035    Blood Urine Negative Negative    pH Urine 5.5 5.0 - 7.0    Protein Albumin Urine 30  (A) Negative mg/dL    Urobilinogen Urine Normal Normal, 2.0 mg/dL    Nitrite Urine Negative Negative    Leukocyte Esterase Urine Small (A) Negative    Mucus Urine Present (A) None Seen /LPF    Uric Acid Crystals Urine Few (A) None Seen /HPF    RBC Urine 25 (H) <=2 /HPF    WBC Urine 66 (H) <=5 /HPF    Squamous Epithelials Urine 1 <=1 /HPF    Transitional Epithelials Urine <1 <=1 /HPF    Narrative    Urine Culture ordered based on laboratory criteria   Prepare red blood  cells (unit)   Result Value Ref Range    CROSSMATCH Compatible     UNIT ABO/RH AB Neg     Unit Number [id number]     Unit Status Issued     Blood Component Type Red Blood Cells     Product Code [id number]     CODING SYSTEM [id number]     UNIT TYPE ISBT 2800     ISSUE DATE AND TIME [id number]    Lactic acid whole blood   Result Value Ref Range    Lactic Acid 2.3 (H) 0.7 - 2.0 mmol/L   Basic metabolic panel   Result Value Ref Range    Creatinine 0.51 0.51 - 0.95 mg/dL    Sodium 135 (L) 136 - 145 mmol/L    Potassium 4.4 3.4 - 4.5 mmol/L    Urea Nitrogen 14.3 8.0 - 23.0 mg/dL    Chloride 104 98 - 107 mmol/L    Carbon Dioxide (CO2) 21 (L) 22 - 29 mmol/L    Anion Gap 10 7 - 15 mmol/L    Glucose 119 (H) 70 - 99 mg/dL    GFR Estimate >90 >60 mL/min/1.73m2    Calcium 8.5 (L) 8.8 - 10.2 mg/dL   CBC with platelets   Result Value Ref Range    WBC Count 14.1 (H) 4.0 - 11.0 10e3/uL    RBC Count 2.75 (L) 3.80 - 5.20 10e6/uL    Hemoglobin 8.7 (L) 11.7 - 15.7 g/dL    Hematocrit 25.9 (L) 35.0 - 47.0 %    MCV 94 78 - 100 fL    MCH 31.6 26.5 - 33.0 pg    MCHC 33.6 31.5 - 36.5 g/dL    RDW 19.9 (H) 10.0 - 15.0 %    Platelet Count 376 150 - 450 10e3/uL   Magnesium   Result Value Ref Range    Magnesium 2.6 (H) 1.7 - 2.3 mg/dL   Phosphorus   Result Value Ref Range    Phosphorus 3.6 2.5 - 4.5 mg/dL   CT Head w/o Contrast    Narrative    CT HEAD W/O CONTRAST 6/23/2022 4:57 AM    History: SDH, follow up   ICD-10:    Comparison: 6/22/22    Technique: Using multidetector thin collimation helical acquisition  technique, axial, coronal and sagittal CT images from the skull base  to the vertex were obtained without intravenous contrast.   (topogram) image(s) also obtained and reviewed.    Findings: Postsurgical changes of left frontal parietotemporal  craniotomy with drain in place. There is reduced size of the subdural  fluid collection measuring up to 10 mm, previously 15 mm deep to the  craniotomy . There is reduced mass effect on the  left cerebral.  Reduced left to right midline shift now measuring 7 mm, previously 13  mm when measured at a similar fashion. Similar appearance of subdural  hemorrhage along the anterior falx extending along the left frontal  lobe measuring up to 7 mm, previously 7 mm. Additional unchanged  subdural blood along the bilateral tentorial leaflets. Normalization  of left medial temporal lobe. Gray/white matter differentiation in  both cerebral hemispheres is preserved. Ventricles are proportionate  to the cerebral sulci. The basal cisterns are clear.    The bony calvaria and the bones of the skull base are normal. The  visualized portions of the paranasal sinuses and mastoid air cells are  clear.      Impression    Impression:    1. Reduced size of subdural fluid collection deep to the left  frontoparietal craniotomy.  2. Reduced mass effect and left to right midline shift now measuring 7  mm previously 13 mm when measured in a similar fashion.  3. Similar appearance of subdural hemorrhage along the anterior falx  and extending along the right anterior cranial fossa.  4. Unchanged appearance of small subdural hemorrhage along the  bilateral tentorial leaflets.     I have personally reviewed the examination and initial interpretation  and I agree with the findings.    OSVALDO CONNOLLY MD         SYSTEM ID:  [id number]       All relevant imaging and laboratory values personally reviewed.

## 2022-06-23 NOTE — OP NOTE
Procedure Date: 06/22/2022    PREOPERATIVE DIAGNOSIS:  Left acute subdural hematoma.    POSTOPERATIVE DIAGNOSIS:  Left acute subdural hematoma.    PROCEDURE PERFORMED:  Left craniotomy for subdural hematoma evacuation.    ATTENDING SURGEON:  Ayden Harris MD    RESIDENT SURGEONS:    1.  Uziel Rice MD  2.  Christopher Martinez MD  3.  Love Ball MD    ANESTHESIA:  General.    ESTIMATED BLOOD LOSS:  200 mL.    INTRAOPERATIVE FINDINGS:  Acute cortical bleed and contused brain.  Hematoma was maximally evacuated.  Modest reexpansion of the brain without return of normal brain pulsations.    INDICATIONS FOR PROCEDURE:  Ms. Benítez is a 76-year-old female who arrived to [facility] via EMS after an unwitnessed fall at home.  Shortly after arriving, the patient was complaining of a significant headache and nausea, and then her right pupil dilated.  She was no longer protecting her airway, so she was sedated and intubated.  Her right eye pupil then returned to a normal caliber.  A head CT demonstrated a left acute subdural approximately 2.3 cm thick with 12 mm of rightward midline shift.  Given the patient's presenting symptoms as well as imaging findings, she was indicated for the aforementioned procedure.  Emergency consent was obtained from the patient's , who was in the Emergency Department with her.  The  described the death of a family member due to malignant hyperthermia.  Given this, our Anesthesia colleagues took the appropriate precautions.    DESCRIPTION OF PROCEDURE:  The patient was marked and family consented in the emergency room.  She was brought directly to the operating room and transferred to the operative table.  Head of bed was already 180 degrees away from our Anesthesia colleagues, and the horseshoe head yen was attached.  A shoulder bump was placed under her left scapula, and her head was turned to the right.  We then shaved the left side of her head using electrical clippers.  She  was then prepped and draped in normal sterile fashion.  Our Anesthesia colleagues were obtaining central access and arterial access.  Local anesthetic 10 mL with epinephrine was injected along the intended incision line, which was planned as a reverse question negrita incision.  An emergency timeout was called, confirming the patient's identity as well as laterality of procedure.  A #10 blade was used to open the skin.  Monopolar cautery was used to carry the dissection down to the bone.  Christ clips were used to maintain hemostasis as able.  Periosteal elevator was then used to underscore the temporalis muscle and detach it from the superior temporal line.  Monopolar cautery was then used to advance our flap forward to the keyhole.   was used to create a tory hole at the keyhole, above the root of the zygoma and posterior within our incision.  The dura was then stripped using a Troy.  B1 with footplate was then used to turn the craniotomy.  The bone flap was lifted and set aside.  The dura was then opened in a cruciate fashion using a #15 blade initially and then scissors.  With the leaflets in the open position, we then evacuated acute subdural hematoma from the entire convexity.  We used irrigation to help mobilize the hematoma fragments.  A bleeding cortical vein was noted along the left frontal lobe on the convexity.  This appeared to be the source of the hematoma.  Given the briskness of the flow, we coagulated this using bipolar cautery and then laid down Surgicel.  We continued to move through all quadrants and evacuate clot from around the surfaces of the brain.  A large draining surface vein was noted along the left anterior frontal lobe.  We removed some clot from around this large vein but were not too aggressive as not to cause worsening bleed.  We removed additional hematoma from along the midline.  We then gently depressed on the brain using a brain ribbon and evacuated additional clot  posteriorly.  Again, we could see some additional clot along the deep curvature of the skull; however, we did not go after this due to the risk of inciting new bleeding.  It was not causing significant mass effect, so we left that alone.  With the hematoma under control, we then obtained hemostasis along the skin and dural edges.  Dural tack-up stitches were then placed.  We then also used hemostatic agent under the bone surface to maintain hemostasis.  The dural leaflets were coagulated where there was bleeding.  We then returned to the subdural space and copiously irrigated over the surface of the brain again.  Irrigant was now returning clear as there was no longer any further run-in blood.  A 10-Filipino round VIOLA drain was then tunneled out through this opening and placed in the subdural space.  The dural leaflets were then reflected closed and held in such a fashion using Nurolon sutures.  Duragen was then overlaid on the dural leaflets.  The bone flap was cleaned and plated.  Bone flap was then returned to the native skull and secured using titanium fixation plates and screws.  The wound was copiously irrigated.     We then turned our attention to closure.  The galea was reapproximated using 3-0 undyed Vicryls in an inverted interrupted fashion.  The drain exit site was secured using a 3-0 nylon.  Skin was closed using staples.  We then cleaned the incision with a wet followed by dry sponge.  ChloraPrep was applied to the incision.  The wound was then dressed with Primapore dressings.  The drapes were taken down, and the head of bed was returned.  The patient was then transferred to an ICU bed and taken to the ICU scanner for a postoperative head CT and then directly to the ICU.  She remained intubated.  All counts were correct at the end of the procedure x 2.  Dr. Ayden Harris was present throughout all critical portions of the case and otherwise immediately available.        Ayden Harris MD    As  Dictated by ADI SOLANO MD        D: 2022   T: 2022   MT: LOR    Name:     ZAHEER WALLIS  MRN:      [id number]        Account:        [id number]   :      1945           Procedure Date: 2022     Document: [id number]

## 2022-06-23 NOTE — ANESTHESIA CARE TRANSFER NOTE
Patient: Louise Benítez    Procedure: Procedure(s):  CRANIOTOMY LEFT for hematoma       Diagnosis: Subdural hematoma (H) [S06.5X9A]  Diagnosis Additional Information: No value filed.    Anesthesia Type:   General     Note:    Oropharynx: ventilatory support  Level of Consciousness: iatrogenic sedation  Patient oxygen source: ETT w/ 10L bag.    Independent Airway: airway patency not satisfactory and stable  Dentition: dentition unchanged  Vital Signs Stable: post-procedure vital signs reviewed and stable  Report to RN Given: handoff report given  Patient transferred to: ICU  Comments: Report given to bedside RN and resident. Discussed Neuro team plan of repeating hemoglobin and transfusing if <8. Neuro team will also follow up on TEG that was ordered.   ICU Handoff: Call for PAUSE to initiate/utilize ICU HANDOFF, Identified Patient, Identified Responsible Provider, Reviewed the Pertinent Medical History, Discussed Surgical Course, Reviewed Intra-OP Anesthesia Management and Issues during Anesthesia, Set Expectations for Post Procedure Period and Allowed Opportunity for Questions and Acknowledgement of Understanding      Vitals:  Vitals Value Taken Time   /52    Temp 35  C (95  F) 06/22/22 2240   Pulse 50 06/22/22 2240   Resp 16 06/22/22 2240   SpO2 100 % 06/22/22 2240   Vitals shown include unvalidated device data.    Electronically Signed By: Julio Romero MD  June 22, 2022  10:41 PM

## 2022-06-23 NOTE — ANESTHESIA PROCEDURE NOTES
Arterial Line Procedure Note    Pre-Procedure   Staff -        Anesthesiologist:  Juan Diego Jones MD       Resident/Fellow: Kristin Ramsay MD       Performed By: resident       Location: OR       Pre-Anesthestic Checklist: patient identified, IV checked, risks and benefits discussed, informed consent, monitors and equipment checked, pre-op evaluation and at physician/surgeon's request  Timeout:       Correct Patient: Yes        Correct Procedure: Yes        Correct Site: Yes        Correct Position: Yes   Line Placement:   This line was placed Post Induction  Procedure   Procedure: arterial line       Laterality: right       Insertion Site: radial.  Sterile Prep        Standard elements of sterile barrier followed       Skin prep: Chloraprep  Insertion/Injection        Technique: ultrasound guided and Seldinger Technique        1. Ultrasound was used to evaluate the access site.       2. Artery evaluated via ultrasound for patency/adequacy.       3. Using real-time ultrasound the needle/catheter was observed entering the artery/vein.       Catheter Type/Size: 20 G, 12 cm  Narrative         Secured by: suture       Tegaderm dressing used.       Complications: None apparent,        Arterial waveform: Yes        IBP within 10% of NIBP: Yes   Comments:  Multiple attempts for the a-line. First attempt, R radial was successful and easy but it was accidentally pulled out. Several other attempts were made in both R and L radial and femoral arteries however the vessels were calcified and the wire would not pass without force so they were aborted. The last attempt was a proximal radial a-line and the wire passed with ease.

## 2022-06-23 NOTE — CARE PLAN
Major Shift Events:  Pt does not follow commands.  Moves all extremities spontaneously, L>R. Labetelol 20mg X 2 for SBP above goal. Extubated at 1600 to 5LNC, down to 4LNC presently.  OGT pulled when pt extubated.  Vasquez pulled, purewick in place.  WOC changed LLE dressing.  Reinforcement needed for surgical dressing d/t bloody drainage.  Team aware.  Plan: Monitor neuro and respiratory status.    For vital signs and complete assessments, please see documentation flowsheets.

## 2022-06-24 NOTE — PLAN OF CARE
Goal Outcome Evaluation:    Major Shift Events:  Insertion of NG tube and Head CT.  Neuro: Patient awakes with sternal-rub and voice, open eyes and goes back to sleep until 6am (unable to open eyes in deep sleep)  Pupil equal and reactive.  Withdrawals from stimuli.  Obtunded on uppers, and has soft mitts for pulling lines and drains.  Resp: On NC 4L, and LS clear uppers and diminished lowers.  Needing frequent suctioning at the back of the throat with thick cloudy secretions.   CVS: Trying to maintain SBP<140, with hydralazine and labetalol IV-push.  Patient SR in 70s-80s.  A-line on the R-forearm with good waveform.  GI/: NPO, NG placed #60 (L-nares) and administrating meds through. Pure-wick for urine, passing flatus no BM.  Skin: Patient had wounds on both arms from the fall she sustained at TCU. WOC is consulted,  L-head dressing was changed by Neuro-surg for oozing X2 and dressing is C/D/I.  Plan: Will continue to monitor and notify MD of status changes.  For vital signs and complete assessments, please see documentation flowsheets.

## 2022-06-24 NOTE — PROGRESS NOTES
CLINICAL NUTRITION SERVICES - BRIEF NOTE    Nutrition Prescription    RECOMMENDATIONS FOR MDs/PROVIDERS TO ORDER:  - Total daily fluids/adjustments per MD   - Electrolyte replacement per protocol as indicated      Recommendations already ordered by Registered Dietitian (RD):  - Initiate via current access/NGT Shonda Li Peptide 1.5 @ 45 mL/hr (1080 mL/day) to provide 1661 kcal (28 kcal/kg), 80 g protein (1.4 g/kg), 149 g CHO, 10 g fiber, 83 g fat (40% from MCTs), and 756 mL free water daily   - Prosource x 2 = 1741 kcals (30 kcals/kg) and 102 gm PRO (1.7 gm/kg)  -  Initiate @ 15 ml/hr and advance by 10 ml q4hr as tolerated  - Do not start or advance until lytes (Mg++,K+) WNL and phos>2.0  - Recommend 30-60 ml q4hr fluid flushes for tube patency. Additional fluids and/or adjustments per MD.    - Order multivitamin/mineral (15 ml/day via FT) to help ensure micronutrient needs being met with suspected hypermetabolic demands and potential interruptions to TF infusions.  - Elevated HOB with gastric feeds     Future/Additional Recommendations:  - initiation/tolerance of EN via current access/NGT     Pt extubated; failed SLP. Plan to initiate EN via current access/NGT. Reviewed labs, lytes WNL    Nutrition Progress Note - f/u for progress towards previous nutrition POC (see previous reassessment for details)     Jyoti Xavier RD, LD, [facility]  Neuro ICU  Pager: [phone]

## 2022-06-24 NOTE — PROGRESS NOTES
[facility], [place]   06/24/2022  Neurosurgery Progress Note:    Assessment:  Ms. Benítez is a 76-year-old female with PMHx myelodysplastic syndrome, seronegative RA, chronic microcytic anemia, PAD, and recent L SDH (5/12/2022) who arrived to [facility] via EMS after an unwitnessed fall at home.  Shortly after arriving, the patient was complaining of a significant headache and nausea, and then her right pupil dilated.  She was no longer protecting her airway, so she was sedated and intubated.  Her right eye pupil then returned to a normal caliber.  A head CT demonstrated a left acute subdural approximately 2.3 cm thick with 12 mm of rightward midline shift.  She was taken to the OR on 6/22 for emergent L craniotomy for SDH evacuation. She was extubated on POD1.     Other diagnoses include:  #Brain compression  #Open evacuation of hematoma on day of admission   #Hyponatremia      Plan:  - Serial neuro exams  - Pain control  - HOB > 30 degrees  - VIOLA to thumbprint suction  - Keppra 7d  - Bowel regimen  - PRN antiemetics  - NG tube clamped  - IVF while NPO  - SLP assessment   - PT/OT  - SCDs for DVT proph    -----------------------------------  Emily Ball MD  Neurosurgery PGY2    Please contact neurosurgery resident on call with questions.    Dial * * *[phone], enter [phone] when prompted.=  -----------------------------------    Interval History: Extubated yesterday. NG placed for enteral access. Low urine output yesterday and today. Worsened neurological exam this morning with copious respiratory secretions. Head CT obtained, appears stable. Chest XR and ABG ordered. Will consider EEG and need for reintubation    Objective:   Temp:  [98.3  F (36.8  C)-100.8  F (38.2  C)] 98.3  F (36.8  C)  Pulse:  [65-92] 72  Resp:  [9-19] 17  BP: (122-159)/(47-79) 159/49  MAP:  [62 mmHg-101 mmHg] 67 mmHg  Arterial Line BP: (122-172)/(32-68) 122/42  FiO2 (%):  [40 %] 40 %  SpO2:  [94 %-100 %] 97 %  I/O last 3  completed shifts:  In: 3052.75 [I.V.:2672.75; NG/GT:30]  Out: 942 [Urine:695; Drains:247]    Gen: Appears comfortable, NAD  Wound: dressing clean, dry, intact, VIOLA with serosanguinous output  Neurologic:  No eye opening, not following commands  No verbal output  PEERL, corneal and cough present  Spontaneous movements in all 4 extremities  LUE purposeful and antigravity  RUE purposeful and antigravity  BLE antigravity when stimulated        LABS:  Recent Labs   Lab 06/23/22  0424 06/22/22 2257 06/22/22 2232 06/22/22 2057 06/22/22  1818   * 137  --  138 137   POTASSIUM 4.4 4.5  --  4.1 3.7   CHLORIDE 104 105  --   --  103   CO2 21* 21*  --   --  18*   ANIONGAP 10 11  --   --  16*   * 120* 115* 131* 167*   BUN 14.3 15.8  --   --  15.6   CR 0.51 0.54  --   --  0.53   MENDEZ 8.5* 8.4*  --   --  9.2       Recent Labs   Lab 06/23/22  0424   WBC 14.1*   RBC 2.75*   HGB 8.7*   HCT 25.9*   MCV 94   MCH 31.6   MCHC 33.6   RDW 19.9*          IMAGING:  Recent Results (from the past 24 hour(s))   CT Head w/o Contrast    Narrative    CT HEAD W/O CONTRAST 6/23/2022 4:57 AM    History: SDH, follow up   ICD-10:    Comparison: 6/22/22    Technique: Using multidetector thin collimation helical acquisition  technique, axial, coronal and sagittal CT images from the skull base  to the vertex were obtained without intravenous contrast.   (topogram) image(s) also obtained and reviewed.    Findings: Postsurgical changes of left frontal parietotemporal  craniotomy with drain in place. There is reduced size of the subdural  fluid collection measuring up to 10 mm, previously 15 mm deep to the  craniotomy . There is reduced mass effect on the left cerebral.  Reduced left to right midline shift now measuring 7 mm, previously 13  mm when measured at a similar fashion. Similar appearance of subdural  hemorrhage along the anterior falx extending along the left frontal  lobe measuring up to 7 mm, previously 7 mm. Additional  unchanged  subdural blood along the bilateral tentorial leaflets. Normalization  of left medial temporal lobe. Gray/white matter differentiation in  both cerebral hemispheres is preserved. Ventricles are proportionate  to the cerebral sulci. The basal cisterns are clear.    The bony calvaria and the bones of the skull base are normal. The  visualized portions of the paranasal sinuses and mastoid air cells are  clear.      Impression    Impression:    1. Reduced size of subdural fluid collection deep to the left  frontoparietal craniotomy.  2. Reduced mass effect and left to right midline shift now measuring 7  mm previously 13 mm when measured in a similar fashion.  3. Similar appearance of subdural hemorrhage along the anterior falx  and extending along the right anterior cranial fossa.  4. Unchanged appearance of small subdural hemorrhage along the  bilateral tentorial leaflets.     I have personally reviewed the examination and initial interpretation  and I agree with the findings.    OSVALDO CONNOLLY MD         SYSTEM ID:  [id number]   XR Abdomen Port 1 View    Narrative    Exam: XR ABDOMEN PORT 1 VIEWS, 6/23/2022 8:50 PM    Indication: Ng tube placement    Comparison: Radiograph of the chest dated 6/22/2022    Findings:   Enteric tube is subdiaphragmatic with tip and sidehole projecting over  the gastric lumen. No obstructive bowel gas pattern. Bibasilar  pulmonary opacities.      Impression    Impression:   1. Enteric tube is subdiaphragmatic with tip and sidehole projecting  over the expected location of the stomach  2. Increased bibasilar pulmonary opacities.    I have personally reviewed the examination and initial interpretation  and I agree with the findings.    DARSHAN DIXON MD         SYSTEM ID:  [id number]

## 2022-06-24 NOTE — PLAN OF CARE
SLP: Clinical swallow eval orders received. Pt is now extubated, but remains obtunded per nursing staff. Pt is not appropriate for participation in swallow eval; will complete orders. Discussed with RN and MD. Please re-consult as appropriate.

## 2022-06-24 NOTE — CARE PLAN
Major Shift Events:  EEG added.  20mg Labetelol X 2.  2LNC.  NT suctioning to help pt with thick secretions.  No BM.  Voiding via purewick.  Still having bloody drainage from left temporal portion of incision.  Reinforced as needed during the day.  Family updated at bedside/phone by NSG resident.  Plan: Monitor neuro and respiratory status overnight.  For vital signs and complete assessments, please see documentation flowsheets.

## 2022-06-24 NOTE — PROGRESS NOTES
Neurocritical Care Progress Note    Reason for critical care admission: Bilateral SDH  Admitting Team: Neurosurgery  Date of Service:  06/24/2022  Date of Admission:  6/22/2022  Hospital Day: 3    Assessment/Plan  Louise Benítez is a 76 year old female w/ PMHx myelodysplastic syndrome, seronegative RA, chronic microcytic anemia, PAD, and recent L SDH (5/12/2022) who was admitted from her TCU on 6/22/2022 after an unwitnessed fall. Was subsequently found to have an acute on chronic L SDH, a new acute R SDH, and increased midline shift w/ trace L uncal herniation now s/p L craniotomy w/ hematoma evacuation    24 hour events: Extubated at 1600, NCC attending at bedside, Tmax 38.2, multiple doses of labetalol and hydralazine given overnight for BP.      Neuro  #Acute on Chronic L SDH + New Acute R SDH w/ increased rightward midline shift and trace L uncal herniation s/p L craniotomy (6/22) w/ hematoma evacuation  #Recent L SDH 2/2 mechanical fall not requiring intervention (hospitalized 5/12-5/20/2022)  -Neurochecks q2h  -HOB > 30   -sBP goal < 140 mmHg; MAP > 65 mmHg  -PRN Hydralazine/Labetalol  -Keppra 750mg BID for 7d (ends 6/28)  -Holding all anti-platelets and anti-coagulation given active bleed  -PT/OT/SLP  -vEEG ordered by nsgy      #Analgesics & sedation  -Tylenol 650mg Q4h prn  -Oxycodone 5-10mg Q4h prn   -PTA Baclofen 5mg at bedtime prn for muscle spasms     CV  #HTN on admission  -cardiac monitoring  -sBP goal < 140 mmHg  -PRN Hydralazine/Labetalol     #Bradycardia, likely 2/2 large dose of propofol   -cardiac monitoring  -echo ordered for 06/24      #PAD  No statin, recent LDL 10 (5/13/2022)     Resp  #former tobacco Use  #acute hypoxic respiratory failure  -continuous pulse ox  -maintain O2 saturations greater than 92%  -currently requiring 4L NC  -pCXR  -received 20 mg IV lasix for fluffy pCXR     GI  #hx GERD  Diet: NGT, TF starting today  Last BM: PTA  GI prophylaxis: not indicated  Bowel regimen: PRN  senna-docusate and Miralax     Renal/  #lactic acidosis - resolved  #hyponatremia  -Repeat lactate  -Daily BMP  -IV fluids: stopped  -Electrolyte replacement protocol     Endo  #ANNALEE  -Hgb A1c  -Monitor glucose levels     Rheum  #Seronegative Rheumatoid Arthritis  -continue PTA Plaquenil 400mg at bedtime      Heme/Onc  #Chronic Microcytic Anemia, likely 2/2 myelodysplastic syndrome  Baseline Hgb ~8.0  -s/p 2u RBC (6/22) for Hgb 7.1 prior to craniotomy  -PTA Darbepoetin Inj 60mg Q3wks (last dose 6/14; next due 7/5)  -PTA Vit B12 500mcg daily  -Daily CBC  -Hgb goal > 8 in acute period post-surgery, plt goal > 50k  -Transfuse to meet Hgb and plt goals     #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  -follow up as out patient [facility]      ID  #UTI  -IV Ceftriaxone 1g daily for 7d (ends 6/29)  -UCx pending  -Daily CBC  -Follow temperature curve     MSK  #Chronic Back Pain w/ lumbar spondylosis and hx of lumbar compression fracture  #Polymyalgia Rheumatica w/hx of GCA  #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, 2003)  -PTA Baclofen 5 mg at bedtime prn for muscle spasm     #Osteoporosis  -Hold PTA Denosumab Inj 60mg  -Hold Ca/Vit D3     #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 Upon evaluation, this patient had a high probability of imminent or life-threatening deterioration due to SDH s/p crani, acute hypoxic respiratory failure, which required my direct attention, intervention, and  personal management. Greater than 50% of my time was spent at the bedside counseling the patient and/or coordinating care regarding services listed in this note.    The patient was seen and discussed with the NCC attending, Dr. Lucy Reynolds.    JOSÉ ANTONIO Garcia, CNP  Neurocritical Care  *[phone]  Text Page    24 Hour Vital Signs Summary:  Temp: 98.3  F (36.8  C) Temp  Min: 98.3  F (36.8  C)  Max: 100.8  F (38.2  C)  Resp: 17 Resp  Min: 9  Max: 19  SpO2: 97 % SpO2  Min: 94 %  Max: 100 %  Pulse: 72 Pulse  Min: 65  Max: 92    No data recorded  BP: (!) 159/49 Systolic (24hrs), Av , Min:122 , Max:159   Diastolic (24hrs), Av, Min:47, Max:79    Respiratory monitoring:   Vent Mode: CPAP/PS  (Continuous positive airway pressure with Pressure Support)  FiO2 (%): 40 %  Resp Rate (Set): 12 breaths/min  Tidal Volume (Set, mL): 390 mL  PEEP (cm H2O): 5 cmH2O  Pressure Support (cm H2O): 5 cmH2O  Resp: 17    I/O last 3 completed shifts:  In: 3052.75 [I.V.:2672.75; NG/GT:30]  Out: 942 [Urine:695; Drains:247]    Current Medications:    cefTRIAXone  1 g Intravenous Q24H     cyanocobalamin  500 mcg Oral Daily     famotidine  20 mg Oral or Feeding Tube BID     hydroxychloroquine  400 mg Oral or Feeding Tube At Bedtime     levETIRAcetam  750 mg Oral or Feeding Tube BID     sodium chloride (PF)  3 mL Intracatheter Q8H       PRN Medications:  [START ON 2022] acetaminophen, baclofen, bisacodyl, hydrALAZINE, labetalol, melatonin, naloxone **OR** naloxone **OR** naloxone **OR** naloxone, ondansetron **OR** ondansetron, oxyCODONE, polyethylene glycol, senna-docusate, sodium chloride (PF)    Infusions:    sodium chloride 75 mL/hr at 22 2307       No Known Allergies    Physical Examination:  Vitals: BP (!) 159/49   Pulse 72   Temp 98.3  F (36.8  C) (Axillary)   Resp 17   Wt 71.7 kg (158 lb 1.1 oz)   SpO2 97%   BMI 27.13 kg/m    General: Adult female patient, lying in bed, critically-ill, family at  bedside  HEENT: Surgical incision CDI, no icterus, oral cavity/oropharynx pink and moist, edema noted to left orbit   Cardiac: RRR per monitor, occasional ectopy   Chest: unlabored, expansion symmetric, no retractions or use of accessory muscles, synchronous with ventilator  Abdomen: Soft, non-distended  Extremities: Warm, no edema, distal pulses +2, well perfused  Skin: No rash or lesion  Psych: Calm  Neuro: Lethargic, unable to assess orientation, exam limited by ETT, PERRL +2 mm, face symmetric, movements intentional, no abnormal movements, moves all extremities, gait and coordination deferred.     NIHSS  1a. Level of Consciousness 1-->Not alert, but arousable by minor stimulation to obey, answer, or respond   1b. LOC Questions 2-->Answers neither question correctly   1c. LOC Commands 1-->Performs one task correctly   2.   Best Gaze 0-->Normal   3.   Visual 0-->No visual loss   4.   Facial Palsy 0-->Normal symmetrical movements   5a. Motor Arm, Left 1-->Drift, limb holds 90 (or 45) degrees, but drifts down before full 10 seconds, does not hit bed or other support   5b. Motor Arm, Right 1-->Drift, limb holds 90 (or 45) degrees, but drifts down before full 10 secs, does not hit bed or other support   6a. Motor Leg, Left 1-->Drift, leg falls by the end of the 5-sec period but does not hit bed   6b. Motor Leg, right 1-->Drift, leg falls by the end of the 5-sec period but does not hit bed   7.   Limb Ataxia 0-->Absent   8.   Sensory 0-->Normal, no sensory loss   9.   Best Language 0-->No aphasia, normal   10. Dysarthria (UN) Intubated or other physical barrier   11. Extinction and Inattention  0-->No abnormality   Total 8 (06/23/22 1058)       Labs and imaging:    Results for orders placed or performed during the hospital encounter of 06/22/22 (from the past 24 hour(s))   Lactic acid whole blood   Result Value Ref Range    Lactic Acid 2.3 (H) 0.7 - 2.0 mmol/L   Basic metabolic panel   Result Value Ref Range     Creatinine 0.51 0.51 - 0.95 mg/dL    Sodium 135 (L) 136 - 145 mmol/L    Potassium 4.4 3.4 - 4.5 mmol/L    Urea Nitrogen 14.3 8.0 - 23.0 mg/dL    Chloride 104 98 - 107 mmol/L    Carbon Dioxide (CO2) 21 (L) 22 - 29 mmol/L    Anion Gap 10 7 - 15 mmol/L    Glucose 119 (H) 70 - 99 mg/dL    GFR Estimate >90 >60 mL/min/1.73m2    Calcium 8.5 (L) 8.8 - 10.2 mg/dL   CBC with platelets   Result Value Ref Range    WBC Count 14.1 (H) 4.0 - 11.0 10e3/uL    RBC Count 2.75 (L) 3.80 - 5.20 10e6/uL    Hemoglobin 8.7 (L) 11.7 - 15.7 g/dL    Hematocrit 25.9 (L) 35.0 - 47.0 %    MCV 94 78 - 100 fL    MCH 31.6 26.5 - 33.0 pg    MCHC 33.6 31.5 - 36.5 g/dL    RDW 19.9 (H) 10.0 - 15.0 %    Platelet Count 376 150 - 450 10e3/uL   Magnesium   Result Value Ref Range    Magnesium 2.6 (H) 1.7 - 2.3 mg/dL   Phosphorus   Result Value Ref Range    Phosphorus 3.6 2.5 - 4.5 mg/dL   CT Head w/o Contrast    Narrative    CT HEAD W/O CONTRAST 6/23/2022 4:57 AM    History: SDH, follow up   ICD-10:    Comparison: 6/22/22    Technique: Using multidetector thin collimation helical acquisition  technique, axial, coronal and sagittal CT images from the skull base  to the vertex were obtained without intravenous contrast.   (topogram) image(s) also obtained and reviewed.    Findings: Postsurgical changes of left frontal parietotemporal  craniotomy with drain in place. There is reduced size of the subdural  fluid collection measuring up to 10 mm, previously 15 mm deep to the  craniotomy . There is reduced mass effect on the left cerebral.  Reduced left to right midline shift now measuring 7 mm, previously 13  mm when measured at a similar fashion. Similar appearance of subdural  hemorrhage along the anterior falx extending along the left frontal  lobe measuring up to 7 mm, previously 7 mm. Additional unchanged  subdural blood along the bilateral tentorial leaflets. Normalization  of left medial temporal lobe. Gray/white matter differentiation in  both  cerebral hemispheres is preserved. Ventricles are proportionate  to the cerebral sulci. The basal cisterns are clear.    The bony calvaria and the bones of the skull base are normal. The  visualized portions of the paranasal sinuses and mastoid air cells are  clear.      Impression    Impression:    1. Reduced size of subdural fluid collection deep to the left  frontoparietal craniotomy.  2. Reduced mass effect and left to right midline shift now measuring 7  mm previously 13 mm when measured in a similar fashion.  3. Similar appearance of subdural hemorrhage along the anterior falx  and extending along the right anterior cranial fossa.  4. Unchanged appearance of small subdural hemorrhage along the  bilateral tentorial leaflets.     I have personally reviewed the examination and initial interpretation  and I agree with the findings.    OSVALDO CONNOLLY MD         SYSTEM ID:  [id number]   Lactic acid whole blood   Result Value Ref Range    Lactic Acid 1.6 0.7 - 2.0 mmol/L   XR Abdomen Port 1 View    Narrative    Exam: XR ABDOMEN PORT 1 VIEWS, 6/23/2022 8:50 PM    Indication: Ng tube placement    Comparison: Radiograph of the chest dated 6/22/2022    Findings:   Enteric tube is subdiaphragmatic with tip and sidehole projecting over  the gastric lumen. No obstructive bowel gas pattern. Bibasilar  pulmonary opacities.      Impression    Impression:   1. Enteric tube is subdiaphragmatic with tip and sidehole projecting  over the expected location of the stomach  2. Increased bibasilar pulmonary opacities.    I have personally reviewed the examination and initial interpretation  and I agree with the findings.    DARSHAN DIXON MD         SYSTEM ID:  [id number]       All relevant imaging and laboratory values personally reviewed.

## 2022-06-25 NOTE — PROGRESS NOTES
Neurocritical Care Progress Note    Reason for critical care admission: Bilateral SDH  Admitting Team: Neurosurgery  Date of Service:  06/25/2022  Date of Admission:  6/22/2022  Hospital Day: 4    Assessment/Plan  Louise Benítez is a 76 year old female w/ PMHx myelodysplastic syndrome, seronegative RA, chronic microcytic anemia, PAD, and recent L SDH (5/12/2022) who was admitted from her TCU on 6/22/2022 after an unwitnessed fall. Was subsequently found to have an acute on chronic L SDH, a new acute R SDH, and increased midline shift w/ trace L uncal herniation now s/p L craniotomy w/ hematoma evacuation    24 hour events: Concern for aspiration over night, Xray okay.      Neuro  #Acute on Chronic L SDH + New Acute R SDH w/ increased rightward midline shift and trace L uncal herniation s/p L craniotomy (6/22) w/ hematoma evacuation  #Recent L SDH 2/2 mechanical fall not requiring intervention (hospitalized 5/12-5/20/2022)  -Neurochecks q2h  -HOB > 30   -sBP goal < 140 mmHg; MAP > 65 mmHg  -PRN Hydralazine/Labetalol  -Keppra 750mg BID for 7d (ends 6/28)  -Holding all anti-platelets and anti-coagulation given active bleed  -PT/OT/SLP  -vEEG ordered by nsgy      #Analgesics & sedation  -Tylenol 650mg Q4h prn  -Oxycodone 5-10mg Q4h prn   -PTA Baclofen 5mg at bedtime prn for muscle spasms     CV  #HTN on admission  -cardiac monitoring  -sBP goal < 140 mmHg  -PRN Hydralazine/Labetalol     #Bradycardia, likely 2/2 large dose of propofol   -cardiac monitoring  -echo 6/24: EF 60-65%, normal RV, no significant valvular abnormalities       #PAD  No statin, recent LDL 10 (5/13/2022)     Resp  #former tobacco Use  #acute hypoxic respiratory failure  -continuous pulse ox  -maintain O2 saturations greater than 92%  -currently requiring 4L NC  -pCXR 6/24: slightly decreased right lower lobe opacity, slight increase in dense retrocardiac consolidation which may represent atelectasis versus infection/aspiration   -on ceftriaxone  for UTI      GI  #hx GERD  Diet: NGT, TF off after concern for aspiration   Last BM: PTA  GI prophylaxis: not indicated  Bowel regimen: PRN senna-docusate and Miralax     Renal/  #lactic acidosis - resolved  #hyponatremia  -Repeat lactate  -Daily BMP  -IV fluids: SL  -Electrolyte replacement protocol     Endo  #ANNALEE  -Hgb A1c  -Monitor glucose levels     Rheum  #Seronegative Rheumatoid Arthritis  -continue PTA Plaquenil 400mg at bedtime      Heme/Onc  #Chronic Microcytic Anemia, likely 2/2 myelodysplastic syndrome  Baseline Hgb ~8.0  -s/p 2u RBC (6/22) for Hgb 7.1 prior to craniotomy  -PTA Darbepoetin Inj 60mg Q3wks (last dose 6/14; next due 7/5)  -PTA Vit B12 500mcg daily  -Daily CBC  -Hgb goal > 8 in acute period post-surgery, plt goal > 50k  -Transfuse to meet Hgb and plt goals     #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  -follow up as out patient [facility]      ID  #UTI  -IV Ceftriaxone 1g daily for 7d (ends 6/29)  -UCx pending  -Daily CBC  -Follow temperature curve     MSK  #Chronic Back Pain w/ lumbar spondylosis and hx of lumbar compression fracture  #Polymyalgia Rheumatica w/hx of GCA  #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, 2003)  -PTA Baclofen 5 mg at bedtime prn for muscle spasm     #Osteoporosis  -Hold PTA Denosumab Inj 60mg  -Hold Ca/Vit D3     #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 Upon evaluation, this patient had a high probability of imminent or life-threatening deterioration due to SDH s/p crani, acute hypoxic respiratory failure, which  required my direct attention, intervention, and personal management. Greater than 50% of my time was spent at the bedside counseling the patient and/or coordinating care regarding services listed in this note.    The patient was seen and discussed with the NCC attending, Dr. Lucy Reynolds.    Allyson Washington, APRN, CNP  Neurocritical Care *[phone]    24 Hour Vital Signs Summary:  Temp: 98.1  F (36.7  C) Temp  Min: 97.7  F (36.5  C)  Max: 99  F (37.2  C)  Resp: 19 Resp  Min: 6  Max: 20  SpO2: 99 % SpO2  Min: 93 %  Max: 100 %  Pulse: 89 Pulse  Min: 64  Max: 93    No data recorded  BP: 130/52 Systolic (24hrs), Av , Min:105 , Max:145   Diastolic (24hrs), Av, Min:40, Max:69    Respiratory monitoring:   Resp: 19    I/O last 3 completed shifts:  In: 1970.5 [I.V.:1254.5; NG/GT:396]  Out: 1805 [Urine:1725; Drains:80]    Current Medications:    cefTRIAXone  1 g Intravenous Q24H     cyanocobalamin  500 mcg Oral Daily     hydroxychloroquine  400 mg Oral or Feeding Tube At Bedtime     levETIRAcetam  750 mg Oral or Feeding Tube BID     multivitamins w/minerals  15 mL Per Feeding Tube Daily     protein modular  1 packet Per Feeding Tube BID 09 12     sodium chloride (PF)  3 mL Intracatheter Q8H       PRN Medications:  acetaminophen, baclofen, bisacodyl, hydrALAZINE, labetalol, melatonin, naloxone **OR** naloxone **OR** naloxone **OR** naloxone, ondansetron **OR** ondansetron, oxyCODONE, polyethylene glycol, senna-docusate, sodium chloride (PF)    Infusions:      No Known Allergies    Physical Examination:  Vitals: /52   Pulse 89   Temp 98.1  F (36.7  C) (Axillary)   Resp 19   Wt 73.7 kg (162 lb 7.7 oz)   SpO2 99%   BMI 27.89 kg/m    General: Adult female patient, lying in bed, critically-ill, family at bedside  HEENT: Surgical incision CDI, no icterus, oral cavity/oropharynx pink and moist, edema noted to left orbit   Cardiac: RRR per monitor, occasional ectopy   Chest: unlabored, expansion symmetric, no  retractions or use of accessory muscles, synchronous with ventilator  Abdomen: Soft, non-distended  Extremities: Warm, no edema, distal pulses +2, well perfused  Skin: No rash or lesion  Psych: Calm  Neuro: Lethargic, unable to assess orientation, exam limited by ETT, PERRL +2 mm, face symmetric, movements intentional, no abnormal movements, moves all extremities, gait and coordination deferred.     NIHSS  1a. Level of Consciousness 1-->Not alert, but arousable by minor stimulation to obey, answer, or respond   1b. LOC Questions 2-->Answers neither question correctly   1c. LOC Commands 1-->Performs one task correctly   2.   Best Gaze 0-->Normal   3.   Visual 0-->No visual loss   4.   Facial Palsy 0-->Normal symmetrical movements   5a. Motor Arm, Left 1-->Drift, limb holds 90 (or 45) degrees, but drifts down before full 10 seconds, does not hit bed or other support   5b. Motor Arm, Right 1-->Drift, limb holds 90 (or 45) degrees, but drifts down before full 10 secs, does not hit bed or other support   6a. Motor Leg, Left 1-->Drift, leg falls by the end of the 5-sec period but does not hit bed   6b. Motor Leg, right 1-->Drift, leg falls by the end of the 5-sec period but does not hit bed   7.   Limb Ataxia 0-->Absent   8.   Sensory 0-->Normal, no sensory loss   9.   Best Language 0-->No aphasia, normal   10. Dysarthria (UN) Intubated or other physical barrier   11. Extinction and Inattention  0-->No abnormality   Total 8 (06/23/22 1058)       Labs and imaging:    Results for orders placed or performed during the hospital encounter of 06/22/22 (from the past 24 hour(s))   Glucose by meter   Result Value Ref Range    GLUCOSE BY METER POCT 109 (H) 70 - 99 mg/dL   Glucose by meter   Result Value Ref Range    GLUCOSE BY METER POCT 104 (H) 70 - 99 mg/dL   Basic metabolic panel   Result Value Ref Range    Creatinine 0.44 (L) 0.51 - 0.95 mg/dL    Sodium 136 136 - 145 mmol/L    Potassium 3.9 3.4 - 5.3 mmol/L    Urea Nitrogen  20.2 8.0 - 23.0 mg/dL    Chloride 103 98 - 107 mmol/L    Carbon Dioxide (CO2) 23 22 - 29 mmol/L    Anion Gap 10 7 - 15 mmol/L    Glucose 120 (H) 70 - 99 mg/dL    GFR Estimate >90 >60 mL/min/1.73m2    Calcium 8.7 (L) 8.8 - 10.2 mg/dL   CBC with platelets   Result Value Ref Range    WBC Count 14.1 (H) 4.0 - 11.0 10e3/uL    RBC Count 2.43 (L) 3.80 - 5.20 10e6/uL    Hemoglobin 7.7 (L) 11.7 - 15.7 g/dL    Hematocrit 24.0 (L) 35.0 - 47.0 %    MCV 99 78 - 100 fL    MCH 31.7 26.5 - 33.0 pg    MCHC 32.1 31.5 - 36.5 g/dL    RDW 20.1 (H) 10.0 - 15.0 %    Platelet Count 344 150 - 450 10e3/uL   Magnesium   Result Value Ref Range    Magnesium 2.1 1.7 - 2.3 mg/dL   Phosphorus   Result Value Ref Range    Phosphorus 3.3 2.5 - 4.5 mg/dL   Glucose by meter   Result Value Ref Range    GLUCOSE BY METER POCT 121 (H) 70 - 99 mg/dL   XR Chest Port 1 View    Narrative    Exam: XR CHEST PORT 1 VIEW, 6/25/2022 4:29 AM    Comparison: 6/24/2022    History: aspiration event    Findings:  Single portable semiupright view of the chest is obtained. Gastric  tube courses over the stomach before coursing off the inferior edge of  the film. Patient is rotated to the right.    Trachea slightly to the right of midline, favored to be rotational.  Mediastinum is within normal limits. Cardiopulmonary silhouette is  within normal limits. Increasing dense retrocardiac consolidation,  slightly decreased right lower lobe opacities. There is no  pneumothorax or pleural effusion. The upper abdomen is unremarkable.      Impression    Impression:   1. Slightly decreased right lower lobe opacities.  2. Slight increase in dense retrocardiac consolidation which may  represent atelectasis versus infection/aspiration.    I have personally reviewed the examination and initial interpretation  and I agree with the findings.    GISELE BURGESS MD         SYSTEM ID:  [id number]       All relevant imaging and laboratory values personally reviewed.

## 2022-06-25 NOTE — PROGRESS NOTES
Neurocritical Care Progress Note    Reason for critical care admission: Bilateral SDH  Admitting Team: Neurosurgery  Date of Service:  06/25/2022  Date of Admission:  6/22/2022  Hospital Day: 4    Assessment/Plan  Louies Benítez is a 76 year old female w/ PMHx myelodysplastic syndrome, seronegative RA, chronic microcytic anemia, PAD, and recent L SDH (5/12/2022) who was admitted from her TCU on 6/22/2022 after an unwitnessed fall. Was subsequently found to have an acute on chronic L SDH, a new acute R SDH, and increased midline shift w/ trace L uncal herniation now s/p L craniotomy w/ hematoma evacuation    24 hour events: Extubated at 1600, NCC attending at bedside, Tmax 38.2, multiple doses of labetalol and hydralazine given overnight for BP.      Neuro  #Acute on Chronic L SDH + New Acute R SDH w/ increased rightward midline shift and trace L uncal herniation s/p L craniotomy (6/22) w/ hematoma evacuation  #Recent L SDH 2/2 mechanical fall not requiring intervention (hospitalized 5/12-5/20/2022)  -Neurochecks q2h  -HOB > 30   -sBP goal < 140 mmHg; MAP > 65 mmHg  -PRN Hydralazine/Labetalol  -Keppra 750mg BID for 7d (ends 6/28)  -Holding all anti-platelets and anti-coagulation given active bleed  -PT/OT/SLP  -vEEG ordered by nsgy      #Analgesics & sedation  -Tylenol 650mg Q4h prn  -Oxycodone 5-10mg Q4h prn   -PTA Baclofen 5mg at bedtime prn for muscle spasms     CV  #HTN on admission  -cardiac monitoring  -sBP goal < 140 mmHg  -PRN Hydralazine/Labetalol     #Bradycardia, likely 2/2 large dose of propofol   -cardiac monitoring  -echo 6/24: EF 60-65%, normal RV, no significant valvular abnormalities       #PAD  No statin, recent LDL 10 (5/13/2022)     Resp  #former tobacco Use  #acute hypoxic respiratory failure  -continuous pulse ox  -maintain O2 saturations greater than 92%  -currently requiring 4L NC  -pCXR 6/24: slightly decreased right lower lobe opacity, slight increase in dense retrocardiac consolidation  which may represent atelectasis versus infection/aspiration   -on ceftriaxone for UTI      GI  #hx GERD  Diet: NGT, resume TF  Last BM: PTA  GI prophylaxis: not indicated  Bowel regimen: scheduled senna-docusate and Miralax, suppository today      Renal/  #lactic acidosis - resolved  #hyponatremia - resolved   -Repeat lactate  -Daily BMP  -IV fluids: SL  -Electrolyte replacement protocol     Endo  #ANNALEE  -Hgb A1c 5.5% 6/22   -Monitor glucose levels     Rheum  #Seronegative Rheumatoid Arthritis  -continue PTA Plaquenil 400mg at bedtime      Heme/Onc  #Chronic Microcytic Anemia, likely 2/2 myelodysplastic syndrome  Baseline Hgb ~8.0  -s/p 2u RBC (6/22) for Hgb 7.1 prior to craniotomy  -PTA Darbepoetin Inj 60mg Q3wks (last dose 6/14; next due 7/5)  -PTA Vit B12 500mcg daily  -Daily CBC  -Hgb goal > 8 in acute period post-surgery, plt goal > 50k  -Transfuse to meet Hgb and plt goals     #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  -follow up as out patient [facility]      ID  #UTI  -IV Ceftriaxone 1g daily for 7d (ends 6/29)  -UCx pending  -Daily CBC  -Follow temperature curve     MSK  #Chronic Back Pain w/ lumbar spondylosis and hx of lumbar compression fracture  #Polymyalgia Rheumatica w/hx of GCA  #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, 2003)  -PTA Baclofen 5 mg at bedtime prn for muscle spasm     #Osteoporosis  -Hold PTA Denosumab Inj 60mg  -Hold Ca/Vit D3     #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 Upon evaluation, this patient had a high probability of  imminent or life-threatening deterioration due to SDH s/p crani, acute hypoxic respiratory failure, which required my direct attention, intervention, and personal management. Greater than 50% of my time was spent at the bedside counseling the patient and/or coordinating care regarding services listed in this note.    The patient was seen and discussed with the NCC attending, Dr. Lucy Reynolds.    Allyson Washington, APRN, CNP  Neurocritical Care *[phone]    24 Hour Vital Signs Summary:  Temp: 98.8  F (37.1  C) Temp  Min: 97.9  F (36.6  C)  Max: 99  F (37.2  C)  Resp: 16 Resp  Min: 6  Max: 19  SpO2: 98 % SpO2  Min: 92 %  Max: 100 %  Pulse: 75 Pulse  Min: 67  Max: 93    No data recorded  BP: 119/47 Systolic (24hrs), Av , Min:105 , Max:145   Diastolic (24hrs), Av, Min:40, Max:69    Respiratory monitoring:   Resp: 16    I/O last 3 completed shifts:  In: 1970.5 [I.V.:1254.5; NG/GT:396]  Out: 1805 [Urine:1725; Drains:80]    Current Medications:    cyanocobalamin  500 mcg Oral Daily     hydroxychloroquine  400 mg Oral or Feeding Tube At Bedtime     levETIRAcetam  1,000 mg Oral or Feeding Tube BID     multivitamins w/minerals  15 mL Per Feeding Tube Daily     polyethylene glycol  17 g Oral Daily     protein modular  1 packet Per Feeding Tube BID 09 12     senna-docusate  2 tablet Oral BID     sodium chloride (PF)  3 mL Intracatheter Q8H       PRN Medications:  acetaminophen, baclofen, bisacodyl, hydrALAZINE, labetalol, melatonin, naloxone **OR** naloxone **OR** naloxone **OR** naloxone, ondansetron **OR** ondansetron, oxyCODONE, sodium chloride (PF)    Infusions:      No Known Allergies    Physical Examination:  Vitals: /47   Pulse 75   Temp 98.8  F (37.1  C) (Axillary)   Resp 16   Wt 73.7 kg (162 lb 7.7 oz)   SpO2 98%   BMI 27.89 kg/m    General: Adult female patient, lying in bed, critically-ill, family at bedside  HEENT: Surgical incision CDI, no icterus, oral cavity/oropharynx pink and moist, edema  noted to left orbit   Cardiac: RRR per monitor, occasional ectopy. S1, S2 without obvious murmur, rub, or gallop.   Chest: unlabored, expansion symmetric, no retractions or use of accessory muscles, synchronous with ventilator. Lung fields coarse- likely upper airway secretions.   Abdomen: Soft, non-distended  Extremities: Warm, no edema, well perfused  Skin: No rash or lesion  Psych: Calm  Neuro: Lethargic, unable to assess orientation, non-verbal, PERRL +2 mm, face symmetric, movements intentional, no abnormal movements, moves all extremities, gait and coordination deferred.     NIHSS  1a. Level of Consciousness 1-->Not alert, but arousable by minor stimulation to obey, answer, or respond   1b. LOC Questions 2-->Answers neither question correctly   1c. LOC Commands 1-->Performs one task correctly   2.   Best Gaze 0-->Normal   3.   Visual 0-->No visual loss   4.   Facial Palsy 0-->Normal symmetrical movements   5a. Motor Arm, Left 1-->Drift, limb holds 90 (or 45) degrees, but drifts down before full 10 seconds, does not hit bed or other support   5b. Motor Arm, Right 1-->Drift, limb holds 90 (or 45) degrees, but drifts down before full 10 secs, does not hit bed or other support   6a. Motor Leg, Left 1-->Drift, leg falls by the end of the 5-sec period but does not hit bed   6b. Motor Leg, right 1-->Drift, leg falls by the end of the 5-sec period but does not hit bed   7.   Limb Ataxia 0-->Absent   8.   Sensory 0-->Normal, no sensory loss   9.   Best Language 0-->No aphasia, normal   10. Dysarthria (UN) Intubated or other physical barrier   11. Extinction and Inattention  0-->No abnormality   Total 8 (06/23/22 1058)       Labs and imaging:    Results for orders placed or performed during the hospital encounter of 06/22/22 (from the past 24 hour(s))   Glucose by meter   Result Value Ref Range    GLUCOSE BY METER POCT 109 (H) 70 - 99 mg/dL   Glucose by meter   Result Value Ref Range    GLUCOSE BY METER POCT 104 (H) 70  - 99 mg/dL   EEG Video 12-26 hr Unmonitored    Narrative    EEG Video 12-26 hr Unmonitored Result    VIDEO EEG DATE: 2022  VIDEO EEG [id number]  VIDEO EEG DAY#: 1  VIDEO EEG SOURCE FILE DURATION: 17 hours and 29 minutes    PATIENT INFORMATION: Louise Benítez is a 76 year old female w/ PMHx   myelodysplastic syndrome, seronegative RA, chronic microcytic anemia, PAD,   and recent L SDH (2022) who was admitted from her TCU on 2022   after an unwitnessed fall. Was subsequently found to have an acute on   chronic L SDH, a new acute R SDH, and increased midline shift w/ trace L   uncal herniation now s/p L craniotomy w/ hematoma evacuation. EEG is being   done to evaluate for seizures.     MEDICATIONS:    Keppra 750 mg bid      PRN:   Baclofen 5m g @ bedtime   Oxycodone 5-10 mg q 4hrs   These medications and doses were derived from the medical record at the   time of this procedure.    TECHNICAL SUMMARY: EEG was recorded from 23 scalp electrodes placed   according to the 10-20 international system. Additional electrodes were   used for referencing, EKG, and to record from other cerebral regions as   appropriate. Video was continuously recorded and was reviewed for clinical   correlation. Electrodes were attached and both video and EEG were   monitored and annotated by qualified EEG technologists. Video-EEG was   reviewed and report generated by qualified physician.    BACKGROUND ACTIVITY:  During wakefulness, there was 7 to 8 Hz activity   over the right posterior head region.  PDR was attenuated over the left   hemisphere.  Mixed alpha theta and occasional delta slowing is seen over   the right hemisphere.  There is nearly continuous delta slowing over the   left hemisphere with superimposed faster activities.  There is an   asymmetry with increased amplitude over the left hemisphere.  No   well-organized sleep architecture was appreciated.      ACTIVATION PROCEDURE: Patient was stimulated per ICU  protocol at ~1125.    No clinical response was noted.  EEG was reactive.    INTERICTAL EPILEPTIFORM DISCHARGES: No clear epileptiform discharges were   present.    ICTAL: several electrographic seizures were captured out of left   hemisphere. They difference between ictal activity and the background was   subtle, as there was continuous delta activity over the left hemisphere,   however, at times, it becomes rhythmic and  sharply-contoured and evolves   in amplitude and rhythmicity and spread to the midline.  This pattern   continues for 20-25 min.  The patient did not have clear clinical   manifestations.  Examples are seen at ~17:41 and 19:48.  Video was   reviewed intermittently by EEG technologist and physician for clinical   seizures.     IMPRESSION OF VIDEO EEG DAY # 1: This video electroencephalogram is   abnormal due to the presence of moderate diffuse slowing, severe left   hemispheric slowing, and several left hemispheric electrographic seizures,   consistent with diffuse moderate nonspecific encephalopathy, an additional   structural abnormality and breach rhythm over the left hemisphere and   focal epilepsy.       Eve Mueller MD  EPILEPSY STAFF      Basic metabolic panel   Result Value Ref Range    Creatinine 0.44 (L) 0.51 - 0.95 mg/dL    Sodium 136 136 - 145 mmol/L    Potassium 3.9 3.4 - 5.3 mmol/L    Urea Nitrogen 20.2 8.0 - 23.0 mg/dL    Chloride 103 98 - 107 mmol/L    Carbon Dioxide (CO2) 23 22 - 29 mmol/L    Anion Gap 10 7 - 15 mmol/L    Glucose 120 (H) 70 - 99 mg/dL    GFR Estimate >90 >60 mL/min/1.73m2    Calcium 8.7 (L) 8.8 - 10.2 mg/dL   CBC with platelets   Result Value Ref Range    WBC Count 14.1 (H) 4.0 - 11.0 10e3/uL    RBC Count 2.43 (L) 3.80 - 5.20 10e6/uL    Hemoglobin 7.7 (L) 11.7 - 15.7 g/dL    Hematocrit 24.0 (L) 35.0 - 47.0 %    MCV 99 78 - 100 fL    MCH 31.7 26.5 - 33.0 pg    MCHC 32.1 31.5 - 36.5 g/dL    RDW 20.1 (H) 10.0 - 15.0 %    Platelet Count 344 150 - 450 10e3/uL    Magnesium   Result Value Ref Range    Magnesium 2.1 1.7 - 2.3 mg/dL   Phosphorus   Result Value Ref Range    Phosphorus 3.3 2.5 - 4.5 mg/dL   Glucose by meter   Result Value Ref Range    GLUCOSE BY METER POCT 121 (H) 70 - 99 mg/dL   XR Chest Port 1 View    Narrative    Exam: XR CHEST PORT 1 VIEW, 6/25/2022 4:29 AM    Comparison: 6/24/2022    History: aspiration event    Findings:  Single portable semiupright view of the chest is obtained. Gastric  tube courses over the stomach before coursing off the inferior edge of  the film. Patient is rotated to the right.    Trachea slightly to the right of midline, favored to be rotational.  Mediastinum is within normal limits. Cardiopulmonary silhouette is  within normal limits. Increasing dense retrocardiac consolidation,  slightly decreased right lower lobe opacities. There is no  pneumothorax or pleural effusion. The upper abdomen is unremarkable.      Impression    Impression:   1. Slightly decreased right lower lobe opacities.  2. Slight increase in dense retrocardiac consolidation which may  represent atelectasis versus infection/aspiration.    I have personally reviewed the examination and initial interpretation  and I agree with the findings.    GISELE BURGESS MD         SYSTEM ID:  [id number]       All relevant imaging and laboratory values personally reviewed.

## 2022-06-25 NOTE — PLAN OF CARE
Major Shift Events: Pt lung sounds were very gurgly/crackles. Scant secretions orally initially. Suctioned pt multiple times nasotracheally and further orally, which produced thick, creamy, and serosanguinous secretions resembling tube feeds. NCC/NSGY notified. TF stopped approximately 0400 and NG placed to LIS. Chest x-ray obtained.    Neuro: Q1 neuro checks. Left side slightly stronger than right. Pupils equal and reactive; opening eyes occasionally to painful stimulus. Not following commands nor visually tracking. Purposeful movement localizing and withdraws in all extremities.  CV: Sinus rhythm with occasional PVCs. Arterial line positional; BP cuff cycled simultaneously. PRN labetelol 10mg given x1 for SBP > 140.  Pulm: LS gurgly/crackles; diminished in bases. SpO2 93-99% on 2L NC.   GI/: NPO. Tube feeds stopped at 0400. Last BM PTA. Purewick in place; moderate urine output + unmeasured occurrence. Bladder scanned approximately 0400 for 720mL. Straight cath output of 725 mL.  Skin: Various bruises and scabs; see MAR. Surgical incisions on head.    Plan: Continue neurological monitoring. Continue to monitor for risk of aspiration.   For vital signs and complete assessments, please see documentation flowsheets.      Chanel Haas RN

## 2022-06-25 NOTE — PLAN OF CARE
Major Shift Events: RASS -1/-2 throughout shift, more alert today, following some commands, purposeful movements in extremities, tracking, L eye remains swollen, EEG in place. Afebrile, SR-ST 80s-low 100s, BP in goal, arterial line removed. VSS on 1L NC, coarse crackles in upper lobes, NT suctioned x2. TF restarted at 1100- currently running at 25 mL/hr with standard FWF, bladder scanned twice, straight cath once for 400 mL, multiple stools this shift post bowel meds. VIOLA drain removed, extra suture added to L head incision. Up to chair for two hours.    Plan: EEG and neuro assessments. Update primary team with changes in POC.    For vital signs and complete assessments, please see flowsheets.

## 2022-06-25 NOTE — PROGRESS NOTES
[facility], [place]   06/25/2022  Neurosurgery Progress Note:    Assessment:  Ms. Benítez is a 76-year-old female with PMHx myelodysplastic syndrome, seronegative RA, chronic microcytic anemia, PAD, and recent L SDH (5/12/2022) who arrived to [facility] via EMS after an unwitnessed fall at home.  Shortly after arriving, the patient was complaining of a significant headache and nausea, and then her right pupil dilated.  She was no longer protecting her airway, so she was sedated and intubated.  Her right eye pupil then returned to a normal caliber.  A head CT demonstrated a left acute subdural approximately 2.3 cm thick with 12 mm of rightward midline shift.  She was taken to the OR on 6/22 for emergent L craniotomy for SDH evacuation. She was extubated on POD1. Concerns for tube feed aspiration on 6/25.     Other diagnoses include:  #Brain compression  #Open evacuation of hematoma on day of admission   #Hyponatremia  #Pulmonary edema    Plan:  - Serial neuro exams  - Pain control  - HOB > 30 degrees  - VIOLA to thumbprint suction - will likely remove today  - Keppra 7d  - Bowel regimen  - PRN antiemetics  - NG tube - TF held, will likely need a postpyloric feeding tube placed  - SLP assessment   - PT/OT  - SCDs for DVT proph    -----------------------------------  Emily Ball MD  Neurosurgery PGY2    Please contact neurosurgery resident on call with questions.    Dial * * [phone], enter [phone] when prompted.=  -----------------------------------    Interval History: EEG started. Given lasix for hypoxia, currently on 2 LPM O2. Urine output improved through yesterday. VIOLA with 80 out yesterday, 15 since midnight, serosanguinous CSF. Exam continues to be poor. This morning with concerns for aspiration events after tube feed emesis - chest XR obtained, tube feeds stopped and NG placed to LIS.     Objective:   Temp:  [97.7  F (36.5  C)-99  F (37.2  C)] 97.9  F (36.6  C)  Pulse:  [64-93] 86  Resp:   [6-20] 10  BP: (105-139)/(40-68) 130/55  MAP:  [61 mmHg-92 mmHg] 83 mmHg  Arterial Line BP: (106-152)/(31-55) 151/42  SpO2:  [94 %-100 %] 99 %  I/O last 3 completed shifts:  In: 1683.5 [I.V.:1142.5; NG/GT:346]  Out: 1355 [Urine:1250; Drains:105]    Gen: Appears comfortable, NAD  Wound: dressing clean, dry, intact, VIOLA with serosanguinous output  Neurologic:  Opens eyes to  voice, not following commands, squeezes hands reflexively  No verbal output  PEERL, corneal and cough present  Spontaneous movements in all 4 extremities  LUE purposeful and antigravity  RUE purposeful and antigravity  BLE antigravity when stimulated        LABS:  Recent Labs   Lab 06/24/22  2356 06/24/22  1940 06/24/22  0403 06/24/22  0400 06/23/22  0424 06/22/22  2257   NA  --   --   --  134* 135* 137   POTASSIUM  --   --   --  4.0 4.4 4.5   CHLORIDE  --   --   --  104 104 105   CO2  --   --   --  22 21* 21*   ANIONGAP  --   --   --  8 10 11   * 109* 105* 114* 119* 120*   BUN  --   --   --  15.9 14.3 15.8   CR  --   --   --  0.46* 0.51 0.54   MENDEZ  --   --   --  8.3* 8.5* 8.4*       Recent Labs   Lab 06/24/22  0400   WBC 15.2*   RBC 2.47*   HGB 8.0*   HCT 24.2*   MCV 98   MCH 32.4   MCHC 33.1   RDW 20.6*          IMAGING:  Recent Results (from the past 24 hour(s))   CT Head w/o Contrast    Narrative    CT HEAD W/O CONTRAST 6/24/2022 6:34 AM    History: Neuro exam changing   ICD-10:    Comparison: 6/23/22    Technique: Using multidetector thin collimation helical acquisition  technique, axial, coronal and sagittal CT images from the skull base  to the vertex were obtained without intravenous contrast.   (topogram) image(s) also obtained and reviewed.    Findings: Surgical changes of left frontotemporal craniotomy and  underlying drain placement. Slightly reduced size of residual subdural  fluid collection deep to the craniotomy measuring up to 9 mm  anteriorly, previously 11 mm. There is similar mass effect on the left  cerebrum  and unchanged 6 mm of left-to-right midline shift. Additional  subdural fluid collection over the right anterior cranial fossa is  unchanged measuring up to 6 mm with extension along the anterior falx.  Thin subdural hemorrhages along the bilateral tentorial leaflets  appear unchanged. Gray/white matter differentiation in both cerebral  hemispheres is preserved. Ventricles are proportionate to the cerebral  sulci. The basal cisterns are clear.    The bony calvaria and the bones of the skull base are normal. The  visualized portions of the paranasal sinuses and mastoid air cells are  clear.      Impression    Impression:    1. Slightly reduced size of subdural fluid collection deep to the left  frontotemporal craniotomy site.  2. Unchanged mass effect on the left cerebrum and unchanged left to  right midline shift.  3. Unchanged additional subdural fluid collection along the right  anterior cranial fossa and extension along the anterior falx.  4. Unchanged appearance of small subdural hemorrhages along the  bilateral tentorial leaflets..     I have personally reviewed the examination and initial interpretation  and I agree with the findings.    OSVALDO CONNOLLY MD         SYSTEM ID:  [id number]   XR Chest Port 1 View    Narrative    Exam: XR CHEST PORT 1 VIEW, 6/24/2022 7:40 AM    Indication: hypoxia    Comparison: 6/22/2022    Findings:   Endotracheal tube no longer visible. Enteric tube side port projects  over the stomach. Heart size is unchanged. Indistinct pulmonary  vasculature, with mildly increased interstitial pulmonary opacities.  New linear right lower lung presumed atelectasis. Increased left  retrocardiac/basilar opacification.      Impression    Impression:   ET tube no longer visualized. Increased interstitial pulmonary edema  and bibasilar atelectasis and/or pneumonia.    I have personally reviewed the examination and initial interpretation  and I agree with the findings.    ANGELIQUE DENTON MD          SYSTEM ID:  [id number]   Echo Complete   Result Value    LVEF  60-65%    [facility]    [id number]  [id number]  [id number]  [id number]^JUSTEN^MARLEN     [facility],[place]  Echocardiography Laboratory  [address] [place] [address]     Name: ZAHEER WALLIS  MRN: [id number]  : 1945  Study Date: 2022 07:38 AM  Age: 76 yrs  Gender: Female  Patient Location: [facility]  Reason For Study: Bradycardia - Sinus  Ordering Physician: MARLEN CAMPUZANO  Performed By: Paula Pelaez RDCS     BSA: 1.8 m2  Height: 64 in  Weight: 158 lb  HR: 56  BP: 160/57 mmHg  ______________________________________________________________________________  Procedure  Complete Portable Echo Adult.  ______________________________________________________________________________  Interpretation Summary  Global and regional left ventricular function is normal with an EF of 60-65%.  Global right ventricular function is normal.  No significant valvular abnormalities present.     Heart rate normal during study (78 bpm).     Previous study not available for comparison.  ______________________________________________________________________________  Left Ventricle  Left ventricular size is normal. Global and regional left ventricular function  is normal with an EF of 60-65%. Mild concentric wall thickening consistent  with left ventricular hypertrophy is present. Left ventricular diastolic  function is normal.     Right Ventricle  The right ventricle is normal size. Global right ventricular function is  normal.     Atria  Both atria appear normal.     Mitral Valve  Mild mitral annular calcification is present. On Doppler interrogation, there  is no significant stenosis or regurgitation.     Aortic Valve  The valve leaflets are not well visualized. On Doppler interrogation, there is  no significant stenosis or regurgitation.     Tricuspid Valve  The tricuspid valve is normal.     Pulmonic Valve  On Doppler  interrogation, there is no significant stenosis or regurgitation.     Vessels  The aorta root is normal. The inferior vena cava was normal in size with  preserved respiratory variability.     Pericardium  No pericardial effusion is present.     Miscellaneous  No significant valvular abnormalities present.     Compared to Previous Study  Previous study not available for comparison.  ______________________________________________________________________________  MMode/2D Measurements & Calculations  RVDd: 3.4 cm  IVSd: 1.2 cm  LVIDd: 5.2 cm  LVIDs: 3.1 cm  LVPWd: 1.0 cm  FS: 39.9 %  LV mass(C)d: 228.3 grams  LV mass(C)dI: 129.0 grams/m2  asc Aorta Diam: 3.6 cm  LVOT diam: 2.1 cm  LVOT area: 3.5 cm2  LA Volume (BP): 46.0 ml     LA Volume Index (BP): 26.0 ml/m2  RWT: 0.40     Doppler Measurements & Calculations  MV E max driss: 102.0 cm/sec  MV A max driss: 114.8 cm/sec  MV E/A: 0.89  MV dec slope: 465.7 cm/sec2  MV dec time: 0.22 sec  E/E' av.6  Lateral E/e': 12.0  Medial E/e': 15.1     ______________________________________________________________________________  Report approved by: Elier XIE 2022 09:07 AM

## 2022-06-26 NOTE — PLAN OF CARE
Goal Outcome Evaluation: ongoing    D/I: Pt lethargic but able to opens eyes to command. Able to follow simple commands. Shrugging shoulder to wiggling toes. Pt denied pain. On room air, O2 saturating low 90s... 1L NC placed after suctioning via NT and deep oral. Good cough when assisted and encouraged. SBP >140s... medicated with PRN Labetalol.   A: Pt up in chair with OT /RN assistance. Pt continues to have gurgles in back of the throat... suctioning as needed.. NT suctioned 3x today and oral suctioning Q 2-3hrs.   P: Continue to monitor VS  and respiratory status... suction as needed.     Plan of Care Reviewed With: patient, spouse, daughter     Overall Patient Progress: no change

## 2022-06-26 NOTE — PROGRESS NOTES
[facility], [place]   06/26/2022  Neurosurgery Progress Note:    Assessment:  Ms. Benítez is a 76-year-old female with PMHx myelodysplastic syndrome, seronegative RA, chronic microcytic anemia, PAD, and recent L SDH (5/12/2022) who arrived to [facility] via EMS after an unwitnessed fall at home.  Shortly after arriving, the patient was complaining of a significant headache and nausea, and then her right pupil dilated.  She was no longer protecting her airway, so she was sedated and intubated.  Her right eye pupil then returned to a normal caliber.  A head CT demonstrated a left acute subdural approximately 2.3 cm thick with 12 mm of rightward midline shift.  She was taken to the OR on 6/22 for emergent L craniotomy for SDH evacuation. She was extubated on POD1. Concerns for tube feed aspiration on 6/25.     Other diagnoses include:  #Brain compression  #Open evacuation of hematoma on day of admission   #Hyponatremia  #Pulmonary edema    Plan:  - Serial neuro exams  - Pain control  - HOB > 30 degrees  - VIOLA removed 6/25  - Keppra 7d  - Bowel regimen  - PRN antiemetics  - NG tube - TF held, will likely need a postpyloric feeding tube placed  - SLP assessment   - PT/OT  - SCDs for DVT proph    -----------------------------------  Christopher Martinez M.D.  Neurosurgery Resident, PGY-3    Please contact neurosurgery resident on call with questions.    Dial * * [phone], enter [phone] when prompted.    -----------------------------------    Interval History: Intermittently following commands yesterday.      Objective:   Temp:  [98.1  F (36.7  C)-99.5  F (37.5  C)] 99  F (37.2  C)  Pulse:  [] 81  Resp:  [9-23] 15  BP: (106-155)/(44-73) 111/55  MAP:  [57 mmHg-86 mmHg] 78 mmHg  Arterial Line BP: (109-155)/(30-49) 148/43  SpO2:  [92 %-99 %] 97 %  I/O last 3 completed shifts:  In: 998 [I.V.:173; NG/GT:505]  Out: 1649 [Urine:1175; Emesis/NG output:400; Drains:74]    Gen: Appears comfortable, NAD  Wound:  dressing with spots of dried blood; otherwise dry and intact  Neurologic:  Opens eyes to  voice, intermittently following commands, squeezes hands reflexively  No verbal output  PEERL, corneal and cough present  Spontaneous movements in all 4 extremities  LUE purposeful and antigravity  RUE purposeful and antigravity  BLE antigravity when stimulated        LABS:  Recent Labs   Lab 06/25/22  2355 06/25/22  1947 06/25/22  0329 06/25/22  0323 06/24/22  0403 06/24/22  0400 06/23/22  0424   NA  --   --   --  136  --  134* 135*   POTASSIUM  --   --   --  3.9  --  4.0 4.4   CHLORIDE  --   --   --  103  --  104 104   CO2  --   --   --  23  --  22 21*   ANIONGAP  --   --   --  10  --  8 10   * 103* 121* 120*   < > 114* 119*   BUN  --   --   --  20.2  --  15.9 14.3   CR  --   --   --  0.44*  --  0.46* 0.51   MENDEZ  --   --   --  8.7*  --  8.3* 8.5*    < > = values in this interval not displayed.       Recent Labs   Lab 06/25/22  0323   WBC 14.1*   RBC 2.43*   HGB 7.7*   HCT 24.0*   MCV 99   MCH 31.7   MCHC 32.1   RDW 20.1*          IMAGING:  Recent Results (from the past 24 hour(s))   XR Chest Port 1 View    Narrative    Exam: XR CHEST PORT 1 VIEW, 6/25/2022 4:29 AM    Comparison: 6/24/2022    History: aspiration event    Findings:  Single portable semiupright view of the chest is obtained. Gastric  tube courses over the stomach before coursing off the inferior edge of  the film. Patient is rotated to the right.    Trachea slightly to the right of midline, favored to be rotational.  Mediastinum is within normal limits. Cardiopulmonary silhouette is  within normal limits. Increasing dense retrocardiac consolidation,  slightly decreased right lower lobe opacities. There is no  pneumothorax or pleural effusion. The upper abdomen is unremarkable.      Impression    Impression:   1. Slightly decreased right lower lobe opacities.  2. Slight increase in dense retrocardiac consolidation which may  represent atelectasis  versus infection/aspiration.    I have personally reviewed the examination and initial interpretation  and I agree with the findings.    GISELE BURGESS MD         SYSTEM ID:  [id number]

## 2022-06-26 NOTE — PROGRESS NOTES
"   06/26/22 1132   Quick Adds   Type of Visit Initial Occupational Therapy Evaluation       Present no   Living Environment   People in Home spouse   Current Living Arrangements house   Home Accessibility stairs to enter home;stairs within home   Number of Stairs, Main Entrance 2   Stair Railings, Main Entrance railing on right side (ascending)   Number of Stairs, Within Home, Primary greater than 10 stairs;other (see comments)  (flight to lower level and upper level, all needs met on main floor)   Stair Railings, Within Home, Primary railing on right side (ascending)   Transportation Anticipated family or friend will provide   Living Environment Comments Per pt  report, lives in house with ~2 OTIS with handrails, all needs are met on main level, tub/shower unit. Per , pt was admitted IP for fall ~6 weeks ago, attended TCU, discharged home with home therapies, currently re-admitted for fall.   Self-Care   Usual Activity Tolerance moderate   Current Activity Tolerance poor   Equipment Currently Used at Home walker, standard   Fall history within last six months yes   Number of times patient has fallen within last six months 4   Activity/Exercise/Self-Care Comment At baseline, pt IND with ADL tasks. Per  report, most recently at home, pt was nearing discharge from home OT and completing most ADL tasks IND. Pt uses walker for mobility.   Instrumental Activities of Daily Living (IADL)   IADL Comments Pt  responsible for most IADL tasks including home mgmt, cooking, driving, etc.   General Information   Onset of Illness/Injury or Date of Surgery 06/22/22   Referring Physician Gena Tapia MD   Patient/Family Therapy Goal Statement (OT)  reports, \"get her back home and to her baseline, she was progressing so well with home therapies.\"   Additional Occupational Profile Info/Pertinent History of Current Problem Per chart, Louise Benítez is a 76 year old female w/ " PMHx myelodysplastic syndrome, seronegative RA, chronic microcytic anemia, PAD, and recent L SDH (5/12/2022) who was admitted from her TCU on 6/22/2022 after an unwitnessed fall. Was subsequently found to have an acute on chronic L SDH, a new acute R SDH, and increased midline shift w/ trace L uncal herniation now s/p L craniotomy w/ hematoma evacuation   Existing Precautions/Restrictions fall;lifting;other (see comments)  (craniotomy)   Limitations/Impairments safety/cognitive   Left Upper Extremity (Weight-bearing Status) partial weight-bearing (PWB)  (no > 10 lb.)   Right Upper Extremity (Weight-bearing Status) partial weight-bearing (PWB)  (no > 10 lb.)   Left Lower Extremity (Weight-bearing Status) full weight-bearing (FWB)   Right Lower Extremity (Weight-bearing Status) full weight-bearing (FWB)   General Observations and Info Activity: Up In Chair   Cognitive Status Examination   Orientation Status person   Cognitive Status Comments Pt presented oriented to self, able to recognize  in room and follow 1-step simple commands ~50% of time. Pt presenting lethargic this date;   Visual Perception   Impact of Vision Impairment on Function (Vision) NT, pt unable to report   Sensory   Sensory Quick Adds No deficits were identified   Pain Assessment   Patient Currently in Pain Yes, see Vital Sign flowsheet   Integumentary/Edema   Integumentary/Edema no deficits were identifed   Posture   Posture forward head position   Posture Comments while seated in bedside chair   Range of Motion Comprehensive   General Range of Motion upper extremity range of motion deficits identified   General Upper Extremity Assessment (Range of Motion)   Comment: Upper Extremity ROM B UE AROM limited, grossly deconditioned   Strength Comprehensive (MMT)   General Manual Muscle Testing (MMT) Assessment upper extremity strength deficits identified   Upper Extremity (Manual Muscle Testing)   Comment, MMT: Upper Extremity B UE strength  grossly deconditioned, MMT not assessed 2/2 post-surgical precautions   Muscle Tone Assessment   Muscle Tone Quick Adds No deficits were identified   Coordination   Coordination Comments B UE GMC/FMC impaired this date   Bed Mobility   Bed Mobility supine-sit;rolling right;rolling left   Rolling Left [place] (Bed Mobility) maximum assist (25% patient effort);2 person assist;verbal cues   Rolling Right [place] (Bed Mobility) maximum assist (25% patient effort);2 person assist;verbal cues   Supine-Sit [place] (Bed Mobility) dependent (less than 25% patient effort)   Assistive Device (Bed Mobility) lift device   Comment (Bed Mobility) max A x 2 log roll R/L, total A supine<>sit via OH lift   Transfers   Transfers bed-chair transfer;sit-stand transfer   Transfer Skill: Bed to Chair/Chair to Bed   Bed-Chair [place] (Transfers) dependent (less than 25% patient effort)   Transfer Comments OH lift bed<>chair   Sit-Stand Transfer   Sit-Stand [place] (Transfers) dependent (less than 25% patient effort)   Sit/Stand Transfer Comments Per clinical judgement, pt total A STS transfer, OH lift utilized this date   Balance   Balance Assessment sitting balance: static   Balance Comments max A to maintain static sitting balance unsupported   Activities of Daily Living   BADL Assessment/Intervention lower body dressing;grooming;toileting;bathing;upper body dressing   Bathing Assessment/Intervention   Position (Bathing) supine   [place] Level (Bathing) dependent (less than 25% patient effort);set up   Comment, (Bathing) Per clinical judgement, total A for FB bathing   Upper Body Dressing Assessment/Training   Position (Upper Body Dressing) sitting up in bed   Comment, (Upper Body Dressing) per clinical judgement, max A and set-up to don/doff hospital gown sitting upright in bed   [place] Level (Upper Body Dressing) maximum assist (25% patient effort)   Lower Body Dressing Assessment/Training   Comment,  (Lower Body Dressing) total A to don bilateral socks   [place] Level (Lower Body Dressing) dependent (less than 25% patient effort)   Grooming Assessment/Training   Position (Grooming) supported sitting   [place] Level (Grooming) minimum assist (75% patient effort);verbal cues;set up   Comment, (Grooming) min A and set-up to complete g/h tasks seated in chair, vcs to continue initiation of task   Toileting   Position (Toileting) other (see comments)  (sidelying)   Comment, (Toileting) total A for nellie cares in sidelying   [place] Level (Toileting) dependent (less than 25% patient effort)   Clinical Impression   Criteria for Skilled Therapeutic Interventions Met (OT) Yes, treatment indicated   OT Diagnosis Decreased ADL I/safety, functional mobility, activity tolerance, cognition, muscular strength and endurance   OT Problem List-Impairments impacting ADL problems related to;activity tolerance impaired;balance;cognition;coordination;mobility;range of motion (ROM);strength;pain;post-surgical precautions;postural control   Assessment of Occupational Performance 5 or more Performance Deficits   Identified Performance Deficits dressing, bathing, toileting, g/h tasks, cognition, functional mobility, IADL tasks, activity tolerance, leisure   Planned Therapy Interventions (OT) ADL retraining;balance training;cognition;ROM;strengthening;transfer training;home program guidelines;progressive activity/exercise   Clinical Decision Making Complexity (OT) low complexity   Anticipated Equipment Needs Upon Discharge (OT) other (see comments)  (TBD; may benefit from shower chair)   Risk & Benefits of therapy have been explained evaluation/treatment results reviewed;care plan/treatment goals reviewed;participants included;patient;spouse/significant other   Clinical Impression Comments Pt significantly below baseline function, would benefit from continued skilled OT during IP stay to progress ADL IND/safety, cognition,  functional mobility.   OT Discharge Planning   OT Discharge Recommendation (DC Rec) Transitional Care Facility   OT Rationale for DC Rec Pt significantly below baseline function for ADL tasks, functional mobility, cognition, activity tolerance. Currently, pt requiring total A for toileting, transfers via OH lift however following simple commands. Recommend continued skilled therapy to progress ADL/IADL I/safety.   OT Brief overview of current status OH lift bed<>chair, max A x 2 log rolling, total A LB dressing, min A seated g/h tasks   Total Evaluation Time (Minutes)   Total Evaluation Time (Minutes) 8   OT Goals   Therapy Frequency (OT) 5 times/wk   OT Predicted Duration/Target Date for Goal Attainment 07/08/22   OT Goals Hygiene/Grooming;Upper Body Dressing;Lower Body Dressing;Upper Body Bathing;Lower Body Bathing;Toilet Transfer/Toileting;Cognition;OT Goal 1   OT: Hygiene/Grooming modified independent   OT: Upper Body Dressing Modified independent;within precautions;including set-up/clothing retrieval   OT: Lower Body Dressing Modified independent;within precautions;including set-up/clothing retrieval   OT: Upper Body Bathing Supervision/stand-by assist;using adaptive equipment;within precautions   OT: Lower Body Bathing Supervision/stand-by assist;using adaptive equipment;with precautions   OT: Toilet Transfer/Toileting Supervision/stand-by assist;toilet transfer;cleaning and garment management;using adaptive equipment;within precautions   OT: Cognitive Patient/caregiver will verbalize understanding of cognitive assessment results/recommendations as needed for safe discharge planning   OT: Goal 1 Pt will be able to demonstrate tub/shower transfer with SBA to replicate home environment.

## 2022-06-26 NOTE — PLAN OF CARE
Major Shift Events: Lethargic; opens eyes to voice or gentle shaking; slow to track. Purposefully moves all extremities. EEG in place. Afebrile. Sinus rhythm with occasional PVCs. PRN labetelol 10mg given x1 for SBP > 140. LS coarse; crackles; SpO2 mids 90s on RA to 1L O2. Nonproductive cough with encouragement. Large loose, liquid BM x3; small x2. TF advanced to 35 mL/hr at 0400; goal of 45 mL/hr. Retaining urine; bladder scanned at 2200 and 0000 for 520 both times; straight cath output of 600 mL.    Plan: Neuro assessments and EEG; notify primary team with changes.    For vital signs and complete assessments, please see documentation flowsheets.

## 2022-06-26 NOTE — PROGRESS NOTES
Neurocritical Care Progress Note    Reason for critical care admission: Bilateral SDH  Admitting Team: Neurosurgery  Date of Service:  06/26/2022  Date of Admission:  6/22/2022  Hospital Day: 5    Assessment/Plan  Louise Benítez is a 76 year old female w/ PMHx myelodysplastic syndrome, seronegative RA, chronic microcytic anemia, PAD, and recent L SDH (5/12/2022) who was admitted from her TCU on 6/22/2022 after an unwitnessed fall. Was subsequently found to have an acute on chronic L SDH, a new acute R SDH, and increased midline shift w/ trace L uncal herniation now s/p L craniotomy w/ hematoma evacuation    24 hour events: Concern for aspiration over night, Xray okay.      Neuro  #Acute on Chronic L SDH + New Acute R SDH w/ increased rightward midline shift and trace L uncal herniation s/p L craniotomy (6/22) w/ hematoma evacuation  #Recent L SDH 2/2 mechanical fall not requiring intervention (hospitalized 5/12-5/20/2022)  -Neurochecks q2h  -HOB > 30   -sBP goal < 140 mmHg; MAP > 65 mmHg  -PRN Hydralazine/Labetalol  -Keppra increased to 1000 mg BID 6/25 for possible electrographic seizure activity  -Okay for SQH today   -PT/OT/SLP  -vEEG ordered by neurosurgery, preliminary report with possible seizure activity     #Analgesics & sedation  -Tylenol 650mg Q4h prn  -Oxycodone 5-10mg Q4h prn   -PTA Baclofen 5mg at bedtime prn for muscle spasms     CV  #HTN on admission  -cardiac monitoring  -sBP goal < 140 mmHg  -PRN Hydralazine/Labetalol     #Bradycardia, likely 2/2 large dose of propofol, resolved   -cardiac monitoring  -echo 6/24: EF 60-65%, normal RV, no significant valvular abnormalities       #PAD  No statin, recent LDL 10 (5/13/2022)     Resp  #former tobacco Use  #acute hypoxic respiratory failure  -continuous pulse ox  -maintain O2 saturations greater than 92%  -pCXR 6/24: slightly decreased right lower lobe opacity, slight increase in dense retrocardiac consolidation which may represent atelectasis versus  infection/aspiration      GI  #hx GERD  Diet: TF, FWF 30 Q4  Last BM: 6/26  GI prophylaxis: not indicated  Bowel regimen: PRN senna-docusate and Miralax     Renal/  #lactic acidosis - resolved  #hyponatremia  -Repeat lactate  -Daily BMP  -IV fluids: SL  -Electrolyte replacement protocol     Endo  #ANNALEE  -Hgb A1c 5.5% 6/22   -Monitor glucose levels     Rheum  #Seronegative Rheumatoid Arthritis  -continue PTA Plaquenil 400mg at bedtime      Heme/Onc  #Chronic Microcytic Anemia, likely 2/2 myelodysplastic syndrome  Baseline Hgb ~8.0  -s/p 2u RBC (6/22) for Hgb 7.1 prior to craniotomy  -PTA Darbepoetin Inj 60mg Q3wks (last dose 6/14; next due 7/5)  -PTA Vit B12 500mcg daily  -Daily CBC  -Hgb goal > 7 in acute period post-surgery, plt goal > 50k  -Transfuse to meet Hgb and plt goals     #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  -follow up as out patient [facility]      ID  #UTI  -UCx with no growth, ceftriaxone stopped 6/25   -Daily CBC  -Follow temperature curve     MSK  #Chronic Back Pain w/ lumbar spondylosis and hx of lumbar compression fracture  #Polymyalgia Rheumatica w/hx of GCA  #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, 2003)  -PTA Baclofen 5 mg at bedtime prn for muscle spasm     #Osteoporosis  -Hold PTA Denosumab Inj 60mg  -Hold Ca/Vit D3     #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 Upon evaluation, this patient had a high probability of imminent or life-threatening deterioration due to SDH s/p crani, acute hypoxic respiratory failure, which required my direct  attention, intervention, and personal management. Greater than 50% of my time was spent at the bedside counseling the patient and/or coordinating care regarding services listed in this note.    The patient was seen and discussed with the NCC attending, Dr. Lucy Reynolds.    Allyson Washington APRN, CNP  Neurocritical Care *[id number]    24 Hour Vital Signs Summary:  Temp: 98.8  F (37.1  C) Temp  Min: 98.5  F (36.9  C)  Max: 99.5  F (37.5  C)  Resp: 14 Resp  Min: 8  Max: 23  SpO2: 94 % SpO2  Min: 91 %  Max: 100 %  Pulse: 90 Pulse  Min: 71  Max: 106    No data recorded  BP: 135/50 Systolic (24hrs), Av , Min:111 , Max:155   Diastolic (24hrs), Av, Min:43, Max:73    Respiratory monitoring:   Resp: 14    I/O last 3 completed shifts:  In: 1184 [I.V.:99; NG/GT:635]  Out: 1434 [Urine:1000; Emesis/NG output:400; Drains:34]    Current Medications:    cyanocobalamin  500 mcg Oral Daily     hydroxychloroquine  400 mg Oral or Feeding Tube At Bedtime     levETIRAcetam  1,000 mg Oral or Feeding Tube BID     multivitamins w/minerals  15 mL Per Feeding Tube Daily     polyethylene glycol  17 g Oral Daily     potassium & sodium phosphates  1 packet Oral or Feeding Tube Q4H     protein modular  1 packet Per Feeding Tube BID 09 12     senna-docusate  2 tablet Oral BID     sodium chloride (PF)  3 mL Intracatheter Q8H       PRN Medications:  acetaminophen, baclofen, bisacodyl, hydrALAZINE, labetalol, melatonin, naloxone **OR** naloxone **OR** naloxone **OR** naloxone, ondansetron **OR** ondansetron, oxyCODONE, sodium chloride (PF)    Infusions:      No Known Allergies    Physical Examination:  Vitals: /50   Pulse 90   Temp 98.8  F (37.1  C) (Axillary)   Resp 14   Wt 71.3 kg (157 lb 3 oz)   SpO2 94%   BMI 26.98 kg/m    General: Adult female patient, lying in bed, critically-ill, family at bedside  HEENT: Surgical incision CDI, no icterus, oral cavity/oropharynx pink and moist, edema noted to left orbit   Cardiac: RRR per  monitor. No obvious murmur, rub, or gallop.   Chest: Coarse lung sounds from upper airway secretions. She is not hypoxic.   Abdomen: Soft, non-distended.  Extremities: Appears adequately perfused.   Skin: No obvious rash or lesion  Psych: Calm  Neuro: Lethargic, opens eyes with significant stimulation. Non-verbal. Follows commands. PERRL. Slightly weaker on left. Purposeful.     NIHSS  1a. Level of Consciousness 1-->Not alert, but arousable by minor stimulation to obey, answer, or respond   1b. LOC Questions 2-->Answers neither question correctly   1c. LOC Commands 1-->Performs one task correctly   2.   Best Gaze 0-->Normal   3.   Visual 0-->No visual loss   4.   Facial Palsy 0-->Normal symmetrical movements   5a. Motor Arm, Left 1-->Drift, limb holds 90 (or 45) degrees, but drifts down before full 10 seconds, does not hit bed or other support   5b. Motor Arm, Right 1-->Drift, limb holds 90 (or 45) degrees, but drifts down before full 10 secs, does not hit bed or other support   6a. Motor Leg, Left 1-->Drift, leg falls by the end of the 5-sec period but does not hit bed   6b. Motor Leg, right 1-->Drift, leg falls by the end of the 5-sec period but does not hit bed   7.   Limb Ataxia 0-->Absent   8.   Sensory 0-->Normal, no sensory loss   9.   Best Language 0-->No aphasia, normal   10. Dysarthria (UN) Intubated or other physical barrier   11. Extinction and Inattention  0-->No abnormality   Total 8 (06/23/22 1058)       Labs and imaging:    Results for orders placed or performed during the hospital encounter of 06/22/22 (from the past 24 hour(s))   Glucose by meter   Result Value Ref Range    GLUCOSE BY METER POCT 103 (H) 70 - 99 mg/dL   Glucose by meter   Result Value Ref Range    GLUCOSE BY METER POCT 103 (H) 70 - 99 mg/dL   Glucose by meter   Result Value Ref Range    GLUCOSE BY METER POCT 110 (H) 70 - 99 mg/dL   Basic metabolic panel   Result Value Ref Range    Creatinine 0.42 (L) 0.51 - 0.95 mg/dL    Sodium 137  136 - 145 mmol/L    Potassium 3.7 3.4 - 5.3 mmol/L    Urea Nitrogen 19.5 8.0 - 23.0 mg/dL    Chloride 104 98 - 107 mmol/L    Carbon Dioxide (CO2) 23 22 - 29 mmol/L    Anion Gap 10 7 - 15 mmol/L    Glucose 113 (H) 70 - 99 mg/dL    GFR Estimate >90 >60 mL/min/1.73m2    Calcium 8.5 (L) 8.8 - 10.2 mg/dL   CBC with platelets   Result Value Ref Range    WBC Count 10.7 4.0 - 11.0 10e3/uL    RBC Count 2.27 (L) 3.80 - 5.20 10e6/uL    Hemoglobin 7.2 (L) 11.7 - 15.7 g/dL    Hematocrit 22.8 (L) 35.0 - 47.0 %     78 - 100 fL    MCH 31.7 26.5 - 33.0 pg    MCHC 31.6 31.5 - 36.5 g/dL    RDW 19.7 (H) 10.0 - 15.0 %    Platelet Count 352 150 - 450 10e3/uL   Magnesium   Result Value Ref Range    Magnesium 1.8 1.7 - 2.3 mg/dL   Phosphorus   Result Value Ref Range    Phosphorus 2.5 2.5 - 4.5 mg/dL       All relevant imaging and laboratory values personally reviewed.

## 2022-06-27 NOTE — PROGRESS NOTES
Neurocritical Care Progress Note    Reason for critical care admission: Bilateral SDH  Admitting Team: Neurosurgery  Date of Service:  06/27/2022  Date of Admission:  6/22/2022  Hospital Day: 6    Assessment/Plan  Louise Benítez is a 76 year old female w/ PMHx myelodysplastic syndrome, seronegative RA, chronic microcytic anemia, PAD, and recent L SDH (5/12/2022) who was admitted from her TCU on 6/22/2022 after an unwitnessed fall. Was subsequently found to have an acute on chronic L SDH, a new acute R SDH, and increased midline shift w/ trace L uncal herniation now s/p L craniotomy w/ hematoma evacuation.    24 hour events: Increase in oxygen requirement overnight. Was transfused 1 unit(s) PRBC this AM. On oxymask now (up to 6L).     Neuro  #Acute on chronic L SDH + new acute R SDH w/ increased rightward midline shift and trace L uncal herniation s/p L craniotomy (6/22) w/ hematoma evacuation  #Recent L SDH 2/2 mechanical fall not requiring intervention (hospitalized 5/12-5/20/2022)  -Neurochecks q2h, current  -HOB > 30   -SBP goal < 140 mmHg; MAP > 65 mmHg  -PRN Hydralazine/Labetalol  -Keppra increased to 1000 mg BID 06/25 with c/f seizures  -PT/OT/SLP  -vEEG report  from 06/26 demonstrates that seizures were not seen (patient was on 750 mg of keppra). Moderate diffuse slowing, severe left hemispheric slowing (nonspecific enceph).    #Metabolic encephalopathy- r/o seizure. New acute on chronic SDH contributing factor  -cont vEEG for one more day  -will consider decreasing keppra back to home dose    #Analgesics & sedation  -Tylenol 650mg Q4h prn  -Oxycodone 5-10mg Q4h prn   -PTA Baclofen 5mg - discontinued     CV  #HTN on admission  -cardiac monitoring  -sBP goal < 140 mmHg  -PRN Hydralazine/Labetalol     #Bradycardia, likely 2/2 large dose of propofol, resolved   -cardiac monitoring  -echo 6/24: EF 60-65%, normal RV, no significant valvular abnormalities       #PAD  No statin, recent LDL 10  (5/13/2022)     Resp  #former tobacco Use  #acute hypoxic respiratory failure- requiring as high as 6 L oxymask  -CXR this AM  -albuterol/mucomyst q6hr  -chest physiotherapy  -continuous pulse ox  -maintain O2 saturations greater than 92%  -concern for aspiration event over the weekend, pCXR improving     GI  #hx GERD  Diet: TF, FWF 30 Q4  Last BM: 6/27  GI prophylaxis: not indicated  Bowel regimen: PRN senna-docusate and Miralax     Renal/  #lactic acidosis - resolved  -Daily BMP  -IV fluids: none  -Electrolyte replacement protocol     Endo  #ANNALEE  -Hgb A1c 5.5% 6/22   -Monitor glucose levels     Rheum  #Seronegative Rheumatoid Arthritis  -continue PTA Plaquenil 400mg at bedtime      Heme/Onc  #Chronic Microcytic Anemia, likely 2/2 myelodysplastic syndrome  Baseline Hgb ~8.0  -s/p 2 unit(s) PRBC (6/22) for Hgb 7.1 prior to craniotomy, received 1 unit(s) PRBC 06/27  -PTA Darbepoetin Inj 60mg Q3wks (last dose 6/14; next due 7/5)  -PTA Vit B12 500mcg daily  -Daily CBC  -Hgb goal > 7 in acute period post-surgery, plt goal > 50k  -Transfuse to meet Hgb and plt goals     #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  -follow up as out patient [facility]      ID  #UTI - resolved  -UCx with no growth, ceftriaxone stopped 6/25   -Daily CBC  -Follow temperature curve     MSK  #Chronic Back Pain w/ lumbar spondylosis and hx of lumbar compression fracture  #Polymyalgia Rheumatica w/hx of GCA  #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, 2003)  -PTA Baclofen 5 mg at bedtime PRN - discontinued      #Osteoporosis  -Hold PTA Denosumab Inj 60mg  -Hold Ca/Vit D3     #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 Upon evaluation, this patient had a high probability of imminent or life-threatening deterioration due to SDH s/p crani, acute hypoxic respiratory failure, which required my direct attention, intervention, and personal management. Greater than 50% of my time was spent at the bedside counseling the patient and/or coordinating care regarding services listed in this note.    The patient was seen and discussed with the NCC attending, Dr. Bethany Gilliam.    John Denny, APRN, CNP  Neurocritical Care  *[phone]  Text Page    24 Hour Vital Signs Summary:  Temp: 99.2  F (37.3  C) Temp  Min: 98.4  F (36.9  C)  Max: 100.1  F (37.8  C)  Resp: 19 Resp  Min: 11  Max: 24  SpO2: 95 % SpO2  Min: 90 %  Max: 100 %  Pulse: 67 Pulse  Min: 60  Max: 93    No data recorded  BP: 122/46 Systolic (24hrs), Av , Min:111 , Max:154   Diastolic (24hrs), Av, Min:43, Max:71    Respiratory monitoring:   Resp: 19    I/O last 3 completed shifts:  In: 1610 [I.V.:125; NG/GT:620]  Out: 600 [Urine:600]    Current Medications:    cyanocobalamin  500 mcg Oral Daily     heparin ANTICOAGULANT  5,000 Units Subcutaneous Q8H     hydroxychloroquine  400 mg Oral or Feeding Tube At Bedtime     levETIRAcetam  1,000 mg Oral or Feeding Tube BID     multivitamins w/minerals  15 mL Per Feeding Tube Daily     polyethylene glycol  17 g Oral Daily     protein modular  1 packet Per Feeding Tube BID 09 12     senna-docusate  2 tablet Oral BID     sodium chloride (PF)  3 mL Intracatheter Q8H       PRN Medications:  acetaminophen, baclofen, bisacodyl, hydrALAZINE, labetalol, melatonin, naloxone **OR** naloxone **OR** naloxone **OR** naloxone, ondansetron **OR** ondansetron, oxyCODONE, sodium chloride (PF)    Infusions:      No Known Allergies    Physical Examination:  Vitals: /46   Pulse 67   Temp 99.2  F (37.3  C) (Axillary)   Resp 19   Wt  72.1 kg (158 lb 15.2 oz)   SpO2 95%   BMI 27.28 kg/m    General: Adult female patient, lying in bed, critically-ill,  at bedside   HEENT: Surgical incision CDI, no icterus, oral cavity/oropharynx pink and moist, edema noted to left orbit   Cardiac: RRR per monitor. S1/S2 No obvious murmur, rub, or gallop  Chest: Rhonchi noted in upper airway along with faint wheezing, no BS in lower lung fields  Abdomen: Soft, non-distended, BS noted in all quadrants  Extremities: Appears adequately perfused  Skin: No obvious rash or lesion  Psych: Calm, cooperative  Neuro: Lethargic, opens eyes with significant stimulation. Non-verbal. Follows commands (wiggles toes). PERRL. Slightly weaker on left. Purposeful.     NIHSS  1a. Level of Consciousness 1-->Not alert, but arousable by minor stimulation to obey, answer, or respond   1b. LOC Questions 2-->Answers neither question correctly   1c. LOC Commands 1-->Performs one task correctly   2.   Best Gaze 0-->Normal   3.   Visual 0-->No visual loss   4.   Facial Palsy 0-->Normal symmetrical movements   5a. Motor Arm, Left 1-->Drift, limb holds 90 (or 45) degrees, but drifts down before full 10 seconds, does not hit bed or other support   5b. Motor Arm, Right 1-->Drift, limb holds 90 (or 45) degrees, but drifts down before full 10 secs, does not hit bed or other support   6a. Motor Leg, Left 1-->Drift, leg falls by the end of the 5-sec period but does not hit bed   6b. Motor Leg, right 1-->Drift, leg falls by the end of the 5-sec period but does not hit bed   7.   Limb Ataxia 0-->Absent   8.   Sensory 0-->Normal, no sensory loss   9.   Best Language 0-->No aphasia, normal   10. Dysarthria (UN) Intubated or other physical barrier   11. Extinction and Inattention  0-->No abnormality   Total 8 (06/23/22 1058)       Labs and imaging:    Results for orders placed or performed during the hospital encounter of 06/22/22 (from the past 24 hour(s))   CT Head w/o Contrast    Narrative    CT  HEAD W/O CONTRAST 6/26/2022 4:42 AM    History: s/p subdural VIOLA drain removal   ICD-10:    Comparison: 6/24/2022    Technique: Using multidetector thin collimation helical acquisition  technique, axial, coronal and sagittal CT images from the skull base  to the vertex were obtained without intravenous contrast.   (topogram) image(s) also obtained and reviewed.    Findings: Postsurgical changes of left frontoparietal temporal  craniotomy and underlying hematoma evacuation with interval surgical  drain removal. Deep to the craniotomy site there is residual subdural  fluid collection measuring up to 8 mm which is unchanged from the  prior exam. Additionally there is a 6 mm subdural collection along the  right frontal lobe with extension along the anterior aspect of the  superior sagittal sinus which is also unchanged. Unchanged appearance  of mild thickening of the tentorial leaflets likely representing  subdural hemorrhage. There is a unchanged mild amount of mass effect  on the underlying cerebral sulci and a unchanged 6 mm of left-to-right  midline shift.. Gray/white matter differentiation in both cerebral  hemispheres is preserved. Ventricular caliber is unchanged. The basal  cisterns are clear. Slightly reduced pneumocephalus    The bony calvaria and the bones of the skull base are normal. The  visualized portions of the paranasal sinuses and mastoid air cells are  clear.      Impression    Impression:  1. Unchanged size of left cerebral convexity subdural fluid collection  status post subdural drain removal.  2. Unchanged size of right frontal subdural hemorrhage with extension  along the anterior falx.  3. Unchanged size of small subdural hemorrhage extending along the  tentorial leaflets.  4. Unchanged 6 mm of left-to-right midline shift.    I have personally reviewed the examination and initial interpretation  and I agree with the findings.    SAVANNA COHEN MD         SYSTEM ID:  [id number]   Glucose by  meter   Result Value Ref Range    GLUCOSE BY METER POCT 128 (H) 70 - 99 mg/dL   Glucose by meter   Result Value Ref Range    GLUCOSE BY METER POCT 123 (H) 70 - 99 mg/dL       All relevant imaging and laboratory values personally reviewed.

## 2022-06-27 NOTE — PLAN OF CARE
Major Shift Events: Pt is lethargic; opens eyes to voice or gentle shaking. Intermittently following commands; oriented to self- able to mouth name and . Purposefully moves all extremities. EEG in place. Tmax 101.1, PRN tylenol given. Sinus rhythm with occasional PVCs; 10 beat run of SVT at 0120. PRN labetelol 10mg x1 and 20mg x2 given for SBP >140. LS coarse; crackles. SpO2 low 90s on RA; pt desaturates when somnolent or when unable to fully clear secretions, so pt intermittently on 1L NC to maintain O2 saturations > 92%. NT suction x 2 + oral suctioning, produced small amount of thick, creamy secretions. NPO; TF at goal of 45mL/hr with standard flush. 1 large, loose BM overnight. Pt continues to retain urine; see MAR for bladder scan volumes; straight cath output of 800 mL at 0000.    Plan: Neurological assessments and EEG; notify primary team of changes.  For vital signs and complete assessments, please see documentation flowsheets.

## 2022-06-27 NOTE — PLAN OF CARE
Major Shift Events:  Hgb stabilized at 7.6 after 1 unit. Pulmonary hygiene maintained - suctioning, oral care, and up to chair w OT. Nose bleed r/t NT suctioning, CXR to follow-up. Prn labetalol given for elevated BP twice this shift. Straight catheterization continued for retention. EEG discontinued. Spouse updated at bedside.  Plan: Continue to monitor pt neuro/resp status and labs, updating MD with any changes. For vital signs and complete assessments, please see documentation flowsheets.    Goal Outcome Evaluation:  Plan of Care Reviewed With: patient, spouse   Overall Patient Progress: no change

## 2022-06-27 NOTE — PROGRESS NOTES
[facility], [place]   06/27/2022  Neurosurgery Progress Note:    Assessment:  Ms. Benítez is a 76-year-old female with PMHx myelodysplastic syndrome, seronegative RA, chronic microcytic anemia, PAD, and recent L SDH (5/12/2022) who arrived to [facility] via EMS after an unwitnessed fall at home.  Shortly after arriving, the patient was complaining of a significant headache and nausea, and then her right pupil dilated.  She was no longer protecting her airway, so she was sedated and intubated.  Her right eye pupil then returned to a normal caliber.  A head CT demonstrated a left acute subdural approximately 2.3 cm thick with 12 mm of rightward midline shift.  She was taken to the OR on 6/22 for emergent L craniotomy for SDH evacuation. She was extubated on POD1. Concerns for tube feed aspiration on 6/25.     Other diagnoses include:  #Brain compression  #Open evacuation of hematoma on day of admission   #Hyponatremia  #Pulmonary edema    Plan:  - Serial neuro exams  - Follow EEG read  - Pain control  - HOB > 30 degrees  - VIOLA removed 6/25  - Keppra to be taken until follow-up with neurology due to findings on EEG  - Bowel regimen  - PRN antiemetics  - NG tube -tube feeds initiated  - SLP assessment   - PT/OT  - SCDs and subcutaneous heparin for DVT proph    -----------------------------------  Bruce Graves MD  Neurosurgery Resident, PGY-2      Please contact neurosurgery resident on call with questions.    Dial * * [phone], enter [phone] when prompted.    -----------------------------------    Interval History: Exam beginning to slowly improved.  Now more consistently following commands x4.  Does not appear grossly asymmetric.  Significant dysarthria, however can communicate to some degree with yes and noes.  EEG with equivocal findings for seizure like activity.  Keppra increased to 1 g twice daily.    Objective:   Temp:  [98.4  F (36.9  C)-100.1  F (37.8  C)] 98.9  F (37.2  C)  Pulse:  [60-93]  82  Resp:  [11-24] 11  BP: (111-154)/(43-71) 144/60  SpO2:  [90 %-100 %] 95 %  I/O last 3 completed shifts:  In: 1610 [I.V.:125; NG/GT:620]  Out: 600 [Urine:600]    Gen: Appears comfortable, NAD, laying in bed, appearing older than stated age  Wound: dressing with spots of dried blood; otherwise dry and intact  Neurologic:  Opens eyes to  voice, intermittently following commands, squeezes hands when instructed  Dysarthric, extreme difficulty saying yes and no.  PEERL, corneal and cough present  Purposeful command following movements in all 4 extremities  LUE purposeful and antigravity  RUE purposeful and antigravity  BLE antigravity when stimulated        LABS:  Recent Labs   Lab 06/26/22 2348 06/26/22 2000 06/26/22 0407 06/25/22 0329 06/25/22 0323 06/24/22 0403 06/24/22  0400   NA  --   --  137  --  136  --  134*   POTASSIUM  --   --  3.7  --  3.9  --  4.0   CHLORIDE  --   --  104  --  103  --  104   CO2  --   --  23  --  23  --  22   ANIONGAP  --   --  10  --  10  --  8   * 128* 113*   < > 120*   < > 114*   BUN  --   --  19.5  --  20.2  --  15.9   CR  --   --  0.42*  --  0.44*  --  0.46*   MENDEZ  --   --  8.5*  --  8.7*  --  8.3*    < > = values in this interval not displayed.       Recent Labs   Lab 06/26/22 0407   WBC 10.7   RBC 2.27*   HGB 7.2*   HCT 22.8*      MCH 31.7   MCHC 31.6   RDW 19.7*          IMAGING:  Recent Results (from the past 24 hour(s))   CT Head w/o Contrast    Narrative    CT HEAD W/O CONTRAST 6/26/2022 4:42 AM    History: s/p subdural VIOLA drain removal   ICD-10:    Comparison: 6/24/2022    Technique: Using multidetector thin collimation helical acquisition  technique, axial, coronal and sagittal CT images from the skull base  to the vertex were obtained without intravenous contrast.   (topogram) image(s) also obtained and reviewed.    Findings: Postsurgical changes of left frontoparietal temporal  craniotomy and underlying hematoma evacuation with interval  surgical  drain removal. Deep to the craniotomy site there is residual subdural  fluid collection measuring up to 8 mm which is unchanged from the  prior exam. Additionally there is a 6 mm subdural collection along the  right frontal lobe with extension along the anterior aspect of the  superior sagittal sinus which is also unchanged. Unchanged appearance  of mild thickening of the tentorial leaflets likely representing  subdural hemorrhage. There is a unchanged mild amount of mass effect  on the underlying cerebral sulci and a unchanged 6 mm of left-to-right  midline shift.. Gray/white matter differentiation in both cerebral  hemispheres is preserved. Ventricular caliber is unchanged. The basal  cisterns are clear. Slightly reduced pneumocephalus    The bony calvaria and the bones of the skull base are normal. The  visualized portions of the paranasal sinuses and mastoid air cells are  clear.      Impression    Impression:  1. Unchanged size of left cerebral convexity subdural fluid collection  status post subdural drain removal.  2. Unchanged size of right frontal subdural hemorrhage with extension  along the anterior falx.  3. Unchanged size of small subdural hemorrhage extending along the  tentorial leaflets.  4. Unchanged 6 mm of left-to-right midline shift.    I have personally reviewed the examination and initial interpretation  and I agree with the findings.    SAVANNA COHEN MD         SYSTEM ID:  [id number]

## 2022-06-28 NOTE — PROGRESS NOTES
[facility], [place]   06/28/2022  Neurosurgery Progress Note:    Assessment:  Ms. Benítez is a 76-year-old female with PMHx myelodysplastic syndrome, seronegative RA, chronic microcytic anemia, PAD, and recent L SDH (5/12/2022) who arrived to [facility] via EMS after an unwitnessed fall at home.  Shortly after arriving, the patient was complaining of a significant headache and nausea, and then her right pupil dilated.  She was no longer protecting her airway, so she was sedated and intubated.  Her right eye pupil then returned to a normal caliber.  A head CT demonstrated a left acute subdural approximately 2.3 cm thick with 12 mm of rightward midline shift.  She was taken to the OR on 6/22 for emergent L craniotomy for SDH evacuation. She was extubated on POD1. Concerns for tube feed aspiration on 6/25.     Other diagnoses include:  #Brain compression  #Open evacuation of hematoma on day of admission   #Hyponatremia  #Pulmonary edema    Plan:  - Serial neuro exams  - Follow EEG read  - Pain control  - HOB > 30 degrees  - VIOLA removed 6/25  - Keppra to be taken until follow-up with neurology due to findings on EEG  - Bowel regimen  - PRN antiemetics  - NG tube -tube feeds initiated  - SLP assessment   - PT/OT  - SCDs and subcutaneous heparin for DVT proph  -Aggressive pulmonary hygiene    -----------------------------------  Bruce Graves MD  Neurosurgery Resident, PGY-2      Please contact neurosurgery resident on call with questions.    Dial * * [phone], enter [phone] when prompted.    -----------------------------------    Interval History: Neurological exam continues steadily improved.  Following commands x4.  Continues with gurgling.  Spiked fever overnight.  Pancultured.  Vancomycin and Zosyn started.  Objective:   Temp:  [97.6  F (36.4  C)-101.7  F (38.7  C)] 97.9  F (36.6  C)  Pulse:  [64-85] 64  Resp:  [16-20] 18  BP: ()/(37-74) 129/55  SpO2:  [91 %-100 %] 96 %  I/O last 3 completed  shifts:  In: 2395 [I.V.:700; NG/GT:360]  Out: 1000 [Urine:1000]    Gen: Appears comfortable, NAD, laying in bed, appearing older than stated age  Wound: Incision clean dry-no signs of leaking  Neurologic:  Opens eyes to  voice, consistently following commands, squeezes hands when instructed  Dysarthric, extreme difficulty saying yes and no.   PEERL, corneal and cough present  Purposeful command following movements in all 4 extremities  LUE purposeful and antigravity  RUE purposeful and antigravity  BLE antigravity when stimulated        LABS:  Recent Labs   Lab 06/28/22  0624 06/27/22  0535 06/27/22  0418 06/26/22 2000 06/26/22  0407    137  --   --  137   POTASSIUM 4.4 4.2  --   --  3.7   CHLORIDE 108* 104  --   --  104   CO2 26 27  --   --  23   ANIONGAP 9 6*  --   --  10   * 122* 117*   < > 113*   BUN 25.5* 23.1*  --   --  19.5   CR 0.46* 0.42*  --   --  0.42*   MENDEZ 8.3* 8.8  --   --  8.5*    < > = values in this interval not displayed.       Recent Labs   Lab 06/28/22 0624   WBC 13.9*   RBC 2.58*   HGB 8.0*   HCT 25.8*      MCH 31.0   MCHC 31.0*   RDW 20.4*          IMAGING:  Recent Results (from the past 24 hour(s))   XR Chest Port 1 View    Narrative    Exam: XR CHEST PORT 1 VIEW, 6/27/2022 3:58 PM    Indication: eval respiratory issues    Comparison: 6/25/2022    Findings:   Enteric tube with side port projecting over the stomach and tip  excluded from field-of-view. Heart is enlarged. Slightly increased  pleural effusions. Indistinct pulmonary vasculature with increased mid  and lower lung predominant interstitial perihilar opacities and  atelectasis. Atherosclerotic calcifications at the aortic knob.      Impression    Impression: Increased effusions, atelectasis and interstitial edema.    I have personally reviewed the examination and initial interpretation  and I agree with the findings.    JOVANA CORRIGAN MD         SYSTEM ID:  [id number]

## 2022-06-28 NOTE — PLAN OF CARE
Major Shift Events: Tmax 101.7. Pan cultured, vanc/zosyn started. Requiring NT suctioning q1-2 hr. Copious amounts of thick creamy/red tinged sputum. Requiring 1-3L oxymask, without O2, sats in mid to high 80s. NSR 60s-80s, rare PVCs. Labetalol x2 to maintain SBP < 140. Opening eyes to voice and able to follow some simple commands. Not verbalizing, will nod yes/no inconsistently. Pupils brisk and equal. Moving all extremities spontaneously, 3/5 throughout. Loose BM x4. Retaining urine, straight cath x1 for urine sample at 0000. Bladder scan at 0530 for 360. Order to straight cath > 700.     Plan: Closely monitor respiratory status and increasing O2 and suction needs. Q2 neuros. Encourage coughing to clear secretions if able. Pending sputum and blood cultures.     For vital signs and complete assessments, please see documentation flowsheets.

## 2022-06-28 NOTE — PHARMACY-VANCOMYCIN DOSING SERVICE
"Pharmacy Vancomycin Initial Note  Date of Service 2022  Patient's  1945  76 year old, female    Indication: Aspiration Pneumonia    Current estimated CrCl = Estimated Creatinine Clearance: 107.5 mL/min (A) (based on SCr of 0.42 mg/dL (L)).    Creatinine for last 3 days  2022:  3:23 AM Creatinine 0.44 mg/dL  2022:  4:07 AM Creatinine 0.42 mg/dL  2022:  5:35 AM Creatinine 0.42 mg/dL    Recent Vancomycin Level(s) for last 3 days  No results found for requested labs within last 72 hours.      Vancomycin IV Administrations (past 72 hours)      No vancomycin orders with administrations in past 72 hours.                Nephrotoxins and other renal medications (From now, onward)    Start     Dose/Rate Route Frequency Ordered Stop    22 1200  vancomycin 1250 mg in 0.9% NaCl 250 mL intermittent infusion 1,250 mg         1,250 mg  over 90 Minutes Intravenous EVERY 12 HOURS 22 2304      22 2330  vancomycin (VANCOCIN) 1,750 mg in sodium chloride 0.9 % 500 mL intermittent infusion         1,750 mg  over 120 Minutes Intravenous ONCE 22 2301      22 2300  piperacillin-tazobactam (ZOSYN) 4.5 g vial to attach to  mL bag        Note to Pharmacy: For [facility], [facility] and [facility]: For Zosyn-naive patients, use the \"Zosyn initial dose + extended infusion\" order panel.    4.5 g  over 30 Minutes Intravenous EVERY 6 HOURS 22 2258            Contrast Orders - past 72 hours (72h ago, onward)    None          [employer] Prediction of Planned Initial Vancomycin Regimen  Loading dose: 1750 mg at 23:30 2022.  Regimen: 1250 mg IV every 12 hours.  Start time: 23:03 on 2022  Exposure target: AUC24 (range)400-600 mg/L.hr   AUC24,ss: 536 mg/L.hr  Probability of AUC24 > 400: 78 %  Ctrough,ss: 15.8 mg/L  Probability of Ctrough,ss > 20: 32 %  Probability of nephrotoxicity (Lodise BUCK ): 11 %          Plan:  1. Give vancomycin 1750mg IV x1 now, then start vancomycin 1250mg IV q12 " hours  2. Vancomycin monitoring method: AUC  3. Vancomycin therapeutic monitoring goal: 400-600 mg*h/L  4. Pharmacy will check vancomycin levels as appropriate in 1-3 Days.    5. Serum creatinine levels will be ordered daily for the first week of therapy and at least twice weekly for subsequent weeks.      Charles Morales, PharmD, BCPS

## 2022-06-28 NOTE — PROGRESS NOTES
Major Shift Events:  Opens eyes to voice, able to follow select simple commands. Not verbalizing, intermittently nods yes/no. Moves all extremities spontaneously, 3/5 strength throughout. SBP < 140 goal met w/ PRN labetalol x2. O2 weaned to 1L oxymask; unable to tolerate RA. Requiring NT suctioning q1-2 hr. Loose BM x2. Straight cath x1 d/t urine retention >700 ml per order, several unmeasured incontinent voids afterwards. Purewick placed. NSG notified of small sanguinous drainage from crani site as well as clear/serous drainage, assessed at bedside. Additional staple placed at incision site.  Plan: Continue to monitor respiratory status and incisional drainage. Neuro checks q4h overnight.  For vital signs and complete assessments, please see documentation flowsheets.

## 2022-06-29 NOTE — PLAN OF CARE
Goal Outcome Evaluation:  Problem: (Enteral Nutrition)  Goal: Feeding Tolerance  Outcome: Ongoing, Progressing  Intervention: Prevent and Manage Feeding Disruption  Flowsheets (Taken 6/29/2022 7299)  Nutrition Support Management: (Continue EN as ordered via current access/NGT). Minimize disruptions to infusions as able    tube feeding initiated    other (see comments)

## 2022-06-29 NOTE — PLAN OF CARE
Major Shift Events: Pt nonverbal, able to follow some commands, wiggling toes and fingers, squeezing hands, raise arms and legs. Unable to stick out tongue, smile, grit teeth, etc. Restless and lethargic periods. NSR, PRN's utilized to keep systolic under goal of 140. Afebrile. Oxymask 7L, requiring oral/NT suctioning multiple times every hour due to copious secretions. Thick creamy white/pink. Neurosurg notified of increased secretions and inquiring if wanting repeat CXR, MD to bedside and no new orders at this time. NPO + TF (at goal). Voiding spontaneously w/ external cath.   Plan: Continue to monitor respiratory and neuro status, wean oxygen as tolerated.   For vital signs and complete assessments, please see documentation flowsheets.

## 2022-06-29 NOTE — PROGRESS NOTES
Neurocritical Care Progress Note    Reason for critical care admission: Bilateral SDH  Admitting Team: Neurosurgery  Date of Service:  06/29/2022  Date of Admission:  6/22/2022  Hospital Day: 8    Assessment/Plan  Louise Benítez is a 76 year old female w/ PMHx myelodysplastic syndrome, seronegative RA, chronic microcytic anemia, PAD, and recent L SDH (5/12/2022) who was admitted from her TCU on 6/22/2022 after an unwitnessed fall. Was subsequently found to have an acute on chronic L SDH, a new acute R SDH, and increased midline shift w/ trace L uncal herniation now s/p L craniotomy w/ hematoma evacuation.    24 hour events: No clear hypoxia overnight, oxygen weaned this AM. Thin posterior oropharynx secretions, no obvious lung congestion.     Neuro  #Acute on chronic L SDH + new acute R SDH w/ increased rightward midline shift and trace L uncal herniation s/p L craniotomy (6/22) w/ hematoma evacuation  #Recent L SDH 2/2 mechanical fall not requiring intervention (hospitalized 5/12-5/20/2022)  -Neurochecks q2h during the day and q4h at night  -HOB > 30   -SBP goal < 140 mmHg; MAP > 65 mmHg  -PRN Hydralazine/Labetalol  -completed Kera x 7 days on 6/28   -PT/OT- up in chair daily  -SLP when able   -vEEG report: Moderate diffuse slowing, severe left hemispheric slowing (nonspecific enceph).    #Metabolic encephalopathy- New acute on chronic SDH contributing factor. improving  -OFF vEEG    #Analgesics & sedation  -Tylenol 650mg Q4h prn     CV  #HTN on admission  -cardiac monitoring  -sBP goal < 140 mmHg  -PRN Hydralazine/Labetalol     #Bradycardia - resolved   -cardiac monitoring  -echo 6/24: EF 60-65%, normal RV, no significant valvular abnormalities       #PAD  No statin, recent LDL 10 (5/13/2022)     Resp  #former tobacco Use  #acute hypoxic respiratory failure- still requiring oxygen  -aspiration pneumonia versus pneumonitis   #pulm edema  - q1hr suctioning requirement.   -try Atropine drops to dry secretions    -another gentle diuresis- today. Lasix 20 mg  -albuterol/mucomyst q6hr  -chest physiotherapy  -continuous pulse ox  -maintain O2 saturations greater than 92%  -CXR 6/29- stable appearing pulmonary opacities, possible vascular congestion      GI  #hx GERD   Diet: TF, FWF 30 Q4  Last BM: 6/28  GI prophylaxis: not indicated  Bowel regimen: PRN senna-docusate and Miralax     Renal/  #hyperchloremia- resolved  -Daily BMP  -IV fluids: none  -Electrolyte replacement protocol     Endo  #ANNALEE  -Hgb A1c 5.5% 6/22   -Monitor glucose levels     Rheum  #Seronegative Rheumatoid Arthritis  -continue PTA Plaquenil 400mg at bedtime      Heme/Onc  #Chronic Microcytic Anemia, likely 2/2 myelodysplastic syndrome  Baseline Hgb ~8.0  -s/p 2 unit(s) PRBC (6/22) for Hgb 7.1 prior to craniotomy, received 1 unit(s) PRBC 06/27  -PTA Darbepoetin Inj 60mg Q3wks (last dose 6/14; next due 7/5)  -PTA Vit B12 500mcg daily  -Daily CBC  -Hgb goal > 7 in acute period post-surgery, plt goal > 50k  -Transfuse to meet Hgb and plt goals     #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  -follow up as out patient [facility]      ID  #UTI - resolved  #concern for aspiration pneumonia vs pneumonitis  -UCx with no growth, ceftriaxone stopped 6/25   -Continue Zosyn, day 2. 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 Upon evaluation, this patient had a high probability of imminent or life-threatening deterioration due to SDH s/p crani, acute hypoxic respiratory failure, which required my direct attention, intervention, and personal management. Greater than 50% of my time was spent at the bedside counseling the patient and/or coordinating care regarding services listed in this note.    The patient was seen and discussed with the NCC attending, Dr. Bethany Gilliam.    Allyson Washington, APRN, CNP  Neurocritical Care *[id number]    24 Hour Vital Signs Summary:  Temp: 99.7  F (37.6  C) Temp  Min: 97.9  F (36.6  C)  Max: 99.7  F (37.6  C)  Resp: 23 Resp  Min: 18  Max: 26  SpO2: 99 % SpO2  Min: 93 %  Max: 100 %  Pulse: 78 Pulse  Min: 64  Max: 93    No data recorded  BP: 139/54 Systolic (24hrs), Av , Min:117 , Max:160   Diastolic (24hrs), Av, Min:48, Max:82    Respiratory monitoring:   Resp: 23    I/O last 3 completed shifts:  In: 1655 [I.V.:265; NG/GT:310]  Out: 1825 [Urine:1825]    Current Medications:    acetylcysteine  2 mL Nebulization Q6H     albuterol  2.5 mg Nebulization Q6H     cyanocobalamin  500 mcg Oral Daily     heparin ANTICOAGULANT  5,000 Units Subcutaneous Q8H     hydroxychloroquine  400 mg Oral or Feeding Tube At Bedtime     multivitamins w/minerals  15 mL Per Feeding Tube Daily     piperacillin-tazobactam  4.5 g Intravenous Q6H     polyethylene glycol  17 g Oral Daily     protein modular  1 packet Per Feeding Tube BID 09 12     senna-docusate  2 tablet Oral BID     sodium chloride (PF)  3 mL Intracatheter Q8H       PRN Medications:  acetaminophen, bisacodyl, hydrALAZINE, labetalol, melatonin, naloxone **OR** naloxone **OR** naloxone **OR** naloxone, ondansetron **OR** ondansetron, sodium chloride (PF)    Infusions:    No Known Allergies    Physical  "Examination:  Vitals: /54   Pulse 78   Temp 99.7  F (37.6  C) (Oral)   Resp 23   Ht 1.626 m (5' 4.02\")   Wt 73.2 kg (161 lb 6 oz)   SpO2 99%   BMI 27.69 kg/m    General: Adult female patient, sitting in chair,  at bedside   HEENT: Surgical incision CDI, no icterus, oral cavity/oropharynx pink and moist, edema noted to left orbit is improved   Cardiac: RRR per monitor. She appears adequately perfused.   Chest: Rhonchi noted in upper airway with secretions, no significant hypoxia. No tachypnea.   Abdomen: Non-distended.   Extremities: Appears adequately perfused  Skin: No obvious rash or lesion  Psych: Calm, cooperative  Neuro: Lethargic, opens eyes with verbal stimulation. Non-verbal, will some times attempt to say name. Follows commands (wiggles toes, moves arms). PERRL. Slightly weaker on left. Purposeful.     NIHSS  1a. Level of Consciousness 1-->Not alert, but arousable by minor stimulation to obey, answer, or respond   1b. LOC Questions 2-->Answers neither question correctly   1c. LOC Commands 1-->Performs one task correctly   2.   Best Gaze 0-->Normal   3.   Visual 0-->No visual loss   4.   Facial Palsy 0-->Normal symmetrical movements   5a. Motor Arm, Left 1-->Drift, limb holds 90 (or 45) degrees, but drifts down before full 10 seconds, does not hit bed or other support   5b. Motor Arm, Right 1-->Drift, limb holds 90 (or 45) degrees, but drifts down before full 10 secs, does not hit bed or other support   6a. Motor Leg, Left 1-->Drift, leg falls by the end of the 5-sec period but does not hit bed   6b. Motor Leg, right 1-->Drift, leg falls by the end of the 5-sec period but does not hit bed   7.   Limb Ataxia 0-->Absent   8.   Sensory 0-->Normal, no sensory loss   9.   Best Language 0-->No aphasia, normal   10. Dysarthria (UN) Intubated or other physical barrier   11. Extinction and Inattention  0-->No abnormality   Total 8 (06/23/22 1058)     Labs and imaging:    Results for orders placed " or performed during the hospital encounter of 06/22/22 (from the past 24 hour(s))   MRSA MSSA PCR, Nasal Swab    Specimen: Nares, Bilateral; Swab   Result Value Ref Range    MRSA Target DNA Negative Negative    SA Target DNA Negative     Narrative    The [employer]  Xpert SA Nasal Complete assay performed in the [employer]  Dx System is a qualitative in vitro diagnostic test designed for rapid detection of Staphylococcus aureus (SA) and methicillin-resistant Staphylococcus aureus (MRSA) from nasal swabs in patients at risk for nasal colonization. The test utilizes automated real-time polymerase chain reaction (PCR) to detect MRSA/SA DNA. The Xpert SA Nasal Complete assay is intended to aid in the prevention and control of MRSA/SA infections in healthcare settings. The assay is not intended to diagnose, guide or monitor treatment for MRSA/SA infections, or provide results of susceptibility to methicillin. A negative result does not preclude MRSA/SA nasal colonization.    CBC with platelets   Result Value Ref Range    WBC Count 12.5 (H) 4.0 - 11.0 10e3/uL    RBC Count 2.41 (L) 3.80 - 5.20 10e6/uL    Hemoglobin 7.5 (L) 11.7 - 15.7 g/dL    Hematocrit 24.3 (L) 35.0 - 47.0 %     (H) 78 - 100 fL    MCH 31.1 26.5 - 33.0 pg    MCHC 30.9 (L) 31.5 - 36.5 g/dL    RDW 20.0 (H) 10.0 - 15.0 %    Platelet Count 389 150 - 450 10e3/uL   XR Chest Port 1 View    Impression    RESIDENT PRELIMINARY INTERPRETATION  IMPRESSION:   1. Similar bibasilar predominant pulmonary opacities concerning for  aspiration or infection. Superimposed atelectasis and probable mild  pulmonary edema is suspected.  2. Stable pleural effusions.       All relevant imaging and laboratory values personally reviewed.

## 2022-06-29 NOTE — PROGRESS NOTES
[facility], [place]   06/29/2022  Neurosurgery Progress Note:    Assessment:  Ms. Benítez is a 76-year-old female with PMHx myelodysplastic syndrome, seronegative RA, chronic microcytic anemia, PAD, and recent L SDH (5/12/2022) who arrived to [facility] via EMS after an unwitnessed fall at home.  Shortly after arriving, the patient was complaining of a significant headache and nausea, and then her right pupil dilated.  She was no longer protecting her airway, so she was sedated and intubated.  Her right eye pupil then returned to a normal caliber.  A head CT demonstrated a left acute subdural approximately 2.3 cm thick with 12 mm of rightward midline shift.  She was taken to the OR on 6/22 for emergent L craniotomy for SDH evacuation. She was extubated on POD1. Concerns for tube feed aspiration on 6/25.     Other diagnoses include:  #Brain compression  #Open evacuation of hematoma on day of admission   #Hyponatremia  #Pulmonary edema    Plan:  - Serial neuro exams  - Follow EEG read  - Antibiotics for presumed aspiration pneumonia.   - Zosyn, cultures remain no growth to date  - Pain control  - HOB > 30 degrees  - VIOLA removed 6/25  - Keppra  - Bowel regimen  - PRN antiemetics  - NG tube -tube feeds initiated   - PEG plans--may need to discuss with SICU regarding timing.   - SLP assessment   - PT/OT  - SCDs and subcutaneous heparin for DVT proph  - Aggressive pulmonary hygiene    -----------------------------------  Bruce Graves MD  Neurosurgery Resident, PGY-2      Please contact neurosurgery resident on call with questions.    Dial * * [phone], enter [phone] when prompted.    -----------------------------------    Interval History: Afebrile. No growth from cultures. Narrowed to Zosyn for presumed aspiration PNA>   Objective:   Temp:  [97.9  F (36.6  C)-99.3  F (37.4  C)] 98.4  F (36.9  C)  Pulse:  [64-93] 71  Resp:  [18-26] 26  BP: (117-160)/(47-82) 128/51  SpO2:  [91 %-100 %] 97 %  I/O last 3  completed shifts:  In: 2340 [I.V.:965; NG/GT:295]  Out: 1925 [Urine:1925]    Gen: Appears comfortable, NAD, laying in bed, appearing older than stated age  Wound: Incision clean dry-no signs of leaking, no wetness appreciated,   Neurologic:  Opens eyes to  voice, consistently following commands, squeezes hands and wiggles toes when instructed  Dysarthric, extreme difficulty saying yes and no. Can say last name.  Grossly, PERRL  And EOMI  Reflexes 2+          LABS:  Recent Labs   Lab 06/28/22 0624 06/27/22  0535 06/27/22  0418 06/26/22 2000 06/26/22  0407    137  --   --  137   POTASSIUM 4.4 4.2  --   --  3.7   CHLORIDE 108* 104  --   --  104   CO2 26 27  --   --  23   ANIONGAP 9 6*  --   --  10   * 122* 117*   < > 113*   BUN 25.5* 23.1*  --   --  19.5   CR 0.46* 0.42*  --   --  0.42*   MENDEZ 8.3* 8.8  --   --  8.5*    < > = values in this interval not displayed.       Recent Labs   Lab 06/28/22 0624   WBC 13.9*   RBC 2.58*   HGB 8.0*   HCT 25.8*      MCH 31.0   MCHC 31.0*   RDW 20.4*          IMAGING:  No results found for this or any previous visit (from the past 24 hour(s)).

## 2022-06-29 NOTE — PROGRESS NOTES
Major Shift Events: Alertness waxing and waning throughout the day. Restless at times. Opens eyes to voice/spontaneously, able to follow simple commands. Intermittently attempts to verbalize. Moves all extremities spontaneously. SBP < 140 goal met w/ PRN labetalol x2. O2 weaned to 2L oxymask. Nasal trumpet removed per MD. Avoiding NT suctioning per provider recommendation. PRN atropine x1 for oral secretions. Loose BM x3. Purewick in place. Several unmeasured voids. 20mg Lasix administered per orders. Up in chair for most of AM. See ABG, CXR results.  Plan: Continue to monitor respiratory and neuro status. , daughter at bedside.  For vital signs and complete assessments, please see documentation flowsheets.

## 2022-06-29 NOTE — PROGRESS NOTES
CLINICAL NUTRITION SERVICES - REASSESSMENT NOTE     Nutrition Prescription    RECOMMENDATIONS FOR MDs/PROVIDERS TO ORDER:  - Total daily fluids/adjustments per MD  - Minimize disruptions to EN infusions as able     Malnutrition Status:    - Patient does not meet two of the established criteria necessary for diagnosing malnutrition     Recommendations already ordered by Registered Dietitian (RD):  - Continue current regimen via NGT: [employer] Peptide 1.5 @ 45 mL/hr (1080 mL/day) to provide 1661 kcal (28 kcal/kg), 80 g protein (1.4 g/kg), 149 g CHO, 10 g fiber, 83 g fat (40% from MCTs), and 756 mL free water daily   - Prosource x 2 = 1741 kcals (30 kcals/kg) and 102 gm PRO (1.7 gm/kg)  - Elevated HOB    Future/Additional Recommendations:  - Ongoing EN tolerance via NGT  - Weight trends      EVALUATION OF THE PROGRESS TOWARD GOALS   Diet: NPO  Nutrition Support: Farms Peptide 1.5 @ 45 mL/hr (1080 mL/day) to provide 1661 kcal (28 kcal/kg), 80 g protein (1.4 g/kg), 149 g CHO, 10 g fiber, 83 g fat (40% from MCTs), and 756 mL free water daily + Prosource x 2 = 1741 kcals (30 kcals/kg) and 102 gm PRO (1.7 gm/kg)  Intake: 7-day averages = 522 ml, 783 kcals, and 39 gm PRO + Prosource = 863 kcals (15 kcals/kg or 59%) and 61 gm PRO (1 gm/kg or 69%)     NEW FINDINGS   Nutrition/GI: Pt initiated on EN via NGT. Tolerating at goal. Last BM: 6/28    Neuro Acute on chronic L SDH + new acute R SDH w/ increased rightward midline shift and trace L uncal herniation s/p L craniotomy (6/22) w/ hematoma evacuation; Recent L SDH 2/2 mechanical fall not requiring intervention;Metabolic encephalopathy- r/o seizure. New acute on chronic SDH contributing factor. Pt nonverbal, able to follow some commands     CV Echo 6/24: EF 60-65%. PAD - no statin, recent LDL 10 (5/13/2022)     Resp acute hypoxic respiratory failure- still requiring oxygen/oxymask. Aspiration pneumonia versus pneumonitis. Pulm edema - gentle diuresis planned for today.       Renal:  FWF 30 q4hr.      Labs/meds:  BUN: 25 (H). Meds: B12; MVI; Miralax; Senokot; Prosource.     Weights:  Overall up from admit: 60.3 kg --> 73.2 kg. Cannot ro role of fluid status changes in weight increase over the short-term.     MALNUTRITION  % Intake: does not meet criteria -- < 75% for 6 days  % Weight Loss: None noted  Subcutaneous Fat Loss: None observed  Muscle Loss: Upper arm (bicep, tricep), Lower arm  (forearm), Upper leg (quadricep, hamstring), Patellar region and Posterior calf: moderate. Overall poor tone/sarcopenic   Fluid Accumulation/Edema: None noted per chart/flowsheets  Malnutrition Diagnosis: Patient does not meet two of the established criteria necessary for diagnosing malnutrition     Previous Goals   Diet adv v nutrition support within 24-48 hours   Evaluation: Met    Previous Nutrition Diagnosis  Inadequate protein-energy intake related to inability to take oral PO/vented as evidenced by NPO and meeting 0% kcal/PRO needs at present     Evaluation: Improving    CURRENT NUTRITION DIAGNOSIS  Inadequate protein-energy intake related to advancing EN and disruptions to infusions as evidenced by pt not meeting goal provisions with 7-day averages meeting 15 kcals/kg and 1 gm PRO/kg dosing weight.       INTERVENTIONS  Implementation  Enteral Nutrition - continue EN as ordered via current access/NGT. Monitor accordingly.     Goals  Total avg nutritional intake to meet a minimum of 25 kcal/kg and 1.5 g PRO/kg daily (per dosing wt 59 kg).    Monitoring/Evaluation  Progress toward goals will be monitored and evaluated per protocol.     Jyoti Xavier RD, KERRY, [facility]  Neuro ICU  Pager: [phone]

## 2022-06-30 NOTE — PROGRESS NOTES
Care Management Follow Up    Length of Stay (days): 8    Expected Discharge Date:  TBD     Patient plan of care discussed at interdisciplinary rounds: Yes    Anticipated Discharge Disposition:  TBD     Anticipated Discharge Services: TBD   Anticipated Discharge DME:  TBD    Additional Information:  Pt continue requiring ICU level of care.  Pt is extubated.  PT/OT are following and TCU need is anticipated, once medically stable.  RNCC was informed that pt family would like to have care conf. with the team for update.  RNCC visited pt and spouse for support and discuss about their request.  Pt spouse stated the team have been updating them well about the plan of care and happy with the care pt is receiving.  Pt spouse stated they would like to have one of the provider to meet with him tomorrow and call the dtr to provide update.  Pt spouse stated they were able to do that last week and it worked with his dtrs.    RNCC agreed to share his request with the team and arrange it for tomorrow.  RNCC shared pt spouse request with Neuro surgery team.  Neuro surgery team will check their availability and will update pt spouse.  RNCC will cont to follow plan of care.      Kyle Johnston RN, PHN, BSN  4A and 4E/ ICU  Care Coordinator  Phone: [phone]  Pager: [phone]    To contact the weekend RNCC  [place] (0800 - 1630) Saturday and Sunday    Units: 4A, 4C, 4E, 5A and 5B- Pager 1: [phone]    Units: 6A, 6B, 6C, 6D- Pager 2: [phone]    Units: 7A, 7B, 7C, 7D, and 5C-Pager 3: [phone]

## 2022-06-30 NOTE — PROGRESS NOTES
06/30/22 0900   Quick Adds   Type of Visit Initial PT Evaluation   Living Environment   Living Environment Comments Unable to attain subjective information due to pt lethargy and decrease command following   Self-Care   Current Activity Tolerance fair   Activity/Exercise/Self-Care Comment Unable to attain subjective information due to pt lethargy and decrease command following   General Information   Onset of Illness/Injury or Date of Surgery 06/22/22   Patient/Family Therapy Goals Statement (PT) Unable to attain subjective information due to pt lethargy and decrease command following   Pertinent History of Current Problem (include personal factors and/or comorbidities that impact the POC) Louise Benítez is a 76 year old female w/ PMHx myelodysplastic syndrome, seronegative RA, chronic microcytic anemia, PAD, and recent L SDH (5/12/2022) who was admitted from her TCU on 6/22/2022 after an unwitnessed fall. Was subsequently found to have an acute on chronic L SDH, a new acute R SDH, and increased midline shift w/ trace L uncal herniation now s/p L craniotomy w/ hematoma evacuation.   Existing Precautions/Restrictions fall  (craniotomy)   Cognition   Affect/Mental Status (Cognition) low arousal/lethargic   Follows Commands (Cognition) 0-24% accuracy   Safety Deficit (Cognition) severe deficit   Integumentary/Edema   Integumentary/Edema no deficits were identifed   Posture    Posture Forward head position;Protracted shoulders;Kyphosis   Range of Motion (ROM)   ROM Comment B LE AROM moderately limited mainly due to diminished alertness   Strength (Manual Muscle Testing)   Strength Comments BLE sterngth difficult to formally assess; deconditioning present   Bed Mobility   Comment, (Bed Mobility) Supine>sit: max A x 2   Transfers   Comment, (Transfers) Unable to assess due to cognitive status   Gait/Stairs (Locomotion)   Comment, (Gait/Stairs) Not assessed   Balance   Balance Comments Mod A progressing to CGA for  sitting balance PAVITHRA   Clinical Impression   Criteria for Skilled Therapeutic Intervention Yes, treatment indicated   PT Diagnosis (PT) Impaired functional mobility   Influenced by the following impairments Alertness/cognitive status, deconditioning, poor posture, diminished AROM   Functional limitations due to impairments Bed mobility, transfers, gait, balance, endurance   Clinical Presentation (PT Evaluation Complexity) Evolving/Changing   Clinical Presentation Rationale Clinical judgement   Clinical Decision Making (Complexity) moderate complexity   Planned Therapy Interventions (PT) balance training;bed mobility training;gait training;home exercise program;neuromuscular re-education;patient/family education;stair training;strengthening;stretching;transfer training   Risk & Benefits of therapy have been explained evaluation/treatment results reviewed;care plan/treatment goals reviewed;risks/benefits reviewed;current/potential barriers reviewed   PT Discharge Planning   PT Discharge Recommendation (DC Rec) Transitional Care Facility;Long term care facility   PT Rationale for DC Rec Weakness, fall risk, cognitive status   PT Brief overview of current status Ceiling lift bed<>chair   Total Evaluation Time   Total Evaluation Time (Minutes) 5   Physical Therapy Goals   PT Frequency 3x/week   PT Predicted Duration/Target Date for Goal Attainment 07/08/22   PT: Bed Mobility Minimal assist;Supine to/from sit;Within precautions   PT: Transfers Minimal assist;Sit to/from stand;Within precautions   PT: Gait Moderate assist;10 feet;Assistive device;Within precautions

## 2022-06-30 NOTE — PROGRESS NOTES
Major Shift Events: Alertness waxing and waning throughout the day. Restless at times. Opens eyes to voice/spontaneously. Moves all extremities spontaneously. Follows simple commands. Intermittently attempting to verbalize. KELLY orientation fully, occasionally able to state last name and that she's in the hospital. SBP < 160 goal met w/ PRN labetalol x1. PO amlodipine initiated. Weaned to 1L oxymask, unable to tolerate RA. Improved cough and respiratory secretions. Oral suctioning required q2h and PRN atropine x1. NG replaced, XR results pending. Purewick in place, several unmeasured voids d/t leaking. Up in chair for several hours, tolerated well.  and daughter updated at bedside.   Plan: Continue to monitor neuro and respiratory status. Care conference to be scheduled.   For vital signs and complete assessments, please see documentation flowsheets.

## 2022-06-30 NOTE — PROGRESS NOTES
[facility] [place]   06/30/2022  Neurosurgery Progress Note:    Assessment:  Ms. Benítez is a 76-year-old female with PMHx myelodysplastic syndrome, seronegative RA, chronic microcytic anemia, PAD, and recent L SDH (5/12/2022) who arrived to [facility] via EMS after an unwitnessed fall at home.  Shortly after arriving, the patient was complaining of a significant headache and nausea, and then her right pupil dilated.  She was no longer protecting her airway, so she was sedated and intubated.  Her right eye pupil then returned to a normal caliber.  A head CT demonstrated a left acute subdural approximately 2.3 cm thick with 12 mm of rightward midline shift.  She was taken to the OR on 6/22 for emergent L craniotomy for SDH evacuation. She was extubated on POD1. Concerns for tube feed aspiration on 6/25.     Other diagnoses include:  #Brain compression  #Open evacuation of hematoma on day of admission   #Hyponatremia  #Pulmonary edema    Plan:  - Serial neuro exams  - Antibiotics for presumed aspiration pneumonia.   - Zosyn, cultures remain no growth to date we will discuss and date with neuro critical care.  - Pain control  - HOB > 30 degrees  - VIOLA removed 6/25  - Keppra  - Bowel regimen  - PRN antiemetics  - NG tube -tube feeds initiated   - PEG plans--may need to discuss with SICU regarding timing.   - SLP assessment   - PT/OT  - SCDs and subcutaneous heparin for DVT proph  - Aggressive pulmonary hygiene    -----------------------------------  Bruce Graves MD  Neurosurgery Resident, PGY-2      Please contact neurosurgery resident on call with questions.    Dial * * [phone], enter [phone] when prompted.    -----------------------------------    Interval History: Remains with no growth to date on cultures.  Continues on Zosyn monotherapy.  Objective:   Temp:  [98.2  F (36.8  C)-98.8  F (37.1  C)] 98.8  F (37.1  C)  Pulse:  [54-98] 69  Resp:  [16-18] 18  BP: ()/(41-74) 108/57  SpO2:  [91  %-100 %] 96 %  I/O last 3 completed shifts:  In: 1270 [I.V.:420; NG/GT:150]  Out: 1525 [Urine:1525]    Gen: Appears comfortable, NAD, laying in bed, appearing older than stated age  Wound: Incision clean dry-no signs of leaking, no wetness appreciated,   Pulm: On 2 L, noticeable upper airway mucus heard, significant difficulty with clearing secretions  Neurologic:  Opens eyes to  voice, consistently following commands, squeezes hands and wiggles toes when instructed  Dysarthric, extreme difficulty saying yes and no. Can say last name.  Does not respond to remaining questions.  Grossly, PERRL  And EOMI  Reflexes 2+          LABS:  Recent Labs   Lab 06/30/22  0546 06/29/22  0642 06/28/22  0624    144 143   POTASSIUM 3.9 4.4 4.4   CHLORIDE 105 105 108*   CO2 28 26 26   ANIONGAP 10 13 9   GLC 82 117* 121*   BUN 27.3* 25.2* 25.5*   CR 0.49* 0.55 0.46*   MENDEZ 9.0 8.5* 8.3*       Recent Labs   Lab 06/30/22  0546   WBC 11.0   RBC 2.58*   HGB 8.0*   HCT 26.0*   *   MCH 31.0   MCHC 30.8*   RDW 20.0*   *       IMAGING:  No results found for this or any previous visit (from the past 24 hour(s)).

## 2022-06-30 NOTE — PROGRESS NOTES
06/30/22 1400   General Information   Onset of Illness/Injury or Date of Surgery 06/22/22   Pertinent History of Current Problem Pt is a 76-year-old female with PMHx myelodysplastic syndrome, seronegative RA, chronic microcytic anemia, PAD, and recent L SDH (5/12/2022) who arrived to [facility] via EMS after an unwitnessed fall at home.  Shortly after arriving, the patient was complaining of a significant headache and nausea, and then her right pupil dilated. A head CT demonstrated a left acute subdural approximately 2.3 cm thick with 12 mm of rightward midline shift.  She was taken to the OR on 6/22 for emergent L craniotomy for SDH evacuation.  Clinical swallow evaluation completed per MD orders.   Past History of Dysphagia None prior to this admission per EMR review   Type of Evaluation   Type of Evaluation Swallow Evaluation   Oral Motor   Oral Musculature unable to assess due to poor participation/comprehension   Structural Abnormalities none present   Mucosal Quality dry   Dentition (Oral Motor)   Dentition (Oral Motor) natural dentition   Facial Symmetry (Oral Motor)   Facial Symmetry (Oral Motor) unable/difficult to assess  (Pt lethargic and not following commands to adequately assess)   Lip Function (Oral Motor)   Lip Range of Motion (Oral Motor) unable/difficult to assess  (Pt lethargic and not following commands to adequately assess)   Tongue Function (Oral Motor)   Tongue ROM (Oral Motor) unable/difficult to assess  (Pt lethargic and not following commands to adequately assess)   Cough/Swallow/Gag Reflex (Oral Motor)   Volitional Throat Clear/Cough (Oral Motor) unable/difficult to assess   Vocal Quality/Secretion Management (Oral Motor)   Vocal Quality (Oral Motor) unable/difficult to assess  (Pt with no attempts at phonation througout evaluation)   Secretion Management (Oral Motor) WNL   General Swallowing Observations   Current Diet/Method of Nutritional Intake (General Swallowing Observations, NIS) NPO    Swallowing Evaluation Clinical swallow evaluation   Clinical Swallow Evaluation   Feeding Assistance dependent   Clinical Swallow Evaluation Textures Trialed thin liquids   Clinical Swallow Eval: Thin Liquid Texture Trial   Mode of Presentation, Thin Liquids other (see comments)  (damp toothette)   Volume of Liquid or Food Presented x4 damp toothettes   Oral Phase of Swallow other (see comments)  (No oral manipulation when presented with toothette)   Pharyngeal Phase of Swallow impaired;coughing/choking   Diagnostic Statement High aspiration risk   Swallowing Recommendations   Diet Consistency Recommendations NPO   Medication Administration Recommendations, Swallowing (SLP) Non-oral   Instrumental Assessment Recommendations instrumental evaluation not recommended at this time   General Therapy Interventions   Planned Therapy Interventions Dysphagia Treatment   Dysphagia treatment Oropharyngeal exercise training;Modified diet education;Instruction of safe swallow strategies;Compensatory strategies for swallowing   Clinical Impression   Criteria for Skilled Therapeutic Interventions Met (SLP Eval) Yes, treatment indicated   SLP Diagnosis severe oropharyngeal dysphagia   Risks & Benefits of therapy have been explained evaluation/treatment results reviewed;care plan/treatment goals reviewed;risks/benefits reviewed;current/potential barriers reviewed;participants included;patient   Clinical Impression Comments SLP: Bedside swallow evaluation completed per MD orders. Pt presents with severe oropharyngeal dysphagia and recommend pt continue NPO status. Note that pt largely unable to actively participate in evaluation as she was lethargic and not following commands. She was presented with damp toothette and presented with no oral manipulation and no pharyngeal swallow observed.  Pt did present with wet delayed cough. Pt not appropriate for further PO trials at this time and is at high aspiration with all intake. Recommend  pt continue strict NPO status with frequent oral cares for pts comfort. Speech therapy will continue to follow and assess for PO readiness and update recommendations as pt progresses.   SLP Discharge Planning   SLP Discharge Recommendation Transitional Care Facility   SLP Rationale for DC Rec significantly below baseline swallow function   SLP Brief overview of current status  Recommend pt continue strict NPO status with frequent oral cares for pts comfort. Speech therapy will continue to follow and assess for PO readiness and update recommendations as pt progresses.    Total Evaluation Time   Total Evaluation Time (Minutes) 12

## 2022-06-30 NOTE — PHARMACY-ADMISSION MEDICATION HISTORY
Admission Medication History Completed by Pharmacy    See [facility] Admission Navigator for allergy information, preferred outpatient pharmacy, prior to admission medications and immunization status.     Medication History Sources:     interviewed  in person    medication list from home care nurse    [facility]- [phone]    Changes made to PTA medication list (reason):    Added: celecoxib    Deleted: None    Changed: None    Additional Information:    Darbopoetin and denosumab would be a clinic medications, thus not reconciled    Prior to Admission medications    Medication Sig Last Dose Taking? Auth Provider Long Term End Date   acetaminophen (TYLENOL) 325 MG tablet 2 tablets (650 mg) by Oral or Feeding Tube route every 4 hours as needed for mild pain or fever Past Month at Unknown time Yes Anamaria Potts PA-C     baclofen (LIORESAL) 10 MG tablet Take 0.5 tablets (5 mg) by mouth nightly as needed for muscle spasms (for muscle spasm) Unknown at Unknown time Yes Anamaria Potts PA-C     celecoxib (CELEBREX) 200 MG capsule Take 200 mg by mouth 2 times daily Past Week at Unknown time Yes Unknown, Entered By History Yes    cholecalciferol, vitamin D3, 50,000 unit Tab [CHOLECALCIFEROL, VITAMIN D3, 50,000 UNIT TAB] Take 1 tablet weekly Past Week at Unknown time Yes Radu Ordonez MD     darbepoetin anthony (ARANESP) 300 MCG/0.6ML injection Inject 300 mcg Subcutaneous every 21 days Last taken 5/10/22 Unknown at Unknown time Yes Unknown, Entered By History     hydroxychloroquine (PLAQUENIL) 200 MG tablet TAKE TWO TABLETS BY MOUTH DAILY AT BEDTIME Past Week at Unknown time Yes Kenny Us MBBS Yes    multivitamin w/minerals (THERA-VIT-M) tablet Take 1 tablet by mouth daily Unknown at Unknown time Yes Unknown, Entered By History     mupirocin (BACTROBAN) 2 % external ointment Apply topically daily To wounds on legs and feet with daily dressing changes Unknown at Unknown time Yes  Anna Tomas MD     oxyCODONE (ROXICODONE) 5 MG tablet 1 tablet (5 mg) by Oral or Feeding Tube route every 4 hours as needed for moderate to severe pain Take 30-60 minutes prior to work with therapy Unknown at Unknown time Yes Anamaria Potts PA-C     polyethylene glycol (MIRALAX) 17 GM/Dose powder Take 17 g by mouth daily Past Week at Unknown time Yes Anamaria Potts PA-C     senna-docusate (SENOKOT-S/PERICOLACE) 8.6-50 MG tablet Take 1-2 tablets by mouth At Bedtime Past Week at Unknown time Yes Anamaria Potts PA-C     vitamin B-12 (CYANOCOBALAMIN) 500 MCG tablet Take 1 tablet (500 mcg) by mouth daily Unknown at Unknown time Yes Anamaria Potts PA-C       Medications have been reviewed by me and are current to the best of my knowledge and ability.      Date completed: 06/30/22    Medication history completed by: Mavis Harvey, PharmD

## 2022-06-30 NOTE — PLAN OF CARE
Major Shift Events:  Alert and opens eyes spontaneously. Lethargic at times and inconsistent with following commands. Restless overnight. Moves all extremities, strength 4/5 at times. Patient removed NG tube, MD notified and TF and PO meds held overnight. Hydralazine x2 and labetalol given x1 for SBP goal < 140. VSS on 2L oxymask, afebrile. Atropine and oral suctioning PRN for secretions. Incontinent BM x1, purewick in place with adequate UOP. Cream applied to nellie area for redness.     Plan: Continue to monitor respiratory and neuro status. Potential bridled NG placement today.     For vital signs and complete assessments, please see documentation flowsheets.     Goal Outcome Evaluation:    Plan of Care Reviewed With: patient

## 2022-06-30 NOTE — PROGRESS NOTES
Neurocritical Care Progress Note    Reason for critical care admission: Bilateral SDH  Admitting Team: Neurosurgery  Date of Service:  06/30/2022  Date of Admission:  6/22/2022  Hospital Day: 9    Assessment/Plan  Louise Benítez is a 76 year old female w/ PMHx myelodysplastic syndrome, seronegative RA, chronic microcytic anemia, PAD, and recent L SDH (5/12/2022) who was admitted from her TCU on 6/22/2022 after an unwitnessed fall. Was subsequently found to have an acute on chronic L SDH, a new acute R SDH, and increased midline shift w/ trace L uncal herniation now s/p L craniotomy w/ hematoma evacuation.    24 hour events: More awake and sitting in the chair. She pulled her NGT out this AM.     Neuro  #Acute on chronic L SDH + new acute R SDH w/ increased rightward midline shift and trace L uncal herniation s/p L craniotomy (6/22) w/ hematoma evacuation  #Recent L SDH 2/2 mechanical fall not requiring intervention (hospitalized 5/12-5/20/2022)  -Neurochecks q2h during the day and q4h at night  -HOB > 30   -SBP goal < 160 mmHg; MAP > 65 mmHg  -PRN Hydralazine/Labetalol  -completed Keppra x 7 days on 6/28   -PT/OT- up in chair daily  -SLP when able   -vEEG report: Moderate diffuse slowing, severe left hemispheric slowing (nonspecific enceph).    #Delirium  -Delirium precaution  -Melatonin 10 mg at 8pm  -no need for neuro stimulant at this time  #Metabolic encephalopathy- New acute on chronic SDH contributing factor. improving  -OFF vEEG    #Analgesics & sedation  -Tylenol 650mg Q4h prn     CV  #HTN on admission  -cardiac monitoring  -sBP goal < 160 mmHg  -PRN Hydralazine/Labetalol  -will start amlodipine 5 mg qhs     #Bradycardia - resolved   -cardiac monitoring  -echo 6/24: EF 60-65%, normal RV, no significant valvular abnormalities       #PAD  No statin, recent LDL 10 (5/13/2022)     Resp  #former tobacco Use  #acute hypoxic respiratory failure- still requiring oxygen  -aspiration pneumonia versus pneumonitis    #pulm edema  - q2hr suctioning requirement.   -continue Atropine drops to dry secretions   -received gentle diuresis 6/28 and 6/29  -stop mucomyst, PRN albuterol   -chest physiotherapy  -continuous pulse ox  -maintain O2 saturations greater than 92%  -CXR 6/29- stable appearing pulmonary opacities, possible vascular congestion      GI  #hx GERD   Diet: TF, FWF 30 Q4.   SLP evaluation, since she pulled her NGT and is more alert this AM  Last BM: 6/29  GI prophylaxis: not indicated  Bowel regimen: PRN senna-docusate and Miralax     Renal/  #hyperchloremia- resolved  -Daily BMP  -IV fluids: none  -Electrolyte replacement protocol     Endo  #ANNALEE  -Hgb A1c 5.5% 6/22   -Monitor glucose levels     Rheum  #Seronegative Rheumatoid Arthritis  -continue PTA Plaquenil 400mg at bedtime      Heme/Onc  #Chronic Microcytic Anemia, likely 2/2 myelodysplastic syndrome  Baseline Hgb ~8.0  -s/p 2 unit(s) PRBC (6/22) for Hgb 7.1 prior to craniotomy, received 1 unit(s) PRBC 06/27  -PTA Darbepoetin Inj 60mg Q3wks (last dose 6/14; next due 7/5)  -PTA Vit B12 500mcg daily  -Daily CBC  -Hgb goal > 7 in acute period post-surgery, plt goal > 50k  -Transfuse to meet Hgb and plt goals     #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 Upon evaluation, this patient had a high probability of imminent or life-threatening deterioration due to SDH s/p crani, acute hypoxic respiratory failure, which required my direct attention, intervention, and personal management. Greater than 50% of my time was spent at the bedside counseling the patient and/or coordinating care regarding services listed in this note.    The patient was seen and discussed with the NCC attending, Dr. Bethany Gilliam.    JOSÉ ANTONIO Whitehead, CNP  Neurocritical Care *[id number]    24 Hour Vital Signs Summary:  Temp: 98.8  F (37.1  C) Temp  Min: 98.2  F (36.8  C)  Max: 99.6  F (37.6  C)  Resp: 16 Resp  Min: 16  Max: 20  SpO2: 100 % SpO2  Min: 92 %  Max: 100 %  Pulse: 71 Pulse  Min: 54  Max: 98    No data recorded  BP: 126/47 Systolic (24hrs), Av , Min:95 , Max:156   Diastolic (24hrs), Av, Min:45, Max:82    Respiratory monitoring:   Resp: 16    I/O last 3 completed shifts:  In: 1270 [I.V.:420; NG/GT:150]  Out: 1525 [Urine:1525]    Current Medications:    acetylcysteine  2 mL Nebulization Q6H     albuterol  2.5 mg Nebulization Q6H     cyanocobalamin  500 mcg Oral Daily     heparin ANTICOAGULANT  5,000 Units Subcutaneous Q8H     hydroxychloroquine  400 mg Oral or Feeding Tube At Bedtime     modafinil  100 mg Oral Daily     multivitamins w/minerals  15 mL Per Feeding Tube Daily     piperacillin-tazobactam  4.5 g Intravenous Q6H     polyethylene glycol  17 g Oral Daily     protein modular  1 packet Per Feeding Tube BID 09 12     senna-docusate  2 tablet Oral BID     sodium chloride (PF)  3 mL Intracatheter Q8H       PRN Medications:  acetaminophen, atropine, bisacodyl, hydrALAZINE,  "labetalol, melatonin, naloxone **OR** naloxone **OR** naloxone **OR** naloxone, ondansetron **OR** ondansetron, sodium chloride (PF)    Infusions:    No Known Allergies    Physical Examination:  Vitals: /47   Pulse 71   Temp 98.8  F (37.1  C) (Axillary)   Resp 16   Ht 1.626 m (5' 4.02\")   Wt 71.8 kg (158 lb 4.6 oz)   SpO2 100%   BMI 27.16 kg/m    General: Adult female patient, sitting in chair,  at bedside   HEENT: Surgical incision CDI, no icterus, oral cavity/oropharynx pink and moist, edema noted to left orbit is improved   Cardiac: RRR per monitor. She appears adequately perfused.   Chest: Rhonchi noted in upper airway with secretions, no significant hypoxia. No tachypnea.   Abdomen: Non-distended.   Extremities: Appears adequately perfused  Skin: No obvious rash or lesion  Psych: Calm, cooperative  Neuro: Lethargic, opens eyes with verbal stimulation. Non-verbal, will some times attempt to say name. Follows complex commands (wiggles toes, moves arms, does heel to shin accurately). PERRL. Slightly weaker on left. Purposeful.     NIHSS  1a. Level of Consciousness 1-->Not alert, but arousable by minor stimulation to obey, answer, or respond   1b. LOC Questions 2-->Answers neither question correctly   1c. LOC Commands 1-->Performs one task correctly   2.   Best Gaze 0-->Normal   3.   Visual 0-->No visual loss   4.   Facial Palsy 0-->Normal symmetrical movements   5a. Motor Arm, Left 1-->Drift, limb holds 90 (or 45) degrees, but drifts down before full 10 seconds, does not hit bed or other support   5b. Motor Arm, Right 1-->Drift, limb holds 90 (or 45) degrees, but drifts down before full 10 secs, does not hit bed or other support   6a. Motor Leg, Left 1-->Drift, leg falls by the end of the 5-sec period but does not hit bed   6b. Motor Leg, right 1-->Drift, leg falls by the end of the 5-sec period but does not hit bed   7.   Limb Ataxia 0-->Absent   8.   Sensory 0-->Normal, no sensory loss   9.   " Best Language 0-->No aphasia, normal   10. Dysarthria (UN) Intubated or other physical barrier   11. Extinction and Inattention  0-->No abnormality   Total 8 (06/23/22 1058)     Labs and imaging:    Results for orders placed or performed during the hospital encounter of 06/22/22 (from the past 24 hour(s))   Basic metabolic panel   Result Value Ref Range    Creatinine 0.49 (L) 0.51 - 0.95 mg/dL    Sodium 143 136 - 145 mmol/L    Potassium 3.9 3.4 - 5.3 mmol/L    Urea Nitrogen 27.3 (H) 8.0 - 23.0 mg/dL    Chloride 105 98 - 107 mmol/L    Carbon Dioxide (CO2) 28 22 - 29 mmol/L    Anion Gap 10 7 - 15 mmol/L    Glucose 82 70 - 99 mg/dL    GFR Estimate >90 >60 mL/min/1.73m2    Calcium 9.0 8.8 - 10.2 mg/dL   CBC with platelets   Result Value Ref Range    WBC Count 11.0 4.0 - 11.0 10e3/uL    RBC Count 2.58 (L) 3.80 - 5.20 10e6/uL    Hemoglobin 8.0 (L) 11.7 - 15.7 g/dL    Hematocrit 26.0 (L) 35.0 - 47.0 %     (H) 78 - 100 fL    MCH 31.0 26.5 - 33.0 pg    MCHC 30.8 (L) 31.5 - 36.5 g/dL    RDW 20.0 (H) 10.0 - 15.0 %    Platelet Count 480 (H) 150 - 450 10e3/uL   Magnesium   Result Value Ref Range    Magnesium 2.3 1.7 - 2.3 mg/dL   Phosphorus   Result Value Ref Range    Phosphorus 4.2 2.5 - 4.5 mg/dL   Procalcitonin   Result Value Ref Range    Procalcitonin 0.09 <5.00 ng/mL   Asymptomatic COVID-19 Virus (Coronavirus) by PCR Nasopharyngeal    Specimen: Nasopharyngeal; Swab   Result Value Ref Range    SARS CoV2 PCR Negative Negative, Testing sent to reference lab. Results will be returned via unsolicited result    Narrative    Testing was performed using the Xpert Xpress SARS-CoV-2 Assay on the  Cepheid Gene-Xpert Instrument Systems. Additional information about  this Emergency Use Authorization (EUA) assay can be found via the Lab  Guide. This test should be ordered for the detection of SARS-CoV-2 in  individuals who meet SARS-CoV-2 clinical and/or epidemiological  criteria. Test performance is unknown in asymptomatic  patients. This  test is for in vitro diagnostic use under the FDA EUA for  laboratories certified under CLIA to perform high complexity testing.  This test has not been FDA cleared or approved. A negative result  does not rule out the presence of PCR inhibitors in the specimen or  target RNA in concentration below the limit of detection for the  assay. The possibility of a false negative should be considered if  the patient's recent exposure or clinical presentation suggests  COVID-19. This test was validated by the [facility]. This laboratory is certified under  the Clinical Laboratory Improvement Amendments of 1988 (CLIA-88) as  qualified to perform high complexity laboratory testing.         All relevant imaging and laboratory values personally reviewed.

## 2022-06-30 NOTE — PROGRESS NOTES
[facility]  WO Nurse Inpatient Assessment     Consulted for: Left Lower extremity and Right forearm wounds    Patient History (according to provider note(s):      76-year-old female with PMHx myelodysplastic syndrome, seronegative RA, chronic microcytic anemia, PAD, and recent L SDH (5/12/2022) who arrived to [facility] via EMS after an unwitnessed fall at home.  s/p 6/22 emergent L craniotomy for SDH evacuationa left acute subdural hematoma     Areas Assessed:      Areas visualized during today's visit: Focused:    Wound location: R arm and Left shin      Date of photos: 6/23/22 and 6/30  Wound due to: Trauma from wheelchair foot rest pins.    Wound history/plan of care: occurred approximately 6 weeks ago at care facility.  Spouse had been leaving open to air.   Wound base: 50 % yellow fibrin, , 5 % dry brown slough, 20% granulation, 25% dermis     Palpation of the wound bed: normal      Drainage: small     Description of drainage: serosanguinous     Measurements (length x width x depth, in cm): 3.2  x 2.8  x  0.2 cm      Tunneling: N/A     Undermining: N/A  Periwound skin: Intact and fragile      Color: normal and consistent with surrounding tissue      Temperature: normal   Odor: none  Pain: no grimacing or signs of discomfort,   Pain interventions prior to dressing change: N/A  Treatment goal: Heal  and Remove necrotic tissue  STATUS: initial assessment  Supplies ordered: gathered, at bedside and ordered [facility]    Wound location: Right forearm      Last photo: 6/30/22  Wound due to: Skin Tear  Wound history/plan of care: wounds not present prior to fall, present on admission.  currently with mepilex border dressing.    Wound base: 100 % dry red fibrinous scab      Palpation of the wound bed: normal      Drainage: small     Description of drainage: serosanguinous     Measurements (length x width x depth, in cm): 1.2  x 0.4  x  0.2 cm      Periwound skin: purple  bruising          Temperature: normal   Odor: none  Pain: no grimacing or signs of discomfort,   Pain interventions prior to dressing change: n/a  Treatment goal: Heal   STATUS: initial assessment  Supplies ordered: gathered  Treatment Plan:     Left lower extremity wound:  Every other day  Cleanse with wound cleanser and gauze.    Paint periwound with no sting barrier film.   Cover wound bed with medihoney, ([id number]) applying nickel thick.   Cover medihoney with vaseline gauze.   Cover vaseline gauze with mepilex border dressing.     Right arm skin tear: every third day  cleanse wound(s) with microklenz spray and gauze.  Pat dry   Cover skin tear with vaseline gauze.   Cover vaseline gauze with mepilex border dressing.     Orders: Reviewed    RECOMMEND PRIMARY TEAM ORDER: None, at this time  Education provided: plan of care and wound progress  Discussed plan of care with: Family  WOC nurse follow-up plan: weekly  Notify WOC if wound(s) deteriorate.  Nursing to notify the Provider(s) and re-consult the WOC Nurse if new skin concern.    DATA:     Current support surface: Standard  Low air loss mattress  BMI: Body mass index is 27.16 kg/m .   Active diet order: Orders Placed This Encounter      NPO for Medical/Clinical Reasons Except for: No Exceptions     Output: I/O last 3 completed shifts:  In: 1270 [I.V.:420; NG/GT:150]  Out: 1525 [Urine:1525]     Labs:   Recent Labs   Lab 06/30/22  0546 06/28/22  0624 06/28/22  0047   HGB 8.0*   < >  --    WBC 11.0   < >  --    CRP  --   --  59.40*    < > = values in this interval not displayed.     Pressure injury risk assessment:   Sensory Perception: 2-->very limited  Moisture: 3-->occasionally moist  Activity: 2-->chairfast  Mobility: 2-->very limited  Nutrition: 2-->probably inadequate  Friction and Shear: 1-->problem  Leobardo Score: 12

## 2022-07-01 NOTE — PROGRESS NOTES
Neurocritical Care Progress Note    Reason for critical care admission: Bilateral SDH  Admitting Team: Neurosurgery  Date of Service:  07/01/2022  Date of Admission:  6/22/2022  Hospital Day: 10    Assessment/Plan  Louise Benítez is a 76 year old female w/ PMHx myelodysplastic syndrome, seronegative RA, chronic microcytic anemia, PAD, and recent L SDH (5/12/2022) who was admitted from her TCU on 6/22/2022 after an unwitnessed fall. Was subsequently found to have an acute on chronic L SDH, a new acute R SDH, and increased midline shift w/ trace L uncal herniation now s/p L craniotomy w/ hematoma evacuation.    24 hour events: Sitting in chair, very awake.  at bedside and she has spoke multiple times to her .     Neuro  #Acute on chronic L SDH + new acute R SDH w/ increased rightward midline shift and trace L uncal herniation s/p L craniotomy (6/22) w/ hematoma evacuation  #Recent L SDH 2/2 mechanical fall not requiring intervention (hospitalized 5/12-5/20/2022)  -Neurochecks q2h during the day and q4h at night  -HOB > 30   -SBP goal < 160 mmHg; MAP > 65 mmHg  -PRN Hydralazine/Labetalol  -completed Keppra x 7 days on 6/28   -PT/OT- up in chair daily  -SLP when able   -vEEG report: Moderate diffuse slowing, severe left hemispheric slowing (nonspecific enceph).    #Delirium  -Delirium precaution  -Melatonin 10 mg at 8pm  -no need for neuro stimulant at this time  #Metabolic encephalopathy- New acute on chronic SDH contributing factor. improving  -OFF vEEG    #Analgesics & sedation  -Tylenol 650mg Q4h prn     CV  #HTN on admission  -cardiac monitoring  -sBP goal < 160 mmHg  -PRN Hydralazine/Labetalol  -continue amlodipine 5 mg at bedtime (initiated 6/30)     #Bradycardia - resolved   -cardiac monitoring  -echo 6/24: EF 60-65%, normal RV, no significant valvular abnormalities       #PAD  No statin, recent LDL 10 (5/13/2022)     Resp  #former tobacco Use  #acute hypoxic respiratory failure- still requiring  oxygen  -aspiration pneumonia versus pneumonitis   #pulm edema  - suction PRN  -continue Atropine drops to dry secretions   -received gentle diuresis 6/28 and 6/29  -stop mucomyst, PRN albuterol   -chest physiotherapy  -continuous pulse ox  -maintain O2 saturations greater than 92%  -CXR 6/29- stable appearing pulmonary opacities, possible vascular congestion      GI  #hx GERD   Diet: TF, FWF 30 Q4.   SLP evaluation, since she pulled her NGT and is more alert this AM  Last BM: 6/29  GI prophylaxis: not indicated  Bowel regimen: PRN senna-docusate and Miralax     Renal/  #hyperchloremia- resolved  -Daily BMP  -IV fluids: none  -Electrolyte replacement protocol     Endo  #ANNALEE  -Hgb A1c 5.5% 6/22   -Monitor glucose levels     Rheum  #Seronegative Rheumatoid Arthritis  -continue PTA Plaquenil 400mg at bedtime      Heme/Onc  #Chronic Microcytic Anemia, likely 2/2 myelodysplastic syndrome  Baseline Hgb ~8.0  -s/p 2 unit(s) PRBC (6/22) for Hgb 7.1 prior to craniotomy, received 1 unit(s) PRBC 06/27  -PTA Darbepoetin Inj 60mg Q3wks (last dose 6/14; next due 7/5)  -PTA Vit B12 500mcg daily  -Daily CBC  -Hgb goal > 7 in acute period post-surgery, plt goal > 50k  -Transfuse to meet Hgb and plt goals     #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 Upon evaluation, this patient had a high probability of imminent or life-threatening deterioration due to SDH s/p crani, acute hypoxic respiratory failure, which required my direct attention, intervention, and personal management. Greater than 50% of my time was spent at the bedside counseling the patient and/or coordinating care regarding services listed in this note.    The patient was seen and discussed with the [facility] attending, Dr. Bethany Gillima.    JOSÉ ANTONIO Whitehead, CNP  Neurocritical Care *[phone]    24 Hour Vital Signs Summary:  Temp: 98.6  F (37  C) Temp  Min: 97.1  F (36.2  C)  Max: 98.8  F (37.1  C)  Resp: 16 Resp  Min: 12  Max: 18  SpO2: 98 % SpO2  Min: 87 %  Max: 100 %  Pulse: 71 Pulse  Min: 58  Max: 94    No data recorded  BP: (!) 145/52 Systolic (24hrs), Av , Min:95 , Max:169   Diastolic (24hrs), Av, Min:41, Max:104    Respiratory monitoring:   Resp: 16    I/O last 3 completed shifts:  In: 420 [I.V.:35; NG/GT:150]  Out: 500 [Urine:500]    Current Medications:    amLODIPine  5 mg Oral or Feeding Tube QPM     cyanocobalamin  500 mcg Oral Daily     heparin ANTICOAGULANT  5,000 Units Subcutaneous Q8H     hydroxychloroquine  400 mg Oral or Feeding Tube At Bedtime     melatonin  10 mg Oral or Feeding Tube Daily     multivitamins w/minerals  15 mL Per Feeding Tube Daily     polyethylene glycol  17 g Oral Daily     protein modular  1 packet Per Feeding Tube BID 09 12     sodium chloride (PF)  3 mL Intracatheter Q8H       PRN Medications:  acetaminophen, albuterol, atropine, bisacodyl, hydrALAZINE, labetalol, naloxone **OR** naloxone **OR** naloxone **OR** naloxone, ondansetron **OR**  "ondansetron, senna-docusate, sodium chloride (PF)    Infusions:    No Known Allergies    Physical Examination:  Vitals: BP (!) 145/52   Pulse 71   Temp 98.6  F (37  C) (Oral)   Resp 16   Ht 1.626 m (5' 4.02\")   Wt 71.8 kg (158 lb 4.6 oz)   SpO2 98%   BMI 27.16 kg/m    General: Adult female patient, sitting in chair,  at bedside.  HEENT: Surgical incision CDI, no icterus, oral cavity/oropharynx pink and moist, edema noted to left orbit is improved.  Cardiac: RRR per monitor. She appears adequately perfused.   Chest: Rhonchi noted in upper airway with secretions, no significant hypoxia. No tachypnea.   Abdomen: Non-distended.   Extremities: Appears adequately perfused  Skin: No obvious rash or lesion  Psych: Calm, cooperative  Neuro: Lethargic, opens eyes with verbal stimulation. Non-verbal, has been speaking and attempting to make needs known to family. Follows complex commands (wiggles toes, moves arms, does heel to shin and finger to nose accurately). PERRL. Slightly weaker on left.     NIHSS  1a. Level of Consciousness 1-->Not alert, but arousable by minor stimulation to obey, answer, or respond   1b. LOC Questions 2-->Answers neither question correctly   1c. LOC Commands 1-->Performs one task correctly   2.   Best Gaze 0-->Normal   3.   Visual 0-->No visual loss   4.   Facial Palsy 0-->Normal symmetrical movements   5a. Motor Arm, Left 1-->Drift, limb holds 90 (or 45) degrees, but drifts down before full 10 seconds, does not hit bed or other support   5b. Motor Arm, Right 1-->Drift, limb holds 90 (or 45) degrees, but drifts down before full 10 secs, does not hit bed or other support   6a. Motor Leg, Left 1-->Drift, leg falls by the end of the 5-sec period but does not hit bed   6b. Motor Leg, right 1-->Drift, leg falls by the end of the 5-sec period but does not hit bed   7.   Limb Ataxia 0-->Absent   8.   Sensory 0-->Normal, no sensory loss   9.   Best Language 0-->No aphasia, normal   10. " Dysarthria (UN) Intubated or other physical barrier   11. Extinction and Inattention  0-->No abnormality   Total 8 (06/23/22 1058)     Labs and imaging:    Results for orders placed or performed during the hospital encounter of 06/22/22 (from the past 24 hour(s))   XR Abdomen Port 1 View    Narrative    EXAMINATION:  XR ABDOMEN PORT 1 VIEWS 6/30/2022 5:51 PM     COMPARISON: Radiograph 6/23/2022, 6/29/2022.    HISTORY: Confirm NG placement.    TECHNIQUE: Frontal view of the abdomen.    FINDINGS: Gastric tube tip and side port projecting over the stomach.  No abnormally dilated loops of bowel. No pneumatosis or portal venous  gas. Similar bibasilar streaky opacities. Atherosclerosis.      Impression    IMPRESSION:   Enteric tube tip and side port projecting over the stomach..    I have personally reviewed the examination and initial interpretation  and I agree with the findings.    ROYA EDMOND MD         SYSTEM ID:  [id number]   ABO/Rh type and screen    Narrative    The following orders were created for panel order ABO/Rh type and screen.  Procedure                               Abnormality         Status                     ---------                               -----------         ------                     Adult Type and Screen[[id number]]                            Final result                 Please view results for these tests on the individual orders.   Adult Type and Screen   Result Value Ref Range    ABO/RH(D) AB NEG     Antibody Screen Negative Negative    SPECIMEN EXPIRATION DATE [id number]    Basic metabolic panel   Result Value Ref Range    Creatinine 0.46 (L) 0.51 - 0.95 mg/dL    Sodium 144 136 - 145 mmol/L    Potassium 3.9 3.4 - 5.3 mmol/L    Urea Nitrogen 34.8 (H) 8.0 - 23.0 mg/dL    Chloride 107 98 - 107 mmol/L    Carbon Dioxide (CO2) 26 22 - 29 mmol/L    Anion Gap 11 7 - 15 mmol/L    Glucose 102 (H) 70 - 99 mg/dL    GFR Estimate >90 >60 mL/min/1.73m2    Calcium 9.2 8.8 - 10.2 mg/dL   CBC  with platelets   Result Value Ref Range    WBC Count 9.9 4.0 - 11.0 10e3/uL    RBC Count 2.46 (L) 3.80 - 5.20 10e6/uL    Hemoglobin 7.6 (L) 11.7 - 15.7 g/dL    Hematocrit 25.4 (L) 35.0 - 47.0 %     (H) 78 - 100 fL    MCH 30.9 26.5 - 33.0 pg    MCHC 29.9 (L) 31.5 - 36.5 g/dL    RDW 19.8 (H) 10.0 - 15.0 %    Platelet Count 490 (H) 150 - 450 10e3/uL   Magnesium   Result Value Ref Range    Magnesium 2.3 1.7 - 2.3 mg/dL   Phosphorus   Result Value Ref Range    Phosphorus 3.2 2.5 - 4.5 mg/dL       All relevant imaging and laboratory values personally reviewed.

## 2022-07-01 NOTE — PROGRESS NOTES
[facility], [place]   07/01/2022  Neurosurgery Progress Note:    Assessment:  Ms. Benítez is a 76-year-old female with PMHx myelodysplastic syndrome, seronegative RA, chronic microcytic anemia, PAD, and recent L SDH (5/12/2022) who arrived to [facility] via EMS after an unwitnessed fall at home.  Shortly after arriving, the patient was complaining of a significant headache and nausea, and then her right pupil dilated.  She was no longer protecting her airway, so she was sedated and intubated.  Her right eye pupil then returned to a normal caliber.  A head CT demonstrated a left acute subdural approximately 2.3 cm thick with 12 mm of rightward midline shift.  She was taken to the OR on 6/22 for emergent L craniotomy for SDH evacuation. She was extubated on POD1. Concerns for tube feed aspiration on 6/25.     Other diagnoses include:  #Brain compression  #Open evacuation of hematoma on day of admission   #Hyponatremia  #Pulmonary edema    Plan:  - Serial neuro exams  - Antibiotics for presumed aspiration pneumonia.   - Zosyn stopped 2/2 negative cx growth  - Pain control  - HOB > 30 degrees  - VIOLA removed 6/25  - Keppra  - Bowel regimen  - PRN antiemetics  - NG tube -tube feeds initiated   - PEG plans--Informal family care conference today to talk with family regarding PEG and trach goals  - SLP assessment  - PT/OT  - SCDs and subcutaneous heparin for DVT proph  - Aggressive pulmonary hygiene    -----------------------------------  Bruce Graves MD  Neurosurgery Resident, PGY-2      Please contact neurosurgery resident on call with questions.    Dial * * [phone], enter [phone] when prompted.    -----------------------------------    Interval History: Zosyn stopped. O2 requirements stable. Plan for informal FCC today.    Objective:   Temp:  [97.1  F (36.2  C)-98.7  F (37.1  C)] 98.6  F (37  C)  Pulse:  [60-94] 68  Resp:  [12-16] 16  BP: ()/() 129/74  SpO2:  [87 %-100 %] 96 %  I/O last 3  completed shifts:  In: 420 [I.V.:35; NG/GT:150]  Out: 500 [Urine:500]    Gen: Appears comfortable, NAD, laying in bed, appearing older than stated age  Wound: Incision clean dry-no signs of leaking, no wetness appreciated,   Pulm: On 2 L, noticeable upper airway mucus heard, significant difficulty with clearing secretions  Neurologic:  Opens eyes to  voice, consistently following commands, squeezes hands and wiggles toes when instructed  Dysarthric, extreme difficulty saying yes and no. Can say last name.  Does not respond to remaining questions.  Grossly, PERRL  And EOMI  Reflexes 2+          LABS:  Recent Labs   Lab 07/01/22  0457 06/30/22  0546 06/29/22  0642    143 144   POTASSIUM 3.9 3.9 4.4   CHLORIDE 107 105 105   CO2 26 28 26   ANIONGAP 11 10 13   * 82 117*   BUN 34.8* 27.3* 25.2*   CR 0.46* 0.49* 0.55   MENDEZ 9.2 9.0 8.5*       Recent Labs   Lab 07/01/22 0457   WBC 9.9   RBC 2.46*   HGB 7.6*   HCT 25.4*   *   MCH 30.9   MCHC 29.9*   RDW 19.8*   *       IMAGING:  Recent Results (from the past 24 hour(s))   XR Abdomen Port 1 View    Narrative    EXAMINATION:  XR ABDOMEN PORT 1 VIEWS 6/30/2022 5:51 PM     COMPARISON: Radiograph 6/23/2022, 6/29/2022.    HISTORY: Confirm NG placement.    TECHNIQUE: Frontal view of the abdomen.    FINDINGS: Gastric tube tip and side port projecting over the stomach.  No abnormally dilated loops of bowel. No pneumatosis or portal venous  gas. Similar bibasilar streaky opacities. Atherosclerosis.      Impression    IMPRESSION:   Enteric tube tip and side port projecting over the stomach..    I have personally reviewed the examination and initial interpretation  and I agree with the findings.    ROYA EDMOND MD         SYSTEM ID:  [id number]

## 2022-07-01 NOTE — PLAN OF CARE
Major Shift Events: Alert and restless overnight with waxing and waning lethargy. Moves all  extremities, intermittently follows commands. Attempting to verbalize needs. Strength 4/5. Hydralazine x1 to meet SBP < 160, afebrile overnight. 1-2L oxymask with PRN oral suction. Incontinent of bowel x2, purewick in place with multiple unmeasured UOP in chux. TF restarted @ 25 after NG placement confirmed and increased to 35 mL/hr at 05:00.   Plan: Potential care conference today. Continue to monitor neuro and respiratory status.    For vital signs and complete assessments, please see documentation flowsheets.       Goal Outcome Evaluation:    Plan of Care Reviewed With: patient

## 2022-07-01 NOTE — PLAN OF CARE
ICU End of Shift Summary. See flowsheets for vital signs and detailed assessment.    Changes this shift: Weaned off oxymask, saturating >92% on RA. Congested, strong cough but unable to expel secretions - coarse LS. Improving mental status, appears more alert and disoriented to time and situation. Mitts off with family at bedside, asks appropriate questions, verbalizes understanding of not picking at medical devices/lines/wounds. Up to chair for 3 hrs. Per SLP, improved swallowing compared to yesterday. Spouse and daughter at bedside for informal care conference with neurosurgery primarily for recovery timeline/prognosis. Family expresses concern for safe discharge as pt fell at TCU. TF advanced to goal rate. Small loose BM x1.    Plan: Holding off PEG in hopes of improved swallow capabilities prior to discharge. Delirium bundle.    Goal Outcome Evaluation:    Plan of Care Reviewed With: patient, spouse, daughter     Overall Patient Progress: improving    Outcome Evaluation: Weaned off O2, neuro status improving

## 2022-07-02 NOTE — PROGRESS NOTES
Neurocritical Care Progress Note    Reason for critical care admission: Bilateral SDH  Admitting Team: Neurosurgery  Date of Service:  07/02/2022  Date of Admission:  6/22/2022  Hospital Day: 11    Assessment/Plan  Louise Benítez is a 76 year old female w/ PMHx myelodysplastic syndrome, seronegative RA, chronic microcytic anemia, PAD, and recent L SDH (5/12/2022) who was admitted from her TCU on 6/22/2022 after an unwitnessed fall. Was subsequently found to have an acute on chronic L SDH, a new acute R SDH, and increased midline shift w/ trace L uncal herniation now s/p L craniotomy w/ hematoma evacuation.    24 hour events: SLP evaluation with continued aspiration risk.     Neuro  #Acute on chronic L SDH + new acute R SDH w/ increased rightward midline shift and trace L uncal herniation s/p L craniotomy (6/22) w/ hematoma evacuation  #Recent L SDH 2/2 mechanical fall not requiring intervention (hospitalized 5/12-5/20/2022)  -Neurochecks q2h during the day and q4h at night  -HOB > 30   -SBP goal < 160 mmHg; MAP > 65 mmHg  -PRN Hydralazine/Labetalol  -completed Keppra x 7 days on 6/28   -PT/OT- up in chair daily  -SLP   -vEEG report: Moderate diffuse slowing, severe left hemispheric slowing (nonspecific enceph).    #Delirium  -Delirium precaution  -Melatonin 10 mg at 8pm  -no need for neuro stimulant at this time  #Metabolic encephalopathy- New acute on chronic SDH contributing factor. improving  -OFF vEEG    #Analgesics & sedation  -Tylenol 650mg Q4h prn     CV  #HTN on admission  -cardiac monitoring  -sBP goal < 160 mmHg  -PRN Hydralazine/Labetalol  -continue amlodipine 5 mg at bedtime (initiated 6/30)     #Bradycardia - resolved   -cardiac monitoring  -echo 6/24: EF 60-65%, normal RV, no significant valvular abnormalities       #PAD  No statin, recent LDL 10 (5/13/2022)     Resp  #former tobacco Use  #acute hypoxic respiratory failure- still requiring oxygen  -aspiration pneumonia versus pneumonitis   #pulm  edema  - suction PRN  -continue Atropine drops to dry secretions   -received gentle diuresis 6/28 and 6/29  -stop mucomyst, PRN albuterol   -chest physiotherapy  -continuous pulse ox  -maintain O2 saturations greater than 92%  -CXR 6/29- stable appearing pulmonary opacities, possible vascular congestion      GI  #hx GERD   Diet: TF, FWF 30 Q4.   Last BM: 7/1  GI prophylaxis: not indicated  Bowel regimen: PRN senna-docusate and Miralax  -with being more awake will hold off on surgery consult for PEG      Renal/  #hyperchloremia- resolved  -Daily BMP  -IV fluids: none  -Electrolyte replacement protocol     Endo  #ANNALEE  -Hgb A1c 5.5% 6/22   -Monitor glucose levels     Rheum  #Seronegative Rheumatoid Arthritis  -continue PTA Plaquenil 400mg at bedtime      Heme/Onc  #Chronic Microcytic Anemia, likely 2/2 myelodysplastic syndrome  Baseline Hgb ~8.0  -s/p 2 unit(s) PRBC (6/22) for Hgb 7.1 prior to craniotomy, received 1 unit(s) PRBC 06/27  -PTA Darbepoetin Inj 60mg Q3wks (last dose 6/14; next due 7/5)  -PTA Vit B12 500mcg daily  -Daily CBC  -Hgb goal > 7 in acute period post-surgery, plt goal > 50k  -Transfuse to meet Hgb and plt goals     #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 Upon evaluation, this patient had a high probability of imminent or life-threatening deterioration due to SDH s/p crani, acute hypoxic respiratory failure, which required my direct attention, intervention, and personal management. Greater than 50% of my time was spent at the bedside counseling the patient and/or coordinating care regarding services listed in this note.    The patient was seen and discussed with the [facility] attending, Dr. Bethany Gilliam.    Allyson Washington, APRN, CNP  Neurocritical Care *[id number]    24 Hour Vital Signs Summary:  Temp: 98.7  F (37.1  C) Temp  Min: 98.6  F (37  C)  Max: 99.3  F (37.4  C)  Resp: 18 Resp  Min: 10  Max: 18  SpO2: 93 % SpO2  Min: 92 %  Max: 98 %  Pulse: 79 Pulse  Min: 53  Max: 96    No data recorded  BP: (!) 142/59 Systolic (24hrs), Av , Min:118 , Max:167   Diastolic (24hrs), Av, Min:44, Max:79    Respiratory monitoring:   Resp: 18    I/O last 3 completed shifts:  In: 1345 [NG/GT:325]  Out: 1000 [Urine:1000]    Current Medications:    amLODIPine  5 mg Oral or Feeding Tube QPM     cyanocobalamin  500 mcg Oral Daily     heparin ANTICOAGULANT  5,000 Units Subcutaneous Q8H     hydroxychloroquine  400 mg Oral or Feeding Tube At Bedtime     melatonin  10 mg Oral or Feeding Tube Daily     multivitamins w/minerals  15 mL Per Feeding Tube Daily     polyethylene glycol  17 g Oral Daily     protein modular  1 packet Per Feeding Tube BID 09 12     sodium chloride (PF)  3 mL Intracatheter Q8H       PRN Medications:  acetaminophen, albuterol, atropine, bisacodyl, hydrALAZINE, labetalol, naloxone **OR** naloxone **OR** naloxone **OR** naloxone, ondansetron **OR** ondansetron, senna-docusate, sodium chloride (PF)    Infusions:    No  "Known Allergies    Physical Examination:  Vitals: BP (!) 142/59   Pulse 79   Temp 98.7  F (37.1  C) (Oral)   Resp 18   Ht 1.626 m (5' 4.02\")   Wt 71.8 kg (158 lb 4.6 oz)   SpO2 93%   BMI 27.16 kg/m    General: Adult female patient, sitting in chair,  at bedside.  HEENT: Surgical incision CDI, no icterus, oral cavity/oropharynx pink and moist, edema noted to left orbit is improved.  Cardiac: RRR per monitor. She appears adequately perfused.   Chest: Rhonchi noted in upper airway with secretions, no significant hypoxia. No tachypnea.   Abdomen: Non-distended.   Extremities: Appears adequately perfused.  Skin: No obvious rash or lesion  Psych: Calm, cooperative  Neuro: Lethargic, opens eyes with verbal stimulation. Speaking a little today, speech is relatively clear and appropriate. Follows complex commands.     NIHSS  1a. Level of Consciousness 1-->Not alert, but arousable by minor stimulation to obey, answer, or respond   1b. LOC Questions 2-->Answers neither question correctly   1c. LOC Commands 1-->Performs one task correctly   2.   Best Gaze 0-->Normal   3.   Visual 0-->No visual loss   4.   Facial Palsy 0-->Normal symmetrical movements   5a. Motor Arm, Left 1-->Drift, limb holds 90 (or 45) degrees, but drifts down before full 10 seconds, does not hit bed or other support   5b. Motor Arm, Right 1-->Drift, limb holds 90 (or 45) degrees, but drifts down before full 10 secs, does not hit bed or other support   6a. Motor Leg, Left 1-->Drift, leg falls by the end of the 5-sec period but does not hit bed   6b. Motor Leg, right 1-->Drift, leg falls by the end of the 5-sec period but does not hit bed   7.   Limb Ataxia 0-->Absent   8.   Sensory 0-->Normal, no sensory loss   9.   Best Language 0-->No aphasia, normal   10. Dysarthria (UN) Intubated or other physical barrier   11. Extinction and Inattention  0-->No abnormality   Total 8 (06/23/22 1058)     Labs and imaging:    Results for orders placed or " performed during the hospital encounter of 06/22/22 (from the past 24 hour(s))   XR Chest Port 1 View    Narrative    EXAM: XR CHEST PORT 1 VIEW  7/1/2022 7:13 PM     HISTORY:  surveillance       COMPARISON:  Chest radiograph 6/29/2022    FINDINGS: Single view of the chest. Enteric tube courses below the  diaphragm with sidehole projecting over the stomach and tip beyond the  field of view.     Stable cardiomediastinal silhouette. The pulmonary vasculature is  indistinct Blunting of the bilateral costophrenic angles. No  appreciable pneumothorax. Decreased bibasilar mixed opacities.  Visualized upper abdomen is unremarkable. Severe degenerative change  in the shoulder joints. Osteopenic appearance of the bones.      Impression    IMPRESSION:   Trace bilateral pleural effusions with overlying bibasilar opacities,  improved from prior.    I have personally reviewed the examination and initial interpretation  and I agree with the findings.    LOS BARAJAS          SYSTEM ID:  [id number]   Basic metabolic panel   Result Value Ref Range    Creatinine 0.43 (L) 0.51 - 0.95 mg/dL    Sodium 147 (H) 136 - 145 mmol/L    Potassium 4.1 3.4 - 5.3 mmol/L    Urea Nitrogen 40.5 (H) 8.0 - 23.0 mg/dL    Chloride 111 (H) 98 - 107 mmol/L    Carbon Dioxide (CO2) 27 22 - 29 mmol/L    Anion Gap 9 7 - 15 mmol/L    Glucose 121 (H) 70 - 99 mg/dL    GFR Estimate >90 >60 mL/min/1.73m2    Calcium 9.4 8.8 - 10.2 mg/dL   CBC with platelets   Result Value Ref Range    WBC Count 9.7 4.0 - 11.0 10e3/uL    RBC Count 2.41 (L) 3.80 - 5.20 10e6/uL    Hemoglobin 7.5 (L) 11.7 - 15.7 g/dL    Hematocrit 25.4 (L) 35.0 - 47.0 %     (H) 78 - 100 fL    MCH 31.1 26.5 - 33.0 pg    MCHC 29.5 (L) 31.5 - 36.5 g/dL    RDW 20.2 (H) 10.0 - 15.0 %    Platelet Count 515 (H) 150 - 450 10e3/uL   Magnesium   Result Value Ref Range    Magnesium 2.2 1.7 - 2.3 mg/dL   Phosphorus   Result Value Ref Range    Phosphorus 2.8 2.5 - 4.5 mg/dL       All relevant imaging and  laboratory values personally reviewed.

## 2022-07-02 NOTE — PROGRESS NOTES
[facility], [place]   07/02/2022  Neurosurgery Progress Note:    Assessment:  Ms. Benítez is a 76-year-old female with PMHx myelodysplastic syndrome, seronegative RA, chronic microcytic anemia, PAD, and recent L SDH (5/12/2022) who arrived to [facility] via EMS after an unwitnessed fall at home.  Shortly after arriving, the patient was complaining of a significant headache and nausea, and then her right pupil dilated.  She was no longer protecting her airway, so she was sedated and intubated.  Her right eye pupil then returned to a normal caliber.  A head CT demonstrated a left acute subdural approximately 2.3 cm thick with 12 mm of rightward midline shift.  She was taken to the OR on 6/22 for emergent L craniotomy for SDH evacuation. She was extubated on POD1. Concerns for tube feed aspiration on 6/25.     Other diagnoses include:  #Brain compression  #Open evacuation of hematoma on day of admission   #Hyponatremia  #Pulmonary edema    Plan:  - Serial neuro exams  - Antibiotics for presumed aspiration pneumonia.   - Zosyn stopped 2/2 negative cx growth  - Pain control  - HOB > 30 degrees  - VIOLA removed 6/25  - Keppra  - Bowel regimen  - PRN antiemetics  - NG tube -tube feeds initiated   -Continuing to hold on trach/PEG given patient's improvement on 7/1/2022  - SLP assessment  - PT/OT  - SCDs and subcutaneous heparin for DVT proph  - Aggressive pulmonary hygiene as possible    -----------------------------------  Bruce Graves MD  Neurosurgery Resident, PGY-2      Please contact neurosurgery resident on call with questions.    Dial * * [phone], enter [phone] when prompted.    -----------------------------------    Interval History: Exam significantly improved. Mentating and able to articulate words much more clear.  Objective:   Temp:  [98.2  F (36.8  C)-99.3  F (37.4  C)] 98.2  F (36.8  C)  Pulse:  [53-96] 75  Resp:  [10-18] 18  BP: (118-167)/(44-79) 127/53  SpO2:  [92 %-98 %] 98 %  I/O last 3  completed shifts:  In: 1345 [NG/GT:325]  Out: 1000 [Urine:1000]    Gen: Appears comfortable, NAD, laying in bed, appearing older than stated age  Wound: Incision clean dry-no signs of leaking, no wetness appreciated.   Pulm: On RA  Neurologic:  Opens eyes to  voice, consistently following commands, squeezes hands and wiggles toes when instructed  Dysarthric, says Yes to appropriate questionins. Answers remaining questions with little words and/or likan architecht Grossly, PERRL  And EOMI  Reflexes 2+          LABS:  Recent Labs   Lab 07/02/22  0531 07/01/22  0457 06/30/22  0546   * 144 143   POTASSIUM 4.1 3.9 3.9   CHLORIDE 111* 107 105   CO2 27 26 28   ANIONGAP 9 11 10   * 102* 82   BUN 40.5* 34.8* 27.3*   CR 0.43* 0.46* 0.49*   MENDEZ 9.4 9.2 9.0       Recent Labs   Lab 07/02/22  0531   WBC 9.7   RBC 2.41*   HGB 7.5*   HCT 25.4*   *   MCH 31.1   MCHC 29.5*   RDW 20.2*   *       IMAGING:  Recent Results (from the past 24 hour(s))   XR Chest Port 1 View    Narrative    EXAM: XR CHEST PORT 1 VIEW  7/1/2022 7:13 PM     HISTORY:  surveillance       COMPARISON:  Chest radiograph 6/29/2022    FINDINGS: Single view of the chest. Enteric tube courses below the  diaphragm with sidehole projecting over the stomach and tip beyond the  field of view.     Stable cardiomediastinal silhouette. The pulmonary vasculature is  indistinct Blunting of the bilateral costophrenic angles. No  appreciable pneumothorax. Decreased bibasilar mixed opacities.  Visualized upper abdomen is unremarkable. Severe degenerative change  in the shoulder joints. Osteopenic appearance of the bones.      Impression    IMPRESSION:   Trace bilateral pleural effusions with overlying bibasilar opacities,  improved from prior.    I have personally reviewed the examination and initial interpretation  and I agree with the findings.    LOS BARAJAS,          SYSTEM ID:  [id number]

## 2022-07-02 NOTE — PROGRESS NOTES
End of Shift Summary.   Continues on Neuro checks Q 2 for day shift.Has been restless throughout shift. Wanting to get out of bed. stating she wanted to go home. Hemodynamically stable this shift.On room air, productive cough. NGT removed by patient and another inserted and confirmed placement via imaging.Mitts in place and attempting to remove the mitts several times and successfully throughout the shift. Sitter ordered and at bedside.Purewick in place. Incontinence episodes noted. Had a moderate urine incontinence occurrence with physical therapy up to chair. Another large urine incontinence transferring from chair to bed.    Both daughters visited at bedside today.     Plan:   -Q4 neuro checks tomorrow   -Transfer to the floor?  -    See flowsheets for vital signs and detailed assessment.

## 2022-07-02 NOTE — PLAN OF CARE
Major Shift Events: Pt oriented x 2-3 with intermittent confusion. Attempted to get out of bed, but redirectable. Able to move all extremities and follow commands. No attempts to remove lines. Secretions remaining in back of throat due to inability to expel and suction out. Coarse lung sounds. TF at 45 ml/hr, goal. Purewick in place, but had 3 unmeasured UOP d/t leaking. Pt bathed and dressings changed. Barrier paste applied to nellie-area for erythema. Q2 turns continued and heels floated. VSS on RA. Systolic BP remain <160 and MAP >65, PRN Hydralazine 10mg administered 1x overnight.  Plan: Monitor neuro status. Delirium bundle.   For vital signs and complete assessments, please see documentation flowsheets.  Goal Outcome Evaluation:  Plan of care reviewed with: patient  Overall patient progress: improving  Outcome evaluation: neuro status improving  Goal: Absence of Hospital-Acquired Illness or Injury  Outcome: Ongoing, Progressing  Intervention: Identify and Manage Fall Risk  Recent Flowsheet Documentation  Taken 7/2/2022 0000 by Briana Junior, RN  Safety Promotion/Fall Prevention:    activity supervised    clutter free environment maintained    fall prevention program maintained    increased rounding and observation    increase visualization of patient    lighting adjusted    treat reversible contributory factors    treat underlying cause    safety round/check completed  Taken 7/1/2022 2000 by Briana Junior, RN  Safety Promotion/Fall Prevention:    activity supervised    clutter free environment maintained    fall prevention program maintained    increased rounding and observation    increase visualization of patient    lighting adjusted    treat reversible contributory factors    treat underlying cause    safety round/check completed  Intervention: Prevent Skin Injury  Recent Flowsheet Documentation  Taken 7/2/2022 0200 by Briana Junior, RN  Body Position:    turned    supine  Taken 7/2/2022 0000 by Briana Junior  TENNILLE MALONE  Body Position:    turned    left    heels elevated  Taken 7/1/2022 2200 by Briana Junior RN  Body Position:    turned    right  Taken 7/1/2022 2000 by Briana Junior RN  Body Position:    turned    lower extremity elevated    supine    upper extremity elevated  Intervention: Prevent and Manage VTE (Venous Thromboembolism) Risk  Recent Flowsheet Documentation  Taken 7/2/2022 0200 by Briana Junior RN  Activity Management: activity adjusted per tolerance  Taken 7/2/2022 0000 by Briana Junior RN  Range of Motion: ROM (range of motion) performed  VTE Prevention/Management: SCDs (sequential compression devices) on, subcutaneous heparin Q8h  Activity Management: activity adjusted per tolerance  Taken 7/1/2022 2200 by Briana Junior RN  Activity Management: activity adjusted per tolerance  Taken 7/1/2022 2000 by Briana Junior RN  Range of Motion: ROM (range of motion) performed  VTE Prevention/Management: SCDs (sequential compression devices) on  Activity Management: activity adjusted per tolerance  Intervention: Prevent Infection  Recent Flowsheet Documentation  Taken 7/2/2022 0000 by Briana Junior RN  Infection Prevention: single patient room provided  Taken 7/1/2022 2000 by Briana Junior RN  Infection Prevention: single patient room provided  Goal: Optimal Comfort and Wellbeing  Outcome: Ongoing, Progressing  Intervention: Monitor Pain and Promote Comfort  Recent Flowsheet Documentation  Taken 7/1/2022 2000 by Briana Junior RN  Pain Management Interventions:    medication (see MAR)    emotional support    environmental changes    quiet environment facilitated    relaxation techniques promoted    repositioned    care clustered  Intervention: Provide Person-Centered Care  Recent Flowsheet Documentation  Taken 7/2/2022 0000 by Briana Junior RN  Trust Relationship/Rapport:    care explained    choices provided    emotional support provided    empathic listening provided    reassurance provided  Taken 7/1/2022  2000 by Briana Junior, RN  Trust Relationship/Rapport:    care explained    choices provided    emotional support provided    empathic listening provided    reassurance provided  Problem: Risk for Delirium  Goal: Optimal Coping  Outcome: Ongoing, Progressing  Intervention: Optimize Psychosocial Adjustment to Delirium  Recent Flowsheet Documentation  Taken 7/2/2022 0000 by Briana Junior, RN  Supportive Measures: relaxation techniques promoted  Family/Support System Care:    self-care encouraged    support provided  Taken 7/1/2022 2000 by Briana Junior RN  Supportive Measures: relaxation techniques promoted  Family/Support System Care:    self-care encouraged    support provided  Goal: Improved Behavioral Control  Outcome: Ongoing, Progressing  Intervention: Prevent and Manage Agitation  Recent Flowsheet Documentation  Taken 7/2/2022 0000 by Briana Junior RN  Environment Familiarity/Consistency: daily routine followed  Taken 7/1/2022 2000 by Briana Junior RN  Environment Familiarity/Consistency: daily routine followed  Goal: Improved Attention and Thought Clarity  Outcome: Ongoing, Progressing  Intervention: Maximize Cognitive Function  Recent Flowsheet Documentation  Taken 7/2/2022 0000 by Briana Junior, RN  Sensory Stimulation Regulation:    care clustered    lighting decreased    auditory stimulation minimized    quiet environment promoted    visual stimulation minimized  Reorientation Measures:    calendar in view    clock in view  Taken 7/1/2022 2000 by Briana Junior, RN  Sensory Stimulation Regulation:    care clustered    lighting decreased    auditory stimulation minimized    quiet environment promoted    visual stimulation minimized  Reorientation Measures:    calendar in view    clock in view  Goal: Improved Sleep  Outcome: Ongoing, Progressing  Intervention: Promote Sleep  Recent Flowsheet Documentation  Taken 7/2/2022 0000 by Briana Junior, RN  Sleep/Rest Enhancement:    awakenings minimized     consistent schedule promoted    family presence promoted    regular sleep/rest pattern promoted    room darkened  Taken 7/1/2022 2000 by Briana Junior, RN  Sleep/Rest Enhancement:    awakenings minimized    consistent schedule promoted    family presence promoted    regular sleep/rest pattern promoted    room darkened     Problem: Adjustment to Surgery (Craniotomy/Craniectomy/Cranioplasty)  Goal: Optimal Coping with Surgery  Outcome: Ongoing, Progressing  Intervention: Support Psychosocial Response to Surgery  Recent Flowsheet Documentation  Taken 7/2/2022 0000 by Briana Junior, RN  Supportive Measures: relaxation techniques promoted  Family/Support System Care:    self-care encouraged    support provided  Taken 7/1/2022 2000 by Briana Junior, RN  Supportive Measures: relaxation techniques promoted  Family/Support System Care:    self-care encouraged    support provided  Problem: Cerebral Tissue Perfusion Risk (Craniotomy/Craniectomy/Cranioplasty)  Goal: Optimal Cerebral Tissue Perfusion  Outcome: Ongoing, Progressing  Intervention: Protect and Optimize Cerebral Perfusion  Recent Flowsheet Documentation  Taken 7/2/2022 0000 by Briana Junior, RN  Sensory Stimulation Regulation:    care clustered    lighting decreased    auditory stimulation minimized    quiet environment promoted    visual stimulation minimized  Cerebral Perfusion Promotion: blood pressure monitored  Stabilization Measures: respiratory support increased  Taken 7/1/2022 2000 by Briana Junior, RN  Sensory Stimulation Regulation:    care clustered    lighting decreased    auditory stimulation minimized    quiet environment promoted    visual stimulation minimized  Cerebral Perfusion Promotion: blood pressure monitored  Stabilization Measures: respiratory support increased     Problem: Fluid and Electrolyte Imbalance (Craniotomy/Craniectomy/Cranioplasty)  Goal: Fluid and Electrolyte Balance  Outcome: Ongoing, Progressing  Intervention: Monitor and Manage  Fluid and Electrolyte Balance  Recent Flowsheet Documentation  Taken 7/2/2022 0000 by Briana Junior, RN  Fluid/Electrolyte Management: intravenous fluids adjusted  Taken 7/1/2022 2000 by Briana Junior RN  Fluid/Electrolyte Management: intravenous fluids adjusted     Problem: Functional Ability Impaired (Craniotomy/Craniectomy/Cranioplasty)  Goal: Optimal Functional Ability  Outcome: Ongoing, Progressing  Intervention: Optimize Functional Ability  Recent Flowsheet Documentation  Taken 7/2/2022 0200 by Briana Junior RN  Activity Management: activity adjusted per tolerance  Activity Assistance Provided: assistance, 1 person  Taken 7/2/2022 0000 by Briana Junior RN  Self-Care Promotion: independence encouraged  Activity Management: activity adjusted per tolerance  Activity Assistance Provided: assistance, 1 person  Taken 7/1/2022 2200 by Briana Junior RN  Activity Management: activity adjusted per tolerance  Activity Assistance Provided: assistance, 1 person  Taken 7/1/2022 2000 by Briana Junior RN  Self-Care Promotion: independence encouraged  Activity Management: activity adjusted per tolerance  Activity Assistance Provided: assistance, 1 person     Problem: Infection (Craniotomy/Craniectomy/Cranioplasty)  Goal: Absence of Infection Signs and Symptoms  Outcome: Ongoing, Progressing  Intervention: Prevent or Manage Infection  Recent Flowsheet Documentation  Taken 7/2/2022 0000 by Briana Junior RN  Infection Management: aseptic technique maintained  Taken 7/1/2022 2000 by Briana Junior RN  Infection Management: aseptic technique maintained     Problem: Pain (Craniotomy/Craniectomy/Cranioplasty)  Goal: Acceptable Pain Control  Outcome: Ongoing, Progressing  Intervention: Prevent or Manage Pain  Recent Flowsheet Documentation  Taken 7/1/2022 2000 by Briana Junior, RN  Pain Management Interventions:    medication (see MAR)    emotional support    environmental changes    quiet environment facilitated    relaxation  techniques promoted    repositioned    care clustered  Problem: Feeding Intolerance (Enteral Nutrition)  Goal: Feeding Tolerance  Outcome: Ongoing, Progressing  Intervention: Prevent and Manage Feeding Intolerance  Recent Flowsheet Documentation  Taken 7/2/2022 0000 by Briana Junior, RN  Nutrition Support Management: weight trending reviewed  Taken 7/1/2022 2000 by Briana Junior, RN  Nutrition Support Management: weight trending reviewed

## 2022-07-03 NOTE — PROGRESS NOTES
Neurocritical Care Multi-Disciplinary Note    Reason for critical care admission: Bilateral SDH  Admitting Team: Neurosurgery   Date of Service:  07/03/2022  Date of Admission:  6/22/2022  Hospital Day: 12    Patient condition reviewed and discussed while on multidisciplinary rounds today.   Please note these minor interventions that were initiated:  1. None    The Neurocritical Care service will continue to follow peripherally while the patient is within the ICU. We are readily available should issues arise. Please feel free to contact us for critical care issues with which we may be of assistance. For all other concerns, please contact primary service first.     Please contact NCC team phone at *[phone].    JOSÉ ANTONIO Whitehead, CNP  Neurocritical Care *[phone]

## 2022-07-03 NOTE — PLAN OF CARE
ICU End of Shift Summary. See flowsheets for vital signs and detailed assessment.    Changes this shift: Neuro checks unchanged. Alternates between lethargic & restless/attempting to get OOB/remove mitts. In the chair for most of the day. Tylenol once for leg pain. Failed speech eval this am d/t lethargy/poor secretion management. Tube feeds continue at goal. 1u RBC this am, recheck hgb 7.6. FWF increased to 60ml q4h d/t hypernatremia.    Plan:  Transfer out of ICU when bed available.     **Patient's daughters are requesting a care conference. They'd like to discuss goals of care & would like to be better informed so that they can support patient's  in making care decisions. Care management/SW consulted.

## 2022-07-03 NOTE — PLAN OF CARE
Major Shift Events:   Neuro: Q4h checks overnight. Lethargic-restless overnight. Only oriented to self/place. Whispered/garbled speech. Follows commands in all extremities. PERRLA.   CV: SR w/ rare PVC's. SBP <160 maintained w/ scheduled meds. Afebrile.  Pulm: RA. Frequent cough. Oral suctioning needed w/ turns. LS coarse/diminsihed.  GI: NG removed by pt @ 0100. Another NG inserted, awaiting placement approval by team. Loose BM x1.  : Straight cath x1. Purewick in place.   Other: 1 unit blood started.  Plan: Transfer to floor.  For vital signs and complete assessments, please see documentation flowsheets.

## 2022-07-03 NOTE — PROGRESS NOTES
[facility], [place]   07/03/2022  Neurosurgery Progress Note:    Assessment:  Ms. Benítez is a 76-year-old female with PMHx myelodysplastic syndrome, seronegative RA, chronic microcytic anemia, PAD, and recent L SDH (5/12/2022) who arrived to [facility] via EMS after an unwitnessed fall at home.  Shortly after arriving, the patient was complaining of a significant headache and nausea, and then her right pupil dilated.  She was no longer protecting her airway, so she was sedated and intubated.  Her right eye pupil then returned to a normal caliber.  A head CT demonstrated a left acute subdural approximately 2.3 cm thick with 12 mm of rightward midline shift.  She was taken to the OR on 6/22 for emergent L craniotomy for SDH evacuation. She was extubated on POD1. Concerns for tube feed aspiration on 6/25.     Other diagnoses include:  #Brain compression  #Open evacuation of hematoma on day of admission   #Hyponatremia  #Pulmonary edema    Note: Patient was restless and agitated overnight- 1:1 sitter was requested. Agitation improved this AM 7/3/2022.    Plan:  - Serial neuro exams  - Antibiotics for presumed aspiration pneumonia.   - Zosyn stopped 2/2 negative cx growth  - Pain control  - HOB > 30 degrees  - VIOLA removed 6/25  - Keppra  - Bowel regimen  - PRN antiemetics  - NG tube -tube feeds initiated   -Continuing to hold on trach/PEG given patient's improvement on 7/1/2022  - SLP assessment  - PT/OT  - SCDs and subcutaneous heparin for DVT proph  - Aggressive pulmonary hygiene as possible  - Calorie counts  - Okay to transfer to the floor.  -----------------------------------  Keenan Tillman  Neurosurgery Resident PGY2     Please contact neurosurgery resident on call with questions.    Dial * * [phone], enter [phone] when prompted.    -----------------------------------    Interval History: Exam significantly improved. Mentating and able to articulate words much more clear.  Objective:   Temp:   [97.7  F (36.5  C)-98.8  F (37.1  C)] 97.8  F (36.6  C)  Pulse:  [] 105  Resp:  [14-20] 16  BP: (123-159)/(44-78) 131/57  SpO2:  [92 %-99 %] 94 %  I/O last 3 completed shifts:  In: 730 [NG/GT:305]  Out: 1150 [Urine:1150]    Gen: Appears comfortable, NAD, laying in bed, appearing older than stated age  Wound: Incision clean dry-no signs of leaking, no wetness appreciated.   Pulm: On RA  Neurologic:  Opens eyes to  voice, consistently following commands, squeezes hands and wiggles toes when instructed  Dysarthric, says Yes to appropriate questionins. Answers remaining questions with little words and/or likan architecht Grossly, PERRL  And EOMI  Reflexes 2+    LABS:  Recent Labs   Lab 07/03/22  0529 07/02/22  0531 07/01/22  0457   * 147* 144   POTASSIUM 3.9 4.1 3.9   CHLORIDE 112* 111* 107   CO2 25 27 26   ANIONGAP 9 9 11   * 121* 102*   BUN 47.4* 40.5* 34.8*   CR 0.41* 0.43* 0.46*   MENDEZ 9.4 9.4 9.2       Recent Labs   Lab 07/03/22  0529   WBC 10.8   RBC 2.02*   HGB 6.3*   HCT 21.2*   *   MCH 31.2   MCHC 29.7*   RDW 20.3*   *       IMAGING:  Recent Results (from the past 24 hour(s))   XR Abdomen Port 1 View    Narrative    Exam: XR ABDOMEN PORT 1 VIEWS, 7/2/2022 1:12 PM    Indication: NG tube Placement    Comparison: 6/30/2022    Findings:   Supine frontal view of the abdomen. Enteric tube tip and sidehole  project over the expected location of the stomach. Air-filled,  nondilated loops of small and large bowel. No pneumatosis or portal  venous gas. Bilateral total hip arthroplasties.      Impression    Impression: Enteric tube tip and sidehole in the stomach.    LOS BARAJAS DO         SYSTEM ID:  [id number]   XR Abdomen Port 1 View    Narrative    XR ABDOMEN PORT 1 VIEWS  7/3/2022 4:50 AM      HISTORY: NG tube placement    COMPARISON: 7/2/2022    FINDINGS:   Single AP view of the lower chest and abdomen. Enteric tube tip and  side-port overlying the stomach. Nonobstructive visualized  bowel gas.  No portal venous gas. Convex left coronal curvature abnormality of the  thoracolumbar spine with degenerative changes.      Impression    IMPRESSION:   Enteric tube tip and side-port overlying the stomach.    I have personally reviewed the examination and initial interpretation  and I agree with the findings.    LOS BARAJAS DO         SYSTEM ID:  [id number]

## 2022-07-04 NOTE — PROGRESS NOTES
MD notified of neuro status changes and low BPs. Neurocrit and neurosurg evaluated patient at bedside and A-line and central line were placed. Central levo initiated. Patient sent for stat CT.

## 2022-07-04 NOTE — PLAN OF CARE
Goal Outcome Evaluation:    Plan of Care Reviewed With: patient     Overall Patient Progress: no change    Major Shift Events:  Alert to self and time. Neuro waxes/wanes-restless or lethargic. Follows commands intermittently. Denies pain. SR to ST 70s-100s. SBP stable. LS clear on RA. Weak congested cough. TF @ GR. BM smear. 2 episodes of large urine incontinence. Wound care completed on LLE per order. Crani incision intact, no drainage. L/R PIV SL. Hgb 6.0-transfused and recheck.     Plan: Recheck Hgb. Schedule care conference    For vital signs and complete assessments, please see documentation flowsheets.

## 2022-07-04 NOTE — PROGRESS NOTES
[facility], [place]   07/04/2022  Neurosurgery Progress Note:    Assessment:  Ms. Benítez is a 76-year-old female with PMHx myelodysplastic syndrome, seronegative RA, chronic microcytic anemia, PAD, and recent L SDH (5/12/2022) who arrived to [facility] via EMS after an unwitnessed fall at home.  Shortly after arriving, the patient was complaining of a significant headache and nausea, and then her right pupil dilated.  She was no longer protecting her airway, so she was sedated and intubated.  Her right eye pupil then returned to a normal caliber.  A head CT demonstrated a left acute subdural approximately 2.3 cm thick with 12 mm of rightward midline shift.  She was taken to the OR on 6/22 for emergent L craniotomy for SDH evacuation. She was extubated on POD1. Concerns for tube feed aspiration on 6/25.     Other diagnoses include:  #Brain compression  #Open evacuation of hematoma on day of admission   #Hyponatremia  #Pulmonary edema    Note: Patient was restless and agitated overnight- 1:1 sitter was requested. Agitation improved this AM 7/3/2022.    Plan:  - Serial neuro exams  - Repeat head CT today for surveillance  - Antibiotics for presumed aspiration pneumonia.   - Zosyn stopped 2/2 negative cx growth  - Pain control  - HOB > 30 degrees  - VIOLA removed 6/25  - Bowel regimen  - PRN antiemetics  - NG tube -tube feeds initiated   -Continuing to hold on trach/PEG given patient's improvement on 7/1/2022  - SLP assessment-currently remains n.p.o.  - PT/OT  - SCDs and subcutaneous heparin for DVT proph  - Aggressive pulmonary hygiene as possible  - Okay to transfer to the floor.  -----------------------------------  Bruce Graves MD  Neurosurgery Resident, PGY-2       Please contact neurosurgery resident on call with questions.    Dial * * [phone], enter [phone] when prompted.    -----------------------------------    Interval History: No acute events overnight.  No issues with agitation or delirium  overnight.  Fluctuating dysarthria.  Briskly follows commands.  Objective:   Temp:  [98.4  F (36.9  C)-99.2  F (37.3  C)] 99.2  F (37.3  C)  Pulse:  [] 111  Resp:  [16-22] 16  BP: (107-159)/(42-74) 154/63  SpO2:  [91 %-98 %] 93 %  I/O last 3 completed shifts:  In: 1890 [NG/GT:550]  Out: 950 [Urine:950]    Gen: Appears comfortable, NAD, laying in bed, appearing older than stated age  Wound: Incision clean dry-no signs of leaking, no wetness appreciated.   Pulm: Breathing comfortably on room air  Neurologic:  Opens eyes to  voice, consistently following commands, squeezes hands and wiggles toes when instructed  Dysarthric, says Yes to appropriate questionins. Answers remaining questions with significant dysarthria although intelligible words.  Grossly, PERRL  And EOMI  Reflexes 2+    LABS:  Recent Labs   Lab 07/04/22  0352 07/03/22  0529 07/02/22  0531   * 146* 147*   POTASSIUM 4.1 3.9 4.1   CHLORIDE 115* 112* 111*   CO2 25 25 27   ANIONGAP 6* 9 9   * 110* 121*   BUN 70.2* 47.4* 40.5*   CR 0.53 0.41* 0.43*   MENDEZ 9.2 9.4 9.4       Recent Labs   Lab 07/04/22  0352   WBC 15.4*   RBC 1.95*   HGB 6.0*   HCT 19.6*   *   MCH 30.8   MCHC 30.6*   RDW 20.7*   *       IMAGING:  No results found for this or any previous visit (from the past 24 hour(s)).

## 2022-07-04 NOTE — PROGRESS NOTES
Neurocritical Care Multi-Disciplinary Note    Reason for critical care admission: Bilateral SDH  Admitting Team: Neurosurgery   Date of Service:  07/04/2022  Date of Admission:  6/22/2022  Hospital Day: 13    Patient condition reviewed and discussed while on multidisciplinary rounds today.   Please note these minor interventions that were initiated:  1. SICU consulted for PEG    The Neurocritical Care service will continue to follow peripherally while the patient is within the ICU. We are readily available should issues arise. Please feel free to contact us for critical care issues with which we may be of assistance. For all other concerns, please contact primary service first.     Please contact NCC team phone at *[phone].    JOSÉ ANTONIO Maxwell, CNP  Neurocritical Care *[phone]

## 2022-07-04 NOTE — PROGRESS NOTES
Neurocritical Care Progress Note    Reason for critical care admission: Bilateral SDH  Admitting Team: svetlana  Date of Service:  07/04/2022  Date of Admission:  6/22/2022  Hospital Day: 13    Assessment/Plan  Louise Benítez is a 76 year old female w/ PMHx myelodysplastic syndrome, seronegative RA, chronic microcytic anemia, PAD, and recent L SDH (5/12/2022) who was admitted from her TCU on 6/22/2022 after an unwitnessed fall. Was subsequently found to have an acute on chronic L SDH, a new acute R SDH, and increased midline shift w/ trace L uncal herniation now s/p L craniotomy w/ hematoma evacuation.     24 hour events: was appropriate for floor with transfer orders for the last few days, however this afternoon became more lethargic, hypotensive, and tachypneic. Patient was subsequently lined for pressor support and STAT CTH was ordered. vEEG was also ordered and will be applied when she returns.       Neuro  #Acute on chronic L SDH + new acute R SDH w/ increased rightward midline shift and trace L uncal herniation s/p L craniotomy (6/22) w/ hematoma evacuation  #Recent L SDH 2/2 mechanical fall not requiring intervention (hospitalized 5/12-5/20/2022)  -Neurochecks q1hr after acute change in mentation   -HOB > 30   -MAP > 65 mmHg  -PT/OT- up in chair daily, SLP - NPO  -CTH this AM, shows increase in left SDH (likely redistribution)   -repeat CTH this evening -   -restart vEEG this evening     #Delirium  -Delirium precaution  -Melatonin 10 mg at 8pm  -no need for neuro stimulant at this time    #Metabolic encephalopathy- New acute on chronic SDH contributing factor. improving  Restart      #Analgesics & sedation  -Tylenol 650mg Q4h prn     CV  #HTN on admission  #hypotension   -cardiac monitoring  -MAP > 65 mmHg  -continue amlodipine 5 mg - now held given HoTN  -start NE infusion titrate to MAP goal    #Bradycardia - resolved   -cardiac monitoring  -echo 6/24: EF 60-65%, normal RV, no significant valvular  abnormalities       #PAD  No statin, recent LDL 10 (5/13/2022)     Resp  #former tobacco Use  #acute hypoxic respiratory failure- still requiring oxygen  -aspiration pneumonia versus pneumonitis   #pulm edema  - suction PRN  -continue atropine drops to dry secretions   -chest physiotherapy  -continuous pulse ox  -maintain O2 saturations greater than 92%  -pCXR shows RLL atelectasis, stable from a pulm perspective   -CT PE to r/o - negative     GI  #hx GERD   Diet: TF, FWF 60 Q4  Last BM: 07/04    Bowel regimen: PRN senna-docusate and Miralax  -with being more awake will hold off on surgery consult for PEG      #Melanic stool  - start pantoprazole 40mg BID IV  - flush NG until clear to evaluate for active bleeding     Renal/  #hyperchloremia- resolved  -Daily BMP  -IV fluids: none  -Electrolyte replacement protocol    #Uremia  - baseline BUN 10-20 on previous admissions  - around baseline on admit  - 70 this morning, repeat pending  - suspect secondary to GI bleeding     Endo  #ANNALEE  -Hgb A1c 5.5% 6/22   -Monitor glucose levels     Rheum  #Seronegative Rheumatoid Arthritis  -continue PTA Plaquenil 400mg at bedtime      Heme/Onc  #Chronic Microcytic Anemia, likely 2/2 myelodysplastic syndrome  Baseline Hgb ~8.0  -PTA Darbepoetin Inj 60mg Q3wks (last dose 6/14; next due 7/5)  -PTA Vit B12 500mcg daily  -Daily CBC  -Hgb goal > 7 in acute period post-surgery, plt goal > 50k  -Transfuse to meet Hgb and plt goals     #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 Upon evaluation, this patient had a high probability of imminent or life-threatening deterioration due to SDH s/p crani, acute hypoxic respiratory failure, which required my direct attention, intervention, and personal management. Greater than 50% of my time was spent at the bedside counseling the patient and/or coordinating care regarding services listed in this note.     The patient was seen and discussed with the NCC attending, Dr. Jus Denny, APRN, CNP  Neurocritical Care  *[phone]  Text Page    Physician Attestation   Louise was seen and evaluated by me on 07/04/22.  She was in critical condition as the result of acute hypotension, acute encephalopathy of undetermined source, acute blood loss anemia, hypoxia.  Her condition is now Critical.     Significant changes in status since my last evaluation include worsening BUN levels, depressed mental functions       I have reviewed changes in critical data from the last 24 hours, including medications, laboratory results, vital signs and radiograph results.      The acute issues managed by me today include  Sudden change in mental status,  GI bleeding  Supportive interventions provided and/or ordered by me include start BID pantoprazole, GI consult, CT head     I formulated and discussed my care plan with the patient s  "Advanced Practice Provider     I discussed the course and plan with the Bedside Nurse and Primary team and answered all questions to the best of my ability.     Total Critical Care time spent by me, excluding procedures, was  45 minutes    Jus Jones MD  Neurocritical Care  Vascular Neurology  Text Page - [phone]      24 Hour Vital Signs Summary:  Temp: (!) 100.5  F (38.1  C) Temp  Min: 98.3  F (36.8  C)  Max: 100.5  F (38.1  C)  Resp: 20 Resp  Min: 16  Max: 22  SpO2: 95 % SpO2  Min: 91 %  Max: 98 %  Pulse: (!) 121 Pulse  Min: 83  Max: 134    No data recorded  BP: 115/47 Systolic (24hrs), Av , Min:76 , Max:159   Diastolic (24hrs), Av, Min:40, Max:70        Respiratory monitoring:   FiO2 (%): 21 %  Resp: 20      I/O last 3 completed shifts:  In: 1935 [NG/GT:550]  Out: 400 [Urine:400]    Current Medications:    [Held by provider] amLODIPine  5 mg Oral or Feeding Tube QPM     cyanocobalamin  500 mcg Oral Daily     heparin ANTICOAGULANT  5,000 Units Subcutaneous Q8H     hydroxychloroquine  400 mg Oral or Feeding Tube At Bedtime     melatonin  10 mg Oral or Feeding Tube Daily     multivitamins w/minerals  15 mL Per Feeding Tube Daily     polyethylene glycol  17 g Oral Daily     protein modular  1 packet Per Feeding Tube BID 09 12     sodium chloride (PF)  3 mL Intracatheter Q8H       PRN Medications:  acetaminophen, albuterol, atropine, bisacodyl, [Held by provider] hydrALAZINE, [Held by provider] labetalol, naloxone **OR** naloxone **OR** naloxone **OR** naloxone, ondansetron **OR** ondansetron, senna-docusate, sodium chloride (PF)    Infusions:    norepinephrine 0.1 mcg/kg/min (22 1748)       No Known Allergies    Physical Examination:  Vitals: /47   Pulse (!) 121   Temp (!) 100.5  F (38.1  C) (Axillary)   Resp 20   Ht 1.626 m (5' 4.02\")   Wt 66.8 kg (147 lb 4.3 oz)   SpO2 95%   BMI 25.27 kg/m    General: Adult female patient, lying in bed, critically-ill  HEENT: Normocephalic, no " icterus, oral cavity/oropharynx pink and dry  Cardiac: tachycardic per monitor  Chest: tachycardic, rhonchi heard throughout all lung fields, low effort  Abdomen: soft, non-distended  Extremities: warm, no edema, distal pulses +2  Skin: No rash or lesion on exposed skin  Psych: unable to assess  Neuro: lethargic, inattentive, disoriented, PERRL, conjugate gaze, EOMI, no abnormal movements, spontaneous movements to LUE noted, does not respond to noxious stimuli, gait and coordination deferred.     Labs and Imaging:    Results for orders placed or performed during the hospital encounter of 06/22/22 (from the past 24 hour(s))   Basic metabolic panel   Result Value Ref Range    Creatinine 0.53 0.51 - 0.95 mg/dL    Sodium 146 (H) 136 - 145 mmol/L    Potassium 4.1 3.4 - 5.3 mmol/L    Urea Nitrogen 70.2 (H) 8.0 - 23.0 mg/dL    Chloride 115 (H) 98 - 107 mmol/L    Carbon Dioxide (CO2) 25 22 - 29 mmol/L    Anion Gap 6 (L) 7 - 15 mmol/L    Glucose 125 (H) 70 - 99 mg/dL    GFR Estimate >90 >60 mL/min/1.73m2    Calcium 9.2 8.8 - 10.2 mg/dL   CBC with platelets   Result Value Ref Range    WBC Count 15.4 (H) 4.0 - 11.0 10e3/uL    RBC Count 1.95 (L) 3.80 - 5.20 10e6/uL    Hemoglobin 6.0 (LL) 11.7 - 15.7 g/dL    Hematocrit 19.6 (L) 35.0 - 47.0 %     (H) 78 - 100 fL    MCH 30.8 26.5 - 33.0 pg    MCHC 30.6 (L) 31.5 - 36.5 g/dL    RDW 20.7 (H) 10.0 - 15.0 %    Platelet Count 456 (H) 150 - 450 10e3/uL   Magnesium   Result Value Ref Range    Magnesium 2.4 (H) 1.7 - 2.3 mg/dL   Phosphorus   Result Value Ref Range    Phosphorus 3.2 2.5 - 4.5 mg/dL   ABO/Rh type and screen    Narrative    The following orders were created for panel order ABO/Rh type and screen.  Procedure                               Abnormality         Status                     ---------                               -----------         ------                     Adult Type and Screen[[id number]]                            Final result                 Please view  results for these tests on the individual orders.   Adult Type and Screen   Result Value Ref Range    ABO/RH(D) AB NEG     Antibody Screen Negative Negative    SPECIMEN EXPIRATION DATE [id number]    Prepare red blood cells (unit)   Result Value Ref Range    CROSSMATCH Compatible     UNIT ABO/RH AB Neg     Unit Number [id number]     Unit Status Issued     Blood Component Type Red Blood Cells     Product Code [id number]     CODING SYSTEM [id number]     UNIT TYPE ISBT 2800     ISSUE DATE AND TIME [id number]    Hemoglobin   Result Value Ref Range    Hemoglobin 7.0 (L) 11.7 - 15.7 g/dL   CT Head w/o Contrast    Narrative    CT HEAD W/O CONTRAST 7/4/2022 11:46 AM    History: follow up SDH s/p evacuation   ICD-10:    Comparison: CT head 6/26/2022.    Technique: Using multidetector thin collimation helical acquisition  technique, axial, coronal and sagittal CT images from the skull base  to the vertex were obtained without intravenous contrast.   (topogram) image(s) also obtained and reviewed.    Findings: Postsurgical changes of left frontoparietal craniotomy for  subdural hematoma evacuation. There has been evolution of blood  products within the cerebral convexities. Right frontal convexity  subdural hematoma is stable in size measuring up to 8 mm in thickness.  Subdural hematoma along the falx and tentorial leaflets has  substantially decreased in size. The left cerebral convexity subdural  hematoma measures up to 10 mm, previously 7 mm. In the absence of new  hyperdense hemorrhage and given the decreased size of the falcine  subdural hematoma, increased in size over the left cerebral convexity  is thought to represent redistribution of blood products. Similar  appearance of mild effacement of the left cerebral sulci. There is  approximately 6 mm of rightward shift of the midline, unchanged. No  evidence for new intracranial hemorrhage.. Gray/white matter  differentiation in both cerebral hemispheres is  preserved. Ventricles  are proportionate to the cerebral sulci. The basal cisterns are clear.  Moderate generalized cerebral atrophy. Periventricular and subcortical  white matter hypoattenuation is nonspecific but likely represent  chronic small vessel ischemic disease in patient this age.    The bony calvaria and the bones of the skull base are normal. Mild  scattered mucosal thickening, most pronounced within the right  maxillary antrum, right ethmoid air cells and with small air-fluid  level within the left sphenoid locule. Small left-sided mastoid  effusion. Right mastoid air cells are relatively clear.       Impression    Impression:    1. Increased size of left cerebral convexity subdural hematoma without  increase in density and with decreased size of left frontal seen  subdural hemorrhage suggests redistribution of blood products.  Attention is recommended on follow-up imaging.  2. stable size of right cerebral convexity subdural hematoma.  3. Stable 6 mm of rightward midline shift.         BONI LINDQUIST MD         SYSTEM ID:  [id number]   XR Chest Port 1 View    Narrative    Portable chest    INDICATION: Tachycardia    COMPARISON: 7/1/2022    FINDINGS: Heart size normal. Calcification at the aortic knob. NG/OG  tube tip and sidehole projected in the stomach. Levocurvature of the  thoracolumbar junction. Calcification at the aortic knob. Patchy  opacities in the right lower lung appears similar.      Impression    IMPRESSION: Continued right lower lung atelectasis or mild edema. No  significant changes otherwise.    JOVANA CORRIGAN MD         SYSTEM ID:  [id number]   Hemoglobin   Result Value Ref Range    Hemoglobin 7.1 (L) 11.7 - 15.7 g/dL   Blood gas arterial with oxyhemoglobin   Result Value Ref Range    pH Arterial 7.45 7.35 - 7.45    pCO2 Arterial 32 (L) 35 - 45 mm Hg    pO2 Arterial 76 (L) 80 - 105 mm Hg    Bicarbonate Arterial 22 21 - 28 mmol/L    Oxyhemoglobin Arterial 94 92 - 100 %    Base  Excess/Deficit (+/-) -1.4 -9.0 - 1.8 mmol/L    FIO2 21    XR Chest Port 1 View    Impression    RESIDENT PRELIMINARY INTERPRETATION  IMPRESSION:     1. Left upper extremity PICC line terminates in the high SVC. No  pneumothorax.  2. Continued right lower lung atelectasis or mild edema. No  significant changes otherwise.   Central line    Narrative    John Denny NP     7/4/2022  5:32 PM  [facility]    Central line    Date/Time: 7/4/2022 5:30 PM  Performed by: John Denny NP  Authorized by: Jus Jones MD   Indications: vascular access      UNIVERSAL PROTOCOL   Site Marked: NA  Prior Images Obtained and Reviewed:  NA  Required items: Required blood products, implants, devices and special   equipment available    Patient identity confirmed:  Arm band and hospital-assigned identification   number  NA - No sedation, light sedation, or local anesthesia  Confirmation Checklist:  Patient's identity using two indicators, relevant   allergies, procedure was appropriate and matched the consent or emergent   situation and correct equipment/implants were available  Time out: Immediately prior to the procedure a time out was called    Universal Protocol: the Joint Commission Universal Protocol was followed    Preparation: Patient was prepped and draped in usual sterile fashion       ANESTHESIA    Anesthesia: Local infiltration  Local Anesthetic:  Lidocaine 1% without epinephrine      SEDATION    Patient Sedated: No      Preparation: skin prepped with 2% chlorhexidine and skin prepped with   ChloraPrep  Skin prep agent dried: skin prep agent completely dried prior to procedure  Sterile barriers: all five maximum sterile barriers used - cap, mask,   sterile gown, sterile gloves, and large sterile sheet  Hand hygiene: hand hygiene performed prior to central venous catheter   insertion  Patient position: Trendelenburg  Pre-procedure: landmarks  identified  Number of attempts: 2  Successful placement: yes  Post-procedure: line sutured  Assessment: blood return through all ports,  placement verified by x-ray   and no pneumothorax on x-ray      PROCEDURE    Patient Tolerance:  Patient tolerated the procedure well with no immediate   complications  Length of time physician/provider present for 1:1 monitoring during   sedation: 30       All relevant imaging and laboratory values personally reviewed.

## 2022-07-04 NOTE — PLAN OF CARE
Major Shift Events     Neuro: Neuro status labile this morning with acute changes this afternoon as patient became unresponsive and stopped following commands. MD's notified and at bedside. Repeat CT ordered and results pending. EEG monitoring reordered. Increase neuro assessment frequency.    CV: T-max 100.5. Sinus tach up to 130's. BPs increasingly soft with MAPs below 65. Central levo initiated. Hgb 7.1 after 1 unit of blood this morning.   RR: Patient currently on oxymask 2L and respiratory consulted for NT suctioning, which has been ok'ed per neurocrit. O2 sats stable. ABG obtained and non-concerning.   GI/: Tube feeds at goal with 60ml flush q4h. Incontinent of bladder. 2x stool smears. 1x black stool at end of shift  Skin: No new concerns  MSK: up to chair this afternoon. Able to move all extremities.     Plan: Increase neuro assessment frequency to q1h.     For vital signs and complete assessments, please see documentation flowsheets.
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
## 2022-07-05 NOTE — PHARMACY-VANCOMYCIN DOSING SERVICE
"Pharmacy Vancomycin Initial Note  Date of Service 2022  Patient's  1945  76 year old, female    Indication: Aspiration Pneumonia    Current estimated CrCl = Estimated Creatinine Clearance: 46.8 mL/min (based on SCr of 0.93 mg/dL).    Creatinine for last 3 days  2022:  5:31 AM Creatinine 0.43 mg/dL  7/3/2022:  5:29 AM Creatinine 0.41 mg/dL  2022:  3:52 AM Creatinine 0.53 mg/dL;  7:08 PM Creatinine 0.84 mg/dL  2022:  3:42 AM Creatinine 0.93 mg/dL    Recent Vancomycin Level(s) for last 3 days  No results found for requested labs within last 72 hours.      Vancomycin IV Administrations (past 72 hours)      No vancomycin orders with administrations in past 72 hours.                Nephrotoxins and other renal medications (From now, onward)    Start     Dose/Rate Route Frequency Ordered Stop    22 0530  vancomycin 1250 mg in 0.9% NaCl 250 mL intermittent infusion 1,250 mg         1,250 mg  over 90 Minutes Intravenous EVERY 24 HOURS 22 0456      22 0500  piperacillin-tazobactam (ZOSYN) 4.5 g vial to attach to  mL bag        Note to Pharmacy: For [facility], [facility] and [facility]: For Zosyn-naive patients, use the \"Zosyn initial dose + extended infusion\" order panel.    4.5 g  over 30 Minutes Intravenous EVERY 6 HOURS 22 0439      22 0500  vancomycin 1500 mg in 0.9% NaCl 250 ml intermittent infusion 1,500 mg         1,500 mg  over 90 Minutes Intravenous ONCE 22 0456      22 2200  vasopressin (VASOSTRICT) 20 Units in sodium chloride 0.9 % 100 mL standard conc infusion         2.4 Units/hr  12 mL/hr  Intravenous CONTINUOUS 22 2132      22 1800  norepinephrine (LEVOPHED) 16 mg in  mL infusion MAX CONC CENTRAL LINE         0.01-0.6 mcg/kg/min × 60.3 kg (Dosing Weight)  0.6-33.9 mL/hr  Intravenous CONTINUOUS 22 1731            Contrast Orders - past 72 hours (72h ago, onward)    Start     Dose/Rate Route Frequency Stop    22 1900  iopamidol " (ISOVUE-370) solution 54 mL         54 mL Intravenous ONCE 07/04/22 1853          [employer] Prediction of Planned Initial Vancomycin Regimen  Loading dose: 1500 mg  Regimen: 1250 mg IV every 24 hours.  Start time: 05:50 on 07/05/2022  Exposure target: AUC24 (range)400-600 mg/L.hr   AUC24,ss: 486 mg/L.hr  Probability of AUC24 > 400: 73 %  Ctrough,ss: 14.3 mg/L  Probability of Ctrough,ss > 20: 17 %  Probability of nephrotoxicity (Lodise BUCK 2009): 9 %          Plan:  1. Start vancomycin  1500 mg IV X1 followed by 1250 mg IV q24h.   2. Vancomycin monitoring method: AUC  3. Vancomycin therapeutic monitoring goal: 400-600 mg*h/L  4. Pharmacy will check vancomycin levels as appropriate in 1-3 Days.    5. Serum creatinine levels will be ordered daily for the first week of therapy and at least twice weekly for subsequent weeks.      Tim Reaves, [facility]

## 2022-07-05 NOTE — CONSULTS
Gastroenterology Consultation      Date of Admission:  6/22/2022  Reason for Admission: Fall, acute SDH  Date of Consult  7/5/2022   Requesting Physician:  Ayden Harris,*           ASSESSMENT AND RECOMMENDATIONS:   Assessment:  76 year old female with a history of myelodysplastic syndrome with frequent transfusions, osteoporosis, rheumatoid arthritis, peripheral arterial disease, lumbar spondylosis, and chronic microcytic anemia admitted 6/22 after a fall, found to have new acute R SDH s/p evacuation last week who developed new lethargy, hypotension in the setting of melena, elevated BUN/Cr and drop in hgb. [facility] was consulted for new dark stools.     # Esophageal and Gastric Ulcers  S/p EGD 7/4/22 with scattered superficial non-bleeding esophageal ulcers, 3 cm hiatal hernia, 3 linear nonbleeding gastric ulcers in the fundus with one extending from the GE junction, one 5 mm nonbleeding gastric ulcer in the anterior wall, and one 5 mm gastric ulcer on the posterior wall with a pigmented spot (likely the source of melena and anemia), not actively bleeding, treated with one hemastatic clip. Hgb stable at 8.2 today.      Recommendations:  -- See final EGD procedure note for further details.   -- Continue Pantoprazole 40 mg IV BID while critically ill in the ICU.    - When able to tolerate PO intake, recommend transitioning to Omeprazole 40 mg PO BID for 3 months.   -- Trend Hgb. Transfuse for Hgb < 7 from GI standpoint.   -- Expect to see melena 48 hours s/p EGD.   -- If concern for rebleeding, can try to aspirate form NG tube.   -- Continue supportive care per primary team.  -- Analgesia/Antiemetics per primary team.    COVID status: Negative 6/30/2022    Discussed with Neurosurgery, Dr. Gilberto Leung.    The inpatient gastroenterology service will sign off at this time. Thank you for allowing us to participate in the care of this patient. Please do not hesitate to page the GI service  "with any questions or concerns.     Pt seen and care plan discussed with Dr. Flowers, GI staff physician.    Suzy Duran PA-C  Inpatient Gastroenterology Service  [facility]  Text Page  -------------------------------------------------------------------------------------------------------------------       Reason for Consultation:   \"subdural hematoma evacuation; new dark stools; hemodynamic instability\"           History of Present Illness:   Patient seen and examined at 10:40 AM. History is obtained from RN, chart review,  Jd. HPI limited by AMS, intubation and sedation.     Louise Benítez is a 76 year old female with a PMH significant for myelodysplastic syndrome with frequent transfusions, osteoporosis, rheumatoid arthritis, peripheral arterial disease, lumbar spondylosis, and chronic microcytic anemia admitted 6/22 after a fall, found to have new acute R SDH s/p evacuation last week who developed new lethargy, hypotension in the setting of melena, elevated BUN/Cr and drop in hgb. The [facility] was consulted for new dark stools.     No significant output from OG. No blood in OG. Dark stool in the rectal tube.       Previous Procedures:    7/4/2022 - EGD - Dr. Carleen Flowers / Dr. Richard Britt  -- See Final Procedure report for further details.               Past Medical History:   Reviewed and edited as appropriate  Past Medical History:   Diagnosis Date     Family history of malignant hyperthermia     Cousin     Rheumatoid arthritis (H)             Past Surgical History:   Reviewed and edited as appropriate   Past Surgical History:   Procedure Laterality Date     CRANIOTOMY Left 6/22/2022    Procedure: CRANIOTOMY LEFT for hematoma;  Surgeon: Ayden Harris MD;  Location: [facility] OR     IR LUMBAR EPIDURAL STEROID INJECTION  8/15/2012              Social History:   Per Chart Review:   The patient lives in [place], [place] with  Jd.    Alcohol: Yes - wine, " unclear amount.  Tobacco: Former smoker.  Illicit drugs: Never used.            Family History:   Patient's family history is reviewed today and is non-contributory    Family History   Problem Relation Age of Onset     Malignant Hyperthermia Cousin              Allergies:   Reviewed and edited as appropriate   No Known Allergies         Medications:     Current Facility-Administered Medications   Medication     acetaminophen (TYLENOL) tablet 975 mg     albuterol (PROVENTIL) neb solution 2.5 mg     [Held by provider] amLODIPine (NORVASC) tablet 5 mg     bisacodyl (DULCOLAX) Suppository 10 mg     cyanocobalamin (VITAMIN B-12) tablet 500 mcg     [Held by provider] heparin ANTICOAGULANT injection 5,000 Units     [Held by provider] hydrALAZINE (APRESOLINE) injection 10-20 mg     hydroxychloroquine (PLAQUENIL) 25 mg/mL suspension 400 mg     [Held by provider] labetalol (NORMODYNE/TRANDATE) injection 10-40 mg     levETIRAcetam (KEPPRA) intermittent infusion 500 mg     [Held by provider] melatonin tablet 10 mg     midazolam (VERSED) drip - ADULT 100 mg/100 mL in NS (pre-mix)     multivitamins w/minerals liquid 15 mL     naloxone (NARCAN) injection 0.2 mg    Or     naloxone (NARCAN) injection 0.4 mg    Or     naloxone (NARCAN) injection 0.2 mg    Or     naloxone (NARCAN) injection 0.4 mg     norepinephrine (LEVOPHED) 16 mg in  mL infusion MAX CONC CENTRAL LINE     ondansetron (ZOFRAN ODT) ODT tab 4 mg    Or     ondansetron (ZOFRAN) injection 4 mg     pantoprazole (PROTONIX) IV push injection 40 mg     piperacillin-tazobactam (ZOSYN) 4.5 g vial to attach to  mL bag     Plasma-Lyte A (electrolyte A) solution     polyethylene glycol (GoLYTELY) suspension 4,000 mL     polyethylene glycol (MIRALAX) Packet 17 g     protein modular (PROSOURCE TF) 1 packet     senna-docusate (SENOKOT-S/PERICOLACE) 8.6-50 MG per tablet 1-2 tablet     sodium chloride (PF) 0.9% PF flush 3 mL     sodium chloride (PF) 0.9% PF flush 3 mL      [START ON 7/6/2022] vancomycin 1250 mg in 0.9% NaCl 250 mL intermittent infusion 1,250 mg     vasopressin (VASOSTRICT) 20 Units in sodium chloride 0.9 % 100 mL standard conc infusion             Review of Systems:     A complete review of systems was performed and is negative except as noted in the HPI             Physical Exam:   Temp: 100  F (37.8  C) Temp src: Axillary BP: 129/54 Pulse: 118   Resp: 18 SpO2: 97 % O2 Device: Mechanical Ventilator Oxygen Delivery: 4 LPM  Wt:   Wt Readings from Last 2 Encounters:   07/04/22 66.8 kg (147 lb 4.3 oz)   05/19/22 60.5 kg (133 lb 4.8 oz)        General: 76 year old female. AMS, intubated, sedated. Lying in bed, critically ill.  HEENT: Head is NC. Intubated. OG presented.   Neck: No masses.   Lungs: Mechanically ventilated, equal chest rise.    Heart: Tachycardic per monitor.  Abdomen: Soft, mildly distended, tympanic.  BS +.  No rebound or peritoneal signs.  Rectal: Rectal tube present, dark stool in tubing.   Extremities: WWP.  Heme/Lymph: No cervical or supraclavicular adenopathy.   Skin: No jaundice or rash  Neurologic: AMS, intubated, sedated.           Data:   LAB WORK:    BMP  Recent Labs   Lab 07/05/22  1014 07/05/22  0601 07/05/22  0342 07/04/22 1908 07/04/22  0352   NA  --  155* 154* 148* 146*   POTASSIUM  --  4.4 4.5 4.6 4.1   CHLORIDE  --  127* 126* 118* 115*   MENDEZ  --  8.4* 8.5* 8.9 9.2   CO2  --  17* 17* 21* 25   BUN  --  98.9* 100.0* 100.0* 70.2*   CR  --  0.96* 0.93 0.84 0.53   * 146* 141* 151* 125*     CBC  Recent Labs   Lab 07/05/22  1014 07/05/22  0439 07/05/22  0342 07/05/22  0008 07/04/22  1908 07/04/22  1106 07/04/22  0352   WBC  --  71.9* 72.9*  --  41.5*  --  15.4*   RBC  --  2.75* 2.94*  --  2.07*  --  1.95*   HGB 8.2* 8.4* 8.8*   < > 6.2*   < > 6.0*   HCT  --  26.1* 27.4*  --  19.8*  --  19.6*   MCV  --  95 93  --  96  --  101*   MCH  --  30.5 29.9  --  30.0  --  30.8   MCHC  --  32.2 32.1  --  31.3*  --  30.6*   RDW  --  19.0* 18.7*  --   21.2*  --  20.7*   PLT  --  480* 502*  --  553*  --  456*    < > = values in this interval not displayed.     INRNo lab results found in last 7 days.  LFTs  Recent Labs   Lab 07/04/22 1936   ALKPHOS 200*   *   *   BILITOTAL 0.7   PROTTOTAL 5.6*   ALBUMIN 3.4*      PANCNo lab results found in last 7 days.      IMAGING:    XR ABDOMEN PORT 1 VIEWS 7/5/2022 7:59 AM   FINDINGS:   Supine radiograph of the abdomen. Enteric tube with tip overlying the  stomach and side port at or just distal to the gastroesophageal  junction. Nonspecific, nonobstructive bowel gas pattern. No  pneumatosis or portal venous gas. The visualized lung bases are clear.  No acute osseous abnormality. Atherosclerotic calcifications of the  abdominal aorta and iliac arteries. Partially visualized bilateral  total hip arthroplasties.                                                                IMPRESSION:   1. Enteric tube with tip in the stomach and side-port at or just  distal to the gastroesophageal junction.  2. Nonobstructive bowel gas pattern.      =======================================================================

## 2022-07-05 NOTE — PROGRESS NOTES
Neurocritical Care Progress Note    Reason for critical care admission: Bilateral SDH  Admitting Team: svetlana  Date of Service:  07/05/2022  Date of Admission:  6/22/2022  Hospital Day: 14    Assessment/Plan  Louise Benítez is a 76 year old female w/ PMHx myelodysplastic syndrome, seronegative RA, chronic microcytic anemia, PAD, and recent L SDH (5/12/2022) who was admitted from her TCU on 6/22/2022 after an unwitnessed fall. Was subsequently found to have an acute on chronic L SDH, a new acute R SDH, and increased midline shift w/ trace L uncal herniation now s/p L craniotomy w/ hematoma evacuation.     24 hour events: was appropriate for floor with transfer orders for the last few days, however this afternoon became more lethargic, hypotensive, and tachypneic. Patient was subsequently lined for pressor support and STAT CTH was ordered. vEEG was also ordered and will be applied when she returns.       Neuro  #Acute on chronic L SDH + new acute R SDH w/ increased rightward midline shift and trace L uncal herniation s/p L craniotomy (6/22) w/ hematoma evacuation  #Recent L SDH 2/2 mechanical fall not requiring intervention (hospitalized 5/12-5/20/2022)  #concern for non-convulsive status epilepticus overnight  -Neurochecks q1hr after acute change in mentation - change to Q2   -HOB > 30   -MAP > 65 mmHg  -PT/OT- on hold given critical status, SLP - NPO  -CTH this AM, shows increase in left SDH (likely redistribution)   -repeat CTH yesterday was negative for acute intracranial pathology   -continue vEEG   -was loaded with 3.5 g keppra continue 500 mg BID keppra, follow and continue to wean to OFF  -now on midazolam infusion at 10 mg/hr - wean to titratable sedation and follow EEG     #Delirium  -Delirium precaution  -Melatonin 10 mg at 8pm -> hold until extubated or off sedation    #Metabolic encephalopathy- New acute on chronic SDH contributing factor. improving  - worsening uremia, secondary to GI  bleed    #Analgesics & sedation  -Tylenol 650mg Q4h prn     CV  #HTN on admission  #hypotension  -cardiac monitoring  -MAP > 65 mmHg  -continue amlodipine 5 mg - now held given HoTN  -NE infusion titrate to MAP goal, currently 0.23 mcg/kg/min  -flotrac for hemodynamic monitoring   CVP: 6 mmHg   SVV: 23%   CO/CI: 5.3/3.1   SVR/SNRI: 829/1418   SVI: 26    #Bradycardia - resolved   -cardiac monitoring  -echo 6/24: EF 60-65%, normal RV, no significant valvular abnormalities       #PAD  No statin, recent LDL 10 (5/13/2022)     Resp  #former tobacco use  #acute hypoxic respiratory failure - intubated 07/04  -vent settings: CMV 18/5/400/40  -chest physiotherapy  -continuous pulse ox  -maintain O2 saturations greater than 92%  -pCXR shows RLL atelectasis, stable from a pulm perspective   -CT PE to r/o - negative     GI  #hx GERD   #melena   #esophageal ulcers  - NG lavage to clear and evaluate for acute blood  #gastric ulcers in the GE junction, anterior wall, posterior wall (received one clipping last night).  #transaminitis  - GoLytely x1 to clear excess blood  Diet: TF, FWF 60 Q4  Last BM: 07/04  -protonix IV 40 mg BID    Bowel regimen: PRN senna-docusate and Miralax  -with being more awake will hold off on surgery consult for PEG       Renal/  #hyperchloremia  #hypernatremia  -Daily BMP  -IV fluids: none  -Electrolyte replacement protocol    #Uremia  - baseline BUN 10-20 on previous admissions, around baseline on admit, now up to 100 this morning  - suspect secondary to GI bleeding     Endo  #stress hyperglycemia   -Hgb A1c 5.5% 6/22   -Monitor glucose levels     Rheum  #Seronegative Rheumatoid Arthritis  -continue PTA Plaquenil 400mg at bedtime      Heme/Onc  #acute blood loss anemia  #Chronic Microcytic Anemia, likely 2/2 myelodysplastic syndrome  baseline Hgb ~8.0  -PTA Darbepoetin Inj 60mg Q3wks (last dose 6/14; next due 7/5)  -PTA Vit B12 500mcg daily  -Daily CBC  -Hgb goal > 7 in acute period post-surgery, plt  goal > 50k  -Transfuse to meet Hgb and plt goals  - received two units of PRBCs overnight 07/05  - peripheral smear     #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  -follow up as out patient [facility]      ID  #severe leukocytosis   -UCx with no growth, ceftriaxone stopped 6/25   -Daily CBC  -Follow temperature curve  -zosyn and vancomycin started overnight   - obtain outside oncology records     MSK  #Chronic Back Pain w/ lumbar spondylosis and hx of lumbar compression fracture  #Polymyalgia Rheumatica w/hx of GCA  #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, 2003)     #Osteoporosis  -Hold PTA Denosumab Inj 60mg  -Hold Ca/Vit D3     #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 Upon evaluation, this patient had a high probability of imminent or life-threatening deterioration due to SDH s/p crani, acute hypoxic respiratory failure, which required my direct attention, intervention, and personal management. Greater than 50% of my time was spent at the bedside counseling the patient and/or coordinating care regarding services listed in this note.     The patient was seen and discussed with the NCC attending, Dr. Jus Denny, APRN, CNP  Neurocritical Care  *[phone]  Text Page    Physician Attestation   Louise was seen and evaluated by me on 07/05/2022.  She was in critical condition as the result of acute hypotension, acute encephalopathy of undetermined source, acute blood loss  anemia, hypoxia.  Her condition is now Critical.     Significant changes in status since my last evaluation include worsening BUN levels, depressed mental functions       I have reviewed changes in critical data from the last 24 hours, including medications, laboratory results, vital signs and radiograph results.      The acute issues managed by me today include uremia, seizure evaluation, melena, GI bleed, acute hypoxic respiratory failure     I formulated and discussed my care plan with the patient s Advanced Practice Provider     I discussed the course and plan with the Bedside Nurse and Primary team and answered all questions to the best of my ability.     Total Critical Care time spent by me, excluding procedures, was  45 minutes    Jus Jones MD  Neurocritical Care  Vascular Neurology  Text Page - [phone]      24 Hour Vital Signs Summary:  Temp: 99  F (37.2  C) Temp  Min: 97.6  F (36.4  C)  Max: 100.5  F (38.1  C)  Resp: 18 Resp  Min: 0  Max: 35  SpO2: 99 % SpO2  Min: 91 %  Max: 100 %  Pulse: 116 Pulse  Min: 95  Max: 137    No data recorded  BP: 95/47 Systolic (24hrs), Av , Min:76 , Max:159   Diastolic (24hrs), Av, Min:40, Max:63        Respiratory monitoring:   Vent Mode: CMV/AC  (Continuous Mandatory Ventilation/ Assist Control)  FiO2 (%): 40 %  Resp Rate (Set): 18 breaths/min  Tidal Volume (Set, mL): 400 mL  PEEP (cm H2O): 5 cmH2O  Resp: 18      I/O last 3 completed shifts:  In: 2113.32 [I.V.:53.32; NG/GT:460]  Out: 100 [Urine:100]    Current Medications:    [Held by provider] amLODIPine  5 mg Oral or Feeding Tube QPM     cyanocobalamin  500 mcg Oral Daily     [Held by provider] heparin ANTICOAGULANT  5,000 Units Subcutaneous Q8H     hydroxychloroquine  400 mg Oral or Feeding Tube At Bedtime     levETIRAcetam  1,000 mg Intravenous Q12H     melatonin  10 mg Oral or Feeding Tube Daily     multivitamins w/minerals  15 mL Per Feeding Tube Daily     pantoprazole (PROTONIX) IV  40 mg Intravenous BID  "    piperacillin-tazobactam  4.5 g Intravenous Q6H     polyethylene glycol  17 g Oral Daily     protein modular  1 packet Per Feeding Tube BID 09 12     sodium chloride (PF)  3 mL Intracatheter Q8H     [START ON 7/6/2022] vancomycin  1,250 mg Intravenous Q24H     vancomycin  1,500 mg Intravenous Once       PRN Medications:  acetaminophen, albuterol, atropine, bisacodyl, [Held by provider] hydrALAZINE, [Held by provider] labetalol, naloxone **OR** naloxone **OR** naloxone **OR** naloxone, ondansetron **OR** ondansetron, senna-docusate, sodium chloride (PF)    Infusions:    midazolam 10 mg/hr (07/05/22 0400)     norepinephrine 0.23 mcg/kg/min (07/05/22 0400)     vasopressin         No Known Allergies    Physical Examination:  Vitals: BP 95/47   Pulse 116   Temp 99  F (37.2  C) (Axillary)   Resp 18   Ht 1.626 m (5' 4.02\")   Wt 66.8 kg (147 lb 4.3 oz)   SpO2 99%   BMI 25.27 kg/m    General: Adult female patient, lying in bed, critically-ill  HEENT: Normocephalic, no icterus, oral cavity/oropharynx pink and dry  Cardiac: tachycardic per monitor  Chest: tachycardic, rhonchi heard throughout all lung fields, low effort  Abdomen: soft, non-distended  Extremities: warm, no edema, distal pulses +2  Skin: No rash or lesion on exposed skin  Psych: unable to assess  Neuro: sedated, exam limited by ETT cough and gag intact, no abnormal movements, doesn't follow commands.    Labs and Imaging:    Results for orders placed or performed during the hospital encounter of 06/22/22 (from the past 24 hour(s))   Vitamin B12   Result Value Ref Range    Vitamin B12 697 232 - 1,245 pg/mL   Folate   Result Value Ref Range    Folic Acid 15.1 4.6 - 34.8 ng/mL   Transferrin   Result Value Ref Range    Transferrin 177.0 (L) 200.0 - 360.0 mg/dL   Hemoglobin   Result Value Ref Range    Hemoglobin 7.0 (L) 11.7 - 15.7 g/dL   CT Head w/o Contrast    Narrative    CT HEAD W/O CONTRAST 7/4/2022 11:46 AM    History: follow up SDH s/p evacuation "   ICD-10:    Comparison: CT head 6/26/2022.    Technique: Using multidetector thin collimation helical acquisition  technique, axial, coronal and sagittal CT images from the skull base  to the vertex were obtained without intravenous contrast.   (topogram) image(s) also obtained and reviewed.    Findings: Postsurgical changes of left frontoparietal craniotomy for  subdural hematoma evacuation. There has been evolution of blood  products within the cerebral convexities. Right frontal convexity  subdural hematoma is stable in size measuring up to 8 mm in thickness.  Subdural hematoma along the falx and tentorial leaflets has  substantially decreased in size. The left cerebral convexity subdural  hematoma measures up to 10 mm, previously 7 mm. In the absence of new  hyperdense hemorrhage and given the decreased size of the falcine  subdural hematoma, increased in size over the left cerebral convexity  is thought to represent redistribution of blood products. Similar  appearance of mild effacement of the left cerebral sulci. There is  approximately 6 mm of rightward shift of the midline, unchanged. No  evidence for new intracranial hemorrhage.. Gray/white matter  differentiation in both cerebral hemispheres is preserved. Ventricles  are proportionate to the cerebral sulci. The basal cisterns are clear.  Moderate generalized cerebral atrophy. Periventricular and subcortical  white matter hypoattenuation is nonspecific but likely represent  chronic small vessel ischemic disease in patient this age.    The bony calvaria and the bones of the skull base are normal. Mild  scattered mucosal thickening, most pronounced within the right  maxillary antrum, right ethmoid air cells and with small air-fluid  level within the left sphenoid locule. Small left-sided mastoid  effusion. Right mastoid air cells are relatively clear.       Impression    Impression:    1. Increased size of left cerebral convexity subdural hematoma  without  increase in density and with decreased size of left frontal seen  subdural hemorrhage suggests redistribution of blood products.  Attention is recommended on follow-up imaging.  2. stable size of right cerebral convexity subdural hematoma.  3. Stable 6 mm of rightward midline shift.         BONI LINDQUIST MD         SYSTEM ID:  [id number]   XR Chest Port 1 View    Narrative    Portable chest    INDICATION: Tachycardia    COMPARISON: 7/1/2022    FINDINGS: Heart size normal. Calcification at the aortic knob. NG/OG  tube tip and sidehole projected in the stomach. Levocurvature of the  thoracolumbar junction. Calcification at the aortic knob. Patchy  opacities in the right lower lung appears similar.      Impression    IMPRESSION: Continued right lower lung atelectasis or mild edema. No  significant changes otherwise.    JOVANA CORRIGAN MD         SYSTEM ID:  [id number]   Transferrin   Result Value Ref Range    Transferrin 191.0 (L) 200.0 - 360.0 mg/dL   Vitamin B12   Result Value Ref Range    Vitamin B12 736 232 - 1,245 pg/mL   Hemoglobin   Result Value Ref Range    Hemoglobin 7.1 (L) 11.7 - 15.7 g/dL   Blood gas arterial with oxyhemoglobin   Result Value Ref Range    pH Arterial 7.45 7.35 - 7.45    pCO2 Arterial 32 (L) 35 - 45 mm Hg    pO2 Arterial 76 (L) 80 - 105 mm Hg    Bicarbonate Arterial 22 21 - 28 mmol/L    Oxyhemoglobin Arterial 94 92 - 100 %    Base Excess/Deficit (+/-) -1.4 -9.0 - 1.8 mmol/L    FIO2 21    XR Chest Port 1 View    Narrative    Portable chest    INDICATION: Line placement    COMPARISON: Same day chest radiograph, 1531 hours    FINDINGS:   New left upper extremity PICC line terminates in SVC.  Heart size normal. Calcification at the aortic knob. NG/OG tube tip  and sidehole projected in the stomach. Levocurvature of the  thoracolumbar junction. Calcification at the aortic knob. Patchy  opacities in the right lower lung appears similar.      Impression    IMPRESSION:     1. Left  upper extremity PICC line terminates in the high SVC. No  pneumothorax.  2. Continued right lower lung atelectasis or mild edema. No  significant changes otherwise.    I have personally reviewed the examination and initial interpretation  and I agree with the findings.    JOVANA CORRIGAN MD         SYSTEM ID:  [id number]   Central line    Narrative    John Denny NP     7/4/2022  5:32 PM  [facility]    Central line    Date/Time: 7/4/2022 5:30 PM  Performed by: Jonh Denny NP  Authorized by: Jus Jones MD   Indications: vascular access      UNIVERSAL PROTOCOL   Site Marked: NA  Prior Images Obtained and Reviewed:  NA  Required items: Required blood products, implants, devices and special   equipment available    Patient identity confirmed:  Arm band and hospital-assigned identification   number  NA - No sedation, light sedation, or local anesthesia  Confirmation Checklist:  Patient's identity using two indicators, relevant   allergies, procedure was appropriate and matched the consent or emergent   situation and correct equipment/implants were available  Time out: Immediately prior to the procedure a time out was called    Universal Protocol: the Joint Commission Universal Protocol was followed    Preparation: Patient was prepped and draped in usual sterile fashion       ANESTHESIA    Anesthesia: Local infiltration  Local Anesthetic:  Lidocaine 1% without epinephrine      SEDATION    Patient Sedated: No      Preparation: skin prepped with 2% chlorhexidine and skin prepped with   ChloraPrep  Skin prep agent dried: skin prep agent completely dried prior to procedure  Sterile barriers: all five maximum sterile barriers used - cap, mask,   sterile gown, sterile gloves, and large sterile sheet  Hand hygiene: hand hygiene performed prior to central venous catheter   insertion  Patient position: Trendelenburg  Pre-procedure: landmarks  identified  Number of attempts: 2  Successful placement: yes  Post-procedure: line sutured  Assessment: blood return through all ports,  placement verified by x-ray   and no pneumothorax on x-ray      PROCEDURE    Patient Tolerance:  Patient tolerated the procedure well with no immediate   complications  Length of time physician/provider present for 1:1 monitoring during   sedation: 30   CT Chest Pulmonary Embolism w Contrast    Narrative    EXAMINATION: CTA pulmonary angiogram, 7/4/2022 7:01 PM     COMPARISON: Same day chest radiograph    HISTORY: 76 year old with L SDH s/p L craniotomy for SDH evacuation,  now has persistent drowsy, tachycardia, hypoxia rule out pulmonary  embolism    TECHNIQUE: Volumetric helical acquisition of CT images of the chest  from the lung apices to the kidneys were acquired after the  administration of 80 mL of Isovue-370 IV contrast. Flash technique  with free breathing acquisition.  Post-processed multiplanar and/or  MIP reformations were obtained, archived to PACS and used in  interpretation of this study.     FINDINGS:  Contrast bolus is: adequate.  Exam is negative   for acute pulmonary embolism.    The largest right ventricle transaxial diameter is (measured from  endocardium to endocardium): 2.5 cm   The largest left ventricle transaxial diameter is (measured from  endocardium to endocardium): 3.1 cm  RV/LV ratio is: 0.81 (if ratio greater than 1.1 then sign is  suspicious for right heart strain)  Reflux of contrast into the IVC? no    Paradoxical bowing of the interventricular septum to the left? no  Pericardial effusion?:not present    Bronchial mucous plugging and linear atelectasis in the right lower  lobe. Otherwise the lungs are clear. No pleural effusion or  pneumothorax. No mediastinal or hilar lymphadenopathy. The heart size  is normal. Ectasia of the mid ascending thoracic aorta measuring  approximately 4.2 cm in diameter. The size of the pulmonary artery is  normal.  Heart size is normal. No pericardial effusion.      Impression    IMPRESSION:   1. Exam is negative   for acute pulmonary embolism.    Evidence for right heart strain or increased right heart pressures?   is not present.     2. Mucous plugging and linear atelectasis in the right lower lobe.  Aspiration or other infectious consolidation cannot be ruled out.    3. Ectatic thoracic aorta, approximately 4.2 cm.    In the event of a positive result for acute pulmonary embolism  resulting in right heart strain, consider calling the   [facility] patient placement ([phone]) for PERT (Pulmonary Embolism  Response Team) Activation?    PERT -- Pulmonary Embolism Response Team (Multidisciplinary team  including cardiology, interventional radiology, critical care,  hematology)    I have personally reviewed the examination and initial interpretation  and I agree with the findings.    JOVANA CORRIGAN MD         SYSTEM ID:  [id number]   CT Head w/o Contrast    Narrative    CT HEAD W/O CONTRAST 7/4/2022 7:01 PM    History: 76 year old with L SDH s/p L craniotomy for SDH evacuation,  now has persistent drowsy, tachycardia, hypoxia     Comparison: Same day head CT from 1138 hours    Technique: Using multidetector thin collimation helical acquisition  technique, axial, coronal and sagittal CT images from the skull base  to the vertex were obtained without intravenous contrast.   (topogram) image(s) also obtained and reviewed.    Findings:   Postsurgical changes of left frontoparietal craniotomy and subdural  hematoma evacuation. Mixed density subdural hemorrhage along the right  frontal and left frontoparietal convexities, falx and tentorium are  stable in size and distribution. Grossly unchanged mild effacement of  left cerebral sulci. Unchanged 5 mm left-to-right midline shift. No  new interval cranial hemorrhage.  Gray/white matter differentiation in both cerebral hemispheres is  preserved. Periventricular and subcortical  white matter hypodensity is  nonspecific but likely represent chronic small vessel ischemic  disease. Ventricles are proportionate to the cerebral sulci. Stable  moderate cerebral atrophy. The basal cisterns are clear.    Left frontoparietal craniotomy. Otherwise the bony calvaria and the  bones of the skull base are normal. Stable mild mucosal thickening in  the right maxillary sinus and right ethmoid air cells. Unchanged small  air-fluid level in the dependent left sphenoid sinus. Stable trace  fluid in the left mastoid air cells.       Impression    Impression:    1. Stable size and the distribution of the subdural hemorrhage along  the right frontal and left frontoparietal convexities, falx and  tentorium.  2. Stable postsurgical changes of left frontoparietal craniotomy and  subdural hematoma evacuation.     I have personally reviewed the examination and initial interpretation  and I agree with the findings.    BONI LINDQUIST MD         SYSTEM ID:  [id number]   Basic metabolic panel   Result Value Ref Range    Creatinine 0.84 0.51 - 0.95 mg/dL    Sodium 148 (H) 136 - 145 mmol/L    Potassium 4.6 3.4 - 5.3 mmol/L    Urea Nitrogen 100.0 (H) 8.0 - 23.0 mg/dL    Chloride 118 (H) 98 - 107 mmol/L    Carbon Dioxide (CO2) 21 (L) 22 - 29 mmol/L    Anion Gap 9 7 - 15 mmol/L    Glucose 151 (H) 70 - 99 mg/dL    GFR Estimate 71 >60 mL/min/1.73m2    Calcium 8.9 8.8 - 10.2 mg/dL   Magnesium   Result Value Ref Range    Magnesium 2.7 (H) 1.7 - 2.3 mg/dL   Phosphorus   Result Value Ref Range    Phosphorus 3.7 2.5 - 4.5 mg/dL   Lactic acid whole blood   Result Value Ref Range    Lactic Acid 1.9 0.7 - 2.0 mmol/L   Ionized Calcium   Result Value Ref Range    Calcium Ionized 4.8 4.4 - 5.2 mg/dL   CBC with platelets   Result Value Ref Range    WBC Count 41.5 (H) 4.0 - 11.0 10e3/uL    RBC Count 2.07 (L) 3.80 - 5.20 10e6/uL    Hemoglobin 6.2 (LL) 11.7 - 15.7 g/dL    Hematocrit 19.8 (L) 35.0 - 47.0 %    MCV 96 78 - 100 fL    MCH 30.0  26.5 - 33.0 pg    MCHC 31.3 (L) 31.5 - 36.5 g/dL    RDW 21.2 (H) 10.0 - 15.0 %    Platelet Count 553 (H) 150 - 450 10e3/uL   Ammonia   Result Value Ref Range    Ammonia 51 11 - 51 umol/L   INR   Result Value Ref Range    INR     Fibrinogen activity   Result Value Ref Range    Fibrinogen Activity     Hepatic panel   Result Value Ref Range    Protein Total 5.6 (L) 6.4 - 8.3 g/dL    Albumin 3.4 (L) 3.5 - 5.2 g/dL    Bilirubin Total 0.7 <=1.2 mg/dL    Alkaline Phosphatase 200 (H) 35 - 104 U/L     (H) 10 - 35 U/L     (H) 10 - 35 U/L    Bilirubin Direct 0.24 0.00 - 0.30 mg/dL   Prepare red blood cells (unit)   Result Value Ref Range    CROSSMATCH Compatible     UNIT ABO/RH A Neg     Unit Number [id number]     Unit Status Issued     Blood Component Type Red Blood Cells     Product Code N7664J97     CODING SYSTEM [id number]     UNIT TYPE ISBT 0600     ISSUE DATE AND TIME [id number]    Prepare red blood cells (unit)   Result Value Ref Range    CROSSMATCH Compatible     UNIT ABO/RH A Neg     Unit Number [id number]     Unit Status Issued     Blood Component Type Red Blood Cells     Product Code T2050I67     CODING SYSTEM [id number]     UNIT TYPE ISBT 0600     ISSUE DATE AND TIME [id number]    XR Chest Port 1 View    Narrative    Portable chest 7/4/2022 at 2153 hours    INDICATION: Endotracheal tube placement    COMPARISON: Earlier today 1720 hours    FINDINGS: Heart size normal. Left PICC tip possibly in the upper  azygos vein. Endotracheal tube tip approximately 1.9 cm above the  leo. Mild bibasilar streaky atelectasis. Aortic atherosclerotic  calcifications. NG/OG tube progresses beyond the inferior margin of  the image.      Impression    IMPRESSION: Endotracheal tube tip in the mid to distal thoracic  trachea. Bibasilar atelectasis. Left upper portion of the PICC line  tip possibly in the upper portion of the azygos vein. Atherosclerosis.    JOVANA CORRIGAN MD         SYSTEM ID:  [id number]   UA  with Microscopic reflex to Culture    Specimen: Urine, Clean Catch   Result Value Ref Range    Color Urine Yellow Colorless, Straw, Light Yellow, Yellow    Appearance Urine Clear Clear    Glucose Urine Negative Negative mg/dL    Bilirubin Urine Negative Negative    Ketones Urine Negative Negative mg/dL    Specific Gravity Urine 1.010 1.003 - 1.035    Blood Urine Negative Negative    pH Urine 5.0 5.0 - 7.0    Protein Albumin Urine Negative Negative mg/dL    Urobilinogen Urine 2.0 Normal, 2.0 mg/dL    Nitrite Urine Negative Negative    Leukocyte Esterase Urine Negative Negative    Mucus Urine Present (A) None Seen /LPF    RBC Urine 1 <=2 /HPF    WBC Urine 2 <=5 /HPF    Squamous Epithelials Urine 1 <=1 /HPF    Narrative    Urine Culture not indicated   Prepare red blood cells (unit)   Result Value Ref Range    CROSSMATCH Compatible     UNIT ABO/RH A Neg     Unit Number [id number]     Unit Status Ready     Blood Component Type Red Blood Cells     Product Code O8079R67     CODING SYSTEM [id number]     UNIT TYPE ISBT 0600    Prepare red blood cells (unit)   Result Value Ref Range    CROSSMATCH Compatible     UNIT ABO/RH A Neg     Unit Number [id number]     Unit Status Ready     Blood Component Type Red Blood Cells     Product Code [id number]     CODING SYSTEM [id number]     UNIT TYPE ISBT 0600    Partial thromboplastin time   Result Value Ref Range    aPTT 27 22 - 38 Seconds   Hemoglobin   Result Value Ref Range    Hemoglobin 9.4 (L) 11.7 - 15.7 g/dL   Basic metabolic panel   Result Value Ref Range    Creatinine 0.93 0.51 - 0.95 mg/dL    Sodium 154 (H) 136 - 145 mmol/L    Potassium 4.5 3.4 - 5.3 mmol/L    Urea Nitrogen 100.0 (H) 8.0 - 23.0 mg/dL    Chloride 126 (H) 98 - 107 mmol/L    Carbon Dioxide (CO2) 17 (L) 22 - 29 mmol/L    Anion Gap 11 7 - 15 mmol/L    Glucose 141 (H) 70 - 99 mg/dL    GFR Estimate 63 >60 mL/min/1.73m2    Calcium 8.5 (L) 8.8 - 10.2 mg/dL   CBC with platelets   Result Value Ref Range    WBC Count 72.9  (HH) 4.0 - 11.0 10e3/uL    RBC Count 2.94 (L) 3.80 - 5.20 10e6/uL    Hemoglobin 8.8 (L) 11.7 - 15.7 g/dL    Hematocrit 27.4 (L) 35.0 - 47.0 %    MCV 93 78 - 100 fL    MCH 29.9 26.5 - 33.0 pg    MCHC 32.1 31.5 - 36.5 g/dL    RDW 18.7 (H) 10.0 - 15.0 %    Platelet Count 502 (H) 150 - 450 10e3/uL   Magnesium   Result Value Ref Range    Magnesium 2.6 (H) 1.7 - 2.3 mg/dL   Phosphorus   Result Value Ref Range    Phosphorus 4.9 (H) 2.5 - 4.5 mg/dL   Ammonia   Result Value Ref Range    Ammonia 44 11 - 51 umol/L   CBC with Platelets & Differential    Narrative    The following orders were created for panel order CBC with Platelets & Differential.  Procedure                               Abnormality         Status                     ---------                               -----------         ------                     CBC with platelets and d...[[id number]]                      In process                   Please view results for these tests on the individual orders.       All relevant imaging and laboratory values personally reviewed.

## 2022-07-05 NOTE — PLAN OF CARE
Major Shift Events:    Neuro: Weaned off of versed. Withdraws in all extremities and PERRL. On straight rate vasopressin and levophed titration for BP maintenance. CV ST. 1L fluid bolus given. Recheck hgb 7.1. T-max 100.0 F. Scheduled tylenol given. Mechanically ventilated on CMV setting and seems to be tolerating this well on FiO2 30%. OG placed this am and set to intermittent low suction with no output. Golytely running through OG currently. Adequately voiding through taylor catheter. Rectal pouch in place with copious black/ watery stools.     Plan: Continue with GI slush and with close neurological monitoring. Plan for care conference later this week pending family availability.     For vital signs and complete assessments, please see documentation flowsheets.

## 2022-07-05 NOTE — PROGRESS NOTES
CLINICAL NUTRITION SERVICES - BRIEF NOTE    Nutrition Prescription    RECOMMENDATIONS FOR MDs/PROVIDERS TO ORDER:  - Restart EN via current access as medically able, pending GI status.    Recommendations already ordered by Registered Dietitian (RD):  - EN HELD per GI    Future/Additional Recommendations:  - Ability to restart EN/GI status      GI following; recent EGD with bleeding gastric ulcer. NPO for noew per GI. Pt TF orders were d/c'd.    Nutrition Progress Note - f/u for progress towards previous nutrition POC (see previous reassessment for details)     Jyoti Xavier RD, LD, [facility]  Neuro ICU  Pager: [phone]

## 2022-07-05 NOTE — PROCEDURES
36.[facility]    Arterial line placement    Date/Time: 7/4/2022 4:45 PM  Performed by: Jus Jones MD  Authorized by: Jus Jones MD       UNIVERSAL PROTOCOL   Site Marked: NA  Prior Images Obtained and Reviewed:  Yes  Required items: Required blood products, implants, devices and special equipment available    Patient identity confirmed:  Anonymous protocol, patient vented/unresponsive  Patient was reevaluated immediately before administering moderate or deep sedation or anesthesia  Confirmation Checklist:  Procedure was appropriate and matched the consent or emergent situation, correct equipment/implants were available, relevant allergies and patient's identity using two indicators  Time out: Immediately prior to the procedure a time out was called    Universal Protocol: the Joint Commission Universal Protocol was followed    Preparation: Patient was prepped and draped in usual sterile fashion    ESBL (mL):  3  Indication: multiple ABGs hemodynamic monitoring  Location: right radial      SEDATION    Patient Sedated: No      PROCEDURE DETAILS  Evaristo's Test Normal?: Evaristo's test not abnormal  Needle Gauge:  20  Seldinger technique: Seldinger technique used    Number of Attempts:  1  Post-procedure:  Line sutured and dressing applied  CMS: normal    PROCEDURE    Patient Tolerance:  Patient tolerated the procedure well with no immediate complications  Length of time physician/provider present for 1:1 monitoring during sedation: 0

## 2022-07-05 NOTE — ANESTHESIA PROCEDURE NOTES
Airway       Patient location during procedure: ICU       Procedure Start/Stop Times: 7/4/2022 9:17 PM  Staff -        Resident/Fellow: John Dorantes MD       Performed By: resident  Consent for Airway        Urgency: elective  Report Obtained from Primary Care Team       History regarding most recent potassium obtained: Yes       History regarding presence/absence of renal failure obtained:Yes       History regarding stroke/CVA obtained:Yes       History regarding presence/absence of NM disorder: YesIndications and Patient Condition       Indications for airway management: airway protection and altered level of consciousness       Mallampati: Not Assessed     Induction type:intravenous       Mask difficulty assessment: 0 - not attempted    Final Airway Details       Final airway type: endotracheal airway       Successful airway: ETT - single and Oral  Endotracheal Airway Details        ETT size (mm): 7.0       Cuffed: yes       Successful intubation technique: video laryngoscopy       VL Blade Size: MAC 3       Grade View of Cords: 1       Adjucts: stylet       Position: Right       Measured from: gums/teeth       Secured at (cm): 25       Bite block used: None    Post intubation assessment        ETT secured, Vent settings by primary/ICU team, Primary/ICU team to review CXR, Sedation to be ordered by primary/ICU team and No apparent complications       Placement verified by: capnometry, equal breath sounds and chest rise        Number of attempts at approach: 1       Number of other approaches attempted: 0       Secured with: commercial tube yen       Ease of procedure: easy       Dentition: Intact and Unchanged    Medication(s) Administered   Medication Administration Time: 7/4/2022 9:17 PM

## 2022-07-05 NOTE — PROGRESS NOTES
.Major Shift Events: Intubated, EGD/scoped, FLotrac, EEG placed, blood transfusions, fluid bolus        NEURO: unresponsive, withdraws BLE, no response BUE (MD aware). Pupils equal/reactive. EEG placed. Status epilepticus, keppra, ativan given with straight rate of versed.       CARDIAC: Levo titrated between 0.14 and 0.25 for MAP > 65, providers notified multiple times for not maintaing goal. Total 2L fluid given, minimal/short response. ST 110s. Flotrac set up.       PULM: CMV settings, 40%. Large amount of tan thick secretions with intubation but minimal post. Afebrile.      GI: Large black liquid BM x5. Rectal tube placed. TF on hold.       : Vasquez placed, minimal output.       SKIN: wound care done to BLE skin tears. Rednesss around rectum      LABS: Hgb 6.2-received 2 units RBC with recheck hgb 9.2. Na 155. Urea elevated. WBC 72.9 (MD aware)      LINES: L CVC, R radial A-line, L/R PIV      MEDS: levo, versed, TKO           Plan: Sputum culture, monitor neuro status. Maintain MAP goal. Schedule care conference    For vital signs and complete assessments, please see documentation flowsheets.

## 2022-07-05 NOTE — PROGRESS NOTES
BRIEF GI NOTE:    07/04/22    Assessment:  76F h/o MDS, PAD, L SDH 5/2022 who was admitted from Tcu on 6/22 after a fall found to have new acute R SDH s/p evacuation last week who has developed new lethargy, hypotension in settings of melena, elevated BUN/Cr and drop in Hgb. Hgb 7.6 to 6 this morning and responded to 7 after 1uPRBC. However later in the pm, was noted to be increasingly somnolent, tachycardic, hypotensive with a large melena at approx 6pm. Now on levophed and new Hgb 6.2. Now on IV PPI.     Recommendations:  - Will proceed with EGD tonight after intubation per ICU (poor mental status, anticipated long procedure time to clear out clots and locate the bleeding source, risks of aspirations)  - IV PPI BID  - 10mg IV reglan now.   - Transfusion to goal of Hgb 7.     Patient discussed with on call GI staff Dr. Sultan Richard Britt MD  Gastroenterology Fellow  Division of Gastroenterology, Hepatology and Nutrition  [facility]  [phone]

## 2022-07-05 NOTE — CONSULTS
Care Management Follow Up    Length of Stay (days): 13    Expected Discharge Date:       Concerns to be Addressed:       Patient plan of care discussed at interdisciplinary rounds: Yes    Anticipated Discharge Disposition: Pt recs TCU        Anticipated Discharge Services:  Therapy- possible LTC needed  Anticipated Discharge DME:  TBD    Patient/family educated on Medicare website which has current facility and service quality ratings:  N/A  Education Provided on the Discharge Plan:  Yes  Patient/Family in Agreement with the Plan:  N/A    Referrals Placed by CM/SW:   Care Conference Friday @ 1 pm with Med team  Private pay costs discussed: Not applicable    Additional Information:  SW was consulted to address family concerns. SW contacted daughter Allyson. Allyson contacted her sister and dad to find out what time would work for a Care Conference. Allyson requested Care Conference on Friday before 3pm. SW talked to Med Team and Care Conference is set for Friday at 1pm. SW will f/u with med team and with sub SW.     SW will continue to f/u with Pt and family for POC and discharge planning.       Antionette Sweeney, BSW, LSW  ICU   PH#: [phone]  Pager#: [phone]  [facility]    For weekend social work needs:  4A, 4C, 4E, 5A, 5B    pager [phone]  6A, 6B, 6C                 pager [phone]  7A, 7B, 7C, 7D, 5C    pager [phone]  ED/OBS                     pager [phone]

## 2022-07-05 NOTE — PROGRESS NOTES
Gastroenterology Endoscopy Suite Brief Operative Note    Procedure:  Upper endoscopy   Post-operative diagnosis:  gastric ulcer   Staff Physician:  Dr. Carleen Flowers   Fellow/Assistant(s):  Dr. Richard Britt    Specimens:  Please see final procedure note for further details.   Findings:  Scattered superficial non bleeding esophageal ulcers.   3cm hiatal hernia  3 linear non bleeding gastric ulcers in the fundus with 1 extending from the GE junction.   One 5 mm non bleeding gastric ulcer in the anterior wall.   One 5 mm gastric ulcer on the posterior wall with a pigmented spot (likely the source of melena and anemia). Not actively bleeding. Treated with one hemostatic clip.    Complications:  None.   Condition:  Stable   Recommendations  - Maintain NPO for now.   - IV PPI BID  - Trend and transfuse to goal Hgb of 7  - Supportive care per ICU team  - Expect to see melena in the next 48 hours.   - If concerning for rebleeding, can try aspirate from NG tube

## 2022-07-05 NOTE — PROGRESS NOTES
VEEG monitoring preliminary results:    VEEG has been running for over one hour.  EEG showed generalized semi-rhythmic theta activities, with intermittent sharps over the left hemisphere.  The semi-rhythmic theta activities alternate with severe generalized slowing with intermittent attenuation of the background activities.  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 Treatment with Benzo and AEDs are recommended. Discussed with Neurology on call resident Dr. Lane.      Fadi Phillips MD  Neurology

## 2022-07-05 NOTE — CARE PLAN
Speech Language Therapy Discharge Summary    Reason for therapy discharge:    Change in medical status.    Progress towards therapy goal(s). See goals on Care Plan in [facility] electronic health record for goal details.  Goals not met.  Barriers to achieving goals:   Change in medical status.    Therapy recommendation(s):    Continued therapy is recommended.  Rationale/Recommendations:  Patient intubated overnight and remains intubated today. Not appropriate for skilled SLP services at this time. SLP to s/o and completed orders. Please re-consult SLP when appropriate..

## 2022-07-06 NOTE — PLAN OF CARE
Goal Outcome Evaluation:  Problem: Feeding Intolerance (Enteral Nutrition)  Goal: Feeding Tolerance  Outcome: Ongoing, Not Progressing  Intervention: Prevent and Manage Feeding Intolerance  Flowsheets (Taken 7/6/2022 1143)  Nutrition Support Management: tube feeding held - monitor for ability to restart EN pending GI status.

## 2022-07-06 NOTE — PROGRESS NOTES
[facility], [place]   07/06/2022  Neurosurgery Progress Note:    Assessment:  Ms. Benítez is a 76-year-old female with PMHx myelodysplastic syndrome, seronegative RA, chronic microcytic anemia, PAD, and recent L SDH (5/12/2022) who arrived to [facility] via EMS after an unwitnessed fall at home.  Shortly after arriving, the patient was complaining of a significant headache and nausea, and then her right pupil dilated.  She was no longer protecting her airway, so she was sedated and intubated.  Her right eye pupil then returned to a normal caliber.  A head CT demonstrated a left acute subdural approximately 2.3 cm thick with 12 mm of rightward midline shift.  She was taken to the OR on 6/22 for emergent L craniotomy for SDH evacuation. She was extubated on POD1. Concerns for tube feed aspiration on 6/25.  She underwent emergent EGD by GI due to sharp drop in hemoglobin as well as melanotic stools.  She had 1 gastric ulcer clipped which was the presumed culprit.  Due to requiring the EGD she needed intubation during this time, therefore she was intubated on 7/4/2022 for EGD.  She was also connected to EEG on 7/4/2022 found to be in nonconvulsive status received Keppra load and Ativan and was started on Versed overnight.  On 7/5/2022, her Versed drip was weaned off with thoughts she was potentially not in nonconvulsive status epilepticus.    Other diagnoses include:  #Brain compression  #Open evacuation of hematoma on day of admission   #Hyponatremia  #Pulmonary edema    Plan:    Neuro:  #Concern for nonconvulsive status epilepticus  #Subdural hematoma  Repeat CT stable  Every hour neurochecks  Video EEG on-no evidence of seizures currently  Versed currently off  Keppra loaded 3.5 g, started on maintenance 1 g twice daily.  Now on 500 twice daily-potential to be stopped today    Cardiac:  #Hypotension (likely secondary to GI bleed)  Maps greater than 65  Levophed on, vasopressin on,   Flowtrack  connected  We will consider repeat echo    Pulm:  #Respiratory failure-intubated overnight (7/4/2021)  Currently on CMV AC settings-see below for most recent settings  CT PE negative    GI:  #Upper GI bleed with hemodynamic instability  #Esophageal ulcers  #Gastric ulcers  #Transaminitis  Gastric ulcer status post clipped on evening of 7/4/2022 by GI, GI continue to follow  IV Protonix 40 mg twice daily ordered  NG in place, continue to hold on PEG for now pending patient's clinical course.    Renal:  #Hypernatremia, hyperchloremia  #Uremia  On Plasma-Lyte at 100 cc an hour    Endo:  Monitor glucose levels    Heme:  #Acute blood loss anemia  #History of myelodysplastic syndrome  Received 2 units of PRBCs on 7/4/2022, 2 units PRBCs available currently.  Overnight with hemoglobin of 6.4, 1 unit PRBC ordered.  Every 6 hour hemoglobins until stable  Peripheral smear pending  Plan for EPO injection today  Subcutaneous heparin held    ID:  #Leukocytosis, afebrile currently, currently downtrending  Follow cultures      -----------------------------------  Bruce Graves MD  Neurosurgery Resident, PGY-3       Please contact neurosurgery resident on call with questions.    Dial * * *[phone], enter [phone] when prompted.    -----------------------------------    Interval History: No acute events overnight.  Versed weaned to off.  Localizing bilateral upper extremities.  Continues on 2 pressors, slowly weaning off vasopressin.    Objective:   Temp:  [97.9  F (36.6  C)-100  F (37.8  C)] 98.7  F (37.1  C)  Pulse:  [] 87  Resp:  [16-19] 18  BP: ()/(47-54) 129/54  MAP:  [59 mmHg-174 mmHg] 71 mmHg  Arterial Line BP: ()/(37-65) 121/41  FiO2 (%):  [30 %-40 %] 30 %  SpO2:  [92 %-100 %] 97 %  I/O last 3 completed shifts:  In: 9658.82 [I.V.:7797.82; NG/GT:4271; IV Piggyback:500]  Out: 4020 [Urine:1570; Stool:2450]    Gen: Appears comfortable, NAD, laying in bed, appearing older than stated age  HEENT: EEG in place  Wound:  Incision closed with staples-now removed, incision appears clean dry-no signs of leaking, no wetness appreciated.  See below for photo from 7/5/2022 evening.  Incision cleaned with ChloraPrep.  Pulm: Intubated, sedated, ET tube in place, on CMV AC settings  Neurologic:  Exam with Versed off  Eyes closed to noxious stimuli, not following commands, not tracking, eyes at midline  Pupils equal round reactive to light 2 mm, reactive, at midline, no apparent gaze preference,  Positive cough, positive gag, positive VOR, positive corneals bilaterally  Localizing in bilateral upper extremities, brisk withdrawal in bilateral lower extremities  Negative Babinski bilaterally                    LABS:  Recent Labs   Lab 07/05/22  1014 07/05/22  0601 07/05/22  0342 07/04/22  1908   NA  --  155* 154* 148*   POTASSIUM  --  4.4 4.5 4.6   CHLORIDE  --  127* 126* 118*   CO2  --  17* 17* 21*   ANIONGAP  --  11 11 9   * 146* 141* 151*   BUN  --  98.9* 100.0* 100.0*   CR  --  0.96* 0.93 0.84   MENDEZ  --  8.4* 8.5* 8.9       Recent Labs   Lab 07/05/22  2229 07/05/22  1650 07/05/22  1306   WBC  --   --  59.0*   RBC  --   --  2.35*   HGB 6.4*   < > 7.1*   HCT  --   --  21.9*   MCV  --   --  93   MCH  --   --  30.2   MCHC  --   --  32.4   RDW  --   --  19.6*   PLT  --   --  449    < > = values in this interval not displayed.       IMAGING:  Recent Results (from the past 24 hour(s))   XR Abdomen Port 1 View    Narrative    EXAM: XR ABDOMEN PORT 1 VIEWS 7/5/2022 7:59 AM     HISTORY: OG placed       COMPARISON: Abdominal radiograph 7/2/2022, chest x-ray 7/3/2022    FINDINGS:   Supine radiograph of the abdomen. Enteric tube with tip overlying the  stomach and side port at or just distal to the gastroesophageal  junction. Nonspecific, nonobstructive bowel gas pattern. No  pneumatosis or portal venous gas. The visualized lung bases are clear.  No acute osseous abnormality. Atherosclerotic calcifications of the  abdominal aorta and iliac  arteries. Partially visualized bilateral  total hip arthroplasties.        Impression    IMPRESSION:   1. Enteric tube with tip in the stomach and side-port at or just  distal to the gastroesophageal junction.  2. Nonobstructive bowel gas pattern.    I have personally reviewed the examination and initial interpretation  and I agree with the findings.    ROHAN MAGALLON MD         SYSTEM ID:  [id number]   XR Chest Port 1 View    Narrative    EXAM: XR CHEST PORT 1 VIEW  7/5/2022 1:01 PM     HISTORY:  76 year old F intubated, GI bleed, rule out pneumonia       COMPARISON:  7/4/2022    FINDINGS: Single view of the chest. Left subclavian catheter tip  projects over the mid SVC. Endotracheal tube tip projects over the low  thoracic trachea. Enteric tube courses below the diaphragm and beyond  the field of view.      Stable cardiomediastinal silhouette. No significant pleural effusion  or pneumothorax. Increased perihilar and bibasilar streaky opacities.       Impression    IMPRESSION:  1. Increased perihilar and bibasilar pulmonary opacities which may  represent atelectasis, edema and/or infection.  2. Stable support devices.     I have personally reviewed the examination and initial interpretation  and I agree with the findings.    JOVANA CORRIGAN MD         SYSTEM ID:  [id number]

## 2022-07-06 NOTE — PLAN OF CARE
Major Shift Events:    Neuro: Withdraws in all extremities, localizes to pain. Occasionally moves extremities spontaneously. Remains off versed  CV: SR-ST 70s-100s. Pressures stable, remains on levo 0.06 mcg/kg/min and vaso 2.4ml/hr.  CVP 8-10. Afebrile, tylenol q6. Hgb 6.4 - x1 unit of RBCs given. Recheck in am was 7.1 - neuro surg aware.   Respiratory: Remains mechanically vented - CMV with same settings. Tolerating well.   GI/: Vasquez with good UOP. Rectal tube in place with large amounts of liquid black stool. OG LIS with no OP.   Pts buttocks small excoriation noted. Neuro surg team was notified to place WOC.    K 3.6 - replaced. Plasma Lyte 100cc/hr.   Plan: Continue neuro checks. Plan for care conference with family later this week.   For vital signs and complete assessments, please see documentation flowsheets.

## 2022-07-06 NOTE — PROGRESS NOTES
[facility], [place]   07/05/2022  Neurosurgery Progress Note:    Assessment:  Ms. Benítez is a 76-year-old female with PMHx myelodysplastic syndrome, seronegative RA, chronic microcytic anemia, PAD, and recent L SDH (5/12/2022) who arrived to [facility] via EMS after an unwitnessed fall at home.  Shortly after arriving, the patient was complaining of a significant headache and nausea, and then her right pupil dilated.  She was no longer protecting her airway, so she was sedated and intubated.  Her right eye pupil then returned to a normal caliber.  A head CT demonstrated a left acute subdural approximately 2.3 cm thick with 12 mm of rightward midline shift.  She was taken to the OR on 6/22 for emergent L craniotomy for SDH evacuation. She was extubated on POD1. Concerns for tube feed aspiration on 6/25.  She underwent emergent EGD by GI due to sharp drop in hemoglobin as well as melanotic stools.  She had 1 gastric ulcer clipped which was the presumed culprit.  Due to requiring the EGD she needed intubation during this time, therefore she was intubated on 7/4/2022 for EGD.  She was also connected to EEG on 7/4/2022 found to be in nonconvulsive status received Keppra load and Ativan and was started on Versed overnight.  On 7/5/2022, her Versed drip was weaned off with thoughts she was potentially not in nonconvulsive status epilepticus.    Other diagnoses include:  #Brain compression  #Open evacuation of hematoma on day of admission   #Hyponatremia  #Pulmonary edema    Plan:    Neuro:  #Concern for nonconvulsive status epilepticus  #Subdural hematoma  Repeat CT stable  Every hour neurochecks  Video EEG on  Versed being weaned  Keppra loaded 3.5 g, on maintenance 1 g twice daily    Cardiac:  #Hypotension (likely secondary to GI bleed)  Maps greater than 65  Levophed on, vasopressin on, status post two 500 cc boluses  Flowtrack connected  We will consider repeat echo    Pulm:  #Respiratory  failure-intubated overnight (7/4/2021)  Currently on CMV AC settings-see below for most recent settings  CT PE negative    GI:  #Upper GI bleed with hemodynamic instability  #Esophageal ulcers  #Gastric ulcers  #Transaminitis  Gastric ulcer status post clipped on evening of 7/4/2022 by GI, GI continue to follow  IV Protonix 40 mg twice daily ordered  NG in place, continue to hold on PEG for now pending patient's clinical course.    Renal:  #Hypernatremia, hyperchloremia  #Uremia  On Plasma-Lyte at 100 cc an hour    Endo:  Monitor glucose levels    Heme:  #Acute blood loss anemia  #History of myelodysplastic syndrome  Received 2 units of PRBCs, 2 units PRBCs available currently  Every 6 hour hemoglobins until stable  Peripheral smear pending  Plan for EPO injection today  Subcutaneous heparin held    ID:  #Leukocytosis, afebrile currently  WBC 72 this morning, pancultured, CBC with differential sent      -----------------------------------  Bruce Graves MD  Neurosurgery Resident, PGY-3       Please contact neurosurgery resident on call with questions.    Dial * * [phone], enter [phone] when prompted.    -----------------------------------    Interval History: In late evening began to have worsening mental status with increased tachypnea, CT head obtained which was stable compared to the small increase she had in the morning.  CT PE was obtained which was negative..  Subsequently began to have melanotic stools.  With sharp drop in hemoglobin.  Was emergently scoped by GI overnight status post 1 clip placed to potential bleeding source in the gastric fundus.  Several other areas of ulcerations noted in the stomach and esophageal area.  She received 2 units of PRBCs for a sharp hemoglobin drop.  She was intubated for her EGD.  She was hooked up to the EEG found to be in nonconvulsive status epilepticus, received 4 mg of Ativan, Keppra 3.5 g load, Keppra 1 g twice daily, Versed 10 mg/h was started.      Objective:   Temp:   [97.6  F (36.4  C)-100  F (37.8  C)] 98.4  F (36.9  C)  Pulse:  [] 83  Resp:  [16-19] 18  BP: ()/(47-54) 129/54  MAP:  [59 mmHg-174 mmHg] 92 mmHg  Arterial Line BP: ()/(37-81) 140/65  FiO2 (%):  [30 %-50 %] 30 %  SpO2:  [92 %-100 %] 97 %  I/O last 3 completed shifts:  In: 9597.82 [I.V.:4522.82; NG/GT:4225; IV Piggyback:500]  Out: 4520 [Urine:1570; Stool:2950]    Gen: Appears comfortable, NAD, laying in bed, appearing older than stated age  Wound: Incision clean dry-no signs of leaking, no wetness appreciated.   Pulm: Intubated, sedated, ET tube in place, on CMV AC settings  Neurologic:  Exam with Versed at 10 mg/h  Eyes closed to noxious stimuli, not following commands, not tracking, eyes at midline  Pupils equal round reactive to light 2 mm, reactive, at midline, no apparent gaze preference,  Positive cough, positive gag, positive VOR, positive corneals bilaterally  No withdrawal to noxious stimuli in all 4 extremities, no increased tone noted in extremities  Negative Babinski bilaterally    LABS:  Recent Labs   Lab 07/05/22  1014 07/05/22  0601 07/05/22  0342 07/04/22  1908   NA  --  155* 154* 148*   POTASSIUM  --  4.4 4.5 4.6   CHLORIDE  --  127* 126* 118*   CO2  --  17* 17* 21*   ANIONGAP  --  11 11 9   * 146* 141* 151*   BUN  --  98.9* 100.0* 100.0*   CR  --  0.96* 0.93 0.84   MENDEZ  --  8.4* 8.5* 8.9       Recent Labs   Lab 07/05/22  1650 07/05/22  1306   WBC  --  59.0*   RBC  --  2.35*   HGB 7.1* 7.1*   HCT  --  21.9*   MCV  --  93   MCH  --  30.2   MCHC  --  32.4   RDW  --  19.6*   PLT  --  449       IMAGING:  Recent Results (from the past 24 hour(s))   XR Abdomen Port 1 View    Narrative    EXAM: XR ABDOMEN PORT 1 VIEWS 7/5/2022 7:59 AM     HISTORY: OG placed       COMPARISON: Abdominal radiograph 7/2/2022, chest x-ray 7/3/2022    FINDINGS:   Supine radiograph of the abdomen. Enteric tube with tip overlying the  stomach and side port at or just distal to the  gastroesophageal  junction. Nonspecific, nonobstructive bowel gas pattern. No  pneumatosis or portal venous gas. The visualized lung bases are clear.  No acute osseous abnormality. Atherosclerotic calcifications of the  abdominal aorta and iliac arteries. Partially visualized bilateral  total hip arthroplasties.        Impression    IMPRESSION:   1. Enteric tube with tip in the stomach and side-port at or just  distal to the gastroesophageal junction.  2. Nonobstructive bowel gas pattern.    I have personally reviewed the examination and initial interpretation  and I agree with the findings.    ROHAN MAGALLON MD         SYSTEM ID:  [id number]   XR Chest Port 1 View    Narrative    EXAM: XR CHEST PORT 1 VIEW  7/5/2022 1:01 PM     HISTORY:  76 year old F intubated, GI bleed, rule out pneumonia       COMPARISON:  7/4/2022    FINDINGS: Single view of the chest. Left subclavian catheter tip  projects over the mid SVC. Endotracheal tube tip projects over the low  thoracic trachea. Enteric tube courses below the diaphragm and beyond  the field of view.      Stable cardiomediastinal silhouette. No significant pleural effusion  or pneumothorax. Increased perihilar and bibasilar streaky opacities.       Impression    IMPRESSION:  1. Increased perihilar and bibasilar pulmonary opacities which may  represent atelectasis, edema and/or infection.  2. Stable support devices.     I have personally reviewed the examination and initial interpretation  and I agree with the findings.    JOVANA CORRIGAN MD         SYSTEM ID:  [id number]

## 2022-07-06 NOTE — PLAN OF CARE
Goal Outcome Evaluation:    Major Shift Events:  Weaned off of levo and vaso.Tolerating pressure support trial throughout most of the day. Intermittently following some simple commands Intermittent periods of presumed alertness.   Plan: Continue to monitor and wean interventions as possible.  For vital signs and complete assessments, please see documentation flowsheets.

## 2022-07-06 NOTE — PROGRESS NOTES
CLINICAL NUTRITION SERVICES - REASSESSMENT NOTE     Nutrition Prescription    RECOMMENDATIONS FOR MDs/PROVIDERS TO ORDER:  - Restart EN once medically able/pending GI status.   - Total daily fluids/adjustments per MD     Malnutrition Status:    - Patient does not meet two of the established criteria necessary for diagnosing malnutrition     Recommendations already ordered by Registered Dietitian (RD):  - Discontinued brandy counts - pt vented   - None additional while nutrition POC pending     Future/Additional Recommendations:  - Ability to restart EN via NGT pending GI status   - Weight trends     - Once able to restart EN, initiate prior regimen: [employer] Peptide 1.5 @ 45 mL/hr (1080 mL/day) to provide 1661 kcal (28 kcal/kg), 80 g protein (1.4 g/kg), 149 g CHO, 10 g fiber (NOT insoluable), 83 g fat (40% from MCTs), and 756 mL free water daily + Prosource x 2 = 1741 kcals (30 kcals/kg) and 102 gm PRO (1.7 gm/kg)  - May needs to restart at trophic rate and advance as tolerated      EVALUATION OF THE PROGRESS TOWARD GOALS   Diet: NPO  Nutrition Support: [employer] Peptide 1.5 @ 45 mL/hr (1080 mL/day) to provide 1661 kcal (28 kcal/kg), 80 g protein (1.4 g/kg), 149 g CHO, 10 g fiber, 83 g fat (40% from MCTs), and 756 mL free water daily + Prosource x 2 = 1741 kcals (30 kcals/kg) and 102 gm PRO (1.7 gm/kg) *HELD*  Intake: 7-day averages = 758 ml, 1137 kcals, and 56 gm PRO + Prosource = 1217 kcals (21 kcals/kg) and 78 gm PRO (1.3 gm/kg)      NEW FINDINGS   Nutrition/GI: Pt initiated on EN via NGT and was tolerating at goal. TF currently on hold per GI after EGD for GI bleed exploration with clipping of ulcer. Melena; esophageal ulcers - NG lavage to clear and evaluate for acute blood; GoLytely given x1 to clear excess blood per NCC. Last BM: 07/04.      Neuro Acute on chronic L SDH + new acute R SDH w/ increased rightward midline shift and trace L uncal herniation s/p L craniotomy (6/22) w/ hematoma evacuation; Recent L  SDH 2/2 mechanical fall not requiring intervention; Metabolic encephalopathy- r/o seizure. New acute on chronic SDH contributing factor. Pt nonverbal, able to follow some commands     CV Pt on pressors to maintain MAP goal.      Resp acute hypoxic respiratory failure- intubated 7/4.      Renal:  FWF 60 q4hr. Uremia - improving.     Skin Left Shin Ulcer; Right Forearm Skin Tear  -WOC consult.      Labs/meds:  Na+ 154 (H); BUN: 64 (H); glucose: 140, 138. . Meds: B12; MVI; Miralax; Senokot; Prosource.      Weights:  Overall up from admit: 60.3 kg --> 69.4 kg. Cannot ro role of fluid status changes in weight increase over the short-term.     MALNUTRITION  % Intake: does not meet criteria  % Weight Loss: None noted  Subcutaneous Fat Loss: None observed  Muscle Loss: Upper arm (bicep, tricep), Lower arm  (forearm), Upper leg (quadricep, hamstring), Patellar region and Posterior calf: moderate/severe. Overall poor tone/sarcopenic   Fluid Accumulation/Edema: None noted per chart/flowsheets  Malnutrition Diagnosis: Patient does not meet two of the established criteria necessary for diagnosing malnutrition     Previous Goals   Total avg nutritional intake to meet a minimum of 25 kcal/kg and 1.5 g PRO/kg daily (per dosing wt 59 kg).  Evaluation: Not met    Previous Nutrition Diagnosis  Inadequate protein-energy intake related to advancing EN and disruptions to infusions as evidenced by pt not meeting goal provisions with 7-day averages meeting 15 kcals/kg and 1 gm PRO/kg dosing weight.     Evaluation: Declining (improved from 7-day averages, however, pt now with TF on HOLD    CURRENT NUTRITION DIAGNOSIS  Inadequate protein-energy intake related to advancing EN and disruptions to infusions as evidenced by pt not meeting goal provisions with 7-day averages meeting 21 kcals/kg and 1.3 gm PRO/kg dosing weight with TF currently being HELD over the last ~ 24 hours.       INTERVENTIONS  Implementation  Enteral Nutrition - monitor for  ability to restart EN pending GI status.     Goals  Pt will be able to restart nutrition support within 24 hours pending medical/GI status.    Monitoring/Evaluation  Progress toward goals will be monitored and evaluated per protocol.      Jyoti Xavier RD, LD, [facility]  Neuro ICU  Pager: [phone]

## 2022-07-06 NOTE — PROGRESS NOTES
Neurocritical Care Progress Note    Reason for critical care admission: Bilateral SDH  Admitting Team: svetlana  Date of Service:  07/06/2022  Date of Admission:  6/22/2022  Hospital Day: 15    Assessment/Plan  Louise Benítez is a 76 year old female w/ PMHx myelodysplastic syndrome, seronegative RA, chronic microcytic anemia, PAD, and recent L SDH (5/12/2022) who was admitted from her TCU on 6/22/2022 after an unwitnessed fall. Was subsequently found to have an acute on chronic L SDH, a new acute R SDH, and increased midline shift w/ trace L uncal herniation now s/p L craniotomy w/ hematoma evacuation.     24 hour events: midazolam being weaned, EEG with spikes continuing from LEFT hemisphere, no seizures     Neuro  #Acute on chronic L SDH + with acute R SDH w/ increased rightward midline shift and trace L uncal herniation s/p L craniotomy (6/22) w/ hematoma evacuation  #Recent L SDH 2/2 mechanical fall not requiring intervention (hospitalized 5/12-5/20/2022)  - stable on 7/4 head CT  #concern for non-convulsive status epilepticus - nope  -Neurochecks q2hr  -HOB > 30   -MAP > 65 mmHg  -PT/OT- on hold given critical status, SLP - NPO  -CTH this AM, shows increase in left SDH (likely redistribution)   -repeat CTH yesterday was negative for acute intracranial pathology   - continue vEEG until midazolam discontinued and while uremia improves  - continue 500 mg BID keppra  - off midazolam gtt for seizures     #Delirium  -Delirium precaution  -Melatonin 10 mg at 8pm -> hold until extubated or off sedation    #Metabolic encephalopathy- New acute on chronic SDH contributing factor. improving  - worsening uremia, secondary to GI bleed    #Analgesics & sedation  -Tylenol 650mg Q4h prn  - midazolam gtt, goal RASS 0 to -1, discontinue     CV  #HTN on admission  #hypotension  -cardiac monitoring  -MAP > 65 mmHg  -continue amlodipine 5 mg - now held given HoTN  -NE infusion titrate to MAP goal, currently 0.06  mcg/kg/min  -vasopression infusion to MAP goal, currently at 2.4 unit(s)/hr  - start hydrocortisone 50mg IV Q6  -flotrac for hemodynamic monitoring   CVP: 10 mmHg   SVV: 5%   CO/CI: 8.3/5.1   SVR/SVRI: 664/1136   SV: 107    #Bradycardia - resolved   -cardiac monitoring  -echo 6/24: EF 60-65%, normal RV, no significant valvular abnormalities  - Order nlimited TTE in setting of new hypotension      #PAD  No statin, recent LDL 10 (5/13/2022)     Resp  #former tobacco use  #acute hypoxic respiratory failure - intubated 07/04  -vent settings: CMV 18/5/400/30  -chest physiotherapy  -continuous pulse ox  -maintain O2 saturations greater than 92%, avoid hyperoxia  -   -pCXR shows RLL atelectasis, stable from a pulm perspective   -CT PE to r/o - negative 07/04     GI  #hx GERD   #melena   #esophageal ulcers  - NG lavage to clear and evaluate for acute blood  #gastric ulcers in the GE junction, anterior wall, posterior wall (received one clipping last night).  #transaminitis  -GoLytely x1 to clear excess blood  Diet: TF, FWF 60 Q4  Last BM: 07/04  -protonix IV 40 mg BID    Bowel regimen: PRN senna-docusate and Miralax  -with being more awake will hold off on surgery consult for PEG       Renal/  #hyperchloremia  #hypernatremia  -Daily BMP  -IV fluids: Plasmalyte at 100 mL> switch to LR  -Electrolyte replacement protocol    #Uremia - improving   - baseline BUN 10-20 on previous admissions, around baseline on admit  - suspect secondary to GI bleeding     Endo  #stress hyperglycemia   -Hgb A1c 5.5% 6/22   -Monitor glucose levels     Rheum  #Seronegative Rheumatoid Arthritis  -continue PTA Plaquenil 400mg at bedtime      Heme/Onc  #acute blood loss anemia  #Chronic Microcytic Anemia, likely 2/2 myelodysplastic syndrome  baseline Hgb ~8.0  -PTA Darbepoetin Inj 60mg Q3wks (last dose 6/14; next due 7/5)  -PTA Vit B12 500mcg daily  -Daily CBC  -Hgb goal > 7 in acute period post-surgery, plt goal > 50k  -Transfuse to meet Hgb and  plt goals  -received unit of PRBCs overnight 07/06  -waiting on peripheral smear     #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  - obtain outside oncology records from [facility]      ID  #severe leukocytosis - improving   -Daily CBC  -Follow temperature curve  - zosyn and vancomycin started, STOP vancomycin  - narrow to nafcillin tomrrow after 2 days of culture  - 4+ S.aureus in sputum, MRSA swab negative    MSK  #Chronic Back Pain w/ lumbar spondylosis and hx of lumbar compression fracture  #Polymyalgia Rheumatica w/hx of GCA  #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, 2003)     #Osteoporosis  -Hold PTA Denosumab Inj 60mg  -Hold Ca/Vit D3     #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 Upon evaluation, this patient had a high probability of imminent or life-threatening deterioration due to SDH s/p crani, acute hypoxic respiratory failure, which required my direct attention, intervention, and personal management. Greater than 50% of my time was spent at the bedside counseling the patient and/or coordinating care regarding services listed in this note.     The patient was seen and discussed with the NCC attending, Dr. Jus Denny, APRN, CNP  Neurocritical Care  *[phone]  Text Page    Physician Attestation   Louise was seen and evaluated by me on 07/06/2022.  She was in critical condition as the result of acute hypotension, acute encephalopathy of undetermined source,  acute blood loss anemia, hypoxia.  Her condition is now Critical.     Significant changes in status since my last evaluation include worsening BUN levels, depressed mental functions       I have reviewed changes in critical data from the last 24 hours, including medications, laboratory results, vital signs and radiograph results.      The acute issues managed by me today include uremia, seizure evaluation, melena, GI bleed, acute hypoxic respiratory failure     I formulated and discussed my care plan with the patient s Advanced Practice Provider     I discussed the course and plan with the Bedside Nurse and Primary team and answered all questions to the best of my ability.     Total Critical Care time spent by me, excluding procedures, was  45 minutes    Jus Jones MD  Neurocritical Care  Vascular Neurology  Text Page - [phone]      24 Hour Vital Signs Summary:  Temp: 98.3  F (36.8  C) Temp  Min: 97.8  F (36.6  C)  Max: 100  F (37.8  C)  Resp: 18 Resp  Min: 18  Max: 19  SpO2: 96 % SpO2  Min: 92 %  Max: 100 %  Pulse: 79 Pulse  Min: 75  Max: 120    No data recorded  BP: 129/54 Systolic (24hrs), Av , Min:129 , Max:129   Diastolic (24hrs), Av, Min:54, Max:54        Respiratory monitoring:   Vent Mode: CMV/AC  (Continuous Mandatory Ventilation/ Assist Control)  FiO2 (%): 30 %  Resp Rate (Set): 18 breaths/min  Tidal Volume (Set, mL): 400 mL  PEEP (cm H2O): 5 cmH2O  Resp: 18      I/O last 3 completed shifts:  In: 8620.93 [I.V.:3949.93; NG/GT:4321]  Out: 5160 [Urine:1910; Stool:3250]    Current Medications:    acetaminophen  975 mg Oral or Feeding Tube Q6H     [Held by provider] amLODIPine  5 mg Oral or Feeding Tube QPM     cyanocobalamin  500 mcg Oral Daily     [Held by provider] heparin ANTICOAGULANT  5,000 Units Subcutaneous Q8H     hydroxychloroquine  400 mg Oral or Feeding Tube At Bedtime     levETIRAcetam  500 mg Intravenous Q12H     [Held by provider] melatonin  10 mg Oral or Feeding Tube Daily      "multivitamins w/minerals  15 mL Per Feeding Tube Daily     pantoprazole (PROTONIX) IV  40 mg Intravenous BID     piperacillin-tazobactam  4.5 g Intravenous Q6H     polyethylene glycol  17 g Oral Daily     protein modular  1 packet Per Feeding Tube BID 09 12     sodium chloride (PF)  3 mL Intracatheter Q8H     vancomycin  1,250 mg Intravenous Q24H       PRN Medications:  albuterol, bisacodyl, [Held by provider] hydrALAZINE, [Held by provider] labetalol, naloxone **OR** naloxone **OR** naloxone **OR** naloxone, ondansetron **OR** ondansetron, senna-docusate, sodium chloride (PF)    Infusions:    midazolam Stopped (07/05/22 1518)     norepinephrine 0.06 mcg/kg/min (07/06/22 0700)     Plasma-Lyte A 100 mL/hr (07/06/22 0700)     vasopressin 2.4 Units/hr (07/06/22 0700)       No Known Allergies    Physical Examination:  Vitals: /54 (BP Location: Left arm)   Pulse 79   Temp 98.3  F (36.8  C) (Axillary)   Resp 18   Ht 1.626 m (5' 4.02\")   Wt 69.4 kg (153 lb)   SpO2 96%   BMI 26.25 kg/m    General: Adult female patient, lying in bed, critically-ill  HEENT: Normocephalic, no icterus, oral cavity/oropharynx pink and dry  Cardiac: regular rate, normal s1/s2  Chest: synchronous with ventilator, CTAB diminished at bases  Abdomen: soft, non-distended  Extremities: warm, no edema, distal pulses +2  Skin: No rash or lesion on exposed skin  Psych: unable to assess  Neuro: sedated, exam limited by ETT cough and gag intact, no abnormal movements, doesn't follow commands.    Labs and Imaging:    Results for orders placed or performed during the hospital encounter of 06/22/22 (from the past 24 hour(s))   XR Abdomen Port 1 View    Narrative    EXAM: XR ABDOMEN PORT 1 VIEWS 7/5/2022 7:59 AM     HISTORY: OG placed       COMPARISON: Abdominal radiograph 7/2/2022, chest x-ray 7/3/2022    FINDINGS:   Supine radiograph of the abdomen. Enteric tube with tip overlying the  stomach and side port at or just distal to the " gastroesophageal  junction. Nonspecific, nonobstructive bowel gas pattern. No  pneumatosis or portal venous gas. The visualized lung bases are clear.  No acute osseous abnormality. Atherosclerotic calcifications of the  abdominal aorta and iliac arteries. Partially visualized bilateral  total hip arthroplasties.        Impression    IMPRESSION:   1. Enteric tube with tip in the stomach and side-port at or just  distal to the gastroesophageal junction.  2. Nonobstructive bowel gas pattern.    I have personally reviewed the examination and initial interpretation  and I agree with the findings.    ROHAN MAGALLON MD         SYSTEM ID:  [id number]   Respiratory Aerobic Bacterial Culture with Gram Stain    Specimen: Endotracheal; Sputum   Result Value Ref Range    Gram Stain Result >25 PMNs/low power field (A)     Gram Stain Result 3+ Gram positive cocci (A)    Hemoglobin   Result Value Ref Range    Hemoglobin 8.2 (L) 11.7 - 15.7 g/dL   Glucose by meter   Result Value Ref Range    GLUCOSE BY METER POCT 140 (H) 70 - 99 mg/dL   XR Chest Port 1 View    Narrative    EXAM: XR CHEST PORT 1 VIEW  7/5/2022 1:01 PM     HISTORY:  76 year old F intubated, GI bleed, rule out pneumonia       COMPARISON:  7/4/2022    FINDINGS: Single view of the chest. Left subclavian catheter tip  projects over the mid SVC. Endotracheal tube tip projects over the low  thoracic trachea. Enteric tube courses below the diaphragm and beyond  the field of view.      Stable cardiomediastinal silhouette. No significant pleural effusion  or pneumothorax. Increased perihilar and bibasilar streaky opacities.       Impression    IMPRESSION:  1. Increased perihilar and bibasilar pulmonary opacities which may  represent atelectasis, edema and/or infection.  2. Stable support devices.     I have personally reviewed the examination and initial interpretation  and I agree with the findings.    JOVANA CORRIGAN MD         SYSTEM ID:  [id number]   Lab Blood Morphology  Pathologist Review    Narrative    The following orders were created for panel order Lab Blood Morphology Pathologist Review.  Procedure                               Abnormality         Status                     ---------                               -----------         ------                     Bld morphology pathology...[[id number]]                      In process                 CBC with platelets and d...[[id number]]  Abnormal            Final result               Reticulocyte count[[id number]]           Normal              Final result               Morphology Tracking[[id number]]                              Final result                 Please view results for these tests on the individual orders.   CBC with platelets and differential   Result Value Ref Range    WBC Count 59.0 (HH) 4.0 - 11.0 10e3/uL    RBC Count 2.35 (L) 3.80 - 5.20 10e6/uL    Hemoglobin 7.1 (L) 11.7 - 15.7 g/dL    Hematocrit 21.9 (L) 35.0 - 47.0 %    MCV 93 78 - 100 fL    MCH 30.2 26.5 - 33.0 pg    MCHC 32.4 31.5 - 36.5 g/dL    RDW 19.6 (H) 10.0 - 15.0 %    Platelet Count 449 150 - 450 10e3/uL    % Neutrophils 89 %    % Lymphocytes 2 %    % Monocytes 4 %    % Eosinophils 0 %    % Basophils 1 %    % Immature Granulocytes 4 %    NRBCs per 100 WBC 0 <1 /100    Absolute Neutrophils 52.8 (H) 1.6 - 8.3 10e3/uL    Absolute Lymphocytes 1.2 0.8 - 5.3 10e3/uL    Absolute Monocytes 2.3 (H) 0.0 - 1.3 10e3/uL    Absolute Eosinophils 0.0 0.0 - 0.7 10e3/uL    Absolute Basophils 0.4 (H) 0.0 - 0.2 10e3/uL    Absolute Immature Granulocytes 2.3 (H) <=0.4 10e3/uL    Absolute NRBCs 0.2 10e3/uL   Reticulocyte count   Result Value Ref Range    % Reticulocyte 1.3 0.5 - 2.0 %    Absolute Reticulocyte 0.029 0.025 - 0.095 10e6/uL   Hemoglobin   Result Value Ref Range    Hemoglobin 7.1 (L) 11.7 - 15.7 g/dL   Hemoglobin   Result Value Ref Range    Hemoglobin 6.4 (LL) 11.7 - 15.7 g/dL   Basic metabolic panel   Result Value Ref Range    Creatinine 0.73 0.51 - 0.95 mg/dL     Sodium 154 (H) 136 - 145 mmol/L    Potassium 3.6 3.4 - 5.3 mmol/L    Urea Nitrogen 64.2 (H) 8.0 - 23.0 mg/dL    Chloride 124 (H) 98 - 107 mmol/L    Carbon Dioxide (CO2) 20 (L) 22 - 29 mmol/L    Anion Gap 10 7 - 15 mmol/L    Glucose 138 (H) 70 - 99 mg/dL    GFR Estimate 84 >60 mL/min/1.73m2    Calcium 7.9 (L) 8.8 - 10.2 mg/dL   CBC with platelets   Result Value Ref Range    WBC Count 39.2 (H) 4.0 - 11.0 10e3/uL    RBC Count 2.43 (L) 3.80 - 5.20 10e6/uL    Hemoglobin 7.1 (L) 11.7 - 15.7 g/dL    Hematocrit 21.9 (L) 35.0 - 47.0 %    MCV 90 78 - 100 fL    MCH 29.2 26.5 - 33.0 pg    MCHC 32.4 31.5 - 36.5 g/dL    RDW 18.7 (H) 10.0 - 15.0 %    Platelet Count 390 150 - 450 10e3/uL   Hemoglobin   Result Value Ref Range    Hemoglobin 7.1 (L) 11.7 - 15.7 g/dL   Magnesium   Result Value Ref Range    Magnesium 2.6 (H) 1.7 - 2.3 mg/dL   Phosphorus   Result Value Ref Range    Phosphorus 3.6 2.5 - 4.5 mg/dL   ABO/Rh type and screen    Narrative    The following orders were created for panel order ABO/Rh type and screen.  Procedure                               Abnormality         Status                     ---------                               -----------         ------                     Adult Type and Screen[[id number]]                            In process                   Please view results for these tests on the individual orders.       All relevant imaging and laboratory values personally reviewed.

## 2022-07-07 NOTE — PROGRESS NOTES
Care Management Follow Up    Length of Stay (days): 15    Expected Discharge Date:  TBD     Concerns to be Addressed: Discharge Planning and Care Conference Coordination      Patient plan of care discussed at interdisciplinary rounds: Yes    Anticipated Discharge Disposition:  Pt recs TCU      Anticipated Discharge Services:  Therapies- possible LTC needed  Anticipated Discharge DME:   TBD    Patient/family educated on Medicare website which has current facility and service quality ratings:  NA  Education Provided on the Discharge Plan:  NA  Patient/Family in Agreement with the Plan:  NA    Referrals Placed by CM/SW:   Care Conference on Friday, July 8th at 1:00pm   Private pay costs discussed: Not applicable    Additional Information:  SW met with pts spouse, Jd at bedside. SW reminded Jd of the Care Conference tomorrow at 1:00pm. SW stated that due to the current visitor policy regulations, they are allowed to have up to three individuals present at the conference tomorrow. Jd stated that it would be him and his daughters attending the conference tomorrow. RACHEL stated that someone from the Care Management team will come to his wife's room prior to the conference to pick them up and walk with them to the conference room. Jd denied having any additional questions, concerns or needs from a SW standpoint at this time.     SW will continue following for care conference coordination, ongoing support and discharge planning.     AQUILES Zuniga, [facility]  Adult Casual   [email or web address]

## 2022-07-07 NOTE — PROGRESS NOTES
[facility]  WO Nurse Inpatient Assessment     Consulted for: Left Lower extremity and Right forearm wounds    Patient History (according to provider note(s):      76-year-old female with PMHx myelodysplastic syndrome, seronegative RA, chronic microcytic anemia, PAD, and recent L SDH (5/12/2022) who arrived to [facility] via EMS after an unwitnessed fall at home.  s/p 6/22 emergent L craniotomy for SDH evacuationa left acute subdural hematoma     Areas Assessed:      Areas visualized during today's visit: Focused: R arm and Left shin    Wound location: R arm and Left shin            Date of photos: 6/23/22 and 6/30  Wound due to: Trauma from wheelchair foot rest pins.    Wound history/plan of care: occurred approximately 6 weeks ago at care facility.  Spouse had been leaving open to air.   Wound base: 40 % yellow fibrin, , 5 % brown slough, 30% granulation, 25% dermis     Palpation of the wound bed: normal      Drainage: small     Description of drainage: serosanguinous     Measurements (length x width x depth, in cm): 3.1  x 2.5  x  0.2 cm      Tunneling: N/A     Undermining: N/A  Periwound skin: Intact and fragile      Color: normal and consistent with surrounding tissue      Temperature: normal   Odor: none  Pain: no grimacing or signs of discomfort,   Pain interventions prior to dressing change: N/A  Treatment goal: Heal  and Remove necrotic tissue  STATUS: healing and follow up  Supplies ordered: gathered, at bedside and ordered medihoney last week    Wound location: Right forearm      Last photo: 6/30/22  Wound due to: Skin Tear  Wound history/plan of care: wounds not present prior to fall, present on admission.  currently with mepilex border dressing.    Wound base: 100 % dry red fibrinous scab      Palpation of the wound bed: normal      Drainage: small     Description of drainage: serosanguinous     Measurements (length x width x depth, in cm): 1  x 0.4  x  0.0 cm       Periwound skin: purple bruising          Temperature: normal   Odor: none  Pain: no grimacing or signs of discomfort,   Pain interventions prior to dressing change: n/a  Treatment goal: Heal   STATUS:  healing and follow up  Supplies ordered: gathered  Treatment Plan:     Left lower extremity wound:  Every other day  Cleanse with wound cleanser and gauze.    Paint periwound with no sting barrier film.   Cover wound bed with medihoney, (PS#[id number]) applying nickel thick.   Cover medihoney with vaseline gauze.   Cover vaseline gauze with mepilex border dressing.     Right arm skin tear: every third day  cleanse wound(s) with microklenz spray and gauze.  Pat dry   Cover skin tear with vaseline gauze.   Cover vaseline gauze with mepilex border dressing.     Orders: Reviewed    RECOMMEND PRIMARY TEAM ORDER: None, at this time  Education provided: plan of care and wound progress  Discussed plan of care with: Family  WOC nurse follow-up plan: weekly  Notify WOC if wound(s) deteriorate.  Nursing to notify the Provider(s) and re-consult the WOC Nurse if new skin concern.    DATA:     Current support surface: Standard  Low air loss mattress  BMI: Body mass index is 27.11 kg/m .   Active diet order: Orders Placed This Encounter      NPO for Medical/Clinical Reasons Except for: No Exceptions     Output: I/O last 3 completed shifts:  In: 3451.7 [I.V.:2781.7; NG/GT:425]  Out: 1355 [Urine:1055; Stool:300]     Labs:   Recent Labs   Lab 07/07/22  0313 07/06/22  1839 07/06/22  1757 07/06/22  1347   ALBUMIN  --   --   --  2.5*   HGB 7.1* 7.2*  7.2*   < > 7.4*   INR  --  1.34*  --   --    WBC 21.6* 27.0*  --   --     < > = values in this interval not displayed.     Pressure injury risk assessment:   Sensory Perception: 2-->very limited  Moisture: 3-->occasionally moist  Activity: 2-->chairfast  Mobility: 1-->completely immobile  Nutrition: 2-->probably inadequate  Friction and Shear: 1-->problem  Leobardo Score: 11

## 2022-07-07 NOTE — PLAN OF CARE
Problem: Plan of Care - These are the overarching goals to be used throughout the patient stay.    Goal: Absence of Hospital-Acquired Illness or Injury  Intervention: Prevent Infection  Recent Flowsheet Documentation  Taken 7/7/2022 1600 by Robby Espinoza RN  Infection Prevention: environmental surveillance performed  Taken 7/7/2022 1400 by Robby Espinoza RN  Infection Prevention: environmental surveillance performed  Taken 7/7/2022 1200 by Robby Espinoza RN  Infection Prevention: environmental surveillance performed  Taken 7/7/2022 1000 by Robby Espinoza RN  Infection Prevention: environmental surveillance performed  Taken 7/7/2022 0800 by Robby Espinoza RN  Infection Prevention: environmental surveillance performed   Goal Outcome Evaluation:  D. Awake follows commands 50% of time - moves all extremities - up in chair vss on no pressors to control BP. Temp 98.5 ax- cbc stable ,labs wnl .   A no s/s infection- on pressure support on vent rr 24/min pip 12, minimal secretions-   I stable - cont to monitor . Antibiotics.

## 2022-07-07 NOTE — ADDENDUM NOTE
Addendum  created 07/07/22 1511 by Juan Diego Jones MD    Attestation recorded in Intraprocedure, Flowsheet accepted, Intraprocedure Attestations filed

## 2022-07-07 NOTE — PLAN OF CARE
"Major shift events: Levo started to maintain MAP >65.     NEURO: withdraws all extremities. Inconsistently follows commands - wiggles toes in both extremities, movement in R hand when asked to give \"thumbs up\", can slowly track with eyes. Pupils equal/reactive. EEG in place. Keppra given.  CARDIAC: Levo titrated between 0.01 and 0.05. Turned off at 0350.   PULM: Tolerating CMV settings. 30% FiO2. RR 18. . PEEP 5. Tan thick secretions. Afebrile.  GI: Watery black liquid stool. Rectal tube in place. TF increased to 25mL at 0200. FWF 60mL q4. TF to be increased by 10mL q8 until goal of 45mL is met.   : Vasquez in place, adequate output.   SKIN: Skin tears throughout. Dressings in place. Redness around rectum.   LABS: Hgb 7.1. Na 154.   LINES: L CVC. L PIV.   MEDS: levo, TKO, LR @ 100ml/hr.     Plan:  Monitor neuro status q2. Maintain MAP goal. Notify providers of significant changes.    Jo Pickard RN on 7/7/2022 at 6:06 AM   "

## 2022-07-07 NOTE — PROCEDURES
Small Bowel Feeding Tube Placement Assessment  Reason for Feeding Tube Placement: Provider request for ppFT  Cortrak Start Time: 12:20   Cortrak End Time: 12:40  Medicine Delivered During Procedure: Lidocaine   Placement Successful: Presume post-pyloric (pending AXR confirmation).     Procedure Complications: NA  Final Placement Mahendra at exit of nare: 100 cm  Face to Face time with patient: 30 min     Jyoti Xavier RD, LD, [facility]  Neuro ICU  Pager: [phone]      Bridle Placement:   Reason for bridle placement: securement of ppFT   Medicine delivered during procedure: lubricating jelly   Procedure: Successful  Location of top of clip on FT: @ 101 cm marker   Condition of nose/skin at time of bridle placement: Unremarkable   Face to Face time with patient: 5 minutes.    Jyoti Xavier RD, LD, [facility]  Neuro ICU  Pager: [phone]

## 2022-07-07 NOTE — PROGRESS NOTES
Neurocritical Care Progress Note    Reason for critical care admission: Bilateral SDH  Admitting Team: svetlana  Date of Service:  07/07/2022  Date of Admission:  6/22/2022  Hospital Day: 16    Assessment/Plan  Louise Benítez is a 76 year old female w/ PMHx myelodysplastic syndrome, seronegative RA, chronic microcytic anemia, PAD, and recent L SDH (5/12/2022) who was admitted from her TCU on 6/22/2022 after an unwitnessed fall. Was subsequently found to have an acute on chronic L SDH, a new acute R SDH, and increased midline shift w/ trace L uncal herniation now s/p L craniotomy w/ hematoma evacuation.     24 hour events: hemodynamics improving, now off pressors      Neuro  #Acute on chronic L SDH + with acute R SDH w/ increased rightward midline shift and trace L uncal herniation s/p L craniotomy (6/22) w/ hematoma evacuation  #Recent L SDH 2/2 mechanical fall not requiring intervention (hospitalized 5/12-5/20/2022)  - stable on 7/4 head CT  #concern for non-convulsive status epilepticus - nope  -Neurochecks q2hr  -HOB > 30   -MAP > 65 mmHg  -PT/OT- on hold given critical status, SLP - NPO  -CTH this AM, shows increase in left SDH (likely redistribution)   -repeat CTH yesterday was negative for acute intracranial pathology   - continue vEEG until uremia improves  - discontinue 500 mg BID keppra  - 200 mg IV vimpat x 1, then 100 mg IV vimpat BID  - off midazolam gtt for seizures     #Delirium  -Delirium precaution  -Melatonin 10 mg at 8pm -> hold until extubated or off sedation    #Metabolic encephalopathy- New acute on chronic SDH contributing factor. improving  - worsening uremia, secondary to GI bleed    #Analgesics & sedation  -Tylenol 650mg Q4h prn  - midazolam gtt, goal RASS 0 to -1, discontinue     CV  #HTN on admission  #hypotension  -cardiac monitoring  -MAP > 65 mmHg  -continue amlodipine 5 mg - now held given HoTN  -NE infusion titrate to MAP goal, discontinue  -vasopression infusion to MAP goal, currently  at 2.4 unit(s)/hr  - start hydrocortisone 50mg IV Q6  -flotrac for hemodynamic monitoring   CVP: 10 mmHg   SVV: 5%   CO/CI: 8.3/5.1   SVR/SVRI: 664/1136   SV: 107    #Bradycardia - resolved   -cardiac monitoring  -echo 6/24: EF 60-65%, normal RV, no significant valvular abnormalities  - Order nlimited TTE in setting of new hypotension      #PAD  No statin, recent LDL 10 (5/13/2022)     Resp  #former tobacco use  #acute hypoxic respiratory failure - intubated 07/04  -vent settings: CMV 18/5/400/30  -chest physiotherapy  -continuous pulse ox  -maintain O2 saturations greater than 92%, avoid hyperoxia  -   -pCXR shows RLL atelectasis, stable from a pulm perspective   -CT PE to r/o - negative 07/04     GI  #hx GERD   #melena   #esophageal ulcers  #gastric ulcers in the GE junction, anterior wall, posterior wall (received one clipping last night).  #transaminitis  -GoLytely x1 to clear excess blood  Diet: TF,  Q4  Last BM: 07/04  -protonix IV 40 mg BID  -switch OGT to post pyloric NGT    Bowel regimen: PRN senna-docusate and Miralax  -with being more awake will hold off on surgery consult for PEG       Renal/  #hyperchloremia  #hypernatremia  -Daily BMP  -IV fluids: Plasmalyte at 100 mL> switch to LR  -Electrolyte replacement protocol    #Uremia - improving   - baseline BUN 10-20 on previous admissions, around baseline on admit  - suspect secondary to GI bleeding     Endo  #stress hyperglycemia   -Hgb A1c 5.5% 6/22   -Monitor glucose levels     Rheum  #Seronegative Rheumatoid Arthritis  -continue PTA Plaquenil 400mg at bedtime      Heme/Onc  #acute blood loss anemia  #Chronic Microcytic Anemia, likely 2/2 myelodysplastic syndrome  baseline Hgb ~8.0  -PTA Darbepoetin Inj 60mg Q3wks (last dose 6/14; next due 7/5)  -PTA Vit B12 500mcg daily  -Daily CBC  -Hgb goal > 7 in acute period post-surgery, plt goal > 50k  -Transfuse to meet Hgb and plt goals  -received unit of PRBCs overnight 07/06  -waiting on peripheral  smear     #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  - obtain outside oncology records from [facility]      ID  #severe leukocytosis - improving   -Daily CBC  -Follow temperature curve  - STOP pip-tazo  - narrow to ancef  - 4+ S.aureus in sputum, MRSA swab negative    MSK  #Chronic Back Pain w/ lumbar spondylosis and hx of lumbar compression fracture  #Polymyalgia Rheumatica w/hx of GCA  #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, 2003)     #Osteoporosis  -Hold PTA Denosumab Inj 60mg  -Hold Ca/Vit D3     #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 Upon evaluation, this patient had a high probability of imminent or life-threatening deterioration due to SDH s/p crani, acute hypoxic respiratory failure, which required my direct attention, intervention, and personal management. Greater than 50% of my time was spent at the bedside counseling the patient and/or coordinating care regarding services listed in this note.     The patient was seen and discussed with the NCC attending, Dr. Jus Denny, APRN, CNP  Neurocritical Care  *[phone]  Text Page    Physician Attestation   Louise was seen and evaluated by me on 07/07/2022.  She was in critical condition as the result of acute hypotension, acute encephalopathy of undetermined source, acute blood loss anemia, hypoxia.  Her condition is now Critical.     Significant changes in status since my last evaluation include worsening  BUN levels, depressed mental functions       I have reviewed changes in critical data from the last 24 hours, including medications, laboratory results, vital signs and radiograph results.      The acute issues managed by me today include uremia, seizure evaluation, cortical irritability management, melena, GI bleed?, acute hypoxic respiratory failure     I formulated and discussed my care plan with the patient s Advanced Practice Provider     I discussed the course and plan with the Bedside Nurse and Primary team and answered all questions to the best of my ability.     Total Critical Care time spent by me, excluding procedures, was 50 minutes    Jus Jones MD  Neurocritical Care  Vascular Neurology  Text Page - [phone]      24 Hour Vital Signs Summary:  Temp: 98.4  F (36.9  C) Temp  Min: 97  F (36.1  C)  Max: 98.8  F (37.1  C)  Resp: 18 Resp  Min: 18  Max: 24  SpO2: 95 % SpO2  Min: 93 %  Max: 98 %  Pulse: 70 Pulse  Min: 62  Max: 91    No data recorded  BP: (!) 144/61 Systolic (24hrs), Av , Min:90 , Max:147   Diastolic (24hrs), Av, Min:31, Max:80        Respiratory monitoring:   Vent Mode: CMV/AC  (Continuous Mandatory Ventilation/ Assist Control)  FiO2 (%): 30 %  Resp Rate (Set): 18 breaths/min  Tidal Volume (Set, mL): 400 mL  PEEP (cm H2O): 5 cmH2O  Pressure Support (cm H2O): 7 cmH2O  Resp: 18      I/O last 3 completed shifts:  In: 3343.25 [I.V.:2778.25; NG/GT:140]  Out: 2385 [Urine:1385; Stool:1000]    Current Medications:    acetaminophen  975 mg Oral or Feeding Tube Q6H     [Held by provider] amLODIPine  5 mg Oral or Feeding Tube QPM     cyanocobalamin  500 mcg Oral Daily     [Held by provider] heparin ANTICOAGULANT  5,000 Units Subcutaneous Q8H     hydrocortisone sodium succinate PF  50 mg Intravenous Q6H     hydroxychloroquine  400 mg Oral or Feeding Tube At Bedtime     levETIRAcetam  500 mg Intravenous Q12H     [Held by provider] melatonin  10 mg Oral or Feeding Tube Daily     multivitamins  "w/minerals  15 mL Per Feeding Tube Daily     pantoprazole (PROTONIX) IV  40 mg Intravenous BID     piperacillin-tazobactam  4.5 g Intravenous Q6H     polyethylene glycol  17 g Oral Daily     potassium chloride  20 mEq Oral Once     protein modular  1 packet Per Feeding Tube BID 09 12     sodium chloride (PF)  3 mL Intracatheter Q8H       PRN Medications:  albuterol, bisacodyl, [Held by provider] hydrALAZINE, [Held by provider] labetalol, naloxone **OR** naloxone **OR** naloxone **OR** naloxone, ondansetron **OR** ondansetron, senna-docusate, sodium chloride (PF)    Infusions:    lactated ringers 100 mL/hr at 07/07/22 0400     midazolam Stopped (07/05/22 1518)     norepinephrine Stopped (07/07/22 0350)     vasopressin Stopped (07/06/22 1519)       No Known Allergies    Physical Examination:  Vitals: BP (!) 144/61   Pulse 70   Temp 98.4  F (36.9  C) (Axillary)   Resp 18   Ht 1.626 m (5' 4.02\")   Wt 69.4 kg (153 lb)   SpO2 95%   BMI 26.25 kg/m    General: Adult female patient, lying in bed, critically-ill  HEENT: Normocephalic, no icterus, oral cavity/oropharynx pink and dry  Cardiac: regular rate, normal s1/s2  Chest: synchronous with ventilator, CTAB diminished at bases  Abdomen: soft, non-distended  Extremities: warm, no edema, distal pulses +2  Skin: No rash or lesion on exposed skin  Psych: calm and cooperative  Neuro: follows commands, opens eyes, moves toes to commands, moves all extremities, face is symmetric, cough present, gag present, no abnormal movements, gait and coordination deferred.     Labs and Imaging:    Results for orders placed or performed during the hospital encounter of 06/22/22 (from the past 24 hour(s))   MRSA MSSA PCR, Nasal Swab    Specimen: Nare, Right; Swab   Result Value Ref Range    MRSA Target DNA Negative Negative    SA Target DNA Positive     Narrative    The [employer]  Xpert SA Nasal Complete assay performed in the [employer]  Dx System is a qualitative in vitro diagnostic test " designed for rapid detection of Staphylococcus aureus (SA) and methicillin-resistant Staphylococcus aureus (MRSA) from nasal swabs in patients at risk for nasal colonization. The test utilizes automated real-time polymerase chain reaction (PCR) to detect MRSA/SA DNA. The Xpert SA Nasal Complete assay is intended to aid in the prevention and control of MRSA/SA infections in healthcare settings. The assay is not intended to diagnose, guide or monitor treatment for MRSA/SA infections, or provide results of susceptibility to methicillin. A negative result does not preclude MRSA/SA nasal colonization.    Prepare red blood cells (unit)   Result Value Ref Range    CROSSMATCH Compatible     UNIT ABO/RH A Neg     Unit Number [id number]     Unit Status Ready     Blood Component Type Red Blood Cells     Product Code D8633Q25     CODING SYSTEM SRYQ130     UNIT TYPE ISBT 0600    Echocardiogram Limited   Result Value Ref Range    LVEF  60-65%     Narrative    [id number]  [id number]  [id number]  [id number]^TYLER^YAO^LAURENCE RICHMOND     [facility]  Echocardiography Laboratory  [address] [place] [id number]     Name: ZAHEER WALLIS  MRN: [id number]  : 1945  Study Date: 2022 11:24 AM  Age: 76 yrs  Gender: Female  Patient Location: [facility]  Reason For Study: Shock  Ordering Physician: YAO SCOTT  Performed By: Marcella Buck     BSA: 1.7 m2  Height: 64 in  Weight: 152 lb  HR: 70  BP: 116/64 mmHg  ______________________________________________________________________________  Procedure  Limited Portable Echo Adult.  ______________________________________________________________________________  Interpretation Summary  Global and regional left ventricular function is normal with an EF of 60-65%.  Right ventricular function, chamber size, wall motion, and thickness are  normal.  Pulmonary artery systolic pressure is normal.  Dilation of the inferior vena cava is present with  abnormal respiratory  variation in diameter.  No pericardial effusion is present.  ______________________________________________________________________________  Left Ventricle  Global and regional left ventricular function is normal with an EF of 60-65%.  Left ventricular size is normal. No regional wall motion abnormalities are  seen.     Right Ventricle  Right ventricular function, chamber size, wall motion, and thickness are  normal.     Mitral Valve  The mitral valve is normal. Trace mitral insufficiency is present.     Aortic Valve  Aortic valve is normal in structure and function.     Tricuspid Valve  The tricuspid valve is normal. Trace tricuspid insufficiency is present. The  right ventricular systolic pressure is approximated at 25.4 mmHg plus the  right atrial pressure. Pulmonary artery systolic pressure is normal.     Pulmonic Valve  The pulmonic valve cannot be assessed.     Vessels  Dilation of the inferior vena cava is present with abnormal respiratory  variation in diameter.     Pericardium  No pericardial effusion is present.     ______________________________________________________________________________  MMode/2D Measurements & Calculations  IVSd: 0.91 cm  LVIDd: 4.8 cm  LVIDs: 3.1 cm  LVPWd: 0.73 cm  FS: 35.1 %  LV mass(C)d: 128.8 grams  LV mass(C)dI: 74.0 grams/m2  asc Aorta Diam: 3.7 cm  RWT: 0.31     Doppler Measurements & Calculations  TR max driss: 252.0 cm/sec  TR max P.4 mmHg     ______________________________________________________________________________  Report approved by: Francia Hernandez 2022 12:26 PM         Hemoglobin   Result Value Ref Range    Hemoglobin 7.4 (L) 11.7 - 15.7 g/dL   Comprehensive metabolic panel   Result Value Ref Range    Sodium 153 (H) 136 - 145 mmol/L    Potassium 3.7 3.4 - 5.3 mmol/L    Creatinine 0.70 0.51 - 0.95 mg/dL    Urea Nitrogen 56.9 (H) 8.0 - 23.0 mg/dL    Chloride 126 (H) 98 - 107 mmol/L    Carbon Dioxide (CO2) 19 (L) 22 - 29 mmol/L     Anion Gap 8 7 - 15 mmol/L    Glucose 129 (H) 70 - 99 mg/dL    Calcium 8.0 (L) 8.8 - 10.2 mg/dL    Protein Total 4.8 (L) 6.4 - 8.3 g/dL    Albumin 2.5 (L) 3.5 - 5.2 g/dL    Bilirubin Total 1.0 <=1.2 mg/dL    Alkaline Phosphatase 105 (H) 35 - 104 U/L    AST 52 (H) 10 - 35 U/L    ALT 44 (H) 10 - 35 U/L    GFR Estimate 88 >60 mL/min/1.73m2   Hemoglobin   Result Value Ref Range    Hemoglobin 7.3 (L) 11.7 - 15.7 g/dL   Hemoglobin   Result Value Ref Range    Hemoglobin 7.2 (L) 11.7 - 15.7 g/dL   Lactate Dehydrogenase   Result Value Ref Range    Lactate Dehydrogenase 235 0 - 250 U/L   Bilirubin Direct and Total   Result Value Ref Range    Bilirubin Direct 0.35 (H) 0.00 - 0.30 mg/dL    Bilirubin Total 0.9 <=1.2 mg/dL   INR   Result Value Ref Range    INR 1.34 (H) 0.85 - 1.15   Partial thromboplastin time   Result Value Ref Range    aPTT 36 22 - 38 Seconds   Fibrinogen activity   Result Value Ref Range    Fibrinogen Activity 523 (H) 170 - 490 mg/dL   CBC with Platelets & Differential    Narrative    The following orders were created for panel order CBC with Platelets & Differential.  Procedure                               Abnormality         Status                     ---------                               -----------         ------                     CBC with platelets and d...[[id number]]  Abnormal            Final result                 Please view results for these tests on the individual orders.   CBC with platelets and differential   Result Value Ref Range    WBC Count 27.0 (H) 4.0 - 11.0 10e3/uL    RBC Count 2.36 (L) 3.80 - 5.20 10e6/uL    Hemoglobin 7.2 (L) 11.7 - 15.7 g/dL    Hematocrit 21.4 (L) 35.0 - 47.0 %    MCV 91 78 - 100 fL    MCH 30.5 26.5 - 33.0 pg    MCHC 33.6 31.5 - 36.5 g/dL    RDW 18.9 (H) 10.0 - 15.0 %    Platelet Count 368 150 - 450 10e3/uL    % Neutrophils 92 %    % Lymphocytes 2 %    % Monocytes 2 %    % Eosinophils 0 %    % Basophils 1 %    % Immature Granulocytes 3 %    NRBCs per 100 WBC 0 <1  /100    Absolute Neutrophils 25.1 (H) 1.6 - 8.3 10e3/uL    Absolute Lymphocytes 0.5 (L) 0.8 - 5.3 10e3/uL    Absolute Monocytes 0.5 0.0 - 1.3 10e3/uL    Absolute Eosinophils 0.0 0.0 - 0.7 10e3/uL    Absolute Basophils 0.1 0.0 - 0.2 10e3/uL    Absolute Immature Granulocytes 0.8 (H) <=0.4 10e3/uL    Absolute NRBCs 0.0 10e3/uL   Basic metabolic panel   Result Value Ref Range    Creatinine 0.68 0.51 - 0.95 mg/dL    Sodium 154 (H) 136 - 145 mmol/L    Potassium 2.9 (L) 3.4 - 5.3 mmol/L    Urea Nitrogen 46.5 (H) 8.0 - 23.0 mg/dL    Chloride 119 (H) 98 - 107 mmol/L    Carbon Dioxide (CO2) 20 (L) 22 - 29 mmol/L    Anion Gap 15 7 - 15 mmol/L    Glucose 129 (H) 70 - 99 mg/dL    GFR Estimate 89 >60 mL/min/1.73m2    Calcium 8.2 (L) 8.8 - 10.2 mg/dL   CBC with platelets   Result Value Ref Range    WBC Count 21.6 (H) 4.0 - 11.0 10e3/uL    RBC Count 2.38 (L) 3.80 - 5.20 10e6/uL    Hemoglobin 7.1 (L) 11.7 - 15.7 g/dL    Hematocrit 21.9 (L) 35.0 - 47.0 %    MCV 92 78 - 100 fL    MCH 29.8 26.5 - 33.0 pg    MCHC 32.4 31.5 - 36.5 g/dL    RDW 19.0 (H) 10.0 - 15.0 %    Platelet Count 353 150 - 450 10e3/uL   Magnesium   Result Value Ref Range    Magnesium 2.4 (H) 1.7 - 2.3 mg/dL   Phosphorus   Result Value Ref Range    Phosphorus 2.8 2.5 - 4.5 mg/dL       All relevant imaging and laboratory values personally reviewed.

## 2022-07-07 NOTE — PROGRESS NOTES
[facility], [place]   07/07/2022  Neurosurgery Progress Note:    Assessment:  Ms. Benítez is a 76-year-old female with PMHx myelodysplastic syndrome, seronegative RA, chronic microcytic anemia, PAD, and recent L SDH (5/12/2022) who arrived to [facility] via EMS after an unwitnessed fall at home.  Shortly after arriving, the patient was complaining of a significant headache and nausea, and then her right pupil dilated.  She was no longer protecting her airway, so she was sedated and intubated.  Her right eye pupil then returned to a normal caliber.  A head CT demonstrated a left acute subdural approximately 2.3 cm thick with 12 mm of rightward midline shift.  She was taken to the OR on 6/22 for emergent L craniotomy for SDH evacuation. She was extubated on POD1. Concerns for tube feed aspiration on 6/25.  She underwent emergent EGD by GI due to sharp drop in hemoglobin as well as melanotic stools.  She had 1 gastric ulcer clipped which was the presumed culprit.  Due to requiring the EGD she needed intubation during this time, therefore she was intubated on 7/4/2022 for EGD.  She was also connected to EEG on 7/4/2022 found to be in nonconvulsive status received Keppra load and Ativan and was started on Versed overnight.  On 7/5/2022, her Versed drip was weaned off with thoughts she was potentially not in nonconvulsive status epilepticus.    Other diagnoses include:  #Brain compression  #Open evacuation of hematoma on day of admission   #Hyponatremia  #Pulmonary edema    Plan:    Neuro:  #Concern for nonconvulsive status epilepticus  #Subdural hematoma  Repeat CT stable  Every hour neurochecks  Video EEG on-no evidence of seizures currently  Versed currently off  Keppra loaded 3.5 g, started on maintenance 1 g twice daily.  Now on 500 twice daily-potential to be stopped.    Cardiac:  #Hypotension (likely secondary to GI bleed)  Maps greater than 65  Levophed off, vasopressin off,   Flowtrack  connected  We will consider repeat echo    Pulm:  #Respiratory failure-intubated overnight (7/4/2021)  Currently on CMV AC settings-see below for most recent settings  CT PE negative    GI:  #Upper GI bleed with hemodynamic instability  #Esophageal ulcers  #Gastric ulcers  #Transaminitis  Gastric ulcer status post clipped on evening of 7/4/2022 by GI, GI continue to follow  IV Protonix 40 mg twice daily ordered  NG in place, continue to hold on PEG for now pending patient's clinical course.    Renal:  #Hypernatremia, hyperchloremia  #Uremia  LR at 100 cc an hour    Endo:  Monitor glucose levels  Stress dose steroids    Heme:  #Acute blood loss anemia  #History of myelodysplastic syndrome  Received 2 units of PRBCs on 7/4/2022, 2 units PRBCs available currently.  Every 6 hour hemoglobins until stable  Peripheral smear pending  Plan for EPO injection today  Subcutaneous heparin held    ID:  #Leukocytosis, afebrile currently, currently downtrending  Follow cultures      -----------------------------------  Bruce Graves MD  Neurosurgery Resident, PGY-3       Please contact neurosurgery resident on call with questions.    Dial * * [phone], enter [phone] when prompted.    -----------------------------------    Interval History: Off pressors. Following commands this AM.     Objective:   Temp:  [97  F (36.1  C)-98.5  F (36.9  C)] 97.8  F (36.6  C)  Pulse:  [62-91] 74  Resp:  [18-24] 18  BP: ()/(31-80) 122/49  MAP:  [53 mmHg-285 mmHg] 53 mmHg  Arterial Line BP: ()/() 76/38  FiO2 (%):  [30 %] 30 %  SpO2:  [93 %-98 %] 96 %  I/O last 3 completed shifts:  In: 3451.7 [I.V.:2781.7; NG/GT:425]  Out: 1355 [Urine:1055; Stool:300]    Gen: Appears comfortable, NAD, laying in bed, appearing older than stated age  HEENT: EEG in place  Wound: Incision closed with staples-now removed, incision appears clean dry-no signs of leaking, no wetness appreciated.  See below for photo from 7/5/2022 evening.  Incision cleaned with  ChloraPrep.  Pulm: Intubated, sedated, ET tube in place, on CMV AC settings  Neurologic:  Exam with Versed off  Eyes closed to noxious stimuli, Following commands, not tracking briskly, eyes at midline  Pupils equal round reactive to light 2 mm, reactive, at midline, no apparent gaze preference,  Positive cough, positive gag, positive VOR, positive corneals bilaterally  Localizing in bilateral upper extremities, brisk withdrawal in bilateral lower extremities  Negative Babinski bilaterally                    LABS:  Recent Labs   Lab 22  0313 22  1347 22  0326   * 153* 154*   POTASSIUM 2.9* 3.7 3.6   CHLORIDE 119* 126* 124*   CO2 20* 19* 20*   ANIONGAP 15 8 10   * 129* 138*   BUN 46.5* 56.9* 64.2*   CR 0.68 0.70 0.73   MENDEZ 8.2* 8.0* 7.9*       Recent Labs   Lab 22   WBC 21.6*   RBC 2.38*   HGB 7.1*   HCT 21.9*   MCV 92   MCH 29.8   MCHC 32.4   RDW 19.0*          IMAGING:  Recent Results (from the past 24 hour(s))   Echocardiogram Limited   Result Value    LVEF  60-65%    Narrative    [id number]  [id number]  [id number]  [id number]^TYLER^YAO^LAURENCE RICHMOND     [facility],[place]  Echocardiography Laboratory  [address] [place] [id number]     Name: ZAHEER WALLIS  MRN: [id number]  : 1945  Study Date: 2022 11:24 AM  Age: 76 yrs  Gender: Female  Patient Location: [facility]  Reason For Study: Shock  Ordering Physician: YAO SCOTT  Performed By: Marcella Buck     BSA: 1.7 m2  Height: 64 in  Weight: 152 lb  HR: 70  BP: 116/64 mmHg  ______________________________________________________________________________  Procedure  Limited Portable Echo Adult.  ______________________________________________________________________________  Interpretation Summary  Global and regional left ventricular function is normal with an EF of 60-65%.  Right ventricular function, chamber size, wall motion, and thickness are  normal.  Pulmonary artery  systolic pressure is normal.  Dilation of the inferior vena cava is present with abnormal respiratory  variation in diameter.  No pericardial effusion is present.  ______________________________________________________________________________  Left Ventricle  Global and regional left ventricular function is normal with an EF of 60-65%.  Left ventricular size is normal. No regional wall motion abnormalities are  seen.     Right Ventricle  Right ventricular function, chamber size, wall motion, and thickness are  normal.     Mitral Valve  The mitral valve is normal. Trace mitral insufficiency is present.     Aortic Valve  Aortic valve is normal in structure and function.     Tricuspid Valve  The tricuspid valve is normal. Trace tricuspid insufficiency is present. The  right ventricular systolic pressure is approximated at 25.4 mmHg plus the  right atrial pressure. Pulmonary artery systolic pressure is normal.     Pulmonic Valve  The pulmonic valve cannot be assessed.     Vessels  Dilation of the inferior vena cava is present with abnormal respiratory  variation in diameter.     Pericardium  No pericardial effusion is present.     ______________________________________________________________________________  MMode/2D Measurements & Calculations  IVSd: 0.91 cm  LVIDd: 4.8 cm  LVIDs: 3.1 cm  LVPWd: 0.73 cm  FS: 35.1 %  LV mass(C)d: 128.8 grams  LV mass(C)dI: 74.0 grams/m2  asc Aorta Diam: 3.7 cm  RWT: 0.31     Doppler Measurements & Calculations  TR max driss: 252.0 cm/sec  TR max P.4 mmHg     ______________________________________________________________________________  Report approved by: Francia Hernandez 2022 12:26 PM

## 2022-07-08 NOTE — CONSULTS
[facility]  Palliative Care Consultation Note    Patient: Louise Benítez  Date of Admission:  6/22/2022    Requesting Clinician / Team: Neuro-critical care  Reason for consult: Goals of care    Recommendations:  GOALS OF CARE/PALLIATIVE CARE ENCOUNTER    Care conference today with patient's  and 2 daughters. Family feel strongly that Louise would not find quality of life acceptable if she could not be independent and living at home. They feel she would not want to be in rehab for extended period of time, would not want multiple rehospitalizations or long term care (had a poor experience at rehab after last discharge and signed herself out after 1 week according to family). They do realize that she may have a new baseline after recovery and that some rehab will be needed. Family feels she enjoys eating and would not want to be tube feed dependent long term. Family are willing to continue restorative focus for now if there is a chance of her regaining function and quality of life, but are understandably concerned about the big picture for her as she has had 2 falls with traumatic injuries, subsequent rehospitalization and complications. The option of hospice was briefly discussed if at any point family feel that Louise is not going to be able to achieve those goals or find her quality of life unacceptable.    Code Status: Changed to  No CPR- Do NOT Intubate after FCC today. Family feel strongly she would not want these interventions due to concerns on her quality of life if she were to survive.    ADVANCE CARE PLANNING     Patient has a completed Health Care Directive: Yes    Health care agent: Donovan Mlies      Thank you for the opportunity to participate in the care of this patient and family. Our team: will continue to follow.     During regular M-F work hours -- if you are not sure who specifically to contact -- please contact us by sending a text page to our  team consult pager at [phone].    After regular work hours and on weekends/holidays, you can call our answering service at [phone]. Also, who's on call for us is available in [facility] Smart Web.     TRUN[facility]  Contact information available via [facility] Paging/Directory        Total time spent was 85 minutes,  >50% of time was spent counseling and/or coordination of care (55 minutes in care conference, 15 minutes in non face to face time and 15 minutes in meeting with family in patient's room).    Assessments:  Louise Benítez is a 76 year old female with PMHx myelodysplastic syndrome, seronegative RA, chronic microcytic anemia, PAD, and recent L SDH (5/12/2022) who was admitted from her TCU on 6/22/2022 after an unwitnessed fall. Was subsequently found to have an acute on chronic L SDH, a new acute R SDH, and increased midline shift w/ trace L uncal herniation now s/p L craniotomy w/ hematoma evacuation.    Today, the patient was seen for:  Acute on chronic L SDH s/p L craniotomy w/hematoma evacuation  Acute R SDH  Goals of care    Prognosis, Goals, & Planning:      Functional Status just prior to hospitalization: 3 (Capable of only limited self-care; needs help with ADLs; in bed/chair >50% of waking hours)       Prognosis, Goals, and/or Advance Care Planning were addressed today: Yes      Patient's decision making preferences: unable to assess          Patient has decision-making capacity today for complex decisions: No            I have concerns about the patient/family's health literacy today: No           Patient has a completed Health Care Directive: Yes, and on file.--in physical chart      Code status: Full Code      Coping, Meaning, & Spirituality:   Mood, coping, and/or meaning in the context of serious illness were addressed today: Yes  Summary/Comments: Family are understandably distressed at Louise's situation, the acute  "decompensation earlier this week, watching her struggle to try to get out of bed and extubate herself, etc. They are struggling with trying to honor her wishes and how she might feel if she comes out of this and finds her quality of life unacceptable.    Social:     Living situation: Lives at home with     Hazel family / caregivers:  Jd. 2 daughters--Allyson Jimenez. 6 grandchildren    Past-times: Enjoys being up at the cabin, glass of wine with friends, spending time with her grandchildren.    History of Present Illness:  History gathered today from: family/loved ones, medical chart, medical team members    Introduced the Palliative Care team model and services we provide including symptom management, assistance navigating chronic illness and goals for treatment, counseling, psychosocial and spiritual support to patient's family-- Jd, daughters Barbara and Allyson. Family recounted how Louise initially had a fall in May resulting in a \"brain bleed\" and hospitalization for 1 week. She was discharged to rehab but found it \"horrible\" and \"discharged herself in 1 week.\" She was able to go home with the support of her  Jd. They were getting home care. Jd escorted the home care RN to the door to leave when Louise fell. They are not sure what caused the fall. She was ambulating with a walker. Now she has been re-admitted with a new brain bleed but they were able to do craniotomy and evacuation of the bleed. She was extubated and at that time seemed to know who she was, but was constantly asking family to \"get me out of here\". They said she was trying to get out of bed. Even while intubated, was trying to pull out the breathing tube. They describe Louise as very independent, stubborn and very social. She had MDS but was not transfusion dependent, PMR, arthritis, with both hips replaced about 22 years ago. Other than that she had not had other hospitalizations and was independent and ambulatory " "without any assistive device. She was supposed to be stepped down from ICU when she acutely decompensated earlier this week. Family is concerned about how long she will need to stay in the hospital and how she will tolerate another rehab stay once discharged.    Planned care conference this afternoon with family, neurocritical care NP, NSG resident and unit SW. Myself and palliative  also present. Medical update provided by [facility]. Discussed events during this hospitalization and expectation to extubate today. Discussed that cause of GIB was found and no concern now for active bleeding. Family had concerns about next steps. Worry that Louise will be \"stuck in a place that she doesn't want and hate\" if she recovers. Concern about further complications, prolonged hospitalization, if she will recover ability to swallow, eat. Family state eating was one of her main pleasures. Daughter shared that MIL had had a CVA, was bed-ridden and hemiplegic as a result and how her mother has expressed she would never want that fate. Shared how their grandmother lived to 89 and was independent up until the end. Stressed importance to their mom that she would be able to live independently in her own home. Shared concern that if her future would be more falls, further decline, that their mother would not want that. Family shared concern that even what we are doing now may not be what she would want. Discussed the uncertain prognosis of neurologic recovery after traumatic injury such as this. Discussed that it may be possible that she could recover to independence, but that rehab would likely be weeks to month or more. Family recognize that she may be at a new baseline upon recovery and that some rehab will be needed. When asked, they felt ok with continuing restorative focus for now. Discussed limiting interventions if something emergent or unexpected were to happen. Discussed re-intubation and CPR. Family feel strongly she would " not want CPR--concerned that ribs would be broken, further anoxic brain injury, etc. They also feel she would not want re-intubation. Discussed that in the event she was DNI and she had respiratory distress that was not reversible the team would then make her comfortable at end of life. Family expressed wish to change code status to DNR/DNI as they feel this would reflect her wishes. Plan to extubate today and she how she recovers. Family will discuss PEG tube, as this may be the next decision point pending how she does with SLP. The option of hospice was briefly discussed as an option if going forward Louise is unable achieve the quality of life she would find acceptable or if further complications develop that would not allow Louise to meet her goals.      Key Palliative Symptom Data:  We are not helping to manage these symptoms currently in this patient.    ROS:  Unable to obtain as patient intubated     Past Medical History:  Past Medical History:   Diagnosis Date     Family history of malignant hyperthermia     Cousin     Rheumatoid arthritis (H)         Past Surgical History:  Past Surgical History:   Procedure Laterality Date     CRANIOTOMY Left 6/22/2022    Procedure: CRANIOTOMY LEFT for hematoma;  Surgeon: Ayden Harris MD;  Location: [facility] OR     IR LUMBAR EPIDURAL STEROID INJECTION  8/15/2012         Family History:  Family History   Problem Relation Age of Onset     Malignant Hyperthermia Cousin         Allergies:  No Known Allergies     Medications:  I have reviewed this patient's medication profile and medications from this hospitalization.   Noted:  Lacosamide 100 mg iv BID    Physical Exam:  Vital Signs: Temp: 98  F (36.7  C) Temp src: Axillary BP: (!) 178/84 Pulse: 72   Resp: 26 SpO2: 96 % O2 Device: Mechanical Ventilator    Weight: 158 lbs 0 oz  Gen: Lying in bed, intubated, in NAD  HEENT: incision s/p S/p L craniotomy  Resp: On vent  Msk: no gross deformity  Skin:  No jaundice  Neuro:  deferred      Data reviewed:  Recent imaging reviewed, my comments on pertinents:   EXAMINATION: CTA ABDOMEN PELVIS WITH CONTRAST, 7/7/2022 10:03 AM     TECHNIQUE:  Helical CT images from the lung bases through the  symphysis pubis were obtained without and with IV contrast utilizing  multiphasic technique. Contrast dose: 97 ml isovue 370      3-D reconstructions are not available at time of dictation     COMPARISON: 5/12/2022     HISTORY: bleeding? patient is anemic, has had 8u PRBC last 3 days, has  already been scoped     FINDINGS:     Liver: Punctate hypodensities within the right lobe of the liver are  too small to further characterize. Periportal edema..  Biliary system: Gallbladder is within normal limits. No intrahepatic  or extrahepatic biliary ductal dilatation.  Pancreas: 5 mm hypodensity in the head of the pancreas (series 13  image 219).  Stomach: Small sliding-type hiatal hernia. Gastric tube in the  stomach. Hemostasis clip noted near the gastric fundus..  Spleen: Subcentimeter splenic hypodensity likely represents hemangioma  or proteinaceous cyst.  Adrenal glands: Within normal limits.  Kidneys: Simple appearing bilateral renal cysts. Slightly ectopic  location of the right kidney. Distal ureters not visualized due to  bilateral total hip prostheses. No hydronephrosis..  Bladder: Vasquez catheter in place..  Reproductive organs: 1.4 cm right ovarian cyst (series 13 image 350)  Limited evaluation of the pelvic organs due to streak artifact caused  by hip prostheses.  Colon: Rectal tube in place. Grossly limited evaluation of the sigmoid  colon due to streak artifact.  Appendix: Within normal limits.  Small Bowel: Within normal limits.  Lymph nodes: No intra-abdominal or pelvic lymphadenopathy.  Vasculature: Abdominal aorta and major branches appear patent.  Aortobiiliac atherosclerosis. There is one renal artery bilaterally  with mild-to-moderate stenosis and mild stenosis on the left secondary  to  atherosclerotic plaque at the aortic origin slightly diminutive mid  right renal artery. Abdominal aorta is normal in caliber.  Approximately 50% stenosis of the right common iliac artery at its  origin and approximately 75% stenosis of the left common iliac artery  near its origin. Additional approximate 50% stenosis of the mid left  common iliac artery. Severe near total occlusion of the bilateral  internal iliac arteries proximally. Near total occlusion of the  bilateral common femoral vessels secondary to atherosclerosis  Peritoneum: No intraperitoneal free air. Trace perihepatic ascites.  Left inguinal hernia containing a loop of sigmoid colon.      Lung bases: Trace bilateral effusions and associated atelectasis..     Bones and soft tissues: No suspicious soft tissue mass or fluid  collection. Multiple subacute appearing nondisplaced anterior rib  fractures. Unchanged appearance of L1 vertebral body compression  fracture with approximately 80% height loss. Similar less profound  compression deformities in L3 and L5 are also unchanged from MRI  1/14/2020. Age indeterminate compression deformity of T10 with  approximately 30% height loss. Subacute appearing right sacral  insufficiency fracture and fracture of the left pubic ramus new since  5/12/2022.                                                                      IMPRESSION:   1. No evidence of gastrointestinal bleed as questioned.  2. Interval new subacute appearing right sacral insufficiency fracture  and subacute appearing left pubic ramus fracture since 5/12/2022.  3. Age-indeterminate compression deformity of T10 with approximately  30% height loss. Multiple subacute appearing anterior rib fractures,  and unchanged appearance of L1, L3, and L5 compression deformities.  4. Small sliding-type hiatal hernia with hemostasis clip noted in the  gastric fundus.  5. Aortobiiliac atherosclerotic atherosclerosis with multiple foci of  arterial stenosis  including the renal arterial origins, common iliac  vessels, and near total occlusions of the proximal internal iliac  arteries and common femoral arteries proximally.  6. Unchanged appearance of left inguinal hernia containing a loop of  sigmoid colon, no signs of obstruction.  7. 5 mm hypodensity in the pancreatic head may represent a sidebranch  IPMN although MRI is recommended to further characterize on an  outpatient basis, pancreatic cystic follow-up is recommended below.  Per [facility] criteria.  8. Indeterminate hepatic hypodensities are too small to further  characterize but could be evaluated further at time of MRI for above  pancreatic lesion.  9. 1.4 cm right ovarian cyst for which annual follow-up is recommended  in a postmenopausal female.  10. Trace effusions and associated atelectasis.    Recent lab data reviewed, my comments on pertinents:   Na 152  K 3.9  BUN 33.7 (trending down)  Cr 0.54  Ca 8.2  WBC 12.7  Hgb 6.8->8.5  Plt 352k

## 2022-07-08 NOTE — PLAN OF CARE
Major shift events: Critical lab Hgb 6.8. 1 unit RBCs transfused. No reaction noted.    NEURO: Inconsistently follows simple commands in all extremities. - wiggles toes in both extremities, can hold both arms up, blink, and slowly track with eyes. Pupils equal/reactive. EEG in place.   CARDIAC: Normal SR. Normotensive.   PULM: Tolerating PS all night. 30% FiO2, RR 18-24, PEEP 5 PS 7. Frequent oral and ETT suctioning/ Tan thick secretions. Afebrile.   GI: Watery dark liquid stool. Rectal tube in place. TF at goal 45mL/hr. FWF 100mL Q4.   : Vasquez in place. Adequate output.   Skin: Skin tears throughout. Dressings in place. Redness arount rectum.  LABS: Hgb 6.8. Na 145.  LINES: L CVC. R PIV.   MEDS: LR@ 100mL/hr.     Plan: Possible extubation today. Care conference today.     Jo Pickard RN on 7/8/2022 at 6:48 AM

## 2022-07-08 NOTE — PLAN OF CARE
Problem: Plan of Care - These are the overarching goals to be used throughout the patient stay.    Goal: Absence of Hospital-Acquired Illness or Injury  Intervention: Prevent Infection  Recent Flowsheet Documentation  Taken 7/8/2022 1400 by Robby Espinoza RN  Infection Prevention: environmental surveillance performed  Taken 7/8/2022 1200 by Robby Espinoza, RN  Infection Prevention: environmental surveillance performed  Taken 7/8/2022 1000 by Robby Espinoza RN  Infection Prevention: environmental surveillance performed  Taken 7/8/2022 0800 by Robby Espinoza RN  Infection Prevention: environmental surveillance performed   Goal Outcome Evaluation:        D.awake alert follows commands - at times does not follow- up out of bedwith assist- vss bp higher with prn labetalol given- afebrile not on suppert - vent at cpap wnl- sats 98%- color good-  A. Extubated to nasal cannula- suction oral - cont. on antibiotics- vss-  P cont to monitor S/S of infection.

## 2022-07-08 NOTE — PROGRESS NOTES
Family Meeting Note          Those present from the family included: Maria Del Carmen (daughter) Allyson (daughter) Jd ()     Those present from the care team included: Neurosurgery, NeuroCrit, and Palliative    The purpose of the family meeting was to: Give medical update and discuss goals of care    The following was discussed: Team started out by giving medical update for family. Team stated that patient was recovering well, and then Monday she had a set back when she had a GI bleed, but medical teams were able to resolve and patient has made good progress over the last four days, and that they could extubate patient today. Family stated that the reason they wanted to have this meeting today was because patient has had conversations with them that she would not want feeding tubes or long term hospitalization, but would be ok with some things if it meant she could return to baseline living independently at home. Family wants to honor her wishes. Team stated that at this time it is unknown what what her full recovery will be, but she is making progress in the right direction. Family had many good questions, team was able to provide support. Code status was discussed and family decided to make patient DNR/DNI. The plan will be to extubate patient today. Team also discussed PEG placement, and that if she were to fail her Swallow Study that she would likely need a PEG. Family wanted to discuss that together before they would decide on PEG after swallow study.     Code status was confirmed as: DNR/DNI     A follow-up family meeting is scheduled for: No follow up meeting scheduled at this time.     Shona Kwok RN Care Coordinator   [facility]  [phone]

## 2022-07-08 NOTE — PROGRESS NOTES
[facility], [place]   07/08/2022  Neurosurgery Progress Note:    Assessment:  Ms. Benítez is a 76-year-old female with PMHx myelodysplastic syndrome, seronegative RA, chronic microcytic anemia, PAD, and recent L SDH (5/12/2022) who arrived to [facility] via EMS after an unwitnessed fall at home.  Shortly after arriving, the patient was complaining of a significant headache and nausea, and then her right pupil dilated.  She was no longer protecting her airway, so she was sedated and intubated.  Her right eye pupil then returned to a normal caliber.  A head CT demonstrated a left acute subdural approximately 2.3 cm thick with 12 mm of rightward midline shift.  She was taken to the OR on 6/22 for emergent L craniotomy for SDH evacuation. She was extubated on POD1. Concerns for tube feed aspiration on 6/25.  She underwent emergent EGD by GI due to sharp drop in hemoglobin as well as melanotic stools.  She had 1 gastric ulcer clipped which was the presumed culprit.  Due to requiring the EGD she needed intubation during this time, therefore she was intubated on 7/4/2022 for EGD.  She was also connected to EEG on 7/4/2022 found to be in nonconvulsive status received Keppra load and Ativan and was started on Versed overnight.  On 7/5/2022, her Versed drip was weaned off with thoughts she was potentially not in nonconvulsive status epilepticus.    Other diagnoses include:  #Brain compression  #Open evacuation of hematoma on day of admission   #Hyponatremia  #Pulmonary edema    Plan:    Neuro:  #Concern for nonconvulsive status epilepticus  #Subdural hematoma  Repeat CT stable  Every hour neurochecks  Video EEG on for now  Versed currently off  Vimpat 100mg BID initiated    Cardiac:  #Hypotension (likely secondary to GI bleed)  Maps greater than 65  Levophed off, vasopressin off,   Flowtrack connected  We will consider repeat echo    Pulm:  #Respiratory failure-intubated overnight (7/4/2021)  CMV AC  settings-see below for most recent settings  CT PE negative  PST all night.     GI:  #Upper GI bleed with hemodynamic instability  #Esophageal ulcers  #Gastric ulcers  #Transaminitis  Gastric ulcer status post clipped on evening of 7/4/2022 by GI, GI continue to follow  IV Protonix 40 mg twice daily   NG in place, continue to hold on PEG for now pending patient's clinical course.    Renal:  #Hypernatremia, hyperchloremia  #Uremia  LR at 100 cc an hour    Endo:  Monitor glucose levels  Stress dose steroids    Heme:  #Acute blood loss anemia  #History of myelodysplastic syndrome  Received 2 units of PRBCs on 7/4/2022, 2 units PRBCs available currently.  Required 1U overnight for Hgb 6.8.   Every 6 hour hemoglobins until stable  Peripheral smear pending  Plan for EPO injection today  Subcutaneous heparin held    ID:  Ancef for MSSA in sputum      -----------------------------------  Bruce Graves MD  Neurosurgery Resident, PGY-3       Please contact neurosurgery resident on call with questions.    Dial * * *[phone], enter [phone] when prompted.    -----------------------------------    Interval History: NAEO. Required 1U for PRBC this AM for hemoglobin 6.8     Objective:   Temp:  [98  F (36.7  C)-99  F (37.2  C)] 98.1  F (36.7  C)  Pulse:  [63-90] 70  Resp:  [18-26] 18  BP: (109-162)/() 144/66  FiO2 (%):  [30 %] 30 %  SpO2:  [94 %-98 %] 96 %  I/O last 3 completed shifts:  In: 5131 [I.V.:2616; NG/GT:1560]  Out: 2215 [Urine:1365; Stool:850]    Gen: Appears comfortable, NAD, laying in bed, appearing older than stated age  HEENT: EEG in place  Wound: Incision closed with staples-now removed, incision appears clean dry-no signs of leaking, no wetness appreciated.  See below for photo from 7/5/2022 evening.  Incision cleaned with ChloraPrep.  Pulm: Intubated, sedated, ET tube in place, on CMV AC settings  Neurologic:  Exam with Versed off  Eyes closed to noxious stimuli, Following commands, not tracking briskly, eyes at  midline  Pupils equal round reactive to light 2 mm, reactive, at midline, no apparent gaze preference,  Positive cough, positive gag, positive VOR, positive corneals bilaterally  Localizing in bilateral upper extremities, brisk withdrawal in bilateral lower extremities  Negative Babinski bilaterally                    LABS:  Recent Labs   Lab 07/08/22  0126 07/07/22  2107 07/07/22  1259 07/07/22  0313 07/06/22  1347     145 145  --  154* 153*   POTASSIUM 3.7 3.7 3.4 2.9* 3.7   CHLORIDE 119*  --   --  119* 126*   CO2 20*  --   --  20* 19*   ANIONGAP 6*  --   --  15 8   *  --   --  129* 129*   BUN 37.3*  --   --  46.5* 56.9*   CR 0.62  --   --  0.68 0.70   MENDEZ 8.3*  --   --  8.2* 8.0*       Recent Labs   Lab 07/08/22  0126   WBC 12.7*   RBC 2.31*   HGB 6.8*   HCT 21.8*   MCV 94   MCH 29.4   MCHC 31.2*   RDW 18.9*          IMAGING:  Recent Results (from the past 24 hour(s))   CTA Abdomen Pelvis with Contrast    Narrative    EXAMINATION: CTA ABDOMEN PELVIS WITH CONTRAST, 7/7/2022 10:03 AM    TECHNIQUE:  Helical CT images from the lung bases through the  symphysis pubis were obtained without and with IV contrast utilizing  multiphasic technique. Contrast dose: 97 ml isovue 370     3-D reconstructions are not available at time of dictation    COMPARISON: 5/12/2022    HISTORY: bleeding? patient is anemic, has had 8u PRBC last 3 days, has  already been scoped    FINDINGS:    Liver: Punctate hypodensities within the right lobe of the liver are  too small to further characterize. Periportal edema..  Biliary system: Gallbladder is within normal limits. No intrahepatic  or extrahepatic biliary ductal dilatation.  Pancreas: 5 mm hypodensity in the head of the pancreas (series 13  image 219).  Stomach: Small sliding-type hiatal hernia. Gastric tube in the  stomach. Hemostasis clip noted near the gastric fundus..  Spleen: Subcentimeter splenic hypodensity likely represents hemangioma  or proteinaceous  cyst.  Adrenal glands: Within normal limits.  Kidneys: Simple appearing bilateral renal cysts. Slightly ectopic  location of the right kidney. Distal ureters not visualized due to  bilateral total hip prostheses. No hydronephrosis..  Bladder: Vasquez catheter in place..  Reproductive organs: 1.4 cm right ovarian cyst (series 13 image 350)  Limited evaluation of the pelvic organs due to streak artifact caused  by hip prostheses.  Colon: Rectal tube in place. Grossly limited evaluation of the sigmoid  colon due to streak artifact.  Appendix: Within normal limits.  Small Bowel: Within normal limits.  Lymph nodes: No intra-abdominal or pelvic lymphadenopathy.  Vasculature: Abdominal aorta and major branches appear patent.  Aortobiiliac atherosclerosis. There is one renal artery bilaterally  with mild-to-moderate stenosis and mild stenosis on the left secondary  to atherosclerotic plaque at the aortic origin slightly diminutive mid  right renal artery. Abdominal aorta is normal in caliber.  Approximately 50% stenosis of the right common iliac artery at its  origin and approximately 75% stenosis of the left common iliac artery  near its origin. Additional approximate 50% stenosis of the mid left  common iliac artery. Severe near total occlusion of the bilateral  internal iliac arteries proximally. Near total occlusion of the  bilateral common femoral vessels secondary to atherosclerosis  Peritoneum: No intraperitoneal free air. Trace perihepatic ascites.  Left inguinal hernia containing a loop of sigmoid colon.     Lung bases: Trace bilateral effusions and associated atelectasis..    Bones and soft tissues: No suspicious soft tissue mass or fluid  collection. Multiple subacute appearing nondisplaced anterior rib  fractures. Unchanged appearance of L1 vertebral body compression  fracture with approximately 80% height loss. Similar less profound  compression deformities in L3 and L5 are also unchanged from MRI  1/14/2020. Age  indeterminate compression deformity of T10 with  approximately 30% height loss. Subacute appearing right sacral  insufficiency fracture and fracture of the left pubic ramus new since  5/12/2022.      Impression    IMPRESSION:   1. No evidence of gastrointestinal bleed as questioned.  2. Interval new subacute appearing right sacral insufficiency fracture  and subacute appearing left pubic ramus fracture since 5/12/2022.  3. Age-indeterminate compression deformity of T10 with approximately  30% height loss. Multiple subacute appearing anterior rib fractures,  and unchanged appearance of L1, L3, and L5 compression deformities.  4. Small sliding-type hiatal hernia with hemostasis clip noted in the  gastric fundus.  5. Aortobiiliac atherosclerotic atherosclerosis with multiple foci of  arterial stenosis including the renal arterial origins, common iliac  vessels, and near total occlusions of the proximal internal iliac  arteries and common femoral arteries proximally.  6. Unchanged appearance of left inguinal hernia containing a loop of  sigmoid colon, no signs of obstruction.  7. 5 mm hypodensity in the pancreatic head may represent a sidebranch  IPMN although MRI is recommended to further characterize on an  outpatient basis, pancreatic cystic follow-up is recommended below.  Per [facility] criteria.  8. Indeterminate hepatic hypodensities are too small to further  characterize but could be evaluated further at time of MRI for above  pancreatic lesion.  9. 1.4 cm right ovarian cyst for which annual follow-up is recommended  in a postmenopausal female.  10. Trace effusions and associated atelectasis.    [facility] recommendations for asymptomatic pancreatic  cysts, modified from international consensus guidelines*   Size of largest cyst:   Less than 1 cm: Follow-up imaging in 6 months to 1 year, then lengthen  interval to 2-3 years if no change.  1-2 cm: Follow-up imaging at 6 months, then  yearly for 2 years, then  lengthen interval if no change.   The optimal initial study or first follow-up exam is MRI performed  with intravenous gadolinium contrast to allow full cyst  characterization. Once characterized, future follow-up MRIs can be  performed without contrast. If MRI is contraindicated, CT with  contrast is recommended.   GI consultation for possible endoscopic ultrasound is recommended for  cysts with the following features: Size > 2 cm, >20% growth on  followup, wall thickening or enhancement, mural nodule, duct > 5 mm,  or abrupt change in duct caliber with distal atrophy. GI or surgical  consultation is also recommended for symptomatic cysts.   *Reference: International Consensus Guidelines for Management of IPMN  and MCN of the pancreas. Pancreatology: 12:(2012); 183-197.    I have personally reviewed the examination and initial interpretation  and I agree with the findings.    ILIANA JEFFERSON MD         SYSTEM ID:  [id number]   XR Abdomen Port 1 View    Narrative    EXAM: XR ABDOMEN PORT 1 VIEWS  7/7/2022 1:34 PM     HISTORY:  Verify small bowel feeding tube bedside placement       COMPARISON:  Same day CTA abdomen and pelvis    FINDINGS: Supine radiograph of the abdomen. Radiopaque clip overlying  the stomach. Feeding tube coursing through the stomach with the tip in  the proximal jejunum. Enteric tube with tip and side-port overlying  the stomach. Nonspecific, nonobstructive bowel gas pattern. No  pneumatosis or portal venous gas. Contrast visualized in bilateral  renal collecting systems from the earlier exam, a contrast-enhanced  CT. Small bilateral pleural effusions. No acute osseous abnormality.  Scoliosis and degenerative changes. Partially visualized bilateral  total hip arthroplasties. Pelvic phleboliths. Extensive  atherosclerosis of the abdominal aorta.      Impression    IMPRESSION:   1. Feeding tube with tip in the proximal jejunum.   2. Enteric tube with tip and side-port overlying  the stomach.  3. Non obstructive bowel gas pattern.  4. Small bilateral pleural effusions.    I have personally reviewed the examination and initial interpretation  and I agree with the findings.    ROYA EDMOND MD         SYSTEM ID:  [id number]

## 2022-07-08 NOTE — PROGRESS NOTES
CLINICAL NUTRITION SERVICES - BRIEF NOTE    Nutrition Prescription    RECOMMENDATIONS FOR MDs/PROVIDERS TO ORDER:  - none additional from full assessment     Recommendations already ordered by Registered Dietitian (RD):  - RD changed orders to reflect current access position: NJT     Future/Additional Recommendations:  - Tolerance of EN via current access/NJT     Pt had small bore ppFT placed 7/8. FT is in jejunum.     Nutrition Progress Note - f/u for progress towards previous nutrition POC (see previous reassessment for details)     Jyoti Xavier, DASH, LD, [facility]  Neuro ICU  Pager: [phone]

## 2022-07-08 NOTE — PROGRESS NOTES
Neurocritical Care Progress Note    Reason for critical care admission: Bilateral SDH  Admitting Team: TAYLOR  Date of Service:  07/08/2022  Date of Admission:  6/22/2022  Hospital Day: 17    Assessment/Plan  Louise Benítez is a 76 year old female w/ PMHx myelodysplastic syndrome, seronegative RA, chronic microcytic anemia, PAD, and recent L SDH (5/12/2022) who was admitted from her TCU on 6/22/2022 after an unwitnessed fall. Was subsequently found to have an acute on chronic L SDH, a new acute R SDH, and increased midline shift w/ trace L uncal herniation now s/p L craniotomy w/ hematoma evacuation.    24 hour events:  Transfused overnight with 1 unit PRBC for Hgb < 7. Watery liquid stools, rectal tube in place. Possible extubation today. Care conference today.    Neuro  #Acute on chronic L SDH + with acute R SDH w/ increased rightward midline shift and trace L uncal herniation s/p L craniotomy (6/22) w/ hematoma evacuation  #Recent L SDH 2/2 mechanical fall not requiring intervention (hospitalized 5/12-5/20/2022)  -Neurochecks q2hr  -HOB > 30   -MAP > 65 mmHg  -PT/OT/SLP when appropriate  -Care conference today  -Palliative Care consult      #Concern for non-convulsive status epilepticus  -Discontinue vEEG  -Vimpat 100 mg BID    #Metabolic encephalopathy likely 2/2 uremia   #Delirium  -Delirium precautions     #Analgesics & sedation  -Discontinue scheduled Tylenol 975 mg Q6h-->change to Q6h PRN     CV  #HTN on admission  #Hypotension - resolved  -Cardiac monitoring  -MAP > 65 mmHg  -Holding Amlodipine 5 mg - will assess if to restart later today  -Stop Hydrocortisone  -Discontinue Flotrac   -Echo 6/24: EF 60-65%, normal RV, no significant valvular abnormalities  -Echo limited on 7/6 unchanged      #PAD  No statin, recent LDL 10 (5/13/2022)     Resp  #Former tobacco use  #Acute hypoxic respiratory failure - intubated 07/04  -Vent settings: CPAP/PS 7/5  -Continue PST  -Continuous pulse ox  -Maintain O2 saturations  greater than 92%, avoid hyperoxia  -Wean to extubate  -Diurese w/ 20mg IV furosemide      GI  #Hx GERD   #Melena   #Esophageal ulcers  #Gastric ulcers in the GE junction, anterior wall, posterior wall (received one clipping)  #Transaminitis ~ improving   Diet: TF,  Q1h  Last BM: 7/8, rectal tube   -Protonix IV 40 mg BID  -NG, OG in place  Bowel regimen: PRN senna-docusate and Miralax  -with being more awake will hold off on surgery consult for PEG       Renal/  #Hyperchloremia; 119  #Hypermagnesemia; 2.5  -Daily BMP; repeat electrolytes this afternoon post lasix   -IV fluids: TKO Lactated Ringers   -Electrolyte replacement protocol     #Uremia - improving   -Baseline BUN 10-20 on previous admissions, around baseline on admit  -Suspect secondary to GI bleeding     Endo  #Stress hyperglycemia   -Hgb A1c 5.5% 6/22   -Monitor glucose levels     Rheum  #Seronegative Rheumatoid Arthritis  -Continue PTA Plaquenil 400mg at bedtime      Heme/Onc  #Acute blood loss anemia  #Chronic Microcytic Anemia, likely 2/2 myelodysplastic syndrome  baseline Hgb ~8.0  -PTA Darbepoetin Inj 60mg Q3wks (last dose 6/14; next due 7/5)  -PTA Vit B12 500 mcg daily  -Daily CBC  -Hgb goal > 7 in acute period post-surgery, plt goal > 50k  -Transfuse to meet Hgb and plt goals  -Received unit of PRBCs overnight 7/8  -Peripheral smear pending       #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  -Outside oncology records from [facility] obtained - need to review     ID  #Leukocytosis - improving  #Aspiration pneumonia  -4+ S.aureus in sputum, MRSA swab negative  -Ancef 2 g Q8h x 5 days   -Daily CBC  -Monitor temperature curve     MSK  #Chronic Back Pain w/ lumbar spondylosis and hx of lumbar compression fracture  #Polymyalgia Rheumatica w/hx of GCA  #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, 2003)     #Osteoporosis  -Hold PTA Denosumab Inj 60mg  -Hold Ca/Vit D3     #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 Upon evaluation, this patient had a high probability of imminent or life-threatening deterioration due to a SDH with brain compression s/p L craniotomy for hematoma evacuation, and acute hypoxic respiratory failure, which required my direct attention, intervention, and personal management. Greater than 50% of my time was spent at the bedside counseling the patient and/or coordinating care regarding services listed in this note.    The patient was seen and discussed with the NCC attending, Dr. Jones.    Ciera CHOU CNP  NeuroCritical Care Nurse Practitioner  Pager: [phone]  Ascom: *[phone] available M-Brar [phone] to [phone]    Physician Attestation     Louise was seen and evaluated by me on 07/08/2022.  She was in critical condition as the result of acute hypotension, acute encephalopathy of undetermined source, acute blood loss anemia, hypoxia. Her condition is now Critical.     Significant changes in status since my last evaluation include worsening BUN levels, depressed mental functions       I have reviewed changes in critical data from the last 24 hours, including medications, laboratory results, vital signs and radiograph results.      The acute issues managed by me today include uremia, seizure evaluation, cortical irritability management, melena, GI bleed?, acute hypoxic respiratory failure     I formulated and discussed my care plan with the patient s Advanced Practice Provider     I discussed the course  and plan with the Bedside Nurse and Primary team and answered all questions to the best of my ability.     Total Critical Care time spent by me, excluding procedures, was 50 minutes     Jus Jones MD  Neurocritical Care  Vascular Neurology  Text Page - [phone]    24 Hour Vital Signs Summary:  Temp: 98.1  F (36.7  C) Temp  Min: 98  F (36.7  C)  Max: 99  F (37.2  C)  Resp: 18 Resp  Min: 18  Max: 26  SpO2: 96 % SpO2  Min: 94 %  Max: 98 %  Pulse: 70 Pulse  Min: 63  Max: 90   BP: (!) 144/66 Systolic (24hrs), Av , Min:109 , Max:162   Diastolic (24hrs), Av, Min:44, Max:109      Respiratory monitoring:   Vent Mode: CPAP/PS  (Continuous positive airway pressure with Pressure Support)  FiO2 (%): 30 %  Resp Rate (Set): 18 breaths/min  Tidal Volume (Set, mL): 400 mL  PEEP (cm H2O): 5 cmH2O  Pressure Support (cm H2O): 7 cmH2O  Resp: 18      I/O last 3 completed shifts:  In: 5131 [I.V.:2616; NG/GT:1560]  Out: 2215 [Urine:1365; Stool:850]    Current Medications:    acetaminophen  975 mg Oral or Feeding Tube Q6H     [Held by provider] amLODIPine  5 mg Oral or Feeding Tube QPM     ceFAZolin  2 g Intravenous Q8H     cyanocobalamin  500 mcg Oral Daily     [Held by provider] heparin ANTICOAGULANT  5,000 Units Subcutaneous Q8H     hydrocortisone sodium succinate PF  50 mg Intravenous Q6H     hydroxychloroquine  400 mg Oral or Feeding Tube At Bedtime     lacosamide (VIMPAT) intermittent infusion  100 mg Intravenous BID     [Held by provider] melatonin  10 mg Oral or Feeding Tube Daily     multivitamins w/minerals  15 mL Per Feeding Tube Daily     pantoprazole (PROTONIX) IV  40 mg Intravenous BID     polyethylene glycol  17 g Oral Daily     potassium & sodium phosphates  1 packet Oral or Feeding Tube Q4H     protein modular  1 packet Per Feeding Tube BID 09 12     sodium chloride (PF)  3 mL Intracatheter Q8H       PRN Medications:  albuterol, bisacodyl, hydrALAZINE, labetalol, naloxone **OR** naloxone **OR** naloxone **OR**  "naloxone, ondansetron **OR** ondansetron, senna-docusate, sodium chloride (PF)    Infusions:    lactated ringers 100 mL/hr at 07/08/22 0600       No Known Allergies    Physical Examination:  Vitals: BP (!) 144/66   Pulse 70   Temp 98.1  F (36.7  C) (Axillary)   Resp 18   Ht 1.626 m (5' 4.02\")   Wt 71.7 kg (158 lb)   SpO2 96%   BMI 27.11 kg/m    General: Adult female patient, lying in bed, NAD   HEENT: Normocephalic, atraumatic, no icterus, oral cavity/oropharynx pink and moist  Cardiac: RRR, s1/s2 auscultated without murmur, S3/S4  Chest: CTAB, unlabored, expansion symmetric, no retractions or use of accessory muscles  Abdomen: Soft, non-distended, bowel sounds present  Extremities: Warm, no edema, distal pulses +2, well perfused  Skin: No rash or lesion  Psych: Calm  Neuro:  Mental status: Drowsy, opens eyes spontaneously and to voice, inconsistently following commands, intubated.   Cranial nerves: PERRL, conjugate gaze, EOMI, pupils +3 round and reactive, facial sensation intact, shoulder shrug strong, cough and gag present.  Motor: Normal bulk and tone. No abnormal movements. 4/5 strength in 4/4 extremities.   Sensory: Intact to light touch x 4 extremities  Coordination: KELLY, deferred.   Gait: KELLY, deferred.      Labs:  Recent Labs   Lab 07/08/22  0126 07/07/22  2107 07/07/22  1259 07/07/22  0313 07/06/22  1839 07/06/22  1347 07/06/22  0326 07/05/22  0342 07/04/22  1936     145 145  --  154*  --  153* 154*   < >  --    POTASSIUM 3.7 3.7 3.4 2.9*  --  3.7 3.6   < >  --    CHLORIDE 119*  --   --  119*  --  126* 124*   < >  --    CO2 20*  --   --  20*  --  19* 20*   < >  --    BUN 37.3*  --   --  46.5*  --  56.9* 64.2*   < >  --    CR 0.62  --   --  0.68  --  0.70 0.73   < >  --    MENDEZ 8.3*  --   --  8.2*  --  8.0* 7.9*   < >  --    BILITOTAL  --   --   --   --  0.9 1.0  --   --  0.7   ALKPHOS  --   --   --   --   --  105*  --   --  200*   ALT  --   --   --   --   --  44*  --   --  110*   AST  --   --  "  --   --   --  52*  --   --  126*    < > = values in this interval not displayed.       Recent Labs   Lab 07/08/22  0126 07/07/22  2107 07/07/22  1615 07/07/22  1259 07/07/22  0313 07/06/22  1839 07/06/22  1347 07/06/22  0326   WBC 12.7*  --   --   --  21.6* 27.0*  --  39.2*   HGB 6.8* 7.1* 7.1* 7.2* 7.1* 7.2*  7.2*   < > 7.1*  7.1*     --   --   --  353 368  --  390    < > = values in this interval not displayed.       Recent Labs   Lab 07/04/22  1637   PH 7.45   PCO2 32*   PO2 76*   HCO3 22       All cultures:  Recent Labs   Lab 07/05/22  0906 07/04/22 2044 07/04/22 2029   CULTURE 2+ Normal panda  4+ Staphylococcus aureus* No growth after 3 days No growth after 3 days       Imaging:    All relevant imaging and laboratory values personally reviewed.

## 2022-07-08 NOTE — PROGRESS NOTES
[facility], Procedure Note          Extubation:       Louise Benítez  MRN# [id number]   July 8, 2022         Patient extubated at: July 8, 2022, 2:14 PM   Supplemental Oxygen: Via nasal cannula at 3 liters per minute   Cough: The cough is weak   Secretion Mode: Able to clear  PRN suction with assistance   Secretion Amount: Small amount, moderately thick and clear in color   Respiratory Exam:: Breath sounds: diminished     Location: bilaterally   Skin Exam:: Patient color: pink   Patient Status: Currently appears comfortable   Arterial Blood Gasses: pH Arterial (no units)   Date Value   07/04/2022 7.45     pO2 Arterial (mm Hg)   Date Value   07/04/2022 76 (L)     pCO2 Arterial (mm Hg)   Date Value   07/04/2022 32 (L)     Bicarbonate Arterial (mmol/L)   Date Value   07/04/2022 22            Recorded by Denis Johnson, RT

## 2022-07-08 NOTE — PROGRESS NOTES
SPIRITUAL HEALTH SERVICES  SPIRITUAL ASSESSMENT Progress Note (Palliative Focus)  [facility] ([place]) 4A    REFERRAL SOURCE: Palliative care consult, family care conference    Attended care conference with pt Louise's  Jd and daughters, Allyson and Maria Del Carmen. IDT gave medical update and discussed next steps of care. Maria Del Carmen and Allyson tearfully spoke about how they worry about Louise's quality of life during this course in the hospital and the future need for rehab. Family expressed how difficult it is emotionally to see Louise struggling in the ICU.  The family agreed that Louise greatly values her independence and that should be an important consideration in future decisions as they see how Louise's recovery progresses. The IDT acknowledged the emotional stress and difficulty of the current situation and explained that with the type of brain injury Louise experienced it is difficult to know what to anticipate in terms of length of recovery, but that it would involve at a minimum a month of rehab to recover her strength.      CODE STATUS: Family all agreed that Louise would not want to be re-intubated again if she fails extubation and that Louise would not want CPR in her current state. Team agreed that is an appropriate decision for family to make to support Louise's wishes.    Jd expressed gratitude for the care and communication they have been receiving from staff. No specific spiritual needs were brought up during the conference.    Plan: Will continue to follow pt while palliative care is consulted. Plan to check in with family early next week.    Corey Hickey  Palliative Chaplain Resident  Pager [phone]    [facility] Palliative Care Inpatient Team Consult pager [phone] (M-F 8-4:30)  After-hours Answering Service [phone]

## 2022-07-09 NOTE — PLAN OF CARE
Major Shift Events:  Completed Q4 hr neuros. Inconsistently follows commands. Does not speak. Pupils equal and reactive, doesn't track. Moves all extremities purposefully. Denies pain. NSR 70s-90s, no ectopy. SBP < 160 without interventions. Afebrile. 2L NC, coarse lung sounds, weak, congested cough. TF at goal, 100mL FWF Q1 hr. Vasquez with adequate UOP. Rectal tube with 700mL output overnight.   Plan: Continue to monitor.  For vital signs and complete assessments, please see documentation flowsheets.

## 2022-07-09 NOTE — PROGRESS NOTES
[facility], [place]   07/09/2022  Neurosurgery Progress Note:    Assessment:  Ms. Benítez is a 76-year-old female with PMHx myelodysplastic syndrome, seronegative RA, chronic microcytic anemia, PAD, and recent L SDH (5/12/2022) who arrived to [facility] via EMS after an unwitnessed fall at home.  Shortly after arriving, the patient was complaining of a significant headache and nausea, and then her right pupil dilated.  She was no longer protecting her airway, so she was sedated and intubated.  Her right eye pupil then returned to a normal caliber.  A head CT demonstrated a left acute subdural approximately 2.3 cm thick with 12 mm of rightward midline shift.  She was taken to the OR on 6/22 for emergent L craniotomy for SDH evacuation. She was extubated on POD1. Concerns for tube feed aspiration on 6/25.  She underwent emergent EGD by GI due to sharp drop in hemoglobin as well as melanotic stools.  She had 1 gastric ulcer clipped which was the presumed culprit.  Due to requiring the EGD she needed intubation during this time, therefore she was intubated on 7/4/2022 for EGD.  She was also connected to EEG on 7/4/2022 found to be in nonconvulsive status received Keppra load and Ativan and was started on Versed overnight.  On 7/5/2022, her Versed drip was weaned off with thoughts she was potentially not in nonconvulsive status epilepticus.    Other diagnoses include:  #Brain compression  #Open evacuation of hematoma on day of admission   #Hyponatremia  #Pulmonary edema    Plan:    Neuro:  #Concern for nonconvulsive status epilepticus  #Subdural hematoma  Repeat CT stable  Serial neurochecks  Video EEG off  Vimpat 100 twice daily    Cardiac:  #Hypotension (likely secondary to GI bleed)  Maps greater than 65  Levophed off, vasopressin off,   Flowtrack stopped    Pulm:  #Respiratory failure-intubated overnight (7/4/2021)  #DNR/DNI  Currently on 2 L nasal cannula    GI:  #Upper GI bleed with  hemodynamic instability  #Esophageal ulcers  #Gastric ulcers  #Transaminitis  Gastric ulcer status post clipped on evening of 7/4/2022 by GI, GI has signed off  IV Protonix 40 mg twice daily-bowel and oral  NG in place, will need to discuss with family whether PEG is in line with what Brie would want    Renal:  #Hypernatremia, hyperchloremia  #Uremia  Daily BMP, trend uremia (improving)    Endo:  Monitor glucose levels  Stress dose steroids    Heme:  #Acute blood loss anemia  #History of myelodysplastic syndrome  Subcutaneous heparin held, serial hemoglobins    ID:  Ancef for MSSA in sputum for 5 days  -----------------------------------  Bruce Graves MD  Neurosurgery Resident, PGY-3       Please contact neurosurgery resident on call with questions.    Dial * * [phone], enter [phone] when prompted.    -----------------------------------    Interval History: Extubated during the day yesterday.  Made DNR/DNI after conversation with family.  Family conveying that Louise would want independence in her day-to-day life activities.  EEG removed yesterday.  Continues on Vimpat.  Currently on 2 L.  Amlodipine remains held.  Hydrocortisone stopped-remains hemodynamically stable.  Received 20 of Lasix.  Hemoglobin at 8.5 after receiving 1 unit.  Rectal tube in place no longer having dark stools.  NG tube remains in place.  Plan for Ancef for 5 days (sputum)  Objective:   Temp:  [98  F (36.7  C)-99  F (37.2  C)] 98.1  F (36.7  C)  Pulse:  [64-80] 64  Resp:  [13-26] 13  BP: (137-178)/() 148/57  FiO2 (%):  [2 %-30 %] 2 %  SpO2:  [93 %-98 %] 98 %  I/O last 3 completed shifts:  In: 3848 [I.V.:1123; NG/GT:1345]  Out: 5560 [Urine:3810; Stool:1750]    Gen: Appears comfortable, NAD, laying in bed, appearing older than stated age  HEENT: EEG leads removed  Wound: Incision closed with staples-now removed, incision appears clean dry-no signs of leaking, no wetness appreciated.  See below for photo from 7/5/2022 evening.  Incision  cleaned with ChloraPrep.  Pulm: Extubated, on nasal cannula 2 L, no increased work of breathing, satting 98%  Neurologic:  Eyes open spontaneously, minimally tracking, gaze at midline without preference, slowly follows commands in all 4 extremities,  Follows commands antigravity in all 4 extremities  Reflexes 2+, downgoing Babinski's bilaterally  See below for incision:                  LABS:  Recent Labs   Lab 07/08/22 2238 07/08/22  1612 07/08/22  1021   *  149* 152* 152*   POTASSIUM 4.0  4.0 3.3*  3.3* 3.9   CHLORIDE 120* 121* 124*   CO2 21* 21* 19*   ANIONGAP 9 10 9   * 125* 139*   BUN 29.1* 31.8* 33.7*   CR 0.52 0.53 0.54   MENDEZ 8.1* 8.1* 8.2*       Recent Labs   Lab 07/08/22 2238 07/08/22  1021 07/08/22  0126   WBC  --   --  12.7*   RBC  --   --  2.31*   HGB 8.8*   < > 6.8*   HCT  --   --  21.8*   MCV  --   --  94   MCH  --   --  29.4   MCHC  --   --  31.2*   RDW  --   --  18.9*   PLT  --   --  352    < > = values in this interval not displayed.       IMAGING:  No results found for this or any previous visit (from the past 24 hour(s)).

## 2022-07-09 NOTE — PROVIDER NOTIFICATION
Notified NSG provider that patient's Na has dropped from 152 to 148 in the last 12 hours. No changes made to FWF at this time.

## 2022-07-09 NOTE — PROGRESS NOTES
Neurocritical Care Progress Note    Reason for critical care admission: Bilateral SDH  Admitting Team: TAYLOR  Date of Service:  07/09/2022  Date of Admission:  6/22/2022  Hospital Day: 18    Assessment/Plan  Louise Benítez is a 76 year old female w/ PMHx myelodysplastic syndrome, seronegative RA, chronic microcytic anemia, PAD, and recent L SDH (5/12/2022) who was admitted from her TCU on 6/22/2022 after an unwitnessed fall. Was subsequently found to have an acute on chronic L SDH, a new acute R SDH, and increased midline shift w/ trace L uncal herniation now s/p L craniotomy w/ hematoma evacuation.    24 hour events:  Lg liquid stools overnight, rectal tube in place. Weak congested cough, on 2L/NC. Plan to work on dispo.    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  -vEEG stopped 7/8  -Vimpat 100 mg BID     #Metabolic encephalopathy likely 2/2 uremia   #Delirium  -Delirium precautions      #Analgesics & sedation  -Tylenol 975 mg Q6h PRN     CV  #HTN on admission  -Resume Amlodipine 5 mg daily  -Echo 6/24: EF 60-65%, normal RV, no significant valvular abnormalities  -Echo limited on 7/6 unchanged  -Cardiac monitoring     #PAD  No statin, recent LDL 10 (5/13/2022)     Resp  #Aspiration pneumonia  Extubated 7/8  -Wean FIO2 as able  -Pulmonary hygiene and suctioning as needed  -Continuous pulse ox  -Maintain O2 saturations greater than 92%, avoid hyperoxia     GI  #Hx GERD   #Melena   #Esophageal ulcers  #Gastric ulcers in the GE junction, anterior wall, posterior wall (received one clipping)  Diet: TF,  Q1h  Last  BM: 7/9, rectal tube   -Protonix IV 40 mg BID  -NG in place  Bowel regimen: PRN senna-docusate and Miralax  -Family to give decision on PEG early this week      Renal/  #Hypernatremia ~ resolved  #Hyperchloremia  -Na checks Q6h  - Q1h  -Daily BMP   -IV fluids: TKO    -Electrolyte replacement protocol     #Uremia - improving   -Baseline BUN 10-20 on previous admissions, around baseline on admit  -Suspect secondary to GI bleeding     Endo  #Stress hyperglycemia   -Hgb A1c 5.5% 6/22   -Monitor glucose levels     Rheum  #Seronegative Rheumatoid Arthritis  -Continue PTA Plaquenil 400mg at bedtime      Heme/Onc  #Chronic Microcytic Anemia, likely 2/2 myelodysplastic syndrome  baseline Hgb ~8.0  -PTA Darbepoetin Inj 60mg Q3wks (last dose 7/5)  -PTA Vit B12 500 mcg daily  -CBC Q12h  -Hgb goal > 7 in acute period post-surgery, plt goal > 50k  -Transfuse to meet Hgb and plt goals     #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  -Outside oncology records from [facility] obtained - need to review     ID  #Leukocytosis; 12.7->12.8  #Aspiration pneumonia  -4+ S.aureus in sputum, MRSA swab negative  -Ancef 2 g Q8h x 5 days   -Daily CBC  -Monitor temperature curve     MSK  #Chronic Back Pain w/ lumbar spondylosis and hx of lumbar compression fracture  #Polymyalgia Rheumatica w/hx of GCA  #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, 2003)     #Osteoporosis  -Hold PTA Denosumab Inj 60mg  -Hold Ca/Vit D3     #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 Upon evaluation, this  patient had a high probability of imminent or life-threatening deterioration due to a SDH with brain compression s/p L craniotomy for hematoma evacuation, and acute hypoxic respiratory failure, which required my direct attention, intervention, and personal management. Greater than 50% of my time was spent at the bedside counseling the patient and/or coordinating care regarding services listed in this note.     The patient was seen and discussed with the NCC attending, Dr. Jones.     Ciera CHOU CNP  NeuroCritical Care Nurse Practitioner  Pager: [phone]  Ascom: *[phone] available [facility] [place] 0700 to 1900     24 Hour Vital Signs Summary:  Temp: 98.1  F (36.7  C) Temp  Min: 98  F (36.7  C)  Max: 99  F (37.2  C)  Resp: 12 Resp  Min: 12  Max: 26  SpO2: 100 % SpO2  Min: 94 %  Max: 100 %  Pulse: 63 Pulse  Min: 63  Max: 80  BP: (!) 155/72 Systolic (24hrs), Av , Min:129 , Max:178   Diastolic (24hrs), Av, Min:56, Max:84      Respiratory monitoring:   Vent Mode: CPAP/PS  (Continuous positive airway pressure with Pressure Support)  FiO2 (%): 2 %  PEEP (cm H2O): 5 cmH2O  Pressure Support (cm H2O): 7 cmH2O  Resp: 12      I/O last 3 completed shifts:  In: 3626 [I.V.:401; NG/GT:1845]  Out: 5150 [Urine:3850; Stool:1300]    Current Medications:    [Held by provider] amLODIPine  5 mg Oral or Feeding Tube QPM     ceFAZolin  2 g Intravenous Q8H     cyanocobalamin  500 mcg Oral Daily     [Held by provider] heparin ANTICOAGULANT  5,000 Units Subcutaneous Q8H     hydroxychloroquine  400 mg Oral or Feeding Tube At Bedtime     lacosamide (VIMPAT) intermittent infusion  100 mg Intravenous BID     [Held by provider] melatonin  10 mg Oral or Feeding Tube Daily     multivitamins w/minerals  15 mL Per Feeding Tube Daily     pantoprazole (PROTONIX) IV  40 mg Intravenous BID     polyethylene glycol  17 g Oral Daily     protein modular  1 packet Per Feeding Tube BID 09 12     sodium chloride (PF)  3 mL Intracatheter Q8H       PRN  "Medications:  acetaminophen, albuterol, atropine, bisacodyl, hydrALAZINE, labetalol, naloxone **OR** naloxone **OR** naloxone **OR** naloxone, ondansetron **OR** ondansetron, senna-docusate, sodium chloride (PF)    Infusions:  None    No Known Allergies    Physical Examination:  Vitals: BP (!) 155/72   Pulse 63   Temp 98.1  F (36.7  C) (Axillary)   Resp 12   Ht 1.626 m (5' 4.02\")   Wt 77.9 kg (171 lb 11.2 oz)   SpO2 100%   BMI 29.46 kg/m    General: Adult female patient, lying in bed, NAD   HEENT: Normocephalic, atraumatic, no icterus, oral cavity/oropharynx pink and moist  Cardiac: RRR, s1/s2 auscultated without murmur, S3/S4  Chest: CTAB, unlabored, expansion symmetric, no retractions or use of accessory muscles  Abdomen: Soft, non-distended, bowel sounds present  Extremities: Warm, no edema, distal pulses +2, well perfused  Skin: No rash or lesion  Psych: Calm  Neuro:  Mental status: Drowsy, opens eyes spontaneously and to voice, inconsistently following commands, intubated.   Cranial nerves: PERRL, conjugate gaze, EOMI, pupils +3 round and reactive, facial sensation intact, shoulder shrug strong, cough and gag present.  Motor: Normal bulk and tone. No abnormal movements. 4/5 strength in 4/4 extremities.   Sensory: Intact to light touch x 4 extremities  Coordination: KELLY, deferred.   Gait: KELLY, deferred.    Labs:  Recent Labs   Lab 07/09/22  0436 07/08/22  2238 07/08/22  1612 07/08/22  1021 07/08/22  0126 07/07/22  0313 07/06/22  1839 07/06/22  1347 07/05/22  0342 07/04/22  1936   * 150*  149* 152* 152* 145  145   < >  --  153*   < >  --    POTASSIUM 4.0 4.0  4.0 3.3*  3.3* 3.9 3.7   < >  --  3.7   < >  --    CHLORIDE  --  120* 121* 124* 119*   < >  --  126*   < >  --    CO2  --  21* 21* 19* 20*   < >  --  19*   < >  --    BUN  --  29.1* 31.8* 33.7* 37.3*   < >  --  56.9*   < >  --    CR  --  0.52 0.53 0.54 0.62   < >  --  0.70   < >  --    MENDEZ  --  8.1* 8.1* 8.2* 8.3*   < >  --  8.0*   < >  --  "   BILITOTAL  --   --   --   --   --   --  0.9 1.0  --  0.7   ALKPHOS  --   --   --   --   --   --   --  105*  --  200*   ALT  --   --   --   --   --   --   --  44*  --  110*   AST  --   --   --   --   --   --   --  52*  --  126*    < > = values in this interval not displayed.       Recent Labs   Lab 07/09/22  0436 07/08/22  2238 07/08/22  1612 07/08/22  1021 07/08/22  0126 07/07/22  1259 07/07/22  0313 07/06/22  1839   WBC 12.8*  --   --   --  12.7*  --  21.6* 27.0*   HGB 8.7* 8.8* 8.8* 8.5* 6.8*   < > 7.1* 7.2*  7.2*     --   --   --  352  --  353 368    < > = values in this interval not displayed.       Recent Labs   Lab 07/04/22  1637   PH 7.45   PCO2 32*   PO2 76*   HCO3 22       All cultures:  Recent Labs   Lab 07/05/22  0906 07/04/22 2044 07/04/22 2029   CULTURE 2+ Normal panda  4+ Staphylococcus aureus* No growth after 4 days No growth after 4 days       Imaging:    All relevant imaging and laboratory values personally reviewed.

## 2022-07-09 NOTE — PLAN OF CARE
ICU End of Shift Summary. See flowsheets for vital signs and detailed assessment.    Changes this shift: Patient without acute event or change this shift. Maintaining sats on room air. Remains afebrile and hemodynamically stable; HTN treated with prn labetalol. No improvement to neuro status; opens eyes spontaneously, tracks and follows commands only intermittently.    Plan: continue to monitor patient status; notify provider of changes.

## 2022-07-10 NOTE — PLAN OF CARE
Major Shift Events:  Q4 hr neuros completed. Intermittently follows commands. Intermittently tracks left to right. Pupils equal and reactive. Moves all extremities purposefully. Denies pain, gave PRN tylenol x1 due to hypertension and moaning, improved. NSR/SB 50s-70s. Maintained SBP < 160 with PRN labetalol x3. Afebrile. Weaned to RA, LS are coarse. Fair, non-productive cough. Attempted NT suction with no results. TF at goal, FWF decreased to 100mL Q2 hours. Vasquez with adequate UOP. Rectal pouch with 700 output this shift. New swelling/ecchymosis on right forearm, NSG aware.   Plan: Continue to monitor.  For vital signs and complete assessments, please see documentation flowsheets.

## 2022-07-10 NOTE — PROGRESS NOTES
Neurocritical Care Progress Note    Reason for critical care admission: Bilateral SDH  Admitting Team: TAYLOR  Date of Service:  07/10/2022  Date of Admission:  6/22/2022  Hospital Day: 19    Assessment/Plan  Louise Benítez is a 76 year old female w/ PMHx myelodysplastic syndrome, seronegative RA, chronic microcytic anemia, PAD, and recent L SDH (5/12/2022) who was admitted from her TCU on 6/22/2022 after an unwitnessed fall. Was subsequently found to have an acute on chronic L SDH, a new acute R SDH, and increased midline shift w/ trace L uncal herniation now s/p L craniotomy w/ hematoma evacuation.    24 hour events:  Maintained SBP < 160 with PRN labetalol x3. Weaned FIO2 to RA.    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  -vEEG stopped 7/8  -Stop Vimpat 100 mg BID  -Start keppra 1000 mg BID      #Metabolic encephalopathy likely 2/2 uremia   #Delirium  -Delirium precautions      #Analgesics & sedation  -Tylenol 975 mg Q6h PRN     CV  #HTN on admission  -Increase Amlodipine to 10 mg daily  -Echo 6/24: EF 60-65%, normal RV, no significant valvular abnormalities  -Echo limited on 7/6 unchanged  -Cardiac monitoring     #PAD  No statin, recent LDL 10 (5/13/2022)     Resp  #Aspiration pneumonia  Extubated 7/8  -Wean FIO2 as able  -Pulmonary hygiene and suctioning as needed  -Continuous pulse ox  -Maintain O2 saturations greater than 92%, avoid hyperoxia     GI  #Hx GERD   #Esophageal ulcers  #Gastric ulcers in the GE junction, anterior wall, posterior wall (received one clipping)  Diet: TF, FWF 60 Q4h  #Watery stools  -Send  C-diff with rising WBC count   Last BM: 7/10, rectal tube   -Protonix PO 40 mg BID  -NG in place  Bowel regimen: PRN senna-docusate and Miralax  -Family to give decision on PEG early this week      Renal/  #Hyperchloremia; 111  -Na checks daily  -FWF 60 Q4h  -Daily BMP   -IV fluids: TKO    -Electrolyte replacement protocol     #Uremia - improving   -Baseline BUN 10-20 on previous admissions, around baseline on admit  -Suspect secondary to GI bleeding     Endo  #Stress hyperglycemia   -Hgb A1c 5.5% 6/22   -Monitor glucose levels     Rheum  #Seronegative Rheumatoid Arthritis  -Continue PTA Plaquenil 400mg at bedtime      Heme/Onc  #Chronic Microcytic Anemia, likely 2/2 myelodysplastic syndrome  baseline Hgb ~8.0  -PTA Darbepoetin Inj 60mg Q3wks (last dose 7/5)  -PTA Vit B12 500 mcg daily  -CBC daily  -Hgb goal > 7 in acute period post-surgery, plt goal > 50k  -Transfuse to meet Hgb and plt goals     #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  -Outside oncology records from [facility] obtained - need to review     ID  #Leukocytosis; 12.8->13.3  #Aspiration pneumonia  -4+ S.aureus in sputum, MRSA swab negative  -Ancef 2 g Q8h x 5 days (end 7/11)  -Follow up C-diff   -Daily CBC  -Monitor temperature curve     MSK  #Chronic Back Pain w/ lumbar spondylosis and hx of lumbar compression fracture  #Polymyalgia Rheumatica w/hx of GCA  #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, 2003)     #Osteoporosis  -Hold PTA Denosumab Inj 60mg  -Hold Ca/Vit D3     #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 Upon evaluation, this patient had a high probability of imminent or life-threatening deterioration due to a SDH with brain compression s/p L craniotomy for hematoma evacuation, and acute hypoxic respiratory failure, pneumonia on abx, which required my direct attention, intervention, and personal management. Greater than 50% of my time was spent at the bedside counseling the patient and/or coordinating care regarding services listed in this note.     The patient was seen and discussed with the NCC attending, Dr. Jones.     Ciera CHOU CNP  NeuroCritical Care Nurse Practitioner  Pager: [phone]  Ascom: *[phone] available M- 0700 to 1900    24 Hour Vital Signs Summary:  Temp: 98.3  F (36.8  C) Temp  Min: 97.9  F (36.6  C)  Max: 98.7  F (37.1  C)  Resp: 12 Resp  Min: 11  Max: 29  SpO2: 97 % SpO2  Min: 93 %  Max: 99 %  Pulse: 56 Pulse  Min: 56  Max: 84   BP: 108/50 Systolic (24hrs), Av , Min:108 , Max:185   Diastolic (24hrs), Av, Min:50, Max:87      Respiratory monitoring:   Resp: 12  RA    I/O last 3 completed shifts:  In: 3895 [I.V.:605; NG/GT:2255]  Out: 2615 [Urine:1415; Stool:1200]    Current Medications:    amLODIPine  5 mg Oral or Feeding Tube QPM     ceFAZolin  2 g Intravenous Q8H     cyanocobalamin  500 mcg Oral Daily     [Held by provider] heparin ANTICOAGULANT  5,000 Units Subcutaneous Q8H     hydroxychloroquine  400 mg Oral or Feeding Tube At Bedtime     lacosamide (VIMPAT) intermittent infusion  100 mg Intravenous BID     multivitamins w/minerals  15 mL Per Feeding Tube Daily     pantoprazole (PROTONIX) IV  40 mg Intravenous BID     polyethylene glycol  17 g Oral Daily     protein modular  1 packet Per Feeding Tube BID 09 12     sodium chloride (PF)  3 mL Intracatheter Q8H       PRN Medications:  acetaminophen, albuterol, atropine, bisacodyl, hydrALAZINE, labetalol, naloxone **OR** naloxone **OR** naloxone **OR** naloxone, ondansetron **OR** ondansetron,  "senna-docusate, sodium chloride (PF)    Infusions:  None    No Known Allergies    Physical Examination:  Vitals: /50   Pulse 56   Temp 98.3  F (36.8  C) (Oral)   Resp 12   Ht 1.626 m (5' 4.02\")   Wt 80.8 kg (178 lb 1.6 oz)   SpO2 97%   BMI 30.56 kg/m    General: Adult female patient, lying in bed, NAD   HEENT: Normocephalic, atraumatic, no icterus, oral cavity/oropharynx pink and moist  Cardiac: RRR, s1/s2 auscultated without murmur, S3/S4  Chest: CTAB, unlabored, expansion symmetric, no retractions or use of accessory muscles  Abdomen: Soft, non-distended, hyperactive bowel sounds present  Extremities: Warm, 2-3+ generalized edema, distal pulses +1, weak  Skin: Bruising   Psych: Calm, hypoactive  Neuro:  Mental status: Awake, alert, oriented to self, follows commands. Language is fluent and coherent with intact comprehension of complex commands, naming and repetition.    Cranial nerves: PERRL, conjugate gaze, EOMI, pupils +3 round and reactive, facial sensation intact, shoulder shrug strong, cough and gag present.  Motor: Normal bulk and tone. No abnormal movements. 4/5 strength in bilateral upper extremities with purposeful movement. 2/5 strength in bilateral lower extremities, slight movement, withdraws.  Sensory: Intact to light touch x 4 extremities  Coordination: KELLY, deferred.   Gait: KELLY, deferred.      Labs:  Recent Labs   Lab 07/10/22  0416 07/09/22  2244 07/09/22  1606 07/09/22  1021 07/09/22  0436 07/08/22  2238 07/08/22  1612 07/07/22  0313 07/06/22  1839 07/06/22  1347 07/05/22  0342 07/04/22  1936     140 142 145 148*  145 148* 150*  149* 152*   < >  --  153*   < >  --    POTASSIUM 4.1  --   --  4.2 4.0 4.0  4.0 3.3*  3.3*   < >  --  3.7   < >  --    CHLORIDE 111*  --   --  117*  --  120* 121*   < >  --  126*   < >  --    CO2 21*  --   --  20*  --  21* 21*   < >  --  19*   < >  --    BUN 21.9  --   --  26.5*  --  29.1* 31.8*   < >  --  56.9*   < >  --    CR 0.39*  --   --  " 0.43*  --  0.52 0.53   < >  --  0.70   < >  --    MENDEZ 8.3*  --   --  8.2*  --  8.1* 8.1*   < >  --  8.0*   < >  --    BILITOTAL  --   --   --   --   --   --   --   --  0.9 1.0  --  0.7   ALKPHOS  --   --   --   --   --   --   --   --   --  105*  --  200*   ALT  --   --   --   --   --   --   --   --   --  44*  --  110*   AST  --   --   --   --   --   --   --   --   --  52*  --  126*    < > = values in this interval not displayed.       Recent Labs   Lab 07/10/22  0416 07/09/22  1606 07/09/22  0436 07/08/22  2238 07/08/22  1021 07/08/22  0126 07/07/22  1259 07/07/22  0313   WBC 13.3*  --  12.8*  --   --  12.7*  --  21.6*   HGB 8.9* 9.2* 8.7* 8.8*   < > 6.8*   < > 7.1*     --  366  --   --  352  --  353    < > = values in this interval not displayed.       Recent Labs   Lab 07/04/22  1637   PH 7.45   PCO2 32*   PO2 76*   HCO3 22       All cultures:  Recent Labs   Lab 07/05/22  0906 07/04/22 2044 07/04/22 2029   CULTURE 2+ Normal panda  4+ Staphylococcus aureus* No Growth No Growth       Imaging:    All relevant imaging and laboratory values personally reviewed.

## 2022-07-10 NOTE — PROGRESS NOTES
[facility], [place]   07/10/2022  Neurosurgery Progress Note:    Assessment:  Ms. Benítez is a 76-year-old female with PMHx myelodysplastic syndrome, seronegative RA, chronic microcytic anemia, PAD, and recent L SDH (5/12/2022) who arrived to [facility] via EMS after an unwitnessed fall at home.  Shortly after arriving, the patient was complaining of a significant headache and nausea, and then her right pupil dilated.  She was no longer protecting her airway, so she was sedated and intubated.  Her right eye pupil then returned to a normal caliber.  A head CT demonstrated a left acute subdural approximately 2.3 cm thick with 12 mm of rightward midline shift.  She was taken to the OR on 6/22 for emergent L craniotomy for SDH evacuation. She was extubated on POD1. Concerns for tube feed aspiration on 6/25.  She underwent emergent EGD by GI due to sharp drop in hemoglobin as well as melanotic stools.  She had 1 gastric ulcer clipped which was the presumed culprit.  Due to requiring the EGD she needed intubation during this time, therefore she was intubated on 7/4/2022 for EGD.  She was also connected to EEG on 7/4/2022 found to be in nonconvulsive status received Keppra load and Ativan and was started on Versed overnight.  On 7/5/2022, her Versed drip was weaned off with confirmation of no status epilepticus. On 7/8 she was extubated and remains stable.     Other diagnoses include:  #Brain compression  #Open evacuation of hematoma on day of admission   #Hyponatremia  #Pulmonary edema    Plan:    Neuro:  #Concern for nonconvulsive status epilepticus  #Subdural hematoma  Repeat CT stable  Serial neurochecks  Video EEG off  Vimpat 100 twice daily- neurocrit considering switching agent to Keppra    Cardiac:  #Hypotension (likely secondary to GI bleed)  Maps greater than 65  Levophed off, vasopressin off,   Flowtrack stopped    Pulm:  #Respiratory failure-intubated overnight  (7/4/2021)  #DNR/DNI  Currently on 1 L nasal cannula    GI:  #Upper GI bleed with hemodynamic instability  #Esophageal ulcers  #Gastric ulcers  #Transaminitis  Gastric ulcer status post clipped on evening of 7/4/2022 by GI, GI has signed off  IV Protonix 40 mg twice daily-bowel and oral  NG in place, will need to discuss with family whether PEG is in line with what Brie would want    Renal:  #Hypernatremia, hyperchloremia  #Uremia  Daily BMP, trend uremia (improving)    Endo:  Monitor glucose levels  Stress dose steroids    Heme:  #Acute blood loss anemia  #History of myelodysplastic syndrome  Subcutaneous heparin held, serial hemoglobins    ID:  Ancef for MSSA in sputum for 5 days  -----------------------------------  Gilberto Leung MD  Neurosurgery Resident, PGY-2       Please contact neurosurgery resident on call with questions.    Dial * * *[phone], enter [phone] when prompted.    -----------------------------------    Interval History: No acute events overnight    Objective:   Temp:  [97.9  F (36.6  C)-98.7  F (37.1  C)] 97.9  F (36.6  C)  Pulse:  [60-84] 60  Resp:  [9-29] 18  BP: (129-185)/(55-87) 135/56  SpO2:  [93 %-100 %] 97 %  I/O last 3 completed shifts:  In: 4260 [I.V.:525; NG/GT:2655]  Out: 2535 [Urine:1435; Stool:1100]    Gen: Appears comfortable, NAD, laying in bed, appearing older than stated age  HEENT: EEG leads removed  Wound: Incision closed with staples-now removed, incision appears clean dry-no signs of leaking, no wetness appreciated.  See below for photo from 7/5/2022 evening.  Incision cleaned with ChloraPrep.  Pulm: Extubated, on nasal cannula 2 L, no increased work of breathing, satting 98%  Neurologic:  Eyes open spontaneously, minimally tracking, gaze at midline without preference  Follows commands in all 4 extremities  Reflexes 2+, downgoing Babinski's bilaterally      LABS:  Recent Labs   Lab 07/09/22  2244 07/09/22  1606 07/09/22  1021 07/09/22  0436 07/08/22  2238 07/08/22  1612   NA  142 145 148*  145 148* 150*  149* 152*   POTASSIUM  --   --  4.2 4.0 4.0  4.0 3.3*  3.3*   CHLORIDE  --   --  117*  --  120* 121*   CO2  --   --  20*  --  21* 21*   ANIONGAP  --   --  11  --  9 10   GLC  --   --  121*  --  119* 125*   BUN  --   --  26.5*  --  29.1* 31.8*   CR  --   --  0.43*  --  0.52 0.53   MENDEZ  --   --  8.2*  --  8.1* 8.1*       Recent Labs   Lab 07/09/22  1606 07/09/22  0436   WBC  --  12.8*   RBC  --  2.85*   HGB 9.2* 8.7*   HCT  --  27.7*   MCV  --  97   MCH  --  30.5   MCHC  --  31.4*   RDW  --  18.2*   PLT  --  366       IMAGING:  No results found for this or any previous visit (from the past 24 hour(s)).

## 2022-07-10 NOTE — PLAN OF CARE
D/I: Patient on unit 4A Surgical/Neuro ICU post op crani with prolonged recovery.  Neuro- Eyes open spontaneously, pupils midline, react, EOMs intact.  LUCAS, follows simple commands (squeezed hands, wiggles toes), nonverbal.  Non weight bearing. Needs lift to transfer to chair.   CV-Hemodynamically stable. Hydralzaine given x1 to keep SBp <160.  Generalized edema. DP pulses 1+ weak.       Pulm- O2 sats 96% on room air. Normal breathing pattern, fair cough, productive, lungs diminished coarse.      GI-Rectal tube in place, deflated x30 minutes. Cdiff sent.  Tolerated TF.  Free water decreased to 60cc q4hrs.       DINA-Pedro DC'd [id number].  Pure wick placed.     Gtts-none   Skin-See PCS for detailed skin assessment.  Pain-Denies.   See flow sheets for further interventions and assessments.  A: Stable. Up to chair with lift x3 hours.  and daughter at bedside at different times and attentive to patient.    P: Continue to monitor pt closely. Notify MD of significant changes.

## 2022-07-10 NOTE — PROVIDER NOTIFICATION
Notified NSG provider that patient has large bruise with swelling on right forearm. Provider said they may order an ultrasound.

## 2022-07-11 NOTE — PROGRESS NOTES
[facility], [place]   07/11/2022  Neurosurgery Progress Note:    Assessment:  Ms. Benítez is a 76-year-old female with PMHx myelodysplastic syndrome, seronegative RA, chronic microcytic anemia, PAD, and recent L SDH (5/12/2022) who arrived to [facility] via EMS after an unwitnessed fall at home.  Shortly after arriving, the patient was complaining of a significant headache and nausea, and then her right pupil dilated.  She was no longer protecting her airway, so she was sedated and intubated.  Her right eye pupil then returned to a normal caliber.  A head CT demonstrated a left acute subdural approximately 2.3 cm thick with 12 mm of rightward midline shift.  She was taken to the OR on 6/22 for emergent L craniotomy for SDH evacuation. She was extubated on POD1. Concerns for tube feed aspiration on 6/25.  She underwent emergent EGD by GI due to sharp drop in hemoglobin as well as melanotic stools.  She had 1 gastric ulcer clipped which was the presumed culprit.  Due to requiring the EGD she needed intubation during this time, therefore she was intubated on 7/4/2022 for EGD.  She was also connected to EEG on 7/4/2022 found to be in nonconvulsive status received Keppra load and Ativan and was started on Versed overnight.  On 7/5/2022, her Versed drip was weaned off with confirmation of no status epilepticus. On 7/8 she was extubated and remains stable.     Other diagnoses include:  #Brain compression  #Open evacuation of hematoma on day of admission   #Hyponatremia  #Pulmonary edema    Plan:    Neuro:  #Concern for nonconvulsive status epilepticus  #Subdural hematoma  Repeat CT stable  Serial neurochecks  Video EEG off  Vimpat 100 twice daily stopped  Keppra 1 g twice daily  Delirium precautions    Cardiac:  #Hypotension (likely secondary to GI bleed)  Maps greater than 65  Levophed off, vasopressin off,   Flowtrack stopped    Pulm:  #Respiratory failure-intubated overnight  (7/4/2021)  #DNR/DNI  Currently on room air    GI:  #Upper GI bleed with hemodynamic instability  #Esophageal ulcers  #Gastric ulcers  #Transaminitis  Gastric ulcer status post clipped on evening of 7/4/2022 by GI, GI has signed off  P.o. Protonix 40 mg twice daily  NG in place, will need to discuss with family whether PEG is in line with what Brie would want  C. difficile negative (for watery stools)    Renal:  #Hypernatremia, hyperchloremia  #Uremia  Daily BMP, trend uremia (improving)    Endo:  Monitor glucose levels  Stress dose steroids stopped    Heme:  #Acute blood loss anemia  #History of myelodysplastic syndrome  Subcutaneous heparin held, serial hemoglobins    ID:  Ancef for MSSA in sputum for 5 days (ending today)  -----------------------------------  Bruce Graves MD  Neurosurgery Resident, PGY-3       Please contact neurosurgery resident on call with questions.    Dial * * *[phone], enter [phone] when prompted.    -----------------------------------    Interval History: No events overnight.  Vimpat switched for Keppra.  Tamara off pressors follows commands x4.  Now currently on room air.  Hemoglobin is remained stable.  Last day of Ancef for MSSA in sputum.  C. difficile negative.  We will discuss potential for restarting subcutaneous heparin today.  Uremia improved now normalized.    Objective:   Temp:  [97.9  F (36.6  C)-98.9  F (37.2  C)] 98.2  F (36.8  C)  Pulse:  [56-82] 73  Resp:  [6-29] 20  BP: (108-176)/() 136/60  SpO2:  [95 %-97 %] 97 %  I/O last 3 completed shifts:  In: 2420 [I.V.:605; NG/GT:780]  Out: 2055 [Urine:1255; Stool:800]        Gen: Appears comfortable, NAD, laying in bed, appearing older than stated age  HEENT: EEG leads removed  Wound: Incision closed with staples-now removed, incision appears clean dry-no signs of leaking, no wetness appreciated.  See below for photo from 7/5/2022 evening.  Incision cleaned with ChloraPrep.  Pulm: Extubated, on nasal cannula 2 L, no increased  work of breathing, satting 98%  Neurologic:  Eyes open spontaneously, minimally tracking, gaze at midline without preference  Follows commands in all 4 extremities  Reflexes 2+, downgoing Babinski's bilaterally      LABS:  Recent Labs   Lab 07/10/22  2146 07/10/22  0416 07/09/22  2244 07/09/22  1606 07/09/22  1021 07/09/22  0436 07/08/22  2238   NA  --  140  140 142 145 148*  145 148* 150*  149*   POTASSIUM  --  4.1  --   --  4.2 4.0 4.0  4.0   CHLORIDE  --  111*  --   --  117*  --  120*   CO2  --  21*  --   --  20*  --  21*   ANIONGAP  --  8  --   --  11  --  9   * 108*  --   --  121*  --  119*   BUN  --  21.9  --   --  26.5*  --  29.1*   CR  --  0.39*  --   --  0.43*  --  0.52   MENDEZ  --  8.3*  --   --  8.2*  --  8.1*       Recent Labs   Lab 07/10/22  0416   WBC 13.3*   RBC 2.92*   HGB 8.9*   HCT 28.2*   MCV 97   MCH 30.5   MCHC 31.6   RDW 17.8*          IMAGING:  No results found for this or any previous visit (from the past 24 hour(s)).

## 2022-07-11 NOTE — PLAN OF CARE
Goal Outcome Evaluation:    Major Shift Events:  None.  Neuro: Patient has expressive aphasia (does not speak) able to follow command intermittently, she has mitts on for pulling on the PICC and NG tube.  Able to wiggle toes and fingers, pupils PERRLA and moves all extremities on purpose.    Cardiac: Received labetalol IV 20mg X2 for SBP>160 with resolution.  SR and HR in 70s.  Patient with edema in the arms and legs.    Resp: Patient SaO2 in mid 90s, on room air, LS coarse, unproductive cough, used sublingual atropine and kept HOB >30.    GI/: Patient NPO and on TF at 45ml Peptide 1.5ml/hr with 60ml water flush q4hrs.  He has a rectal tube, and purewick.   Skin: Red flash face and chest, multiple mepilex on arms and leg and coccyx.  Skin easily bruised on the arms.  Her nellie area with incontinent rush, using barrier cream and pericare cares 2-hrs.  Plan: Keep SBP<160, provide comfort, and turn q2hr and reposition.  For vital signs and complete assessments, please see documentation flowsheets.

## 2022-07-11 NOTE — PROGRESS NOTES
D/I: Patient on unit 4A Surgical/Neuro ICU post op crani with prolonged recovery.  Neuro- Lethargic, flat, withdrawn. Eyes open spontaneously, pupils midline, react, not tracking as well as yesterday.   LUCAS, follows simple commands inconsistent (squeezed hands, wiggles toes to command), nonverbal.  Non weight bearing. Needs lift to transfer to chair.   CV-Hemodynamically stable.   Generalized edema less today than yesterday.  DP pulses 1+ weak.       Pulm- O2 sats 96% on room air. Normal breathing pattern, fair cough, productive, lungs clear, diminished in bases. Suctioned orally with red arzola.  Thick tan sputum.       GI-Rectal tube in place, deflated x30 minutes.  Tolerating tube feeds.       -External urine device working well. Voiding adequately.     Gtts-none.   Skin-See PCS for detailed skin assessment.  Pain-Denies, no signs of pain.   See flow sheets for further interventions and assessments.  A: Stable. Up to chair with lift x3 hours x2.  and sister at bedside at different times and attentive to patient.    P: Continue to monitor pt closely. Notify MD of significant changes.

## 2022-07-11 NOTE — PROGRESS NOTES
Care Management Follow Up    Length of Stay (days): 19    SW consulted with [facility] Admissions ARU/TCU. Pt is not appropriate for discharge today d/t IV's and other medical interventions that are not TCU appropriate. SW will work with Med Team and Care Management team to determine when med ready if Pt more appropriate for Long Term Care. PT/OT to offer recs when appropriate.       SW will continue to f/u with POC and any discharge planning PRN.    Antionette Sweeney, BSW, LSW  ICU   PH#: [phone]  Pager#: [phone]  [facility]    For weekend social work needs:  4A, 4C, 4E, 5A, 5B    pager [phone]  6A, 6B, 6C                 pager [phone]  7A, 7B, 7C, 7D, 5C    pager [phone]  ED/OBS                     pager [phone]

## 2022-07-11 NOTE — PROGRESS NOTES
Neurocritical Care Progress Note    Reason for critical care admission: Bilateral SDH  Admitting Team: TAYLOR  Date of Service:  07/11/2022  Date of Admission:  6/22/2022  Hospital Day: 20    Assessment/Plan  Louise Benítez is a 76 year old female w/ PMHx myelodysplastic syndrome, seronegative RA, chronic microcytic anemia, PAD, and recent L SDH (5/12/2022) who was admitted from her TCU on 6/22/2022 after an unwitnessed fall. Was subsequently found to have an acute on chronic L SDH, a new acute R SDH, and increased midline shift w/ trace L uncal herniation now s/p L craniotomy w/ hematoma evacuation.    24 hour events:  Slept well overnight. Awaiting transfer to floor or dispo.    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 ~ resolved  -vEEG stopped 7/8  -Keppra 1000 mg BID      #Metabolic encephalopathy likely 2/2 uremia   #Delirium  -Delirium precautions      #Analgesics & sedation  -Tylenol 975 mg Q6h PRN     CV  #HTN on admission  -Amlodipine 10 mg daily  -Echo 6/24: EF 60-65%, normal RV, no significant valvular abnormalities  -Echo limited on 7/6 unchanged  -Cardiac monitoring     #PAD  No statin, recent LDL 10 (5/13/2022)     Resp  #Aspiration pneumonia  Extubated 7/8  -Wean FIO2 as able  -Pulmonary hygiene and suctioning as needed  -Continuous pulse ox  -Maintain O2 saturations greater than 92%, avoid hyperoxia     GI  #Hx GERD   #Esophageal ulcers  #Gastric ulcers in the GE junction, anterior wall, posterior wall (received one clipping)  Diet: TF, FWF 60 Q4h  #Watery stools  -C-diff negative  Last BM: 7/11, rectal  tube   -Protonix PO 40 mg BID  -NG in place  Bowel regimen: PRN senna-docusate and Miralax  -Will discuss PEG with spouse today      Renal/  -Daily BMP   -IV fluids: TKO    -Electrolyte replacement protocol     Endo  -Hgb A1c 5.5% 6/22   -Monitor glucose levels     Rheum  #Seronegative Rheumatoid Arthritis  -Continue PTA Plaquenil 400mg at bedtime      Heme/Onc  #Chronic Microcytic Anemia, likely 2/2 myelodysplastic syndrome  baseline Hgb ~8.0  -PTA Darbepoetin Inj 60mg Q3wks (last dose 7/5)  -PTA Vit B12 500 mcg daily  -CBC daily  -Hgb goal > 7 in acute period post-surgery, plt goal > 50k  -Transfuse to meet Hgb and plt goals     #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  -Outside oncology records from [facility] - need to review     ID  #Leukocytosis; 13.3->13.2  #Aspiration pneumonia  -4+ S.aureus in sputum, MRSA swab negative  -Ancef 2 g Q8h x 5 days (end 7/11)  -7/10 C-diff negative  -Daily CBC  -Monitor temperature curve     MSK  #Chronic Back Pain w/ lumbar spondylosis and hx of lumbar compression fracture  #Polymyalgia Rheumatica w/hx of GCA  #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, 2003)     #Osteoporosis  -Hold PTA Denosumab Inj 60mg  -Hold Ca/Vit D3     #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 Upon evaluation, this patient had a SDH with brain compression s/p L craniotomy for hematoma evacuation, acute hypoxic respiratory failure, and pneumonia now on abx, which required my direct attention, intervention, and personal management. Greater than 50% of my time was spent at  "the bedside counseling the patient and/or coordinating care regarding services listed in this note.     The patient was discussed with the NCC attending, Dr. Reynolds.     Ciera CHOU CNP  NeuroCritical Care Nurse Practitioner  Pager: [phone]  Ascom: *[phone] available M- 0700 to 1900    24 Hour Vital Signs Summary:  Temp: 98.4  F (36.9  C) Temp  Min: 97.9  F (36.6  C)  Max: 98.9  F (37.2  C)  Resp: 17 Resp  Min: 6  Max: 29  SpO2: 96 % SpO2  Min: 95 %  Max: 97 %  Pulse: 77 Pulse  Min: 67  Max: 82   BP: (!) 151/61 Systolic (24hrs), Av , Min:118 , Max:176   Diastolic (24hrs), Av, Min:47, Max:114      Respiratory monitoring:   Resp: 17  RA    I/O last 3 completed shifts:  In: 2215 [I.V.:575; NG/GT:560]  Out: 1775 [Urine:1175; Stool:600]    Current Medications:   amLODIPine  10 mg Oral or Feeding Tube Daily    ceFAZolin  2 g Intravenous Q8H    cyanocobalamin  500 mcg Oral Daily    [Held by provider] heparin ANTICOAGULANT  5,000 Units Subcutaneous Q8H    hydroxychloroquine  400 mg Oral or Feeding Tube At Bedtime    levETIRAcetam  1,000 mg Intravenous Q12H    multivitamins w/minerals  15 mL Per Feeding Tube Daily    pantoprazole  40 mg Oral BID AC    polyethylene glycol  17 g Oral Daily    protein modular  1 packet Per Feeding Tube BID 09 12    sodium chloride (PF)  3 mL Intracatheter Q8H       PRN Medications:  acetaminophen, albuterol, atropine, bisacodyl, hydrALAZINE, labetalol, naloxone **OR** naloxone **OR** naloxone **OR** naloxone, ondansetron **OR** ondansetron, senna-docusate, sodium chloride (PF)    Infusions:  None    No Known Allergies    Physical Examination:  Vitals: BP (!) 151/61   Pulse 77   Temp 98.4  F (36.9  C) (Oral)   Resp 17   Ht 1.626 m (5' 4.02\")   Wt 80.8 kg (178 lb 1.6 oz)   SpO2 96%   BMI 30.56 kg/m    General: Adult female patient, lying in bed, NAD   HEENT: Normocephalic, atraumatic, no icterus, oral cavity/oropharynx pink and moist  Cardiac: RRR, s1/s2 auscultated " without murmur, S3/S4  Chest: CTAB, unlabored, expansion symmetric, no retractions or use of accessory muscles  Abdomen: Soft, non-distended, hyperactive bowel sounds present  Extremities: Warm, 2-3+ generalized edema, distal pulses +1, weak  Skin: Bruising   Psych: Calm, hypoactive  Neuro:  Mental status: Awake, alert, oriented to self, follows commands. Language is fluent and coherent with intact comprehension of complex commands, naming and repetition.     Cranial nerves: PERRL, conjugate gaze, EOMI, pupils +3 round and reactive, facial sensation intact, shoulder shrug strong, cough and gag present.  Motor: Normal bulk and tone. No abnormal movements. 4/5 strength in bilateral upper extremities with purposeful movement. 2/5 strength in bilateral lower extremities, slight movement, withdraws.  Sensory: Intact to light touch x 4 extremities  Coordination: KELLY, deferred.   Gait: KELLY, deferred.    Labs:  Recent Labs   Lab 07/11/22  0414 07/10/22  0416 07/09/22  2244 07/09/22  1606 07/09/22  1021 07/09/22  0436 07/08/22  2238 07/07/22  0313 07/06/22  1839 07/06/22  1347 07/05/22  0342 07/04/22  1936    140  140 142 145 148*  145 148* 150*  149*   < >  --  153*   < >  --    POTASSIUM 4.2 4.1  --   --  4.2 4.0 4.0  4.0   < >  --  3.7   < >  --    CHLORIDE 110* 111*  --   --  117*  --  120*   < >  --  126*   < >  --    CO2 24 21*  --   --  20*  --  21*   < >  --  19*   < >  --    BUN 17.1 21.9  --   --  26.5*  --  29.1*   < >  --  56.9*   < >  --    CR 0.38* 0.39*  --   --  0.43*  --  0.52   < >  --  0.70   < >  --    MENDEZ 8.5* 8.3*  --   --  8.2*  --  8.1*   < >  --  8.0*   < >  --    BILITOTAL  --   --   --   --   --   --   --   --  0.9 1.0  --  0.7   ALKPHOS  --   --   --   --   --   --   --   --   --  105*  --  200*   ALT  --   --   --   --   --   --   --   --   --  44*  --  110*   AST  --   --   --   --   --   --   --   --   --  52*  --  126*    < > = values in this interval not displayed.       Recent Labs    Lab 07/11/22  0414 07/10/22  0416 07/09/22  1606 07/09/22  0436 07/08/22  1021 07/08/22  0126   WBC 13.2* 13.3*  --  12.8*  --  12.7*   HGB 10.0* 8.9* 9.2* 8.7*   < > 6.8*   * 408  --  366  --  352    < > = values in this interval not displayed.       Recent Labs   Lab 07/04/22  1637   PH 7.45   PCO2 32*   PO2 76*   HCO3 22       All cultures:  Recent Labs   Lab 07/05/22  0906 07/04/22  2044 07/04/22 2029   CULTURE 2+ Normal panda  4+ Staphylococcus aureus* No Growth No Growth       Imaging:    All relevant imaging and laboratory values personally reviewed.

## 2022-07-11 NOTE — PLAN OF CARE
Neuro: Exam unchanged. Waxes/wanes alert/drowsy. Follows simple commands only. Nonverbal. Spont moves all exts with 4/5 strength in uppers.  Resp: Sats good on RA. LS coarse.  CV: NSR in 60s-70s with very rare ectopy. SBP below goal without intervention. Dependent edema. Afebrile.   : Voiding adequately via purewick.   GI: TF at goal of 45 with 60q4h FWF. Watery stool output via rect tube, moderate leakage.  Skin: Perianal and perineal redness - barrier applied. Skin tear dressings CDI. Bruising on uppers.   Activity: Lift dependent.  Labs: No EL replacement necessary.  Misc:       Plan: Continue to monitor neuro status and liberalize neuro checks. Possibly get floor orders?      Goal Outcome Evaluation:    Plan of Care Reviewed With: patient     Overall Patient Progress: no change

## 2022-07-12 NOTE — PROGRESS NOTES
[facility]  Palliative Care Daily Progress Note       Recommendations & Counseling       Jd plans to meet and discuss PEG tube with both daughters before making a decision.     Thank you for the opportunity to participate in the care of this patient and family. Our team: will continue to follow.     During regular M-F work hours ([phone]) -- if you are not sure who specifically to contact -- please contact us in [facility] Smart Web.     After regular work hours and on weekends/holidays, you can call our answering service at [phone].     Clarice Miller PA-C  [facility]  Contact information available via [facility] Paging/Directory    Attestation:  Total time on the floor involved in the patient's care: 25 minutes  Total time spent in counseling/care coordination: >50%      Assessments          Louise Benítez is a 76 year old female with PMHx myelodysplastic syndrome, seronegative RA, chronic microcytic anemia, PAD, and recent L SDH (5/12/2022) who was admitted from her TCU on 6/22/2022 after an unwitnessed fall. Was subsequently found to have an acute on chronic L SDH, a new acute R SDH, and increased midline shift w/ trace L uncal herniation now s/p L craniotomy w/ hematoma evacuation. Course complicated by GIB requiring reintubation. Now extubated.     Today, the patient was seen for:  Acute on chronic L SDH s/p L craniotomy w/hematoma evacuation  Acute R SDH  Dysphagia  Goals of care    Prognosis, Goals, or Advance Care Planning was addressed today with: Yes.  Mood, coping, and/or meaning in the context of serious illness were addressed today: Yes.            Interval History:     Chart review/discussion with unit or clinical team members:   Patient now extubated. Primary team speaking with family about PEG tube.    Per patient or family/caregivers today:  Co-visit with palliative  to see Louise this morning.  No family at bedside. Louise was awake. She is able to nod yes/no to some questions. Denies pain. Unclear how much she is understanding as she is nonverbal.    Visited room in the afternoon. Louise was asleep, but  Jd at bedside. He states progress is slow, but overall he and the family are pleased about the care Louise has been receiving. Discussed the concerns mentioned at prior care conference, about whether Louise would be ok with the cares she is receiving and concerns about her quality of life. Jd states that overall family is feeling less distress about this issue. Louise has been trying to speak to Jd at times, but words not always intelligible. Jd shared that one of his granddaughters is starting college at [employer], so his daughter is there with her. When she returns, he plans to speak with both daughters about the PEG tube. He asked about risks or complications with PEG tube which we discussed. Encouraged him also to ask primary team these questions as well. He wonders about how much she will be able to recover. Validated their concerns about her overall trajectory and how her quality of life may be impacted.     Key Palliative Symptoms:  We are not helping to manage these symptoms currently in this patient.             Review of Systems:     A comprehensive ROS has been negative other than stated in the HPI           Medications:     I have reviewed this patient's medication profile and medications during this hospitalization.           Physical Exam:   Temp: 98.7  F (37.1  C) Temp src: Axillary BP: (!) 155/67 Pulse: 76   Resp: 19 SpO2: 94 % O2 Device: Nasal cannula Oxygen Delivery: 1 LPM  Gen: Lying in bed. Appears comfortable, in NAD  HEENT: NCAT. Conjunctiva clear. Sclera anicteric. NGT in place.  Resp: No increased work of breathing on O2 via nasal cannula  Msk: no gross deformity  Skin:  Warm, dry, no jaundice  Ext: warm, well perfused.  Neuro: Awake. Shakes/nods head appropriately to  yes/no questions. Non verbal             Data Reviewed:     Reviewed recent pertinent imaging, comments:   Results for orders placed or performed during the hospital encounter of 06/22/22   Head CT w/o contrast   Result Value Ref Range    Radiologist flags (Urgent)      Acute subdural hematomas with mass effect as described    Impression    Impression:  1. Active hemorrhage superimposed upon Acute and chronic left-sided  mixed density subdural measuring up to 2.3 cm in maximum thickness  with 12 mm of rightward midline shift and trace left-sided uncal  herniation.  2. Acute subdural along the right frontal convexity measuring up to 7  mm in maximum thickness.    [Urgent Result: Acute subdural hematomas with mass effect as described  above.] Concern for active hemorrhage.    Finding was identified on 6/22/2022 6:04 PM.     Dr. Sanchez was contacted by Dr. Leung and Dr. Lindquist at  6/22/2022 6:09 PM and was again contacted at 6:30 PM and verbalized  understanding of the urgent finding.      I have personally reviewed the examination and initial interpretation  and I agree with the findings.    BONI LINDQUIST MD         SYSTEM ID:  [id number]   Cervical spine CT w/o contrast    Impression    Impression:   No acute fracture or traumatic subluxation.    I have personally reviewed the examination and initial interpretation  and I agree with the findings.    BONI LINDQUIST MD         SYSTEM ID:  [id number]   XR Chest Port 1 View    Impression    IMPRESSION:  ET tube is 2.5 cm above the leo. No acute consolidative airspace  disease. Left lower lobe atelectasis suspected.    I have personally reviewed the examination and initial interpretation  and I agree with the findings.    ROYA EDMOND MD         SYSTEM ID:  [id number]   CT Head w/o Contrast    Impression    Impression:  1. Postsurgical changes of left frontoparietal craniotomy and subdural  hematoma evacuation.  2. Decreased size of the heterogeneous  fluid collection overlying the  left cerebral convexity.  3. Decreased rightward midline shift. Decreased effacement of the left  lateral ventricle.  4. Stable appearance of the subdural hematomas overlying the right  cerebral convexity and anterior falx.    I have personally reviewed the examination and initial interpretation  and I agree with the findings.    OSVALDO CONNOLLY MD         SYSTEM ID:  [id number]   XR Chest Port 1 View    Impression    IMPRESSION:   1. ET tube is in the mid thoracic trachea.  2. Decreased perihilar pulmonary opacities.    I have personally reviewed the examination and initial interpretation  and I agree with the findings.    ROYA EDMOND MD         SYSTEM ID:  [id number]   CT Head w/o Contrast    Impression    Impression:    1. Reduced size of subdural fluid collection deep to the left  frontoparietal craniotomy.  2. Reduced mass effect and left to right midline shift now measuring 7  mm previously 13 mm when measured in a similar fashion.  3. Similar appearance of subdural hemorrhage along the anterior falx  and extending along the right anterior cranial fossa.  4. Unchanged appearance of small subdural hemorrhage along the  bilateral tentorial leaflets.     I have personally reviewed the examination and initial interpretation  and I agree with the findings.    OSVALDO CONNOLLY MD         SYSTEM ID:  [id number]   XR Abdomen Port 1 View    Impression    Impression:   1. Enteric tube is subdiaphragmatic with tip and sidehole projecting  over the expected location of the stomach  2. Increased bibasilar pulmonary opacities.    I have personally reviewed the examination and initial interpretation  and I agree with the findings.    DARSHAN DIXON MD         SYSTEM ID:  [id number]   CT Head w/o Contrast    Impression    Impression:    1. Slightly reduced size of subdural fluid collection deep to the left  frontotemporal craniotomy site.  2. Unchanged mass effect on the left cerebrum and unchanged  left to  right midline shift.  3. Unchanged additional subdural fluid collection along the right  anterior cranial fossa and extension along the anterior falx.  4. Unchanged appearance of small subdural hemorrhages along the  bilateral tentorial leaflets..     I have personally reviewed the examination and initial interpretation  and I agree with the findings.    OSVALDO CONNOLLY MD         SYSTEM ID:  [id number]   XR Chest Port 1 View    Impression    Impression:   ET tube no longer visualized. Increased interstitial pulmonary edema  and bibasilar atelectasis and/or pneumonia.    I have personally reviewed the examination and initial interpretation  and I agree with the findings.    ANGELIQUE DENTON MD         SYSTEM ID:  [id number]   XR Chest Port 1 View    Impression    Impression:   1. Slightly decreased right lower lobe opacities.  2. Slight increase in dense retrocardiac consolidation which may  represent atelectasis versus infection/aspiration.    I have personally reviewed the examination and initial interpretation  and I agree with the findings.    GISELE BURGESS MD         SYSTEM ID:  [id number]   CT Head w/o Contrast    Impression    Impression:  1. Unchanged size of left cerebral convexity subdural fluid collection  status post subdural drain removal.  2. Unchanged size of right frontal subdural hemorrhage with extension  along the anterior falx.  3. Unchanged size of small subdural hemorrhage extending along the  tentorial leaflets.  4. Unchanged 6 mm of left-to-right midline shift.    I have personally reviewed the examination and initial interpretation  and I agree with the findings.    SAVANNA COHEN MD         SYSTEM ID:  [id number]   XR Chest Port 1 View    Impression    Impression: Increased effusions, atelectasis and interstitial edema.    I have personally reviewed the examination and initial interpretation  and I agree with the findings.    JOVANA CORRIGAN MD         SYSTEM ID:  [id number]   XR Chest  Port 1 View    Impression    IMPRESSION:   1. Similar bibasilar predominant pulmonary opacities concerning for  aspiration or infection. Superimposed atelectasis and probable mild  pulmonary edema is suspected.  2. Stable pleural effusions.    I have personally reviewed the examination and initial interpretation  and I agree with the findings.    LOS BARAJAS DO         SYSTEM ID:  [id number]   XR Abdomen Port 1 View    Impression    IMPRESSION:   Enteric tube tip and side port projecting over the stomach..    I have personally reviewed the examination and initial interpretation  and I agree with the findings.    ROYA EDMOND MD         SYSTEM ID:  [id number]   XR Chest Port 1 View    Impression    IMPRESSION:   Trace bilateral pleural effusions with overlying bibasilar opacities,  improved from prior.    I have personally reviewed the examination and initial interpretation  and I agree with the findings.    LOS BARAJAS DO         SYSTEM ID:  [id number]   XR Abdomen Port 1 View    Impression    Impression: Enteric tube tip and sidehole in the stomach.    LOS BARAJAS DO         SYSTEM ID:  [id number]   XR Abdomen Port 1 View    Impression    IMPRESSION:   Enteric tube tip and side-port overlying the stomach.    I have personally reviewed the examination and initial interpretation  and I agree with the findings.    LOS BARAJAS DO         SYSTEM ID:  [id number]   CT Head w/o Contrast    Impression    Impression:    1. Increased size of left cerebral convexity subdural hematoma without  increase in density and with decreased size of left frontal seen  subdural hemorrhage suggests redistribution of blood products.  Attention is recommended on follow-up imaging.  2. stable size of right cerebral convexity subdural hematoma.  3. Stable 6 mm of rightward midline shift.         BONI LINDQUIST MD         SYSTEM ID:  [id number]   XR Chest Port 1 View    Impression    IMPRESSION: Continued right lower lung  atelectasis or mild edema. No  significant changes otherwise.    JOVANA CORRIGAN MD         SYSTEM ID:  [id number]   XR Chest Port 1 View    Impression    IMPRESSION:     1. Left upper extremity PICC line terminates in the high SVC. No  pneumothorax.  2. Continued right lower lung atelectasis or mild edema. No  significant changes otherwise.    I have personally reviewed the examination and initial interpretation  and I agree with the findings.    JOVANA CORRIGAN MD         SYSTEM ID:  [id number]   CT Head w/o Contrast    Impression    Impression:    1. Stable size and the distribution of the subdural hemorrhage along  the right frontal and left frontoparietal convexities, falx and  tentorium.  2. Stable postsurgical changes of left frontoparietal craniotomy and  subdural hematoma evacuation.     I have personally reviewed the examination and initial interpretation  and I agree with the findings.    BONI LINDQUIST MD         SYSTEM ID:  [id number]   CT Chest Pulmonary Embolism w Contrast    Impression    IMPRESSION:   1. Exam is negative   for acute pulmonary embolism.    Evidence for right heart strain or increased right heart pressures?   is not present.     2. Mucous plugging and linear atelectasis in the right lower lobe.  Aspiration or other infectious consolidation cannot be ruled out.    3. Ectatic thoracic aorta, approximately 4.2 cm.    In the event of a positive result for acute pulmonary embolism  resulting in right heart strain, consider calling the   [facility] patient placement ([phone]) for PERT (Pulmonary Embolism  Response Team) Activation?    PERT -- Pulmonary Embolism Response Team (Multidisciplinary team  including cardiology, interventional radiology, critical care,  hematology)    I have personally reviewed the examination and initial interpretation  and I agree with the findings.    JOVANA CORRIGAN MD         SYSTEM ID:  [id number]   XR Chest Port 1 View    Impression    IMPRESSION:  Endotracheal tube tip in the mid to distal thoracic  trachea. Bibasilar atelectasis. Left upper portion of the PICC line  tip possibly in the upper portion of the azygos vein. Atherosclerosis.    JOVANA CORRIGAN MD         SYSTEM ID:  [id number]   XR Abdomen Port 1 View    Impression    IMPRESSION:   1. Enteric tube with tip in the stomach and side-port at or just  distal to the gastroesophageal junction.  2. Nonobstructive bowel gas pattern.    I have personally reviewed the examination and initial interpretation  and I agree with the findings.    ROHAN MAGALLON MD         SYSTEM ID:  [id number]   XR Chest Port 1 View    Impression    IMPRESSION:  1. Increased perihilar and bibasilar pulmonary opacities which may  represent atelectasis, edema and/or infection.  2. Stable support devices.     I have personally reviewed the examination and initial interpretation  and I agree with the findings.    JOVANA CORRIGAN MD         SYSTEM ID:  [id number]   CTA Abdomen Pelvis with Contrast    Impression    IMPRESSION:   1. No evidence of gastrointestinal bleed as questioned.  2. Interval new subacute appearing right sacral insufficiency fracture  and subacute appearing left pubic ramus fracture since 5/12/2022.  3. Age-indeterminate compression deformity of T10 with approximately  30% height loss. Multiple subacute appearing anterior rib fractures,  and unchanged appearance of L1, L3, and L5 compression deformities.  4. Small sliding-type hiatal hernia with hemostasis clip noted in the  gastric fundus.  5. Aortobiiliac atherosclerotic atherosclerosis with multiple foci of  arterial stenosis including the renal arterial origins, common iliac  vessels, and near total occlusions of the proximal internal iliac  arteries and common femoral arteries proximally.  6. Unchanged appearance of left inguinal hernia containing a loop of  sigmoid colon, no signs of obstruction.  7. 5 mm hypodensity in the pancreatic head may represent a  sidebranch  IPMN although MRI is recommended to further characterize on an  outpatient basis, pancreatic cystic follow-up is recommended below.  Per [facility] criteria.  8. Indeterminate hepatic hypodensities are too small to further  characterize but could be evaluated further at time of MRI for above  pancreatic lesion.  9. 1.4 cm right ovarian cyst for which annual follow-up is recommended  in a postmenopausal female.  10. Trace effusions and associated atelectasis.    [facility] recommendations for asymptomatic pancreatic  cysts, modified from international consensus guidelines*   Size of largest cyst:   Less than 1 cm: Follow-up imaging in 6 months to 1 year, then lengthen  interval to 2-3 years if no change.  1-2 cm: Follow-up imaging at 6 months, then yearly for 2 years, then  lengthen interval if no change.   The optimal initial study or first follow-up exam is MRI performed  with intravenous gadolinium contrast to allow full cyst  characterization. Once characterized, future follow-up MRIs can be  performed without contrast. If MRI is contraindicated, CT with  contrast is recommended.   GI consultation for possible endoscopic ultrasound is recommended for  cysts with the following features: Size > 2 cm, >20% growth on  followup, wall thickening or enhancement, mural nodule, duct > 5 mm,  or abrupt change in duct caliber with distal atrophy. GI or surgical  consultation is also recommended for symptomatic cysts.   *Reference: International Consensus Guidelines for Management of IPMN  and MCN of the pancreas. Pancreatology: 12:(2012); 183-197.    I have personally reviewed the examination and initial interpretation  and I agree with the findings.    ILIANA JEFFERSON MD         SYSTEM ID:  [id number]   XR Abdomen Port 1 View    Impression    IMPRESSION:   1. Feeding tube with tip in the proximal jejunum.   2. Enteric tube with tip and side-port overlying the stomach.  3. Non obstructive  bowel gas pattern.  4. Small bilateral pleural effusions.    I have personally reviewed the examination and initial interpretation  and I agree with the findings.    ROYA EDMOND MD         SYSTEM ID:  [id number]   Echo Complete   Result Value Ref Range    LVEF  60-65%    Echocardiogram Limited   Result Value Ref Range    LVEF  60-65%        Reviewed recent labs, comments:   Na 142  K 4.0  Creat 0.37  WBC 11.3  Hgb 9.0  Plt 427k

## 2022-07-12 NOTE — PLAN OF CARE
SLP: Clinical swallow eval orders received. Per nursing, pt with limited command following and is not yet appropriate for participation in eval. Will follow up as appropriate.

## 2022-07-12 NOTE — PLAN OF CARE
Neuro: Eyes open spontaneously, pupils conjugate, equal round reactive. Follows simple commands (squeezed hands, wiggles toes). Nonverbal.     CV: Hemodynamically stable. Generalized edema. DP pulses 1+ weak.     Pulm: O2 sats 98% on 1L nasal cannula. Normal breathing pattern. Fair, productive cough. Lung sounds diminished/coarse. Frequent oral and nasal suctioning.     GI: Rectal tube in place. Loose, watery stool. TF at goal 45mL/hr. FWF 60mL Q4hrs.     : Purewick in place. Adequate output.     Gtts: none    Skin: See PCS for detailed skin assessment. All dressings changed this shift. Non-weight bearing. Lift to transfer to chair.     Pain: Nonverbal indicators absent.     Possible plan to transfer to floor today. Notify provider of changes.     Jo Pickard RN on 7/12/2022 at 6:47 AM

## 2022-07-12 NOTE — PROGRESS NOTES
Neurocritical Care Progress Note    Reason for critical care admission: Bilateral SDH  Admitting Team: TAYLOR  Date of Service:  07/12/2022  Date of Admission:  6/22/2022  Hospital Day: 21    Assessment/Plan  Louise Benítez is a 76 year old female w/ PMHx myelodysplastic syndrome, seronegative RA, chronic microcytic anemia, PAD, and recent L SDH (5/12/2022) who was admitted from her TCU on 6/22/2022 after an unwitnessed fall. Was subsequently found to have an acute on chronic L SDH, a new acute R SDH, and increased midline shift w/ trace L uncal herniation now s/p L craniotomy w/ hematoma evacuation.    24 hour events:  Discussing PEG placement with family. Will touch base again today. Awaiting transfer to floor. Dispo pending PEG placement.    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 ~ resolved  -vEEG stopped 7/8  -Keppra 1000 mg BID      #Metabolic encephalopathy likely 2/2 uremia   #Delirium  -Delirium precautions      #Analgesics & sedation  -Tylenol 975 mg Q6h PRN     CV  #HTN on admission  -Amlodipine 10 mg daily  -Echo 6/24: EF 60-65%, normal RV, no significant valvular abnormalities  -Echo limited on 7/6 unchanged  -Cardiac monitoring     #PAD  No statin, recent LDL 10 (5/13/2022)     Resp  #Aspiration pneumonia  Extubated 7/8  -Wean FIO2 as able  -Pulmonary hygiene and suctioning as needed  -Continuous pulse ox  -Maintain O2 saturations greater than 92%, avoid hyperoxia     GI  #Hx GERD   #Esophageal ulcers  #Gastric ulcers in the GE junction, anterior wall, posterior wall (received one clipping)  Diet: TF,  FWF 60 Q4h  #Watery stools  -C-diff negative  Last BM: 7/11, rectal tube   -Protonix PO 40 mg BID  -NG in place  Bowel regimen: PRN senna-docusate and Miralax  -Will continue discussion on PEG with family      Renal/  -Daily BMP   -IV fluids: TKO    -Electrolyte replacement protocol     Endo  -Hgb A1c 5.5% 6/22   -Monitor glucose levels     Rheum  #Seronegative Rheumatoid Arthritis  -Continue PTA Plaquenil 400mg at bedtime      Heme/Onc  #Chronic Microcytic Anemia, likely 2/2 myelodysplastic syndrome  baseline Hgb ~8.0  -PTA Darbepoetin Inj 60mg Q3wks (last dose 7/5)  -PTA Vit B12 500 mcg daily  -CBC daily  -Hgb goal > 7 in acute period post-surgery, plt goal > 50k  -Transfuse to meet Hgb and plt goals     #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  -Outside oncology records from [facility] obtained - need to review     ID  #Leukocytosis; 13.2->11.3  -7/10 C-diff negative  -Daily CBC  -Monitor temperature curve    #Aspiration pneumonia ~ resolved  -4+ S.aureus in sputum, MRSA swab negative  -Ancef coarse completed on 7/11     MSK  #Chronic Back Pain w/ lumbar spondylosis and hx of lumbar compression fracture  #Polymyalgia Rheumatica w/hx of GCA  #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, 2003)     #Osteoporosis  -Hold PTA Denosumab Inj 60mg  -Hold Ca/Vit D3     #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 Upon evaluation, this patient had a SDH with brain compression s/p L craniotomy for hematoma evacuation, acute hypoxic respiratory failure, and pneumonia now on abx, which required my direct attention,  "intervention, and personal management. Greater than 50% of my time was spent at the bedside counseling the patient and/or coordinating care regarding services listed in this note.     The patient was discussed with the NCC attending, Dr. Reynolds.     Ciera CHOU CNP  NeuroCritical Care Nurse Practitioner  Pager: [phone]  Ascom: [phone] available [facility] 0700 to 1900    24 Hour Vital Signs Summary:  Temp: 98.7  F (37.1  C) Temp  Min: 98.5  F (36.9  C)  Max: 98.7  F (37.1  C)  Resp: 13 Resp  Min: 9  Max: 29  SpO2: 98 % SpO2  Min: 94 %  Max: 100 %  Pulse: 67 Pulse  Min: 65  Max: 87  BP: 111/49 Systolic (24hrs), Av , Min:102 , Max:158   Diastolic (24hrs), Av, Min:49, Max:100      Respiratory monitoring:   Resp: 13  RA; 1L/NC    I/O last 3 completed shifts:  In: 2131 [I.V.:556; NG/GT:495]  Out: 1300 [Urine:1100; Stool:200]    Current Medications:    amLODIPine  10 mg Oral or Feeding Tube Daily     cyanocobalamin  500 mcg Oral Daily     heparin ANTICOAGULANT  5,000 Units Subcutaneous Q8H     hydroxychloroquine  400 mg Oral or Feeding Tube At Bedtime     levETIRAcetam  1,000 mg Oral or Feeding Tube BID     multivitamins w/minerals  15 mL Per Feeding Tube Daily     pantoprazole  40 mg Oral BID AC     polyethylene glycol  17 g Oral Daily     protein modular  1 packet Per Feeding Tube BID 09 12     sodium chloride (PF)  3 mL Intracatheter Q8H       PRN Medications:  acetaminophen, albuterol, atropine, bisacodyl, naloxone **OR** naloxone **OR** naloxone **OR** naloxone, ondansetron **OR** ondansetron, senna-docusate, sodium chloride (PF)    Infusions:  None    No Known Allergies    Physical Examination:  Vitals: /49   Pulse 67   Temp 98.7  F (37.1  C) (Axillary)   Resp 13   Ht 1.626 m (5' 4.02\")   Wt 80 kg (176 lb 5.5 oz)   SpO2 98%   BMI 30.25 kg/m    General: Adult female patient, lying in bed, NAD   HEENT: Normocephalic, atraumatic, no icterus, oral cavity/oropharynx pink and moist  Cardiac: " RRR, s1/s2 auscultated without murmur, S3/S4  Chest: CTAB, unlabored, expansion symmetric, no retractions or use of accessory muscles  Abdomen: Soft, non-distended, hyperactive bowel sounds present  Extremities: Warm, 2-3+ generalized edema, distal pulses +1, weak  Skin: Bruising   Psych: Calm, hypoactive  Neuro:  Mental status: Awake, alert, oriented to self, follows commands. Language is fluent and coherent with intact comprehension of complex commands, naming and repetition.     Cranial nerves: PERRL, conjugate gaze, EOMI, pupils +3 round and reactive, facial sensation intact, shoulder shrug strong, cough and gag present.  Motor: Normal bulk and tone. No abnormal movements. 4/5 strength in bilateral upper extremities with purposeful movement. 2/5 strength in bilateral lower extremities, slight movement, withdraws.  Sensory: Intact to light touch x 4 extremities  Coordination: KELLY, deferred.   Gait: KELLY, deferred.    Labs:  Recent Labs   Lab 07/12/22  0330 07/11/22  0414 07/10/22  0416 07/09/22  2244 07/09/22  1606 07/09/22  1021 07/07/22  0313 07/06/22  1839 07/06/22  1347    141 140  140 142   < > 148*  145   < >  --  153*   POTASSIUM 4.0 4.2 4.1  --   --  4.2   < >  --  3.7   CHLORIDE 108* 110* 111*  --   --  117*   < >  --  126*   CO2 27 24 21*  --   --  20*   < >  --  19*   BUN 19.4 17.1 21.9  --   --  26.5*   < >  --  56.9*   CR 0.37* 0.38* 0.39*  --   --  0.43*   < >  --  0.70   MENDEZ 8.3* 8.5* 8.3*  --   --  8.2*   < >  --  8.0*   BILITOTAL  --   --   --   --   --   --   --  0.9 1.0   ALKPHOS  --   --   --   --   --   --   --   --  105*   ALT  --   --   --   --   --   --   --   --  44*   AST  --   --   --   --   --   --   --   --  52*    < > = values in this interval not displayed.       Recent Labs   Lab 07/12/22  0330 07/11/22  0414 07/10/22  0416 07/09/22  1606 07/09/22  0436   WBC 11.3* 13.2* 13.3*  --  12.8*   HGB 9.0* 10.0* 8.9* 9.2* 8.7*    465* 408  --  366         All  cultures:  Recent Labs   Lab 07/05/22  0906   CULTURE 2+ Normal panda  4+ Staphylococcus aureus*       Imaging:    All relevant imaging and laboratory values personally reviewed.

## 2022-07-12 NOTE — PROGRESS NOTES
[facility], [place]   07/12/2022  Neurosurgery Progress Note:    Assessment:  Ms. Benítez is a 76-year-old female with PMHx myelodysplastic syndrome, seronegative RA, chronic microcytic anemia, PAD, and recent L SDH (5/12/2022) who arrived to [facility] via EMS after an unwitnessed fall at home.  Shortly after arriving, the patient was complaining of a significant headache and nausea, and then her right pupil dilated.  She was no longer protecting her airway, so she was sedated and intubated.  Her right eye pupil then returned to a normal caliber.  A head CT demonstrated a left acute subdural approximately 2.3 cm thick with 12 mm of rightward midline shift.  She was taken to the OR on 6/22 for emergent L craniotomy for SDH evacuation. She was extubated on POD1. Concerns for tube feed aspiration on 6/25.  She underwent emergent EGD by GI due to sharp drop in hemoglobin as well as melanotic stools.  She had 1 gastric ulcer clipped which was the presumed culprit.  Due to requiring the EGD she needed intubation during this time, therefore she was intubated on 7/4/2022 for EGD.  She was also connected to EEG on 7/4/2022 found to be in nonconvulsive status received Keppra load and Ativan and was started on Versed overnight.  On 7/5/2022, her Versed drip was weaned off with confirmation of no status epilepticus. On 7/8 she was extubated and remains stable.     Other diagnoses include:  #Brain compression  #Open evacuation of hematoma on day of admission   #Hyponatremia  #Pulmonary edema    Plan:    Neuro:  #Concern for nonconvulsive status epilepticus  #Subdural hematoma  Repeat CT stable  Serial neurochecks  Video EEG off  Vimpat 100 twice daily stopped  Keppra 1 g twice daily  Delirium precautions    Cardiac:  #Hypotension (likely secondary to GI bleed)  Maps greater than 65  Levophed off, vasopressin off,   Flowtrack stopped    Pulm:  #Respiratory failure-intubated overnight  (7/4/2021)  #DNR/DNI  Currently on room air    GI:  #Upper GI bleed with hemodynamic instability  #Esophageal ulcers  #Gastric ulcers  #Transaminitis  Gastric ulcer status post clipped on evening of 7/4/2022 by GI, GI has signed off  P.o. Protonix 40 mg twice daily  NG in place, will need to discuss with family whether PEG is in line with what Brie would want  C. difficile negative (for watery stools)    Renal:  #Hypernatremia, hyperchloremia  #Uremia  Daily BMP, trend uremia (improving)    Endo:  Monitor glucose levels  Stress dose steroids stopped    Heme:  #Acute blood loss anemia  #History of myelodysplastic syndrome  Subcutaneous heparin held, serial hemoglobins    ID:  Afebrile, AVSS.   -----------------------------------  Bruce Graves MD  Neurosurgery Resident, PGY-3       Please contact neurosurgery resident on call with questions.    Dial * * [phone], enter [phone] when prompted.    -----------------------------------    Interval History: NAEO. FC x 4.     Objective:   Temp:  [98.5  F (36.9  C)-98.7  F (37.1  C)] 98.7  F (37.1  C)  Pulse:  [65-87] 73  Resp:  [9-29] 19  BP: (102-158)/() 153/58  SpO2:  [94 %-100 %] 97 %  I/O last 3 completed shifts:  In: 2131 [I.V.:556; NG/GT:495]  Out: 1300 [Urine:1100; Stool:200]        Gen: Appears comfortable, NAD, laying in bed, appearing older than stated age  HEENT: EEG leads removed  Wound: Incision closed with staples-now removed, incision appears clean dry-no signs of leaking, no wetness appreciated.  See below for photo from 7/5/2022 evening.  Incision cleaned with ChloraPrep.  Pulm: Extubated, on nasal cannula 2 L, no increased work of breathing, satting 98%  Neurologic:  Eyes open spontaneously, minimally tracking, gaze at midline without preference  Follows commands in all 4 extremities  Reflexes 2+, downgoing Babinski's bilaterally      LABS:  Recent Labs   Lab 07/12/22  0330 07/11/22  0414 07/10/22  2146 07/10/22  0416    141  --  140  140    POTASSIUM 4.0 4.2  --  4.1   CHLORIDE 108* 110*  --  111*   CO2 27 24  --  21*   ANIONGAP 7 7  --  8   * 108* 113* 108*   BUN 19.4 17.1  --  21.9   CR 0.37* 0.38*  --  0.39*   MENDEZ 8.3* 8.5*  --  8.3*       Recent Labs   Lab 07/12/22  0330   WBC 11.3*   RBC 2.89*   HGB 9.0*   HCT 27.9*   MCV 97   MCH 31.1   MCHC 32.3   RDW 17.6*          IMAGING:  No results found for this or any previous visit (from the past 24 hour(s)).

## 2022-07-12 NOTE — PLAN OF CARE
Goal Outcome Evaluation:    Major Shift Events: Opens eyes spontaneously. Intermittently follows commands (able to squeeze hands, wiggle toes). Remains nonverbal aside from stating name aloud once during shift. Denies pain. Rectal tube in place and patent. Purewick in place and patent. Good UO throughout shift. Normal Sinus Rhythm. No PRN's given to maintain SBP goal of <160. Generalized edema present. Tube feeds running at goal (45 mL/hr with q4 60mL free water flushes.) Lung sounds coarse. Remains on 1L NC.     Access: Triple Lumen L.) Subclavian - TKO @ 5mL/hr      Plan: Plans for speech to see pt. tomorrow for swallow test.   Transfer orders to 6D when bed becomes available      For vital signs and complete assessments, please see documentation flowsheets.

## 2022-07-13 NOTE — PROGRESS NOTES
Neurocritical Care Progress Note    Reason for critical care admission: Bilateral SDH  Admitting Team: TAYLOR  Date of Service:  07/13/2022  Date of Admission:  6/22/2022  Hospital Day: 22    Assessment/Plan  Louise Benítez is a 76 year old female w/ PMHx myelodysplastic syndrome, seronegative RA, chronic microcytic anemia, PAD, and recent L SDH (5/12/2022) who was admitted from her TCU on 6/22/2022 after an unwitnessed fall. Was subsequently found to have an acute on chronic L SDH, a new acute R SDH, and increased midline shift w/ trace L uncal herniation now s/p L craniotomy w/ hematoma evacuation.    24 hour events:  Orientation improving. Awaiting further discussion on PEG placement. Floor orders.    Neuro  #Acute on chronic L SDH + with acute R SDH w/ increased rightward midline shift and trace L uncal herniation s/p L craniotomy (6/22) w/ hematoma evacuation  #L SDH 2/2 mechanical fall not requiring intervention (hospitalized 5/12-5/20/2022)  -Neurochecks q4hr  -HOB > 30   -MAP > 65 mmHg  -PT/OT/SLP  -Care conference 7/8-->DNR/DNI  -Palliative Care following       #Concern for non-convulsive status epilepticus ~ resolved  -vEEG stopped 7/8  -Keppra 1000 mg BID      #Metabolic encephalopathy likely 2/2 uremia   #Delirium  -Delirium precautions      #Analgesics & sedation  -Tylenol 975 mg Q6h PRN     CV  #HTN on admission  -Amlodipine 10 mg daily  -Echo 6/24: EF 60-65%, normal RV, no significant valvular abnormalities  -Echo limited on 7/6 unchanged  -Cardiac monitoring     #PAD  No statin, recent LDL 10 (5/13/2022)     Resp  Oxygen/vent: Room air  -Continuous pulse ox  -Maintain O2 saturations greater than 92%, avoid hyperoxia     GI  #Hx GERD   #Esophageal ulcers  #Gastric ulcers in the GE junction, anterior wall, posterior wall (received one clipping)  Diet: TF, FWF 60 Q4h  #Watery stools  -C-diff negative  Last BM: 7/13, rectal tube   -Protonix PO 40 mg BID  -NG in place  Bowel regimen: PRN senna-docusate  and Miralax  -Will continue discussion on PEG with family      Renal/  -Daily BMP   -IV fluids: None   -Electrolyte replacement protocol     Endo  -Hgb A1c 5.5% 6/22   -Monitor glucose levels     Rheum  #Seronegative Rheumatoid Arthritis  -Continue PTA Plaquenil 400mg at bedtime      Heme/Onc  #Chronic Microcytic Anemia, likely 2/2 myelodysplastic syndrome  baseline Hgb ~8.0  -PTA Darbepoetin Inj 60mg Q3wks (last dose 7/5)  -PTA Vit B12 500 mcg daily  -CBC daily  -Hgb goal > 7 in acute period post-surgery, plt goal > 50k  -Transfuse to meet Hgb and plt goals     #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  -Outside oncology records from [facility] obtained - need to review     ID  #Afebrile  -Daily CBC  -Monitor temperature curve     #Aspiration pneumonia ~ resolved  -4+ S.aureus in sputum, MRSA swab negative  -Ancef coarse completed on 7/11     MSK  #Chronic Back Pain w/ lumbar spondylosis and hx of lumbar compression fracture  #Polymyalgia Rheumatica w/hx of GCA  #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, 2003)     #Osteoporosis  -Hold PTA Denosumab Inj 60mg  -Hold Ca/Vit D3     #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 Upon evaluation, this patient had a SDH with brain compression s/p L craniotomy for hematoma evacuation, acute hypoxic respiratory failure, and pneumonia now on abx, which required my direct attention, intervention, and personal management. Greater than 50% of my time was spent at the bedside counseling the patient and/or coordinating care regarding services listed in this note.     The  "patient was discussed with the NCC attending, Dr. Reynolds.     Ciera CHOU CNP  NeuroCritical Care Nurse Practitioner  Pager: [phone]  Ascom: [phone] [facility] 0700 to 1900    24 Hour Vital Signs Summary:  Temp: 98.4  F (36.9  C) Temp  Min: 97.7  F (36.5  C)  Max: 99  F (37.2  C)  Resp: 13 Resp  Min: 9  Max: 20  SpO2: 99 % SpO2  Min: 89 %  Max: 100 %  Pulse: 70 Pulse  Min: 67  Max: 96   BP: 119/50 Systolic (24hrs), Av , Min:103 , Max:155   Diastolic (24hrs), Av, Min:48, Max:86      Respiratory monitoring:   Resp: 13  RA    I/O last 3 completed shifts:  In: 1635 [I.V.:75; NG/GT:480]  Out: 2925 [Urine:; Stool:900]    Current Medications:    amLODIPine  10 mg Oral or Feeding Tube Daily     cyanocobalamin  500 mcg Oral Daily     heparin ANTICOAGULANT  5,000 Units Subcutaneous Q8H     hydroxychloroquine  400 mg Oral or Feeding Tube At Bedtime     levETIRAcetam  1,000 mg Oral or Feeding Tube BID     multivitamins w/minerals  15 mL Per Feeding Tube Daily     pantoprazole  40 mg Oral BID AC     polyethylene glycol  17 g Oral Daily     protein modular  1 packet Per Feeding Tube BID 09 12     sodium chloride (PF)  3 mL Intracatheter Q8H       PRN Medications:  acetaminophen, albuterol, atropine, bisacodyl, naloxone **OR** naloxone **OR** naloxone **OR** naloxone, ondansetron **OR** ondansetron, senna-docusate, sodium chloride (PF)    Infusions:  None    No Known Allergies    Physical Examination:  Vitals: /50   Pulse 70   Temp 98.4  F (36.9  C) (Axillary)   Resp 13   Ht 1.626 m (5' 4.02\")   Wt 80.3 kg (177 lb 1.6 oz)   SpO2 99%   BMI 30.38 kg/m    General: Adult female patient, sitting up in chair, NAD   HEENT: Normocephalic, atraumatic, no icterus, oral cavity/oropharynx pink and moist  Cardiac: RRR, s1/s2 auscultated without murmur, S3/S4  Chest: CTAB, unlabored, expansion symmetric, no retractions or use of accessory muscles  Abdomen: Soft, non-distended, hyperactive bowel sounds " present  Extremities: Warm, 2-3+ generalized edema, distal pulses +1, weak  Skin: Bruising   Psych: Calm, hypoactive  Neuro:  Mental status: Awake, alert, oriented to self and family, follows commands. Minimal speaking, one to two words at best. Hoarse, soft.   Cranial nerves: PERRL, conjugate gaze, EOMI, pupils +3 round and reactive, facial sensation intact, shoulder shrug strong, cough and gag present.  Motor: Normal bulk and tone. No abnormal movements. 4/5 strength in bilateral upper extremities with purposeful movement. 2/5 strength in bilateral lower extremities, slight movement, withdraws.  Sensory: Intact to light touch x 4 extremities  Coordination: KELLY, deferred.   Gait: KELLY, deferred.    Labs:  Recent Labs   Lab 07/13/22  0325 07/12/22  0330 07/11/22  0414 07/10/22  0416 07/07/22  0313 07/06/22  1839 07/06/22  1347    142 141 140  140   < >  --  153*   POTASSIUM 4.2 4.0 4.2 4.1   < >  --  3.7   CHLORIDE 108* 108* 110* 111*   < >  --  126*   CO2 28 27 24 21*   < >  --  19*   BUN 21.9 19.4 17.1 21.9   < >  --  56.9*   CR 0.36* 0.37* 0.38* 0.39*   < >  --  0.70   MENDEZ 8.1* 8.3* 8.5* 8.3*   < >  --  8.0*   BILITOTAL  --   --   --   --   --  0.9 1.0   ALKPHOS  --   --   --   --   --   --  105*   ALT  --   --   --   --   --   --  44*   AST  --   --   --   --   --   --  52*    < > = values in this interval not displayed.       Recent Labs   Lab 07/13/22  0325 07/12/22  0330 07/11/22  0414 07/10/22  0416   WBC 9.8 11.3* 13.2* 13.3*   HGB 8.3* 9.0* 10.0* 8.9*    427 465* 408         Imaging:    All relevant imaging and laboratory values personally reviewed.

## 2022-07-13 NOTE — PROGRESS NOTES
CLINICAL NUTRITION SERVICES - REASSESSMENT NOTE     Nutrition Prescription    RECOMMENDATIONS FOR MDs/PROVIDERS TO ORDER:  Continue with TF regimen as ordered.   Total daily fluids/adjustments per MD     Malnutrition Status:    Patient does not meet two of the established criteria necessary for diagnosing malnutrition.     Recommendations already ordered by Registered Dietitian (RD):  [facility] Peptide 1.5 @ 45 mL/hr (1080 mL/day) to provide 1661 kcal (28 kcal/kg), 80 g protein (1.4 g/kg), 149 g CHO, 10 g fiber, 83 g fat (40% from MCTs), and 756 mL free water daily   - Prosource x 2 = 1741 kcals (30 kcals/kg) and 102 gm PRO (1.7 gm/kg)     Future/Additional Recommendations:  Monitor for possible placement of PEG tube.        EVALUATION OF THE PROGRESS TOWARD GOALS   Diet: NPO  Nutrition Support: 6/24 - [employer] Peptide 1.5 @ 45 mL/hr (1080 mL/day) to provide 1661 kcal (28 kcal/kg), 80 g protein (1.4 g/kg), 149 g CHO, 10 g fiber, 83 g fat (40% from MCTs), and 756 mL free water daily +Prosource x 2 = 1741 kcals (30 kcals/kg) and 102 gm PRO (1.7 gm/kg)   Intake: TFs held 7/4-7/5, re-initiated 7/6.   7-day (7/6-7/12) average: 891 mL/day, ~1.9 packets ProSource, 1411 kcal/day (26 g/kg, 96% low end needs), 86 g protein (1.5 g/kg, 97% low end needs)     -EN Access: NJT      NEW FINDINGS   -General:   TFs were held 7/4-7/5 per GI after EGD for GI bleed exploration with clipping of ulcer, TFs re-initiated 7/6 and reached goal 7/7. -Diet/SLP: Per 7/12 SLP note, clinical swallow eval orders received, but pt with limited command following and is not yet appropriate for participation in eval.     Mitts placed on pt 7/13 due to pt attempt to pull out NJ tube, per chart review pt family is meeting in the next few days to discuss possible PEG placement.     -Wt:   07/13/22 0512 80.3 kg (177 lb 1.6 oz) --   07/12/22 0600 80 kg (176 lb 5.5 oz) Bed scale   07/10/22 0600 80.8 kg (178 lb 1.6 oz) Bed scale   07/09/22 0000 77.9 kg (171  lb 11.2 oz) Bed scale   07/07/22 0400 71.7 kg (158 lb) Bed scale   07/06/22 0030 69.4 kg (153 lb) --   07/04/22 0400 66.8 kg (147 lb 4.3 oz) Bed scale   07/03/22 0200 66.9 kg (147 lb 7.8 oz) Bed scale   06/30/22 0400 71.8 kg (158 lb 4.6 oz) Bed scale   Wts experiencing fluctuations, suspect 2/2 to fluid status.     -Labs:  Lytes WNL     -GI:   Pt has a rectal tube in place, with loose watery stool, fluctuating between 200-1650 mL daily.     -Skin:   Pt last seen by [facility] nurse 7/13, presents with healing wounds on R arm and left shin.     -Meds:   Amlodipine daily   Plaquenil daily   Protonix daily   Miralax daily   B12   MVI  Prosource     MALNUTRITION  % Intake: No decreased intake noted- TFs meeting 100% nutrition needs  % Weight Loss: None noted- with fluctuations likely 2/2 fluid status   Subcutaneous Fat Loss: None observed  Muscle Loss: Temporal:, Scapular bone, Upper arm (bicep, tricep), Lower arm  (forearm), Upper leg (quadricep, hamstring), Patellar region and Posterior calf: moderate/severe  Fluid Accumulation/Edema: L/R arm: mild, L/R hand and foot: moderate - cannot r/o other causes of edema aside from nutritional   Malnutrition Diagnosis: Patient does not meet two of the established criteria necessary for diagnosing malnutrition.     Previous Goals   Pt will be able to restart nutrition support within 24 hours pending medical/GI status.  Evaluation: Met    Previous Nutrition Diagnosis  Inadequate protein-energy intake related to advancing EN and disruptions to infusions as evidenced by pt not meeting goal provisions with 7-day averages meeting 21 kcals/kg and 1.3 gm PRO/kg dosing weight with TF currently being HELD over the last ~ 24 hours.     Evaluation: Resolved    CURRENT NUTRITION DIAGNOSIS  Inadequate oral intake related to inability to take PO intake as evidenced by NPO status and TFs meeting 100% nutrition needs.      INTERVENTIONS  Implementation  Enteral Nutrition - maintain   Monitor GI status      Goals  Total avg nutritional intake to meet a minimum of 1475 kcal/kg and 89 g PRO/kg daily (per dosing wt 59 kg).    Monitoring/Evaluation  Progress toward goals will be monitored and evaluated per protocol.    Corinna Caputo, Dietetic Intern    Agree with above   Jyoti Xavier RD, KERRY, [facility]  Neuro ICU  Pager: [phone]

## 2022-07-13 NOTE — PLAN OF CARE
Goal Outcome Evaluation:    Plan of Care Reviewed With: patient     Overall Patient Progress: no change    Outcome Evaluation: Met with pt today to perform NFPE, no change. Pt at goal for TFs, tolerating. Monitor for possible placement of PEG.      Corinna Caputo, Dietetic Intern  Observed by Floor RD

## 2022-07-13 NOTE — PROGRESS NOTES
[facility], [place]   07/13/2022  Neurosurgery Progress Note:    Assessment:  Ms. Benítez is a 76-year-old female with PMHx myelodysplastic syndrome, seronegative RA, chronic microcytic anemia, PAD, and recent L SDH (5/12/2022) who arrived to [facility] via EMS after an unwitnessed fall at home.  Shortly after arriving, the patient was complaining of a significant headache and nausea, and then her right pupil dilated.  She was no longer protecting her airway, so she was sedated and intubated.  Her right eye pupil then returned to a normal caliber.  A head CT demonstrated a left acute subdural approximately 2.3 cm thick with 12 mm of rightward midline shift.  She was taken to the OR on 6/22 for emergent L craniotomy for SDH evacuation. She was extubated on POD1. Concerns for tube feed aspiration on 6/25.  She underwent emergent EGD by GI due to sharp drop in hemoglobin as well as melanotic stools.  She had 1 gastric ulcer clipped which was the presumed culprit.  Due to requiring the EGD she needed intubation during this time, therefore she was intubated on 7/4/2022 for EGD.  She was also connected to EEG on 7/4/2022 found to be in nonconvulsive status received Keppra load and Ativan and was started on Versed overnight.  On 7/5/2022, her Versed drip was weaned off with confirmation of no status epilepticus. On 7/8 she was extubated and remains stable.     Other diagnoses include:  #Brain compression  #Open evacuation of hematoma on day of admission   #Hyponatremia  #Pulmonary edema    Plan:    Neuro:  #Concern for nonconvulsive status epilepticus  #Subdural hematoma  Repeat CT stable  Serial neurochecks  Video EEG off  Vimpat 100 twice daily stopped  Keppra 1 g twice daily  Delirium precautions    Cardiac:  #Hypotension (likely secondary to GI bleed)  Maps greater than 65  Levophed off, vasopressin off,   Flowtrack stopped    Pulm:  #Respiratory failure-intubated overnight  (7/4/2021)  #DNR/DNI  Currently on room air/1NC    GI:  #Upper GI bleed with hemodynamic instability  #Esophageal ulcers  #Gastric ulcers  #Transaminitis  Gastric ulcer status post clipped on evening of 7/4/2022 by GI, GI has signed off  P.o. Protonix 40 mg twice daily  NG in place, will need to discuss with family whether PEG is in line with what Brie would want  C. difficile negative (for watery stools)    Renal:  #Hypernatremia, hyperchloremia  #Uremia  Daily BMP, trend uremia (improving)    Endo:  Monitor glucose levels  Stress dose steroids stopped    Heme:  #Acute blood loss anemia  #History of myelodysplastic syndrome  Subcutaneous heparin started; serial hemoglobins    ID:  Afebrile, AVSS.   -----------------------------------  Bruce Graves MD  Neurosurgery Resident, PGY-3       Please contact neurosurgery resident on call with questions.    Dial * * [phone], enter [phone] when prompted.    -----------------------------------    Interval History: NAEO. FC x 4.     Objective:   Temp:  [97.7  F (36.5  C)-99  F (37.2  C)] 98.4  F (36.9  C)  Pulse:  [67-96] 78  Resp:  [9-20] 16  BP: (103-155)/(48-86) 131/55  SpO2:  [89 %-100 %] 98 %  I/O last 3 completed shifts:  In: 1635 [I.V.:75; NG/GT:480]  Out: 2925 [Urine:2025; Stool:900]        Gen: Appears comfortable, NAD, laying in bed, appearing older than stated age  HEENT: EEG leads removed  Wound: Incision closed with staples-now removed, incision appears clean dry-no signs of leaking, no wetness appreciated.  See below for photo from 7/5/2022 evening.  Incision cleaned with ChloraPrep.  Pulm: Extubated, on nasal cannula 1 L, no increased work of breathing, satting 98%  Neurologic:  Eyes open spontaneously, minimally tracking, gaze at midline without preference  Follows commands in all 4 extremities  Reflexes 2+, downgoing Babinski's bilaterally      LABS:  Recent Labs   Lab 07/13/22  0330 07/13/22  0325 07/13/22  0039 07/12/22  1942 07/12/22  0330 07/11/22  0414    NA  --  142  --   --  142 141   POTASSIUM  --  4.2  --   --  4.0 4.2   CHLORIDE  --  108*  --   --  108* 110*   CO2  --  28  --   --  27 24   ANIONGAP  --  6*  --   --  7 7   * 104* 86   < > 103* 108*   BUN  --  21.9  --   --  19.4 17.1   CR  --  0.36*  --   --  0.37* 0.38*   MENDEZ  --  8.1*  --   --  8.3* 8.5*    < > = values in this interval not displayed.       Recent Labs   Lab 07/13/22  0325   WBC 9.8   RBC 2.74*   HGB 8.3*   HCT 26.8*   MCV 98   MCH 30.3   MCHC 31.0*   RDW 17.7*          IMAGING:  No results found for this or any previous visit (from the past 24 hour(s)).

## 2022-07-13 NOTE — PLAN OF CARE
Major Shift Events: 2000- Pt answering orientation questions and able to state first name and last name, year, state and city. Following commands of shrugging shoulders, turning head side to side, sticking out tongue and giving thumbs up. 0000- not participating in neuro assessment besides small commands- squeezing hands and wiggling toes- nonverbal. Mitts placed on at 0000 due to patient attempting to pull out CVC and NJ. VSS on 2L NC. TF at goal, Purwick in place with good urine output. Rectal tube in place.   Plan: Transfer to 6D when bed available  For vital signs and complete assessments, please see documentation flowsheets.

## 2022-07-13 NOTE — PROGRESS NOTES
Care Management Follow Up    Length of Stay (days): 21    Expected Discharge Date:  TBD     Concerns to be Addressed: Goal of care      Patient plan of care discussed at interdisciplinary rounds: Yes    Anticipated Discharge Disposition:  TBD     Anticipated Discharge Services: TBD   Anticipated Discharge DME:  TBD    Additional Information:  Per discussion with the team and chart review, the team and family had a care conf. last week and the need of PEG were discussed.  Family are discussion among themself regarding the PEG.  RNCC visited pt and spouse for support and to check if they have any question.  Pt spouse stated one of their dtr were out of town and they are planning to discuss today around 5:00.  Pt dtr will be coming to the hospital today at 5:00.    Neurosurgery team have discussed with pt spouse and they are planning to meet with pt dtr around 5:00 to discuss the plan of care and address if they have any questions.  Pt spouse stated pt is more awake today and they are more hopeful.  Pt spouse agreed to call RNCC if they need any RNCC assistance.  RNCC will cont to follow plan of care.      Kyle Johnston RN, PHN, BSN  4A and 4E/ ICU  Care Coordinator  Phone: [phone]  Pager: [phone]    To contact the weekend RNCC  [place] (0800 - 1630) Saturday and Sunday    Units: 4A, 4C, 4E, 5A and 5B- Pager 1: [phone]    Units: 6A, 6B, 6C, 6D- Pager 2: [phone]    Units: 7A, 7B, 7C, 7D, and 5C-Pager 3: [phone]

## 2022-07-13 NOTE — PROGRESS NOTES
[facility]  WO Nurse Inpatient Assessment     Consulted for: Left Lower extremity and Right forearm wounds    Patient History (according to provider note(s):      76-year-old female with PMHx myelodysplastic syndrome, seronegative RA, chronic microcytic anemia, PAD, and recent L SDH (5/12/2022) who arrived to [facility] via EMS after an unwitnessed fall at home.  s/p 6/22 emergent L craniotomy for SDH evacuationa left acute subdural hematoma     Areas Assessed:      Areas visualized during today's visit: Focused: R arm and Left shin    Wound location: R arm and Left shin                                                                                                                                                                                                                                                7/13        Date of photos: 6/23/22 and 6/30  Wound due to: Trauma from wheelchair foot rest pins.    Wound history/plan of care: occurred approximately 6 weeks ago at care facility.  Spouse had been leaving open to air.   Wound base: 40 % yellow fibrin, , 60% pink tissue     Palpation of the wound bed: normal      Drainage: small     Description of drainage: serosanguinous     Measurements (length x width x depth, in cm): 2.5  x 2.5  x  0.1 cm      Tunneling: N/A     Undermining: N/A  Periwound skin: Intact and fragile      Color: normal and consistent with surrounding tissue      Temperature: normal   Odor: none  Pain: no grimacing or signs of discomfort,   Pain interventions prior to dressing change: N/A  Treatment goal: Heal  and Remove necrotic tissue  STATUS: healing and follow up  Supplies ordered: gathered, at bedside and ordered medihoney last week    Wound location: Right forearm- scabbed        Last photo: 6/30/22  Wound due to: Skin Tear  Wound history/plan of care: wounds not present prior to fall, present on admission.  currently with mepilex border dressing.     Wound base: 100 % dry red fibrinous scab      Palpation of the wound bed: normal      Drainage: none     Description of drainage: serosanguinous     Measurements (length x width x depth, in cm): 1  x 0.4  x  0.0 cm      Periwound skin: purple bruising          Temperature: normal   Odor: none  Pain: no grimacing or signs of discomfort,   Pain interventions prior to dressing change: n/a  Treatment goal: Heal almost  STATUS:  healing and follow up  Supplies ordered: gathered  Treatment Plan:     Left lower extremity wound:  Every other day  Cleanse with wound cleanser and gauze.    Paint periwound with no sting barrier film.   Cover wound bed with medihoney, (PS#[id number]) applying nickel thick.   Cover medihoney with vaseline gauze.   Cover vaseline gauze with mepilex border dressing.     Right arm skin tear: every third day  cleanse wound(s) with microklenz spray and gauze.  Pat dry   Cover skin tear with vaseline gauze.   Cover vaseline gauze with mepilex border dressing.     Orders: Reviewed    RECOMMEND PRIMARY TEAM ORDER: None, at this time  Education provided: plan of care and wound progress  Discussed plan of care with: Family  WO nurse follow-up plan: weekly  Notify WOC if wound(s) deteriorate.  Nursing to notify the Provider(s) and re-consult the WOC Nurse if new skin concern.    DATA:     Current support surface: Standard  Low air loss mattress  BMI: Body mass index is 30.38 kg/m .   Active diet order: Orders Placed This Encounter      NPO for Medical/Clinical Reasons Except for: No Exceptions     Output: I/O last 3 completed shifts:  In: 1635 [I.V.:75; NG/GT:480]  Out: 2925 [Urine:2025; Stool:900]     Labs:   Recent Labs   Lab 07/13/22  0325 07/07/22  0313 07/06/22  1839 07/06/22  1757 07/06/22  1347   ALBUMIN  --   --   --   --  2.5*   HGB 8.3*   < > 7.2*  7.2*   < > 7.4*   INR  --   --  1.34*  --   --    WBC 9.8   < > 27.0*  --   --     < > = values in this interval not displayed.     Pressure injury  risk assessment:   Sensory Perception: 2-->very limited  Moisture: 3-->occasionally moist  Activity: 1-->bedfast  Mobility: 2-->very limited  Nutrition: 3-->adequate  Friction and Shear: 2-->potential problem  Leobardo Score: 13

## 2022-07-13 NOTE — PLAN OF CARE
Goal Outcome Evaluation:    Transferred to: Unit 6D at 1700    Belongings: Sent with patient.    Vasquez removed? YES- purewick in place   Central line removed? No  Chart and medications sent with patient Yes  Family notified: Yes

## 2022-07-14 NOTE — ADDENDUM NOTE
Addendum  created 07/13/22 2305 by Juan Diego Jones MD    Clinical Note Signed, Intraprocedure Attestations deleted, Intraprocedure Blocks edited, SmartForm saved

## 2022-07-14 NOTE — PROGRESS NOTES
[facility], [place]   07/14/2022  Neurosurgery Progress Note:    Assessment:  Ms. Benítez is a 76-year-old female with PMHx myelodysplastic syndrome, seronegative RA, chronic microcytic anemia, PAD, and recent L SDH (5/12/2022) who arrived to [facility] via EMS after an unwitnessed fall at home.  Shortly after arriving, the patient was complaining of a significant headache and nausea, and then her right pupil dilated.  She was no longer protecting her airway, so she was sedated and intubated.  Her right eye pupil then returned to a normal caliber.  A head CT demonstrated a left acute subdural approximately 2.3 cm thick with 12 mm of rightward midline shift.  She was taken to the OR on 6/22 for emergent L craniotomy for SDH evacuation. She was extubated on POD1. Concerns for tube feed aspiration on 6/25.  She underwent emergent EGD by GI due to sharp drop in hemoglobin as well as melanotic stools.  She had 1 gastric ulcer clipped which was the presumed culprit.  Due to requiring the EGD she needed intubation during this time, therefore she was intubated on 7/4/2022 for EGD.  She was also connected to EEG on 7/4/2022 found to be in nonconvulsive status received Keppra load and Ativan and was started on Versed overnight.  On 7/5/2022, her Versed drip was weaned off with confirmation of no status epilepticus. On 7/8 she was extubated and remains stable.     Other diagnoses include:  #Brain compression  #Open evacuation of hematoma on day of admission   #Hyponatremia  #Pulmonary edema  #GI bleed  #Uremia    Plan:  Continue Keppra 1 g twice daily until seen by neurology and as as outpatient  Please continue delirium precautions  Continue p.o. Protonix 40 mg twice daily  Daily BMP/CBC  Subcutaneous heparin for DVT prophylaxis    -----------------------------------  Bruce Graves MD  Neurosurgery Resident, PGY-3       Please contact neurosurgery resident on call with questions.    Dial * * *[phone],  enter [phone] when prompted.    -----------------------------------    Interval History: More somnolent and.  Stopped following commands overnight.  Head CT obtained stable.  Subsequently following commands briskly x4    Objective:   Temp:  [98.4  F (36.9  C)-98.7  F (37.1  C)] 98.6  F (37  C)  Pulse:  [68-90] 85  Resp:  [10-21] 10  BP: (115-159)/(45-84) 158/77  SpO2:  [91 %-99 %] 91 %  I/O last 3 completed shifts:  In: 815 [I.V.:45; NG/GT:320]  Out: 1450 [Urine:300; Stool:1150]        Gen: Appears comfortable, NAD, laying in bed, appearing older than stated age  HEENT: EEG leads removed  Wound: Incision closed with staples-now removed, incision appears clean dry-no signs of leaking, no wetness appreciated.  See below for photo from 7/5/2022 evening.  Incision cleaned with ChloraPrep.  Pulm: Extubated, on nasal cannula 1 L, no increased work of breathing, satting 98%  Neurologic:  Eyes open spontaneously, minimally tracking, gaze at midline without preference  Follows commands in all 4 extremities  Reflexes 2+, downgoing Babinski's bilaterally      LABS:  Recent Labs   Lab 07/14/22  0808 07/13/22  0330 07/13/22  0325 07/12/22  1942 07/12/22  0330     --  142  --  142   POTASSIUM 4.4  --  4.2  --  4.0   CHLORIDE 106  --  108*  --  108*   CO2 29  --  28  --  27   ANIONGAP 6*  --  6*  --  7   * 104* 104*   < > 103*   BUN 20.0  --  21.9  --  19.4   CR 0.37*  --  0.36*  --  0.37*   MENDEZ 8.7*  --  8.1*  --  8.3*    < > = values in this interval not displayed.       Recent Labs   Lab 07/14/22  0808   WBC 9.0   RBC 2.88*   HGB 8.8*   HCT 28.8*      MCH 30.6   MCHC 30.6*   RDW 17.8*   *       IMAGING:  Recent Results (from the past 24 hour(s))   CT Head w/o Contrast    Narrative    CT HEAD W/O CONTRAST 7/14/2022 3:08 AM    History: no longer following commands     Comparison: CT 7/4/2022    Technique: Using multidetector thin collimation helical acquisition  technique, axial, coronal and sagittal CT  images from the skull base  to the vertex were obtained without intravenous contrast.   (topogram) image(s) also obtained and reviewed.    Findings:  Postsurgical changes of left frontal parietal craniotomy. Decreased  hyperdense component without significant change in size of the mixed  density subdural hemorrhage along the right frontal and left  frontoparietal convexities. Similar thin extension along the falx and  tentorium. Similar edema throughout the left frontal and parietal  lobes with partial effacement of the cerebral sulci and resultant  midline shift measuring 5 mm, stable. No acute loss of gray-white  matter differentiation. No new intracranial hemorrhage. Ventricles are  proportionate to the cerebral sulci. The basal cisterns are clear. No  acute osseous abnormality. Increased scattered mucosal thickening  throughout the ethmoid air cells and sphenoid locules. Partial  effusions mastoid air cells.      Impression    Impression:  Stable subdural hemorrhage along the right frontal and left frontal  parietal convexities. No new intracranial pathology.    I have personally reviewed the examination and initial interpretation  and I agree with the findings.    OSVALDO CONNOLLY MD         SYSTEM ID:  [id number]

## 2022-07-14 NOTE — PLAN OF CARE
Major Shift Events:  Pt VSS, afebrile. Pt is on RA ad tolerating it well. Monitoring pt neuro status q4hrs. Pt will follow commands in all four extremities. Pt is nonverbal. Pt has purewick and rectal tube in place. Pt having adequate output from each. Pt has TF running through NJ and is tolerating them well.    Plan: Pt will be NPO at midnight and go or PEG tube placement in the morning.     For vital signs and complete assessments, please see documentation flowsheets.

## 2022-07-14 NOTE — CONSULTS
"    Interventional Radiology Consult Service Note    Patient is on IR schedule 7/15 for a G tube placement.   Labs WNL for procedure. COVID neg.    Orders for NPO and antibiotics have been entered.  Medications to be held include: subcutaneous heparin x1 dose  Consent will be done prior to procedure with spouse.     Please contact the IR charge RN at [phone] for estimated time of procedure.     Case discussed with Dr. Grant from IR and Dr. Santos. This is a 76-year-old female with PMHx myelodysplastic syndrome, seronegative RA, chronic microcytic anemia, PAD, and recent L SDH (5/12/2022) who arrived to [facility] 6/22 via EMS after an unwitnessed fall at home. She was found to have a left acute subdural approximately 2.3 cm thick with 12 mm of rightward midline shift. She was taken to the OR on 6/22 for emergent L craniotomy for SDH evacuation. Course complicated by GIB, gastric ulcer s/p clipping, and seizures. She was ultimately extubated on 7/8 and remains stable. She has a NJ tube in the place for ongoing dysphagia and is tolerating tube feeds. IR is consulted for G tube placement for long-term tube feeds.     CT scan from 7/7 was reviewed and there appears to be a favorable window for perc G tube placement. Oral contrast has intentionally not been ordered. IR will use current NJ tube for insufflation of the stomach. G tube must remain in x6 weeks after placement even if it is no longer needed.    Expected date of discharge: TBD    Vitals:   BP (!) 158/77 (BP Location: Left arm)   Pulse 77   Temp 98.6  F (37  C) (Axillary)   Resp 14   Ht 1.626 m (5' 4.02\")   Wt 74.8 kg (164 lb 14.5 oz)   SpO2 99%   BMI 28.29 kg/m      Pertinent Labs:     Lab Results   Component Value Date    WBC 9.0 07/14/2022    WBC 9.8 07/13/2022    WBC 11.3 (H) 07/12/2022       Lab Results   Component Value Date    HGB 8.8 07/14/2022    HGB 8.3 07/13/2022    HGB 9.0 07/12/2022       Lab Results   Component Value Date     07/14/2022 "     07/13/2022     07/12/2022       Lab Results   Component Value Date    INR 1.34 (H) 07/06/2022    PTT 36 07/06/2022       Lab Results   Component Value Date    POTASSIUM 4.4 07/14/2022    POTASSIUM 4.1 06/22/2022    POTASSIUM 4.2 05/20/2022        JOSÉ ANTONIO Wolfe CNP  Interventional Radiology  Pager: [phone]

## 2022-07-14 NOTE — PROGRESS NOTES
[facility], [place]   07/15/2022  Neurosurgery Progress Note:    Assessment:  Ms. Benítez is a 76-year-old female with PMHx myelodysplastic syndrome, seronegative RA, chronic microcytic anemia, PAD, and recent L SDH (5/12/2022) who arrived to [facility] via EMS after an unwitnessed fall at home.  Shortly after arriving, the patient was complaining of a significant headache and nausea, and then her right pupil dilated.  She was no longer protecting her airway, so she was sedated and intubated.  Her right eye pupil then returned to a normal caliber.  A head CT demonstrated a left acute subdural approximately 2.3 cm thick with 12 mm of rightward midline shift.  She was taken to the OR on 6/22 for emergent L craniotomy for SDH evacuation. She was extubated on POD1. Concerns for tube feed aspiration on 6/25.  She underwent emergent EGD by GI due to sharp drop in hemoglobin as well as melanotic stools.  She had 1 gastric ulcer clipped which was the presumed culprit.  Due to requiring the EGD she needed intubation during this time, therefore she was intubated on 7/4/2022 for EGD.  She was also connected to EEG on 7/4/2022 found to be in nonconvulsive status received Keppra load and Ativan and was started on Versed overnight.  On 7/5/2022, her Versed drip was weaned off with confirmation of no status epilepticus. On 7/8 she was extubated and remains stable.     Other diagnoses include:  #Brain compression  #Open evacuation of hematoma on day of admission   #Hyponatremia  #Pulmonary edema  #GI bleed  #Uremia  #hypokalemia    Plan:  Continue Keppra 1 g twice daily until seen by neurology and as as outpatient  Please continue delirium precautions  Continue p.o. Protonix 40 mg twice daily  Daily BMP/CBC  Subcutaneous heparin for DVT prophylaxis    -----------------------------------  Bruce Graves MD  Neurosurgery Resident, PGY-3       Please contact neurosurgery resident on call with questions.     Dial * * *[phone], enter [phone] when prompted.    -----------------------------------    Interval History: No acute events overnight.  Neurological exam stable.  Plan for PEG today.    Objective:   Temp:  [98.2  F (36.8  C)-98.5  F (36.9  C)] 98.2  F (36.8  C)  Pulse:  [71-90] 75  Resp:  [14-16] 14  BP: (148-154)/(61-64) 154/64  SpO2:  [92 %-100 %] 99 %  I/O last 3 completed shifts:  In: 465 [NG/GT:240]  Out: 2720 [Urine:2400; Stool:320]        Gen: Appears comfortable, NAD, laying in bed, appearing older than stated age  HEENT: Left craniotomy incision healing well  Wound: Incision closed with staples-now removed, incision appears clean dry-no signs of leaking, no wetness appreciated.  See below for photo from 7/5/2022 evening.  Incision cleaned with ChloraPrep.  Pulm: Extubated, on nasal cannula 1 L, no increased work of breathing, satting 98%  Neurologic:  Eyes open spontaneously, minimally tracking, gaze at midline without preference  Follows commands in all 4 extremities  Reflexes 2+, downgoing Babinski's bilaterally      LABS:  Recent Labs   Lab 07/15/22  1435 07/15/22  0608 07/14/22  0808 07/13/22  0330 07/13/22  0325   NA  --  141 141  --  142   POTASSIUM  --  4.2 4.4  --  4.2   CHLORIDE  --  105 106  --  108*   CO2  --  29 29  --  28   ANIONGAP  --  7 6*  --  6*   GLC 72 86 105*   < > 104*   BUN  --  18.1 20.0  --  21.9   CR  --  0.38* 0.37*  --  0.36*   MENDEZ  --  8.9 8.7*  --  8.1*    < > = values in this interval not displayed.       Recent Labs   Lab 07/15/22  0608   WBC 7.0   RBC 2.70*   HGB 8.3*   HCT 27.3*   *   MCH 30.7   MCHC 30.4*   RDW 17.4*          IMAGING:  No results found for this or any previous visit (from the past 24 hour(s)).

## 2022-07-15 NOTE — PROGRESS NOTES
Care Management Follow Up    Length of Stay (days): 23    Expected Discharge Date: 07/20/2022     Concerns to be Addressed:    Dishcarge planning  Patient plan of care discussed at interdisciplinary rounds: Yes    Anticipated Discharge Disposition: TCU/LTC      Anticipated Discharge Services:  Therapies  Anticipated Discharge DME:  TBD    Patient/family educated on Medicare website which has current facility and service quality ratings:  Yes  Education Provided on the Discharge Plan:  Yes  Patient/Family in Agreement with the Plan:  Yes    Referrals Placed by CM/SW:    [facility]-Faxed referral 7/15  [address] [facility] [address]         Phone: [phone]       Fax: [phone]        [facility]-Faxed referral 7/15  [address] [facility] [address]         Phone: [phone]       Fax: [phone]        [facility]--Faxed referral 7/15  [address] [facility] [address]         Phone: [phone]       Fax: [phone]        [facility] Faxed referral 7/15  [address] [facility] [address], [place] [address]         Phone: [phone]       Fax: [phone]        [facility]-faxed referral 7/15  [address] [facility] [address]         Phone: [phone]       Fax: [phone]        [facility]-faxed referral 7/15  [address] [address]         Phone: [phone]       Fax: [phone]        [facility]-faxed referral 7/15  [address] [facility] [address]         Phone: [phone]       Fax: [phone]        Private pay costs discussed: Not applicable    Additional Information:  SW met with Pt and Pt's . Pt's  identified that he would prefer a TCU that is either group home between his house and his daughters or closer to [place] and [place] where his daughters live since they work and  giving them a shorter drive equates to more time with their mother. SW identified that based on what writer was hearing he would try to make some referrals that meet that criteria and follow up with Pt and family. SW to continue following for placement.    ________________    AQUILES Peacock, LICSW  6D   [facility]  Phone: [phone]  Pager: [phone]  Fax: [phone]

## 2022-07-15 NOTE — PLAN OF CARE
"/62 (BP Location: Left arm)   Pulse 77   Temp 98.5  F (36.9  C) (Axillary)   Resp 16   Ht 1.626 m (5' 4.02\")   Wt 74.8 kg (164 lb 14.5 oz)   SpO2 92%   BMI 28.29 kg/m         Major Shift Events: Opens eyes spontaneously.  Follows commands (able to squeeze hands, wiggle toes). Remains nonverbal  . Denies pain. Rectal tube in place and patent. Purewick in place and patent. Good UO throughout shift. Normal Sinus Rhythm. No PRN's given to maintain SBP goal of <160. Generalized edema present. Tube feeds stopped at mid night due to PEG tube insertion.  Lung sounds coarse. Remains on 2 L  NC.  Turn Q2H Tylenol given x1 for comfort. A.m labs drawn results pending. No acute neuro changes noted.    Access: Triple Lumen L.) Subclavian - SL    For vital signs and complete assessments, please see documentation flowsheets.     Problem: Plan of Care - These are the overarching goals to be used throughout the patient stay.    Goal: Optimal Comfort and Wellbeing  Outcome: Ongoing, Progressing     Problem: Risk for Delirium  Goal: Optimal Coping  Outcome: Ongoing, Progressing     "

## 2022-07-15 NOTE — PRE-PROCEDURE
GENERAL PRE-PROCEDURE:   Procedure:  G tube placement  Date/Time:  7/15/2022 6:17 PM    Written consent obtained?: Yes    Expected level of sedation:  Moderate  Appropriately NPO:  Yes  ASA Class:  3  Mallampati  :  Grade 3- soft palate visible, posterior pharyngeal wall not visible  Lungs:  Wheezes  Heart:  Normal heart sounds and rate  History & Physical reviewed:  History and physical reviewed and no updates needed  Statement of review:  I have reviewed the lab findings, diagnostic data, medications, and the plan for sedation

## 2022-07-15 NOTE — PLAN OF CARE
1140-6212: Q4hour neuro's unchanged. A&O x 4, non-verbal but responding to yes-no questions by head nodding or thumbs up/down. Rectal tube fell out - no BM since. Purewick in place. Repositioning 2qhours. Pt pulled PICC line out. MD notified - does not feel that it is necessary to place another central line at this time. New PIV placed. IR placing PEG tube. Tube feeds held. Supportive  at bedside. Continue POC.    Goal Outcome Evaluation:  Plan of Care Reviewed With: patient, spouse   Overall Patient Progress: improving  Outcome Evaluation: Neuros unchanged. Repo q0aysff. NPO until PEG tube placed. BS within range.    Problem: Respiratory Compromise (Craniotomy/Craniectomy/Cranioplasty)  Goal: Effective Oxygenation and Ventilation  Outcome: Ongoing, Progressing  Intervention: Optimize Oxygenation and Ventilation  Flowsheets  Taken 7/15/2022 3971  Airway/Ventilation Management:   airway patency maintained   calming measures promoted

## 2022-07-15 NOTE — PRE-PROCEDURE
GENERAL PRE-PROCEDURE:   Procedure:  Gastrostomy tube placement  Date/Time:  7/15/2022 6:17 PM    Verbal consent obtained?: No    Written consent obtained?: Yes    Risks and benefits: Risks, benefits and alternatives were discussed    Consent given by:  Spouse  Patient states understanding of procedure being performed: Yes    Patient's understanding of procedure matches consent: Yes    Procedure consent matches procedure scheduled: Yes    Appropriately NPO:  Yes  Mallampati  :  Grade 2- soft palate, base of uvula, tonsillar pillars, and portion of posterior pharyngeal wall visible  Lungs:  Lungs clear with good breath sounds bilaterally  Heart:  Normal heart sounds and rate  History & Physical reviewed:  History and physical reviewed and no updates needed  Statement of review:  I have reviewed the lab findings, diagnostic data, medications, and the plan for sedation

## 2022-07-16 NOTE — PROGRESS NOTES
[facility], [place]   07/16/2022  Neurosurgery Progress Note:    Assessment:  Ms. Benítez is a 76-year-old female with PMHx myelodysplastic syndrome, seronegative RA, chronic microcytic anemia, PAD, and recent L SDH (5/12/2022) who arrived to [facility] via EMS after an unwitnessed fall at home.  Shortly after arriving, the patient was complaining of a significant headache and nausea, and then her right pupil dilated.  She was no longer protecting her airway, so she was sedated and intubated.  Her right eye pupil then returned to a normal caliber.  A head CT demonstrated a left acute subdural approximately 2.3 cm thick with 12 mm of rightward midline shift.  She was taken to the OR on 6/22 for emergent L craniotomy for SDH evacuation. She was extubated on POD1. Concerns for tube feed aspiration on 6/25.  She underwent emergent EGD by GI due to sharp drop in hemoglobin as well as melanotic stools.  She had 1 gastric ulcer clipped which was the presumed culprit.  Due to requiring the EGD she needed intubation during this time, therefore she was intubated on 7/4/2022 for EGD.  She was also connected to EEG on 7/4/2022 found to be in nonconvulsive status received Keppra load and Ativan and was started on Versed overnight.  On 7/5/2022, her Versed drip was weaned off with confirmation of no status epilepticus. On 7/8 she was extubated and remains stable.  Received PEG on 7/15/2022 by IR.    Other diagnoses include:  #Brain compression  #Open evacuation of hematoma on day of admission   #Hyponatremia  #Pulmonary edema  #GI bleed  #Uremia  #hypokalemia    Plan:  Continue Keppra 1 g twice daily until seen by neurology and as as outpatient  Please continue delirium precautions  PEG continues for tube feeds.  Continue p.o. Protonix 40 mg twice daily  Daily BMP/CBC  Subcutaneous heparin for DVT prophylaxis    -----------------------------------  Bruce Graves MD  Neurosurgery Resident,  PGY-3       Please contact neurosurgery resident on call with questions.    Dial * * *[phone], enter [phone] when prompted.    -----------------------------------    Interval History: No acute events overnight.  Neurological exam stable.  Plan for PEG today.    Objective:   Temp:  [97.9  F (36.6  C)-98.4  F (36.9  C)] 97.9  F (36.6  C)  Pulse:  [74-90] 75  Resp:  [14-17] 14  BP: (142-163)/(62-81) 142/62  SpO2:  [95 %-98 %] 98 %  I/O last 3 completed shifts:  In: 1030 [NG/GT:400]  Out: 2150 [Urine:2100; Emesis/NG output:50]        Gen: Appears comfortable, NAD, laying in bed, appearing older than stated age  HEENT: Left craniotomy incision healing well  Wound: Incision closed with staples-now removed, incision appears clean dry-no signs of leaking, no wetness appreciated.  See below for photo from 7/5/2022 evening.  Incision cleaned with ChloraPrep.  Pulm: Extubated, on nasal cannula 1 L, no increased work of breathing, satting 98%  Neurologic:  Eyes open spontaneously, minimally tracking, gaze at midline without preference  Follows commands in all 4 extremities  Reflexes 2+, downgoing Babinski's bilaterally      LABS:  Recent Labs   Lab 07/16/22  0547 07/15/22  1435 07/15/22  0608 07/14/22  0808     --  141 141   POTASSIUM 4.6  --  4.2 4.4   CHLORIDE 103  --  105 106   CO2 26  --  29 29   ANIONGAP 11  --  7 6*   GLC 97 72 86 105*   BUN 19.3  --  18.1 20.0   CR 0.34*  --  0.38* 0.37*   MENDEZ 8.9  --  8.9 8.7*       Recent Labs   Lab 07/16/22  0547   WBC 7.7   RBC 2.91*   HGB 8.8*   HCT 28.7*   MCV 99   MCH 30.2   MCHC 30.7*   RDW 16.9*          IMAGING:  Recent Results (from the past 24 hour(s))   IR Gastrostomy Tube Percutaneous Plcmnt    Narrative    PROCEDURE: Percutaneous fluoroscopic gastrostomy tube placement    DATE: 7/15/2022 6:55 PM    INDICATION: Dural hematoma. Requires gastrostomy tube for feedings.    PROCEDURE/FINDINGS: Informed consent was obtained. The patient is  brought into the angiography  suite and placed supine on the  angiography table. The patient's abdomen was prepped and draped  sterilely. Ultrasound is performed of the upper abdomen to negrita the  edge of the liver to ensure that it is outside the path puncture for  gastrostomy. Existing nasoenteric feeding tube is pulled back to the  level of the stomach under fluoroscopy. After intravenous  administration of glucagon, the stomach was insufflated through the  nasogastric tube. Using 1% lidocaine for local anesthesia,  fluoroscopic guided puncture is made into the low gastric body and a T  tack was deployed after confirmation of intraluminal position is made  with a contrast injection. In a similar fashion, a second T tack was  deployed in the low gastric body. Third puncture is made between the 2  T tacks directed toward the gastric outlet. An Skype guidewire  was advanced into the stomach. Tract is dilated with a 7 mm x 8 cm  angioplasty balloon followed by placement of a 22 Romansh peel-away  introducer sheath. An 18 Romansh ADRIANA gastrostomy tube was then placed  over the guidewire through the peel-away sheath into the stomach under  fluoroscopy. The retention balloon was inflated to 10 mL dilute  contrast and was pulled up against the anterior gastric wall. Contrast  injection demonstrates satisfactory tube position and function. The  tube was flushed with saline. The gastrostomy site is closed with 2-0  Ethilon. T tacks are secured. External disc is slid down to hold the  balloon in place. Sterile dressing is applied. G-tube is placed to  gravity drainage.    RADIOLOGIST: Dr. Geovany Stevens    FELLOW: Dr. Gabriel Wagoner    MEDICATIONS: Versed 1.5 mg IV, Fentanyl 75 mcg IV. Administration and  continuous monitoring of intravenous conscious sedation was performed  by the interventional radiology nurse under my direct supervision.   Vital signs throughout the procedure remained stable.  Total time of  sedation was 20 minutes.      FLUOROSCOPY TIME: 3 minutes.    CONTRAST: 20 mL Isovue-250    ESTIMATED BLOOD LOSS: Minimal    COMPLICATIONS: No immediate complications.      Impression    IMPRESSION:  1. Percutaneous fluoroscopic guided gastrostomy tube placement as  described.    JOE GONZALEZ MD         SYSTEM ID:  [id number]   XR Chest Port 1 View    Narrative    EXAM:  XR CHEST PORT 1 VIEW    INDICATION: to rule out chest infection    COMPARISON:  7/5/2022    FINDINGS:  Single portable AP view the chest at 60 degrees.    Stable enlarged cardiomediastinal silhouette. Decreased relative lung  volumes. Increased bibasilar opacities with stable perihilar  opacities. No pneumothorax. No pleural effusion. Unremarkable upper  abdomen. No acute bony lesions.      Impression    IMPRESSION:  Increased bibasilar opacities which may represent infection versus  atelectasis. Stable perihilar opacities. Cardiomegaly.    I have personally reviewed the examination and initial interpretation  and I agree with the findings.    JOVANA CORRIGAN MD         SYSTEM ID:  [id number]

## 2022-07-16 NOTE — PLAN OF CARE
Patient lethargic, opens eyes spontaneously, able to squeeze hands to answer simple questions - oriented to self, time, and place. Unable to assess full neuro exam as patient intermittently follows commands, non-verbal, PERRLA. On 2L NC, desats down to 85% on room air at times while asleep. Patient with weak congested cough, nonproductive at this time, coarse lungs. MD at bedside and notified, ordered CXR and CPT with RT. PEG tube with tube feeds at goal 45ml/hr. Incontinent of urine, external catheter in place. Last bowel movement yesterday. Turn Q2 hours for optimal skin integrity, patient restless in bed, pulling at lines, mittens applied. Tylenol PRN given. Sat up in chair with PT via ceiling lift.     Unable to obtain sputum culture, patient not coughing up secretions at this time.

## 2022-07-16 NOTE — PLAN OF CARE
"Neuro/Musculoskeletal:  A&O3-4 difficult to assess as patient is non-verbal follows command but occasionally trying to pull lines and hospital gown. Generalized body weakness, RUE is slightly weaker than LUE  Cardiac:  SR.  VSS.   Respiratory:  Sating in the 90s on O2 at 2 LPM. Desat to mid 80s with no oxygen.  GI/:  Adequate urine output.  BM 0 in the night shift  Diet/Appetite:  NPO on TF at 45 ml/hr with 30ml FWF Q4  Activity:  Assist of 2 using lift device  Pain:  Denies.  Skin:   Surgical area scalp - skin closure well approximated with sutures  Generalized bruises  Redness in perineal area - barrier paste applied  Multiple old wounds on both lower extremities on scabbing stage.    LDAs + Drips/IVF: PIV left arm  PEG tube - continuous TF    Protocols/Labs:      Pertinent Shift Updates: PEG tube insertion in IR successful - feeding resumed.    Plan:  Continue POC, notify provider for changes.    pital ([facility] [place]  [facility]    Problem: Plan of Care - These are the overarching goals to be used throughout the patient stay.    Goal: Plan of Care Review/Shift Note  Description: The Plan of Care Review/Shift note should be completed every shift.  The Outcome Evaluation is a brief statement about your assessment that the patient is improving, declining, or no change.  This information will be displayed automatically on your shift note.  Outcome: Ongoing, Not Progressing  Goal: Patient-Specific Goal (Individualized)  Description: You can add care plan individualizations to a care plan. Examples of Individualization might be:  \"Parent requests to be called daily at 9am for status\", \"I have a hard time hearing out of my right ear\", or \"Do not touch me to wake me up as it startles me\".  Outcome: Ongoing, Not Progressing  Goal: Absence of Hospital-Acquired Illness or Injury  Outcome: Ongoing, Progressing  Intervention: Identify and Manage Fall Risk  Recent Flowsheet Documentation  Taken 7/16/2022 0000 " by Lord Shaggy Dent RN  Safety Promotion/Fall Prevention:    activity supervised    assistive device/personal items within reach    bed alarm on    clutter free environment maintained    fall prevention program maintained    increased rounding and observation    increase visualization of patient    nonskid shoes/slippers when out of bed    patient and family education  Taken 7/15/2022 2000 by Lord Shaggy Dent RN  Safety Promotion/Fall Prevention:    activity supervised    assistive device/personal items within reach    bed alarm on    clutter free environment maintained    fall prevention program maintained    increased rounding and observation    increase visualization of patient    nonskid shoes/slippers when out of bed    patient and family education  Intervention: Prevent Skin Injury  Recent Flowsheet Documentation  Taken 7/16/2022 0000 by Lord Shaggy Dent RN  Body Position:    turned    left  Taken 7/15/2022 2200 by Lord Shaggy Dent RN  Body Position:    turned    right  Intervention: Prevent and Manage VTE (Venous Thromboembolism) Risk  Recent Flowsheet Documentation  Taken 7/16/2022 0000 by Lord Shaggy Dent RN  Range of Motion: ROM (range of motion) performed  VTE Prevention/Management: SCDs (sequential compression devices) off  Activity Management: activity adjusted per tolerance  Taken 7/15/2022 2000 by Lord Shaggy Dent, RN  Range of Motion: ROM (range of motion) performed  VTE Prevention/Management: SCDs (sequential compression devices) off  Activity Management: activity adjusted per tolerance  Goal: Optimal Comfort and Wellbeing  Outcome: Ongoing, Progressing  Intervention: Monitor Pain and Promote Comfort  Recent Flowsheet Documentation  Taken 7/16/2022 0000 by Lord Shaggy Dent RN  Pain Management Interventions:    care clustered    relaxation techniques promoted    repositioned  Taken 7/15/2022 2000 by Lord Shaggy Dent RN  Pain Management Interventions:    care  clustered    relaxation techniques promoted    repositioned  Intervention: Provide Person-Centered Care  Recent Flowsheet Documentation  Taken 7/16/2022 0000 by Lord Shaggy Dent, RN  Trust Relationship/Rapport:    care explained    choices provided  Taken 7/15/2022 2000 by Lord Shaggy Dent, RN  Trust Relationship/Rapport:    care explained    choices provided  Goal: Readiness for Transition of Care  Outcome: Ongoing, Progressing

## 2022-07-16 NOTE — PROCEDURES
[facility]    Procedure: IR Procedure Note    Date/Time: 7/15/2022 7:02 PM  Performed by: Gabriel Wagoner MD  Authorized by: Geovany Stevens MD   IR Fellow Physician: Gabriel Wagoner      UNIVERSAL PROTOCOL   Site Marked: NA  Prior Images Obtained and Reviewed:  Yes  Required items: Required blood products, implants, devices and special equipment available    Patient identity confirmed:  Verbally with patient, arm band, provided demographic data and hospital-assigned identification number  Patient was reevaluated immediately before administering moderate or deep sedation or anesthesia  Confirmation Checklist:  Patient's identity using two indicators, relevant allergies, procedure was appropriate and matched the consent or emergent situation and correct equipment/implants were available  Time out: Immediately prior to the procedure a time out was called    Universal Protocol: the Joint Commission Universal Protocol was followed    Preparation: Patient was prepped and draped in usual sterile fashion       ANESTHESIA    Anesthesia: Local infiltration  Local Anesthetic:  Lidocaine 1% without epinephrine      SEDATION  Patient Sedated: Yes    Sedation:  Fentanyl and midazolam  Vital signs: Vital signs monitored during sedation    See dictated procedure note for full details.  Findings: Successful gastrostomy tube placement.     Specimens: none    Complications: None    Condition: Stable    Plan: NPO for 4 hours.      PROCEDURE  Describe Procedure: Successful gastrostomy tube placement.   Patient Tolerance:  Patient tolerated the procedure well with no immediate complications  Length of time physician/provider present for 1:1 monitoring during sedation: 30

## 2022-07-17 NOTE — PLAN OF CARE
Neuro/Musculoskeletal:  A&O3-4 difficult to assess as patient is non-verbal follows command but occasionally trying to pull lines and hospital gown. Generalized body weakness, RUE is slightly weaker than LUE  Cardiac:  SR.  VSS.   Respiratory:  Sating in the 90s on O2 at 2 LPM. Desat to mid 80s with no oxygen.  GI/:  Adequate urine output.  BM 0 in the night shift  Diet/Appetite:  NPO on TF at 45 ml/hr with 30ml FWF Q4  Activity:  Assist of 2 using lift device  Pain:  Denies.  Skin:   Surgical area scalp - skin closure well approximated  Generalized bruises  Redness in perineal area - barrier paste applied  Multiple old wounds on both lower extremities on scabbing stage.    LDAs + Drips/IVF: PIV left arm  PEG tube - continuous TF    Plan:  Continue POC, notify provider for changes.

## 2022-07-17 NOTE — PROGRESS NOTES
[facility], [place]   07/17/2022  Neurosurgery Progress Note:    Assessment:  Ms. Benítez is a 76-year-old female with PMHx myelodysplastic syndrome, seronegative RA, chronic microcytic anemia, PAD, and recent L SDH (5/12/2022) who arrived to [facility] via EMS after an unwitnessed fall at home.  Shortly after arriving, the patient was complaining of a significant headache and nausea, and then her right pupil dilated.  She was no longer protecting her airway, so she was sedated and intubated.  Her right eye pupil then returned to a normal caliber.  A head CT demonstrated a left acute subdural approximately 2.3 cm thick with 12 mm of rightward midline shift.  She was taken to the OR on 6/22 for emergent L craniotomy for SDH evacuation. She was extubated on POD1. Concerns for tube feed aspiration on 6/25.  She underwent emergent EGD by GI due to sharp drop in hemoglobin as well as melanotic stools.  She had 1 gastric ulcer clipped which was the presumed culprit.  Due to requiring the EGD she needed intubation during this time, therefore she was intubated on 7/4/2022 for EGD.  She was also connected to EEG on 7/4/2022 found to be in nonconvulsive status received Keppra load and Ativan and was started on Versed overnight.  On 7/5/2022, her Versed drip was weaned off with confirmation of no status epilepticus. On 7/8 she was extubated and remains stable.  Received PEG on 7/15/2022 by IR.    Other diagnoses include:  #Brain compression  #Open evacuation of hematoma on day of admission   #Hyponatremia  #Pulmonary edema  #GI bleed  #Uremia  #hypokalemia    Plan:  Will follow on CXR and WBC  Requested RT for chest physiotherapies  Sputum cultures sent  Continue Keppra 1 g twice daily until seen by neurology and as as outpatient  Please continue delirium precautions  PEG continues for tube feeds.  Continue p.o. Protonix 40 mg twice daily  Subcutaneous heparin for DVT  prophylaxis    -----------------------------------  Keenan Tillman  Neurosurgery Resident PGY2     Please contact neurosurgery resident on call with questions.    Dial * * [phone], enter [phone] when prompted.    -----------------------------------    Interval History: No acute events overnight.  Neurological exam stable.  Will get CXR, Sputum cultures and request RT to consider chest physiotherapy.    Objective:   Temp:  [97.9  F (36.6  C)-98.7  F (37.1  C)] 98.5  F (36.9  C)  Pulse:  [74-87] 76  Resp:  [10-19] 13  BP: (130-163)/(62-74) 150/69  SpO2:  [90 %-100 %] 96 %  I/O last 3 completed shifts:  In: 1490 [NG/GT:410]  Out: 2500 [Urine:2500]        Gen: Appears comfortable, NAD, laying in bed, appearing older than stated age  HEENT: Left craniotomy incision healing well  Wound: Incision closed with staples-now removed, incision appears clean dry-no signs of leaking, no wetness appreciated.  See below for photo from 7/5/2022 evening.  Incision cleaned with ChloraPrep.  Pulm: Extubated, on nasal cannula 1 L, no increased work of breathing, satting 98%  Neurologic:  Eyes open spontaneously, minimally tracking, gaze at midline without preference  Follows commands in all 4 extremities  Reflexes 2+, downgoing Babinski's bilaterally      LABS:  Recent Labs   Lab 07/16/22  0547 07/15/22  1435 07/15/22  0608 07/14/22  0808     --  141 141   POTASSIUM 4.6  --  4.2 4.4   CHLORIDE 103  --  105 106   CO2 26  --  29 29   ANIONGAP 11  --  7 6*   GLC 97 72 86 105*   BUN 19.3  --  18.1 20.0   CR 0.34*  --  0.38* 0.37*   MENDEZ 8.9  --  8.9 8.7*       Recent Labs   Lab 07/17/22  0653   WBC 12.2*   RBC 3.16*   HGB 9.8*   HCT 30.8*   MCV 98   MCH 31.0   MCHC 31.8   RDW 17.6*   *       IMAGING:  Recent Results (from the past 24 hour(s))   XR Chest Port 1 View    Narrative    EXAM:  XR CHEST PORT 1 VIEW    INDICATION: to rule out chest infection    COMPARISON:  7/5/2022    FINDINGS:  Single portable AP view the chest at 60  degrees.    Stable enlarged cardiomediastinal silhouette. Decreased relative lung  volumes. Increased bibasilar opacities with stable perihilar  opacities. No pneumothorax. No pleural effusion. Unremarkable upper  abdomen. No acute bony lesions.      Impression    IMPRESSION:  Increased bibasilar opacities which may represent infection versus  atelectasis. Stable perihilar opacities. Cardiomegaly.    I have personally reviewed the examination and initial interpretation  and I agree with the findings.    JOVANA CORRIGAN MD         SYSTEM ID:  [id number]

## 2022-07-17 NOTE — PLAN OF CARE
Patient is lethargic, opens eyes spontaneously. Unable to assess full neuro exam, patient is non-verbal and intermittently follows commands. Restless, pulls at lines, patient self-removed PIV x 2, mittens re-applied. On room air with SpO2 90%, 2L NC applied. Hemodynamically stable, afebrile. Patient contninues to have weak, congested non-productive cough. NT suctioning done by RT. Vitals stable, afebrile. PEG tube with tube feeds at goal 45ml/hr. Incontinent of urine, external catheter in place. Last bowel movement today, loose. Turn Q2 with ceiling lift.     1000: Sputum culture obtained by RT, sample sent to lab.

## 2022-07-18 NOTE — PLAN OF CARE
Neuro/Musculoskeletal:  A&O3-4 difficult to assess as patient is non-verbal follows command but occasionally trying to pull lines and hospital gown. Generalized body weakness, RUE is slightly weaker than LUE  Cardiac:  SR.  VSS.   Respiratory:  Sating in the 90s on O2 at 2 LPM. Desat to mid 80s with no oxygen.  GI/:  Adequate urine output.  BM 5 in the night shift. Bloody with clots  Diet/Appetite: TF on hold  Activity:  Assist of 2 using lift device  Pain:  Denies.  Skin:   Surgical area scalp - skin closure well approximated  Generalized bruises  Redness in perineal area - barrier paste applied  Multiple old wounds on both lower extremities on scabbing stage.    LDAs + Drips/IVF: PIV right armx2 1 unit PRBC given        Plan:  Continue POC, notify provider for changes.

## 2022-07-18 NOTE — PROGRESS NOTES
GASTROENTEROLOGY PROGRESS NOTE    Date of Admission: 6/22/2022  Reason for Admission: Fall, acute SDH    ASSESSMENT:  76 year old female with a history of myelodysplastic syndrome with frequent transfusions, osteoporosis, rheumatoid arthritis, peripheral arterial disease, lumbar spondylosis, and chronic microcytic anemia admitted 6/22 after a fall, found to have new acute R SDH s/p evacuation last week who developed new lethargy, hypotension in the setting of reported melena, elevated BUN/Cr and drop in hgb. The Luminal GI service was initially consulted for melena s/p EGD 7/4 with few non-bleeding esophageal and gastric ulcers with no stigmata of recent bleeding and one 5 mm non-bleeding gastric ulcer with pigmented material found in the posterior wall that was clipped, likely the source of previous melena. G-tube placed by IR 7/15/2022 for feedings. The GI Luminal Service has been paged with concerns for ongoing bleed, drop in Hgb.     # Hematochezia  # Acute on Chronic Anemia  Acute hemoglobin drop in the setting of 1 day history of hematochezia with known chronic microcytic anemia and MDS. Hemoglobin this morning at 03:28 was 10.1, recheck at 08:31 at 8.7 (after around 7 episodes of hematochezia). Heparin subcutaneous injection held since 7/14. Rectal tube removed 7/15 then no bowel movement until 16:00 on 7/17. Today BUN slightly elevated at 23.4 with low Creatinine at 0.40. Given hemodynamic stability, brisk upper GI bleed less likely. Most likely lower GI bleed. Last colonoscopy 1/25/2006 at a [facility], unable to view report. Lower GI Bleeding differential includes ischemic colitis, diverticular hemorrhage, AVM, hemorrhoids, anal fissure, rectal ulcer (given recent rectal tube); less likely vasculitis, neoplastic disease.     # S/p EGD 7/4 with Esophageal and Gastric Ulcers  S/p EGD 7/4/22 with scattered superficial non-bleeding esophageal ulcers, 3 cm hiatal hernia, 3 linear nonbleeding  gastric ulcers in the fundus with one extending from the GE junction, one 5 mm nonbleeding gastric ulcer in the anterior wall, and one 5 mm gastric ulcer on the posterior wall with a pigmented spot (likely the source of melena and anemia), not actively bleeding, treated with one hemastatic clip. Currently on pantoprazole 40 mg PO BID.      RECOMMENDATIONS:  -- Switch PO to IV PPI. Bolus than IV PPI BID.   -- Continue to hold tube feedings today for colonoscopy prep through G-tube.  -- Plan for Colonoscopy +/- EGD tomorrow (7/19/2022).    -- TODAY (7/18): 6 L Golytely total via G-tube.  - At Noon: administer 2 L Golytely. Deliver 15-30 oz of prep via G-tube every 20 minutes until 2 L Golytely is gone.  - Wait 2-4 hours (~ 16:00): administer 2 L Golytely. Deliver 15-30 oz of prep via G-tube every 20 minutes until 2 L Golytely is gone.  - Wait 2-4 hours (~ 20:00): administer 2 L Golytely. Deliver 15-30 oz of prep via G-tube every 20 minutes until 2 L Golytely is gone.   -- Tomorrow (7/19): 2 L Golytely. NPO at 00:15 other than prep     - At 04:00 (finished by 06:00): Administer 2 L Golytely. Deliver 15-30 oz of prep via NGT every 20 minutes until 2 L Golytely is gone.    - 06:00: If rectal output not clear (consistency tea, mountain dew or broth), page GI an FYI.      - Strict NPO after that prep.    -- Anticipate fluid and electrolyte shifts during this process, monitor closely.  -- Maintain 2 large bore IVs, 18G or larger.  -- Continue Supportive Cares  - Adequate volume resuscitation with IVF, pRBCs.   - Monitor Hemoglobin closely. Transfuse to keep Hgb > 7 g/dl from GI standpoint.    - Maintain hemodynamics: MAP >65 mmHg and HR <100.  - Optimize electrolytes.  -- Continue to hold heparin/anticoagulation/antiplatelets if okay from primary team standpoint.   -- Analgesia/Antiemetics per primary team.  -- GI to continue to follow.    COVID status: Negative 7/14/2022.    Discussed with Neurosurgery (Perez).     Thank  "you for involving us in this patient's care. Please do not hesitate to contact the GI service with any questions or concerns.     The patient was discussed and plan agreed upon with GI staff, Dr. Hoffman.    Suzy Duran PA-C  Inpatient Gastroenterology Service  [facility]  Text Page  _______________________________________________________________      Subjective: Nursing notes and 24hr events reviewed. Patient seen and examined at 10:15 AM. HPI limited by AMS, non-verbal. Did not open eyes during exam.     Per RN, Rectal tube removed 7/15 at 13:00. Previous, had not had a bowel movement since Friday 7/15 after rectal tube was removed. Yesterday was given Miralax then had loose brown stool at 1600 yesterday. Then hematochezia started last evening 2200/2300 - 7 overnight episodes of bright red blood per rectum. Another 3 episodes of hematochezia thus far this morning.     ROS:   4 pt ROS negative unless noted in subjective.     Objective:  Blood pressure 113/54, pulse 77, temperature 98.4  F (36.9  C), temperature source Axillary, resp. rate 12, height 1.626 m (5' 4.02\"), weight 74.8 kg (164 lb 14.5 oz), SpO2 98 %, not currently breastfeeding.  General: 76 year old female lying in bed in NAD. Appears ill, unresponsive, does not open eyes, no verbalization.   HEENT: Left craniotomy incision CDI.   Neck: No masses.  Heme/Lymph: No cervical or supraclavicular adenopathy.   Lungs: Equal chest rise, 2 LPM via nasal cannula.   Heart: Regular rate.   Abdomen: Soft, non-tender, non-distended. No rebound or peritoneal signs. PEG tube present in LUQ. Multiple small ecchymoses, likely from previous subcutaneous injections.   Rectal: TENNILLE Mckinney present as chaperone. Bright red and maroon blood from rectum and on dick pad. External exam with anterior external perianal skin tag, no lesions, anal fissures or masses visualized. Digital rectal exam with small internal nodules, likely small hemorrhoids, " and maroon blood on exam, no internal masses or stool palpated in rectum.   Extremities: WWP.  Musculoskeletal: No gross deformity.  Skin: No jaundice or rash. Left upper chest lesion, oozing clear liquid.    Mental status/Psych: AMS.    Date 07/18/22 0700 - 07/19/22 0659   Shift 8153-9967 3335-2649 3245-3406 24 Hour Total   INTAKE   Shift Total(mL/kg)       OUTPUT   Urine 600   600   Shift Total(mL/kg) 600(8.02)   600(8.02)   Weight (kg) 74.8 74.8 74.8 74.8         PROCEDURES:    7/4/2022 - EGD - Dr. Carleen Flowers  Impression:            - A few non bleeding esophageal and gastric ulcers                          with no stigmata of recent bleeding.                          - One 5mm non-bleeding gastric ulcer with pigmented                          material found in the posterior wall. This was likely                          the source of melena. Clip was placed.     1/25/2006 - Colonoscopy - at a [facility], unable to view report.      LABS:  BMP  Recent Labs   Lab 07/18/22  0328 07/17/22  1213 07/16/22  0547 07/15/22  1435 07/15/22  0608    136 140  --  141   POTASSIUM 4.9 4.5 4.6  --  4.2   CHLORIDE 105 101 103  --  105   MENDEZ 9.2 9.2 8.9  --  8.9   CO2 25 25 26  --  29   BUN 23.4* 20.0 19.3  --  18.1   CR 0.40* 0.35* 0.34*  --  0.38*   GLC 95 111* 97 72 86     CBC  Recent Labs   Lab 07/18/22  0831 07/18/22  0328 07/17/22  2305 07/17/22  0653   WBC 11.2* 11.5* 10.7 12.2*   RBC 2.85* 3.30* 2.75* 3.16*   HGB 8.7* 10.1* 8.4* 9.8*   HCT 28.2* 32.1* 27.1* 30.8*   MCV 99 97 99 98   MCH 30.5 30.6 30.5 31.0   MCHC 30.9* 31.5 31.0* 31.8   RDW 16.5* 16.6* 17.2* 17.6*   * 532* 502* 562*     INR  Recent Labs   Lab 07/14/22  1417   INR 1.07     LFTsNo lab results found in last 7 days.   PANCNo lab results found in last 7 days.      IMAGING:    CTA ABDOMEN PELVIS WITH CONTRAST, 7/7/2022 10:03 AM  FINDINGS:     Liver: Punctate hypodensities within the right lobe of the liver are  too small to further  characterize. Periportal edema..  Biliary system: Gallbladder is within normal limits. No intrahepatic  or extrahepatic biliary ductal dilatation.  Pancreas: 5 mm hypodensity in the head of the pancreas (series 13  image 219).  Stomach: Small sliding-type hiatal hernia. Gastric tube in the  stomach. Hemostasis clip noted near the gastric fundus..  Spleen: Subcentimeter splenic hypodensity likely represents hemangioma  or proteinaceous cyst.  Adrenal glands: Within normal limits.  Kidneys: Simple appearing bilateral renal cysts. Slightly ectopic  location of the right kidney. Distal ureters not visualized due to  bilateral total hip prostheses. No hydronephrosis..  Bladder: Vasquez catheter in place..  Reproductive organs: 1.4 cm right ovarian cyst (series 13 image 350)  Limited evaluation of the pelvic organs due to streak artifact caused  by hip prostheses.  Colon: Rectal tube in place. Grossly limited evaluation of the sigmoid  colon due to streak artifact.  Appendix: Within normal limits.  Small Bowel: Within normal limits.  Lymph nodes: No intra-abdominal or pelvic lymphadenopathy.  Vasculature: Abdominal aorta and major branches appear patent.  Aortobiiliac atherosclerosis. There is one renal artery bilaterally  with mild-to-moderate stenosis and mild stenosis on the left secondary  to atherosclerotic plaque at the aortic origin slightly diminutive mid  right renal artery. Abdominal aorta is normal in caliber.  Approximately 50% stenosis of the right common iliac artery at its  origin and approximately 75% stenosis of the left common iliac artery  near its origin. Additional approximate 50% stenosis of the mid left  common iliac artery. Severe near total occlusion of the bilateral  internal iliac arteries proximally. Near total occlusion of the  bilateral common femoral vessels secondary to atherosclerosis  Peritoneum: No intraperitoneal free air. Trace perihepatic ascites.  Left inguinal hernia containing a loop  of sigmoid colon.      Lung bases: Trace bilateral effusions and associated atelectasis..     Bones and soft tissues: No suspicious soft tissue mass or fluid  collection. Multiple subacute appearing nondisplaced anterior rib  fractures. Unchanged appearance of L1 vertebral body compression  fracture with approximately 80% height loss. Similar less profound  compression deformities in L3 and L5 are also unchanged from MRI  1/14/2020. Age indeterminate compression deformity of T10 with  approximately 30% height loss. Subacute appearing right sacral  insufficiency fracture and fracture of the left pubic ramus new since  5/12/2022.                                                                      IMPRESSION:   1. No evidence of gastrointestinal bleed as questioned.  2. Interval new subacute appearing right sacral insufficiency fracture  and subacute appearing left pubic ramus fracture since 5/12/2022.  3. Age-indeterminate compression deformity of T10 with approximately  30% height loss. Multiple subacute appearing anterior rib fractures,  and unchanged appearance of L1, L3, and L5 compression deformities.  4. Small sliding-type hiatal hernia with hemostasis clip noted in the  gastric fundus.  5. Aortobiiliac atherosclerotic atherosclerosis with multiple foci of  arterial stenosis including the renal arterial origins, common iliac  vessels, and near total occlusions of the proximal internal iliac  arteries and common femoral arteries proximally.  6. Unchanged appearance of left inguinal hernia containing a loop of  sigmoid colon, no signs of obstruction.  7. 5 mm hypodensity in the pancreatic head may represent a sidebranch  IPMN although MRI is recommended to further characterize on an  outpatient basis, pancreatic cystic follow-up is recommended below.  Per [facility] criteria.  8. Indeterminate hepatic hypodensities are too small to further  characterize but could be evaluated further at time of MRI  for above  pancreatic lesion.  9. 1.4 cm right ovarian cyst for which annual follow-up is recommended  in a postmenopausal female.  10. Trace effusions and associated atelectasis.

## 2022-07-18 NOTE — PROGRESS NOTES
Neurosurgery brief interval provider note:    Reported by nursing large bloody stool this evening.  Repeat CBC sent-hemoglobin at 8.4 (previously 9.8 earlier today).    Assessment: 76-year-old female, POD 25 from left-sided craniotomy for subdural hematoma.  Prior upper GI bleed status post EGD and clipping on 7/4/2022.  New bloody stools.  Differential includes complication secondary to recent PEG placement by interventional radiology on 7/15/2022, recurrent upper GI bleed from known ulcers that were seen but not treated during EGD, new lower GI bleed.    Plan:  Repeat hemoglobins every 6 hours  Every 1 hours vital monitor for hemodynamic instability; q2h neurochecks  Hold tube feeds for now  Monitor for further bloody stools  Discussed with GI- give 1 unit PRBC now, will be seen in the morning    Bruce Graves MD  Neurosurgery Resident, PGY-3

## 2022-07-18 NOTE — PROGRESS NOTES
Palliative Care Note    Met with patient's  Jd outside of patient's room. Jd shared how Louise's course has been up and down, and how hard that has been. He became tearful at times during discussion. Per Jd, Louise is less interactive than she was a week ago, but appears to be calm for the most part. Family is still hopeful that Louise can have some meaningful recovery. Plan for colonoscopy +/- EGD due to concerns for ongoing GIB. Reassured Jd that we would continue to support Louise and family and that we are available any time. Jd was appreciative.    Clarice Miller PA-C  Palliative Care Consult Team  [facility]  Contact information available via [facility] Paging/Directory

## 2022-07-18 NOTE — PROGRESS NOTES
SLP NOTE: Pt is not appropriate for evaluation due to unresponsiveness and concern for GI bleed. Will continue to follow and initiate eval if appropriate.

## 2022-07-18 NOTE — PROGRESS NOTES
[facility], [place]   07/17/2022  Neurosurgery Progress Note:    Assessment:  Ms. Benítez is a 76-year-old female with PMHx myelodysplastic syndrome, seronegative RA, chronic microcytic anemia, PAD, and recent L SDH (5/12/2022) who arrived to [facility] via EMS after an unwitnessed fall at home.  Shortly after arriving, the patient was complaining of a significant headache and nausea, and then her right pupil dilated.  She was no longer protecting her airway, so she was sedated and intubated.  Her right eye pupil then returned to a normal caliber.  A head CT demonstrated a left acute subdural approximately 2.3 cm thick with 12 mm of rightward midline shift.  She was taken to the OR on 6/22 for emergent L craniotomy for SDH evacuation. She was extubated on POD1. Concerns for tube feed aspiration on 6/25.  She underwent emergent EGD by GI due to sharp drop in hemoglobin as well as melanotic stools.  She had 1 gastric ulcer clipped which was the presumed culprit.  Due to requiring the EGD she needed intubation during this time, therefore she was intubated on 7/4/2022 for EGD.  She was also connected to EEG on 7/4/2022 found to be in nonconvulsive status received Keppra load and Ativan and was started on Versed overnight.  On 7/5/2022, her Versed drip was weaned off with confirmation of no status epilepticus. On 7/8 she was extubated and remains stable.  Received PEG on 7/15/2022 by IR.    Other diagnoses include:  #Brain compression  #Open evacuation of hematoma on day of admission   #Hyponatremia  #Pulmonary edema  #GI bleed  #Uremia  #hypokalemia    Plan:  To be seen by GI this morning  Please trend hemoglobin, ordered for every 6 hours  Requested RT for chest physiotherapies  Sputum cultures sent  Continue Keppra 1 g twice daily until seen by neurology and as as outpatient  Please continue delirium precautions  PEG continues for tube feeds-held for now  Continue p.o. Protonix 40 mg twice  daily  Subcutaneous heparin for DVT prophylaxis-remains held  Please continue every hour vital checks for now.    -----------------------------------  Bruce Graves MD  Neurosurgery Resident     Please contact neurosurgery resident on call with questions.    Dial * * *[phone], enter [phone] when prompted.    -----------------------------------    Interval History: Overnight with large bloody stool.  Hemoglobin drop by 1.4 (9.8-8.4).  Trending hemoglobins, switch to every hour vital checks.  1 unit PRBC given.  Tube feeds held.  Will be seen by GI this morning.    Objective:   Temp:  [97.8  F (36.6  C)-99.1  F (37.3  C)] 99.1  F (37.3  C)  Pulse:  [76-92] 82  Resp:  [12-24] 24  BP: ()/(48-69) 120/48  SpO2:  [90 %-100 %] 98 %  I/O last 3 completed shifts:  In: 1400 [NG/GT:320]  Out: 1850 [Urine:1850]        Gen: Appears comfortable, NAD, laying in bed, appearing older than stated age  HEENT: Left craniotomy incision healing well  Wound: Incision closed with staples-now removed, incision appears clean dry-no signs of leaking, no wetness appreciated.  See below for photo from 7/5/2022 evening.  Incision cleaned with ChloraPrep.  Pulm: On nasal cannula 1 L, no increased work of breathing, satting 98%  Neurologic:  Eyes open spontaneously, minimally tracking, gaze at midline without preference  Follows commands in all 4 extremities  Reflexes 2+, downgoing Babinski's bilaterally      LABS:  Recent Labs   Lab 07/17/22  1213 07/16/22  0547 07/15/22  1435 07/15/22  0608    140  --  141   POTASSIUM 4.5 4.6  --  4.2   CHLORIDE 101 103  --  105   CO2 25 26  --  29   ANIONGAP 10 11  --  7   * 97 72 86   BUN 20.0 19.3  --  18.1   CR 0.35* 0.34*  --  0.38*   MENDEZ 9.2 8.9  --  8.9       Recent Labs   Lab 07/17/22  2305   WBC 10.7   RBC 2.75*   HGB 8.4*   HCT 27.1*   MCV 99   MCH 30.5   MCHC 31.0*   RDW 17.2*   *       IMAGING:  No results found for this or any previous visit (from the past 24 hour(s)).

## 2022-07-18 NOTE — PLAN OF CARE
"Patient continues to be lethargic, opens eyes spontaneously. Non-verbal, intermittently follows commands, nods to simple questions. Restless, pulls at lines, mittens on. 2L NC to maintain SpO2 >92% with weak, congested, non-productive cough. Hemodynamically stable, afebrile. Incontinent of urine, external catheter in place. Last bowel movement today, loose. Turn Q2 hours.    0700: Pt had very large, bright red bloody stool, checking vitals [id number] - hemodynamically stable, last hgb 10.1 at 0330, monitoring closely. Patient still lethargic, opens eyes spontaneously, no neuro changes. Tube feeds have been off since last night.     0810: Paged Neurosurgery - \"Pt with active GIB, had 7 large bloody BMs overnight. BP soft with MAP 62, do you want me to hold on scheduled amlodipine?\" Order to hold morning dose, team to discuss findings with GI.    1048: Paged Neurosurgery - \"FYI possible abscess noted on left chest, leaking clear/yellow fluid. Please assess when rounding\" Orders received for wound care consultation.     1200: Mg 1.8 - electrolyte replaced. Orders received for golytely prep, protonix IV BID, LR fluids at 75ml/hr, keep NPO/hold tube feeds.     1500: Golytely intiated, external rectal pouch on       Plan for colonoscopy/EGD tomorrow 7/18, continue to hold tube feeds per GI.  "

## 2022-07-18 NOTE — CONSULTS
[facility]  Consult Note - Hospitalist Service, GOLD TEAM 12  Date of Admission:  6/22/2022  Consult Requested by: Bruce Graves MD  Reason for Consult: GI bleed    Assessment & Plan   Louise Benítez is a 76-year-old F with a history of seronegative rheumatoid arthritis, osteoporosis, peripheral artery disease, and myelodysplastic syndrome admitted 6/22 after a ground level fall resulting in subdural hematoma with midline shift s/p emergent craniotomy and SDH evacuation. Course has been complicated by nonconvulsive status epilepticus (resolved), GI bleeding s/p EGD and clipping of gastric ulcer 7/5, and aspiration s/p PEG placement 7/15. Noted to have bloody stools the evening of 7/17 with acute hemoglobin drop (10.1 -> 8.7). Medicine was consulted for recommendations surrounding management of GI bleed.    # Acute blood loss anemia on chronic microcytic anemia  # GI bleed  # Gastric ulcers complicated by GI bleeding s/p EGD 7/5 with clipping of 5mm ulcers in posterior gastric wall  - Agree with GI consult and IV PPI BID  - Hold tube feeds pending GI evaluation and recommendations; start mIVF while NPO (ordered for you)  - HgB q6h, type and screen is up to date; goal HgB>7  - Agree with holding DVT prophylaxis for now     # Subdural hematoma with midline shift s/p emergent craniotomy with SDH evacuation (6/22/22)  # Nonconvulsive status epilepticus (s/p vEEG 7/8)  - Seen by palliative care on 7/8; advanced care document on file (02/2003)  - On 1g Keppra BID   - Neurology evaluated the patient, last saw her 7/13  - Continue on delirium precautions     # Mild leukocytosis  WBC 11.2 <-11.5. Sputum culture collected yesterday, showed normal panda. CXR with increased bibasilar opacities-- at this time, with clear lung sounds and good air movement and no fever, favor atelectasis as the etiology. Was diagnosed with aspiration pneumonia (sputum cx from 7/5 4+ S. Aureus, s/p a  course of cefazolin 7/11). Patient has been afebrile and hemodynamically stable.   - Continue to monitor with CBC daily  - If febrile, consider covering for aspiration pneumonia with ceftriaxone 1g q24h   - AGree with chest physiotherapy     # Seronegative rheumatoid arthritis  - Continue PTA hydroxychloroquine 400mg daily   - Has rheumatology follow up with Dr. Us on 9/13    # High blood pressure  - Continue amlodipine 10mg daily (started on admission due to high blood pressures); dose decreased to 5mg daily for recent softer blood pressures (ordered)    # Myelodysplasic syndrome  - PTA darbepoietin (last dose 7/5, due for next dose 7/26)    # Osteoporosis  - Hold PTA calcium/vitamin D  - Holding PTA denosumab while inpatient     # Hyperlipidemia  # Peripheral arterial disease  - Not on a PTA statin or antiplatelet    # Aspiration s/p IR PEG (7/15)  - Dietitian following; hold TFs for now in light of GI bleed  - HOB>45 degrees     The patient's care was discussed with the neurosurgery team via this note and to the patient's  (Jd) via telephone.    Briana Calloway MD  [facility]  Securely message with the Vocera Web Console (learn more here)  Text page via [facility] Paging/Directory   Please see signed in provider for up to date coverage information    Hospitalist Service, [facility] TEAM 12      ______________________________________________________________________    Chief Complaint   GI bleed    History is obtained from the patient and electronic health record due to patient's depressed mental status    History of Present Illness   Louise Benítez is a 76-year-old F with a history of osteoarthritis, seronegative rheumatoid arthritis, peripheral artery disease, and myelodysplastic syndrome admitted on 6/22.    Ms. Benítez initially sustained a ground-level fall on 5/12/22 and was found to have a L subdural hematoma; she was clinically stable and discharged to TCU  on 5/20. She was discharged from TCU mid-June and did week at home. However, on 6/22, she was found by her  on the ground next to her chair and was presumed to have fallen; Ms. Benítez was noted to be altered and was brought by EMS to the ED, where she was found to have a new subdural hematoma with midline shift-- she was emergently intubated and taken to the OR for emergent L craniotomy and SDH evacuation. Ms. Benítez was extubated on post-op day 1.    She was noted on 7/4 to have evidence of GI bleed and was intubated again for EGD on 7/5, which showed gastric ulcers-- one on the posterior gastric wall was clipped. She had a PEG placed on 7/15 and has been receiving tube feeds. Last night, there was some more concern for GI bleed due to bloody stools noted, and GI and medicine were consulted today for management.     Review of Systems   Review of systems not obtained due to patient factors - mental status    Past Medical History    I have reviewed this patient's medical history and updated it with pertinent information if needed.   Seronegative rheumatoid arthritis  Osteoarthritis  Peripheral artery disease  Myelodysplastic syndrome    Past Surgical History   I have reviewed this patient's surgical history and updated it with pertinent information if needed.  Past Surgical History:   Procedure Laterality Date     CRANIOTOMY Left 6/22/2022    Procedure: CRANIOTOMY LEFT for hematoma;  Surgeon: Ayden Harris MD;  Location: [facility] OR     IR GASTROSTOMY TUBE PERCUTANEOUS PLCMNT  7/15/2022     IR LUMBAR EPIDURAL STEROID INJECTION  8/15/2012       Social History   I have reviewed this patient's social history and updated it with pertinent information if needed.  Social History     Tobacco Use     Smoking status: Former Smoker     Smokeless tobacco: Never Used   Substance Use Topics     Alcohol use: Yes     Comment: Alcoholic Drinks/day: wine      Drug use: Never       Family History   I have reviewed this  patient's family history and updated it with pertinent information if needed.  Family History   Problem Relation Age of Onset     Malignant Hyperthermia Cousin        Medications   Current Facility-Administered Medications   Medication     acetaminophen (TYLENOL) tablet 975 mg     albuterol (PROVENTIL) neb solution 2.5 mg     amLODIPine (NORVASC) tablet 10 mg     atropine 1 % ophthalmic solution 2 drop     bisacodyl (DULCOLAX) Suppository 10 mg     cyanocobalamin (VITAMIN B-12) tablet 500 mcg     [Held by provider] heparin ANTICOAGULANT injection 5,000 Units     hydroxychloroquine (PLAQUENIL) 25 mg/mL suspension 400 mg     iodixanol (VISIPAQUE 320) injection 100 mL     levETIRAcetam (KEPPRA) tablet 1,000 mg     magnesium sulfate 2 g in water intermittent infusion     melatonin tablet 5 mg     multivitamins w/minerals liquid 15 mL     naloxone (NARCAN) injection 0.2 mg    Or     naloxone (NARCAN) injection 0.4 mg    Or     naloxone (NARCAN) injection 0.2 mg    Or     naloxone (NARCAN) injection 0.4 mg     ondansetron (ZOFRAN ODT) ODT tab 4 mg    Or     ondansetron (ZOFRAN) injection 4 mg     pantoprazole (PROTONIX) 2 mg/mL suspension 40 mg     polyethylene glycol (MIRALAX) Packet 17 g     protein modular (PROSOURCE TF) 1 packet     senna-docusate (SENOKOT-S/PERICOLACE) 8.6-50 MG per tablet 1-2 tablet     sodium chloride (PF) 0.9% PF flush 3 mL     sodium chloride (PF) 0.9% PF flush 3 mL       Allergies   No Known Allergies    Physical Exam   Vital Signs: Temp: 98.4  F (36.9  C) Temp src: Axillary BP: 102/49 Pulse: 76   Resp: (!) 3 SpO2: 99 % O2 Device: Nasal cannula Oxygen Delivery: 2 LPM  Weight: 164 lbs 14.47 oz    GENERAL: The patient intermittently turns toward voice and with sternal rub. Does not follow commands. Cachectic.   HEAD: Well-healing craniotomy scar.  EYES: Pupils PERRL.  NOSE: Without deformity, bleeding or discharge.   ORAL CAVITY: Dry mucus membranes.  LUNGS: Clear to auscultation bilaterally--  transmitted upper airway sounds noted in the chest. No rales, rhonchi or wheezes are appreciated. Good air movement is auscultated in all 4 lung fields. No increased work of breathing.  HEART: Regular rate and rhythm. No murmur, rubs, or gallops noted. No S3, S4 or rub is auscultated.   ABDOMEN: Soft, nontender and nondistended. Normal bowel sounds are auscultated.  EXTREMITIES: No clubbing, cyanosis or edema. 2+ DP pulses. 2-beat clonus noted bilaterally.  SKIN: No rashes. No jaundice. Pink and warm with good turgor.     Data   Results for orders placed or performed during the hospital encounter of 06/22/22 (from the past 24 hour(s))   Basic metabolic panel   Result Value Ref Range    Creatinine 0.35 (L) 0.51 - 0.95 mg/dL    Sodium 136 136 - 145 mmol/L    Potassium 4.5 3.4 - 5.3 mmol/L    Urea Nitrogen 20.0 8.0 - 23.0 mg/dL    Chloride 101 98 - 107 mmol/L    Carbon Dioxide (CO2) 25 22 - 29 mmol/L    Anion Gap 10 7 - 15 mmol/L    Glucose 111 (H) 70 - 99 mg/dL    GFR Estimate >90 >60 mL/min/1.73m2    Calcium 9.2 8.8 - 10.2 mg/dL   CBC with platelets   Result Value Ref Range    WBC Count 10.7 4.0 - 11.0 10e3/uL    RBC Count 2.75 (L) 3.80 - 5.20 10e6/uL    Hemoglobin 8.4 (L) 11.7 - 15.7 g/dL    Hematocrit 27.1 (L) 35.0 - 47.0 %    MCV 99 78 - 100 fL    MCH 30.5 26.5 - 33.0 pg    MCHC 31.0 (L) 31.5 - 36.5 g/dL    RDW 17.2 (H) 10.0 - 15.0 %    Platelet Count 502 (H) 150 - 450 10e3/uL   Prepare red blood cells (unit)   Result Value Ref Range    CROSSMATCH Compatible     UNIT ABO/RH AB Neg     Unit Number [id number]     Unit Status Issued     Blood Component Type Red Blood Cells     Product Code C5865W82     CODING SYSTEM [id number]     UNIT TYPE ISBT 2800     ISSUE DATE AND TIME [id number]    Basic metabolic panel   Result Value Ref Range    Creatinine 0.40 (L) 0.51 - 0.95 mg/dL    Sodium 141 136 - 145 mmol/L    Potassium 4.9 3.4 - 5.3 mmol/L    Urea Nitrogen 23.4 (H) 8.0 - 23.0 mg/dL    Chloride 105 98 - 107 mmol/L     Carbon Dioxide (CO2) 25 22 - 29 mmol/L    Anion Gap 11 7 - 15 mmol/L    Glucose 95 70 - 99 mg/dL    GFR Estimate >90 >60 mL/min/1.73m2    Calcium 9.2 8.8 - 10.2 mg/dL   CBC with platelets   Result Value Ref Range    WBC Count 11.5 (H) 4.0 - 11.0 10e3/uL    RBC Count 3.30 (L) 3.80 - 5.20 10e6/uL    Hemoglobin 10.1 (L) 11.7 - 15.7 g/dL    Hematocrit 32.1 (L) 35.0 - 47.0 %    MCV 97 78 - 100 fL    MCH 30.6 26.5 - 33.0 pg    MCHC 31.5 31.5 - 36.5 g/dL    RDW 16.6 (H) 10.0 - 15.0 %    Platelet Count 532 (H) 150 - 450 10e3/uL   Magnesium   Result Value Ref Range    Magnesium 1.8 1.7 - 2.3 mg/dL   Phosphorus   Result Value Ref Range    Phosphorus 4.5 2.5 - 4.5 mg/dL   CBC with platelets   Result Value Ref Range    WBC Count 11.2 (H) 4.0 - 11.0 10e3/uL    RBC Count 2.85 (L) 3.80 - 5.20 10e6/uL    Hemoglobin 8.7 (L) 11.7 - 15.7 g/dL    Hematocrit 28.2 (L) 35.0 - 47.0 %    MCV 99 78 - 100 fL    MCH 30.5 26.5 - 33.0 pg    MCHC 30.9 (L) 31.5 - 36.5 g/dL    RDW 16.5 (H) 10.0 - 15.0 %    Platelet Count 488 (H) 150 - 450 10e3/uL   Extra Tube    Narrative    The following orders were created for panel order Extra Tube.  Procedure                               Abnormality         Status                     ---------                               -----------         ------                     Extra Green Top (Lithium...[[id number]]                      Final result                 Please view results for these tests on the individual orders.   Extra Green Top (Lithium Heparin) Tube   Result Value Ref Range    Hold Specimen JI

## 2022-07-19 NOTE — PROGRESS NOTES
Care Management Follow Up    Length of Stay (days): 27    Expected Discharge Date: 07/22/22     Concerns to be Addressed:  Dish[employer] PLanning   Patient plan of care discussed at interdisciplinary rounds: Yes    Anticipated Discharge Disposition: TCU      Anticipated Discharge Services:  Therapies  Anticipated Discharge DME: TBD     Patient/family educated on Medicare website which has current facility and service quality ratings:  Yes  Education Provided on the Discharge Plan:  Yes  Patient/Family in Agreement with the Plan:  Yes    Referrals Placed by CM/SW:      [facility]--No beds this week-7/19        [address] [facility] [address]            Phone: [phone]        Fax: [phone]         [facility] Faxed referral 7/15        [address], [place] [address]            Phone: [phone]        Fax: [phone]         [facility]-faxed referral 7/15--left  7/19        [address] [address]            Phone: [phone]        Fax: [phone]         [facility] 7/15        [address] [facility] [address]            Phone: [phone]        Fax: [phone]        Declined  [facility]-Faxed referral 7/15        [address] [facility] [address]            Phone: [phone]        Fax: [phone]         [facility]-F[facility] referral 7/15        [address] [facility] [address]            Phone: [phone]        Fax: [phone]       [facility]-faxed referral 7/15 left  7/19        [address] [facility] [address]            Phone: [phone]        Fax: [phone]         Private pay costs discussed: Not applicable    Additional Information:  SW made the above TCU referrals. SW will continue to follow.      ________________    AQUILES Peacock, LICSW  6D   [facility]  Phone: [phone]  Pager: [phone]  Fax: [phone]

## 2022-07-19 NOTE — OR NURSING
Pt underwent colonoscopy with biopsies under conscious sedation. Specimens sent to lab. Report called to 6D RN and pt transported back to floor.       Olivia Harrington RN

## 2022-07-19 NOTE — PROGRESS NOTES
[facility], [place]   07/19/2022  Neurosurgery Progress Note:    Assessment:  Ms. Benítez is a 76-year-old female with PMHx myelodysplastic syndrome, seronegative RA, chronic microcytic anemia, PAD, and recent L SDH (5/12/2022) who arrived to [facility] via EMS after an unwitnessed fall at home.  Shortly after arriving, the patient was complaining of a significant headache and nausea, and then her right pupil dilated.  She was no longer protecting her airway, so she was sedated and intubated.  Her right eye pupil then returned to a normal caliber.  A head CT demonstrated a left acute subdural approximately 2.3 cm thick with 12 mm of rightward midline shift.  She was taken to the OR on 6/22 for emergent L craniotomy for SDH evacuation. She was extubated on POD1. Concerns for tube feed aspiration on 6/25.  She underwent emergent EGD by GI due to sharp drop in hemoglobin as well as melanotic stools.  She had 1 gastric ulcer clipped which was the presumed culprit.  Due to requiring the EGD she needed intubation during this time, therefore she was intubated on 7/4/2022 for EGD.  She was also connected to EEG on 7/4/2022 found to be in nonconvulsive status received Keppra load and Ativan and was started on Versed overnight.  On 7/5/2022, her Versed drip was weaned off with confirmation of no status epilepticus. On 7/8 she was extubated and remains stable.  Received PEG on 7/15/2022 by IR.    Other diagnoses include:  #Brain compression  #Open evacuation of hematoma on day of admission   #Hyponatremia  #Pulmonary edema  #GI bleed  #Uremia  #hypokalemia    Plan:  Colonscopy +/- EGD today  Please trend hemoglobin, ordered for every 6 hours  Requested RT for chest physiotherapies  Sputum cultures sent  Continue Keppra 1 g twice daily until seen by neurology and as as outpatient  Please continue delirium precautions  PEG continues for tube feeds-held for now  Continue p.o. Protonix 40 mg twice  daily  Subcutaneous heparin for DVT prophylaxis-remains held  Please continue every hour vital checks for now.    -----------------------------------  Bruce Graves MD  Neurosurgery Resident     Please contact neurosurgery resident on call with questions.    Dial * * *[phone], enter [phone] when prompted.    -----------------------------------    Interval History: Overnight continues with bloody stools. Hgb down to 7.3. 1U given. Medicine now primary.     Objective:   Temp:  [98.6  F (37  C)-98.7  F (37.1  C)] 98.7  F (37.1  C)  Pulse:  [] 82  Resp:  [3-19] 9  BP: ()/(41-78) 131/56  SpO2:  [93 %-99 %] 96 %  I/O last 3 completed shifts:  In: 4445 [I.V.:445; NG/GT:4000]  Out: 3550 [Urine:600; Stool:2950]        Gen: Appears comfortable, NAD, laying in bed, appearing older than stated age  HEENT: Left craniotomy incision healing well  Wound: Incision closed with staples-now removed, incision appears clean dry-no signs of leaking, no wetness appreciated.  See below for photo from 7/5/2022 evening.  Incision cleaned with ChloraPrep.  Pulm: On nasal cannula 1 L, no increased work of breathing, satting 98%  Neurologic:  Eyes open spontaneously, minimally tracking, gaze at midline without preference  Follows commands in all 4 extremities  Reflexes 2+, downgoing Babinski's bilaterally      LABS:  Recent Labs   Lab 07/19/22  0627 07/18/22  1924 07/18/22  0328 07/17/22  1213     --  141 136   POTASSIUM 3.7  --  4.9 4.5   CHLORIDE 105  --  105 101   CO2 26  --  25 25   ANIONGAP 11  --  11 10   GLC 91 81 95 111*   BUN 17.8  --  23.4* 20.0   CR 0.39*  --  0.40* 0.35*   MENDEZ 8.9  --  9.2 9.2       Recent Labs   Lab 07/19/22  0627   WBC 8.1   RBC 2.53*   HGB 7.7*   HCT 25.4*      MCH 30.4   MCHC 30.3*   RDW 16.3*   *       IMAGING:  No results found for this or any previous visit (from the past 24 hour(s)).

## 2022-07-19 NOTE — PROGRESS NOTES
[facility]    Medicine Progress Note - Hospitalist Service, GOLD TEAM 6    Date of Admission:  6/22/2022    Assessment & Plan  Louise Benítez is a 76-year-old F with a history of seronegative rheumatoid arthritis, osteoporosis, peripheral artery disease, and myelodysplastic syndrome admitted 6/22 after a ground level fall resulting in subdural hematoma with midline shift s/p emergent craniotomy and SDH evacuation. Course has been complicated by nonconvulsive status epilepticus (resolved), GI bleeding s/p EGD and clipping of gastric ulcer 7/5, and aspiration s/p PEG placement 7/15. Noted to have bloody stools the evening of 7/17 with acute hemoglobin drop (10.1 -> 8.7). Medicine was consulted for recommendations surrounding management of GI bleed.    # Acute blood loss anemia on chronic microcytic anemia  # GI bleed  # Gastric ulcers complicated by GI bleeding s/p EGD 7/5 with clipping of 5mm ulcers in posterior gastric wall  Bloody stool output with associated Hgb drop on 7/17 from 10.1->8.7->6.6 today. She was transfused 1 unit(s) pRBCs prior to colonoscopy. Colonoscopy done 7/19  showed ulcers at the splenic flexure that were likely source of bleeding but have stopped on their own.  - Appreciate assistance and recs from GI  - Continue volume resuscitation with IVFs and blood as needed   - Continue IV Protonix BID for now. Ok to transition back to PO PPI BID x 12 weeks once tolerating orals   - Monitor Hgb q 8 hours for now, transfuse for Hgb < 7  - Will continue to hold AC for VTE prophylaxis until Hgb stabilizes. Will use SCDs     # Subdural hematoma with midline shift s/p emergent craniotomy with SDH evacuation (6/22/22)  # Nonconvulsive status epilepticus (s/p vEEG 7/8)  # Frequent falls   Initial fall w/ SDH occurred on 8/12 and pt was admitted to [facility] neuro ICU from 5/12-5/20. Discharged to TCU. She did return home from TCU and was doing well. On 6/22 she  suffered a mechanical fall while at home. In the ER, CT scan showed a new SDH w/ midline shift. She subsequently underwent L craniotomy for SDH evacuation and was extubated on post-op day 1.   - Medicine is taking over as primary team with neurosurgery continuing to follow as consultants   - On 1g Keppra BID                 - Neurology evaluated the patient, last saw her 7/13  - Continue on delirium precautions      # Goals of care   Per conversation with pt's  7/19 the patient was in a good state of health prior to initial fall and SDH in may. Mentation returned to normal following initial SDH but since her most recent fall on 6/22 she has remained mostly somnolent with only some wakefulness.   - Palliative care is following. Advanced care document on file (02/2003)  - Placed  PICC line today due to poor IV access   - Family has made pt DNR/DNI  - Will continue to have goals of care discussions in coming days     # Mild leukocytosis  WBC 11.2 <-11.5. Sputum culture collected 7/17, showed normal panda. CXR with increased bibasilar opacities-- at this time, with clear lung sounds and good air movement and no fever, favor atelectasis as the etiology. Was diagnosed with aspiration pneumonia (sputum cx from 7/5 4+ S. Aureus, s/p a course of cefazolin 7/11). Patient has been afebrile and hemodynamically stable.   - Continue to monitor with CBC daily (WBC remains WNL)  - If febrile, consider covering for aspiration pneumonia with ceftriaxone 1g q24h   - Agree with chest physiotherapy     # High blood pressure  Amlodipine started at 10 mg on admission, decreased to 5 mg due to lows  - Continue amlodipine 5 mg daily for now   - Monitor BP and adjust accordingly     # Seronegative rheumatoid arthritis  - Continue PTA hydroxychloroquine 400mg daily   - Has rheumatology follow up with Dr. Us on 9/13     # Myelodysplasic syndrome  - PTA darbepoietin (last dose 7/5, due for next dose 7/26)     # Osteoporosis  - Hold  PTA calcium/vitamin D  - Holding PTA denosumab while inpatient      # Hyperlipidemia  # Peripheral arterial disease  - Not on a PTA statin or antiplatelet     # Aspiration s/p IR PEG (7/15)  - Dietitian following; hold TFs for now in light of GI bleed  - HOB>45 degrees         Diet: Adult Formula Drip Feeding: Continuous [employer] Peptide 1.5; Nasojejunal; Goal Rate: 45 (Initiate @ 15 ml/hr and adv by 10 ml q8hr as tolerated; mL/hr; Medication - Feeding Tube Flush Frequency: At least 15-30 mL water before and after medicatio...  NPO per Anesthesia Guidelines for Procedure/Surgery Except for: Meds    DVT Prophylaxis: Pneumatic Compression Devices in setting of acute bleed   Vasquez Catheter: Not present  Central Lines: None  Cardiac Monitoring: None  Code Status: No CPR- Do NOT Intubate      Disposition Plan     Expected Discharge Date: 07/20/2022        Discharge Comments: Family looking at TCU options        The patient's care was discussed with the Attending Physician, Dr. Delgadillo, Bedside Nurse, Patient, Patient's Family and neurosurgery Consultant.    Luis Alberto Sinha PA-C  Hospitalist Service, GOLD TEAM [address] [place] [address]  Securely message with the Vocera Web Console (learn more here)  Text page via [facility] Paging/Directory   Please see signed in provider for up to date coverage information      Clinically Significant Risk Factors Present on Admission                      ______________________________________________________________________    Interval History   Patient sedated and non responsive to verbal commands following procedure    Her are was discussed with her , Jd, at bedside     Data reviewed today: I reviewed all medications, new labs and imaging results over the last 24 hours. Please see discussion of these results in the A/P.    Physical Exam     Vital Signs: Temp: 98.7  F (37.1  C) Temp src: Axillary BP: (!) 152/62 Pulse: 86     Resp: 14 SpO2: 97 % O2  Device: None (Room air) Oxygen Delivery: 2 LPM  Weight: 164 lbs 14.47 oz    Constitutional: Patient somnolent following colonoscopy.   Eyes: EOM, PERRLA. Sclera clear, conjunctiva normal. Anicteric   Head: Well-healing craniotomy scar.  ENT: Normocephalic, without obvious abnormality, atraumatic.   Respiratory: No increased work of breathing. Clear to auscultation bilaterally, no crackles or wheezing - transmitted upper airway sounds in the upper chest   Cardiovascular: RRR. No murmur or friction rub. No edema. No chest wall tenderness.  GI: Soft, non-tender without rebound or guarding. Bowel sounds are active.   Skin: no bruising or bleeding. Normal skin color, No jaundice  Musculoskeletal:  Full range of motion noted.    Neurologic: Cranial nerves II-XII are grossly intact.   Neuropsychiatric: General: normal, calm and normal eye contact. Normal affect    Extremities: Right hand pitting edema.     Data   Recent Labs   Lab 07/19/22  0627 07/18/22  2343 07/18/22  1924 07/18/22  1645 07/18/22  1236 07/18/22  0831 07/18/22  0328 07/17/22  2305 07/17/22  1213 07/17/22  0653 07/16/22  0547 07/15/22  0608 07/14/22  1417   WBC 8.1  --   --   --   --  11.2* 11.5*   < >  --    < > 7.7   < >  --    HGB 7.7* 7.3*  --  8.2*   < > 8.7* 10.1*   < >  --    < > 8.8*   < >  --      --   --   --   --  99 97   < >  --    < > 99   < >  --    *  --   --   --   --  488* 532*   < >  --    < > 449   < >  --    INR  --   --   --   --   --   --   --   --   --   --   --   --  1.07   NA  --   --   --   --   --   --  141  --  136  --  140   < >  --    POTASSIUM  --   --   --   --   --   --  4.9  --  4.5  --  4.6   < >  --    CHLORIDE  --   --   --   --   --   --  105  --  101  --  103   < >  --    CO2  --   --   --   --   --   --  25  --  25  --  26   < >  --    BUN  --   --   --   --   --   --  23.4*  --  20.0  --  19.3   < >  --    CR  --   --   --   --   --   --  0.40*  --  0.35*  --  0.34*   < >  --    ANIONGAP  --   --   --    --   --   --  11  --  10  --  11   < >  --    MENDEZ  --   --   --   --   --   --  9.2  --  9.2  --  8.9   < >  --    GLC  --   --  81  --   --   --  95  --  111*  --  97   < >  --     < > = values in this interval not displayed.       No results found for this or any previous visit (from the past 24 hour(s)).

## 2022-07-19 NOTE — PROGRESS NOTES
Shift Summary: Sent down for colonoscopy. TF's restarted post procedure per provider order. Hgb 6.6. Notified provider. Transfused 1 unit RBC's. Vitals stable, afebrile. Will continue to monitor.    Denis Wing RN on 7/19/2022 at 5:42 PM

## 2022-07-19 NOTE — PLAN OF CARE
Neuro/Musculoskeletal:  A&O3-4 difficult to assess as patient  follows command but occasionally trying to pull lines and hospital gown. Generalized body weakness, RUE is slightly weaker than LUE  Cardiac:  SR.  VSS.   Respiratory:  Sating in the 90s on O2 at 2 LPM. Desat to mid 80s with no oxygen.  GI/:  Adequate urine output.    Diet/Appetite: TF on hold. GI prep given  Activity:  Assist of 2 using lift device  Pain:  Denies.  Skin: Surgical area scalp - skin closure well approximated  Generalized bruises  Redness in perineal area - barrier paste applied  Multiple old wounds on both lower extremities on scabbing stage.    LDAs + Drips/IVF: PIV right armx2. PRBC ordered    Plan:  Continue POC, notify provider for changes. GI scope today

## 2022-07-19 NOTE — PROGRESS NOTES
Pt has poor vasculature in both arm with/without ultrasound. Recommendation for PICC. 45 mins placing PIV with ultrasound in left upper.

## 2022-07-19 NOTE — PROGRESS NOTES
Brief GI Note  76 year old female with a history of myelodysplastic syndrome with frequent transfusions, osteoporosis, rheumatoid arthritis, peripheral arterial disease, lumbar spondylosis, and chronic microcytic anemia admitted 6/22 after a fall, found to have new acute R SDH s/p evacuation last week who developed new lethargy, hypotension in the setting of reported melena, elevated BUN/Cr and drop in hgb. The Luminal GI service was initially consulted for melena s/p EGD 7/4 with few non-bleeding esophageal and gastric ulcers with no stigmata of recent bleeding and one 5 mm non-bleeding gastric ulcer with pigmented material found in the posterior wall that was clipped, likely the source of previous melena. G-tube placed by IR 7/15/2022 for feedings. The GI Luminal Service has been paged with concerns for ongoing bleed, drop in Hgb.     S/p colonoscopy for hematochezia on 7/19/2022 with ulcers in the colon at the splenic flexure, biopsies taken. Nothing actively bleeding but likely the source of hematochezia. Couple small polyps visualized in the cecum, no polypectomy performed in the setting of bleeding procedural indication. Recommend an outpatient follow-up with primary care provider to discuss whether colonoscopy benefits outweigh the risks.      Recommendations:  -- Okay to transition from IV back to Omeprazole 40 mg PO BID for 12 weeks.   -- Continue Supportive Cares.  - Adequate volume resuscitation with IVF, pRBCs.   - Monitor Hemoglobin closely. Transfuse to keep Hgb > 7 g/dl from GI standpoint.    - Maintain hemodynamics: MAP >65 mmHg and HR <100.  - Optimize electrolytes.  -- Okay restart tube feedings.  -- Regarding anticoagulation, from a GI perspective, no contraindication to restarting anticoagulation. Defer decision to primary team. Recommend weighing benefits of anticoagulation versus bleeding risks.   -- Analgesics/antiemetics per primary team.    Outpatient (GI will arrange):  -- EGD in 3 months  for evaluation of esophageal and gastric ulcers.   --  No outpatient GI clinic follow-up necessary.    Other Outpatient:  -- Follow-up with primary care provider for goals of care discussion regarding outpatient colonoscopy for colon polyps visualized in the cecum if the benefits outweigh the risks once discharged.      The inpatient gastroenterology service will sign off at this time. Thank you for allowing us to participate in the care of this patient. Please do not hesitate to page the GI service with any questions or concerns.     Discussed with Dr. Hoffman, GI Luminal Attending Physician.    Suzy Duran PA-C  Inpatient Gastroenterology Service  [facility]  Pager: *[phone]  Text Page

## 2022-07-20 NOTE — PROGRESS NOTES
CLINICAL NUTRITION SERVICES - REASSESSMENT NOTE     Nutrition Prescription    RECOMMENDATIONS FOR MDs/PROVIDERS TO ORDER:  -Minimize interruptions to TF regimen as able.    Malnutrition Status:    -Patient does not meet two of the established criteria necessary for diagnosing malnutrition but is at risk for malnutrition    Recommendations already ordered by Registered Dietitian (RD):  -Ordered updated weight (last wt on 7/14)  -Increasing ProSource modulars d/t increased needs and maintenance of LBM.   [employer] Peptide 1.5 @ 45 mL/hr (1080 mL/day) to provide 1661 kcal (28 kcal/kg), 80 g protein (1.4 g/kg), 149 g CHO, 10 g fiber, 83 g fat (40% from MCTs), and 756 mL free water daily   --Prosource x 3 = 1781 kcals (30.2 kcals/kg) and 113 gm PRO (1.92 gm/kg)     Future/Additional Recommendations:  -Monitor stooling for evidence of recurred GI bleed, monitor TF for tolerance, wt trends, labs, and GOC after discussion with family.      EVALUATION OF THE PROGRESS TOWARD GOALS   Diet:   NPO for anesthesia since 7/14, pt was on a clear liquid diet briefly but does not appear to have been able to tolerate much, if at all - no order hx available per [facility] and per chart review pt failed SLP evaluation.   Nutrition Support:   Currently receiving:   [employer] Peptide 1.5 @ 45 mL/hr (1080 mL/day) to provide 1661 kcal (28 kcal/kg), 80 g protein (1.4 g/kg), 149 g CHO, 10 g fiber, 83 g fat (40% from MCTs), and 756 mL free water daily   Pt also receiving 1 packet ProSource BID (total 2 packets / day), including modulars this provides pt with: 1741 kcals (30 kcals/kg) and 102 gm PRO (1.7 gm/kg)   Intake:   Average intake from TF (7/12-7/18):  During the last week, pt received an average of 700 mL formula / day.   Average intake of 1.85 packets ProSource / day (pt missed 1 packet d/t being NPO when ProSource was scheduled to be administered).   Over this 7 day time period, pt received an average of 1050 kcals, 52g protein, 97g  CHO, 6g fiber, 54g fat, and 490 mL free water from formula.   Including modulars, pt received an average of 1124 kcals (19 kcal / kg), and 72g protein (1.2 g / kg) per DW of 59 kg.      NEW FINDINGS   -Aspiration s/p PEG placement 7/15, no further instance of aspiration / emesis since.   -Large bloody stool 7/17, TF held 7/18, found to have new LGIB, per colonoscopy on 7/19 ulcers present near splenic flexure, appears to have spontaneously stopped bleeding on their own. Medicine consulted for management of GI bleeds.  -Pt not assessed by SLP 7/18 due to concern for LGIB and pt unresponsive.   -Pt made DNR/DNI 7/19, GOC discussion to be had in the coming days.   -LBM 7/18, multiple bloody stools over the course of late night 7/17 and over the day on 7/18 (~10). Prior to this loose / watery stools.   -Attempted to speak with Louise today, but pt not easily woken, writer left room to let her rest.     -Wt trends this admission difficult to assess d/t varying fluid status, also question accuracy of admission weight 133 lbs.  07/14/22 0700 74.8 kg (164 lb 14.5 oz) Bed scale   07/13/22 0512 80.3 kg (177 lb 1.6 oz) --   07/12/22 0600 80 kg (176 lb 5.5 oz) Bed scale   07/10/22 0600 80.8 kg (178 lb 1.6 oz) Bed scale   07/09/22 0000 77.9 kg (171 lb 11.2 oz) Bed scale   07/07/22 0400 71.7 kg (158 lb) Bed scale   07/06/22 0030 69.4 kg (153 lb) --   07/04/22 0400 66.8 kg (147 lb 4.3 oz) Bed scale   07/03/22 0200 66.9 kg (147 lb 7.8 oz) Bed scale   06/30/22 0400 71.8 kg (158 lb 4.6 oz) Bed scale   06/28/22 0400 73.2 kg (161 lb 6 oz) --   06/27/22 0000 72.1 kg (158 lb 15.2 oz) Bed scale   06/26/22 0000 71.3 kg (157 lb 3 oz) Bed scale   06/25/22 0000 73.7 kg (162 lb 7.7 oz) Bed scale   06/22/22 2225 71.7 kg (158 lb 1.1 oz) --     06/22/22 1719 60.3 kg (133 lb) --     MALNUTRITION  % Intake: Does not meet criteria - pt received an average 76% of estimated needs over the past week.   % Weight Loss: Weight loss does not meet criteria,  wt loss likely 2/2 fluid status, also variable in general, and no updated weight since 7/14.   Subcutaneous Fat Loss: Facial region:  None noted (visual exam only) and Lower arm:  None noted (visual exam only)  Muscle Loss: Temporal:  None noted (visual exam only), Facial & jaw region:  None noted (visual exam only) and Posterior calf:  None noted (visual exam only)  Fluid Accumulation/Edema: Unable to assess  Malnutrition Diagnosis: Patient does not meet two of the established criteria necessary for diagnosing malnutrition but is at risk for malnutrition    Previous Goals   Total avg nutritional intake to meet a minimum of 1475 kcal/kg and 89 g PRO/kg daily (per dosing wt 59 kg).  Evaluation: Not met    Previous Nutrition Diagnosis  Inadequate oral intake related to inability to take PO intake as evidenced by NPO status and TFs meeting 100% nutrition needs.  Evaluation: No change    CURRENT NUTRITION DIAGNOSIS  Inadequate oral intake related to inability to take PO intake as evidenced by NPO status and pt reliant on TF to meet nutritional needs.     INTERVENTIONS  Implementation  Enteral Nutrition - Maintaining current regimen.     Goals  Total avg nutritional intake to meet a minimum of 25 kcal/kg and 1.5 g PRO/kg daily (per dosing wt 59 kg).    Monitoring/Evaluation  Progress toward goals will be monitored and evaluated per protocol.    Avi Koch, DASH, LD  6D RD pager: [phone]  Weekend/Holiday RD pager: [phone]

## 2022-07-20 NOTE — PROGRESS NOTES
Care Management Follow Up    Length of Stay (days): 28    Expected Discharge Date:  TBD     Concerns to be Addressed:  Discharge Planning    Patient plan of care discussed at interdisciplinary rounds: Yes    Anticipated Discharge Disposition:  TCU     Anticipated Discharge Services:  Thrapies  Anticipated Discharge DME:  TBD    Patient/family educated on Medicare website which has current facility and service quality ratings:  Yes  Education Provided on the Discharge Plan:  Yes  Patient/Family in Agreement with the Plan:  Yes    Referrals Placed by CM/SW:    [facility]--Faxed referral 7/15--No beds this week-7/19            [address] [facility] [address]            Phone: [phone]        Fax: [phone]         [facility] Faxed referral 7/15--left VM for call back w/ Sandy Liaison 7/20            [address], [place] [address]            Phone: [phone]        Fax: [phone]         [facility]-faxed referral 7/15-left VM for Call back W/ Sandy Liaison 7/20            [address] [facility] [address]            Phone: [phone]        Fax: [phone]            Declined  Declined  [facility]-Faxed referral 7/15            [address] [facility] [address]            Phone: [phone]        Fax: [phone]        [facility]-faxed referral 7/15 left VM 7/19            [address] [facility] [address]            Phone: [phone]        Fax: [phone]        [facility]-Faxed referral 7/15-TCU temporarily closed            [address] [facility] [address]            Phone: [phone]        Fax: [phone]        [facility]-faxed referral 7/15--left VM 7/19-- 7/20--no bed availability.            [address] [place] [address]            Phone: [phone]        Fax: [phone]         Private pay costs discussed: Not applicable    Additional Information:  SW followed up with the above referrals and left VM's with them requesting calls back.  ________________    AQUILES Peacock, [address]   [facility]  Phone: [phone]  Pager: [phone]  Fax: [phone]

## 2022-07-20 NOTE — PROGRESS NOTES
07/20/22 1400   General Information   Onset of Illness/Injury or Date of Surgery 06/22/22   Referring Physician Ap Santos MD   Patient/Family Therapy Goal Statement (SLP) unable to state a goal   Pertinent History of Current Problem Louise Benítez is a 76-year-old F with a history of seronegative rheumatoid arthritis, osteoporosis, peripheral artery disease, and myelodysplastic syndrome admitted 6/22 after a ground level fall resulting in subdural hematoma with midline shift s/p emergent craniotomy and SDH evacuation. Course has been complicated by nonconvulsive status epilepticus (resolved), GI bleeding s/p EGD and clipping of gastric ulcer 7/5, and aspiration s/p PEG placement 7/15. Noted to have bloody stools the evening of 7/17 with acute hemoglobin drop (10.1 -> 8.7). Medicine was consulted for recommendations surrounding management of GI bleed.   General Observations Today the patient is alert but nonverbal. Sitting up in the chair. Pt is approved for participation in swallow eval by medicine team.   Past History of Dysphagia Prior to this hospital admission pt has no record of dysphagia. NPO following bedside eval on 6/30/22.   Type of Evaluation   Type of Evaluation Swallow Evaluation   Oral Motor   Oral Musculature unable to assess due to poor participation/comprehension   Structural Abnormalities none present   Mucosal Quality dry;sticky   Dentition (Oral Motor)   Dentition (Oral Motor) natural dentition   Facial Symmetry (Oral Motor)   Facial Symmetry (Oral Motor) unable/difficult to assess   Lip Function (Oral Motor)   Lip Range of Motion (Oral Motor) unable/difficult to assess   Tongue Function (Oral Motor)   Tongue ROM (Oral Motor) unable/difficult to assess   Jaw Function (Oral Motor)   Jaw Function (Oral Motor) unable/difficult to assess   Cough/Swallow/Gag Reflex (Oral Motor)   Soft Palate/Velum (Oral Motor) unable/difficult to assess   Gag Reflex (Oral Motor) not tested   Volitional  Throat Clear/Cough (Oral Motor) unable/difficult to assess   Volitional Swallow (Oral Motor) unable/difficult to assess   Vocal Quality/Secretion Management (Oral Motor)   Vocal Quality (Oral Motor) unable/difficult to assess  (no attempts to verbalize during this visit)   General Swallowing Observations   Respiratory Support (General Swallowing Observations) nasal cannula   Current Diet/Method of Nutritional Intake (General Swallowing Observations, NIS) NPO;gastrostomy tube (PEG)   Swallowing Evaluation Clinical swallow evaluation   Clinical Swallow Evaluation   Feeding Assistance dependent   Clinical Swallow Eval: Thin Liquid Texture Trial   Mode of Presentation, Thin Liquids spoon;fed by clinician  (wet swab)   Volume of Liquid or Food Presented teaspoon of H20, wet swab   Oral Phase of Swallow WFL   Pharyngeal Phase of Swallow impaired;coughing/choking;reduction in laryngeal movement;repeated swallows   Diagnostic Statement Delayed but significant cough episode, pt in obvious discomfort. At high aspiration risk - not appropriate for advanced trials.   Esophageal Phase of Swallow   Patient reports or presents with symptoms of esophageal dysphagia No   Swallowing Recommendations   Diet Consistency Recommendations NPO;consider alternative means of nutrition   Medication Administration Recommendations, Swallowing (SLP) Not able to safely swallow PO medication at this time.   Instrumental Assessment Recommendations instrumental evaluation not recommended at this time  (likely will be indicated in the future, pending progress)   General Therapy Interventions   Planned Therapy Interventions Dysphagia Treatment   Dysphagia treatment   (PO readiness)   Intervention Comments anticipate extensive speech  and language intervention needs when able to participate consistently   Clinical Impression   Criteria for Skilled Therapeutic Interventions Met (SLP Eval) Yes, treatment indicated   SLP Diagnosis Severe oropharyngeal  dysphagia   Risks & Benefits of therapy have been explained evaluation/treatment results reviewed   Clinical Impression Comments Clinical dysphagia exam completed per MD order. The patient was alert and up in the chair for this exam. She did not attempt any verbalizations or follow commands, but did visually track SLP throughout visit. Reportedly the patient can squeeze hand yes/no at times but was unable to complete this with any accuracy during this visit. The patient presented with significant positive signs/symptoms of aspiration with minimal PO trial as served above. Due to severity of deficits was not appropriate for more trials. Recommend NPO with continuation of alternative source for nutrition/hydration/medication (Pt has PEG). Encourage thorough oral cares. SLP will follow for dysphagia tx for PO readiness and also to initiate speech-language intervention as appropriate.   SLP Discharge Planning   SLP Discharge Recommendation Transitional Care Facility   SLP Rationale for DC Rec Well below baseline oropharyngeal swallowing and communication skills. NPO with PEG.   SLP Brief overview of current status  Rec NPO due to high aspiration risk. Rec pt continue alternative source for nutrition/hydration/medication. Encourage thorough oral cares when upright and alert. SLP will plan to initiate speech-language intervention as appropriate.   SLP Goals   Therapy Frequency (SLP Eval) 4 times/wk   SLP Predicted Duration/Target Date for Goal Attainment 08/20/22   SLP Goals Swallow   Psychosocial Support   Trust Relationship/Rapport care explained

## 2022-07-20 NOTE — PROGRESS NOTES
[facility]  WO Nurse Inpatient Assessment     Consulted for: Left Lower extremity and Right forearm wounds    Patient History (according to provider note(s):      76-year-old female with PMHx myelodysplastic syndrome, seronegative RA, chronic microcytic anemia, PAD, and recent L SDH (5/12/2022) who arrived to [facility] via EMS after an unwitnessed fall at home.  s/p 6/22 emergent L craniotomy for SDH evacuationa left acute subdural hematoma     Areas Assessed:      Areas visualized during today's visit: Focused: R arm and Left shin    Wound location: R arm and Left shin                                                                                                                                                            Date of photos: 6/23/22, 7/7, 7/20  Wound due to: Trauma from wheelchair foot rest pins.    Wound history/plan of care: occurred approximately 6 weeks prior to admission.  Spouse had been leaving open to air.   Wound base: 100 % dry red/fibrin     Palpation of the wound bed: normal      Drainage: small     Description of drainage: serosanguinous     Measurements (length x width x depth, in cm): 1.2  x 1.3  x  0.1 cm      Tunneling: N/A     Undermining: N/A  Periwound skin: Intact and fragile      Color: normal and consistent with surrounding tissue      Temperature: normal   Odor: none  Pain: no grimacing or signs of discomfort,   Pain interventions prior to dressing change: N/A  Treatment goal: Heal  and Remove necrotic tissue  STATUS: healing and follow up  Supplies ordered: gathered, at bedside and ordered medihoney last week    Wound location: Right forearm- scabbed          last photo: 7/20/22  Wound due to: Skin Tear  Wound history/plan of care: wounds not present prior to fall, present on admission.  currently with mepilex border dressing.    Wound base: 100 % dry red fibrinous scab      Palpation of the wound bed: normal      Drainage: none      Description of drainage: serosanguinous     Measurements (length x width x depth, in cm): 0.3  x 0.2  x  0.0 cm      Periwound skin: purple bruising          Temperature: normal   Odor: none  Pain: no grimacing or signs of discomfort,   Pain interventions prior to dressing change: n/a  Treatment goal: Heal almost  STATUS:  healing and follow up  Supplies ordered: gathered  Treatment Plan:     Left lower extremity wound:  Every third  Cleanse with wound cleanser and gauze.    Paint periwound with no sting barrier film.    Cover with mepilex border dressing.   Discontinue when wound healed     Right arm skin tear: leave open to air     Orders: Reviewed and Updated    RECOMMEND PRIMARY TEAM ORDER: None, at this time  Education provided: plan of care and wound progress  Discussed plan of care with: Family  WOC nurse follow-up plan: signing off  Notify WOC if wound(s) deteriorate.  Nursing to notify the Provider(s) and re-consult the WOC Nurse if new skin concern.    DATA:     Current support surface: Standard  Low air loss mattress  BMI: Body mass index is 28.29 kg/m .   Active diet order: Orders Placed This Encounter      NPO per Anesthesia Guidelines for Procedure/Surgery Except for: Meds     Output: I/O last 3 completed shifts:  In: 2898 [I.V.:1800; NG/GT:120]  Out: -      Labs:   Recent Labs   Lab 07/20/22  0559 07/18/22  2343 07/18/22  1645 07/15/22  0608 07/14/22  1417   ALBUMIN 3.3*  --   --   --   --    HGB 8.0*  8.0*   < > 8.2*   < >  --    INR  --   --   --   --  1.07   WBC 9.0   < >  --    < >  --    CRP  --   --  17.20*  --   --     < > = values in this interval not displayed.     Pressure injury risk assessment:   Sensory Perception: 2-->very limited  Moisture: 3-->occasionally moist  Activity: 1-->bedfast  Mobility: 2-->very limited  Nutrition: 3-->adequate  Friction and Shear: 2-->potential problem  Leobardo Score: 13

## 2022-07-20 NOTE — PLAN OF CARE
Neuro/Musculoskeletal:  A&O3-4 difficult to assess as patient  follows command but occasionally trying to pull lines and hospital gown. Generalized body weakness, RUE is slightly weaker than LUE  Cardiac:  SR.  VSS.   Respiratory:  Sating in the 90s on O2 at 2 LPM. Desat to mid 80s with no oxygen.  GI/:  Adequate urine output.    Diet/Appetite: TF @ 45  Activity:  Assist of 2 using lift device  Pain:  Denies.  Skin: Surgical area scalp - skin closure well approximated  Generalized bruises  Redness in perineal area - barrier paste applied  Multiple old wounds on both lower extremities on scabbing stage.    LDAs + Drips/IVF: PIV left arm    Plan:  Continue POC, notify provider for changes. GI scope today

## 2022-07-20 NOTE — PLAN OF CARE
Problem: Plan of Care - These are the overarching goals to be used throughout the patient stay.    Goal: Plan of Care Review/Shift Note  Outcome: Ongoing, Not Progressing  Flowsheets (Taken 7/20/2022 1249)  Plan of Care Reviewed With: other (see comments)  Overall Patient Progress: declining     Problem: Bowel Motility Impaired (Craniotomy/Craniectomy/Cranioplasty)  Goal: Effective Bowel Elimination  Outcome: Ongoing, Not Progressing   Goal Outcome Evaluation:  Pt with new LGIB found on 7/19.   Plan of Care Reviewed With: other (see comments)     Overall Patient Progress: declining

## 2022-07-20 NOTE — PROGRESS NOTES
[facility]    Medicine Progress Note - Hospitalist Service, GOLD TEAM 6    Date of Admission:  6/22/2022    Assessment & Plan  Louise Benítez is a 76-year-old F with a history of seronegative rheumatoid arthritis, osteoporosis, peripheral artery disease, and myelodysplastic syndrome admitted 6/22 after a ground level fall resulting in subdural hematoma with midline shift s/p emergent craniotomy and SDH evacuation. Course has been complicated by nonconvulsive status epilepticus (resolved), GI bleeding s/p EGD and clipping of gastric ulcer 7/5, and aspiration s/p PEG placement 7/15. Noted to have bloody stools the evening of 7/17 with acute hemoglobin drop (10.1 -> 8.7). Medicine was consulted for recommendations surrounding management of GI bleed.    7/20  - Hgb trending up to 8.0 following 1u pRBCs and Colonoscopy yesterday  - Will plan to restart Lovenox tomorrow as long as Hgb remains stable   - Repeat Hgb at 1700 this evening. If remains stable will go to 1x daily CBC  - Mental status more alert today. Pt is responsive to basic commands but remains non-verbal.  - SLP to evaluate for possible oral intake. RT following for chest physiotherapy   - discussed case w/ pt's sister at bedside and updated them on plan of care. Discussed that it will likely take weeks to months to see extent of recovery     # Acute blood loss anemia on chronic microcytic anemia  # GI bleed  # Gastric ulcers complicated by GI bleeding s/p EGD 7/5 with clipping of 5mm ulcers in posterior gastric wall  Bloody stool output with associated Hgb drop on 7/17 from 10.1->8.7->6.6 7/19. She was transfused 1 unit(s) pRBCs prior to colonoscopy. Colonoscopy done 7/19  showed ulcers at the splenic flexure that were likely source of bleeding but have stopped bleeding on their own.   - Hgb stable at 8.0 following procedure  - will continue to monitor   - Plan to restart prophylactic Lovenox in 1 day as long  as Hgb has stabilized   - Appreciate assistance and recs from GI  - Continue volume resuscitation with IVFs and blood as needed   - Continue IV Protonix BID for now. Ok to transition back to PO PPI BID x 12 weeks once tolerating orals   - Will continue to hold AC for VTE prophylaxis until Hgb stabilizes.   - Will use SCDs     # Subdural hematoma with midline shift s/p emergent craniotomy with SDH evacuation (6/22/22)  # Nonconvulsive status epilepticus (s/p vEEG 7/8)  # Frequent falls   Initial fall w/ SDH occurred on 8/12 and pt was admitted to [facility] neuro ICU from 5/12-5/20. Discharged to TCU. She did return home from TCU and was doing well. On 6/22 she suffered a mechanical fall while at home. In the ER, CT scan showed a new SDH w/ midline shift. She subsequently underwent L craniotomy for SDH evacuation and was extubated on post-op day 1.   - 7/20: Patient more alert today. Is responsive to commands (facial movements, shaking head, lifting legs) but remains non-verbal.   - Neurosurgery continues to follow   - On 1g Keppra BID                 - Neurology evaluated the patient, last saw her 7/13  - Continue on delirium precautions  - Per neurosurgery, it will likely take on the order of weeks to months to see her full capacity for neurologic recovery      # Risk of malnutrition  Pt does not meet the diagnostic criteria for malnutrition, per RD. Have been using PEG tube feeding due to decreased level of consciousness and risk for aspiration   - Continue PEG tube feeding for now per RD dosing  - SLP to re-evaluate for PO intake as pt is more alert today   - Continue Potassium, Phosphorus, and Magnesium protocols daily      # Goals of care   Per conversation with pt's  7/19 the patient was in a good state of health prior to initial fall and SDH in may. Mentation returned to normal following initial SDH but since her most recent fall on 6/22 she has remained mostly somnolent with only some wakefulness.   -  Palliative care is following. Advanced care document on file (02/2003)  - Placed  PICC line 7/19 due to poor IV access   - PEG tube in place for feeding  - Family has made pt DNR/DNI  - Will continue to have goals of care discussions in coming days     # Mild leukocytosis  WBC 11.2 <-11.5. Sputum culture collected 7/17, showed normal panda. CXR with increased bibasilar opacities-- at this time, with clear lung sounds and good air movement and no fever, favor atelectasis as the etiology. Was diagnosed with aspiration pneumonia (sputum cx from 7/5 4+ S. Aureus, s/p a course of cefazolin 7/11). Patient has been afebrile and hemodynamically stable.   - Continue to monitor with CBC daily (WBC remains WNL)  - If febrile, consider covering for aspiration pneumonia with ceftriaxone 1g q24h   - Agree with chest physiotherapy     # High blood pressure  Amlodipine started at 10 mg on admission, decreased to 5 mg due to lows  - Continue amlodipine 5 mg daily for now   - Monitor BP and adjust accordingly     # Seronegative rheumatoid arthritis  - Continue PTA hydroxychloroquine 400mg daily   - Has rheumatology follow up with Dr. Us on 9/13     # Myelodysplasic syndrome  - PTA darbepoietin (last dose 7/5, due for next dose 7/26)     # Osteoporosis  - Hold PTA calcium/vitamin D  - Holding PTA denosumab while inpatient      # Hyperlipidemia  # Peripheral arterial disease  - Not on a PTA statin or antiplatelet     # Aspiration s/p IR PEG (7/15)  - Dietitian following; hold TFs for now in light of GI bleed  - HOB>45 degrees         Diet: Adult Formula Drip Feeding: Continuous [employer] Peptide 1.5; Nasojejunal; Goal Rate: 45 (Initiate @ 15 ml/hr and adv by 10 ml q8hr as tolerated; mL/hr; Medication - Feeding Tube Flush Frequency: At least 15-30 mL water before and after medicatio...  NPO per Anesthesia Guidelines for Procedure/Surgery Except for: Meds    DVT Prophylaxis: Pneumatic Compression Devices in setting of acute bleed    Vasquez Catheter: Not present  Central Lines: None  Cardiac Monitoring: None  Code Status: No CPR- Do NOT Intubate      Disposition Plan     Expected Discharge Date: 07/20/2022        Discharge Comments: Family looking at TCU options        The patient's care was discussed with the Attending Physician, Dr. Delgadillo, Bedside Nurse, Patient, Patient's Family and neurosurgery Consultant.    Luis Alberto Sinha PA-C  Hospitalist Service, GOLD TEAM [address] [place] [address]  Securely message with the Vocera Web Console (learn more here)  Text page via [facility] Paging/Directory   Please see signed in provider for up to date coverage information      Clinically Significant Risk Factors Present on Admission                      ______________________________________________________________________    Interval History   Patient more responsive than yesterday. Is responding to commands but remains non-verbal     Her are was discussed with her sister, Anna, at bedside     Data reviewed today: I reviewed all medications, new labs and imaging results over the last 24 hours. Please see discussion of these results in the A/P.    Physical Exam     Vital Signs: Temp: 97.4  F (36.3  C) Temp src: Axillary BP: 127/64 Pulse: 73     Resp: 11 SpO2: 95 % O2 Device: Oxymask Oxygen Delivery: 2 LPM  Weight: 164 lbs 14.47 oz    Constitutional: Patient somnolent following colonoscopy.   Eyes: EOM, PERRLA. Sclera clear, conjunctiva normal. Anicteric   Head: Well-healing craniotomy scar.  ENT: Normocephalic, without obvious abnormality, atraumatic.   Respiratory: No increased work of breathing. Clear to auscultation bilaterally, no crackles or wheezing - transmitted upper airway sounds in the upper chest   Cardiovascular: RRR. No murmur or friction rub. No edema. No chest wall tenderness.  GI: Soft, non-tender without rebound or guarding. Bowel sounds are active. PEG tube in place   Skin: no bruising or bleeding. Normal skin  color, No jaundice  Musculoskeletal:  Full range of motion noted.    Neurologic: Cranial nerves II-XII are grossly intact. She lifts LEs on command  Neuropsychiatric: Responsive to commands, remains non-verbal  Extremities: Right hand pitting edema.     Data   Recent Labs   Lab 07/20/22  0559 07/19/22  2138 07/19/22  1533 07/19/22  0931 07/19/22  0627 07/18/22  2343 07/18/22  1924 07/18/22  0831 07/18/22  0328 07/15/22  0608 07/14/22  1417   WBC 9.0  --  6.9  --  8.1  --   --    < > 11.5*   < >  --    HGB 8.0*  8.0* 8.2* 8.5*   < > 7.7*   < >  --    < > 10.1*   < >  --    MCV 97  --  98  --  100  --   --    < > 97   < >  --    *  --  448  --  503*  --   --    < > 532*   < >  --    INR  --   --   --   --   --   --   --   --   --   --  1.07     --   --   --  142  --   --   --  141   < >  --    POTASSIUM 4.2  --   --   --  3.7  --   --   --  4.9   < >  --    CHLORIDE 104  --   --   --  105  --   --   --  105   < >  --    CO2 27  --   --   --  26  --   --   --  25   < >  --    BUN 11.3  --   --   --  17.8  --   --   --  23.4*   < >  --    CR 0.39*  --   --   --  0.39*  --   --   --  0.40*   < >  --    ANIONGAP 8  --   --   --  11  --   --   --  11   < >  --    MENDEZ 8.8  --   --   --  8.9  --   --   --  9.2   < >  --    *  --   --   --  91  --  81  --  95   < >  --    ALBUMIN 3.3*  --   --   --   --   --   --   --   --   --   --    PROTTOTAL 5.5*  --   --   --   --   --   --   --   --   --   --    BILITOTAL 0.4  --   --   --   --   --   --   --   --   --   --    ALKPHOS 85  --   --   --   --   --   --   --   --   --   --    ALT 28  --   --   --   --   --   --   --   --   --   --    AST 51*  --   --   --   --   --   --   --   --   --   --     < > = values in this interval not displayed.       No results found for this or any previous visit (from the past 24 hour(s)).

## 2022-07-20 NOTE — PROGRESS NOTES
[facility], [place]   07/20/2022  Neurosurgery Progress Note:    Assessment:  Ms. Benítez is a 76-year-old female with PMHx myelodysplastic syndrome, seronegative RA, chronic microcytic anemia, PAD, and recent L SDH (5/12/2022) who arrived to [facility] via EMS after an unwitnessed fall at home.  Shortly after arriving, the patient was complaining of a significant headache and nausea, and then her right pupil dilated.  She was no longer protecting her airway, so she was sedated and intubated.  Her right eye pupil then returned to a normal caliber.  A head CT demonstrated a left acute subdural approximately 2.3 cm thick with 12 mm of rightward midline shift.  She was taken to the OR on 6/22 for emergent L craniotomy for SDH evacuation. She was extubated on POD1. Concerns for tube feed aspiration on 6/25.  She underwent emergent EGD by GI due to sharp drop in hemoglobin as well as melanotic stools.  She had 1 gastric ulcer clipped which was the presumed culprit.  Due to requiring the EGD she needed intubation during this time, therefore she was intubated on 7/4/2022 for EGD.  She was also connected to EEG on 7/4/2022 found to be in nonconvulsive status received Keppra load and Ativan and was started on Versed overnight.  On 7/5/2022, her Versed drip was weaned off with confirmation of no status epilepticus. On 7/8 she was extubated and remains stable.  Received PEG on 7/15/2022 by IR.  Underwent colonoscopy with GI on 7/19/2022.  Transferred to medicine primary on 7/19/2022.    Other diagnoses include:  #Brain compression  #Open evacuation of hematoma on day of admission   #Hyponatremia  #Pulmonary edema  #GI bleed  #Uremia  #hypokalemia    Recommendations:  Continue Keppra 1 g twice daily till seen by neurology as outpatient  Remaining cares per primary team  -----------------------------------  Bruce Graves MD  Neurosurgery Resident     Please contact neurosurgery resident on call with  questions.    Dial * * *[phone], enter [phone] when prompted.    -----------------------------------    Interval History: Underwent colonoscopy yesterday.  Multiple ulcers seen in the colon with no active bleeding however likely the source of the hematochezia.    Objective:   Temp:  [97.8  F (36.6  C)-98.7  F (37.1  C)] 98.4  F (36.9  C)  Pulse:  [69-89] 87  Resp:  [9-22] 10  BP: ()/(55-93) 152/76  SpO2:  [89 %-100 %] 89 %  I/O last 3 completed shifts:  In: 4433 [I.V.:1800; NG/GT:2060]  Out: -         Gen: Appears comfortable, NAD, laying in bed, appearing older than stated age  HEENT: Left craniotomy incision healing well  Wound: Incision closed with staples-now removed, incision appears clean dry-no signs of leaking, no wetness appreciated.  See below for photo from 7/5/2022 evening.  Incision cleaned with ChloraPrep.  Pulm: On nasal cannula 1 L, no increased work of breathing, satting 98%  Neurologic:  Eyes open spontaneously, minimally tracking, gaze at midline without preference  Follows commands in all 4 extremities  Reflexes 2+, downgoing Babinski's bilaterally      LABS:  Recent Labs   Lab 07/19/22  0627 07/18/22  1924 07/18/22  0328 07/17/22  1213     --  141 136   POTASSIUM 3.7  --  4.9 4.5   CHLORIDE 105  --  105 101   CO2 26  --  25 25   ANIONGAP 11  --  11 10   GLC 91 81 95 111*   BUN 17.8  --  23.4* 20.0   CR 0.39*  --  0.40* 0.35*   MENDEZ 8.9  --  9.2 9.2       Recent Labs   Lab 07/19/22  2138 07/19/22  1533   WBC  --  6.9   RBC  --  2.78*   HGB 8.2* 8.5*   HCT  --  27.1*   MCV  --  98   MCH  --  30.6   MCHC  --  31.4*   RDW  --  16.2*   PLT  --  448       IMAGING:  No results found for this or any previous visit (from the past 24 hour(s)).

## 2022-07-20 NOTE — PLAN OF CARE
Patient more alert, although drowsy between nursing care, opens eyes spontaneously. Non-verbal, intermittently follows commands, nods to simple questions. Moves all extremities, sometimes restless. 2L NC to maintain SpO2 >92% with weak, congested, non-productive cough. Hemodynamically stable, afebrile. Incontinent of urine, external catheter in place. Last bowel movement 7/19. Patient sat up in chair with PT, tolerated well.  and sister at bedside today.

## 2022-07-21 NOTE — PROGRESS NOTES
[facility], [place]   07/21/2022  Neurosurgery Progress Note:    Assessment:  Ms. Benítez is a 76-year-old female with PMHx myelodysplastic syndrome, seronegative RA, chronic microcytic anemia, PAD, and recent L SDH (5/12/2022) who arrived to [facility] via EMS after an unwitnessed fall at home.  Shortly after arriving, the patient was complaining of a significant headache and nausea, and then her right pupil dilated.  She was no longer protecting her airway, so she was sedated and intubated.  Her right eye pupil then returned to a normal caliber.  A head CT demonstrated a left acute subdural approximately 2.3 cm thick with 12 mm of rightward midline shift.  She was taken to the OR on 6/22 for emergent L craniotomy for SDH evacuation. She was extubated on POD1. Concerns for tube feed aspiration on 6/25.  She underwent emergent EGD by GI due to sharp drop in hemoglobin as well as melanotic stools.  She had 1 gastric ulcer clipped which was the presumed culprit.  Due to requiring the EGD she needed intubation during this time, therefore she was intubated on 7/4/2022 for EGD.  She was also connected to EEG on 7/4/2022 found to be in nonconvulsive status received Keppra load and Ativan and was started on Versed overnight.  On 7/5/2022, her Versed drip was weaned off with confirmation of no status epilepticus. On 7/8 she was extubated and remains stable.  Received PEG on 7/15/2022 by IR.  Underwent colonoscopy with GI on 7/19/2022.  Transferred to medicine primary on 7/19/2022.    Other diagnoses include:  #Brain compression  #Open evacuation of hematoma on day of admission   #Hyponatremia  #Pulmonary edema  #GI bleed  #Uremia  #hypokalemia    Recommendations:  Continue Keppra 1 g twice daily till seen by neurology as outpatient  Head CT on 7/28  Ok for lovenox from nsgy perspective  Remaining cares per primary team  -----------------------------------  Bruce Graves MD  Neurosurgery  Resident     Please contact neurosurgery resident on call with questions.    Dial * * *[phone], enter [phone] when prompted.    -----------------------------------    Interval History: VANESSAO.    Objective:   Temp:  [97.9  F (36.6  C)-99.5  F (37.5  C)] 98.5  F (36.9  C)  Pulse:  [73-93] 90  Resp:  [15-19] 16  BP: (126-168)/(52-81) 168/75  SpO2:  [96 %-99 %] 97 %  I/O last 3 completed shifts:  In: 1320 [NG/GT:240]  Out: 1650 [Urine:1650]        Gen: Appears comfortable, NAD, laying in bed, appearing older than stated age  MSK: PEG in place  HEENT: Left craniotomy incision healing well  Wound: Incision closed with staples-now removed, incision appears clean dry-no signs of leaking, no wetness appreciated.  See below for photo from 7/5/2022 evening.  Incision cleaned with ChloraPrep.  Pulm: On nasal cannula 1 L, no increased work of breathing, satting 98%  Neurologic:  Eyes open spontaneously, minimally tracking, gaze at midline without preference  Follows commands in all 4 extremities  Reflexes 2+, downgoing Babinski's bilaterally      LABS:  Recent Labs   Lab 07/21/22  0715 07/20/22  0559 07/19/22  0627    139 142   POTASSIUM 4.1 4.2 3.7   CHLORIDE 101 104 105   CO2 28 27 26   ANIONGAP 7 8 11   * 102* 91   BUN 16.4 11.3 17.8   CR 0.39* 0.39* 0.39*   MENDEZ 9.1 8.8 8.9       Recent Labs   Lab 07/21/22  0715   WBC 12.1*   RBC 2.62*   HGB 8.1*  8.1*   HCT 25.9*   MCV 99   MCH 30.9   MCHC 31.3*   RDW 16.0*   *       IMAGING:  No results found for this or any previous visit (from the past 24 hour(s)).

## 2022-07-21 NOTE — PLAN OF CARE
Patient lethargic, opens eyes spontaneously. Non-verbal, intermittently follows commands. Moves all extremities, sometimes restless, mittens did not need to be applied today. 2L NC to maintain SpO2 >92% with weak, congested, non-productive cough. Hemodynamically stable, afebrile. Incontinent of urine, external catheter in place. Last bowel movement 7/19.  Visited by daughters today at bedside. Triggered sepsis - lactic ordered per protocol. Low-grade fever, HR 90s.

## 2022-07-21 NOTE — PROGRESS NOTES
[facility]    Medicine Progress Note - Hospitalist Service, GOLD TEAM 6    Date of Admission:  6/22/2022    Assessment & Plan  Louise Benítez is a 76-year-old F with a history of seronegative rheumatoid arthritis, osteoporosis, peripheral artery disease, and myelodysplastic syndrome admitted 6/22 after a ground level fall resulting in subdural hematoma with midline shift s/p emergent craniotomy and SDH evacuation. Course has been complicated by nonconvulsive status epilepticus (resolved), GI bleeding s/p EGD and clipping of gastric ulcer 7/5, and aspiration s/p PEG placement 7/15. Noted to have bloody stools the evening of 7/17 with acute hemoglobin drop (10.1 -> 8.7). Medicine was consulted for recommendations surrounding management of GI bleed.    7/21  - WBC jumped to 12 today without any changes in vital signs. CXR ordered and showed no notable infiltrate, does show persistent bibasilar atelectasis  - Hgb trending up. Will restart Lovenox for VTE prophylaxis today after discussion with neurosurgery   - Mentation continues to wax and wane. Neurosurgery planning to repeat CT Head on  7/28    # Acute blood loss anemia on chronic microcytic anemia  # GI bleed  # Gastric ulcers complicated by GI bleeding s/p EGD 7/5 with clipping of 5mm ulcers in posterior gastric wall  Bloody stool output with associated Hgb drop on 7/17 from 10.1->8.7->6.6 7/19. She was transfused 1 unit(s) pRBCs prior to colonoscopy. Colonoscopy done 7/19  showed ulcers at the splenic flexure that were likely source of bleeding but have stopped bleeding on their own.   - Hgb stable, up to 8.1. Will decrease to daily checks   - Restart Lovenox for VTE prophylaxis   - Appreciate assistance and recs from GI  - Continue volume resuscitation with IVFs and blood as needed   - Continue IV Protonix BID for now. Ok to transition back to PO PPI BID x 12 weeks once tolerating orals   - Will continue to hold AC  for VTE prophylaxis until Hgb stabilizes.   - Will use SCDs     # Subdural hematoma with midline shift s/p emergent craniotomy with SDH evacuation (6/22/22)  # Nonconvulsive status epilepticus (s/p vEEG 7/8)  # Frequent falls   Initial fall w/ SDH occurred on 8/12 and pt was admitted to [facility] neuro ICU from 5/12-5/20. Discharged to TCU. She did return home from TCU and was doing well. On 6/22 she suffered a mechanical fall while at home. In the ER, CT scan showed a new SDH w/ midline shift. She subsequently underwent L craniotomy for SDH evacuation and was extubated on post-op day 1.   - Mentation continues to wax and wane day to day   - Neurosurgery continues to follow   - Continue Keppra 1 gram BID  - Plan to repeat CT brain 7/28  - Continue on delirium precautions  - Per neurosurgery, it will likely take on the order of weeks to months to see her full capacity for neurologic recovery      # Risk of malnutrition  Pt does not meet the diagnostic criteria for malnutrition, per RD. Have been using PEG tube feeding due to decreased level of consciousness and risk for aspiration   - Continue PEG tube feeding for now per RD dosing  - SLP to re-evaluate for PO intake as pt is more alert today   - Continue Potassium, Phosphorus, and Magnesium protocols daily      # Goals of care   Per conversation with pt's  7/19 the patient was in a good state of health prior to initial fall and SDH in may. Mentation returned to normal following initial SDH but since her most recent fall on 6/22 she has remained mostly somnolent with only some wakefulness.   - Palliative care is following. Advanced care document on file (02/2003)  - Placed  PICC line 7/19 due to poor IV access   - PEG tube in place for feeding  - Family has made pt DNR/DNI  - Will continue to have goals of care discussions in coming days     # Hx of Myelodysplasic syndrome  # Mild leukocytosis  WBC 11.2 <-11.5. Sputum culture collected 7/17, showed normal panda.  CXR 7/16 with bibasilar opacities suspected to be atelectasis. Repeat CXR 7/21 appears similar, no signs of infection. . Was previously diagnosed with aspiration pneumonia (sputum cx from 7/5 4+ S. Aureus, s/p a course of cefazolin 7/11). Patient has been afebrile and hemodynamically stable.   - PTA darbepoietin (last dose 7/5, due for next dose 7/26)  - Continue to monitor with CBC daily (WBC remains WNL)  - If febrile, consider covering for aspiration pneumonia with ceftriaxone 1g q24h   - Chest physiotherapy     # High blood pressure  Amlodipine started at 10 mg on admission, decreased to 5 mg due to lows  - Continue amlodipine 5 mg daily for now   - Monitor BP and adjust accordingly     # Seronegative rheumatoid arthritis  - Continue PTA hydroxychloroquine 400mg daily   - Has rheumatology follow up with Dr. Us on 9/1     # Osteoporosis  - Hold PTA calcium/vitamin D  - Holding PTA denosumab while inpatient      # Hyperlipidemia  # Peripheral arterial disease  - Not on a PTA statin or antiplatelet     # Aspiration s/p IR PEG (7/15)  - Dietitian following; hold TFs for now in light of GI bleed  - HOB>45 degrees         Diet: NPO per Anesthesia Guidelines for Procedure/Surgery Except for: Meds  Adult Formula Drip Feeding: Continuous [employer] Peptide 1.5; Nasojejunal; Goal Rate: 45 (Initiate @ 15 ml/hr and adv by 10 ml q8hr as tolerated; mL/hr; Medication - Feeding Tube Flush Frequency: At least 15-30 mL water before and after medicatio...    DVT Prophylaxis: Pneumatic Compression Devices in setting of acute bleed   Vasquez Catheter: Not present  Central Lines: None  Cardiac Monitoring: None  Code Status: No CPR- Do NOT Intubate      Disposition Plan      Expected Discharge Date: 07/28/2022        Discharge Comments: Patinet likely will take weeks to months to fully recover from neuro stand point. Will need TCU        The patient's care was discussed with the Attending Physician, Dr. Delgadillo, Bedside Nurse, Patient,  Patient's Family and neurosurgery Consultant.    Luis Alberto Sinha PA-C  Hospitalist Service, GOLD TEAM [address] [place] [address]  Securely message with the Vocera Web Console (learn more here)  Text page via [facility] Paging/Directory   Please see signed in provider for up to date coverage information      Clinically Significant Risk Factors Present on Admission                      ______________________________________________________________________    Interval History   Mentation continues to wax a wane. A little less responsive today compared to yesterday     Data reviewed today: I reviewed all medications, new labs and imaging results over the last 24 hours. Please see discussion of these results in the A/P.    Physical Exam     Vital Signs: Temp: 98.6  F (37  C) Temp src: Axillary BP: (!) 159/74 Pulse: 96     Resp: 17 SpO2: 98 % O2 Device: Nasal cannula Oxygen Delivery: 2 LPM  Weight: 164 lbs 14.47 oz    Constitutional: Patient somnolent following colonoscopy.   Eyes: EOM, PERRLA. Sclera clear, conjunctiva normal. Anicteric   Head: Well-healing craniotomy scar.  ENT: Normocephalic, without obvious abnormality, atraumatic.   Respiratory: No increased work of breathing. Clear to auscultation bilaterally, no crackles or wheezing - transmitted upper airway sounds in the upper chest   Cardiovascular: RRR. No murmur or friction rub. No edema. No chest wall tenderness.  GI: Soft, non-tender without rebound or guarding. Bowel sounds are active. PEG tube in place   Skin: no bruising or bleeding. Normal skin color, No jaundice  Musculoskeletal:  Full range of motion noted.    Neurologic: Cranial nerves II-XII are grossly intact. She lifts LEs on command  Neuropsychiatric: Responsive to commands, remains non-verbal  Extremities: Right hand pitting edema.     Data   Recent Labs   Lab 07/21/22  0715 07/20/22  1847 07/20/22  0559 07/19/22  2138 07/19/22  1533 07/19/22  0931 07/19/22  0627  07/15/22  0608 07/14/22  1417   WBC 12.1*  --  9.0  --  6.9  --  8.1   < >  --    HGB 8.1*  8.1* 8.3* 8.0*  8.0*   < > 8.5*   < > 7.7*   < >  --    MCV 99  --  97  --  98  --  100   < >  --    *  --  472*  --  448  --  503*   < >  --    INR  --   --   --   --   --   --   --   --  1.07     --  139  --   --   --  142   < >  --    POTASSIUM 4.1  --  4.2  --   --   --  3.7   < >  --    CHLORIDE 101  --  104  --   --   --  105   < >  --    CO2 28  --  27  --   --   --  26   < >  --    BUN 16.4  --  11.3  --   --   --  17.8   < >  --    CR 0.39*  --  0.39*  --   --   --  0.39*   < >  --    ANIONGAP 7  --  8  --   --   --  11   < >  --    MENDEZ 9.1  --  8.8  --   --   --  8.9   < >  --    *  --  102*  --   --   --  91   < >  --    ALBUMIN 3.4*  --  3.3*  --   --   --   --   --   --    PROTTOTAL 5.7*  --  5.5*  --   --   --   --   --   --    BILITOTAL 0.4  --  0.4  --   --   --   --   --   --    ALKPHOS 87  --  85  --   --   --   --   --   --    ALT 20  --  28  --   --   --   --   --   --    AST 43*  --  51*  --   --   --   --   --   --     < > = values in this interval not displayed.       Recent Results (from the past 24 hour(s))   XR Chest Port 1 View    Narrative    Portable chest    INDICATION: Wheezing    COMPARISON: 7/16/2022    FINDINGS: Heart size upper normal. Osteopenia. Degenerative changes at  the shoulders. Degenerative changes in the thoracic spine. A few  patchy densities in the lungs are noted especially in the lower lungs.      Impression    IMPRESSION: Probable atelectasis rather than edema in the lower lungs.  Osteopenia with degenerative changes in the spine and shoulders.    JOVANA CORRIGAN MD         SYSTEM ID:  [id number]

## 2022-07-21 NOTE — PROGRESS NOTES
Care Management Follow Up    Length of Stay (days): 29    Expected Discharge Date: 07/28/2022     Concerns to be Addressed: Discharge planning     Patient plan of care discussed at interdisciplinary rounds: Yes    Anticipated Discharge Disposition:  TCU     Anticipated Discharge Services: Therapies   Anticipated Discharge DME:  TBD    Patient/family educated on Medicare website which has current facility and service quality ratings:  Yes  Education Provided on the Discharge Plan:  Yes  Patient/Family in Agreement with the Plan:  Yes    Referrals Placed by CM/SW:    [facility] 7/[facility]--No beds this week-7/19            [address] [address]            Phone: [phone]        Fax: [phone]         [facility] Faxed referral 7/15--left VM for call back w/ Sandy Liaison 7/20            [address], [place] [address]            Phone: [phone]        Fax: [phone]         [facility]-faxed referral 7/15-left VM for Call back W/ Sandy Liaison 7/20-Confirmed Pt is off soft restraints-7/21            [address] [facility] [address]            Phone: [phone]        Fax: [phone]                                       [facility]-Faxed referral 7/15            [address] [facility] [address]            Phone: [phone]        Fax: [phone]         [facility]-faxed referral 7/15 left VM 7/19            [address] [facility] [address]            Phone: [phone]        Fax: [phone]         [facility]-Faxed referral 7/15-TCU temporarily closed            [address] [facility] [address]            Phone: [phone]        Fax: [phone]         [facility]-faxed referral 7/15--left VM 7/19-- 7/20--no bed availability.            [address], [place]  [address]            Phone: [phone]        Fax: [phone]        Private pay costs discussed: Not applicable    Additional Information:  [facility] Cell [phone]  and confirmed that Pt is off soft restraints for a minimum of 24 hours. RACHEL met with Pt's daughter in Pt's room and provided education on Medicare.gov and identified that additional referrals can be placed once Pt's family provides additional choices RACHEL provided his desk phone number and identified that while he is off on Friday staff will check his Voicemail. SW identified being back on Sunday and would check in with Pt's family if they are here.       ________________    AQUILES Peacock, LICSW  6D   [employer]  Phone: [phone]  Pager: [phone]  Fax: [phone]

## 2022-07-21 NOTE — PROGRESS NOTES
[facility]  Palliative Care Daily Progress Note       Recommendations & Counseling       Met with daughter Allyson (Louise asleep and lacks capacity at the moment for goals of care conversations). Goal is for Louise to go to TCU. Family is concerned about the many setbacks Louise has had. We did discuss the uncertainty about how much recovery Louise will have and the possibility of further setbacks. We also discussed the option of hospice if at any point family feels Louise is not able to have meaningful recovery or acceptable quality of life.    Recommend POLST prior to discharge.    Spoke with SW about daughter's request to look at more TCU options    Thank you for the opportunity to participate in the care of this patient and family. Our team: will continue to follow.     During regular M-F work hours ([id number]) -- if you are not sure who specifically to contact -- please contact us in [facility] Smart Web.     After regular work hours and on weekends/holidays, you can call our answering service at [phone].     Clarice Miller PA-C  [facility]  Contact information available via [facility] Paging/Directory    Attestation:  Total time on the floor involved in the patient's care: 25 minutes  Total time spent in counseling/care coordination: >50%      Assessments          Louise Benítez is a 76 year old female with PMHx myelodysplastic syndrome, seronegative RA, chronic microcytic anemia, PAD, and recent L SDH (5/12/2022) who was admitted from her TCU on 6/22/2022 after an unwitnessed fall. Was subsequently found to have an acute on chronic L SDH, a new acute R SDH, and increased midline shift w/ trace L uncal herniation now s/p L craniotomy w/ hematoma evacuation. Course complicated by GIB requiring reintubation. Now extubated.     Today, the patient was seen for:  Acute on chronic L SDH s/p L craniotomy w/hematoma  "evacuation  Acute R SDH  Dysphagia  Gastric and colonic ulcers, GIB  Goals of care    Prognosis, Goals, or Advance Care Planning was addressed today with: Yes.  Mood, coping, and/or meaning in the context of serious illness were addressed today: Yes.            Interval History:     Chart review/discussion with unit or clinical team members:   S/p colonoscopy on 7/19 which showed ulcers at the splenic flexure that were likely source of bleeding but no active bleeding.    Per patient or family/caregivers today:  Visited Louise who was asleep. Daughter Allyson in the room. She states that yesterday Louise's sister was visiting and that she thinks Louise recognized her, but there have also been times when Louise does not seem \"there\". Today she has mostly been sleeping. Allyson states her father has been tearful at times and she worries that the stress of visiting Louise so often may be wearing on him. She's worried that if Louise is at a TCU near her or her sister, her father will have to drive further. She and other family are trying to alternate visiting her mother to give her father a break. She has where referrals had been placed and who could assist with that. Reviewed  notes and showed her where referrals had been sent, declined and still pending. She would like to meet with  to ask about other sites. She voiced the concern that at last discharge the TCU was of poor quality and hoping to have a chance to visit the sites before final selection. She also recognizes that there may not be much choice, pending on where her mother would be accepted. She also spoke about how many setbacks her mother has had. She also wondered if bringing her mother to her home and having her father move in would be an option. Reviewed therapy notes that showed her mother needs an assist of 2 and sometimes a lift. Discussed that Louise, in addition to tube feeds, may need more care than family can provide, even with additional hired help. " Discussed that she may get more therapy at a TCU as well than at home. We did discuss the uncertainty about how much recovery Louise will have and the possibility of further setbacks. We also discussed the option of hospice if at any point family feels Louise is not able to have meaningful recovery or acceptable quality of life.    Key Palliative Symptoms:  We are not helping to manage these symptoms currently in this patient.             Review of Systems:     A comprehensive ROS has been negative other than stated in the HPI           Medications:     I have reviewed this patient's medication profile and medications during this hospitalization.           Physical Exam:   Temp: 98.6  F (37  C) Temp src: Axillary BP: (!) 159/74 Pulse: 96   Resp: 17 SpO2: 98 % O2 Device: Nasal cannula Oxygen Delivery: 2 LPM  Gen: Lying in bed. Asleep. Appears comfortable, in NAD  HEENT: NCAT. NGT in place.  Resp: No increased work of breathing on room air  Msk: no gross deformity  Skin:  Warm, dry, no jaundice  Ext: warm, well perfused.  Neuro: Deferred as patient sleeping             Data Reviewed:     Reviewed recent pertinent imaging, comments:   Results for orders placed or performed during the hospital encounter of 06/22/22   Head CT w/o contrast   Result Value Ref Range    Radiologist flags (Urgent)      Acute subdural hematomas with mass effect as described    Impression    Impression:  1. Active hemorrhage superimposed upon Acute and chronic left-sided  mixed density subdural measuring up to 2.3 cm in maximum thickness  with 12 mm of rightward midline shift and trace left-sided uncal  herniation.  2. Acute subdural along the right frontal convexity measuring up to 7  mm in maximum thickness.    [Urgent Result: Acute subdural hematomas with mass effect as described  above.] Concern for active hemorrhage.    Finding was identified on 6/22/2022 6:04 PM.     Dr. Sanchez was contacted by Dr. Leung and Dr. Nair  at  6/22/2022 6:09 PM and was again contacted at 6:30 PM and verbalized  understanding of the urgent finding.      I have personally reviewed the examination and initial interpretation  and I agree with the findings.    BONI LINDQUIST MD         SYSTEM ID:  [id number]   Cervical spine CT w/o contrast    Impression    Impression:   No acute fracture or traumatic subluxation.    I have personally reviewed the examination and initial interpretation  and I agree with the findings.    BONI LINDQUIST MD         SYSTEM ID:  [id number]   XR Chest Port 1 View    Impression    IMPRESSION:  ET tube is 2.5 cm above the leo. No acute consolidative airspace  disease. Left lower lobe atelectasis suspected.    I have personally reviewed the examination and initial interpretation  and I agree with the findings.    ROYA EDMOND MD         SYSTEM ID:  [id number]   CT Head w/o Contrast    Impression    Impression:  1. Postsurgical changes of left frontoparietal craniotomy and subdural  hematoma evacuation.  2. Decreased size of the heterogeneous fluid collection overlying the  left cerebral convexity.  3. Decreased rightward midline shift. Decreased effacement of the left  lateral ventricle.  4. Stable appearance of the subdural hematomas overlying the right  cerebral convexity and anterior falx.    I have personally reviewed the examination and initial interpretation  and I agree with the findings.    OSVALDO CONNOLLY MD         SYSTEM ID:  [id number]   XR Chest Port 1 View    Impression    IMPRESSION:   1. ET tube is in the mid thoracic trachea.  2. Decreased perihilar pulmonary opacities.    I have personally reviewed the examination and initial interpretation  and I agree with the findings.    ROYA EDMOND MD         SYSTEM ID:  [id number]   CT Head w/o Contrast    Impression    Impression:    1. Reduced size of subdural fluid collection deep to the left  frontoparietal craniotomy.  2. Reduced mass effect and left to  right midline shift now measuring 7  mm previously 13 mm when measured in a similar fashion.  3. Similar appearance of subdural hemorrhage along the anterior falx  and extending along the right anterior cranial fossa.  4. Unchanged appearance of small subdural hemorrhage along the  bilateral tentorial leaflets.     I have personally reviewed the examination and initial interpretation  and I agree with the findings.    OSVALDO CONNOLLY MD         SYSTEM ID:  [id number]   XR Abdomen Port 1 View    Impression    Impression:   1. Enteric tube is subdiaphragmatic with tip and sidehole projecting  over the expected location of the stomach  2. Increased bibasilar pulmonary opacities.    I have personally reviewed the examination and initial interpretation  and I agree with the findings.    DARSHAN DIXON MD         SYSTEM ID:  [id number]   CT Head w/o Contrast    Impression    Impression:    1. Slightly reduced size of subdural fluid collection deep to the left  frontotemporal craniotomy site.  2. Unchanged mass effect on the left cerebrum and unchanged left to  right midline shift.  3. Unchanged additional subdural fluid collection along the right  anterior cranial fossa and extension along the anterior falx.  4. Unchanged appearance of small subdural hemorrhages along the  bilateral tentorial leaflets..     I have personally reviewed the examination and initial interpretation  and I agree with the findings.    OSVALDO CONNOLLY MD         SYSTEM ID:  [id number]   XR Chest Port 1 View    Impression    Impression:   ET tube no longer visualized. Increased interstitial pulmonary edema  and bibasilar atelectasis and/or pneumonia.    I have personally reviewed the examination and initial interpretation  and I agree with the findings.    ANGELIQUE DENTON MD         SYSTEM ID:  [id number]   XR Chest Port 1 View    Impression    Impression:   1. Slightly decreased right lower lobe opacities.  2. Slight increase in dense retrocardiac  consolidation which may  represent atelectasis versus infection/aspiration.    I have personally reviewed the examination and initial interpretation  and I agree with the findings.    GISELE BURGESS MD         SYSTEM ID:  [id number]   CT Head w/o Contrast    Impression    Impression:  1. Unchanged size of left cerebral convexity subdural fluid collection  status post subdural drain removal.  2. Unchanged size of right frontal subdural hemorrhage with extension  along the anterior falx.  3. Unchanged size of small subdural hemorrhage extending along the  tentorial leaflets.  4. Unchanged 6 mm of left-to-right midline shift.    I have personally reviewed the examination and initial interpretation  and I agree with the findings.    SAVANNA COHEN MD         SYSTEM ID:  [id number]   XR Chest Port 1 View    Impression    Impression: Increased effusions, atelectasis and interstitial edema.    I have personally reviewed the examination and initial interpretation  and I agree with the findings.    JOVANA CORRIGAN MD         SYSTEM ID:  [id number]   XR Chest Port 1 View    Impression    IMPRESSION:   1. Similar bibasilar predominant pulmonary opacities concerning for  aspiration or infection. Superimposed atelectasis and probable mild  pulmonary edema is suspected.  2. Stable pleural effusions.    I have personally reviewed the examination and initial interpretation  and I agree with the findings.    LOS BARAJAS DO         SYSTEM ID:  [id number]   XR Abdomen Port 1 View    Impression    IMPRESSION:   Enteric tube tip and side port projecting over the stomach..    I have personally reviewed the examination and initial interpretation  and I agree with the findings.    ROYA EDMOND MD         SYSTEM ID:  [id number]   XR Chest Port 1 View    Impression    IMPRESSION:   Trace bilateral pleural effusions with overlying bibasilar opacities,  improved from prior.    I have personally reviewed the examination and initial  interpretation  and I agree with the findings.    LOS BARAJAS DO         SYSTEM ID:  [id number]   XR Abdomen Port 1 View    Impression    Impression: Enteric tube tip and sidehole in the stomach.    LOS BARAJAS DO         SYSTEM ID:  [id number]   XR Abdomen Port 1 View    Impression    IMPRESSION:   Enteric tube tip and side-port overlying the stomach.    I have personally reviewed the examination and initial interpretation  and I agree with the findings.    LOS BARAJAS DO         SYSTEM ID:  [id number]   CT Head w/o Contrast    Impression    Impression:    1. Increased size of left cerebral convexity subdural hematoma without  increase in density and with decreased size of left frontal seen  subdural hemorrhage suggests redistribution of blood products.  Attention is recommended on follow-up imaging.  2. stable size of right cerebral convexity subdural hematoma.  3. Stable 6 mm of rightward midline shift.         BONI LINDQUIST MD         SYSTEM ID:  [id number]   XR Chest Port 1 View    Impression    IMPRESSION: Continued right lower lung atelectasis or mild edema. No  significant changes otherwise.    JOVANA CORRIGAN MD         SYSTEM ID:  [id number]   XR Chest Port 1 View    Impression    IMPRESSION:     1. Left upper extremity PICC line terminates in the high SVC. No  pneumothorax.  2. Continued right lower lung atelectasis or mild edema. No  significant changes otherwise.    I have personally reviewed the examination and initial interpretation  and I agree with the findings.    JOVANA CORRIGAN MD         SYSTEM ID:  [id number]   CT Head w/o Contrast    Impression    Impression:    1. Stable size and the distribution of the subdural hemorrhage along  the right frontal and left frontoparietal convexities, falx and  tentorium.  2. Stable postsurgical changes of left frontoparietal craniotomy and  subdural hematoma evacuation.     I have personally reviewed the examination and initial  interpretation  and I agree with the findings.    BONI LINDQUIST MD         SYSTEM ID:  [id number]   CT Chest Pulmonary Embolism w Contrast    Impression    IMPRESSION:   1. Exam is negative   for acute pulmonary embolism.    Evidence for right heart strain or increased right heart pressures?   is not present.     2. Mucous plugging and linear atelectasis in the right lower lobe.  Aspiration or other infectious consolidation cannot be ruled out.    3. Ectatic thoracic aorta, approximately 4.2 cm.    In the event of a positive result for acute pulmonary embolism  resulting in right heart strain, consider calling the   [facility] patient placement ([phone]) for PERT (Pulmonary Embolism  Response Team) Activation?    PERT -- Pulmonary Embolism Response Team (Multidisciplinary team  including cardiology, interventional radiology, critical care,  hematology)    I have personally reviewed the examination and initial interpretation  and I agree with the findings.    JOVANA CORRIGAN MD         SYSTEM ID:  [id number]   XR Chest Port 1 View    Impression    IMPRESSION: Endotracheal tube tip in the mid to distal thoracic  trachea. Bibasilar atelectasis. Left upper portion of the PICC line  tip possibly in the upper portion of the azygos vein. Atherosclerosis.    JOVANA CORRIGAN MD         SYSTEM ID:  [id number]   XR Abdomen Port 1 View    Impression    IMPRESSION:   1. Enteric tube with tip in the stomach and side-port at or just  distal to the gastroesophageal junction.  2. Nonobstructive bowel gas pattern.    I have personally reviewed the examination and initial interpretation  and I agree with the findings.    ROHAN MAGALLON MD         SYSTEM ID:  [id number]   XR Chest Port 1 View    Impression    IMPRESSION:  1. Increased perihilar and bibasilar pulmonary opacities which may  represent atelectasis, edema and/or infection.  2. Stable support devices.     I have personally reviewed the examination and initial  interpretation  and I agree with the findings.    JOVANA CORRIGAN MD         SYSTEM ID:  [id number]   CTA Abdomen Pelvis with Contrast    Impression    IMPRESSION:   1. No evidence of gastrointestinal bleed as questioned.  2. Interval new subacute appearing right sacral insufficiency fracture  and subacute appearing left pubic ramus fracture since 5/12/2022.  3. Age-indeterminate compression deformity of T10 with approximately  30% height loss. Multiple subacute appearing anterior rib fractures,  and unchanged appearance of L1, L3, and L5 compression deformities.  4. Small sliding-type hiatal hernia with hemostasis clip noted in the  gastric fundus.  5. Aortobiiliac atherosclerotic atherosclerosis with multiple foci of  arterial stenosis including the renal arterial origins, common iliac  vessels, and near total occlusions of the proximal internal iliac  arteries and common femoral arteries proximally.  6. Unchanged appearance of left inguinal hernia containing a loop of  sigmoid colon, no signs of obstruction.  7. 5 mm hypodensity in the pancreatic head may represent a sidebranch  IPMN although MRI is recommended to further characterize on an  outpatient basis, pancreatic cystic follow-up is recommended below.  Per [facility] criteria.  8. Indeterminate hepatic hypodensities are too small to further  characterize but could be evaluated further at time of MRI for above  pancreatic lesion.  9. 1.4 cm right ovarian cyst for which annual follow-up is recommended  in a postmenopausal female.  10. Trace effusions and associated atelectasis.    [facility] recommendations for asymptomatic pancreatic  cysts, modified from international consensus guidelines*   Size of largest cyst:   Less than 1 cm: Follow-up imaging in 6 months to 1 year, then lengthen  interval to 2-3 years if no change.  1-2 cm: Follow-up imaging at 6 months, then yearly for 2 years, then  lengthen interval if no change.   The  optimal initial study or first follow-up exam is MRI performed  with intravenous gadolinium contrast to allow full cyst  characterization. Once characterized, future follow-up MRIs can be  performed without contrast. If MRI is contraindicated, CT with  contrast is recommended.   GI consultation for possible endoscopic ultrasound is recommended for  cysts with the following features: Size > 2 cm, >20% growth on  followup, wall thickening or enhancement, mural nodule, duct > 5 mm,  or abrupt change in duct caliber with distal atrophy. GI or surgical  consultation is also recommended for symptomatic cysts.   *Reference: International Consensus Guidelines for Management of IPMN  and MCN of the pancreas. Pancreatology: 12:(2012); 183-197.    I have personally reviewed the examination and initial interpretation  and I agree with the findings.    ILIANA JEFFERSON MD         SYSTEM ID:  [id number]   XR Abdomen Port 1 View    Impression    IMPRESSION:   1. Feeding tube with tip in the proximal jejunum.   2. Enteric tube with tip and side-port overlying the stomach.  3. Non obstructive bowel gas pattern.  4. Small bilateral pleural effusions.    I have personally reviewed the examination and initial interpretation  and I agree with the findings.    ROYA EDMOND MD         SYSTEM ID:  [id number]   Echo Complete   Result Value Ref Range    LVEF  60-65%    Echocardiogram Limited   Result Value Ref Range    LVEF  60-65%        Reviewed recent labs, comments:   Na 136  K 4.1  Creat 0.39  WBC 12.1  Hgb 8.1  Plt 488k

## 2022-07-21 NOTE — PLAN OF CARE
Major Shift Events: No acute events overnight. Pt remains drowsy and nonverbal. KELLY orientation. Follows commands intermittently. Pt remains restless and fidgety. Able to move all extremities spontaneously. VSS on 2L. Pt has a weak and congested cough which she is unable to expectorate. Purewick in place for incontinence with good UOP. Tolerating TF at 45 ml/hr with PEG. Surgical head incision remains intact with no drainage noted. Delirium and aspiration precautions maintained.     Plan: Exercise with PT. Continue to closely monitor patient and notify MD's of any new changes. For vital signs and complete assessments, please see documentation flowsheets.    Problem: Risk for Delirium  Goal: Optimal Coping  Outcome: Ongoing, Progressing  Goal: Improved Behavioral Control  Outcome: Ongoing, Progressing  Goal: Improved Attention and Thought Clarity  Outcome: Ongoing, Progressing  Goal: Improved Sleep  Outcome: Ongoing, Progressing     Problem: Adjustment to Surgery (Craniotomy/Craniectomy/Cranioplasty)  Goal: Optimal Coping with Surgery  Outcome: Ongoing, Progressing     Problem: Functional Ability Impaired (Craniotomy/Craniectomy/Cranioplasty)  Goal: Optimal Functional Ability  Outcome: Ongoing, Progressing  Intervention: Optimize Functional Ability  Recent Flowsheet Documentation  Taken 7/21/2022 0400 by Briana Junior, RN  Activity Management: activity adjusted per tolerance  Activity Assistance Provided: assistance, 2 people  Taken 7/21/2022 0000 by Briana Junior, RN  Activity Management: activity adjusted per tolerance  Activity Assistance Provided: assistance, 2 people  Taken 7/20/2022 2000 by Briana Junior, RN  Activity Management: activity adjusted per tolerance  Activity Assistance Provided: assistance, 2 people     Problem: Infection (Craniotomy/Craniectomy/Cranioplasty)  Goal: Absence of Infection Signs and Symptoms  Outcome: Ongoing, Progressing

## 2022-07-22 NOTE — PLAN OF CARE
Major Shift Events: Pt very lethargic and reluctant to open eyes to voice stimulation. Febrile at 2000 with Tmax 101.9, MD notified. BC, UA collected. Antibiotics started. Pt's mental status waxes and weans throughout night. Intermittently follows commands with repeated stimulation. Spontaneously moves all extremities. Remained on 2L with good saturation. Pt still has a weak and congested cough which she is unable to expectorate. CPT continued by RT. Antiseptic oral rinse administered. Purewick in place for incontinence with good UOP. Tolerates TF at 45 ml/hr in PEG. No drainage from surgical head incision. Delirium and aspiration precautions maintained.      Plan: Exercise with PT. Continue to closely monitor patient and notify MD's of any new changes. For vital signs and complete assessments, please see documentation flowsheets.    Problem: Risk for Delirium  Goal: Optimal Coping  Outcome: Ongoing, Progressing  Goal: Improved Behavioral Control  Outcome: Ongoing, Progressing  Goal: Improved Attention and Thought Clarity  Outcome: Ongoing, Progressing  Intervention: Maximize Cognitive Function  Recent Flowsheet Documentation  Taken 7/22/2022 0000 by Briana Junior, RN  Sensory Stimulation Regulation:    care clustered    lighting decreased    quiet environment promoted    visual stimulation minimized  Reorientation Measures:    calendar in view    clock in view  Taken 7/21/2022 2000 by Briana Junior, RN  Sensory Stimulation Regulation:    care clustered    lighting decreased    quiet environment promoted    visual stimulation minimized  Reorientation Measures:    calendar in view    clock in view  Goal: Improved Sleep  Outcome: Ongoing, Progressing     Problem: Bowel Motility Impaired (Craniotomy/Craniectomy/Cranioplasty)  Goal: Effective Bowel Elimination  Outcome: Ongoing, Progressing     Problem: Functional Ability Impaired (Craniotomy/Craniectomy/Cranioplasty)  Goal: Optimal Functional Ability  Outcome: Ongoing,  Progressing  Intervention: Optimize Functional Ability  Recent Flowsheet Documentation  Taken 7/22/2022 0336 by Briana Junior, RN  Activity Management: activity adjusted per tolerance  Activity Assistance Provided: assistance, 2 people  Taken 7/22/2022 0000 by Briana Junior RN  Activity Management: activity adjusted per tolerance  Activity Assistance Provided: assistance, 2 people  Taken 7/21/2022 2000 by Briana Junior, RN  Activity Management: activity adjusted per tolerance  Activity Assistance Provided: assistance, 2 people     Problem: Infection (Craniotomy/Craniectomy/Cranioplasty)  Goal: Absence of Infection Signs and Symptoms  Outcome: Ongoing, Progressing     Problem: Respiratory Compromise (Craniotomy/Craniectomy/Cranioplasty)  Goal: Effective Oxygenation and Ventilation  Outcome: Ongoing, Progressing  Intervention: Optimize Oxygenation and Ventilation  Recent Flowsheet Documentation  Taken 7/22/2022 0336 by Briana Junior, TENNILLE  Head of Bed (HOB) Positioning: HOB at 30 degrees  Taken 7/22/2022 0200 by Briana Junior, TENNILLE  Head of Bed (HOB) Positioning: HOB at 30 degrees  Taken 7/22/2022 0000 by Briana Junior, RN  Airway/Ventilation Management:    airway patency maintained    calming measures promoted  Head of Bed (HOB) Positioning: HOB at 30 degrees  Taken 7/21/2022 2200 by Briana Junior, RN  Head of Bed (HOB) Positioning: HOB at 30 degrees  Taken 7/21/2022 2000 by Briana Junior, RN  Airway/Ventilation Management:    airway patency maintained    calming measures promoted  Head of Bed (HOB) Positioning: HOB at 30 degrees

## 2022-07-22 NOTE — PHARMACY-VANCOMYCIN DOSING SERVICE
"Pharmacy Vancomycin Initial Note  Date of Service 2022  Patient's  1945  76 year old, female    Indication: Sepsis    Current estimated CrCl = Estimated Creatinine Clearance: 124 mL/min (A) (based on SCr of 0.37 mg/dL (L)).    Creatinine for last 3 days  2022:  6:27 AM Creatinine 0.39 mg/dL  2022:  5:59 AM Creatinine 0.39 mg/dL  2022:  7:15 AM Creatinine 0.39 mg/dL;  2:03 PM Creatinine 0.37 mg/dL    Recent Vancomycin Level(s) for last 3 days  No results found for requested labs within last 72 hours.      Vancomycin IV Administrations (past 72 hours)      No vancomycin orders with administrations in past 72 hours.                Nephrotoxins and other renal medications (From now, onward)    Start     Dose/Rate Route Frequency Ordered Stop    22 2130  piperacillin-tazobactam (ZOSYN) 4.5 g vial to attach to  mL bag        Note to Pharmacy: For [facility], [facility] and [facility]: For Zosyn-naive patients, use the \"Zosyn initial dose + extended infusion\" order panel.    4.5 g  over 30 Minutes Intravenous EVERY 6 HOURS 22 2107            Contrast Orders - past 72 hours (72h ago, onward)    Start     Dose/Rate Route Frequency Stop    07/15/22 1830  iodixanol (VISIPAQUE 320) injection 100 mL         100 mL Tube ONCE            Insight[employer] Prediction of Planned Initial Vancomycin Regimen    Insight unable to load, will have day team re-enter and evaluate        Plan:  1. Start vancomycin 1750 mg IV once, then 1250 mg IV q24h.   2. Vancomycin monitoring method: AUC  3. Vancomycin therapeutic monitoring goal: 400-600 mg*h/L  4. Pharmacy will check vancomycin levels as appropriate in 1-3 Days.    5. Serum creatinine levels will be ordered daily for the first week of therapy and at least twice weekly for subsequent weeks.      Soco Bronson, PharmD  "

## 2022-07-22 NOTE — PROGRESS NOTES
[facility]    Medicine Progress Note - Hospitalist Service, GOLD TEAM 6    Date of Admission:  6/22/2022    Assessment & Plan  Louise Benítez is a 76-year-old F with a history of seronegative rheumatoid arthritis, osteoporosis, peripheral artery disease, and myelodysplastic syndrome admitted 6/22 after a ground level fall resulting in subdural hematoma with midline shift s/p emergent craniotomy and SDH evacuation. Course has been complicated by nonconvulsive status epilepticus (resolved), GI bleeding s/p EGD and clipping of gastric ulcer 7/5, and aspiration s/p PEG placement 7/15. Noted to have bloody stools the evening of 7/17 with acute hemoglobin drop (10.1 -> 8.7). Medicine was consulted for recommendations surrounding management of GI bleed.    # SIRS w/o source of infection    Febrile to 101.9F and WBC up 12.1->16.5 since 7/21. HR and BP stable. With patients current illness and immobility she is at increased risk of infections including PNA, UTI, and cellulitis from skin breakdown. CC ordered CXR that showed probably atelectasis, which could conceal a PNA. UTI without infection, procal only 0.06. Lactic acid normal.   - Continue Vancomycin and Zosyn   - Ordered CT head - no acute changes since 7/14 to suggest central infection or central source of fever  - Ordered Sputum culture and respiratory virus panel   - follow blood cultures (2 sets drawn)  - Will give 500 ml bolus over 6 hours to keep pt hydrated in case of infection   - Repeat lactic acid and blood cultures w/ febrile spikes     # Acute blood loss anemia on chronic microcytic anemia  # GI bleed  # Gastric ulcers complicated by GI bleeding s/p EGD 7/5 with clipping of 5mm ulcers in posterior gastric wall  Bloody stool output with associated Hgb drop on 7/17 from 10.1->8.7->6.6 7/19. She was transfused 1 unit(s) pRBCs prior to colonoscopy. Colonoscopy done 7/19  showed ulcers at the splenic flexure that  were likely source of bleeding but have stopped bleeding on their own.   - Hgb stable, up to 8.9  - Convtinue Lovenox for VTE prophylaxis   - Appreciate assistance and recs from GI  - Continue volume resuscitation with IVFs and blood as needed   - Continue IV Protonix BID for now. Ok to transition back to PO PPI BID x 12 weeks once tolerating orals   - Will continue to hold AC for VTE prophylaxis until Hgb stabilizes.   - Will use SCDs     # Subdural hematoma with midline shift s/p emergent craniotomy with SDH evacuation (6/22/22)  # Nonconvulsive status epilepticus (s/p vEEG 7/8)  # Frequent falls   Initial fall w/ SDH occurred on 8/12 and pt was admitted to [facility] neuro ICU from 5/12-5/20. Discharged to TCU. She did return home from TCU and was doing well. On 6/22 she suffered a mechanical fall while at home. In the ER, CT scan showed a new SDH w/ midline shift. She subsequently underwent L craniotomy for SDH evacuation and was extubated on post-op day 1.   - Mentation continues to wax and wane day to day   - Neurosurgery continues to follow   - Continue Keppra 1 gram BID  - Plan to repeat CT brain 7/28  - Continue on delirium precautions  - Per neurosurgery, it will likely take on the order of weeks to months to see her full capacity for neurologic recovery      # Risk of malnutrition  Pt does not meet the diagnostic criteria for malnutrition, per RD. Have been using PEG tube feeding due to decreased level of consciousness and risk for aspiration   - Continue PEG tube feeding for now per RD dosing  - SLP to re-evaluate for PO intake as pt is more alert today   - Continue Potassium, Phosphorus, and Magnesium protocols daily      # Goals of care   Per conversation with pt's  7/19 the patient was in a good state of health prior to initial fall and SDH in may. Mentation returned to normal following initial SDH but since her most recent fall on 6/22 she has remained mostly somnolent with only some wakefulness.    - Palliative care is following. Advanced care document on file (02/2003)  - Placed  PICC line 7/19 due to poor IV access   - PEG tube in place for feeding  - Family has made pt DNR/DNI  - Will continue to have goals of care discussions in coming days     # Hx of Myelodysplasic syndrome  # Mild leukocytosis  WBC 11.2 <-11.5. Sputum culture collected 7/17, showed normal panda. CXR 7/16 with bibasilar opacities suspected to be atelectasis. Repeat CXR 7/21 appears similar, no signs of infection. . Was previously diagnosed with aspiration pneumonia (sputum cx from 7/5 4+ S. Aureus, s/p a course of cefazolin 7/11). Patient has been afebrile and hemodynamically stable.   - PTA darbepoietin (last dose 7/5, due for next dose 7/26)  - Continue to monitor with CBC daily (WBC remains WNL)  - If febrile, consider covering for aspiration pneumonia with ceftriaxone 1g q24h   - Chest physiotherapy     # High blood pressure  Amlodipine started at 10 mg on admission, decreased to 5 mg due to lows  - Continue amlodipine 5 mg daily for now   - Monitor BP and adjust accordingly     # Seronegative rheumatoid arthritis  - Continue PTA hydroxychloroquine 400mg daily   - Has rheumatology follow up with Dr. Us on 9/1     # Osteoporosis  - Hold PTA calcium/vitamin D  - Holding PTA denosumab while inpatient      # Hyperlipidemia  # Peripheral arterial disease  - Not on a PTA statin or antiplatelet     # Aspiration s/p IR PEG (7/15)  - Dietitian following; hold TFs for now in light of GI bleed  - HOB>45 degrees       Diet: NPO per Anesthesia Guidelines for Procedure/Surgery Except for: Meds  Adult Formula Drip Feeding: Continuous [employer] Peptide 1.5; Nasojejunal; Goal Rate: 45 (Initiate @ 15 ml/hr and adv by 10 ml q8hr as tolerated; mL/hr; Medication - Feeding Tube Flush Frequency: At least 15-30 mL water before and after medicatio...    DVT Prophylaxis: Pneumatic Compression Devices in setting of acute bleed   Vasquez Catheter: Not  present  Central Lines: None  Cardiac Monitoring: None  Code Status: No CPR- Do NOT Intubate      Disposition Plan      Expected Discharge Date: 07/28/2022        Discharge Comments: [place] likely will take weeks to months to fully recover from neuro stand point. Will need TCU        The patient's care was discussed with the Attending Physician, Dr. Delgadillo, Bedside Nurse, Patient, Patient's Family and neurosurgery Consultant.    Luis Alberto Sinha PA-C  Hospitalist Service, [address]  Securely message with the Vocera Web Console (learn more here)  Text page via [facility] Paging/Directory   Please see signed in provider for up to date coverage information      Clinically Significant Risk Factors Present on Admission                    ______________________________________________________________________    Interval History   Mentation continues to wax a wane. Not responsive today     Data reviewed today: I reviewed all medications, new labs and imaging results over the last 24 hours. Please see discussion of these results in the A/P.    Physical Exam     Vital Signs: Temp: 99.4  F (37.4  C) Temp src: Axillary BP: 130/55 Pulse: 90     Resp: 16 SpO2: 100 % O2 Device: Nasal cannula Oxygen Delivery: 2 LPM  Weight: 164 lbs 14.47 oz    Constitutional: Patient somnolent following colonoscopy.   Eyes: EOM, PERRLA. Sclera clear, conjunctiva normal. Anicteric   Head: Well-healing craniotomy scar.  ENT: Normocephalic, without obvious abnormality, atraumatic.   Respiratory: No increased work of breathing. Clear to auscultation bilaterally, no crackles or wheezing - transmitted upper airway sounds in the upper chest   Cardiovascular: RRR. No murmur or friction rub. No edema. No chest wall tenderness.  GI: Soft, non-tender without rebound or guarding. Bowel sounds are active. PEG tube in place   Skin: no bruising or bleeding. Normal skin color, No jaundice  Musculoskeletal:  Full  range of motion noted.    Neurologic: pt not responsive to commands today   Neuropsychiatric: Remains non-verbal  Extremities: Right hand pitting edema. L leg with shallow, healing ulcer without purulent drainage or surrounding erythema or warmth     Data   Recent Labs   Lab 07/22/22  0644 07/21/22  1403 07/21/22  0715 07/20/22  1847 07/20/22  0559   WBC 16.5*  --  12.1*  --  9.0   HGB 8.9*  --  8.1*  8.1* 8.3* 8.0*  8.0*   MCV 99  --  99  --  97   *  --  488*  --  472*     --  136  --  139   POTASSIUM 4.2  --  4.1  --  4.2   CHLORIDE 102  --  101  --  104   CO2 26  --  28  --  27   BUN 20.4  --  16.4  --  11.3   CR 0.45* 0.37* 0.39*  --  0.39*   ANIONGAP 10  --  7  --  8   MENDEZ 8.9  --  9.1  --  8.8   *  --  111*  --  102*   ALBUMIN 3.2*  --  3.4*  --  3.3*   PROTTOTAL 5.7*  --  5.7*  --  5.5*   BILITOTAL 0.6  --  0.4  --  0.4   ALKPHOS 91  --  87  --  85   ALT 14  --  20  --  28   AST 38*  --  43*  --  51*       Recent Results (from the past 24 hour(s))   CT Head w/o Contrast    Narrative    EXAM: CT HEAD W/O CONTRAST  7/22/2022 10:24 AM     HISTORY:  looking for hematomar re-evaluation in the setting of new  fevers       COMPARISON:  7/14/2022    TECHNIQUE: Using multidetector thin collimation helical acquisition  technique, axial, coronal and sagittal CT images from the skull base  to the vertex were obtained without intravenous contrast.   (topogram) image(s) also obtained and reviewed.    FINDINGS:  Stable surgical changes of left frontal craniotomy for evacuation of  subdural hematoma. Unchanged dural thickening and small amount of  extra-axial fluid beneath the craniotomy site measuring up to 3 mm.  Decreased mass effect with 4 mm of rightward midline shift at the  septum pellucidum, previously 6 mm.    No acute loss of gray-white matter differentiation in the cerebral  hemispheres. Ventricles are proportionate to the cerebral sulci..  Clear basal cisterns. Continued mastoid  effusions bilaterally.  Decreased mucosal thickening and layering fluid in the sphenoid,  ethmoid, frontal sinuses.      Impression    IMPRESSION:  Surgical changes left frontal craniotomy for subdural hematoma  evacuation with decreased extra-axial fluid overlying the left frontal  convexity and decreased now minimal rightward midline shift. No  evidence of acute intracranial findings since 7/14/2022. Improved  aeration of paranasal sinuses in the interval.    I have personally reviewed the examination and initial interpretation  and I agree with the findings.    OSVALDO CONNOLLY MD         SYSTEM ID:  [id number]

## 2022-07-22 NOTE — PROGRESS NOTES
Medicine cross cover note    Called for new fevers.      Will start on Zosyn/Vanco.  Add on procal, obtain UA, BC x2.

## 2022-07-22 NOTE — PROGRESS NOTES
[facility], [place]   07/22/2022  Neurosurgery Progress Note:    Assessment:  Ms. Benítez is a 76-year-old female with PMHx myelodysplastic syndrome, seronegative RA, chronic microcytic anemia, PAD, and recent L SDH (5/12/2022) who arrived to [facility] via EMS after an unwitnessed fall at home.  Shortly after arriving, the patient was complaining of a significant headache and nausea, and then her right pupil dilated.  She was no longer protecting her airway, so she was sedated and intubated.  Her right eye pupil then returned to a normal caliber.  A head CT demonstrated a left acute subdural approximately 2.3 cm thick with 12 mm of rightward midline shift.  She was taken to the OR on 6/22 for emergent L craniotomy for SDH evacuation. She was extubated on POD1. Concerns for tube feed aspiration on 6/25.  She underwent emergent EGD by GI due to sharp drop in hemoglobin as well as melanotic stools.  She had 1 gastric ulcer clipped which was the presumed culprit.  Due to requiring the EGD she needed intubation during this time, therefore she was intubated on 7/4/2022 for EGD.  She was also connected to EEG on 7/4/2022 found to be in nonconvulsive status received Keppra load and Ativan and was started on Versed overnight.  On 7/5/2022, her Versed drip was weaned off with confirmation of no status epilepticus. On 7/8 she was extubated and remains stable.  Received PEG on 7/15/2022 by IR.  Underwent colonoscopy with GI on 7/19/2022.  Transferred to medicine primary on 7/19/2022.    Other diagnoses include:  #Brain compression  #Open evacuation of hematoma on day of admission   #Hyponatremia  #Pulmonary edema  #GI bleed  #Uremia  #hypokalemia    Recommendations:  Continue Keppra 1 g twice daily till seen by neurology as outpatient  Head CT on 7/28  Ok for lovenox from nsgy perspective  Remaining cares per primary team    Neurosurgery will continue to  follow  -----------------------------------  Bruce Graves MD  Neurosurgery Resident     Please contact neurosurgery resident on call with questions.    Dial * * [phone], enter [phone] when prompted.    -----------------------------------    Interval History: NAEO.    Objective:   Temp:  [98.5  F (36.9  C)-101.9  F (38.8  C)] 98.9  F (37.2  C)  Pulse:  [] 86  Resp:  [16-22] 18  BP: (117-168)/(47-76) 139/76  SpO2:  [96 %-99 %] 99 %  I/O last 3 completed shifts:  In: 1320 [NG/GT:240]  Out: 2100 [Urine:2100]        Gen: Appears comfortable, NAD, laying in bed, appearing older than stated age  MSK: PEG in place  HEENT: Left craniotomy incision healing well  Wound: Incision closed with staples-now removed, incision appears clean dry-no signs of leaking, no wetness appreciated.  See below for photo from 7/5/2022 evening.  Incision cleaned with ChloraPrep.  Pulm: On RA, no increased work of breathing, satting 98%  Neurologic:  Eyes open spontaneously, minimally tracking, gaze at midline without preference  Follows commands in all 4 extremities  Reflexes 2+, downgoing Babinski's bilaterally      LABS:  Recent Labs   Lab 07/21/22  1403 07/21/22  0715 07/20/22  0559 07/19/22  0627   NA  --  136 139 142   POTASSIUM  --  4.1 4.2 3.7   CHLORIDE  --  101 104 105   CO2  --  28 27 26   ANIONGAP  --  7 8 11   GLC  --  111* 102* 91   BUN  --  16.4 11.3 17.8   CR 0.37* 0.39* 0.39* 0.39*   MENDEZ  --  9.1 8.8 8.9       Recent Labs   Lab 07/22/22  0644   WBC 16.5*   RBC 2.90*   HGB 8.9*   HCT 28.8*   MCV 99   MCH 30.7   MCHC 30.9*   RDW 16.2*   *       IMAGING:  Recent Results (from the past 24 hour(s))   XR Chest Port 1 View    Narrative    Portable chest    INDICATION: Wheezing    COMPARISON: 7/16/2022    FINDINGS: Heart size upper normal. Osteopenia. Degenerative changes at  the shoulders. Degenerative changes in the thoracic spine. A few  patchy densities in the lungs are noted especially in the lower lungs.      Impression     IMPRESSION: Probable atelectasis rather than edema in the lower lungs.  Osteopenia with degenerative changes in the spine and shoulders.    JOVANA CORRIGAN MD         SYSTEM ID:  [id number]

## 2022-07-22 NOTE — PLAN OF CARE
Problem: Plan of Care - These are the overarching goals to be used throughout the patient stay.    Goal: Readiness for Transition of Care  Outcome: Ongoing, Not Progressing     Problem: Risk for Delirium  Goal: Improved Behavioral Control  Outcome: Ongoing, Not Progressing  Goal: Improved Attention and Thought Clarity  Outcome: Ongoing, Not Progressing     Problem: Risk for Delirium  Goal: Improved Sleep  Outcome: Ongoing, Progressing     Problem: Bowel Motility Impaired (Craniotomy/Craniectomy/Cranioplasty)  Goal: Effective Bowel Elimination  Outcome: Ongoing, Progressing     Problem: Plan of Care - These are the overarching goals to be used throughout the patient stay.    Goal: Absence of Hospital-Acquired Illness or Injury  Intervention: Identify and Manage Fall Risk  Recent Flowsheet Documentation  Taken 7/22/2022 1600 by Farhana Ventura RN  Safety Promotion/Fall Prevention:   room near nurse's station   room door open   increase visualization of patient   clutter free environment maintained  Taken 7/22/2022 1200 by Farhana Ventura RN  Safety Promotion/Fall Prevention:   room near nurse's station   room door open   increase visualization of patient   clutter free environment maintained  Taken 7/22/2022 0800 by Farhana Ventura RN  Safety Promotion/Fall Prevention:   room near nurse's station   room door open   increase visualization of patient   clutter free environment maintained  Intervention: Prevent Skin Injury  Recent Flowsheet Documentation  Taken 7/22/2022 1600 by Farhana Ventura RN  Body Position:   left   turned   lower extremity elevated   upper extremity elevated  Taken 7/22/2022 1254 by Farhana Ventura RN  Body Position:   right   turned   lower extremity elevated   upper extremity elevated  Taken 7/22/2022 0748 by Farhana Ventura RN  Body Position:   left   turned   lower extremity elevated   upper extremity elevated  Intervention: Prevent and Manage VTE (Venous Thromboembolism) Risk  Recent Flowsheet  Documentation  Taken 7/22/2022 1600 by Farhana Ventura RN  Range of Motion: ROM (range of motion) performed  VTE Prevention/Management: SCDs (sequential compression devices) off  Activity Management: activity adjusted per tolerance  Taken 7/22/2022 1254 by Farhana Ventura RN  Activity Management: activity adjusted per tolerance  Taken 7/22/2022 1200 by Farhana Ventura RN  Range of Motion: ROM (range of motion) performed  VTE Prevention/Management: SCDs (sequential compression devices) off  Activity Management:   activity adjusted per tolerance   bedrest  Taken 7/22/2022 0800 by Farhana Ventura RN  Range of Motion: ROM (range of motion) performed  VTE Prevention/Management: SCDs (sequential compression devices) off  Activity Management:   activity adjusted per tolerance   bedrest  Taken 7/22/2022 0748 by Farhana Ventura RN  Activity Management: activity adjusted per tolerance  Intervention: Prevent Infection  Recent Flowsheet Documentation  Taken 7/22/2022 1600 by Farhana Ventura RN  Infection Prevention: environmental surveillance performed  Taken 7/22/2022 1200 by Farhana Ventura RN  Infection Prevention: environmental surveillance performed  Taken 7/22/2022 0800 by Farhana Ventura RN  Infection Prevention: environmental surveillance performed  Goal: Optimal Comfort and Wellbeing  Intervention: Provide Person-Centered Care  Recent Flowsheet Documentation  Taken 7/22/2022 1600 by Farhana Ventura RN  Trust Relationship/Rapport:   care explained   reassurance provided   emotional support provided  Taken 7/22/2022 1200 by Farhana Ventura RN  Trust Relationship/Rapport:   care explained   reassurance provided   emotional support provided  Taken 7/22/2022 0800 by Farhana Ventura RN  Trust Relationship/Rapport:   care explained   reassurance provided   emotional support provided     Problem: Functional Ability Impaired (Craniotomy/Craniectomy/Cranioplasty)  Goal: Optimal Functional Ability  Intervention: Optimize Functional Ability  Recent  Flowsheet Documentation  Taken 7/22/2022 1600 by Farhana Ventura RN  Activity Management: activity adjusted per tolerance  Activity Assistance Provided: assistance, 2 people  Taken 7/22/2022 1254 by Farhana Ventura RN  Activity Management: activity adjusted per tolerance  Activity Assistance Provided: assistance, 2 people  Taken 7/22/2022 1200 by Farhana Ventura RN  Activity Management:   activity adjusted per tolerance   bedrest  Activity Assistance Provided: assistance, 2 people  Taken 7/22/2022 0800 by Farhana Ventura RN  Activity Management:   activity adjusted per tolerance   bedrest  Activity Assistance Provided: assistance, 2 people  Taken 7/22/2022 0748 by Farhana Ventura RN  Activity Management: activity adjusted per tolerance  Activity Assistance Provided: assistance, 2 people     Problem: Respiratory Compromise (Craniotomy/Craniectomy/Cranioplasty)  Goal: Effective Oxygenation and Ventilation  Intervention: Optimize Oxygenation and Ventilation  Recent Flowsheet Documentation  Taken 7/22/2022 1600 by Farhana Ventura RN  Head of Bed (HOB) Positioning: HOB at 30-45 degrees  Taken 7/22/2022 1254 by Farhana Ventura RN  Head of Bed (HOB) Positioning: HOB at 30-45 degrees  Taken 7/22/2022 1200 by Farhana Ventura RN  Head of Bed (HOB) Positioning: HOB at 30-45 degrees  Taken 7/22/2022 0800 by Farhana Ventura RN  Head of Bed (HOB) Positioning: HOB at 30-45 degrees  Taken 7/22/2022 0748 by Farhana Ventura RN  Head of Bed (HOB) Positioning: HOB at 30-45 degrees   Goal Outcome Evaluation:    Shift update: Patient went down for head CT this morning. MRSA swab collected and resulted negative. Respiratory panel collect, positive rhinovirus found.  Neuro: Patient lethargic during shift. Responding to loud voice and painful stimuli. No fever. Tmax 99.4. PRN tylenol was given.  Pain: No signs of pain.   Cardiac: SR 80s-90s. Sbp 1 teens.   Resp: Patient remained on 2L NC. Saturations in >95%.   GIGU: TF running at 45ml/hr goal. Suppository was  given this afternoon. Patient had 1 large bm. Patient urinating.   Skin: See flowsheet.  Lines: 1 LPIV running normal saline bolus at 83.3 ml/hr       Farhana Ventura RN

## 2022-07-23 NOTE — PROGRESS NOTES
[facility], [place]   07/23/2022  Neurosurgery Progress Note:    Assessment:  Ms. Benítez is a 76-year-old female with PMHx myelodysplastic syndrome, seronegative RA, chronic microcytic anemia, PAD, and recent L SDH (5/12/2022) who arrived to [facility] via EMS after an unwitnessed fall at home.  Shortly after arriving, the patient was complaining of a significant headache and nausea, and then her right pupil dilated.  She was no longer protecting her airway, so she was sedated and intubated.  Her right eye pupil then returned to a normal caliber.  A head CT demonstrated a left acute subdural approximately 2.3 cm thick with 12 mm of rightward midline shift.  She was taken to the OR on 6/22 for emergent L craniotomy for SDH evacuation. She was extubated on POD1. Concerns for tube feed aspiration on 6/25.  She underwent emergent EGD by GI due to sharp drop in hemoglobin as well as melanotic stools.  She had 1 gastric ulcer clipped which was the presumed culprit.  Due to requiring the EGD she needed intubation during this time, therefore she was intubated on 7/4/2022 for EGD.  She was also connected to EEG on 7/4/2022 found to be in nonconvulsive status received Keppra load and Ativan and was started on Versed overnight.  On 7/5/2022, her Versed drip was weaned off with confirmation of no status epilepticus. On 7/8 she was extubated and remains stable.  Received PEG on 7/15/2022 by IR.  Underwent colonoscopy with GI on 7/19/2022.  Transferred to medicine primary on 7/19/2022.    Other diagnoses include:  #Brain compression  #Open evacuation of hematoma on day of admission   #Hyponatremia  #Pulmonary edema  #GI bleed  #Uremia  #hypokalemia    Recommendations:  Continue Keppra 1 g twice daily till seen by neurology as outpatient  Head CT on 7/28  Ok for lovenox from nsgy perspective  Remaining cares per primary team    Neurosurgery will continue to  follow  -----------------------------------  Bruce Graves MD  Neurosurgery Resident     Please contact neurosurgery resident on call with questions.    Dial * * [phone], enter [phone] when prompted.    -----------------------------------    Interval History: NAEO.    Objective:   Temp:  [98.3  F (36.8  C)-99.5  F (37.5  C)] 99  F (37.2  C)  Pulse:  [79-96] 79  Resp:  [11-17] 11  BP: (118-138)/(51-60) 121/60  SpO2:  [91 %-100 %] 100 %  I/O last 3 completed shifts:  In: 1771 [I.V.:466; NG/GT:360]  Out: 1350 [Urine:1350]        Gen: Appears comfortable, NAD, laying in bed, appearing older than stated age  MSK: PEG in place  HEENT: Left craniotomy incision healing well  Wound: Incision closed with staples-now removed, incision appears clean dry-no signs of leaking, no wetness appreciated.  See below for photo from 7/5/2022 evening.  Incision cleaned with ChloraPrep.  Pulm: On RA, no increased work of breathing, satting 98%  Neurologic:  Eyes open spontaneously, minimally tracking, gaze at midline without preference  Follows commands in all 4 extremities  Reflexes 2+, downgoing Babinski's bilaterally      LABS:  Recent Labs   Lab 07/23/22  0915 07/22/22  0644 07/21/22  1403 07/21/22  0715    138  --  136   POTASSIUM 4.4 4.2  --  4.1   CHLORIDE 107 102  --  101   CO2 30* 26  --  28   ANIONGAP 6* 10  --  7   * 114*  --  111*   BUN 21.2 20.4  --  16.4   CR 0.46* 0.45* 0.37* 0.39*   MENDEZ 9.1 8.9  --  9.1       Recent Labs   Lab 07/23/22  0915   WBC 11.5*   RBC 2.37*   HGB 7.4*   HCT 24.2*   *   MCH 31.2   MCHC 30.6*   RDW 16.5*          IMAGING:  Recent Results (from the past 24 hour(s))   CT Head w/o Contrast    Narrative    EXAM: CT HEAD W/O CONTRAST  7/22/2022 10:24 AM     HISTORY:  looking for hematomar re-evaluation in the setting of new  fevers       COMPARISON:  7/14/2022    TECHNIQUE: Using multidetector thin collimation helical acquisition  technique, axial, coronal and sagittal CT images  from the skull base  to the vertex were obtained without intravenous contrast.   (topogram) image(s) also obtained and reviewed.    FINDINGS:  Stable surgical changes of left frontal craniotomy for evacuation of  subdural hematoma. Unchanged dural thickening and small amount of  extra-axial fluid beneath the craniotomy site measuring up to 3 mm.  Decreased mass effect with 4 mm of rightward midline shift at the  septum pellucidum, previously 6 mm.    No acute loss of gray-white matter differentiation in the cerebral  hemispheres. Ventricles are proportionate to the cerebral sulci..  Clear basal cisterns. Continued mastoid effusions bilaterally.  Decreased mucosal thickening and layering fluid in the sphenoid,  ethmoid, frontal sinuses.      Impression    IMPRESSION:  Surgical changes left frontal craniotomy for subdural hematoma  evacuation with decreased extra-axial fluid overlying the left frontal  convexity and decreased now minimal rightward midline shift. No  evidence of acute intracranial findings since 7/14/2022. Improved  aeration of paranasal sinuses in the interval.    I have personally reviewed the examination and initial interpretation  and I agree with the findings.    OSVALDO CONNOLLY MD         SYSTEM ID:  [id number]

## 2022-07-23 NOTE — PHARMACY-VANCOMYCIN DOSING SERVICE
"Pharmacy Vancomycin Note  Date of Service 2022  Patient's  1945   76 year old, female    Indication: Sepsis  Day of Therapy: 3  Current vancomycin regimen:  1250 mg IV q24h  Current vancomycin monitoring method: AUC  Current vancomycin therapeutic monitoring goal: 400-600 mg*h/L    [employer]ight[employer] Prediction of Current Vancomycin Regimen  Regimen: 1250 mg IV every 24 hours.  Start time: 11:24 on 2022  Exposure target: AUC24 (range)400-600 mg/L.hr   AUC24,ss: 357 mg/L.hr  Probability of AUC24 > 400: 28 %  Ctrough,ss: 9.0 mg/L  Probability of Ctrough,ss > 20: 0 %  Probability of nephrotoxicity (Lodise BUCK ): 5 %      Current estimated CrCl = Estimated Creatinine Clearance: 98.5 mL/min (A) (based on SCr of 0.46 mg/dL (L)).    Creatinine for last 3 days  2022:  7:15 AM Creatinine 0.39 mg/dL;  2:03 PM Creatinine 0.37 mg/dL  2022:  6:44 AM Creatinine 0.45 mg/dL  2022:  9:15 AM Creatinine 0.46 mg/dL    Recent Vancomycin Levels (past 3 days)  2022:  9:15 AM Vancomycin 14.7 ug/mL    Vancomycin IV Administrations (past 72 hours)                   vancomycin 1250 mg in 0.9% NaCl 250 mL intermittent infusion 1,250 mg (mg) 1,250 mg New Bag 22    vancomycin (VANCOCIN) 1,750 mg in sodium chloride 0.9 % 500 mL intermittent infusion (mg) 1,750 mg New Bag 22 223                Nephrotoxins and other renal medications (From now, onward)    Start     Dose/Rate Route Frequency Ordered Stop    22  vancomycin 1250 mg in 0.9% NaCl 250 mL intermittent infusion 1,250 mg         1,250 mg  over 90 Minutes Intravenous EVERY 24 HOURS 22  piperacillin-tazobactam (ZOSYN) 4.5 g vial to attach to  mL bag        Note to Pharmacy: For [facility], [facility] and [facility]: For Zosyn-naive patients, use the \"Zosyn initial dose + extended infusion\" order panel.    4.5 g  over 30 Minutes Intravenous EVERY 6 HOURS 22 2107               Contrast Orders - past 72 " hours (72h ago, onward)    Start     Dose/Rate Route Frequency Stop    07/15/22 1830  iodixanol (VISIPAQUE 320) injection 100 mL         100 mL Tube ONCE            Interpretation of levels and current regimen:  Vancomycin level is reflective of AUC less than 400    Has serum creatinine changed greater than 50% in last 72 hours: No    Urine output:  good urine output    Renal Function: Stable    [employer]ight[employer] Prediction of Planned New Vancomycin Regimen  Regimen: 1500 mg IV every 24 hours.  Start time: 11:24 on 07/23/2022  Exposure target: AUC24 (range)400-600 mg/L.hr   AUC24,ss: 425 mg/L.hr  Probability of AUC24 > 400: 63 %  Ctrough,ss: 10.8 mg/L  Probability of Ctrough,ss > 20: 1 %  Probability of nephrotoxicity (Lodise BUCK 2009): 6 %      Plan:  1. Increase Dose to 1500 IV q24hr  2. Vancomycin monitoring method: AUC  3. Vancomycin therapeutic monitoring goal: 400-600 mg*h/L  4. Pharmacy will check vancomycin levels as appropriate in 1-3 Days.  5. Serum creatinine levels will be ordered daily for the first week of therapy and at least twice weekly for subsequent weeks.    DRU GRAMAJO [facility]

## 2022-07-23 NOTE — PROGRESS NOTES
[facility]    Medicine Progress Note - Hospitalist Service, GOLD TEAM 6    Date of Admission:  6/22/2022    Assessment & Plan  Louise Benítez is a 76-year-old F with a history of seronegative rheumatoid arthritis, osteoporosis, peripheral artery disease, and myelodysplastic syndrome admitted 6/22 after a ground level fall resulting in subdural hematoma with midline shift s/p emergent craniotomy and SDH evacuation. Course has been complicated by nonconvulsive status epilepticus (resolved), GI bleeding s/p EGD and clipping of gastric ulcer 7/5, and aspiration s/p PEG placement 7/15. Noted to have bloody stools the evening of 7/17 with acute hemoglobin drop (10.1 -> 8.7). Medicine was consulted for recommendations surrounding management of GI bleed.    # SIRS w/o source of infection    Febrile to 101.9F and WBC up 12.1->16.5 since 7/21. HR and BP stable. With patients current illness and immobility she is at increased risk of infections including PNA, UTI, and cellulitis from skin breakdown. CC ordered CXR that showed probably atelectasis, which could conceal a PNA. UTI without infection, procal only 0.06. Lactic acid normal. Patient is more alert today compared to the past 2 days and I suspect this is due to effectively treating a yet undetermined source of infection   - WBC down 16.5-> 11.5 1 day after initiation of Abx   - Continue Vanc + Zosyn for now, plan to tx at least 5-7 days and will johnathan abx pending culture results   - Patient has some ? RUQ tenderness today but is difficult to tell given her AMS. She did have a small jump in Alk phos 91-> 125 today so will check an abdominal US   - Respiratory virus panel positive for Rhino/Enterovirus. Not convincing as cause of SIRS.  - Ordered Sputum culture with RT induction   - follow blood cultures (2 sets drawn) - NGTD   - Repeat lactic acid and blood cultures w/ febrile spikes     # Acute blood loss anemia on chronic  microcytic anemia  # GI bleed  # Gastric ulcers complicated by GI bleeding s/p EGD 7/5 with clipping of 5mm ulcers in posterior gastric wall  Bloody stool output with associated Hgb drop on 7/17 from 10.1->8.7->6.6 7/19. She was transfused 1 unit(s) pRBCs prior to colonoscopy. Colonoscopy done 7/19  showed ulcers at the splenic flexure that were likely source of bleeding but have stopped bleeding on their own.   - Hgb acutely dropped 8.9-> 7.5 today after trending up the past several days   - Will hold Lovenox given recent GI bleed and recheck Hgb at 1800 this evening   - Transfuse if hgb drops <7  - Appreciate assistance and recs from GI - I sent FYI page regarding Hgb today   - Continue volume resuscitation with IVFs and blood as needed   - Continue IV Protonix BID for now. Ok to transition back to PO PPI BID x 12 weeks once tolerating orals   - Will continue to hold AC for VTE prophylaxis until Hgb stabilizes.   - Will use SCDs     # Subdural hematoma with midline shift s/p emergent craniotomy with SDH evacuation (6/22/22)  # Nonconvulsive status epilepticus (s/p vEEG 7/8)  # Frequent falls   Initial fall w/ SDH occurred on 8/12 and pt was admitted to [facility] neuro ICU from 5/12-5/20. Discharged to TCU. She did return home from TCU and was doing well. On 6/22 she suffered a mechanical fall while at home. In the ER, CT scan showed a new SDH w/ midline shift. She subsequently underwent L craniotomy for SDH evacuation and was extubated on post-op day 1.  CT head 7/22 - no acute changes since 7/14, no explanations for febrile spikes.  - Mentation continues to wax and wane day to day   - Neurosurgery continues to follow   - Continue Keppra 1 gram BID  - Plan to repeat CT brain 7/28  - Continue on delirium precautions  - Per neurosurgery, it will likely take on the order of weeks to months to see her full capacity for neurologic recovery      # Risk of malnutrition  Pt does not meet the diagnostic criteria for  malnutrition, per RD. Have been using PEG tube feeding due to decreased level of consciousness and risk for aspiration   - Continue PEG tube feeding for now per RD dosing  - SLP to re-evaluate for PO intake as pt is more alert today   - Continue Potassium, Phosphorus, and Magnesium protocols daily      # Goals of care   Per conversation with pt's  7/19 the patient was in a good state of health prior to initial fall and SDH in may. Mentation returned to normal following initial SDH but since her most recent fall on 6/22 she has remained mostly somnolent with only some wakefulness.   - Palliative care is following. Advanced care document on file (02/2003)  - Placed  PICC line 7/19 due to poor IV access   - PEG tube in place for feeding  - Family has made pt DNR/DNI  - Will continue to have goals of care discussions in coming days     # Hx of Myelodysplasic syndrome  # Mild leukocytosis  WBC 11.2 <-11.5. Sputum culture collected 7/17, showed normal panda. CXR 7/16 with bibasilar opacities suspected to be atelectasis. Repeat CXR 7/21 appears similar, no signs of infection. . Was previously diagnosed with aspiration pneumonia (sputum cx from 7/5 4+ S. Aureus, s/p a course of cefazolin 7/11). Patient has been afebrile and hemodynamically stable.   - PTA darbepoietin (last dose 7/5, due for next dose 7/26)  - Continue to monitor with CBC daily (WBC remains WNL)  - If febrile, consider covering for aspiration pneumonia with ceftriaxone 1g q24h   - Chest physiotherapy     # High blood pressure  Amlodipine started at 10 mg on admission, decreased to 5 mg due to lows  - Continue amlodipine 5 mg daily for now   - Monitor BP and adjust accordingly     # Seronegative rheumatoid arthritis  - Continue PTA hydroxychloroquine 400mg daily   - Has rheumatology follow up with Dr. Us on 9/1     # Osteoporosis  - Hold PTA calcium/vitamin D  - Holding PTA denosumab while inpatient      # Hyperlipidemia  # Peripheral arterial  disease  - Not on a PTA statin or antiplatelet     # Aspiration s/p IR PEG (7/15)  - Dietitian following; hold TFs for now in light of GI bleed  - HOB>45 degrees       Diet: NPO per Anesthesia Guidelines for Procedure/Surgery Except for: Meds  Adult Formula Drip Feeding: Continuous [employer] Peptide 1.5; Nasojejunal; Goal Rate: 45 (Initiate @ 15 ml/hr and adv by 10 ml q8hr as tolerated; mL/hr; Medication - Feeding Tube Flush Frequency: At least 15-30 mL water before and after medicatio...    DVT Prophylaxis: Pneumatic Compression Devices in setting of acute bleed   Vasquez Catheter: Not present  Central Lines: None  Cardiac Monitoring: None  Code Status: No CPR- Do NOT Intubate      Disposition Plan     Expected Discharge Date: 07/28/2022        Discharge Comments: Patinet likely will take weeks to months to fully recover from neuro stand point. Will need TCU        The patient's care was discussed with the Attending Physician, Dr. Delgadillo, Bedside Nurse, Patient, Patient's Family and neurosurgery Consultant.    Luis Alberto Sinha PA-C  Hospitalist Service, [address]  Securely message with the Vocera Web Console (learn more here)  Text page via [facility] Paging/Directory   Please see signed in provider for up to date coverage information      Clinically Significant Risk Factors Present on Admission                    ______________________________________________________________________    Interval History   Mentation continues to wax a wane. She is more responsive today compared to the past 2 days     Data reviewed today: I reviewed all medications, new labs and imaging results over the last 24 hours. Please see discussion of these results in the A/P.    Physical Exam     Vital Signs: Temp: 99  F (37.2  C) Temp src: Axillary BP: 121/60 Pulse: 79     Resp: 11 SpO2: 100 % O2 Device: Nasal cannula Oxygen Delivery: 2 LPM  Weight: 160 lbs 7.92 oz    Constitutional: Patient  somnolent following colonoscopy.   Eyes: EOM, PERRLA. Sclera clear, conjunctiva normal. Anicteric   Head: Well-healing craniotomy scar.  ENT: Normocephalic, without obvious abnormality, atraumatic.   Respiratory: No increased work of breathing. Clear to auscultation bilaterally, no crackles or wheezing - transmitted upper airway sounds in the upper chest   Cardiovascular: RRR. No murmur or friction rub. No edema. No chest wall tenderness.  GI: Soft, non-tender without rebound or guarding. Bowel sounds are active. PEG tube in place   Skin: no bruising or bleeding. Normal skin color, No jaundice  Musculoskeletal:  Full range of motion noted.    Neurologic: pt not responsive to commands today   Neuropsychiatric: Remains non-verbal  Extremities: Right hand pitting edema. L leg with shallow, healing ulcer without purulent drainage or surrounding erythema or warmth     Data   Recent Labs   Lab 07/22/22  0644 07/21/22  1403 07/21/22  0715 07/20/22  1847 07/20/22  0559   WBC 16.5*  --  12.1*  --  9.0   HGB 8.9*  --  8.1*  8.1* 8.3* 8.0*  8.0*   MCV 99  --  99  --  97   *  --  488*  --  472*     --  136  --  139   POTASSIUM 4.2  --  4.1  --  4.2   CHLORIDE 102  --  101  --  104   CO2 26  --  28  --  27   BUN 20.4  --  16.4  --  11.3   CR 0.45* 0.37* 0.39*  --  0.39*   ANIONGAP 10  --  7  --  8   MENDEZ 8.9  --  9.1  --  8.8   *  --  111*  --  102*   ALBUMIN 3.2*  --  3.4*  --  3.3*   PROTTOTAL 5.7*  --  5.7*  --  5.5*   BILITOTAL 0.6  --  0.4  --  0.4   ALKPHOS 91  --  87  --  85   ALT 14  --  20  --  28   AST 38*  --  43*  --  51*       Recent Results (from the past 24 hour(s))   CT Head w/o Contrast    Narrative    EXAM: CT HEAD W/O CONTRAST  7/22/2022 10:24 AM     HISTORY:  looking for hematomar re-evaluation in the setting of new  fevers       COMPARISON:  7/14/2022    TECHNIQUE: Using multidetector thin collimation helical acquisition  technique, axial, coronal and sagittal CT images from the skull  base  to the vertex were obtained without intravenous contrast.   (topogram) image(s) also obtained and reviewed.    FINDINGS:  Stable surgical changes of left frontal craniotomy for evacuation of  subdural hematoma. Unchanged dural thickening and small amount of  extra-axial fluid beneath the craniotomy site measuring up to 3 mm.  Decreased mass effect with 4 mm of rightward midline shift at the  septum pellucidum, previously 6 mm.    No acute loss of gray-white matter differentiation in the cerebral  hemispheres. Ventricles are proportionate to the cerebral sulci..  Clear basal cisterns. Continued mastoid effusions bilaterally.  Decreased mucosal thickening and layering fluid in the sphenoid,  ethmoid, frontal sinuses.      Impression    IMPRESSION:  Surgical changes left frontal craniotomy for subdural hematoma  evacuation with decreased extra-axial fluid overlying the left frontal  convexity and decreased now minimal rightward midline shift. No  evidence of acute intracranial findings since 7/14/2022. Improved  aeration of paranasal sinuses in the interval.    I have personally reviewed the examination and initial interpretation  and I agree with the findings.    OSVALDO CONNOLLY MD         SYSTEM ID:  [id number]

## 2022-07-23 NOTE — PROGRESS NOTES
Neuro/Musculoskeletal:  Disoriented, inconsistently follows command, unable to speak. Opens eyes spontaneously.  Cardiac:  SR.  VSS.    Respiratory:  Sating in the 95s on O2 at 2LPM.  GI/:  Adequate urine output via external catheter. BM 1x loose watery. Incontinent on both.  Diet/Appetite:  On TF at 45ml/hr, FWF 30ml Q4.  Activity:  Assist of 2 using lift up to chair  Pain:  CPOT 0-2  Skin:  No new deficits noted. See Flow sheet  LDAs + Drips/IVF:   PIV left arm on KVO    Protocols/Labs: K, Phos, Mag replacement protocol.    Pertinent Shift Updates: No significant changes last night.    Plan: Continue POC, Notify provider for changes.

## 2022-07-24 NOTE — PLAN OF CARE
Neuro/Musculoskeletal:  Disoriented, inconsistently follows command, unable to speak. Opens eyes spontaneously.   Cardiac:  SR.  VSS.    Respiratory:  Sating in the 95s on O2 at 2 LPM when sleeping.  GI/:  Adequate urine output via external catheter. BM 3x loose watery. Incontinent on both.  Diet/Appetite:  On TF at 45ml/hr, FWF 30ml Q4.  Activity:  Assist of 2 using lift up to chair  Pain:  CPOT 0-1  Skin:  No new deficits noted. See Flow sheet  LDAs + Drips/IVF:   PIV left arm on KVO  Female external cath  PEG tube with yellowish discharge, surrounding site is red and warm.  Protocols/Labs: K, Phos, Mag replacement protocol.     Pertinent Shift Updates: PEG site gram stain + Gram positive cocci. Reported to provider on duty. No further order made as patient is already treating with antibiotics.     Plan: Continue POC, Notify provider for changes.  Problem: Plan of Care - These are the overarching goals to be used throughout the patient stay.    Goal: Absence of Hospital-Acquired Illness or Injury  Outcome: Ongoing, Progressing  Intervention: Identify and Manage Fall Risk  Recent Flowsheet Documentation  Taken 7/24/2022 0000 by Lord Shaggy Dent, RN  Safety Promotion/Fall Prevention:    activity supervised    assistive device/personal items within reach    bed alarm on    clutter free environment maintained    fall prevention program maintained    increased rounding and observation    increase visualization of patient    lighting adjusted    patient and family education    room near nurse's station  Taken 7/23/2022 2000 by Lord Shaggy Dent, RN  Safety Promotion/Fall Prevention:    activity supervised    assistive device/personal items within reach    bed alarm on    clutter free environment maintained    fall prevention program maintained    increased rounding and observation    increase visualization of patient    lighting adjusted    patient and family education    room near nurse's  station  Intervention: Prevent Skin Injury  Recent Flowsheet Documentation  Taken 7/24/2022 0000 by Lord Shaggy Dent, RN  Body Position:    left    turned  Taken 7/23/2022 2000 by Lord Shaggy Dent RN  Body Position:    left    turned  Intervention: Prevent and Manage VTE (Venous Thromboembolism) Risk  Recent Flowsheet Documentation  Taken 7/24/2022 0000 by Lord Shaggy Dent, RN  Range of Motion: ROM (range of motion) performed  VTE Prevention/Management:    SCDs (sequential compression devices) off    SCDs (sequential compression devices) on  Activity Management: activity adjusted per tolerance  Taken 7/23/2022 2000 by Lord Shaggy Dent, RN  Range of Motion: ROM (range of motion) performed  VTE Prevention/Management:    SCDs (sequential compression devices) off    SCDs (sequential compression devices) on  Activity Management: activity adjusted per tolerance   Goal Outcome Evaluation:

## 2022-07-24 NOTE — PROGRESS NOTES
[facility], [place]   07/24/2022  Neurosurgery Progress Note:    Assessment:  Ms. Benítez is a 76-year-old female with PMHx myelodysplastic syndrome, seronegative RA, chronic microcytic anemia, PAD, and recent L SDH (5/12/2022) who arrived to [facility] via EMS after an unwitnessed fall at home.  Shortly after arriving, the patient was complaining of a significant headache and nausea, and then her right pupil dilated.  She was no longer protecting her airway, so she was sedated and intubated.  Her right eye pupil then returned to a normal caliber.  A head CT demonstrated a left acute subdural approximately 2.3 cm thick with 12 mm of rightward midline shift.  She was taken to the OR on 6/22 for emergent L craniotomy for SDH evacuation. She was extubated on POD1. Concerns for tube feed aspiration on 6/25.  She underwent emergent EGD by GI due to sharp drop in hemoglobin as well as melanotic stools.  She had 1 gastric ulcer clipped which was the presumed culprit.  Due to requiring the EGD she needed intubation during this time, therefore she was intubated on 7/4/2022 for EGD.  She was also connected to EEG on 7/4/2022 found to be in nonconvulsive status received Keppra load and Ativan and was started on Versed overnight.  On 7/5/2022, her Versed drip was weaned off with confirmation of no status epilepticus. On 7/8 she was extubated and remains stable.  Received PEG on 7/15/2022 by IR.  Underwent colonoscopy with GI on 7/19/2022.  Transferred to medicine primary on 7/19/2022. On 7/24, drainage noted at PEG site, swab culture sent for analysis per primary team. WBC downtrending on zosyn.    Other diagnoses include:  #Brain compression  #Open evacuation of hematoma on day of admission   #Hyponatremia  #Pulmonary edema  #GI bleed  #Uremia  #hypokalemia    Recommendations:  Continue Keppra 1 g twice daily till seen by neurology as outpatient  Head CT on 7/28  Ok for lovenox from nsgy  perspective  Remaining cares per primary team    Neurosurgery will continue to follow  -----------------------------------  Gilberto Leung MD  Neurosurgery Resident, PGY-2     Please contact neurosurgery resident on call with questions.    Dial * * [phone], enter [phone] when prompted.    -----------------------------------    Interval History: NAEO.    Objective:   Temp:  [98.1  F (36.7  C)-99.1  F (37.3  C)] 98.5  F (36.9  C)  Pulse:  [79-87] 87  Resp:  [10-16] 13  BP: (121-139)/(49-60) 121/60  SpO2:  [95 %-100 %] 100 %  I/O last 3 completed shifts:  In: 1076 [I.V.:216; NG/GT:410]  Out: 1300 [Urine:1300]        Gen: Appears comfortable, NAD, laying in bed  Abd: PEG in place  HEENT: Left craniotomy incision healing well  Wound: Incision closed with staples-now removed, incision appears clean dry-no signs of leaking, no wetness appreciated.  See below for photo from 7/5/2022 evening.  Incision cleaned with ChloraPrep.  Pulm: On RA, no increased work of breathing, symmetric chest rise  Neurologic:  Eyes open spontaneously, minimally tracking, gaze at midline without preference  Follows commands in all 4 extremities  Reflexes 2+, downgoing Babinski's bilaterally    LABS:  Recent Labs   Lab 07/23/22  0915 07/22/22  0644 07/21/22  1403 07/21/22  0715    138  --  136   POTASSIUM 4.4 4.2  --  4.1   CHLORIDE 107 102  --  101   CO2 30* 26  --  28   ANIONGAP 6* 10  --  7   * 114*  --  111*   BUN 21.2 20.4  --  16.4   CR 0.46* 0.45* 0.37* 0.39*   MENDEZ 9.1 8.9  --  9.1       Recent Labs   Lab 07/23/22  1752 07/23/22  0915   WBC  --  11.5*   RBC  --  2.37*   HGB 7.4* 7.4*   HCT  --  24.2*   MCV  --  102*   MCH  --  31.2   MCHC  --  30.6*   RDW  --  16.5*   PLT  --  408       IMAGING:  Recent Results (from the past 24 hour(s))   US Abdomen Limited    Narrative    EXAMINATION: Limited Abdominal Ultrasound, 7/23/2022 2:25 PM     COMPARISON: Abdominal CT 7/7/2022    HISTORY: Fever and leukocytosis unknown origin in patient  with altered  mental status. Has right upper quadrant tenderness on exam and bump in  ALK phos. Please evaluate biliary system    FINDINGS:   Fluid: No evidence of ascites or pleural effusions.    Liver: The liver demonstrates normal echotexture, measuring 16.9 cm in  craniocaudal dimension. There is no focal mass.     Gallbladder: There is no wall thickening, pericholecystic fluid,  positive sonographic Rothman's sign or evidence for cholelithiasis.    Bile Ducts: Both the intra- and extrahepatic biliary system are of  normal caliber.  The common bile duct measures 3 mm in diameter.    Pancreas: Visualized portions of the head and body of the pancreas are  unremarkable. The pancreatic duct is visualized measuring  approximately 1 mm, which is within normal limits. Pancreas is  partially obscured.    Kidney: Not well as visualized due to abnormal positioning as noted on  prior CT. The right kidney measures approximately 9.6 cm long. There  is no hydronephrosis or renal mass demonstrated.       Impression    IMPRESSION:     1.  Normal appearing gallbladder  2.  No intrahepatic or extrahepatic biliary dilatation.    I have personally reviewed the examination and initial interpretation  and I agree with the findings.    DARSHAN DIXON MD         SYSTEM ID:  [id number]

## 2022-07-24 NOTE — PROGRESS NOTES
[facility]    Medicine Progress Note - Hospitalist Service, GOLD TEAM 6    Date of Admission:  6/22/2022    Assessment & Plan  Louise Benítez is a 76-year-old F with a history of seronegative rheumatoid arthritis, osteoporosis, peripheral artery disease, and myelodysplastic syndrome admitted 6/22 after a ground level fall resulting in subdural hematoma with midline shift s/p emergent craniotomy and SDH evacuation. Course has been complicated by nonconvulsive status epilepticus (resolved), GI bleeding s/p EGD and clipping of gastric ulcer 7/5, and aspiration s/p PEG placement 7/15. Noted to have bloody stools the evening of 7/17 with acute hemoglobin drop (10.1 -> 8.7). Medicine was consulted for recommendations surrounding management of GI bleed.    # Sepsis   # Cellulitis at PEG insertion site   Febrile to 101.9F and WBC up 12.1->16.5 since as of 7/22. With patients current illness and immobility she is at increased risk of infections including PNA, UTI, and cellulitis from skin breakdown. CC 7/22 ordered CXR that showed probably atelectasis, which could conceal a PNA. UTI without infection, procal only 0.06. Lactic acid normal. RUQ tenderness on 7/23 but RUQ US negative for acute findings. Respiratory virus panel positive for Rhino/Enterovirus (not convincing as cause of SIRS). On the evening of 7/23, RN reported redness and purulent discharge around PEG insertion site. WBC has normalized since starting on Abx   - CT abdomen today showed findings of inflammation without any isolated fluid collection   - Swab from site is growing Staph Aureus, but swab may not provide the most accurate sample     - Wound care consult placed to obtain  swab and assess wound   - Follow Sputum Cx - no organisms on gram stain   - Continue Vancomycin + Zosyn for now  - treat with Zosyn for 5 days as was initially ordered to cover for HCAP   - Continue Vancomycin, taper to culture  results   - Recommend consulting ID on Monday for input on Abx and input on need to change PEG tube   - PEG Tube placed on 7/15 by IR    - Reach out to IR if PEG Tube exchange is needed   - Continue Vanc + Zosyn for now, plan to tx at least 5-7 days and will johnathan abx pending culture results   - follow blood cultures (2 sets drawn) - NGTD   - Repeat lactic acid and blood cultures w/ febrile spikes     # Acute blood loss anemia on chronic microcytic anemia  # GI bleed  # Gastric ulcers complicated by GI bleeding s/p EGD 7/5 with clipping of 5mm ulcers in posterior gastric wall  Bloody stool output with associated Hgb drop on 7/17 from 10.1->8.7->6.6 7/19. She was transfused 1 unit(s) pRBCs prior to colonoscopy. Colonoscopy done 7/19  showed ulcers at the splenic flexure that were likely source of bleeding but have stopped bleeding on their own.   - Hgb initially improving after colonoscopy but acutely declined 8.9-> 7.5 on 7/23. Preemptively placed Lovenox on hold due to concern for recurrent GI bleed (no evidence of melena or hematochezia so far).   - Hgb up some to 7.6 today   - Transfuse if hgb drops <7  - Reach back out to GI with any signs of recurrent bleed   - Continue volume resuscitation with IVFs and blood as needed   - Continue IV Protonix BID for now. Ok to transition back to PO PPI BID x 12 weeks once tolerating orals   - Will continue to hold AC for VTE prophylaxis until Hgb stabilizes.   - Will use SCDs     # Subdural hematoma with midline shift s/p emergent craniotomy with SDH evacuation (6/22/22)  # Nonconvulsive status epilepticus (s/p vEEG 7/8)  # Frequent falls   Initial fall w/ SDH occurred on 8/12 and pt was admitted to [facility] neuro ICU from 5/12-5/20. Discharged to TCU. She did return home from TCU and was doing well. On 6/22 she suffered a mechanical fall while at home. In the ER, CT scan showed a new SDH w/ midline shift. She subsequently underwent L craniotomy for SDH evacuation and was  extubated on post-op day 1.  CT head 7/22 - no acute changes since 7/14, no explanations for febrile spikes.  - Mentation continues to wax and wane day to day   - Neurosurgery continues to follow   - Continue Keppra 1 gram BID  - Plan to repeat CT brain 7/28  - Continue on delirium precautions  - Per neurosurgery, it will likely take on the order of weeks to months to see her full capacity for neurologic recovery      # Risk of malnutrition  Pt does not meet the diagnostic criteria for malnutrition, per RD. Have been using PEG tube feeding due to decreased level of consciousness and risk for aspiration   - Continue PEG tube feeding for now per RD dosing  - SLP to re-evaluate for PO intake as pt is more alert today   - Continue Potassium, Phosphorus, and Magnesium protocols daily      # Goals of care   Per conversation with pt's  7/19 the patient was in a good state of health prior to initial fall and SDH in may. Mentation returned to normal following initial SDH but since her most recent fall on 6/22 she has remained mostly somnolent with only some wakefulness.   - Palliative care is following. Advanced care document on file (02/2003)  - Placed  PICC line 7/19 due to poor IV access   - PEG tube in place for feeding by IR 7/15  - Family has made pt DNR/DNI  - Continue ongoing GOC discussions     # Hx of Myelodysplasic syndrome  # Mild leukocytosis  WBC 11.2 <-11.5. Sputum culture collected 7/17, showed normal panda. CXR 7/16 with bibasilar opacities suspected to be atelectasis. Repeat CXR 7/21 appears similar, no signs of infection. . Was previously diagnosed with aspiration pneumonia (sputum cx from 7/5 4+ S. Aureus, s/p a course of cefazolin 7/11). Patient has been afebrile and hemodynamically stable.   - PTA darbepoietin (last dose 7/5, due for next dose 7/26)  - Continue to monitor with CBC daily (WBC remains WNL)  - If febrile, consider covering for aspiration pneumonia with ceftriaxone 1g q24h   - Chest  physiotherapy     # High blood pressure  Amlodipine started at 10 mg on admission, decreased to 5 mg due to lows  - Continue amlodipine 5 mg daily for now   - Monitor BP and adjust accordingly     # Seronegative rheumatoid arthritis  - Continue PTA hydroxychloroquine 400mg daily   - Has rheumatology follow up with Dr. Us on 9/1     # Osteoporosis  - Hold PTA calcium/vitamin D  - Holding PTA denosumab while inpatient      # Hyperlipidemia  # Peripheral arterial disease  - Not on a PTA statin or antiplatelet     # Aspiration s/p IR PEG (7/15)  - Dietitian following; hold TFs for now in light of GI bleed  - HOB>45 degrees       Diet: NPO per Anesthesia Guidelines for Procedure/Surgery Except for: Meds  Adult Formula Drip Feeding: Continuous [employer] Peptide 1.5; Nasojejunal; Goal Rate: 45 (Initiate @ 15 ml/hr and adv by 10 ml q8hr as tolerated; mL/hr; Medication - Feeding Tube Flush Frequency: At least 15-30 mL water before and after medicatio...    DVT Prophylaxis: Pneumatic Compression Devices in setting of acute bleed   Vasquez Catheter: Not present  Central Lines: None  Cardiac Monitoring: None  Code Status: No CPR- Do NOT Intubate      Disposition Plan      Expected Discharge Date: 08/05/2022        Discharge Comments: Patinet likely will take weeks to months to fully recover from neuro stand point. Will need TCU        The patient's care was discussed with the Attending Physician, Dr. Delgadillo, Bedside Nurse, Patient, Patient's Family and neurosurgery Consultant.    Luis Alberto Sinha PA-C  Hospitalist Service, GOLD TEAM [address] [place] [address]  Securely message with the Vocera Web Console (learn more here)  Text page via [facility] Paging/Directory   Please see signed in provider for up to date coverage information      Clinically Significant Risk Factors Present on Admission                    ______________________________________________________________________    Interval History    Mentation continues to wax a wane. She is more responsive today compared to the past 2 days     Data reviewed today: I reviewed all medications, new labs and imaging results over the last 24 hours. Please see discussion of these results in the A/P.    Physical Exam     Vital Signs: Temp: 98.5  F (36.9  C) Temp src: Oral BP: 128/56 Pulse: 85     Resp: 13 SpO2: 95 % O2 Device: None (Room air) Oxygen Delivery: 2 LPM  Weight: 160 lbs 11.45 oz    Constitutional: Patient somnolent following colonoscopy.   Eyes: EOM, PERRLA. Sclera clear, conjunctiva normal. Anicteric   Head: Well-healing craniotomy scar.  ENT: Normocephalic, without obvious abnormality, atraumatic.   Respiratory: No increased work of breathing. Clear to auscultation bilaterally, no crackles or wheezing - transmitted upper airway sounds in the upper chest   Cardiovascular: RRR. No murmur or friction rub. No edema. No chest wall tenderness.  GI: Soft, non-tender without rebound or guarding. Bowel sounds are active. PEG tube in place   Skin: no bruising or bleeding. Normal skin color, No jaundice. Erythema and induration around PED insertion site with some purulent discharge   Musculoskeletal:  Full range of motion noted.    Neurologic: pt not responsive to commands today   Neuropsychiatric: Remains non-verbal  Extremities: Right hand pitting edema. L leg with shallow, healing ulcer without purulent drainage or surrounding erythema or warmth     Data   Recent Labs   Lab 07/24/22  0756 07/23/22  1752 07/23/22  0915 07/22/22  0644 07/21/22  1403 07/21/22  0715   WBC 8.7  --  11.5* 16.5*  --  12.1*   HGB 7.6* 7.4* 7.4* 8.9*  --  8.1*  8.1*     --  102* 99  --  99     --  408 461*  --  488*   NA  --   --  143 138  --  136   POTASSIUM  --   --  4.4 4.2  --  4.1   CHLORIDE  --   --  107 102  --  101   CO2  --   --  30* 26  --  28   BUN  --   --  21.2 20.4  --  16.4   CR 0.41*  --  0.46* 0.45*   < > 0.39*   ANIONGAP  --   --  6* 10  --  7   MENDEZ  --    --  9.1 8.9  --  9.1   GLC  --   --  110* 114*  --  111*   ALBUMIN  --   --  3.3* 3.2*  --  3.4*   PROTTOTAL  --   --  5.9* 5.7*  --  5.7*   BILITOTAL  --   --  0.3 0.6  --  0.4   ALKPHOS  --   --  125* 91  --  87   ALT  --   --  27 14  --  20   AST  --   --  44* 38*  --  43*    < > = values in this interval not displayed.       Recent Results (from the past 24 hour(s))   CT Abdomen Pelvis w Contrast    Narrative    EXAMINATION: CT ABDOMEN PELVIS W CONTRAST, 7/24/2022 12:44 PM    TECHNIQUE: Axial CT images from the lung bases through the symphysis  pubis were obtained  with IV contrast. Coronal and sagittal reformats  also provided. Contrast dose: iopamidol (ISOVUE-370) solution 100 mL    COMPARISON: CT abdomen and pelvis 7/7/2022    HISTORY: Drainage around PEG insertion site, possible abscess    FINDINGS:  Devices: Percutaneous gastrostomy tube balloon is within the gastric  lumen. There is soft tissue thickening and fat stranding surrounding  the tube and thickening of the left rectus muscle. No fluid collection  is identified.    Lung Bases: Trace left more than the right pleural effusion with  subsegmental atelectasis in the lung bases bilaterally. The heart is  normal in size without pericardial effusion.     ABDOMEN:  Liver: Normal size. No focal lesions.    Biliary/Gallbladder: No CT evidence of calculi, wall thickening or  pericholecystic fluid. No intra or extrahepatic biliary dilatation.    Spleen: Normal size. Stable cyst in the lateral aspect.    Pancreas: No evidence of pancreatic mass or peripancreatic fluid.    Adrenals: Normal.    Kidneys: Malrotated right kidney. No hydronephrosis, calculi or mass.  A few subcentimeter hypodensities in both kidneys are too small to  characterize but likely are cysts.    Urinary bladder: Unremarkable.    Reproductive organs: Pelvic organs are obscured by metallic artifact  from bilateral hip prostheses.    Gastrointestinal: The stomach and duodenum are  unremarkable. Small and  large bowel are normal in caliber and without abnormal wall  thickening.    Mesentery/Peritoneum: No ascites or pneumoperitoneum.    Lymph nodes: No lymphadenopathy.    Vasculature: Major abdominal arteries are patent.    Bones and soft tissues: No acute lesions. Unchanged chronic  compression deformity of L1 vertebra. Moderate degenerative changes in  the lumbar spine. Bilateral hip prostheses. Redemonstrated left  indirect inguinal hernia containing nonobstructed colon.      Impression    IMPRESSION:   1.  Percutaneous gastrostomy tube balloon is within the gastric lumen.  There is soft tissue thickening and fat stranding surrounding the tube  and thickening of the left rectus muscle, suggestive of inflammation.  No fluid collection is identified.  2.  Trace left more than the right pleural effusion.  3.  Stable chronic findings as described in the body of the report.       MARQUIS MAGALLON MD         SYSTEM ID:  [id number]

## 2022-07-24 NOTE — PROGRESS NOTES
Goal Outcome Evaluation:     Shift update: Patient went down for abdominal CT this morning.  Neuro: Patient remained alert during shift. No fever.  Pain: No signs of pain.   Cardiac: SR 80s-90s. Sbp 140s-150s.   Resp: Patient remained on room air during shift  Saturations in >90%.   GIGU: TF running at 45ml/hr goal.  Patient had multiple loose large bm. Patient urinating.   Skin: See flowsheet.  Lines: 1 LPIV saline locked       Farhana Ventura RN

## 2022-07-25 NOTE — PHARMACY-VANCOMYCIN DOSING SERVICE
"Pharmacy Vancomycin Initial Note  Date of Service 2022  Patient's  1945  76 year old, female    Indication: Skin and Soft Tissue Infection    Current estimated CrCl = Estimated Creatinine Clearance: 110.7 mL/min (A) (based on SCr of 0.41 mg/dL (L)).    Creatinine for last 3 days  2022:  9:15 AM Creatinine 0.46 mg/dL  2022:  7:56 AM Creatinine 0.41 mg/dL    Recent Vancomycin Level(s) for last 3 days  2022:  9:15 AM Vancomycin 14.7 ug/mL      Vancomycin IV Administrations (past 72 hours)                   vancomycin 1250 mg in 0.9% NaCl 250 mL intermittent infusion 1,250 mg (mg) 1,250 mg New Bag 22                Nephrotoxins and other renal medications (From now, onward)    Start     Dose/Rate Route Frequency Ordered Stop    22  vancomycin (VANCOCIN) 1000 mg in dextrose 5% 200 mL PREMIX         1,000 mg  200 mL/hr over 1 Hours Intravenous EVERY 12 HOURS 22 18022 213  piperacillin-tazobactam (ZOSYN) 4.5 g vial to attach to  mL bag        Note to Pharmacy: For [facility], [facility] and [facility]: For Zosyn-naive patients, use the \"Zosyn initial dose + extended infusion\" order panel.    4.5 g  over 30 Minutes Intravenous EVERY 6 HOURS 22 2129          Contrast Orders - past 72 hours (72h ago, onward)    Start     Dose/Rate Route Frequency Stop    22 1230  iopamidol (ISOVUE-370) solution 100 mL         100 mL Intravenous ONCE 22 1234    07/15/22 1830  iodixanol (VISIPAQUE 320) injection 100 mL         100 mL Tube ONCE            [employer] Prediction of Planned Initial Vancomycin Regimen  Regimen: 1000 mg IV every 12 hours.  Start time: 18:31 on 2022  Exposure target: AUC24 (range)400-600 mg/L.hr   AUC24,ss: 504 mg/L.hr  Probability of AUC24 > 400: 91 %  Ctrough,ss: 15.7 mg/L  Probability of Ctrough,ss > 20: 13 %  Probability of nephrotoxicity (Lodise BUCK ): 11 %          Plan:  1. Start vancomycin  1000 mg IV q12h. " (Patient last had a dose of vancomycin IV 7/22@ 1947 and current estimated AUC exposure is ~ 200 mg/L*hr so opted not to give a loading dose).  2. Vancomycin monitoring method: AUC  3. Vancomycin therapeutic monitoring goal: 400-600 mg*h/L  4. Pharmacy will check vancomycin level with AM labs on 7/26/2022 ~ 10-12 hour post-dose given patient's age and previous dosing.  5. Serum creatinine levels will be ordered a minimum of twice weekly.      Lucy Hand, Pharm.D., [facility]  Pager [phone]

## 2022-07-25 NOTE — PLAN OF CARE
"Neuro/Musculoskeletal:  Alert, inconsistently follows command, able to mumble some words.   Cardiac:  SR.  VSS.    Respiratory:  Sating in the 95s on room air the whole night.  GI/:  Adequate urine output via external catheter. BM 3x loose watery.   Diet/Appetite: NPO,  On TF at 45ml/hr, FWF 30ml Q4.  Activity:  Assist of 2 using lift up to chair  Pain:  CPOT 0-1  Skin:  No new deficits noted. See Flow sheet  LDAs + Drips/IVF:   PIV left arm on KVO  Female external cath  PEG tube with scant yellowish discharge, surrounding site is red  Protocols/Labs: K, Phos, Mag replacement protocol.     Pertinent Shift Updates: Multiple loose stool relayed to provider on duty. With reply to notify the primary team in the am.     Plan: Continue POC, Notify provider for changes.      Problem: Plan of Care - These are the overarching goals to be used throughout the patient stay.    Goal: Plan of Care Review/Shift Note  Description: The Plan of Care Review/Shift note should be completed every shift.  The Outcome Evaluation is a brief statement about your assessment that the patient is improving, declining, or no change.  This information will be displayed automatically on your shift note.  Outcome: Ongoing, Not Progressing  Goal: Patient-Specific Goal (Individualized)  Description: You can add care plan individualizations to a care plan. Examples of Individualization might be:  \"Parent requests to be called daily at 9am for status\", \"I have a hard time hearing out of my right ear\", or \"Do not touch me to wake me up as it startles me\".  Outcome: Ongoing, Not Progressing  Goal: Absence of Hospital-Acquired Illness or Injury  Outcome: Ongoing, Progressing  Intervention: Identify and Manage Fall Risk  Recent Flowsheet Documentation  Taken 7/25/2022 0000 by Lord Shaggy Dent, RN  Safety Promotion/Fall Prevention:    activity supervised    assistive device/personal items within reach    bed alarm on    clutter free environment " maintained    fall prevention program maintained    increased rounding and observation    increase visualization of patient    lighting adjusted    patient and family education    room near nurse's station  Taken 7/24/2022 2000 by Lord Shaggy Dent RN  Safety Promotion/Fall Prevention:    activity supervised    assistive device/personal items within reach    bed alarm on    clutter free environment maintained    fall prevention program maintained    increased rounding and observation    increase visualization of patient    lighting adjusted    patient and family education    room near nurse's station  Intervention: Prevent Skin Injury  Recent Flowsheet Documentation  Taken 7/25/2022 0000 by Lord Shaggy Dent RN  Body Position:    left    turned  Taken 7/24/2022 2200 by Lord Shaggy Dent RN  Body Position:    right    turned  Taken 7/24/2022 2000 by Lord Shaggy Dent RN  Body Position:    left    turned  Intervention: Prevent and Manage VTE (Venous Thromboembolism) Risk  Recent Flowsheet Documentation  Taken 7/25/2022 0000 by Lord Shaggy Dent RN  Range of Motion: ROM (range of motion) performed  VTE Prevention/Management:    SCDs (sequential compression devices) off    SCDs (sequential compression devices) on  Activity Management: activity adjusted per tolerance  Taken 7/24/2022 2000 by Lord Shaggy Dent RN  Range of Motion: ROM (range of motion) performed  VTE Prevention/Management:    SCDs (sequential compression devices) off    SCDs (sequential compression devices) on  Activity Management: activity adjusted per tolerance  Goal: Optimal Comfort and Wellbeing  Outcome: Ongoing, Progressing  Intervention: Monitor Pain and Promote Comfort  Recent Flowsheet Documentation  Taken 7/25/2022 0000 by Lord Shaggy Dent RN  Pain Management Interventions:    rest    repositioned    care clustered  Taken 7/24/2022 2000 by Lord Shaggy Dent RN  Pain Management Interventions:    rest     repositioned    care clustered  Intervention: Provide Person-Centered Care  Recent Flowsheet Documentation  Taken 7/25/2022 0000 by Lord Shaggy Dent, RN  Trust Relationship/Rapport: care explained  Taken 7/24/2022 2000 by Lord Shaggy Dent, RN  Trust Relationship/Rapport: care explained  Goal: Readiness for Transition of Care  Outcome: Ongoing, Progressing   Goal Outcome Evaluation:

## 2022-07-25 NOTE — PROGRESS NOTES
[facility], [place]   07/25/2022  Neurosurgery Progress Note:    Assessment:  Ms. Benítez is a 76-year-old female with PMHx myelodysplastic syndrome, seronegative RA, chronic microcytic anemia, PAD, and recent L SDH (5/12/2022) who arrived to [facility] via EMS after an unwitnessed fall at home.  Shortly after arriving, the patient was complaining of a significant headache and nausea, and then her right pupil dilated.  She was no longer protecting her airway, so she was sedated and intubated.  Her right eye pupil then returned to a normal caliber.  A head CT demonstrated a left acute subdural approximately 2.3 cm thick with 12 mm of rightward midline shift.  She was taken to the OR on 6/22 for emergent L craniotomy for SDH evacuation. She was extubated on POD1. Concerns for tube feed aspiration on 6/25.  She underwent emergent EGD by GI due to sharp drop in hemoglobin as well as melanotic stools.  She had 1 gastric ulcer clipped which was the presumed culprit.  Due to requiring the EGD she needed intubation during this time, therefore she was intubated on 7/4/2022 for EGD.  She was also connected to EEG on 7/4/2022 found to be in nonconvulsive status received Keppra load and Ativan and was started on Versed overnight.  On 7/5/2022, her Versed drip was weaned off with confirmation of no status epilepticus. On 7/8 she was extubated and remains stable.  Received PEG on 7/15/2022 by IR.  Underwent colonoscopy with GI on 7/19/2022.  Transferred to medicine primary on 7/19/2022. On 7/24, drainage noted at PEG site, swab culture sent for analysis per primary team. WBC downtrending on zosyn.    Other diagnoses include:  #Brain compression  #Open evacuation of hematoma on day of admission   #Hyponatremia  #Pulmonary edema  #GI bleed  #Uremia  #hypokalemia    Recommendations:  Continue Keppra 1 g twice daily till seen by neurology as outpatient  Head CT on 7/28  Ok for lovenox from nsgy  perspective  Remaining cares per primary team4    Neurosurgery will continue to follow  -----------------------------------  Bruce Graves MD  Neurosurgery Resident     Please contact neurosurgery resident on call with questions.    Dial * * [phone], enter [phone] when prompted.    -----------------------------------    Interval History: No acute events.  Stable neurological exam.  Incision well-healed.    Objective:   Temp:  [97.4  F (36.3  C)-98.5  F (36.9  C)] 98.5  F (36.9  C)  Pulse:  [75-94] 75  Resp:  [12-21] 12  BP: (115-163)/(49-71) 139/53  SpO2:  [94 %-97 %] 97 %  I/O last 3 completed shifts:  In: 1283 [I.V.:203; NG/GT:360]  Out: 1400 [Urine:1400]        Gen: Appears comfortable, NAD, laying in bed  Abd: PEG in place  HEENT: Left craniotomy incision healing well  Wound: Incision closed with staples-now removed, incision appears clean dry-no signs of leaking, no wetness appreciated.  See below for photo from 7/5/2022 evening.  Incision cleaned with ChloraPrep.  Pulm: On RA, no increased work of breathing, symmetric chest rise  Neurologic:  Eyes open spontaneously, minimally tracking, gaze at midline without preference  Follows commands in all 4 extremities  Reflexes 2+, downgoing Babinski's bilaterally    LABS:  Recent Labs   Lab 07/24/22  2315 07/24/22  0756 07/23/22  0915 07/22/22  0644 07/21/22  1403 07/21/22  0715   NA  --   --  143 138  --  136   POTASSIUM  --   --  4.4 4.2  --  4.1   CHLORIDE  --   --  107 102  --  101   CO2  --   --  30* 26  --  28   ANIONGAP  --   --  6* 10  --  7   GLC 86  --  110* 114*  --  111*   BUN  --   --  21.2 20.4  --  16.4   CR  --  0.41* 0.46* 0.45*   < > 0.39*   MENDEZ  --   --  9.1 8.9  --  9.1    < > = values in this interval not displayed.       Recent Labs   Lab 07/25/22  0606   WBC 9.3   RBC 2.66*   HGB 8.1*   HCT 26.5*      MCH 30.5   MCHC 30.6*   RDW 16.5*   *       IMAGING:  Recent Results (from the past 24 hour(s))   CT Abdomen Pelvis w Contrast     Narrative    EXAMINATION: CT ABDOMEN PELVIS W CONTRAST, 7/24/2022 12:44 PM    TECHNIQUE: Axial CT images from the lung bases through the symphysis  pubis were obtained  with IV contrast. Coronal and sagittal reformats  also provided. Contrast dose: iopamidol (ISOVUE-370) solution 100 mL    COMPARISON: CT abdomen and pelvis 7/7/2022    HISTORY: Drainage around PEG insertion site, possible abscess    FINDINGS:  Devices: Percutaneous gastrostomy tube balloon is within the gastric  lumen. There is soft tissue thickening and fat stranding surrounding  the tube and thickening of the left rectus muscle. No fluid collection  is identified.    Lung Bases: Trace left more than the right pleural effusion with  subsegmental atelectasis in the lung bases bilaterally. The heart is  normal in size without pericardial effusion.     ABDOMEN:  Liver: Normal size. No focal lesions.    Biliary/Gallbladder: No CT evidence of calculi, wall thickening or  pericholecystic fluid. No intra or extrahepatic biliary dilatation.    Spleen: Normal size. Stable cyst in the lateral aspect.    Pancreas: No evidence of pancreatic mass or peripancreatic fluid.    Adrenals: Normal.    Kidneys: Malrotated right kidney. No hydronephrosis, calculi or mass.  A few subcentimeter hypodensities in both kidneys are too small to  characterize but likely are cysts.    Urinary bladder: Unremarkable.    Reproductive organs: Pelvic organs are obscured by metallic artifact  from bilateral hip prostheses.    Gastrointestinal: The stomach and duodenum are unremarkable. Small and  large bowel are normal in caliber and without abnormal wall  thickening.    Mesentery/Peritoneum: No ascites or pneumoperitoneum.    Lymph nodes: No lymphadenopathy.    Vasculature: Major abdominal arteries are patent.    Bones and soft tissues: No acute lesions. Unchanged chronic  compression deformity of L1 vertebra. Moderate degenerative changes in  the lumbar spine. Bilateral hip  prostheses. Redemonstrated left  indirect inguinal hernia containing nonobstructed colon.      Impression    IMPRESSION:   1.  Percutaneous gastrostomy tube balloon is within the gastric lumen.  There is soft tissue thickening and fat stranding surrounding the tube  and thickening of the left rectus muscle, suggestive of inflammation.  No fluid collection is identified.  2.  Trace left more than the right pleural effusion.  3.  Stable chronic findings as described in the body of the report.       MARQUIS MAGALLON MD         SYSTEM ID:  [id number]

## 2022-07-25 NOTE — PROGRESS NOTES
[facility]  WO Nurse Inpatient Assessment     Consulted for: Left Lower extremity and Right forearm wounds    Patient History (according to provider note(s):      76-year-old female with PMHx myelodysplastic syndrome, seronegative RA, chronic microcytic anemia, PAD, and recent L SDH (5/12/2022) who arrived to [facility] via EMS after an unwitnessed fall at home.  s/p 6/22 emergent L craniotomy for SDH evacuationa left acute subdural hematoma     Areas Assessed:      Areas visualized during today's visit: Focused: R arm and Left shin and peg tube site    Wound location: R arm and Left shin                                                                                                                                                            Date of photos: 6/23/22, 7/7, 7/25  Wound due to: Trauma from wheelchair foot rest pins.    Wound history/plan of care: occurred approximately 6 weeks prior to admission.  Spouse had been leaving open to air.   Wound base: 100 % dry red/fibrin     Palpation of the wound bed: normal      Drainage: none     Description of drainage: none     Measurements (length x width x depth, in cm): 1  x 1.1  x  0.1 cm      Tunneling: N/A     Undermining: N/A  Periwound skin: Intact and fragile      Color: normal and consistent with surrounding tissue      Temperature: normal   Odor: none  Pain: no grimacing or signs of discomfort,   Pain interventions prior to dressing change: N/A  Treatment goal: Heal  and Remove necrotic tissue  STATUS: healing and follow up  Supplies ordered: gathered, at bedside and ordered medihoney last week    Wound location: Right forearm- healed  Wound location: Peg tube site          last photo: 7/25/22  Wound due to: Surgical Wound  Wound history/plan of care: Peg was placed on 7/10 in IR, has two tightly fitted buttons  Wound base: 100 % yellow fibrinous tissue at the insertion site     Palpation of the wound bed: normal       Drainage: small     Description of drainage: cloudy and yellow     Measurements (length x width x depth, in cm): 0.5  x 0.3  x  0.1 cm      Periwound skin: Erythema- blanchable          Temperature: normal   Odor: none  Pain: tension to hands, feet and body,   Pain interventions prior to dressing change: n/a  Treatment goal: Heal , Decrease moisture and Protection  STATUS:  Initial assessment and follow up- took culture per request  Supplies ordered: gathered  Treatment Plan:     Left lower extremity wound:  Every third  Cleanse with wound cleanser and gauze.    Paint periwound with no sting barrier film.    Cover with mepilex border dressing.   Discontinue when wound healed     POC for wound located around peg tube- Cleanse gently with microklenz and apply No sting film barrier daily. Make sure skin surfaces are  to allow the barrier to dry completely before allowing skin to return to the normal position. Coat twice for better protection, ensure to dry first layer before applying second layer. Place a layer of split gauze.    Orders: Reviewed and Updated    RECOMMEND PRIMARY TEAM ORDER: None, at this time  Education provided: plan of care and wound progress  Discussed plan of care with: Patient and Nurse  WOC nurse follow-up plan: weekly  Notify WOC if wound(s) deteriorate.  Nursing to notify the Provider(s) and re-consult the WOC Nurse if new skin concern.    DATA:     Current support surface: Standard  Low air loss mattress  BMI: Body mass index is 27.57 kg/m .   Active diet order: Orders Placed This Encounter      NPO per Anesthesia Guidelines for Procedure/Surgery Except for: Meds     Output: I/O last 3 completed shifts:  In: 1283 [I.V.:203; NG/GT:360]  Out: 1400 [Urine:1400]     Labs:   Recent Labs   Lab 07/25/22  0606 07/23/22  1752 07/23/22  0915 07/18/22  2343 07/18/22  1645   ALBUMIN  --   --  3.3*   < >  --    HGB 8.1*   < > 7.4*   < > 8.2*   WBC 9.3   < > 11.5*   < >  --    CRP  --   --   --    --  17.20*    < > = values in this interval not displayed.     Pressure injury risk assessment:   Sensory Perception: 2-->very limited  Moisture: 3-->occasionally moist  Activity: 1-->bedfast  Mobility: 2-->very limited  Nutrition: 3-->adequate  Friction and Shear: 2-->potential problem  Leobardo Score: 13

## 2022-07-25 NOTE — PROGRESS NOTES
CLINICAL NUTRITION SERVICES - BRIEF NOTE     Nutrition Prescription    Recommendations already ordered by Registered Dietitian (RD):  - Continue current EN: [employer] Peptide 1.5 @ 45 mL/hr + 3 Prosource  - Add Banatrol 1 pkt BID    Future/Additional Recommendations:  Monitor tolerance of EN   See RD note from 7/20 for full assessment      NEW FINDINGS   Current Nutirtion Support: [employer] Peptide 1.5 @ 45 mL/hr (1080 mL/day) to provide 1661 kcal (28 kcal/kg), 80 g protein (1.4 g/kg), 149 g CHO, 10 g fiber, 83 g fat (40% from MCTs), and 756 mL free water daily   - Prosource x 3 = 1741 kcals (30 kcals/kg) and 113 gm PRO (1.8 gm/kg)     GI: Multiple loose stools 7/23 and 7/24. Last received Miralax on 7/22.     INTERVENTIONS  Implementation  Enteral Nutrition - Add fiber    Monitoring/Evaluation  Will continue to monitor and evaluate per protocol.    Erum Haq, DASH, LD  6D RD pager [phone]  Weekend/ED RD pager [phone]

## 2022-07-25 NOTE — PROGRESS NOTES
Care Management Follow Up    Length of Stay (days): 33    Expected Discharge Date: 08/05/2022     Concerns to be Addressed:  Discharge Planning  Patient plan of care discussed at interdisciplinary rounds: Yes    Anticipated Discharge Disposition: TCU       Anticipated Discharge Services: Therapies  Anticipated Discharge DME: TBD    Patient/family educated on Medicare website which has current facility and service quality ratings:  Yes  Education Provided on the Discharge Plan:  Yes  Patient/Family in Agreement with the Plan:  Yes    Referrals Placed by CM/SW:  NA  Private pay costs discussed: Not applicable    Additional Information:  Pt is needing new PT notes/updates in flow sheet before more TCU referrals can be made. SW to continue following and will connect with Pt's daughter to obtain new TCU recs.     ________________    AQUILES Peacock, LincolnHealthSW  6D   [facility]  Phone: [phone]  Pager: [phone]  Fax: [phone]

## 2022-07-25 NOTE — PROGRESS NOTES
Shift update: Patient worked with Ot this morning. [facility] nurse visited patient. PEG tube wound orders in. PEG tube cultures were drawn. c-diff was collected on patient. Results pending.   Neuro: Patient remained alert during shift. No fever.  Pain: No signs of pain.   Cardiac: SR 80s-90s. Sbp 140s-150s.   Resp: Patient remained on room air during shift  Saturations in >90%.   GIGU: TF running at 45ml/hr goal.  Patient had multiple watery large bm. Rectal tube was placed for to skin protect. Patient urinating via external cath.  Skin: See flowsheet.  Lines: 1 LPIV saline locked.      Farhana Ventura RN

## 2022-07-25 NOTE — PROGRESS NOTES
[facility]    Medicine Progress Note - Hospitalist Service, GOLD TEAM 6    Date of Admission:  6/22/2022    Assessment & Plan  Louise Benítez is a 76-year-old F with a history of seronegative rheumatoid arthritis, osteoporosis, peripheral artery disease, and myelodysplastic syndrome admitted 6/22 after a ground level fall resulting in subdural hematoma with midline shift s/p emergent craniotomy and SDH evacuation. Course has been complicated by nonconvulsive status epilepticus (resolved), GI bleeding s/p EGD and clipping of gastric ulcer 7/5, and aspiration s/p PEG placement 7/15. Noted to have bloody stools the evening of 7/17 with acute hemoglobin drop (10.1 -> 8.7). Medicine consulted for recommendations surrounding management of GI bleed.    # Sepsis   # Cellulitis at PEG insertion site   Febrile to 101.9F and WBC up 12.1->16.5 7/22.  CXR showed probably atelectasis, which could conceal a PNA. UTI without infection, procal only 0.06. Lactic acid normal. RUQ tenderness on 7/23 but RUQ US negative for acute findings. Respiratory virus panel positive for Rhino/Enterovirus (not convincing as cause of SIRS). On the evening of 7/23, RN reported redness and purulent discharge around PEG insertion site. WBC has normalized since starting on Abx   - CT abdomen 7/24 showed findings of inflammation without any isolated fluid collection   - Swab from site with Staph Aureus, but swab may not provide  most accurate sample     - Wound care consult placed to obtain  swab and assess wound   - Follow Sputum Cx - no organisms on gram stain   - Continue Zosyn. Restart Vanco and possibly discontinue pending repeat wound culture  - Consider ID consult tomorrow for input on Abx and need to change PEG tube   - PEG Tube placed on 7/15 by IR    - Reach out to IR if PEG Tube exchange is needed   - Blood culture NGTD     #Loose stools  No recent bowel regimen. >10 stools since last  night.   -Check C.Diff  -Rectal tube    # Acute blood loss anemia on chronic microcytic anemia  # GI bleed  # Gastric ulcers complicated by GI bleeding s/p EGD 7/5 with clipping of 5mm ulcers in posterior gastric wall  Bloody stool output with associated Hgb drop on 7/17 from 10.1->8.7->6.6 7/19. She was transfused 1 unit(s) pRBCs prior to colonoscopy. Colonoscopy done 7/19  showed ulcers at the splenic flexure that were likely source of bleeding but have stopped bleeding on their own.   - Hgb initially improving after colonoscopy but acutely declined 8.9-> 7.5 on 7/23. Preemptively placed Lovenox on hold due to concern for recurrent GI bleed (no evidence of melena or hematochezia so far).   - Hgb improved 8.1 today   - Transfuse if hgb drops <7  - Reach back out to GI with any signs of recurrent bleed   - Continue IV Protonix BID for now. Transition back to PO PPI BID x 12 weeks once tolerating orals   - Will consider restarting VTE prophylaxis if Hgb remains stable 7/26  - Will use SCDs     # Subdural hematoma with midline shift s/p emergent craniotomy with SDH evacuation (6/22/22)  # Nonconvulsive status epilepticus (s/p vEEG 7/8)  # Frequent falls   Initial fall w/ SDH occurred on 8/12 and pt was admitted to [facility] neuro ICU from 5/12-5/20. Discharged to TCU. She did return home from TCU and was doing well. On 6/22 she suffered a mechanical fall while at home. In the ER, CT scan showed a new SDH w/ midline shift. She subsequently underwent L craniotomy for SDH evacuation and was extubated on post-op day 1.  CT head 7/22 - no acute changes since 7/14, no explanations for febrile spikes.  - Mentation continues to wax and wane day to day   - Neurosurgery continues to follow   - Continue Keppra 1 gram BID  - Plan to repeat CT brain 7/28  - Continue on delirium precautions  - Per neurosurgery, it will likely take on the order of weeks to months to see her full capacity for neurologic recovery      # Risk of  malnutrition  Pt does not meet the diagnostic criteria for malnutrition, per RD. Have been using PEG tube feeding due to decreased level of consciousness and risk for aspiration   - Continue PEG tube feeding for now per RD dosing  - SLP to re-evaluate for PO intake   - Continue Potassium, Phosphorus, and Magnesium protocols daily      # Goals of care   Patient in good state of health prior to initial fall and SDH in May. Mentation returned to normal following initial SDH but since her most recent fall on 6/22 she has remained mostly somnolent with only some wakefulness.   - Palliative care is following. Advanced care document on file (02/2003)  - Placed  PICC line 7/19 due to poor IV access   - PEG tube in place for feeding by IR 7/15  - Family has made pt DNR/DNI  - Continue ongoing GOC discussions     # Hx of Myelodysplasic syndrome  # Mild leukocytosis  - PTA darbepoietin (last dose 7/5, due for next dose 7/26)  - Continue to monitor with CBC daily (WBC remains WNL)    # High blood pressure  Amlodipine started at 10 mg on admission, decreased to 5 mg due to low blood pressure  - Continue amlodipine 5 mg daily   - Monitor BP and adjust accordingly     # Seronegative rheumatoid arthritis  - Continue PTA hydroxychloroquine 400mg daily   - Has rheumatology follow up with Dr. Us on 9/1     # Osteoporosis  - Hold PTA calcium/vitamin D  - Holding PTA denosumab while inpatient      # Hyperlipidemia  # Peripheral arterial disease  - Not on a PTA statin or antiplatelet     # Aspiration s/p IR PEG (7/15)  - Dietitian following; hold TFs for now in light of GI bleed  - HOB>45 degrees       Diet: NPO per Anesthesia Guidelines for Procedure/Surgery Except for: Meds  Adult Formula Drip Feeding: Continuous [employer] Peptide 1.5; Gastrostomy; Goal Rate: 45; mL/hr; Medication - Feeding Tube Flush Frequency: At least 15-30 mL water before and after medication administration and with tube clogging; Amount to Send ...    DVT  Prophylaxis: Pneumatic Compression Devices in setting of acute bleed   Vasquez Catheter: Not present  Central Lines: None  Cardiac Monitoring: None  Code Status: No CPR- Do NOT Intubate      Disposition Plan      Expected Discharge Date: 08/05/2022        Discharge Comments: Kathynet likely will take weeks to months to fully recover from neuro stand point. Infectious work up in progress. Will need TCU        The patient's care was discussed with the Attending physician, Dr. Delgadillo and bedside nurse.     Jillian Simmons PA-C  Hospitalist Service, [address]  Securely message with the Vocera Web Console (learn more here)  Text page via [facility] Paging/Directory   Please see signed in provider for up to date coverage information    ______________________________________    Interval History   Per bedside RN, more alert earlier. Multiple episodes loose stool (>10). Nurse has taken care of patient since Fri and reports formed stool 3 days again and now watery.  Unable to obtain ROS from patient-does not answer questions.     Data reviewed today: I reviewed all medications, new labs and imaging results over the last 24 hours. Please see discussion of these results in the A/P.    Physical Exam     Vital Signs: Temp: 98  F (36.7  C) Temp src: Oral BP: (!) 146/63 Pulse: 93     Resp: 20 SpO2: 96 % O2 Device: None (Room air)    Weight: 160 lbs 11.45 oz    General: Initially with eyes closed but then opened. Appears comfortable lying on hospital bed.   HEENT: . Anicteric sclera.  Oral mucosa moist. Neck supple. No JVD.  Resp: No signs of respiratory distress. Lungs clear to ausculation bilaterally without rales, rhonchi or wheezes.   Cardiac: RRR. S1 and S2 normal intensity. No murmurs appreciated.   GI: Abdomen soft, non-tender, non-distended.  Bowel sounds present. No masses, hepatomegaly or splenomegaly. PEG site with yellow drainage and mild surrounding erythema  : No  claudia  Neuro:  Follows simple commands (open eyes, squeeze hands). Non-verbal during exam. Did not answer questions. Moves all extremities. Gait not tested.   Psych: Blunted affect  Derm: Skin warm and dry. No rashes or skin breakdown. No jaundice.   Extremities: No cyanosis, clubbing or edema      Data   Recent Labs   Lab 07/25/22  0606 07/24/22  2315 07/24/22  0756 07/23/22  1752 07/23/22  0915 07/22/22  0644 07/21/22  1403 07/21/22  0715   WBC 9.3  --  8.7  --  11.5* 16.5*  --  12.1*   HGB 8.1*  --  7.6* 7.4* 7.4* 8.9*  --  8.1*  8.1*     --  100  --  102* 99  --  99   *  --  422  --  408 461*  --  488*   NA  --   --   --   --  143 138  --  136   POTASSIUM  --   --   --   --  4.4 4.2  --  4.1   CHLORIDE  --   --   --   --  107 102  --  101   CO2  --   --   --   --  30* 26  --  28   BUN  --   --   --   --  21.2 20.4  --  16.4   CR  --   --  0.41*  --  0.46* 0.45*   < > 0.39*   ANIONGAP  --   --   --   --  6* 10  --  7   MENDEZ  --   --   --   --  9.1 8.9  --  9.1   GLC  --  86  --   --  110* 114*  --  111*   ALBUMIN  --   --   --   --  3.3* 3.2*  --  3.4*   PROTTOTAL  --   --   --   --  5.9* 5.7*  --  5.7*   BILITOTAL  --   --   --   --  0.3 0.6  --  0.4   ALKPHOS  --   --   --   --  125* 91  --  87   ALT  --   --   --   --  27 14  --  20   AST  --   --   --   --  44* 38*  --  43*    < > = values in this interval not displayed.       No results found for this or any previous visit (from the past 24 hour(s)).

## 2022-07-26 NOTE — PROGRESS NOTES
[facility], [place]   07/26/2022  Neurosurgery Progress Note:    Assessment:  Ms. Benítez is a 76-year-old female with PMHx myelodysplastic syndrome, seronegative RA, chronic microcytic anemia, PAD, and recent L SDH (5/12/2022) who arrived to [facility] via EMS after an unwitnessed fall at home.  Shortly after arriving, the patient was complaining of a significant headache and nausea, and then her right pupil dilated.  She was no longer protecting her airway, so she was sedated and intubated.  Her right eye pupil then returned to a normal caliber.  A head CT demonstrated a left acute subdural approximately 2.3 cm thick with 12 mm of rightward midline shift.  She was taken to the OR on 6/22 for emergent L craniotomy for SDH evacuation. She was extubated on POD1. Concerns for tube feed aspiration on 6/25.  She underwent emergent EGD by GI due to sharp drop in hemoglobin as well as melanotic stools.  She had 1 gastric ulcer clipped which was the presumed culprit.  Due to requiring the EGD she needed intubation during this time, therefore she was intubated on 7/4/2022 for EGD.  She was also connected to EEG on 7/4/2022 found to be in nonconvulsive status received Keppra load and Ativan and was started on Versed overnight.  On 7/5/2022, her Versed drip was weaned off with confirmation of no status epilepticus. On 7/8 she was extubated and remains stable.  Received PEG on 7/15/2022 by IR.  Underwent colonoscopy with GI on 7/19/2022.  Transferred to medicine primary on 7/19/2022. On 7/24, drainage noted at PEG site, swab culture sent for analysis per primary team. WBC downtrending on zosyn.    Other diagnoses include:  #Brain compression  #Open evacuation of hematoma on day of admission   #Hyponatremia  #Pulmonary edema  #GI bleed  #Uremia  #hypokalemia    Recommendations:  Continue Keppra 1 g twice daily till seen by neurology as outpatient  Plan for Head CT on 7/28  Ok for lovenox from nsgy  perspective  Remaining cares per primary team    Neurosurgery will continue to follow  -----------------------------------  Bruce Graves MD  Neurosurgery Resident     Please contact neurosurgery resident on call with questions.    Dial * * [phone], enter [phone] when prompted.    -----------------------------------    Interval History: No acute events overnight.  Yesterday PEG tube cultures drawn by wound ostomy care nurse.  C. difficile several collected based on diarrhea quiring rectal tube placement for skin production.  Results are pending.  Patient worked with OT yesterday.  Neurological remained stable following commands x4.  Continues on room air.  Tube feeds continue.  Wound ostomy nurse recommending micro cleanse and applying No  sting film barrier daily-see WOC complete note from 7/25/2022.    Objective:   Temp:  [97.9  F (36.6  C)-99.7  F (37.6  C)] 98.5  F (36.9  C)  Pulse:  [75-97] 92  Resp:  [7-21] 9  BP: (115-171)/(49-76) 148/66  SpO2:  [92 %-100 %] 96 %  I/O last 3 completed shifts:  In: 1326 [I.V.:6; NG/GT:330]  Out: 650 [Urine:450; Stool:200]        Gen: Appears comfortable, NAD, laying in bed  Abd: PEG in place  HEENT: Left craniotomy incision healing well  Wound: Incision closed with staples-now removed, incision appears clean dry-no signs of leaking, no wetness appreciated.  See below for photo from 7/5/2022 evening.  Incision cleaned with ChloraPrep.  Pulm: On RA, no increased work of breathing, symmetric chest rise  Neurologic:  Eyes open spontaneously, minimally tracking, gaze at midline without preference  Follows commands in all 4 extremities  Reflexes 2+, downgoing Babinski's bilaterally    LABS:  Recent Labs   Lab 07/26/22  0708 07/24/22  2315 07/24/22  0756 07/23/22  0915 07/22/22  0644     --   --  143 138   POTASSIUM 4.4  --   --  4.4 4.2   CHLORIDE 108*  --   --  107 102   CO2 23  --   --  30* 26   ANIONGAP 10  --   --  6* 10   * 86  --  110* 114*   BUN 21.5  --   --  21.2  20.4   CR 0.43*  --  0.41* 0.46* 0.45*   MENDEZ 9.5  --   --  9.1 8.9       Recent Labs   Lab 07/26/22  0708   WBC 10.5   RBC 2.87*   HGB 8.6*   HCT 27.7*   MCV 97   MCH 30.0   MCHC 31.0*   RDW 16.5*   *       IMAGING:  No results found for this or any previous visit (from the past 24 hour(s)).

## 2022-07-26 NOTE — PHARMACY-VANCOMYCIN DOSING SERVICE
"Pharmacy Vancomycin Note  Date of Service 2022  Patient's  1945   76 year old, female    Indication: Skin and Soft Tissue Infection  Day of Therapy: 2  Current vancomycin regimen:  1000 mg IV q12h  Current vancomycin monitoring method: AUC  Current vancomycin therapeutic monitoring goal: 400-600 mg*h/L    Insight[employer] Prediction of Current Vancomycin Regimen  Regimen: 1000 mg IV every 12 hours.  Start time: 09:09 on 2022  Exposure target: AUC24 (range)400-600 mg/L.hr   AUC24,ss: 555 mg/L.hr  Probability of AUC24 > 400: 97 %  Ctrough,ss: 17.5 mg/L  Probability of Ctrough,ss > 20: 28 %  Probability of nephrotoxicity (Lodise BUCK ): 13 %      Current estimated CrCl = Estimated Creatinine Clearance: 104 mL/min (A) (based on SCr of 0.43 mg/dL (L)).    Creatinine for last 3 days  2022:  9:15 AM Creatinine 0.46 mg/dL  2022:  7:56 AM Creatinine 0.41 mg/dL  2022:  7:08 AM Creatinine 0.43 mg/dL    Recent Vancomycin Levels (past 3 days)  2022:  9:15 AM Vancomycin 14.7 ug/mL  2022:  7:08 AM Vancomycin 10.2 ug/mL    Vancomycin IV Administrations (past 72 hours)                   vancomycin (VANCOCIN) 1000 mg in dextrose 5% 200 mL PREMIX (mg) 1,000 mg New Bag 22                Nephrotoxins and other renal medications (From now, onward)    Start     Dose/Rate Route Frequency Ordered Stop    22 0900  vancomycin (VANCOCIN) 750 mg in sodium chloride 0.9 % 250 mL intermittent infusion         750 mg  over 60-90 Minutes Intravenous EVERY 12 HOURS 22 0811      22 213  piperacillin-tazobactam (ZOSYN) 4.5 g vial to attach to  mL bag        Note to Pharmacy: For [facility], [facility] and [facility]: For Zosyn-naive patients, use the \"Zosyn initial dose + extended infusion\" order panel.    4.5 g  over 30 Minutes Intravenous EVERY 6 HOURS 22             Contrast Orders - past 72 hours (72h ago, onward)    Start     Dose/Rate Route Frequency Stop    " 07/24/22 1230  iopamidol (ISOVUE-370) solution 100 mL         100 mL Intravenous ONCE 07/24/22 1234    07/15/22 1830  iodixanol (VISIPAQUE 320) injection 100 mL         100 mL Tube ONCE            Interpretation of levels and current regimen:  Vancomycin level is reflective of -600    Has serum creatinine changed greater than 50% in last 72 hours: No    Urine output:  good urine output    Renal Function: Stable    InsightRX Prediction of Planned New Vancomycin Regimen  Regimen: 750 mg IV every 12 hours.  Start time: 09:09 on 07/26/2022  Exposure target: AUC24 (range)400-600 mg/L.hr   AUC24,ss: 418 mg/L.hr  Probability of AUC24 > 400: 60 %  Ctrough,ss: 13.1 mg/L  Probability of Ctrough,ss > 20: 3 %  Probability of nephrotoxicity (Lodise BUCK 2009): 8 %      Plan:  1. Decrease Dose to 750 mg IV q12h to avoid nephrotoxicity since early level.  2. Vancomycin monitoring method: AUC  3. Vancomycin therapeutic monitoring goal: 400-600 mg*h/L  4. Pharmacy will check vancomycin levels as appropriate in 1-3 Days.  5. Serum creatinine levels will be ordered daily for the first week of therapy and at least twice weekly for subsequent weeks.    Lucía Leblanc, [facility]

## 2022-07-26 NOTE — PROGRESS NOTES
[facility]    Medicine Progress Note - Hospitalist Service, GOLD TEAM 6    Date of Admission:  6/22/2022    Assessment & Plan  Louise Benítez is a 76-year-old F with a history of seronegative rheumatoid arthritis, osteoporosis, peripheral artery disease, and myelodysplastic syndrome admitted 6/22 after a ground level fall resulting in subdural hematoma with midline shift s/p emergent craniotomy and SDH evacuation. Course has been complicated by nonconvulsive status epilepticus (resolved), GI bleeding s/p EGD and clipping of gastric ulcer 7/5, and aspiration s/p PEG placement 7/15. Noted to have bloody stools the evening of 7/17 with acute hemoglobin drop (10.1 -> 8.7). Medicine consulted for recommendations surrounding management of GI bleed.    # Sepsis   # Cellulitis at G tube insertion site (placed by IR 7/15)  Febrile to 101.9F and WBC up 12.1->16.5 7/22.  CXR showed probably atelectasis, which could conceal a PNA. UTI without infection, procal only 0.06. Lactic acid normal. RUQ tenderness on 7/23 but RUQ US negative for acute findings. Respiratory virus panel positive for Rhino/Enterovirus (not convincing as cause of SIRS). On the evening of 7/23, RN reported redness and purulent discharge around PEG insertion site. WBC has normalized since starting on Abx   - CT abdomen 7/24 showed findings of inflammation without any isolated fluid collection   - Wound culture with MSSA. Repeat wound clx sens pending  -De-escalate antibiotics: Stop Vanco/Zosyn. Start cefazolin (appreciate Pharm recs)  -Hold off on ID consult since clinically improving    - G Tube placed on 7/15 by IR. T fastener still in place. Contacted IR who removed today (normally removed 7-10 days after procedure). IR does not feel G tube exchange indicated.   - Blood culture NGTD     #Loose stools  No recent bowel regimen. >10 stools since last night. Suspect 2/2 tube feeds.   -C.Diff negative  -Cont  banana flakes  -Increase fiber, add loperimide  -Rectal tube    # Acute blood loss anemia on chronic microcytic anemia  # GI bleed  # Gastric ulcers complicated by GI bleeding s/p EGD 7/5 with clipping of 5mm ulcers in posterior gastric wall  Bloody stool output with associated Hgb drop on 7/17 from 10.1->8.7->6.6 7/19. She was transfused 1 unit(s) pRBCs prior to colonoscopy. Colonoscopy done 7/19  showed ulcers at the splenic flexure that were likely source of bleeding but have stopped bleeding on their own.   - Hgb initially improving after colonoscopy but acutely declined 8.9-> 7.5 on 7/23. Preemptively placed Lovenox on hold due to concern for recurrent GI bleed (no evidence of melena or hematochezia so far).   - Hgb remains stable at 8.6 today   - Transfuse if hgb drops <7  - Contact GI with any signs of recurrent bleed   - Transition back to PO PPI BID x 12 weeks (per GI rec)   - Restart VTE prophylaxis since Hgb remains     # Subdural hematoma with midline shift s/p emergent craniotomy with SDH evacuation (6/22/22)  # Nonconvulsive status epilepticus (s/p vEEG 7/8)  # Frequent falls   Initial fall w/ SDH occurred on 8/12 and pt was admitted to [facility] neuro ICU from 5/12-5/20. Discharged to TCU. She did return home from TCU and was doing well. On 6/22 she suffered a mechanical fall while at home. In the ER, CT scan showed a new SDH w/ midline shift. She subsequently underwent L craniotomy for SDH evacuation and was extubated on post-op day 1.  CT head 7/22 - no acute changes since 7/14, no explanations for febrile spikes.  - Mentation continues to wax and wane day to day   - Neurosurgery continues to follow   - Continue Keppra 1 gram BID  - Plan to repeat CT brain 7/28  - Continue on delirium precautions  - Per neurosurgery, it will likely take on the order of weeks to months to see her full capacity for neurologic recovery      # Risk of malnutrition  Pt does not meet the diagnostic criteria for malnutrition,  per RD. Have been using PEG tube feeding due to decreased level of consciousness and risk for aspiration   - Continue G tube feeding for now per RD dosing  - SLP to re-evaluate for PO intake   - Continue Potassium, Phosphorus, and Magnesium protocols daily      # Goals of care   Patient in good state of health prior to initial fall and SDH in May. Mentation returned to normal following initial SDH but since her most recent fall on 6/22 she has remained mostly somnolent with only some wakefulness.   - Palliative care is following. Advanced care document on file (02/2003)  - Placed  PICC line 7/19 due to poor IV access   - Family has made pt DNR/DNI  - Continue ongoing GOC discussions     # Hx of Myelodysplasic syndrome  # Mild leukocytosis  - PTA darbepoietin (last dose 7/5, due for next dose 7/26)  - Continue to monitor with CBC daily (WBC remains WNL)    # High blood pressure  -Increase amlodipine 10 mg daily since -170  - Monitor BP and adjust accordingly     # Seronegative rheumatoid arthritis  - Continue PTA hydroxychloroquine 400mg daily   - Has rheumatology follow up with Dr. Us on 9/1     # Osteoporosis  - Hold PTA calcium/vitamin D  - Holding PTA denosumab while inpatient      # Hyperlipidemia  # Peripheral arterial disease  - Not on a PTA statin or antiplatelet     # Aspiration s/p IR G tube placement (7/15), resolved    Diet: NPO per Anesthesia Guidelines for Procedure/Surgery Except for: Meds  Adult Formula Drip Feeding: Continuous [employer] Peptide 1.5; Gastrostomy; Goal Rate: 45; mL/hr; Medication - Feeding Tube Flush Frequency: At least 15-30 mL water before and after medication administration and with tube clogging; Amount to Send ...    DVT Prophylaxis: Pneumatic Compression Devices in setting of acute bleed   Vasquez Catheter: Not present  Central Lines: None  Cardiac Monitoring: None  Code Status: No CPR- Do NOT Intubate      Disposition Plan      Expected Discharge Date: 08/05/2022       "  Discharge Comments: Patinet likely will take weeks to months to fully recover from neuro stand point. Infectious work up in progress. Will need TCU        The patient's care was discussed with the Attending physician, Dr. Holt, and bedside nurse.     Jillian Simmons PA-C  Hospitalist Service, GOLD TEAM [address] [place] [address]  Securely message with the Vocera Web Console (learn more here)  Text page via [employer] Paging/Directory   Please see signed in provider for up to date coverage information    ______________________________________    Interval History   Per bedside RN, ongoing rectal tube output. No new issues.  Unable to obtain ROS from patient-does not answer questions.     Data reviewed today: I reviewed all medications, new labs and imaging results over the last 24 hours. Please see discussion of these results in the A/P.    Physical Exam     Vital Signs: Temp: 97.4  F (36.3  C) Temp src: Axillary BP: (!) 148/66 Pulse: 86     Resp: 9 SpO2: 96 % O2 Device: None (Room air)    Weight: 156 lbs 1.37 oz    General: Alert.  Appears comfortable lying on hospital bed.   HEENT: . Anicteric sclera.  Oral mucosa moist. Neck supple. No JVD.  Resp: No signs of respiratory distress. Lungs clear to ausculation bilaterally without rales, rhonchi or wheezes.   Cardiac: RRR. S1 and S2 normal intensity. No murmurs appreciated.   GI: Abdomen soft, non-tender, non-distended.  Bowel sounds present. No masses, hepatomegaly or splenomegaly. PEG site with yellow drainage and mild surrounding erythema. Button in place.   : No taylor  Neuro:  Follows simple commands (open eyes, squeeze hands). Stated, \"I don't want to\" when asked to state her name. Did not answer questions. Moves all extremities. Gait not tested.   Psych: Blunted affect  Derm: Skin warm and dry. No rashes or skin breakdown. No jaundice.   Extremities: No cyanosis, clubbing or edema      Data   Recent Labs   Lab 07/26/22  0708 " 07/25/22  0606 07/24/22  2315 07/24/22  0756 07/23/22  1752 07/23/22  0915 07/22/22  0644   WBC 10.5 9.3  --  8.7  --  11.5* 16.5*   HGB 8.6* 8.1*  --  7.6*   < > 7.4* 8.9*   MCV 97 100  --  100  --  102* 99   * 520*  --  422  --  408 461*     --   --   --   --  143 138   POTASSIUM 4.4  --   --   --   --  4.4 4.2   CHLORIDE 108*  --   --   --   --  107 102   CO2 23  --   --   --   --  30* 26   BUN 21.5  --   --   --   --  21.2 20.4   CR 0.43*  --   --  0.41*  --  0.46* 0.45*   ANIONGAP 10  --   --   --   --  6* 10   MENDEZ 9.5  --   --   --   --  9.1 8.9   *  --  86  --   --  110* 114*   ALBUMIN  --   --   --   --   --  3.3* 3.2*   PROTTOTAL  --   --   --   --   --  5.9* 5.7*   BILITOTAL  --   --   --   --   --  0.3 0.6   ALKPHOS  --   --   --   --   --  125* 91   ALT  --   --   --   --   --  27 14   AST  --   --   --   --   --  44* 38*    < > = values in this interval not displayed.       No results found for this or any previous visit (from the past 24 hour(s)).

## 2022-07-26 NOTE — PROGRESS NOTES
Care Management Follow Up    Length of Stay (days): 34    Expected Discharge Date: 08/05/2022     Concerns to be Addressed:       Patient plan of care discussed at interdisciplinary rounds: Yes    Anticipated Discharge Disposition:  TCU     Anticipated Discharge Services:  Therapies  Anticipated Discharge DME:  FRE[facility]OM    Patient/family educated on Medicare website which has current facility and service quality ratings:  Yes  Education Provided on the Discharge Plan:  Yes  Patient/Family in Agreement with the Plan:  Yes    Referrals Placed by CM/SW:  NA  Private pay costs discussed: Not applicable    Additional Information:  Per rounds Pt has rectal tube and is on IV-antibiotics. Given this new TCU referrals will be on hold until Pt is closer to being medically ready for discharge.     ________________    AQUILES Peacock, Mill[facility]ocket Regional HospitalSW  6D   [facility]  Phone: [phone]  Pager: [phone]  Fax: [phone]

## 2022-07-26 NOTE — PROGRESS NOTES
Interventional Radiology Progress Service Note  07/26/22     76 year old female with history of IR Gastrostomy tube placement 7/15/2022 still with two T fasteners in place and interrupted suture stitch around G tube.  All three removed.      D/w Jillian, with regard to intermittent leaking around tube, ensure disc is secured snug against skin which I did during this visit.  Given patient movement from bed to chair, with transfer, ensure the external disc is snug around skin.  Dressing changes for leaking.        Vianey Sifuentes PA-C  Interventional Radiology  Pager: [phone]

## 2022-07-26 NOTE — PROGRESS NOTES
Antimicrobial Stewardship Team Note    Antimicrobial Stewardship Program - A joint venture between [facility] and  Physicians to optimize antibiotic management.  NOT a formal consult - Restricted Antimicrobial Review     Patient: Louise Benítez  MRN: [id number]  Allergies: Patient has no known allergies.    Brief Summary:   Louise Benítez is a 76 year old female with history of myelodysplastic syndrome with frequent transfusions, osteoporosis, rheumatoid arthritis, peripheral arterial disease, lumbar spondylosis, chronic microcytic anemia, and family (cousin) history of malignant hyperthermia who presents to the ED 6/22 after an unwitnessed ground-level fall at home and finding of acute 2 cm left subdural hematoma with 12 mm rightward midline shift.    HPI:  Patient presented to the ED 6/22 following an unwitnessed fall at home. Patient was found to have an acute 2 cm left subdural hematoma with 12 mm rightward midline shift. Patient was emergently taken to the OR for L craniotomy SDH evacuation. Patient was given a dose of cefazolin prior to the operation and was continued on ceftriaxone for potential UTI based on inflammatory UA on 6/22, was discontinued 6/25 following no growth on urine culture. On 6/27, patient spiked a fever and developed a leukocytosis. There was concern for aspiration pneumonia vs pneumonitis so vancomycin and Zosyn were initiated. By 6/30 patient's leukocytosis resolved, sputum cultures were negative, and patient was MRSA nares negative so Zosyn and vancomycin were discontinued. On 7/4, patient developed lethargy, and hypotension in the setting of melena and drop in Hgb. Patient was intubated for an EGD with hemorrhage control. There was also concern for HAP and patient was started on Zosyn 7/5 and then switched to cefazolin 7/7-7/12 when sputum cultures came back for MSSA.     Patient did not have infectious concerns again until 7/21 when patient developed a mild  leukocytosis (WBC = 12.1) and spiked a fever of 101.9. CXR identified probable atelectasis rather than edema in the lower lungs. CT a/p noted soft tissue thickening and fat stranding surrounding the percutaneous gastrostomy tube and thickening of the left rectus muscle, suggestive of inflammation. Cellulitis was observed at her PEG insertion site and patient was empirically started on vancomycin and Zosyn. Vancomycin was discontinued 7/23 after abdominal wound cultures 7/23 grew MSSA. Repeat abdominal wound cultures 7/25 grew S.aureus (sensitivities pending), and vancomycin was restarted.          Active Anti-infective Medications   (From admission, onward)                 Start     Stop    07/26/22 0900  vancomycin (VANCOCIN) injection  750 mg,   Intravenous,   EVERY 12 HOURS        Skin and Soft Tissue Infection        --    07/21/22 2130  piperacillin-tazobactam  4.5 g,   Intravenous,   EVERY 6 HOURS        Sepsis        07/26/22 2129 06/23/22 2200  hydroxychloroquine  400 mg,   Oral or Feeding Tube,   AT BEDTIME        Rheumatoid Arthritis        --                  Assessment:   Cellulitis surrounding PEG tube insertion site   Today, patient is afebrile, hemodynamically stable, WBC is within normal levels, and patient is on room air. Given that the patient is MRSA nares negative and has previously isolated MSSA from their PEG site, their antibiotic regimen could be de-escalated from Zosyn and vancomycin to an agent that more narrowly targets MSSA like cefazolin. An appropriate duration of therapy is unknown at this time and would be dependent on improvement in the cellulitis around the PEG tube. Given that the 7/24 CT a/p revealed soft tissue thickening and fat stranding surrounding PEG, if there is concern for a deeper infection, treatment with antibiotics alone might not be sufficient and further PEG tube intervention could be considered. Additionally, may require serial imaging with US and CT to follow up  on previous CT findings.     Recommendations:  Discontinue vancomycin and Zosyn.  Initiate cefazolin 2 g IV q8hr for duration TBD  Consider serial imaging with US and CT as well as changing PEG tube    Medication Change:  De-escalate antibiotic regimen, Discontinue one or more antibiotic(s) - Vancomycin IV, Piperacillin + Tazobactam.  Other Recommendation(s): Serial imaging with US and CT    Pharmacy took the following actions: Called/paged provider, Electronic note created.    Discussed with ID Staff: Olivia Rider MD & Joellen Lee, PharmD, BCIDP  Margarito Hoyt, PharmD IV Student   [phone]    Vital Signs/Clinical Features:  Vitals  Report        07/24 0700 07/25 0659 07/25 0700 07/26 0659 07/26 0700 07/26 1250   Most Recent      Temp ( F) 97.4 -  98.8    97.9 -  99.7    97.4 -  98.5     97.4 (36.3) 07/26 1151    Pulse 81 -  94    75 -  97    86 -  92     86 07/26 1151    Resp 12 -  21 7 -  21    9 -  16     9 07/26 1151    /56 -  163/62    115/49 -  171/72      148/66     148/66 07/26 1151    SpO2 (%) 94 -  97    92 -  100      96     96 07/26 1151            Labs  Estimated Creatinine Clearance: 104 mL/min (A) (based on SCr of 0.43 mg/dL (L)).  Recent Labs   Lab Test 07/21/22  0715 07/21/22  1403 07/22/22  0644 07/23/22  0915 07/24/22  0756 07/26/22  0708   CR 0.39* 0.37* 0.45* 0.46* 0.41* 0.43*       Recent Labs   Lab Test 07/05/22  0439 07/05/22  1014 07/21/22  0715 07/22/22  0644 07/23/22  0915 07/23/22  1752 07/24/22  0756 07/25/22  0606 07/26/22  0708   WBC 71.9*   < > 12.1* 16.5* 11.5*  --  8.7 9.3 10.5   ANEU 66.1*  --   --   --   --   --   --   --   --    ALYM 3.6  --   --   --   --   --   --   --   --    GREG 0.0  --   --   --   --   --   --   --   --    AEOS 0.0  --   --   --   --   --   --   --   --    HGB 8.4*   < > 8.1*  8.1* 8.9* 7.4* 7.4* 7.6* 8.1* 8.6*   HCT 26.1*   < > 25.9* 28.8* 24.2*  --  25.3* 26.5* 27.7*   MCV 95   < > 99 99 102*  --  100 100 97   *   < > 488*  461* 408  --  422 520* 567*    < > = values in this interval not displayed.       Recent Labs   Lab Test 07/04/22  1936 07/06/22  1347 07/06/22  1839 07/20/22  0559 07/21/22  0715 07/22/22  0644 07/23/22  0915   BILITOTAL 0.7 1.0 0.9 0.4 0.4 0.6 0.3   ALKPHOS 200* 105*  --  85 87 91 125*   ALBUMIN 3.4* 2.5*  --  3.3* 3.4* 3.2* 3.3*   * 52*  --  51* 43* 38* 44*   * 44*  --  28 20 14 27       Recent Labs   Lab Test 03/17/21  1058 06/22/22  2057 06/22/22  2300 06/23/22  0424 06/23/22  1059 06/28/22  0047 06/28/22  0624 06/30/22  0546 07/04/22  1908 07/18/22  1645 07/19/22  2138 07/21/22  1403 07/21/22  1713   PCAL  --   --   --   --   --   --  0.09 0.09  --   --   --  0.06*  --    LACT  --    < > 4.3* 2.3* 1.6  --   --   --  1.9  --  0.5*  --  0.6*   CRP 0.1  --   --   --   --  59.40*  --   --   --  17.20*  --   --   --    SED 32*  --   --   --   --  64*  --   --   --  60*  --   --   --     < > = values in this interval not displayed.       Recent Labs   Lab Test 07/26/22  0708   VANCOMYCIN 10.2       Culture Results:  7-Day Micro Results       Procedure Component Value Units Date/Time    Clostridium difficile toxin B PCR [[id number]]  (Normal) Collected: 07/25/22 1704    Order Status: Completed Lab Status: Final result Updated: 07/25/22 2023    Specimen: Stool from Per Rectum      C Difficile Toxin B by PCR Negative     Comment: A negative result does not exclude actual disease due to C. difficile and may be due to improper collection, handling and storage of the specimen or the number of organisms in the specimen is below the detection limit of the assay.       Narrative:      The [employer] Xpert C. difficile Assay, performed on the [employer]  Instrument Systems, is a qualitative in vitro diagnostic test for rapid detection of toxin B gene sequences from unformed (liquid or soft) stool specimens collected from patients suspected of having Clostridioides difficile infection (CDI). The test  utilizes automated real-time polymerase chain reaction (PCR) to detect toxin gene sequences associated with toxin producing C. difficile. The Xpert C. difficile Assay is intended as an aid in the diagnosis of CDI.    Wound Aerobic Bacterial Culture Routine with Gram Stain [[id number]]  (Abnormal) Collected: 07/25/22 1137    Order Status: Completed Lab Status: Preliminary result Updated: 07/26/22 1055    Specimen: Wound from Abdomen      Culture Culture in progress      1+ Staphylococcus aureus     Gram Stain Result 1+ Gram positive cocci      3+ WBC seen     Comment: Predominantly PMNs       Body fluid, unsp Aerobic Bacterial Culture Routine with Gram Stain [[id number]]  (Abnormal)  (Susceptibility) Collected: 07/23/22 1702    Order Status: Completed Lab Status: Final result Updated: 07/26/22 0557    Specimen: Body fluid, unsp from Abdomen      Culture 1+ Staphylococcus aureus     Gram Stain Result 2+ Gram positive cocci      4+ WBC seen     Comment: Predominantly PMNs       Narrative:      This specimen was received on a swab. Results may not be optimal. For maximum sensitivity of detection submit tissue, fluid or needle aspirate.    Susceptibility       Staphylococcus aureus (1)       Antibiotic Interpretation Sensitivity   Method Status      Oxacillin Susceptible 0.5 ug/mL ADRIANA Final      Oxacillin susceptible isolates are susceptible to cephalosporins (example: cefazolin and cephalexin) and beta lactam combination agents. Oxacillin resistant isolates are resistant to these agents.        Gentamicin Susceptible <=0.5 ug/mL ADRIANA Final     Ciprofloxacin  [*]  Susceptible <=0.5 ug/mL ADRIANA Final     Levofloxacin  [*]  Susceptible 0.25 ug/mL ADRIANA Final     Moxifloxacin  [*]  Susceptible <=0.25 ug/mL ADRIANA Final     Inducible macrolide resistance test  [*]  Negative Negative ug/mL ADRIANA Final     Erythromycin Susceptible 0.5 ug/mL ADRIANA Final     Clindamycin Susceptible <=0.25 ug/mL ADRIANA Final     Quinupristin/Dalfopristin   [*]  Susceptible <=0.25 ug/mL ADRIANA Final     Linezolid  [*]  Susceptible 2 ug/mL ADRIANA Final     Vancomycin Susceptible 1 ug/mL ADRIANA Final     Tetracycline Susceptible <=1 ug/mL ADRIANA Final     Tigecycline  [*]  Susceptible 0.25 ug/mL ADRIANA Final     Nitrofurantoin  [*]  Susceptible 32 ug/mL ADRIANA Final     Rifampin  [*]  Susceptible <=0.5 ug/mL ADRIANA Final     Trimethoprim/Sulfamethoxazole Susceptible <=0.5/9.5 ug/mL ARDIANA Final              [*]  Suppressed Antibiotic                   Respiratory Aerobic Bacterial Culture with Gram Stain [[id number]]  (Abnormal) Collected: 07/23/22 1434    Order Status: Completed Lab Status: Preliminary result Updated: 07/26/22 1010    Specimen: Sputum from Endotracheal      Culture 2+ Normal panda      1+ Staphylococcus aureus     Gram Stain Result <25 PMNs/low power field      No organisms seen    MRSA MSSA PCR, Nasal Swab [[id number]] Collected: 07/22/22 1241    Order Status: Completed Lab Status: Final result Updated: 07/22/22 1729    Specimen: Swab from Nare, Right      MRSA Target DNA Negative     SA Target DNA Positive    Narrative:      The [employer]  Xpert SA Nasal Complete assay performed in the [employer]  Dx System is a qualitative in vitro diagnostic test designed for rapid detection of Staphylococcus aureus (SA) and methicillin-resistant Staphylococcus aureus (MRSA) from nasal swabs in patients at risk for nasal colonization. The test utilizes automated real-time polymerase chain reaction (PCR) to detect MRSA/SA DNA. The Xpert SA Nasal Complete assay is intended to aid in the prevention and control of MRSA/SA infections in healthcare settings. The assay is not intended to diagnose, guide or monitor treatment for MRSA/SA infections, or provide results of susceptibility to methicillin. A negative result does not preclude MRSA/SA nasal colonization.     Respiratory Panel PCR [[id number]]  (Abnormal) Collected: 07/22/22 1241    Order Status: Completed Lab Status: Final result  Updated: 07/22/22 1829    Specimen: Swab from Nasopharyngeal      Adenovirus Not Detected     Coronavirus Not Detected     Comment: This test detects Coronavirus 229E, HKU1, NL63 and OC43 but does not distinguish between them. It does not detect MERS ( Respiratory Syndrome), SARS (Severe Acute Respiratory Syndrome) or 2019-nCoV (Novel 2019) Coronavirus.        Human Metapneumovirus Not Detected     Human Rhin/Enterovirus Detected     Influenza A Not Detected     Influenza A, H1 Not Detected     Influenza A 2009 H1N1 Not Detected     Influenza A, H3 Not Detected     Influenza B Not Detected     Parainfluenza Virus 1 Not Detected     Parainfluenza Virus 2 Not Detected     Parainfluenza Virus 3 Not Detected     Parainfluenza Virus 4 Not Detected     Respiratory Syncytial Virus A Not Detected     Respiratory Syncytial Virus B Not Detected     Chlamydia Pneumoniae Not Detected     Mycoplasma Pneumoniae Not Detected    Narrative:      The [employer] Respiratory Panel is a qualitative nucleic acid, multiplex, in vitro diagnostic test for the simultaneous detection and identification of multiple respiratory viral and bacterial nucleic acids in nasopharyngeal swabs collected in viral transport media from individual exhibiting signs and symptoms of respiratory infection. The assay has received FDA approval for the testing of nasopharyngeal (NP) swabs only. The [facility] at [facility] has validated the performance characteristics for bronchial alveolar lavage specimens. This test is used for clinical purposes and should not be regarded as investigational or for research. This laboratory is certified under the Clinical Laboratory Improvement Amendments of 1988 (CLIA-88) as qualified to perform high complexity clinical laboratory testing.     Respiratory Aerobic Bacterial Culture [[id number]] Collected: 07/22/22 1214    Order Status: Completed Lab Status: Final result Updated:  07/24/22 0725    Specimen: Sputum from Endotracheal      Culture 2+ Normal panda     Gram Stain Result >25 PMNs/low power field      3+ Mixed panda    Blood Culture Hand, Left [[id number]]  (Normal) Collected: 07/21/22 2109    Order Status: Completed Lab Status: Preliminary result Updated: 07/25/22 2248    Specimen: Blood from Hand, Left      Culture No growth after 4 days    Blood Culture Hand, Right [[id number]]  (Normal) Collected: 07/21/22 2104    Order Status: Completed Lab Status: Preliminary result Updated: 07/25/22 2248    Specimen: Blood from Hand, Right      Culture No growth after 4 days            Recent Labs   Lab Test 05/13/22  0531 06/22/22  2353 06/27/22  2316 07/04/22  2215 07/21/22  2215   URINEPH 5.5 5.5 5.5 5.0 7.5*   NITRITE Negative Negative Negative Negative Negative   LEUKEST Negative Small* Negative Negative Negative   WBCU 2 66* 1 2 1             Recent Labs   Lab Test 07/22/22  1241   IFLUA Not Detected   FLUAH1 Not Detected   FLUAH3 Not Detected   YJ2016 Not Detected   IFLUB Not Detected   RSVA Not Detected   RSVB Not Detected   PIV1 Not Detected   PIV2 Not Detected   PIV3 Not Detected   HMPV Not Detected       Recent Labs   Lab Test 07/25/22  1704   CDBPCT Negative       Imaging: US Abdomen Limited    Result Date: 7/23/2022  EXAMINATION: Limited Abdominal Ultrasound, 7/23/2022 2:25 PM COMPARISON: Abdominal CT 7/7/2022 HISTORY: Fever and leukocytosis unknown origin in patient with altered mental status. Has right upper quadrant tenderness on exam and bump in ALK phos. Please evaluate biliary system FINDINGS: Fluid: No evidence of ascites or pleural effusions. Liver: The liver demonstrates normal echotexture, measuring 16.9 cm in craniocaudal dimension. There is no focal mass. Gallbladder: There is no wall thickening, pericholecystic fluid, positive sonographic Rothman's sign or evidence for cholelithiasis. Bile Ducts: Both the intra- and extrahepatic biliary system are of normal  caliber.  The common bile duct measures 3 mm in diameter. Pancreas: Visualized portions of the head and body of the pancreas are unremarkable. The pancreatic duct is visualized measuring approximately 1 mm, which is within normal limits. Pancreas is partially obscured. Kidney: Not well as visualized due to abnormal positioning as noted on prior CT. The right kidney measures approximately 9.6 cm long. There is no hydronephrosis or renal mass demonstrated.     IMPRESSION: 1.  Normal appearing gallbladder 2.  No intrahepatic or extrahepatic biliary dilatation. I have personally reviewed the examination and initial interpretation and I agree with the findings. DARSHAN DIXON MD   SYSTEM ID:  [id number]    XR Chest Port 1 View    Result Date: 7/21/2022  Portable chest INDICATION: Wheezing COMPARISON: 7/16/2022 FINDINGS: Heart size upper normal. Osteopenia. Degenerative changes at the shoulders. Degenerative changes in the thoracic spine. A few patchy densities in the lungs are noted especially in the lower lungs.     IMPRESSION: Probable atelectasis rather than edema in the lower lungs. Osteopenia with degenerative changes in the spine and shoulders. JOVANA CORRIGAN MD   SYSTEM ID:  [id number]    CT Abdomen Pelvis w Contrast    Result Date: 7/24/2022  EXAMINATION: CT ABDOMEN PELVIS W CONTRAST, 7/24/2022 12:44 PM TECHNIQUE: Axial CT images from the lung bases through the symphysis pubis were obtained  with IV contrast. Coronal and sagittal reformats also provided. Contrast dose: iopamidol (ISOVUE-370) solution 100 mL COMPARISON: CT abdomen and pelvis 7/7/2022 HISTORY: Drainage around PEG insertion site, possible abscess FINDINGS: Devices: Percutaneous gastrostomy tube balloon is within the gastric lumen. There is soft tissue thickening and fat stranding surrounding the tube and thickening of the left rectus muscle. No fluid collection is identified. Lung Bases: Trace left more than the right pleural effusion with  subsegmental atelectasis in the lung bases bilaterally. The heart is normal in size without pericardial effusion. ABDOMEN: Liver: Normal size. No focal lesions. Biliary/Gallbladder: No CT evidence of calculi, wall thickening or pericholecystic fluid. No intra or extrahepatic biliary dilatation. Spleen: Normal size. Stable cyst in the lateral aspect. Pancreas: No evidence of pancreatic mass or peripancreatic fluid. Adrenals: Normal. Kidneys: Malrotated right kidney. No hydronephrosis, calculi or mass. A few subcentimeter hypodensities in both kidneys are too small to characterize but likely are cysts. Urinary bladder: Unremarkable. Reproductive organs: Pelvic organs are obscured by metallic artifact from bilateral hip prostheses. Gastrointestinal: The stomach and duodenum are unremarkable. Small and large bowel are normal in caliber and without abnormal wall thickening. Mesentery/Peritoneum: No ascites or pneumoperitoneum. Lymph nodes: No lymphadenopathy. Vasculature: Major abdominal arteries are patent. Bones and soft tissues: No acute lesions. Unchanged chronic compression deformity of L1 vertebra. Moderate degenerative changes in the lumbar spine. Bilateral hip prostheses. Redemonstrated left indirect inguinal hernia containing nonobstructed colon.     IMPRESSION: 1.  Percutaneous gastrostomy tube balloon is within the gastric lumen. There is soft tissue thickening and fat stranding surrounding the tube and thickening of the left rectus muscle, suggestive of inflammation. No fluid collection is identified. 2.  Trace left more than the right pleural effusion. 3.  Stable chronic findings as described in the body of the report.  MARQUIS MAGALLON MD   SYSTEM ID:  [id number]    CT Head w/o Contrast    Result Date: 7/22/2022  EXAM: CT HEAD W/O CONTRAST  7/22/2022 10:24 AM HISTORY:  looking for hematomar re-evaluation in the setting of new fevers   COMPARISON:  7/14/2022 TECHNIQUE: Using multidetector thin collimation  helical acquisition technique, axial, coronal and sagittal CT images from the skull base to the vertex were obtained without intravenous contrast.  (topogram) image(s) also obtained and reviewed. FINDINGS: Stable surgical changes of left frontal craniotomy for evacuation of subdural hematoma. Unchanged dural thickening and small amount of extra-axial fluid beneath the craniotomy site measuring up to 3 mm. Decreased mass effect with 4 mm of rightward midline shift at the septum pellucidum, previously 6 mm. No acute loss of gray-white matter differentiation in the cerebral hemispheres. Ventricles are proportionate to the cerebral sulci.. Clear basal cisterns. Continued mastoid effusions bilaterally. Decreased mucosal thickening and layering fluid in the sphenoid, ethmoid, frontal sinuses.     IMPRESSION: Surgical changes left frontal craniotomy for subdural hematoma evacuation with decreased extra-axial fluid overlying the left frontal convexity and decreased now minimal rightward midline shift. No evidence of acute intracranial findings since 7/14/2022. Improved aeration of paranasal sinuses in the interval. I have personally reviewed the examination and initial interpretation and I agree with the findings. OSVALDO CONNOLLY MD   SYSTEM ID:  [id number]

## 2022-07-26 NOTE — PROGRESS NOTES
Shift Summary: [id number]    Neuro: Pt intermittently follows commands. Pt opens eyes spontaneously and tracks. Will occasionally have incomprehensible speech. PERRL. Afebrile.     Cardiac: NSR. BP elevated throughout shift. PRN hydralazine x1 for SBP >160.     Resp: RA, tolerating well. Congested, weak cough. Lung sounds coarse/diminished.     GI/: TF infusing via PEG at 45mL/hr, goal. Voiding appropriately. Rectal tube in place, leaking occasionally. C-diff (-).     Skin: redness to nellie area, barrier cream and interdry applied.     PRN Tylenol x1.

## 2022-07-27 NOTE — PROGRESS NOTES
End of shift summary:  Patient slept comfortably between cares.  Intermittently follows commands.  Non verbal except occasional moans.  Moving all extremities but muscles very rigid.  On room air with adequate O2 sats.  Unproductive cough but sounds rattley.  NT suctioning performed x2 with good result, patient tolerates well.  Went apneic while sleeping with sharp drop in sats into the 70s when bed at 30 degrees.  Gold 6 notified and CPAP was ordered.  HOB increased to 60 with no further apneic episodes.  SR in the 70s.  NPO.  Minimal drainage from PEG site, TF continued @ goal.  Loose stools continue.  Dignishield intact with 300cc out for the night.  Purewick in place, 500cc output.      Plan:  Head CT tomorrow.  Notify primary team with any changes.

## 2022-07-27 NOTE — PROGRESS NOTES
[id number]    Patient up to chair this shift with lift assistance. Did stand with assistance with heavy assist x2. Plan for CT tomorrow 7/28.     Neuro: Followed commands, very slow, move BLE independently and attempted to follow BUE very weak. Remaining mute this shift. Very rigid in extremities.  CV: SR 70-80s, normotensive, trace edema   Resp: RA, shallow, was coughing independently. Nasal trumpet in right nare, no suctioning required this shift. Chest percussion being performed by RT  GI/: Purewick in place and changed with good UOP, fecal management system in place with minimal leaking. Greater then 100 ml output for FMS. Did discuss increasing banatrol. TF at goal in G tube with standard FWF.  ID/Heme: Continued abx Ancef   Access: PIV x2

## 2022-07-27 NOTE — PROGRESS NOTES
Care Management Follow Up    Length of Stay (days): 35    Expected Discharge Date: 08/05/2022     Concerns to be Addressed: Discharge planning      Patient plan of care discussed at interdisciplinary rounds: Yes    Anticipated Discharge Disposition:  TCU     Anticipated Discharge Services:  Therapies  Anticipated Discharge DME:  TBD    Patient/family educated on Medicare website which has current facility and service quality ratings:  YES  Education Provided on the Discharge Plan:  YES  Patient/Family in Agreement with the Plan:  YES    Referrals Placed by CM/SW:  NA  Private pay costs discussed: Not applicable    Additional Information:  SW spoke with Pt's  RE the hold up in Pt being able to discharge to TCU. SW identified that he was happy to meet with Pt's  and daughters via phone call and stated that he was free 7/28 and then on Monday 8/1 as SW was off work on Friday.     ________________    AQUILES Peacock, LICSW  6D   [facility]  Phone: [phone]  Pager: [phone]  Fax: [phone]

## 2022-07-27 NOTE — PROGRESS NOTES
[facility]    Medicine Progress Note - Hospitalist Service, GOLD TEAM 6    Date of Admission:  6/22/2022    Assessment & Plan  Louise Benítez is a 76-year-old F with a history of seronegative rheumatoid arthritis, osteoporosis, peripheral artery disease, and myelodysplastic syndrome admitted 6/22 after a ground level fall resulting in subdural hematoma with midline shift s/p emergent craniotomy and SDH evacuation. Course has been complicated by nonconvulsive status epilepticus (resolved), GI bleeding s/p EGD and clipping of gastric ulcer 7/5, and aspiration s/p PEG placement 7/15. Noted to have bloody stools the evening of 7/17 with acute hemoglobin drop (10.1 -> 8.7). Medicine consulted for recommendations surrounding management of GI bleed.    # Sepsis   # Cellulitis at G tube insertion site (placed by IR 7/15)  Febrile to 101.9F and WBC up 12.1->16.5 7/22.  CXR showed probably atelectasis, which could conceal a PNA. UTI without infection, procal only 0.06. Lactic acid normal. RUQ tenderness on 7/23 but RUQ US negative for acute findings. Respiratory virus panel positive for Rhino/Enterovirus (not convincing as cause of SIRS). On the evening of 7/23, RN reported redness and purulent discharge around PEG insertion site. WBC has normalized since starting on Abx   - CT abdomen 7/24 showed findings of inflammation without any isolated fluid collection   - Wound culture with MSSA. Repeat wound clx sens pending  -De-escalate antibiotics: Vanco/Zosyn stopped 7/26). Cont cefazolin (started 7/26)  - G Tube placed on 7/15 by IR. T fastener removed 7/26.IR did not feel G tube exchange indicated.   - Blood culture NGTD     #Loose stools  Improving. Suspect 2/2 tube feeds.   -C.Diff negative  -Cont banana flakes, loperimide  -Rectal tube    # Acute blood loss anemia on chronic microcytic anemia  # GI bleed  # Gastric ulcers complicated by GI bleeding s/p EGD 7/5 with clipping of  5mm ulcers in posterior gastric wall  Bloody stool output with associated Hgb drop on 7/17.  Transfused 1 unit PRBC.  Colonoscopy done 7/19  showed ulcers at the splenic flexure that were likely source of bleeding but have stopped bleeding on their own.  Hgb initially improved after colonoscopy but acutely declined on 7/23.  Lovenox held but no evidence of recurrent GI bleed  -Lovenox for VTE prophylaxis resumed 7/26  - Hgb remains stable at 8.3 today   - Transfuse if hgb drops <7  - Contact GI with any signs of recurrent bleed   - Cont PO PPI BID x 12 weeks (per GI rec)     # Subdural hematoma with midline shift s/p emergent craniotomy with SDH evacuation (6/22/22)  # Nonconvulsive status epilepticus (s/p vEEG 7/8)  # Frequent falls   Initial fall w/ SDH occurred on 8/12 and pt was admitted to [facility] neuro ICU from 5/12-5/20. Discharged to TCU. She did return home from TCU and was doing well. On 6/22 she suffered a mechanical fall while at home. In the ER, CT scan showed a new SDH w/ midline shift. She subsequently underwent L craniotomy for SDH evacuation and was extubated on post-op day 1.  CT head 7/22 - no acute changes since 7/14, no explanations for febrile spikes.  - Mentation continues to wax and wane day to day   - Neurosurgery continues to follow   - Continue Keppra 1 gram BID  - Plan to repeat CT brain 7/28  - Continue on delirium precautions  - Per neurosurgery, it will likely take on the order of weeks to months to see her full capacity for neurologic recovery      # Risk of malnutrition  Pt does not meet the diagnostic criteria for malnutrition, per RD. Have been using PEG tube feeding due to decreased level of consciousness and risk for aspiration   - Continue G tube feeding for now per RD dosing  - SLP to re-evaluate for PO intake   - Continue Potassium, Phosphorus, and Magnesium protocols daily      # Goals of care   Patient in good state of health prior to initial fall and SDH in May. Mentation  returned to normal following initial SDH but since her most recent fall on 6/22 she has remained mostly somnolent with only some wakefulness.   - Palliative care is following. Advanced care document on file (02/2003)  - Placed  PICC line 7/19 due to poor IV access   - Family has made pt DNR/DNI  - Continue ongoing GOC discussions     # Hx of Myelodysplasic syndrome  # Mild leukocytosis  - PTA darbepoietin (Last dose given 7/26)  - Continue to monitor     # High blood pressure  -Increased amlodipine from 5 to10 mg daily 7/27 for elevated blood pressure  - Monitor BP and adjust accordingly     # Seronegative rheumatoid arthritis  - Continue PTA hydroxychloroquine 400mg daily   - Has rheumatology follow up with Dr. Us on 9/1     # Osteoporosis  - Hold PTA calcium/vitamin D  - Holding PTA denosumab while inpatient      # Hyperlipidemia  # Peripheral arterial disease  - Not on a PTA statin or antiplatelet     # Aspiration s/p IR G tube placement (7/15), resolved    Diet: NPO per Anesthesia Guidelines for Procedure/Surgery Except for: Meds  Adult Formula Drip Feeding: Continuous [employer] Peptide 1.5; Gastrostomy; Goal Rate: 45; mL/hr; Medication - Feeding Tube Flush Frequency: At least 15-30 mL water before and after medication administration and with tube clogging; Amount to Send ...    DVT Prophylaxis: Pneumatic Compression Devices in setting of acute bleed   Vasquez Catheter: Not present  Central Lines: None  Cardiac Monitoring: None  Code Status: No CPR- Do NOT Intubate      Disposition Plan      Expected Discharge Date: 08/05/2022        Discharge Comments: Patinet likely will take weeks to months to fully recover from neuro stand point. Infectious work up in progress. Will need TCU        The patient's care was discussed with the Attending physician, Dr. Holt, and bedside nurse. Called patient's spouse, Jd, to update but no answer (tried home phone number and cell phone number listed in chart).      Jillian DONAHUE  TRUNG Simmons  Hospitalist Service, GOLD TEAM [address] [place] [address]  Securely message with the [employer] Web Console (learn more here)  Text page via [facility] Paging/Directory   Please see signed in provider for up to date coverage information    ______________________________________    Interval History   No new issues.  Unable to obtain ROS from patient-does not answer questions.     Data reviewed today: I reviewed all medications, new labs and imaging results over the last 24 hours. Please see discussion of these results in the A/P.    Physical Exam     Vital Signs: Temp: 98.3  F (36.8  C) Temp src: Axillary BP: 117/48 Pulse: 72     Resp: 13 SpO2: 96 % O2 Device: None (Room air) Oxygen Delivery: 2 LPM  Weight: 156 lbs 1.37 oz    General: Alert.  Appears comfortable lying on hospital bed.   HEENT: . Anicteric sclera.  Oral mucosa moist. Neck supple. No JVD.  Resp: No signs of respiratory distress. Lungs clear to ausculation bilaterally without rales, rhonchi or wheezes.   Cardiac: RRR. S1 and S2 normal intensity. No murmurs appreciated.   GI: Abdomen soft, non-tender, non-distended.  Bowel sounds present. No masses, hepatomegaly or splenomegaly. PEG site with yellow drainage and mild surrounding erythema. Button in place.   : No taylor  Neuro:  Follows simple commands (open eyes, squeeze hands). Did not answer questions. Moves all extremities. Gait not tested.   Psych: Blunted affect  Derm: Skin warm and dry. No rashes or skin breakdown. No jaundice.   Extremities: No cyanosis, clubbing or edema      Data   Recent Labs   Lab 07/27/22  0703 07/26/22  0708 07/25/22  0606 07/24/22  2315 07/24/22  0756 07/23/22  1752 07/23/22  0915   WBC 8.8 10.5 9.3  --  8.7  --  11.5*   HGB 8.3* 8.6* 8.1*  --  7.6*   < > 7.4*    97 100  --  100  --  102*   * 567* 520*  --  422  --  408    141  --   --   --   --  143   POTASSIUM 4.3 4.4  --   --   --   --  4.4   CHLORIDE 107 108*   --   --   --   --  107   CO2 24 23  --   --   --   --  30*   BUN 23.2* 21.5  --   --   --   --  21.2   CR 0.40* 0.43*  --   --  0.41*  --  0.46*   ANIONGAP 10 10  --   --   --   --  6*   MENDEZ 9.6 9.5  --   --   --   --  9.1   * 112*  --  86  --   --  110*   ALBUMIN 3.5  --   --   --   --   --  3.3*   PROTTOTAL 6.3*  --   --   --   --   --  5.9*   BILITOTAL 0.2  --   --   --   --   --  0.3   ALKPHOS 160*  --   --   --   --   --  125*   ALT 26  --   --   --   --   --  27   AST 40*  --   --   --   --   --  44*    < > = values in this interval not displayed.       No results found for this or any previous visit (from the past 24 hour(s)).

## 2022-07-27 NOTE — PROGRESS NOTES
CLINICAL NUTRITION SERVICES - REASSESSMENT NOTE     Nutrition Prescription    RECOMMENDATIONS FOR MDs/PROVIDERS TO ORDER:  - Total daily fluids/adjustments per MD  - Minimize disruptions to EN regimen    Malnutrition Status:    Patient does not meet two of the established criteria necessary for diagnosing malnutrition but is at risk for malnutrition    Recommendations already ordered by Registered Dietitian (RD):  - Continue current EN: Shonda Li Peptide 1.5 @ 45 mL/hr + 3 pkts Prosource  - Increase Banatrol to 1 pkt TID    Future/Additional Recommendations:  Monitor tolerance of EN     EVALUATION OF THE PROGRESS TOWARD GOALS   Diet: NPO  Nutrition Support: Shonda Li Peptide 1.5 @ 45 mL/hr (1080 mL/day) to provide 1661 kcal (28 kcal/kg), 80 g protein (1.4 g/kg), 149 g CHO, 10 g fiber, 83 g fat (40% from MCTs), and 756 mL free water daily   - Prosource x 3 = 1741 kcals (30 kcals/kg) and 113 gm PRO (1.8 gm/kg)   - Free water: 30 mL q4 hours  7 day EN intake: 932 mL/day + 3 pkts Prosource/day. Total provisions: 1553 kcal (26 kcal/kg) and 102 g protein (1.7 g/kg). Meets 100% estimated nutrition needs.      NEW FINDINGS   Weight: 70.8 kg on 7/26, weight variable since admit likely 2/2 fluid status.     Labs: Reviewed     Meds:   Banatrol 1 pkt BID  Vit B12  Liquid multivitamin   Protonix  PRN Imodium    GI: Rectal tube with 500 mL output on 7/26    Skin: WOCN last saw pt on 7/25- right arm, left shin, and PEG tube site    MALNUTRITION  % Intake: No decreased intake noted- EN  % Weight Loss: None noted  Subcutaneous Fat Loss: None observed  Muscle Loss: Upper arm (bicep, tricep), Lower arm  (forearm), Upper leg (quadricep, hamstring), Patellar region and Posterior calf: moderate   Fluid Accumulation/Edema: Mild  Malnutrition Diagnosis: Patient does not meet two of the established criteria necessary for diagnosing malnutrition but is at risk for malnutrition    Previous Goals   Total avg nutritional intake to meet a  minimum of 25 kcal/kg and 1.5 g PRO/kg daily (per dosing wt 59 kg).  Evaluation: Met    Previous Nutrition Diagnosis  Inadequate oral intake related to inability to take PO intake as evidenced by NPO status and pt reliant on TF to meet nutritional needs.   Evaluation: No change    CURRENT NUTRITION DIAGNOSIS  Inadequate oral intake related to inability to take PO intake as evidenced by NPO status and pt reliant on TF to meet nutritional needs.     INTERVENTIONS  Implementation  Enteral Nutrition - Continue as ordered     Goals  Total avg nutritional intake to meet a minimum of 25 kcal/kg and 1.5 g PRO/kg daily (per dosing wt 59 kg).    Monitoring/Evaluation  Progress toward goals will be monitored and evaluated per protocol.    Erum Haq, RD, LD  6D RD pager [phone]  Weekend/ED RD pager [phone]

## 2022-07-27 NOTE — PLAN OF CARE
Goal Outcome Evaluation:    Plan of Care Reviewed With: patient     Overall Patient Progress: no change    Outcome Evaluation: Continue current EN, increased amount of Banatrol pkts

## 2022-07-27 NOTE — PROGRESS NOTES
[facility], [place]   07/27/2022  Neurosurgery Progress Note:    Assessment:  Ms. Benítez is a 76-year-old female with PMHx myelodysplastic syndrome, seronegative RA, chronic microcytic anemia, PAD, and recent L SDH (5/12/2022) who arrived to [facility] via EMS after an unwitnessed fall at home.  Shortly after arriving, the patient was complaining of a significant headache and nausea, and then her right pupil dilated.  She was no longer protecting her airway, so she was sedated and intubated.  Her right eye pupil then returned to a normal caliber.  A head CT demonstrated a left acute subdural approximately 2.3 cm thick with 12 mm of rightward midline shift.  She was taken to the OR on 6/22 for emergent L craniotomy for SDH evacuation. She was extubated on POD1. Concerns for tube feed aspiration on 6/25.  She underwent emergent EGD by GI due to sharp drop in hemoglobin as well as melanotic stools.  She had 1 gastric ulcer clipped which was the presumed culprit.  Due to requiring the EGD she needed intubation during this time, therefore she was intubated on 7/4/2022 for EGD.  She was also connected to EEG on 7/4/2022 found to be in nonconvulsive status received Keppra load and Ativan and was started on Versed overnight.  On 7/5/2022, her Versed drip was weaned off with confirmation of no status epilepticus. On 7/8 she was extubated and remains stable.  Received PEG on 7/15/2022 by IR.  Underwent colonoscopy with GI on 7/19/2022.  Transferred to medicine primary on 7/19/2022. On 7/24, drainage noted at PEG site, swab culture sent for analysis per primary team. WBC downtrending on zosyn.    Other diagnoses include:  #Brain compression  #Open evacuation of hematoma on day of admission   #Hyponatremia  #Pulmonary edema  #GI bleed  #Uremia  #hypokalemia    Recommendations:  Continue Keppra 1 g twice daily till seen by neurology as outpatient  Plan for Head CT on 7/28  Ok for lovenox from nsgy  perspective  Remaining cares per primary team    Neurosurgery will continue to follow  -----------------------------------  Bruce Graves MD  Neurosurgery Resident     Please contact neurosurgery resident on call with questions.    Dial * * [phone], enter [phone] when prompted.    -----------------------------------    Interval History: Vancomycin and zosyn stopped for ancef for MSSA cx.     Objective:   Temp:  [97.4  F (36.3  C)-98.5  F (36.9  C)] 98.5  F (36.9  C)  Pulse:  [73-93] 83  Resp:  [9-20] 16  BP: (112-150)/(60-73) 112/61  SpO2:  [96 %-99 %] 99 %  I/O last 3 completed shifts:  In: 1580 [NG/GT:410]  Out: 900 [Urine:500; Stool:400]        Gen: Appears comfortable, NAD, laying in bed  Abd: PEG in place  HEENT: Left craniotomy incision healing well  Wound: Incision closed with staples-now removed, incision appears clean dry-no signs of leaking, no wetness appreciated.  See below for photo from 7/5/2022 evening.  Incision cleaned with ChloraPrep.  Pulm: On RA, no increased work of breathing, symmetric chest rise  Neurologic:  Eyes open spontaneously, minimally tracking, gaze at midline without preference  Follows commands in all 4 extremities  Reflexes 2+, downgoing Babinski's bilaterally    LABS:  Recent Labs   Lab 07/26/22  0708 07/24/22  2315 07/24/22  0756 07/23/22  0915 07/22/22  0644     --   --  143 138   POTASSIUM 4.4  --   --  4.4 4.2   CHLORIDE 108*  --   --  107 102   CO2 23  --   --  30* 26   ANIONGAP 10  --   --  6* 10   * 86  --  110* 114*   BUN 21.5  --   --  21.2 20.4   CR 0.43*  --  0.41* 0.46* 0.45*   MENDEZ 9.5  --   --  9.1 8.9       Recent Labs   Lab 07/26/22  0708   WBC 10.5   RBC 2.87*   HGB 8.6*   HCT 27.7*   MCV 97   MCH 30.0   MCHC 31.0*   RDW 16.5*   *       IMAGING:  No results found for this or any previous visit (from the past 24 hour(s)).

## 2022-07-28 NOTE — PROGRESS NOTES
[facility]    Medicine Progress Note - Hospitalist Service, GOLD TEAM 6    Date of Admission:  6/22/2022    Assessment & Plan  Louise Benítez is a 76-year-old F with a history of seronegative rheumatoid arthritis, osteoporosis, peripheral artery disease, and myelodysplastic syndrome admitted 6/22 after a ground level fall resulting in subdural hematoma with midline shift s/p emergent craniotomy and SDH evacuation. Course has been complicated by nonconvulsive status epilepticus (resolved), GI bleeding s/p EGD and clipping of gastric ulcer 7/5, and aspiration s/p PEG placement 7/15. Noted to have bloody stools the evening of 7/17 with acute hemoglobin drop (10.1 -> 8.7). Medicine consulted for recommendations surrounding management of GI bleed.    # Sepsis   # Cellulitis at G tube insertion site (placed by IR 7/15)  Patient developed fever and leukocytosis 7/22.  CXR showed probably atelectasis, which could conceal a PNA. UTI without infection, procal only 0.06. Lactic acid normal. RUQ tenderness on 7/23 but RUQ US negative for acute findings. Respiratory virus panel positive for Rhino/Enterovirus (not convincing as cause of SIRS). On the evening of 7/23, RN reported redness and purulent discharge around PEG insertion site. WBC has normalized since starting on Abx and she remains afebrile.  - CT abdomen 7/24 showed findings of inflammation without any isolated fluid collection   - Wound culture x2 with MSSA.   -Antibiotics: Vanco/Zosyn 7/21pm- 7/26. Cont cefazolin (started 7/26) Complete 10 day course of therapy (End date 7/31)  - G Tube placed on 7/15 by IR. T fastener removed 7/26. IR did not feel G tube exchange indicated.   - Blood culture NGTD     #Loose stools  Suspect 2/2 tube feeds.   -C.Diff negative  -Cont banana flakes, schedule loperimide  -Rectal tube    # Acute blood loss anemia on chronic microcytic anemia  # GI bleed  # Gastric ulcers complicated by GI  bleeding s/p EGD 7/5 with clipping of 5mm ulcers in posterior gastric wall  Bloody stool output with associated Hgb drop on 7/17.  Transfused 1 unit PRBC.  Colonoscopy done 7/19  showed ulcers at the splenic flexure that were likely source of bleeding but have stopped bleeding on their own.  Hgb initially improved after colonoscopy but acutely declined on 7/23.  Lovenox held but no evidence of recurrent GI bleed  -Lovenox for VTE prophylaxis resumed 7/26  - Hgb remains stable at 9 today   - Transfuse if hgb drops <7  - Contact GI with any signs of recurrent bleed   - Cont PO PPI BID x 12 weeks (per GI rec)     # Subdural hematoma with midline shift s/p emergent craniotomy with SDH evacuation (6/22/22)  # Nonconvulsive status epilepticus (s/p vEEG 7/8)  # Frequent falls   Initial fall w/ SDH occurred on 8/12 and pt was [facility] to [facility] ICU from 5/12-5/20. Discharged to TCU. She did return home from TCU and was doing well. On 6/22 she suffered a mechanical fall while at home. In the ER, CT scan showed a new SDH w/ midline shift. She subsequently underwent L craniotomy for SDH evacuation and was extubated on post-op day 1.  CT head 7/22 - no acute changes since 7/14, no explanations for febrile spikes.  - Mentation continues to wax and wane day to day   - Neurosurgery continues to follow   - Continue Keppra 1 gram BID until seen by neurology outpatient  - Repeat CT head today  - Continue on delirium precautions  - Per neurosurgery, it will likely take on the order of weeks to months to see her full capacity for neurologic recovery      # Risk of malnutrition  Pt does not meet the diagnostic criteria for malnutrition, per RD. Have been using PEG tube feeding due to decreased level of consciousness and risk for aspiration   - Continue G tube feeding for now per RD dosing  - SLP to re-evaluate for PO intake   - Continue Potassium, Phosphorus, and Magnesium protocols daily      # Goals of care   Patient in good state  [facility] prior to initial fall and SDH in May. Mentation returned to normal following initial SDH but since her most recent fall on 6/22 she has remained mostly somnolent with only some wakefulness.   - Palliative care is following. Advanced care document on file (02/2003)  - Family has made pt DNR/DNI  - Continue ongoing GOC discussions. Attempted to call  multiple times but no answer. Asked bedside nurse to page when arrives today to update.      # Hx of Myelodysplasic syndrome  # Mild leukocytosis  - PTA darbepoietin (Last dose given 7/26)  - Continue to monitor     # HTN, improved  -Increased amlodipine from 5 to10 mg daily 7/27 for elevated blood pressure  - Monitor BP and adjust accordingly     # Seronegative rheumatoid arthritis  - Continue PTA hydroxychloroquine 400mg daily   - Has rheumatology follow up with Dr. Us on 9/1     # Osteoporosis  - Hold PTA calcium/vitamin D  - Holding PTA denosumab while inpatient      # Hyperlipidemia  # Peripheral arterial disease  - Not on a PTA statin or antiplatelet     # Aspiration s/p IR G tube placement (7/15), resolved    Diet: NPO per Anesthesia Guidelines for Procedure/Surgery Except for: Meds  Adult Formula Drip Feeding: Continuous [employer] Peptide 1.5; Gastrostomy; Goal Rate: 45; mL/hr; Medication - Feeding Tube Flush Frequency: At least 15-30 mL water before and after medication administration and with tube clogging; Amount to Send ...    DVT Prophylaxis: Restarted lovenox  Vasquez Catheter: Not present  Central Lines: None  Cardiac Monitoring: None  Code Status: No CPR- Do NOT Intubate      Disposition Plan      Expected Discharge Date: 08/05/2022        Discharge Comments: Patinet likely will take weeks to months to fully recover from neuro stand point. Anticipated TCU 2-3 days        The patient's care was discussed with the Attending physician, Dr. Holt, and bedside nurse.   Updated patient's , Jd, at bedside.     Jillian Simmons,  TRUNG  Hospitalist Service, GOLD TEAM [address] [place] [address]  Securely message with the [employer] Web Console (learn more here)  Text page via [facility] Paging/Directory   Please see signed in provider for up to date coverage information    ______________________________________    Interval History   Per bedside nurse, no new issues.  Continues to have high output from rectal tube. Unable to obtain ROS from patient-does not answer questions.     Data reviewed today: I reviewed all medications, new labs and imaging results over the last 24 hours. Please see discussion of these results in the A/P.    Physical Exam     Vital Signs: Temp: 98.1  F (36.7  C) Temp src: Oral BP: 121/50 Pulse: 81     Resp: 10 SpO2: 98 % O2 Device: None (Room air)    Weight: 156 lbs 1.37 oz    General: Alert.  Appears comfortable lying on hospital bed.   HEENT: . Anicteric sclera.  Oral mucosa moist. Neck supple. No JVD.  Resp: No signs of respiratory distress. Lungs clear to ausculation bilaterally without rales, rhonchi or wheezes.   Cardiac: RRR. S1 and S2 normal intensity. No murmurs appreciated.   GI: Abdomen soft, non-tender, non-distended.  Bowel sounds present. No masses, hepatomegaly or splenomegaly. PEG site with yellow drainage and mild surrounding erythema. Button in place.   : No taylor  Neuro:  Follows simple commands (open eyes, squeeze hands). Did not answer questions. Moves all extremities. Gait not tested.   Psych: Blunted affect  Derm: Skin warm and dry. No rashes or skin breakdown. No jaundice.   Extremities: No cyanosis, clubbing or edema      Data   Recent Labs   Lab 07/28/22  0725 07/27/22  0703 07/26/22  0708 07/25/22  0606 07/24/22  2315 07/24/22  0756 07/23/22  1752 07/23/22  0915   WBC 8.8 8.8 10.5   < >  --  8.7  --  11.5*   HGB 9.0* 8.3* 8.6*   < >  --  7.6*   < > 7.4*    100 97   < >  --  100  --  102*   * 504* 567*   < >  --  422  --  408   NA  --  141 141  --   --    --   --  143   POTASSIUM  --  4.3 4.4  --   --   --   --  4.4   CHLORIDE  --  107 108*  --   --   --   --  107   CO2  --  24 23  --   --   --   --  30*   BUN  --  23.2* 21.5  --   --   --   --  21.2   CR  --  0.40* 0.43*  --   --  0.41*  --  0.46*   ANIONGAP  --  10 10  --   --   --   --  6*   MENDEZ  --  9.6 9.5  --   --   --   --  9.1   GLC  --  116* 112*  --  86  --   --  110*   ALBUMIN  --  3.5  --   --   --   --   --  3.3*   PROTTOTAL  --  6.3*  --   --   --   --   --  5.9*   BILITOTAL  --  0.2  --   --   --   --   --  0.3   ALKPHOS  --  160*  --   --   --   --   --  125*   ALT  --  26  --   --   --   --   --  27   AST  --  40*  --   --   --   --   --  44*    < > = values in this interval not displayed.       No results found for this or any previous visit (from the past 24 hour(s)).

## 2022-07-28 NOTE — PLAN OF CARE
"  /60 (BP Location: Right arm)   Pulse 93   Temp 98.4  F (36.9  C) (Axillary)   Resp 20   Ht 1.626 m (5' 4.02\")   Wt 70.8 kg (156 lb 1.4 oz)   SpO2 98%   BMI 26.78 kg/m       Uneventful night  no acute changes noted  Problem: Risk for Delirium  Goal: Improved Behavioral Control  Intervention: Prevent and Manage Agitation  Recent Flowsheet Documentation  Taken 7/28/2022 0430 by Marissa Hurtado RN  Complementary Therapy: music therapy provided  Taken 7/28/2022 0030 by Marissa Hurtado RN  Complementary Therapy: music therapy provided  Taken 7/27/2022 2119 by Marissa Hurtado RN  Complementary Therapy: music therapy provided     Problem: Plan of Care - These are the overarching goals to be used throughout the patient stay.    Goal: Optimal Comfort and Wellbeing  Intervention: Provide Person-Centered Care  Recent Flowsheet Documentation  Taken 7/28/2022 0430 by Marissa Hurtado RN  Trust Relationship/Rapport: care explained  Taken 7/28/2022 0030 by Marissa Hurtado RN  Trust Relationship/Rapport: care explained  Taken 7/27/2022 2119 by Marissa Hurtado RN  Trust Relationship/Rapport: care explained     "

## 2022-07-28 NOTE — PLAN OF CARE
Patient opens eyes spontaneously, more alert today. Follows commands inconsistently, nods to simple questions. Non-verbal. Vitals stable, afebrile. On room air, has congested non-productive cough. G-tube to tube feeds, tolerating. External catheter on with adequate urine output, rectal tube intact with continuous liquid stools, immodium given, see I/Os. Sat up in chair for approximately 3 hours with daughter and  at bedside. Head CT completed.

## 2022-07-28 NOTE — PROGRESS NOTES
[facility], [place]   07/28/2022  Neurosurgery Progress Note:    Assessment:  Ms. Benítez is a 76-year-old female with PMHx myelodysplastic syndrome, seronegative RA, chronic microcytic anemia, PAD, and recent L SDH (5/12/2022) who arrived to [facility] via EMS after an unwitnessed fall at home.  Shortly after arriving, the patient was complaining of a significant headache and nausea, and then her right pupil dilated.  She was no longer protecting her airway, so she was sedated and intubated.  Her right eye pupil then returned to a normal caliber.  A head CT demonstrated a left acute subdural approximately 2.3 cm thick with 12 mm of rightward midline shift.  She was taken to the OR on 6/22 for emergent L craniotomy for SDH evacuation. She was extubated on POD1. Concerns for tube feed aspiration on 6/25.  She underwent emergent EGD by GI due to sharp drop in hemoglobin as well as melanotic stools.  She had 1 gastric ulcer clipped which was the presumed culprit.  Due to requiring the EGD she needed intubation during this time, therefore she was intubated on 7/4/2022 for EGD.  She was also connected to EEG on 7/4/2022 found to be in nonconvulsive status received Keppra load and Ativan and was started on Versed overnight.  On 7/5/2022, her Versed drip was weaned off with confirmation of no status epilepticus. On 7/8 she was extubated and remains stable.  Received PEG on 7/15/2022 by IR.  Underwent colonoscopy with GI on 7/19/2022.  Transferred to medicine primary on 7/19/2022. On 7/24, drainage noted at PEG site, swab culture sent for analysis per primary team. WBC downtrending on zosyn.    Other diagnoses include:  #Brain compression  #Open evacuation of hematoma on day of admission   #Hyponatremia  #Pulmonary edema  #GI bleed  #Uremia  #hypokalemia    Recommendations:  Continue Keppra 1 g twice daily till seen by neurology as outpatient  Plan for Head CT on 7/28.-Following on head CT for  today.  Ok for lovenox from nsgy perspective  Remaining cares per primary team    Neurosurgery will continue to follow  -----------------------------------  Bruce Graves MD  Neurosurgery Resident     Please contact neurosurgery resident on call with questions.    Dial * * [phone], enter [phone] when prompted.    -----------------------------------    Interval History: Continues on Ancef.  Neurological exam stable-following commands x4.  We will follow-up in head CT for today.    Objective:   Temp:  [98.1  F (36.7  C)-98.9  F (37.2  C)] 98.1  F (36.7  C)  Pulse:  [81-98] 81  Resp:  [10-20] 10  BP: (121-134)/(50-66) 121/50  SpO2:  [96 %-98 %] 98 %  I/O last 3 completed shifts:  In: 1565 [I.V.:10; NG/GT:520]  Out: 2050 [Urine:1550; Stool:500]        Gen: Appears comfortable, NAD, laying in bed  Abd: PEG in place  HEENT: Left craniotomy incision healing well  Wound: Incision closed with staples-now removed, incision appears clean dry-no signs of leaking, no wetness appreciated.  See below for photo from 7/5/2022 evening.  Incision cleaned with ChloraPrep.  Pulm: On RA, no increased work of breathing, symmetric chest rise  Neurologic:  Eyes open spontaneously, minimally tracking, gaze at midline without preference  Follows commands in all 4 extremities  Reflexes 2+, downgoing Babinski's bilaterally    LABS:  Recent Labs   Lab 07/27/22  0703 07/26/22  0708 07/24/22  2315 07/24/22  0756 07/23/22  0915    141  --   --  143   POTASSIUM 4.3 4.4  --   --  4.4   CHLORIDE 107 108*  --   --  107   CO2 24 23  --   --  30*   ANIONGAP 10 10  --   --  6*   * 112* 86  --  110*   BUN 23.2* 21.5  --   --  21.2   CR 0.40* 0.43*  --  0.41* 0.46*   MENDEZ 9.6 9.5  --   --  9.1       Recent Labs   Lab 07/28/22  0725   WBC 8.8   RBC 2.92*   HGB 9.0*   HCT 29.3*      MCH 30.8   MCHC 30.7*   RDW 17.2*   *       IMAGING:  No results found for this or any previous visit (from the past 24 hour(s)).

## 2022-07-28 NOTE — PROGRESS NOTES
Care Management Follow Up    Length of Stay (days): 36    Expected Discharge Date: 08/05/2022     Concerns to be Addressed:       Patient plan of care discussed at interdisciplinary rounds: Yes    Anticipated Discharge Disposition:       Anticipated Discharge Services:    Anticipated Discharge DME:      Patient/family educated on Medicare website which has current facility and service quality ratings:    Education Provided on the Discharge Plan:    Patient/Family in Agreement with the Plan:      Referrals Placed by CM/SW:    Private pay costs discussed: Not applicable    Additional Information:  Patient family is requesting GOC tomorrow morning at 8:30 a.m., RNCC reached out to Neurosurgery team to coordinate meeting time. Family will call in to tablet device which will be set up in neurosurgery workroom on unit 4A by RNCC. Patient device ID [id number] PIN [id number]. Patient got head CT today, has PEG with infection at site on IV abx for 10 day course likely to end prior to patient's placement at TCU. RNCC spoke with daughter Maria Del Carmen Reynoso who is contacting family to set up call. Gold 6 is primary team, will attend.      To join tablet meetings:  [email or web address]  Call in:   Ph: [phone]  Enter patient specific device ID and PIN listed in Epic  ID: [id number] PIN: [id number]    JOSÉ MIGUEL VerdugoN, BA, RN, CMSRN, RNCC  Covering Units 6D/OBS  Pager: [phone]  Phone: [phone]  6th floor Weekend/Holiday Pager: [phone]  Observation weekend/after hours: [phone]

## 2022-07-28 NOTE — PROGRESS NOTES
[facility]  Palliative Care Daily Progress Note       Recommendations & Counseling       Spoke with  Jd via telephone and daughter Allyson. Allyson shared that earlier this week family had concerns whether they were carrying out her mother's wishes as stated in the HCD, but now that her mother is improving they are feeling more optimistic. They would still like to meet with primary team and neurosurgery for a better understanding of what to expect going forward. Shared with Allyson that PT/OT plan to work more frequently with Louise, but that there are concerns that Louise may never return to living independently or even be appropriate for assisted living, that she may need long-term care.    Recommend POLST prior to discharge.    Spoke with SW about family's request for a care conference    Thank you for the opportunity to participate in the care of this patient and family. Our team: will continue to follow.     During regular M-F work hours ([id number]) -- if you are not sure who specifically to contact -- please contact us in [facility] Smart Web.     After regular work hours and on weekends/holidays, you can call our answering service at [phone].     Clarice Miller PA-C  [facility]  Contact information available via [facility] Paging/Directory    Attestation:  Total time on the floor involved in the patient's care: 25 minutes  Total time spent in counseling/care coordination: >50%      Assessments          Louise Benítez is a 76 year old female with PMHx myelodysplastic syndrome, seronegative RA, chronic microcytic anemia, PAD, and recent L SDH (5/12/2022) who was admitted from her TCU on 6/22/2022 after an unwitnessed fall. Was subsequently found to have an acute on chronic L SDH, a new acute R SDH, and increased midline shift w/ trace L uncal herniation now s/p L craniotomy w/ hematoma evacuation. Course complicated  "by GIB requiring reintubation. Now extubated.     Today, the patient was seen for:  Acute on chronic L SDH s/p L craniotomy w/hematoma evacuation  Acute R SDH  Dysphagia  Gastric and colonic ulcers, GIB  Goals of care    Prognosis, Goals, or Advance Care Planning was addressed today with: Yes.  Mood, coping, and/or meaning in the context of serious illness were addressed today: Yes.            Interval History:     Chart review/discussion with unit or clinical team members:   Plan for repeat head CT today.    Per patient or family/caregivers today:  Visited Louise who was seated in chair. She was non-verbal and unable to indicate any yes/no response to questions.     Called and spoke with patient's  Jd. He said that family was interested in meeting with SW and palliative care, and that Allyson had more details. Spoke with PT who stated that Louise is able to do some exercises and respond to some requests by the end of their session, and the plan to do more frequent therapy with her. They still feel she is TCU appropriate, but have concerns that she will not likely ever be independent again and will likely need someone 24/7 to be with her going forward.    Visited patient's room later in day. Allyson present at bedside. Louise did smile when her daughter called her \"garrett thomas\" (grandchildren's nickname for Louise) and actually said \"yes\" at one point. Allyson shared that on Sunday family had concerns about her (that she was febrile, less responsive, needing transfusion) and had wondered whether continuing full cares--tube feeds, antibiotics, transfusions, etc-- was still in line with what her mother would want as expressed in her health care directive. Now that Louise's condition has improved, they are feeling more optimistic. Allyson knows her mother is going to have another CT, and is hoping that this will shed some light on her mother's prognosis--is she getting worse, stable, getting better? Discussed that imaging " "cannot always tell us what neurologic recovery will look like. Shared with her discussion with therapy. Discussed also that further setbacks might occur, and difficult to know what the trajectory will be. Allyson did feel there would be value in meeting with primary team and neurosurgery so that family can have update and questions answered.    Key Palliative Symptoms:  We are not helping to manage these symptoms currently in this patient.             Review of Systems:     A comprehensive ROS has been negative other than stated in the HPI           Medications:     I have reviewed this patient's medication profile and medications during this hospitalization.           Physical Exam:   Temp: 98.1  F (36.7  C) Temp src: Oral BP: 136/54 Pulse: 89   Resp: 14 SpO2: 98 % O2 Device: None (Room air)    Gen: Awake, sitting in chair. Appears comfortable, in NAD  Resp: No increased work of breathing on room air  Msk: no gross deformity  Skin:  Warm, dry, no jaundice  Ext: warm, well perfused.  Neuro: Awake, alert. Mostly non-verbal, but did spontaneously say \"yes\" and smiled in response to her daughter.              Data Reviewed:     Reviewed recent pertinent imaging, comments:   Results for orders placed or performed during the hospital encounter of 06/22/22   Head CT w/o contrast   Result Value Ref Range    Radiologist flags (Urgent)      Acute subdural hematomas with mass effect as described    Impression    Impression:  1. Active hemorrhage superimposed upon Acute and chronic left-sided  mixed density subdural measuring up to 2.3 cm in maximum thickness  with 12 mm of rightward midline shift and trace left-sided uncal  herniation.  2. Acute subdural along the right frontal convexity measuring up to 7  mm in maximum thickness.    [Urgent Result: Acute subdural hematomas with mass effect as described  above.] Concern for active hemorrhage.    Finding was identified on 6/22/2022 6:04 PM.     Dr. Sanchez was contacted by Dr." Madhu and Dr. Lindquist at  6/22/2022 6:09 PM and was again contacted at 6:30 PM and verbalized  understanding of the urgent finding.      I have personally reviewed the examination and initial interpretation  and I agree with the findings.    BONI LINDQUIST MD         SYSTEM ID:  [id number]   Cervical spine CT w/o contrast    Impression    Impression:   No acute fracture or traumatic subluxation.    I have personally reviewed the examination and initial interpretation  and I agree with the findings.    BONI LINDQUIST MD         SYSTEM ID:  [id number]   XR Chest Port 1 View    Impression    IMPRESSION:  ET tube is 2.5 cm above the leo. No acute consolidative airspace  disease. Left lower lobe atelectasis suspected.    I have personally reviewed the examination and initial interpretation  and I agree with the findings.    ROYA EDMOND MD         SYSTEM ID:  [id number]   CT Head w/o Contrast    Impression    Impression:  1. Postsurgical changes of left frontoparietal craniotomy and subdural  hematoma evacuation.  2. Decreased size of the heterogeneous fluid collection overlying the  left cerebral convexity.  3. Decreased rightward midline shift. Decreased effacement of the left  lateral ventricle.  4. Stable appearance of the subdural hematomas overlying the right  cerebral convexity and anterior falx.    I have personally reviewed the examination and initial interpretation  and I agree with the findings.    OSVALDO CONNOLLY MD         SYSTEM ID:  [id number]   XR Chest Port 1 View    Impression    IMPRESSION:   1. ET tube is in the mid thoracic trachea.  2. Decreased perihilar pulmonary opacities.    I have personally reviewed the examination and initial interpretation  and I agree with the findings.    ROYA EDMOND MD         SYSTEM ID:  [id number]   CT Head w/o Contrast    Impression    Impression:    1. Reduced size of subdural fluid collection deep to the left  frontoparietal craniotomy.  2. Reduced  mass effect and left to right midline shift now measuring 7  mm previously 13 mm when measured in a similar fashion.  3. Similar appearance of subdural hemorrhage along the anterior falx  and extending along the right anterior cranial fossa.  4. Unchanged appearance of small subdural hemorrhage along the  bilateral tentorial leaflets.     I have personally reviewed the examination and initial interpretation  and I agree with the findings.    OSVALDO CONNOLLY MD         SYSTEM ID:  [id number]   XR Abdomen Port 1 View    Impression    Impression:   1. Enteric tube is subdiaphragmatic with tip and sidehole projecting  over the expected location of the stomach  2. Increased bibasilar pulmonary opacities.    I have personally reviewed the examination and initial interpretation  and I agree with the findings.    DARSHAN DIXON MD         SYSTEM ID:  [id number]   CT Head w/o Contrast    Impression    Impression:    1. Slightly reduced size of subdural fluid collection deep to the left  frontotemporal craniotomy site.  2. Unchanged mass effect on the left cerebrum and unchanged left to  right midline shift.  3. Unchanged additional subdural fluid collection along the right  anterior cranial fossa and extension along the anterior falx.  4. Unchanged appearance of small subdural hemorrhages along the  bilateral tentorial leaflets..     I have personally reviewed the examination and initial interpretation  and I agree with the findings.    OSVALDO CONNOLLY MD         SYSTEM ID:  [id number]   XR Chest Port 1 View    Impression    Impression:   ET tube no longer visualized. Increased interstitial pulmonary edema  and bibasilar atelectasis and/or pneumonia.    I have personally reviewed the examination and initial interpretation  and I agree with the findings.    ANGELIQUE DENTON MD         SYSTEM ID:  [id number]   XR Chest Port 1 View    Impression    Impression:   1. Slightly decreased right lower lobe opacities.  2. Slight increase in dense  retrocardiac consolidation which may  represent atelectasis versus infection/aspiration.    I have personally reviewed the examination and initial interpretation  and I agree with the findings.    GISELE BURGESS MD         SYSTEM ID:  [id number]   CT Head w/o Contrast    Impression    Impression:  1. Unchanged size of left cerebral convexity subdural fluid collection  status post subdural drain removal.  2. Unchanged size of right frontal subdural hemorrhage with extension  along the anterior falx.  3. Unchanged size of small subdural hemorrhage extending along the  tentorial leaflets.  4. Unchanged 6 mm of left-to-right midline shift.    I have personally reviewed the examination and initial interpretation  and I agree with the findings.    SAVANNA COHEN MD         SYSTEM ID:  [id number]   XR Chest Port 1 View    Impression    Impression: Increased effusions, atelectasis and interstitial edema.    I have personally reviewed the examination and initial interpretation  and I agree with the findings.    JOVANA CORRIGAN MD         SYSTEM ID:  [id number]   XR Chest Port 1 View    Impression    IMPRESSION:   1. Similar bibasilar predominant pulmonary opacities concerning for  aspiration or infection. Superimposed atelectasis and probable mild  pulmonary edema is suspected.  2. Stable pleural effusions.    I have personally reviewed the examination and initial interpretation  and I agree with the findings.    LOS BARAJAS DO         SYSTEM ID:  [id number]   XR Abdomen Port 1 View    Impression    IMPRESSION:   Enteric tube tip and side port projecting over the stomach..    I have personally reviewed the examination and initial interpretation  and I agree with the findings.    ROYA EDMOND MD         SYSTEM ID:  [id number]   XR Chest Port 1 View    Impression    IMPRESSION:   Trace bilateral pleural effusions with overlying bibasilar opacities,  improved from prior.    I have personally reviewed the examination and  initial interpretation  and I agree with the findings.    LOS BARAJAS DO         SYSTEM ID:  [id number]   XR Abdomen Port 1 View    Impression    Impression: Enteric tube tip and sidehole in the stomach.    LOS BARAJAS DO         SYSTEM ID:  [id number]   XR Abdomen Port 1 View    Impression    IMPRESSION:   Enteric tube tip and side-port overlying the stomach.    I have personally reviewed the examination and initial interpretation  and I agree with the findings.    LOS BARAJAS DO         SYSTEM ID:  [id number]   CT Head w/o Contrast    Impression    Impression:    1. Increased size of left cerebral convexity subdural hematoma without  increase in density and with decreased size of left frontal seen  subdural hemorrhage suggests redistribution of blood products.  Attention is recommended on follow-up imaging.  2. stable size of right cerebral convexity subdural hematoma.  3. Stable 6 mm of rightward midline shift.         BONI LINDQUIST MD         SYSTEM ID:  [id number]   XR Chest Port 1 View    Impression    IMPRESSION: Continued right lower lung atelectasis or mild edema. No  significant changes otherwise.    JOVANA CORRIGAN MD         SYSTEM ID:  [id number]   XR Chest Port 1 View    Impression    IMPRESSION:     1. Left upper extremity PICC line terminates in the high SVC. No  pneumothorax.  2. Continued right lower lung atelectasis or mild edema. No  significant changes otherwise.    I have personally reviewed the examination and initial interpretation  and I agree with the findings.    JOVANA CORRIGAN MD         SYSTEM ID:  [id number]   CT Head w/o Contrast    Impression    Impression:    1. Stable size and the distribution of the subdural hemorrhage along  the right frontal and left frontoparietal convexities, falx and  tentorium.  2. Stable postsurgical changes of left frontoparietal craniotomy and  subdural hematoma evacuation.     I have personally reviewed the examination and initial  interpretation  and I agree with the findings.    BONI LINDQUIST MD         SYSTEM ID:  [id number]   CT Chest Pulmonary Embolism w Contrast    Impression    IMPRESSION:   1. Exam is negative   for acute pulmonary embolism.    Evidence for right heart strain or increased right heart pressures?   is not present.     2. Mucous plugging and linear atelectasis in the right lower lobe.  Aspiration or other infectious consolidation cannot be ruled out.    3. Ectatic thoracic aorta, approximately 4.2 cm.    In the event of a positive result for acute pulmonary embolism  resulting in right heart strain, consider calling the   [facility] patient placement ([phone]) for PERT (Pulmonary Embolism  Response Team) Activation?    PERT -- Pulmonary Embolism Response Team (Multidisciplinary team  including cardiology, interventional radiology, critical care,  hematology)    I have personally reviewed the examination and initial interpretation  and I agree with the findings.    JOVANA CORRIGAN MD         SYSTEM ID:  [id number]   XR Chest Port 1 View    Impression    IMPRESSION: Endotracheal tube tip in the mid to distal thoracic  trachea. Bibasilar atelectasis. Left upper portion of the PICC line  tip possibly in the upper portion of the azygos vein. Atherosclerosis.    JOVANA CORRIGAN MD         SYSTEM ID:  [id number]   XR Abdomen Port 1 View    Impression    IMPRESSION:   1. Enteric tube with tip in the stomach and side-port at or just  distal to the gastroesophageal junction.  2. Nonobstructive bowel gas pattern.    I have personally reviewed the examination and initial interpretation  and I agree with the findings.    ROHAN MAGALLON MD         SYSTEM ID:  [id number]   XR Chest Port 1 View    Impression    IMPRESSION:  1. Increased perihilar and bibasilar pulmonary opacities which may  represent atelectasis, edema and/or infection.  2. Stable support devices.     I have personally reviewed the examination and initial  interpretation  and I agree with the findings.    JOVANA CORRIGAN MD         SYSTEM ID:  [id number]   CTA Abdomen Pelvis with Contrast    Impression    IMPRESSION:   1. No evidence of gastrointestinal bleed as questioned.  2. Interval new subacute appearing right sacral insufficiency fracture  and subacute appearing left pubic ramus fracture since 5/12/2022.  3. Age-indeterminate compression deformity of T10 with approximately  30% height loss. Multiple subacute appearing anterior rib fractures,  and unchanged appearance of L1, L3, and L5 compression deformities.  4. Small sliding-type hiatal hernia with hemostasis clip noted in the  gastric fundus.  5. Aortobiiliac atherosclerotic atherosclerosis with multiple foci of  arterial stenosis including the renal arterial origins, common iliac  vessels, and near total occlusions of the proximal internal iliac  arteries and common femoral arteries proximally.  6. Unchanged appearance of left inguinal hernia containing a loop of  sigmoid colon, no signs of obstruction.  7. 5 mm hypodensity in the pancreatic head may represent a sidebranch  IPMN although MRI is recommended to further characterize on an  outpatient basis, pancreatic cystic follow-up is recommended below.  Per [facility] criteria.  8. Indeterminate hepatic hypodensities are too small to further  characterize but could be evaluated further at time of MRI for above  pancreatic lesion.  9. 1.4 cm right ovarian cyst for which annual follow-up is recommended  in a postmenopausal female.  10. Trace effusions and associated atelectasis.    [facility] recommendations for asymptomatic pancreatic  cysts, modified from international consensus guidelines*   Size of largest cyst:   Less than 1 cm: Follow-up imaging in 6 months to 1 year, then lengthen  interval to 2-3 years if no change.  1-2 cm: Follow-up imaging at 6 months, then yearly for 2 years, then  lengthen interval if no change.   The  optimal initial study or first follow-up exam is MRI performed  with intravenous gadolinium contrast to allow full cyst  characterization. Once characterized, future follow-up MRIs can be  performed without contrast. If MRI is contraindicated, CT with  contrast is recommended.   GI consultation for possible endoscopic ultrasound is recommended for  cysts with the following features: Size > 2 cm, >20% growth on  followup, wall thickening or enhancement, mural nodule, duct > 5 mm,  or abrupt change in duct caliber with distal atrophy. GI or surgical  consultation is also recommended for symptomatic cysts.   *Reference: International Consensus Guidelines for Management of IPMN  and MCN of the pancreas. Pancreatology: 12:(2012); 183-197.    I have personally reviewed the examination and initial interpretation  and I agree with the findings.    ILIANA JEFFERSON MD         SYSTEM ID:  [id number]   XR Abdomen Port 1 View    Impression    IMPRESSION:   1. Feeding tube with tip in the proximal jejunum.   2. Enteric tube with tip and side-port overlying the stomach.  3. Non obstructive bowel gas pattern.  4. Small bilateral pleural effusions.    I have personally reviewed the examination and initial interpretation  and I agree with the findings.    ROYA EDMOND MD         SYSTEM ID:  [id number]   Echo Complete   Result Value Ref Range    LVEF  60-65%    Echocardiogram Limited   Result Value Ref Range    LVEF  60-65%        Reviewed recent labs, comments:   WBC 8.8  Hgb 9.0  Plt 486k

## 2022-07-29 NOTE — PROGRESS NOTES
Care Management Follow Up    Length of Stay (days): 37    Expected Discharge Date: 08/03/2022     Concerns to be Addressed:     GOC  Patient plan of care discussed at interdisciplinary rounds: Yes    Anticipated Discharge Disposition:  TCU with hospice?     Anticipated Discharge Services:    Anticipated Discharge DME:      Patient/family educated on Medicare website which has current facility and service quality ratings:    Education Provided on the Discharge Plan:    Patient/Family in Agreement with the Plan:      Referrals Placed by CM/SW:    Private pay costs discussed: Not applicable    Additional Information:  GOC discussion was had today via tablet/phone with patient's family,  and sisters, patient is likely to go on comfort cares/hospice per [facility] provider EDUARDO Bhatia, neurosurgery provider also involved in care discussion. Family is talking to palliative services provider today about options, hospice, likely to need hospice TCU placement next week. Patient still has rectal tube which may pose as a barrier to placement. RNCC and SW continue to follow.    JOSÉ MIGUEL VerdugoN, BA, RN, CMSRN, RNCC  Covering Units 6D/OBS  Pager: [phone]  Phone: [phone]  6th floor Weekend/Holiday Pager: [phone]  Observation weekend/after hours: [phone]

## 2022-07-29 NOTE — PROGRESS NOTES
"[facility]  Palliative Care Daily Progress Note       Recommendations & Counseling     Per family request, called patient's daughter Allyson by phone who had her sister Maria Del Carmen (patient's other daughter) join the phone call as well.  Family had questions after a care conference that was completed this morning and wanted to talk about what a comfort focus care path and hospice philosophy of care would look like.  They shared with me that even the best case scenario for their mom in this current clinical situation would not be an acceptable quality of life for her. Patient's daughters noted that they have had very clear and concrete discussions with their mother about what would and would not be acceptable quality of life especially around persistent mental status changes and dependence for cares as they have had to loved ones with significant neurologic injury such as stroke in past years.  Patient's daughters feel that even a time-limited trial of continuing current cares in a long-term care facility over the course of weeks to months would be \"prolonging her suffering\".  We discussed comfort focused cares and hospice and what this would look like.  Our discussion included that on this care path, patient would transition off of tube feeds and patient would be offered food and water by mouth if she wanted it and that her mouth would be moist for comfort but that she would not continue with artificial nutrition and hydration support as this would be prolonging an unacceptable quality of life and would prolong a state that patient would never want to be in--with this, artificial nutrition would be considered a nonbeneficial treatment and not contributing to comfort or quality of life and instead would be promoting suffering.  Patient's daughters shared that there is extended family that is struggling with coping with patient's acute clinical decline, discussed option of a " second care conference that includes extended family to help with communication and support with coping of extended family, daughters want to think about this more.      At this time, patient's daughters Allyson and Maria Del Carmen do not want to transition to comfort focused cares yet, but are interested in learning more about hospice agencies that would service care facilities closer to patient's home and are okay with a social work consult for hospice referral.  Patient's daughters would be open to a hospice informational visit as well.    Patient's daughters do not feel that patient could be cared for at home and would like to explore options for hospice facilities or hospice care at a nursing home.    Patient's daughters are asking for option for patient 6 grandchildren to come visit with patient before transition to comfort cares, this request was passed along to primary team.  I did discuss with the patient's daughters that if she did transition to comfort cares in the hospital that the visitor policy would change to 6 visitors a day (2 at a time).    Total time spent was 45 minutes,  >50% of time was spent counseling and/or coordination of care regarding goals of care, family support, decision support, support with coping.  Recommendations discussed with primary team in person, by note.    Livia Crews -[phone]   Team Consult Pager [phone] (answered 8am-430pm M-F) - ok to text page via [email or web address] / After-Hours Answering Service [phone] / Palliative Clinic in the [facility] at the [facility] - [phone] (scheduling); [phone] (triage).      Assessments          Louise Benítez is a 76 year old female with PMHx myelodysplastic syndrome, seronegative RA, chronic microcytic anemia, PAD, and recent L SDH (5/12/2022) who was admitted from her TCU on 6/22/2022 after an unwitnessed fall. Was subsequently found to have an acute on chronic L SDH, a new acute R SDH, and increased midline shift w/ trace  L uncal herniation now s/p L craniotomy w/ hematoma evacuation. Course complicated by GIB requiring reintubation. Now extubated.     Today, the patient was seen for:  Acute on chronic L SDH s/p L craniotomy w/ hematoma evacuation  Acute R SDH  Dysphagia  Gastric and colonic ulcers, GIB  Goals of care  Support with coping  Decision support  Family support    Prognosis, Goals, or Advance Care Planning was addressed today with: Yes.  Mood, coping, and/or meaning in the context of serious illness were addressed today: Yes.  Summary/Comments:            Interval History:     Chart review/discussion with unit or clinical team members:   Care conference this morning with medicine and neurosurgery teams and patient's family.    Per patient or family/caregivers today:  See above for details of discussion with family.           Review of Systems:     Besides above, an additional system ROS was unable to be reviewed as patient is not able to respond consistently to yes/no questions or interview questions.          Medications:     I have reviewed this patient's medication profile and medications during this hospitalization.           Physical Exam:   Vitals were reviewed  Temp: 98.7  F (37.1  C) Temp src: Oral BP: 132/56 Pulse: 93   Resp: 16 SpO2: 98 % O2 Device: None (Room air)    Gen: NAD, sitting up in chair, nonverbal  HEENT: normocephalic, no scleral icterus, no perioral lesions, oral mucosa moist  Pulm: No increased work of breathing, no tachypnea  LE: no edema bilaterally  Skin: no rash or jaundice on limited exam  Neuro: Alert but nonverbal, not consistently answering yes/no questions, inconsistently tracking with eyes, moving all extremities  Psych: No agitation at time of exam               Data Reviewed:     Reviewed recent pertinent imaging.    Reviewed recent labs.

## 2022-07-29 NOTE — PLAN OF CARE
"/62 (BP Location: Right arm)   Pulse 85   Temp 98.1  F (36.7  C) (Axillary)   Resp 18   Ht 1.626 m (5' 4.02\")   Wt 70.8 kg (156 lb 1.4 oz)   SpO2 99%   BMI 26.78 kg/m         Neuro/Musculoskeletal:  Alert, inconsistently follows command, able to mumble some words.   Cardiac:  SR.  VSS.    Respiratory:  Sating in the 95s on RA all  night.  GI/:  Adequate urine output via external catheter.    Diet/Appetite: NPO,  On TF at 45ml/hr, FWF 30ml Q4.  Activity:  Assist of 2 using lift with turns  Pain:  CPOT 0-1  Skin:  No new deficits noted. See Flow sheet  LDAs + Drips/IVF:   PIV left arm on KVO  Female external cath  PEG tube with scant yellowish discharge, surrounding site is red cares completed  Protocols/Labs: K, Phos, Mag replacement protocol.     Problem: Plan of Care - These are the overarching goals to be used throughout the patient stay.    Goal: Plan of Care Review/Shift Note  Description: The Plan of Care Review/Shift note should be completed every shift.  The Outcome Evaluation is a brief statement about your assessment that the patient is improving, declining, or no change.  This information will be displayed automatically on your shift note.  Outcome: Ongoing, Not Progressing  Goal: Patient-Specific Goal (Individualized)  Description: You can add care plan individualizations to a care plan. Examples of Individualization might be:  \"Parent requests to be called daily at 9am for status\", \"I have a hard time hearing out of my right ear\", or \"Do not touch me to wake me up as it startles me\".  Outcome: Ongoing, Not Progressing  Goal: Absence of Hospital-Acquired Illness or Injury  Outcome: Ongoing, Progressing  Intervention: Identify and Manage Fall Risk  Recent Flowsheet Documentation  Taken 7/25/2022 0000 by Lord Shaggy Dent RN  Safety Promotion/Fall Prevention:   activity supervised   assistive device/personal items within reach   bed alarm on   clutter free environment maintained   fall " prevention program maintained   increased rounding and observation   increase visualization of patient   lighting adjusted   patient and family education   room near nurse's station  Taken 7/24/2022 2000 by Lord Shaggy Dent RN  Safety Promotion/Fall Prevention:   activity supervised   assistive device/personal items within reach   bed alarm on   clutter free environment maintained   fall prevention program maintained   increased rounding and observation   increase visualization of patient   lighting adjusted   patient and family education   room near nurse's station  Intervention: Prevent Skin Injury  Recent Flowsheet Documentation  Taken 7/25/2022 0000 by Lord Shaggy Dent RN  Body Position:   left   turned  Taken 7/24/2022 2200 by Lord Shaggy Dent RN  Body Position:   right   turned  Taken 7/24/2022 2000 by Lord Shaggy Dent RN  Body Position:   left   turned  Intervention: Prevent and Manage VTE (Venous Thromboembolism) Risk  Recent Flowsheet Documentation  Taken 7/25/2022 0000 by Lord Shaggy Dent RN  Range of Motion: ROM (range of motion) performed  VTE Prevention/Management:   SCDs (sequential compression devices) off   SCDs (sequential compression devices) on  Activity Management: activity adjusted per tolerance  Taken 7/24/2022 2000 by Lord Shaggy Dent RN  Range of Motion: ROM (range of motion) performed  VTE Prevention/Management:   SCDs (sequential compression devices) off   SCDs (sequential compression devices) on  Activity Management: activity adjusted per tolerance  Goal: Optimal Comfort and Wellbeing  Outcome: Ongoing, Progressing  Intervention: Monitor Pain and Promote Comfort  Recent Flowsheet Documentation  Taken 7/25/2022 0000 by Lord Shaggy Dent RN  Pain Management Interventions:   rest   repositioned   care clustered  Taken 7/24/2022 2000 by Lord Shaggy Dent RN  Pain Management Interventions:   rest   repositioned   care clustered  Intervention: Provide  Person-Centered Care  Recent Flowsheet Documentation  Taken 7/25/2022 0000 by Lord Shaggy Dent RN  Trust Relationship/Rapport: care explained  Taken 7/24/2022 2000 by Lord Shaggy Dent RN  Trust Relationship/Rapport: care explained  Goal: Readiness for Transition of Care  Outcome: Ongoing, Progressing   Goal Outcome Evaluation:                      Problem: Plan of Care - These are the overarching goals to be used throughout the patient stay.    Goal: Absence of Hospital-Acquired Illness or Injury  Intervention: Prevent Skin Injury  Recent Flowsheet Documentation  Taken 7/29/2022 0420 by Marissa Hurtado RN  Body Position:   weight shifting   turned   right  Taken 7/29/2022 0000 by Marissa Hurtado RN  Body Position:   weight shifting   turned   right  Taken 7/28/2022 2000 by Marissa Hurtado RN  Body Position:   weight shifting   turned   right     Problem: Risk for Delirium  Goal: Optimal Coping  Outcome: Ongoing, Progressing     Problem: Risk for Delirium  Goal: Improved Behavioral Control  Outcome: Ongoing, Progressing  Intervention: Prevent and Manage Agitation  Recent Flowsheet Documentation  Taken 7/29/2022 0420 by Marissa Hurtado RN  Complementary Therapy: music therapy provided  Taken 7/29/2022 0000 by Marissa Hurtado RN  Complementary Therapy: music therapy provided  Taken 7/28/2022 2000 by Marissa Hurtado RN  Complementary Therapy: music therapy provided

## 2022-07-29 NOTE — PROGRESS NOTES
[facility]    Medicine Progress Note - Hospitalist Service, GOLD TEAM 6    Date of Admission:  6/22/2022    Assessment & Plan  Louise Benítez is a 76-year-old F with a history of seronegative rheumatoid arthritis, osteoporosis, peripheral artery disease, and myelodysplastic syndrome admitted 6/22 after a ground level fall resulting in subdural hematoma with midline shift s/p emergent craniotomy and SDH evacuation. Course has been complicated by nonconvulsive status epilepticus (resolved), GI bleeding s/p EGD and clipping of gastric ulcer 7/5, and aspiration s/p PEG placement 7/15. Noted to have bloody stools the evening of 7/17 with acute hemoglobin drop (10.1 -> 8.7). Medicine consulted for recommendations surrounding management of GI bleed.      CARE CONFERENCE 7/29:  Met with patient's  and daughters for a care conference with neurosurgery today. Discussed goals of care. Neurosurgery discussed prognosis. Family informed about option for comfort care/hospice since they expressed that current functional status is not what Louise would have wanted. Family requests no escalation of care at this time. Palliative care further discussed options with family this afternoon (see note). At this time, family still considering comfort care/hospice but needs more time.     # Sepsis   # Cellulitis at G tube insertion site (placed by IR 7/15)  Patient developed fever and leukocytosis 7/22.  CXR showed probably atelectasis, which could conceal a PNA. UTI without infection, procal only 0.06. Lactic acid normal. RUQ tenderness on 7/23 but RUQ US negative for acute findings. Respiratory virus panel positive for Rhino/Enterovirus (not convincing as cause of SIRS). On the evening of 7/23, RN reported redness and purulent discharge around PEG insertion site. WBC has normalized since starting on Abx and she remains afebrile.  - CT abdomen 7/24 showed findings of inflammation without  any isolated fluid collection   - Wound culture x2 with MSSA.   -Antibiotics: Vanco/Zosyn 7/21pm- 7/26. Cont cefazolin (started 7/26) Complete 10 day course of therapy (End date 7/31)  - G Tube placed on 7/15 by IR. T fastener removed 7/26. IR did not feel G tube exchange indicated.   - Blood culture NGTD     #Loose stools  Suspect 2/2 tube feeds.   -C.Diff negative  -Cont banana flakes, schedule loperamide (will increase from 2 mg BID to 4 mg BID)  -Rectal tube    #Mild transaminitis  AST mildly elevated at 42 and alk phos elevated to 123. Normal bilirubin, ALT.   -Monitor     # Acute blood loss anemia on chronic microcytic anemia  # GI bleed  # Gastric ulcers complicated by GI bleeding s/p EGD 7/5 with clipping of 5mm ulcers in posterior gastric wall  Bloody stool output with associated Hgb drop on 7/17.  Transfused 1 unit PRBC.  Colonoscopy done 7/19  showed ulcers at the splenic flexure that were likely source of bleeding but have stopped bleeding on their own.  Hgb initially improved after colonoscopy but acutely declined on 7/23.  Lovenox held but no evidence of recurrent GI bleed  -Lovenox for VTE prophylaxis resumed 7/26  - Hgb remains stable at 9 today   - Transfuse if hgb drops <7  - Contact GI with any signs of recurrent bleed   - Cont PO PPI BID x 12 weeks (per GI rec)     # Subdural hematoma with midline shift s/p emergent craniotomy with SDH evacuation (6/22/22)  # Nonconvulsive status epilepticus (s/p vEEG 7/8)  # Frequent falls   Initial fall w/ SDH occurred on 8/12 and pt was admitted to [facility] neuro ICU from 5/12-5/20. Discharged to TCU. She did return home from TCU and was doing well. On 6/22 she suffered a mechanical fall while at home. In the ER, CT scan showed a new SDH w/ midline shift. She subsequently underwent L craniotomy for SDH evacuation and was extubated on post-op day 1.  CT head 7/22 - no acute changes since 7/14, no explanations for febrile spikes.  - Mentation continues to wax and  sam day to day   - Neurosurgery continues to follow   - Continue Keppra 1 gram BID until seen by neurology outpatient  - Repeat CT head today  - Continue on delirium precautions  - Per neurosurgery, it will likely take on the order of weeks to months to see her full capacity for neurologic recovery      # Risk of malnutrition  - Continue G tube feeding for now per RD dosing  - SLP to re-evaluate for PO intake   - Continue Potassium, Phosphorus, and Magnesium protocols daily     # Hx of Myelodysplasic syndrome  # Mild leukocytosis  - PTA darbepoietin (Last dose given 7/26)  - Continue to monitor     # HTN, improved  -Increased amlodipine from 5 to10 mg daily 7/27 for elevated blood pressure  - Monitor BP and adjust accordingly     # Seronegative rheumatoid arthritis  - Continue PTA hydroxychloroquine 400mg daily   - Has rheumatology follow up with Dr. Us on 9/1     # Osteoporosis  - Hold PTA calcium/vitamin D  - Holding PTA denosumab while inpatient      # Hyperlipidemia  # Peripheral arterial disease  - Not on a PTA statin or antiplatelet     # Aspiration s/p IR G tube placement (7/15), resolved    Diet: NPO per Anesthesia Guidelines for Procedure/Surgery Except for: Meds  Adult Formula Drip Feeding: Continuous [employer] Peptide 1.5; Gastrostomy; Goal Rate: 45; mL/hr; Medication - Feeding Tube Flush Frequency: At least 15-30 mL water before and after medication administration and with tube clogging; Amount to Send ...    DVT Prophylaxis: Restarted lovenox  Vasquez Catheter: Not present  Central Lines: None  Cardiac Monitoring: None  Code Status: No CPR- Do NOT Intubate      Disposition Plan      Expected Discharge Date: 08/03/2022, 12:00 PM      Discharge Comments: Care conference 7/29. Family requests        The patient's care was discussed with the Attending physician, Dr. Holt, and bedside nurse.   Updated patient's , Jd, at bedside.     Jillian Simmons PA-C  Hospitalist Service, GOLD TEAM 6  M  "[facility]  Securely message with the [employer] Web Console (learn more here)  Text page via [facility] Paging/Directory   Please see signed in provider for up to date coverage information    ______________________________________    Interval History   Per bedside nurse, no new issues.  Continues to have high output from rectal tube. Unable to obtain ROS from patient-does not answer questions.     Data reviewed today: I reviewed all medications, new labs and imaging results over the last 24 hours. Please see discussion of these results in the A/P.    Physical Exam     Vital Signs: Temp: 98.7  F (37.1  C) Temp src: Oral BP: 132/56 Pulse: 93     Resp: 16 SpO2: 95 % O2 Device: None (Room air)    Weight: 156 lbs 1.37 oz    General: Alert.  Appears comfortable lying on hospital bed.   HEENT: . Anicteric sclera.  Oral mucosa moist. Neck supple. No JVD.  Resp: No signs of respiratory distress. Lungs clear to ausculation bilaterally without rales, rhonchi or wheezes.   Cardiac: RRR. S1 and S2 normal intensity. No murmurs appreciated.   GI: Abdomen soft, non-tender, non-distended.  No masses, hepatomegaly or splenomegaly. PEG site with no drainage (resovled) and no surrounding erythema (resolved).    : No taylor  Neuro:  Follows simple commands (open eyes, squeeze hands, did a \"thumbs up\"). Did not answer questions. Moves all extremities. Gait not tested.   Psych: Blunted affect  Derm: Skin warm and dry. No rashes or skin breakdown. No jaundice.   Extremities: No cyanosis, clubbing or edema      Data   Recent Labs   Lab 07/29/22  0734 07/28/22  0725 07/27/22  0703 07/26/22  0708   WBC 8.6 8.8 8.8 10.5   HGB 8.2* 9.0* 8.3* 8.6*    100 100 97   * 486* 504* 567*     --  141 141   POTASSIUM 4.6  --  4.3 4.4   CHLORIDE 103  --  107 108*   CO2 24  --  24 23   BUN 23.5*  --  23.2* 21.5   CR 0.43*  --  0.40* 0.43*   ANIONGAP 10  --  10 10   MENDEZ 9.4  --  9.6 9.5   *  --  " 116* 112*   ALBUMIN 3.3*  --  3.5  --    PROTTOTAL 6.1*  --  6.3*  --    BILITOTAL 0.2  --  0.2  --    ALKPHOS 123*  --  160*  --    ALT 10  --  26  --    AST 42*  --  40*  --        Recent Results (from the past 24 hour(s))   CT Head w/o Contrast    Narrative    EXAM: CT HEAD W/O CONTRAST  7/28/2022 3:37 PM     HISTORY:  follow up for recent SDH       COMPARISON:  CT head, 7/22/2022    TECHNIQUE: Using multidetector thin collimation helical acquisition  technique, axial, coronal and sagittal CT images from the skull base  to the vertex were obtained without intravenous contrast.   (topogram) image(s) also obtained and reviewed.    FINDINGS:  Stable postsurgical changes of left frontal craniotomy and subdural  hematoma evacuation. Left frontal dural thickening and extra-axial  fluid collection beneath craniotomy are similar to prior. The fluid  collection measures up to 3 mm with unchanged right-to-left midline  shift measuring 4 mm at the septum pellucidum.    No new intracranial hemorrhage. No acute loss of gray-white matter  differentiation in the cerebral hemispheres. Ventricles are  proportionate to the cerebral sulci. There is cerebral volume loss.  Clear basal cisterns.    No acute osseous change seen in the bony calvaria and the bones of the  skull base. Persistent bilateral opacified mastoid air cells and mild  mucosal thickening in the left sphenoid sinus, ethmoid air cells and  right maxillary sinus. Grossly normal orbits.       Impression    IMPRESSION:  1. Stable extra-axial fluid collection and associated right to left  midline shift.  2. Stable postsurgical changes of left frontal craniotomy and subdural  hematoma evacuation.  3. Persistent bilateral opacified mastoid air cells and mild mucosal  thickening in paranasal sinuses.    I have personally reviewed the examination and initial interpretation  and I agree with the findings.    BONI LINDQUIST MD         SYSTEM ID:  [id number]

## 2022-07-29 NOTE — PROGRESS NOTES
[facility], [place]   07/29/2022  Neurosurgery Progress Note:    Assessment:  Ms. Benítez is a 76-year-old female with PMHx myelodysplastic syndrome, seronegative RA, chronic microcytic anemia, PAD, and recent L SDH (5/12/2022) who arrived to [facility] via EMS after an unwitnessed fall at home.  Shortly after arriving, the patient was complaining of a significant headache and nausea, and then her right pupil dilated.  She was no longer protecting her airway, so she was sedated and intubated.  Her right eye pupil then returned to a normal caliber.  A head CT demonstrated a left acute subdural approximately 2.3 cm thick with 12 mm of rightward midline shift.  She was taken to the OR on 6/22 for emergent L craniotomy for SDH evacuation. She was extubated on POD1. Concerns for tube feed aspiration on 6/25.  She underwent emergent EGD by GI due to sharp drop in hemoglobin as well as melanotic stools.  She had 1 gastric ulcer clipped which was the presumed culprit.  Due to requiring the EGD she needed intubation during this time, therefore she was intubated on 7/4/2022 for EGD.  She was also connected to EEG on 7/4/2022 found to be in nonconvulsive status received Keppra load and Ativan and was started on Versed overnight.  On 7/5/2022, her Versed drip was weaned off with confirmation of no status epilepticus. On 7/8 she was extubated and remains stable.  Received PEG on 7/15/2022 by IR.  Underwent colonoscopy with GI on 7/19/2022.  Transferred to medicine primary on 7/19/2022. On 7/24, drainage noted at PEG site, swab culture sent for analysis per primary team. WBC downtrending on zosyn.    Other diagnoses include:  #Brain compression  #Open evacuation of hematoma on day of admission   #Hyponatremia  #Pulmonary edema  #GI bleed  #Uremia  #hypokalemia    Recommendations:  Continue Keppra 1 g twice daily till seen by neurology as outpatient  Ok for lovenox from nsgy perspective  Remaining cares per  primary team    Neurosurgery will continue to follow  -----------------------------------  Bruce Graves MD  Neurosurgery Resident     Please contact neurosurgery resident on call with questions.    Dial * * *[phone], enter [phone] when prompted.    -----------------------------------    Interval History: FCC today. Otherwise neurologically stable.     Objective:   Temp:  [98  F (36.7  C)-98.9  F (37.2  C)] 98.2  F (36.8  C)  Pulse:  [77-93] 84  Resp:  [9-18] 14  BP: (115-146)/(54-70) 128/60  SpO2:  [95 %-99 %] 98 %  I/O last 3 completed shifts:  In: 1320 [NG/GT:330]  Out: 1950 [Urine:1350; Stool:600]        Gen: Appears comfortable, NAD, laying in bed  Abd: PEG in place  HEENT: Left craniotomy incision healing well  Wound: Incision closed with staples-now removed, incision appears clean dry-no signs of leaking, no wetness appreciated.  See below for photo from 7/5/2022 evening.  Incision cleaned with ChloraPrep.  Pulm: On RA, no increased work of breathing, symmetric chest rise  Neurologic:  Eyes open spontaneously, minimally tracking, gaze at midline without preference  Follows commands in all 4 extremities  Reflexes 2+, downgoing Babinski's bilaterally    LABS:  Recent Labs   Lab 07/27/22  0703 07/26/22  0708 07/24/22  2315 07/24/22  0756 07/23/22  0915    141  --   --  143   POTASSIUM 4.3 4.4  --   --  4.4   CHLORIDE 107 108*  --   --  107   CO2 24 23  --   --  30*   ANIONGAP 10 10  --   --  6*   * 112* 86  --  110*   BUN 23.2* 21.5  --   --  21.2   CR 0.40* 0.43*  --  0.41* 0.46*   MENDEZ 9.6 9.5  --   --  9.1       Recent Labs   Lab 07/29/22  0734   WBC 8.6   RBC 2.70*   HGB 8.2*   HCT 27.1*      MCH 30.4   MCHC 30.3*   RDW 17.2*   *       IMAGING:  Recent Results (from the past 24 hour(s))   CT Head w/o Contrast    Narrative    EXAM: CT HEAD W/O CONTRAST  7/28/2022 3:37 PM     HISTORY:  follow up for recent SDH       COMPARISON:  CT head, 7/22/2022    TECHNIQUE: Using multidetector thin  collimation helical acquisition  technique, axial, coronal and sagittal CT images from the skull base  to the vertex were obtained without intravenous contrast.   (topogram) image(s) also obtained and reviewed.    FINDINGS:  Stable postsurgical changes of left frontal craniotomy and subdural  hematoma evacuation. Left frontal dural thickening and extra-axial  fluid collection beneath craniotomy are similar to prior. The fluid  collection measures up to 3 mm with unchanged right-to-left midline  shift measuring 4 mm at the septum pellucidum.    No new intracranial hemorrhage. No acute loss of gray-white matter  differentiation in the cerebral hemispheres. Ventricles are  proportionate to the cerebral sulci. There is cerebral volume loss.  Clear basal cisterns.    No acute osseous change seen in the bony calvaria and the bones of the  skull base. Persistent bilateral opacified mastoid air cells and mild  mucosal thickening in the left sphenoid sinus, ethmoid air cells and  right maxillary sinus. Grossly normal orbits.       Impression    IMPRESSION:  1. Stable extra-axial fluid collection and associated right to left  midline shift.  2. Stable postsurgical changes of left frontal craniotomy and subdural  hematoma evacuation.  3. Persistent bilateral opacified mastoid air cells and mild mucosal  thickening in paranasal sinuses.    I have personally reviewed the examination and initial interpretation  and I agree with the findings.    BONI LINDQUIST MD         SYSTEM ID:  [id number]

## 2022-07-29 NOTE — PLAN OF CARE
Patient more alert and interactive with staff and  at bedside, nods/shakes head to simple questions, follows commands inconsistently. Still non-verbal. Vitals stable, afebrile. On room air, has congested non-productive cough. G-tube to tube feeds, tolerating. Seen by SLP today,  states he feels patient is improving and hopeful about her progress. External catheter on with adequate urine output, rectal tube intact with liquid stools, immodium given. Sat up in chair with PT for several hours, tolerated well. No events this shift.

## 2022-07-30 NOTE — PLAN OF CARE
"/55 (BP Location: Right arm, Cuff Size: Adult Regular)   Pulse 76   Temp 98  F (36.7  C) (Axillary)   Resp 16   Ht 1.626 m (5' 4.02\")   Wt 70.8 kg (156 lb 1.4 oz)   SpO2 95%   BMI 26.78 kg/m       Patient more alert and interactive with staff . Able to tollow simple commands.nods/shakes head to simple questions.  Pt is still  non-verbal. Vitals stable, afebrile. On room air, has congested productive cough. G-tube to tube feeds, tolerating.  External catheter on with adequate urine output, rectal tube intact with liquid stools, immodium given. Pt is having less stools. 300 ml over night.  Turn Q2H No events this shift.   Problem: Risk for Delirium  Goal: Optimal Coping  Outcome: Ongoing, Progressing     Problem: Risk for Delirium  Goal: Improved Behavioral Control  Intervention: Prevent and Manage Agitation  Recent Flowsheet Documentation  Taken 7/30/2022 0400 by Marissa Hurtado, RN  Complementary Therapy: music therapy provided  Taken 7/30/2022 0000 by Marissa Hurtado, RN  Complementary Therapy: music therapy provided  Taken 7/29/2022 2000 by Marissa Hurtado, RN  Complementary Therapy: music therapy provided   Goal Outcome Evaluation:                      "

## 2022-07-30 NOTE — PROGRESS NOTES
[facility]    Medicine Progress Note - Hospitalist Service, GOLD TEAM 6    Date of Admission:  6/22/2022    Assessment & Plan  Louise Benítez is a 76-year-old F with a history of seronegative rheumatoid arthritis, osteoporosis, peripheral artery disease, and myelodysplastic syndrome admitted 6/22 after a ground level fall resulting in subdural hematoma with midline shift s/p emergent craniotomy and SDH evacuation. Course has been complicated by nonconvulsive status epilepticus (resolved), GI bleeding s/p EGD and clipping of gastric ulcer 7/5, and aspiration s/p PEG placement 7/15. Noted to have bloody stools the evening of 7/17 with acute hemoglobin drop (10.1 -> 8.7). Medicine consulted for recommendations surrounding management of GI bleed.    #Sepsis   #Cellulitis at G tube insertion site (placed by IR 7/15)  Patient developed fever and leukocytosis 7/22.  CXR showed probably atelectasis, which could conceal a PNA. UTI without infection, procal only 0.06. Lactic acid normal. RUQ tenderness on 7/23 but RUQ US negative for acute findings. Respiratory virus panel positive for Rhino/Enterovirus (not convincing as cause of SIRS). On the evening of 7/23, RN reported redness and purulent discharge around PEG insertion site. WBC has normalized since starting on Abx and she remains afebrile.  - CT abdomen 7/24 showed findings of inflammation without any isolated fluid collection   - Wound culture x2 with MSSA.   -Antibiotics: Vanco/Zosyn 7/21pm- 7/26. Cont cefazolin (started 7/26) Complete 10 day course of therapy (End date 7/31)  - G Tube placed on 7/15 by IR. T fastener removed 7/26. IR did not feel G tube exchange indicated.   - Blood culture NGTD     #Loose stools, slow improvement  Suspect 2/2 tube feeds.   -C.Diff negative  -Cont banana flakes, schedule loperamide (will increase from 2 mg BID to 4 mg BID)  -Rectal tube    #Mild transaminitis  AST mildly elevated at 39  and alk phos elevated to 117. Normal bilirubin, ALT.   -Monitor     # Acute blood loss anemia on chronic microcytic anemia  # GI bleed  # Gastric ulcers complicated by GI bleeding s/p EGD 7/5 with clipping of 5mm ulcers in posterior gastric wall  Bloody stool output with associated Hgb drop on 7/17.  Transfused 1 unit PRBC.  Colonoscopy done 7/19  showed ulcers at the splenic flexure that were likely source of bleeding but have stopped bleeding on their own.  Hgb initially improved after colonoscopy but acutely declined on 7/23.  Lovenox held but no evidence of recurrent GI bleed  -Lovenox for VTE prophylaxis resumed 7/26  - Hgb remains stable at 8-9   - Transfuse if hgb drops <7  - Contact GI with any signs of recurrent bleed   - Cont PO PPI BID x 12 weeks (per GI rec)     # Subdural hematoma with midline shift s/p emergent craniotomy with SDH evacuation (6/22/22)  # Nonconvulsive status epilepticus (s/p vEEG 7/8)  # Frequent falls   Initial fall w/ SDH occurred on 8/12 and pt was admitted to [facility] neuro ICU from 5/12-5/20. Discharged to TCU. She did return home from TCU and was doing well. On 6/22 she suffered a mechanical fall while at home. In the ER, CT scan showed a new SDH w/ midline shift. She subsequently underwent L craniotomy for SDH evacuation and was extubated on post-op day 1.  CT head 7/22 - no acute changes since 7/14, no explanations for febrile spikes. CT head 7/28 with stable fluid collection and associated right to left midline shift.   - Mentation continues to wax and wane day to day   - Neurosurgery continues to follow   - Continue Keppra 1 gram BID until seen by neurology outpatiet  - Continue on delirium precautions  - Per neurosurgery, it will likely take on the order of weeks to months to see her full capacity for neurologic recovery      # Risk of malnutrition  - Continue G tube feeding for now per RD dosing  - SLP to re-evaluate for PO intake   - Continue Potassium, Phosphorus, and  Magnesium protocols daily     # Hx of Myelodysplasic syndrome  # Mild leukocytosis  - PTA darbepoietin (Last dose given 7/26)  - Continue to monitor     # HTN, improved  -Increased amlodipine from 5 to10 mg daily 7/27 for elevated blood pressure  - Monitor BP and adjust accordingly     # Seronegative rheumatoid arthritis  - Continue PTA hydroxychloroquine 400mg daily   - Has rheumatology follow up with Dr. Us on 9/1     # Osteoporosis  - Hold PTA calcium/vitamin D  - Holding PTA denosumab while inpatient      # Hyperlipidemia  # Peripheral arterial disease  - Not on a PTA statin or antiplatelet     # Aspiration s/p IR G tube placement (7/15), resolved    Diet: NPO per Anesthesia Guidelines for Procedure/Surgery Except for: Meds  Adult Formula Drip Feeding: Continuous [employer] Peptide 1.5; Gastrostomy; Goal Rate: 45; mL/hr; Medication - Feeding Tube Flush Frequency: At least 15-30 mL water before and after medication administration and with tube clogging; Amount to Send ...    DVT Prophylaxis: Restarted lovenox  Vasquez Catheter: Not present  Central Lines: None  Cardiac Monitoring: None  Code Status: No CPR- Do NOT Intubate      Disposition Plan      Expected Discharge Date: 08/03/2022, 12:00 PM      Discharge Comments: Family considering comfort care but no decision yet. Needs improvement in diarrhea but otherwise ready for TCU        The patient's care was discussed with the Attending physician, Dr. Holt, and bedside nurse.   Updated patient's daughter, Allyson, and granddaughter at bedside.       Jillian Simmons PA-C  Hospitalist Service, GOLD TEAM [address] [place] [address]  Securely message with the Vocera Web Console (learn more here)  Text page via [facility] Paging/Directory   Please see signed in provider for up to date coverage information    ______________________________________    Interval History   Per bedside nurse, no new issues.  Less stool output last night  "(300mL). Unable to obtain ROS from patient-does not answer questions.     Data reviewed today: I reviewed all medications, new labs and imaging results over the last 24 hours. Please see discussion of these results in the A/P.    Physical Exam     Vital Signs: Temp: 98.4  F (36.9  C) Temp src: Axillary BP: 114/51 Pulse: 80     Resp: 16 SpO2: 97 % O2 Device: None (Room air)    Weight: 156 lbs 1.37 oz    General: Alert.  Appears comfortable lying on hospital bed.   HEENT: . Anicteric sclera.  Oral mucosa moist. Neck supple. No JVD.  Resp: No signs of respiratory distress. Lungs clear to ausculation bilaterally without rales, rhonchi or wheezes.   Cardiac: RRR. S1 and S2 normal intensity. No murmurs appreciated.   GI: Abdomen soft, non-tender, non-distended.  No masses, hepatomegaly or splenomegaly. PEG site with no drainage (resovled) and no surrounding erythema (resolved).    : No taylor  Neuro:  Follows simple commands (open eyes, squeeze hands, gives a \"thumbs up\"). Did not answer questions. Moves all extremities. Gait not tested.   Psych: Blunted affect  Derm: Skin warm and dry. No rashes or skin breakdown. No jaundice.   Extremities: No cyanosis, clubbing or edema      Data   Recent Labs   Lab 07/30/22  0651 07/29/22  0734 07/28/22  0725 07/27/22  0703   WBC 9.9 8.6 8.8 8.8   HGB 8.0* 8.2* 9.0* 8.3*    100 100 100   * 489* 486* 504*    137  --  141   POTASSIUM 4.4 4.6  --  4.3   CHLORIDE 105 103  --  107   CO2 26 24  --  24   BUN 25.5* 23.5*  --  23.2*   CR 0.45* 0.43*  --  0.40*   ANIONGAP 9 10  --  10   MENDEZ 9.5 9.4  --  9.6   * 111*  --  116*   ALBUMIN 3.5 3.3*  --  3.5   PROTTOTAL 6.2* 6.1*  --  6.3*   BILITOTAL 0.2 0.2  --  0.2   ALKPHOS 117* 123*  --  160*   ALT 14 10  --  26   AST 39* 42*  --  40*       No results found for this or any previous visit (from the past 24 hour(s)).   "

## 2022-07-30 NOTE — PLAN OF CARE
NURSING PROGRESS NOTE  Shift Summary      Date: July 30, 2022     Neuro/Musculoskeletal:  None verbal. Is able to give thumbs up/down for yes/no questions but does not answer consistently.  Cardiac:  SR.  VSS.     Respiratory:  Sating in the 90s on room air.  GI/:  Adequate urine output.  LBM: rectal tube with 500cc out over day  Diet/Appetite:  Tolerating tube feed diet. Seen by SLP today but was not able to advance a diet due to significant coughing.   Activity:  Assist of lift. Pt moving frequently in bed. Pulling at lines, needing redirection and tubes covered. Tele discontinued and removed   Pain:  Denies.  Skin:  No new deficits noted.   LDAs + Drips/IVF:    Protocols/Labs:      Pertinent Shift Updates:  Pt appears very alert today. Family visited and reported Louise was attempting to verbalize with them. Writer could not get pt to reproduce vocalizations. Pt was able to follow some commands to open mouth for cleaning etc but also delayed response at times. SLP reported pt was able to follow commands during their test but did not appear to protect airway very well. Pt becoming increasingly mobile, swinging legs around in bed. Family at bedside to attempt to redirect pt. When family left, bed alarm applied. Pt reminded not to pull at tubes and PEG protected with gauze and covered.     Rectal tube leaked x1 today. Appears to have 500cc out over day shift. Purewick in place with 1100cc urine out over day shift. Pt does not appears to know when she has to urinate.       Nu Leung RN  .................................................... July 30, 2022   6:34 PM  [facility] ([facility]): [facility] ICU (Unit 6D)       Goal Outcome Evaluation:  Problem: Plan of Care - These are the overarching goals to be used throughout the patient stay.    Goal: Absence of Hospital-Acquired Illness or Injury  Intervention: Identify and Manage Fall Risk  Recent Flowsheet  Documentation  Taken 7/30/2022 1600 by Nu Leung RN  Safety Promotion/Fall Prevention:   activity supervised   assistive device/personal items within reach   clutter free environment maintained   fall prevention program maintained   increased rounding and observation   increase visualization of patient   lighting adjusted   mobility aid in reach   nonskid shoes/slippers when out of bed   patient and family education   room door open   room near nurse's station   safety round/check completed   supervised activity  Taken 7/30/2022 1200 by Nu Leung RN  Safety Promotion/Fall Prevention:   activity supervised   assistive device/personal items within reach   clutter free environment maintained   fall prevention program maintained   increased rounding and observation   increase visualization of patient   lighting adjusted   mobility aid in reach   nonskid shoes/slippers when out of bed   patient and family education   room door open   room near nurse's station   safety round/check completed   supervised activity  Taken 7/30/2022 0800 by Nu Lenug RN  Safety Promotion/Fall Prevention:   activity supervised   assistive device/personal items within reach   clutter free environment maintained   fall prevention program maintained   increased rounding and observation   increase visualization of patient   lighting adjusted   mobility aid in reach   nonskid shoes/slippers when out of bed   patient and family education   room door open   room near nurse's station   safety round/check completed   supervised activity     Problem: Risk for Delirium  Goal: Improved Behavioral Control  Outcome: Ongoing, Progressing

## 2022-07-30 NOTE — PROGRESS NOTES
[facility], [place]   07/30/2022  Neurosurgery Progress Note:    Assessment:  Ms. Benítez is a 76-year-old female with PMHx myelodysplastic syndrome, seronegative RA, chronic microcytic anemia, PAD, and recent L SDH (5/12/2022) who arrived to [facility] via EMS after an unwitnessed fall at home.  Shortly after arriving, the patient was complaining of a significant headache and nausea, and then her right pupil dilated.  She was no longer protecting her airway, so she was sedated and intubated.  Her right eye pupil then returned to a normal caliber.  A head CT demonstrated a left acute subdural approximately 2.3 cm thick with 12 mm of rightward midline shift.  She was taken to the OR on 6/22 for emergent L craniotomy for SDH evacuation. She was extubated on POD1. Concerns for tube feed aspiration on 6/25.  She underwent emergent EGD by GI due to sharp drop in hemoglobin as well as melanotic stools.  She had 1 gastric ulcer clipped which was the presumed culprit.  Due to requiring the EGD she needed intubation during this time, therefore she was intubated on 7/4/2022 for EGD.  She was also connected to EEG on 7/4/2022 found to be in nonconvulsive status received Keppra load and Ativan and was started on Versed overnight.  On 7/5/2022, her Versed drip was weaned off with confirmation of no status epilepticus. On 7/8 she was extubated and remains stable.  Received PEG on 7/15/2022 by IR.  Underwent colonoscopy with GI on 7/19/2022.  Transferred to medicine primary on 7/19/2022. On 7/24, drainage noted at PEG site, swab culture sent for analysis per primary team. WBC downtrending on zosyn.    Other diagnoses include:  #Brain compression  #Open evacuation of hematoma on day of admission   #Hyponatremia  #Pulmonary edema  #GI bleed  #Uremia  #hypokalemia    Recommendations:  Continue Keppra 1 g twice daily till seen by neurology as outpatient  Ok for lovenox from nsgy perspective  Remaining cares per  primary team    Neurosurgery will continue to follow  -----------------------------------  Bruce Graves MD  Neurosurgery Resident     Please contact neurosurgery resident on call with questions.    Dial * * *[phone], enter [phone] when prompted.    -----------------------------------    Interval History: Family care conference yesterday, family not ready to transition to comfort cares yet but are generally more about hospice.  Currently they are exploring the hospice option-are open to hospice facilities with hospice care at nursing home.     Objective:   Temp:  [98.1  F (36.7  C)-98.9  F (37.2  C)] 98.9  F (37.2  C)  Pulse:  [77-93] 90  Resp:  [10-16] 10  BP: (128-135)/(55-60) 135/55  SpO2:  [95 %-99 %] 95 %  I/O last 3 completed shifts:  In: 870 [NG/GT:150]  Out: 1100 [Urine:900; Stool:200]        Gen: Appears comfortable, NAD, laying in bed  Abd: PEG in place  HEENT: Left craniotomy incision healing well  Wound: Incision closed with staples-now removed, incision appears clean dry-no signs of leaking, no wetness appreciated.  See below for photo from 7/5/2022 evening.  Pulm: On RA, no increased work of breathing, symmetric chest rise  Neurologic:  Eyes open spontaneously, minimally tracking, gaze at midline without preference  Follows commands in all 4 extremities  Reflexes 2+, downgoing Babinski's bilaterally    LABS:  Recent Labs   Lab 07/29/22  0734 07/27/22  0703 07/26/22  0708    141 141   POTASSIUM 4.6 4.3 4.4   CHLORIDE 103 107 108*   CO2 24 24 23   ANIONGAP 10 10 10   * 116* 112*   BUN 23.5* 23.2* 21.5   CR 0.43* 0.40* 0.43*   MENDEZ 9.4 9.6 9.5       Recent Labs   Lab 07/29/22  0734   WBC 8.6   RBC 2.70*   HGB 8.2*   HCT 27.1*      MCH 30.4   MCHC 30.3*   RDW 17.2*   *       IMAGING:  No results found for this or any previous visit (from the past 24 hour(s)).

## 2022-07-31 NOTE — PROGRESS NOTES
[facility], [place]   07/31/2022  Neurosurgery Progress Note:    Assessment:  Ms. Benítez is a 76-year-old female with PMHx myelodysplastic syndrome, seronegative RA, chronic microcytic anemia, PAD, and recent L SDH (5/12/2022) who arrived to [facility] via EMS after an unwitnessed fall at home.  Shortly after arriving, the patient was complaining of a significant headache and nausea, and then her right pupil dilated.  She was no longer protecting her airway, so she was sedated and intubated.  Her right eye pupil then returned to a normal caliber.  A head CT demonstrated a left acute subdural approximately 2.3 cm thick with 12 mm of rightward midline shift.  She was taken to the OR on 6/22 for emergent L craniotomy for SDH evacuation. She was extubated on POD1. Concerns for tube feed aspiration on 6/25.  She underwent emergent EGD by GI due to sharp drop in hemoglobin as well as melanotic stools.  She had 1 gastric ulcer clipped which was the presumed culprit.  Due to requiring the EGD she needed intubation during this time, therefore she was intubated on 7/4/2022 for EGD.  She was also connected to EEG on 7/4/2022 found to be in nonconvulsive status received Keppra load and Ativan and was started on Versed overnight.  On 7/5/2022, her Versed drip was weaned off with confirmation of no status epilepticus. On 7/8 she was extubated and remains stable.  Received PEG on 7/15/2022 by IR.  Underwent colonoscopy with GI on 7/19/2022.  Transferred to medicine primary on 7/19/2022. On 7/24, drainage noted at PEG site, swab culture sent for analysis per primary team. Currently on ancef.     Other diagnoses include:  #Brain compression  #Open evacuation of hematoma on day of admission   #Hyponatremia  #Pulmonary edema  #GI bleed  #Uremia  #hypokalemia    Recommendations:  Continue Keppra 1 g twice daily till seen by neurology as outpatient  Ok for lovenox from nsgy perspective  Remaining cares per  primary team    Neurosurgery will continue to follow  -----------------------------------  Gilberto Leung MD  Neurosurgery Resident     Please contact neurosurgery resident on call with questions.    Dial * * [phone], enter [phone] when prompted.    -----------------------------------    Interval History: Family care conference yesterday, family not ready to transition to comfort cares yet but are generally more about hospice.  Currently they are exploring the hospice option-are open to hospice facilities with hospice care at nursing home.     Objective:   Temp:  [98.3  F (36.8  C)-98.6  F (37  C)] 98.6  F (37  C)  Pulse:  [80-98] 85  Resp:  [16-18] 16  BP: (114-141)/(51-64) 141/64  SpO2:  [96 %-98 %] 97 %  I/O last 3 completed shifts:  In: 1520 [NG/GT:530]  Out: 2400 [Urine:2000; Stool:400]        Gen: Appears comfortable, NAD, laying in bed  Abd: PEG in place  HEENT: Left craniotomy incision healing well  Wound: Incision closed with staples-now removed, incision appears clean dry-no signs of leaking, no wetness appreciated.  See below for photo from 7/5/2022 evening.  Pulm: On RA, no increased work of breathing, symmetric chest rise  Neurologic:  Eyes open spontaneously, minimally tracking, gaze at midline without preference  More alert during nursing cares  Follows commands in all 4 extremities  Reflexes 2+, downgoing Babinski's bilaterally    LABS:  Recent Labs   Lab 07/30/22  0651 07/29/22  0734 07/27/22  0703    137 141   POTASSIUM 4.4 4.6 4.3   CHLORIDE 105 103 107   CO2 26 24 24   ANIONGAP 9 10 10   * 111* 116*   BUN 25.5* 23.5* 23.2*   CR 0.45* 0.43* 0.40*   MENDEZ 9.5 9.4 9.6       Recent Labs   Lab 07/30/22  0651   WBC 9.9   RBC 2.65*   HGB 8.0*   HCT 26.4*      MCH 30.2   MCHC 30.3*   RDW 17.1*   *       IMAGING:  No results found for this or any previous visit (from the past 24 hour(s)).

## 2022-07-31 NOTE — PROGRESS NOTES
Major Shift Events:  Nonverbal but alert able to somewhat follow commands, slept between cares. VSS on RA. Purewick in place, rectal tube in place. TF @ 45mL/hr. 1 PIV SL.  Plan: notify team with acute changes, TCU on 8/3  For vital signs and complete assessments, please see documentation flowsheets.

## 2022-07-31 NOTE — PROGRESS NOTES
[facility]    Medicine Progress Note - Hospitalist Service, GOLD TEAM 6    Date of Admission:  6/22/2022    Assessment & Plan  Louise Benítez is a 76-year-old F with a history of seronegative rheumatoid arthritis, osteoporosis, peripheral artery disease, and myelodysplastic syndrome admitted 6/22 after a ground level fall resulting in subdural hematoma with midline shift s/p emergent craniotomy and SDH evacuation. Course has been complicated by nonconvulsive status epilepticus (resolved), GI bleeding s/p EGD and clipping of gastric ulcer 7/5, and aspiration s/p PEG placement 7/15. Noted to have bloody stools the evening of 7/17 with acute hemoglobin drop (10.1 -> 8.7).     CARE CONFERENCE 7/29:  Met with patient's  and daughters for a care conference with neurosurgery 7/29. Discussed goals of care. Neurosurgery discussed prognosis. Family also discussed option of transition to comfort care/hospice with palliative care 7/29. At this time, family requests no escalation of care. Family to be contacted if any worsening clinical status.     #Sepsis   #Cellulitis at G tube insertion site (placed by IR 7/15)  Patient developed fever and leukocytosis 7/22.  CXR showed probably atelectasis, which could conceal a PNA. UTI without infection, procal only 0.06. Lactic acid normal. RUQ tenderness on 7/23 but RUQ US negative for acute findings. Respiratory virus panel positive for Rhino/Enterovirus (not convincing as cause of SIRS). On the evening of 7/23, RN reported redness and purulent discharge around PEG insertion site. WBC has normalized since starting on Abx and she remains afebrile.  - CT abdomen 7/24 showed findings of inflammation without any isolated fluid collection   - Wound culture x2 with MSSA.   -Antibiotics: Vanco/Zosyn 7/21pm- 7/26. Cont cefazolin (started 7/26) Complete 10 day course of therapy (End today)  - G Tube placed on 7/15 by IR. T fastener removed  7/26. IR did not feel G tube exchange indicated.   - Blood culture NGTD     #Loose stools, slow improvement  Suspect 2/2 tube feeds.   -C.Diff negative  -Cont banatrol, schedule loperamide (increased from 2 mg BID to 4 mg BID)  -Lomotil x 1 today  -Rectal tube    #Mild transaminitis  AST mildly elevated at 39 and alk phos elevated to 117. Normal bilirubin, ALT.   -Monitor (no CMP today)    # Acute blood loss anemia on chronic microcytic anemia  # GI bleed  # Gastric ulcers complicated by GI bleeding s/p EGD 7/5 with clipping of 5mm ulcers in posterior gastric wall  Bloody stool output with associated Hgb drop on 7/17.  Transfused 1 unit PRBC.  Colonoscopy done 7/19  showed ulcers at the splenic flexure that were likely source of bleeding but have stopped bleeding on their own.  Hgb initially improved after colonoscopy but acutely declined on 7/23.  Lovenox held but no evidence of recurrent GI bleed  -Lovenox for VTE prophylaxis resumed 7/26  - Hgb remains stable at 8-9   - Transfuse if hgb drops <7  - Contact GI with any signs of recurrent bleed   - Cont PO PPI BID x 12 weeks (per GI rec)     # Subdural hematoma with midline shift s/p emergent craniotomy with SDH evacuation (6/22/22)  # Nonconvulsive status epilepticus (s/p vEEG 7/8)  # Frequent falls   Initial fall w/ SDH occurred on 8/12 and pt was admitted to [facility] neuro ICU from 5/12-5/20. Discharged to TCU. She did return home from TCU and was doing well. On 6/22 she suffered a mechanical fall while at home. In the ER, CT scan showed a new SDH w/ midline shift. She subsequently underwent L craniotomy for SDH evacuation and was extubated on post-op day 1.  CT head 7/22 - no acute changes since 7/14, no explanations for febrile spikes. CT head 7/28 with stable fluid collection and associated right to left midline shift.   - Mentation continues to wax and wane day to day   - Neurosurgery continues to follow   - Continue Keppra 1 gram BID until seen by neurology  outpatiet  - Continue on delirium precautions  - Per neurosurgery, it will likely take on the order of weeks to months to see her full capacity for neurologic recovery      # Risk of malnutrition  - Continue G tube feeding for now per RD dosing  - SLP to re-evaluate for PO intake   - Continue Potassium, Phosphorus, and Magnesium protocols daily     # Hx of Myelodysplasic syndrome  # Mild leukocytosis  - PTA darbepoietin (Last dose given 7/26)  - Continue to monitor     # HTN, improved  -Increased amlodipine from 5 to10 mg daily 7/27 for elevated blood pressure  - Monitor BP and adjust accordingly     # Seronegative rheumatoid arthritis  - Continue PTA hydroxychloroquine 400mg daily   - Has rheumatology follow up with Dr. Us on 9/1     # Osteoporosis  - Hold PTA calcium/vitamin D  - Holding PTA denosumab while inpatient      # Hyperlipidemia  # Peripheral arterial disease  - Not on a PTA statin or antiplatelet     # Aspiration s/p IR G tube placement (7/15), resolved    Diet: NPO per Anesthesia Guidelines for Procedure/Surgery Except for: Meds  Adult Formula Drip Feeding: Continuous [employer] Peptide 1.5; Gastrostomy; Goal Rate: 45; mL/hr; Medication - Feeding Tube Flush Frequency: At least 15-30 mL water before and after medication administration and with tube clogging; Amount to Send ...    DVT Prophylaxis: On lovenox  Vasquez Catheter: Not present  Central Lines: None  Cardiac Monitoring: None  Code Status: No CPR- Do NOT Intubate      Disposition Plan     Expected Discharge Date: 08/03/2022, 12:00 PM      Discharge Comments: Family considering comfort care but no decision yet. Needs improvement in diarrhea but otherwise ready for TCU        The patient's care was discussed with the Attending physician, Dr. Holt, and bedside nurse.   Updated patient's , Nehemiah, at bedside.       Jillian Simmons PA-C  Hospitalist Service, GOLD TEAM [address] [place] [address]  Securely  "message with the [employer] Web Console (learn more here)  Text page via [facility] Paging/Directory   Please see signed in provider for up to date coverage information    ______________________________________    Interval History   Per RN notes, no advancement in diet 2/2 coughing. Rectal tube with 500mL total output yesterday. 100mL document overnight. Unable to obtain ROS 2/2 cognitive impairment from subdural hematoma    Data reviewed today: I reviewed all medications, new labs and imaging results over the last 24 hours. Please see discussion of these results in the A/P.    Physical Exam     Vital Signs: Temp: 98.4  F (36.9  C) Temp src: Axillary BP: 138/63 Pulse: 87     Resp: 16 SpO2: 97 % O2 Device: None (Room air)    Weight: 147 lbs 14.86 oz    General: Alert.  Appears comfortable lying on hospital bed.   HEENT: . Anicteric sclera.  Oral mucosa moist. Neck supple. No JVD.  Resp: No signs of respiratory distress. Lungs clear to ausculation bilaterally without rales, rhonchi or wheezes.   Cardiac: RRR. S1 and S2 normal intensity. No murmurs appreciated.   GI: Abdomen soft, non-tender, non-distended.  No masses, hepatomegaly or splenomegaly. PEG site with no drainage (resovled) and no surrounding erythema (resolved).    : No taylor  Neuro:  Follows simple commands (open eyes, squeeze hands, gives a \"thumbs up\"). Did not answer questions. Moves all extremities. Gait not tested.   Psych: Blunted affect  Derm: Skin warm and dry. No rashes or skin breakdown. No jaundice.   Extremities: No cyanosis, clubbing or edema      Data   Recent Labs   Lab 07/31/22  0532 07/30/22  0651 07/29/22  0734 07/28/22  0725 07/27/22  0703   WBC 9.4 9.9 8.6   < > 8.8   HGB 8.8* 8.0* 8.2*   < > 8.3*    100 100   < > 100   * 544* 489*   < > 504*   NA  --  140 137  --  141   POTASSIUM  --  4.4 4.6  --  4.3   CHLORIDE  --  105 103  --  107   CO2  --  26 24  --  24   BUN  --  25.5* 23.5*  --  23.2*   CR  --  0.45* 0.43*  --  0.40* "   ANIONGAP  --  9 10  --  10   MEDNEZ  --  9.5 9.4  --  9.6   GLC  --  113* 111*  --  116*   ALBUMIN  --  3.5 3.3*  --  3.5   PROTTOTAL  --  6.2* 6.1*  --  6.3*   BILITOTAL  --  0.2 0.2  --  0.2   ALKPHOS  --  117* 123*  --  160*   ALT  --  14 10  --  26   AST  --  39* 42*  --  40*    < > = values in this interval not displayed.       No results found for this or any previous visit (from the past 24 hour(s)).

## 2022-07-31 NOTE — PLAN OF CARE
Neuro: Intermittently follows. Pt appears to be able to answer yes/no questions better with using her hands then shaking her head yes/no. I asked if she had gotten a new IV and she lifted the appropriate arm and directed me to the location.   Cardiac: WDL   Respiratory: Sating 98% on RA.  GI/: Adequate urine output. Rectal tube irrigated. 200cc during day shift.   Diet/appetite: Multiple medications to help with diarrhea.   Activity:  Pt unable to participate.   Pain: No pain identified.   Skin: No new deficits noted.      Plan: Continue with POC. Notify primary team with changes.

## 2022-08-01 NOTE — PLAN OF CARE
Major Shift Events:  pt nonverbal but alert. VSS on RA. Purewick in place with adequate output, rectal tube in place. Rectal tube leaked x1 this shift. 1 PIV SL. TF running at goal. Pt slept early in shift,   Plan: pending TCU discharge, notify team with acute changes    For vital signs and complete assessments, please see documentation flowsheets.    Goal Outcome Evaluation:    Plan of Care Reviewed With: patient     Overall Patient Progress: no change    Outcome Evaluation: TF maintained at goal rate. Continue plan of care

## 2022-08-01 NOTE — PROGRESS NOTES
[facility], [place]   08/01/2022  Neurosurgery Progress Note:    Assessment:  Ms. Benítez is a 76-year-old female with PMHx myelodysplastic syndrome, seronegative RA, chronic microcytic anemia, PAD, and recent L SDH (5/12/2022) who arrived to [facility] via EMS after an unwitnessed fall at home.  Shortly after arriving, the patient was complaining of a significant headache and nausea, and then her right pupil dilated.  She was no longer protecting her airway, so she was sedated and intubated.  Her right eye pupil then returned to a normal caliber.  A head CT demonstrated a left acute subdural approximately 2.3 cm thick with 12 mm of rightward midline shift.  She was taken to the OR on 6/22 for emergent L craniotomy for SDH evacuation. She was extubated on POD1. Concerns for tube feed aspiration on 6/25.  She underwent emergent EGD by GI due to sharp drop in hemoglobin as well as melanotic stools.  She had 1 gastric ulcer clipped which was the presumed culprit.  Due to requiring the EGD she needed intubation during this time, therefore she was intubated on 7/4/2022 for EGD.  She was also connected to EEG on 7/4/2022 found to be in nonconvulsive status received Keppra load and Ativan and was started on Versed overnight.  On 7/5/2022, her Versed drip was weaned off with confirmation of no status epilepticus. On 7/8 she was extubated and remains stable.  Received PEG on 7/15/2022 by IR.  Underwent colonoscopy with GI on 7/19/2022.  Transferred to medicine primary on 7/19/2022. On 7/24, drainage noted at PEG site, swab culture sent for analysis per primary team. Currently on ancef.     No acute events overnight.    Other diagnoses include:  #Brain compression  #Open evacuation of hematoma on day of admission   #Hyponatremia  #Pulmonary edema  #GI bleed  #Uremia  #hypokalemia    Recommendations:  Continue Keppra 1 g twice daily till seen by neurology as outpatient  Ok for lovenox from nsgy  perspective- currently on lovenox  Remaining cares per primary team    Neurosurgery will continue to follow  -----------------------------------  Keenan Tillman  Neurosurgery Resident PGY2     Please contact neurosurgery resident on call with questions.    Dial * * [phone], enter [phone] when prompted.    -----------------------------------    Interval History: Family care conference yesterday, family not ready to transition to comfort cares yet but are generally more about hospice.  Currently they are exploring the hospice option-are open to hospice facilities with hospice care at nursing home.     Objective:   Temp:  [98.4  F (36.9  C)-99.2  F (37.3  C)] 98.5  F (36.9  C)  Pulse:  [80-98] 90  Resp:  [16-20] 16  BP: (125-150)/(51-67) 150/67  SpO2:  [96 %-98 %] 98 %  I/O last 3 completed shifts:  In: 1330 [NG/GT:250]  Out: 1450 [Urine:1000; Stool:450]    Gen: Appears comfortable, NAD, laying in bed  Abd: PEG in place  HEENT: Left craniotomy incision healing well  Wound: Incision closed with staples-now removed, incision appears clean dry-no signs of leaking, no wetness appreciated.  See below for photo from 7/5/2022 evening.  Pulm: On RA, no increased work of breathing, symmetric chest rise  Neurologic:  Eyes open spontaneously, minimally tracking, gaze at midline without preference  More alert during nursing cares  Follows commands in all 4 extremities  Reflexes 2+, downgoing Babinski's bilaterally    LABS:  Recent Labs   Lab 08/01/22  0653 07/30/22  0651 07/29/22  0734    140 137   POTASSIUM 5.0 4.4 4.6   CHLORIDE 103 105 103   CO2 25 26 24   ANIONGAP 12 9 10   * 113* 111*   BUN 28.3* 25.5* 23.5*   CR 0.42* 0.45* 0.43*   MENDEZ 9.7 9.5 9.4       Recent Labs   Lab 08/01/22  0653   WBC 11.6*   RBC 2.82*   HGB 8.7*   HCT 28.0*   MCV 99   MCH 30.9   MCHC 31.1*   RDW 17.2*   *       IMAGING:  No results found for this or any previous visit (from the past 24 hour(s)).

## 2022-08-01 NOTE — PROGRESS NOTES
Care Management Follow Up    Length of Stay (days): 40    Expected Discharge Date: 08/04/2022     Concerns to be Addressed: Discharge Planning   Patient plan of care discussed at interdisciplinary rounds: Yes    Anticipated Discharge Disposition: Hospice VS TCU     Anticipated Discharge Services:  Therapies vs Comfort  Anticipated Discharge DME: TBD     Patient/family educated on Medicare website which has current facility and service quality ratings:  Yes  Education Provided on the Discharge Plan:  Yes  Patient/Family in Agreement with the Plan:  Yes    Referrals Placed by CM/SW:  NA  Private pay costs discussed: Not applicable    Additional Information:  SW spoke with Pt's daughter. At this time family is unsure if Pt requires In hospital Hospice, TCU or residential Hospice. Pt's daughter Allyson will call in the AM with a list of TCU's/skilled nursing facilities that the family would like to pursue for placement. RACHEL to continue following.      ________________    AQUILES Peacock, LICSW  6D   [facility]  Phone: [phone]  Pager: [phone]  Fax: [phone]

## 2022-08-01 NOTE — PROGRESS NOTES
[facility]    Medicine Progress Note - Hospitalist Service, GOLD TEAM 6    Date of Admission: 6/22/2022    Assessment & Plan        Louise Benítez is a 76 year old female with history of seronegative RA, osteoporosis, PAD, and myelodysplastic syndrome admitted 6/22 after a ground level fall resulting in SDH with midline shift s/p emergent craniotomy and evacuation. Course c/b nonconvulsive status epilepticus (resolved), GIB s/p EGD and clipping of gastric ulcer 7/5, and aspiration s/p PEG placement 7/15. Noted to have bloody stools the evening of 7/17 with acute hemoglobin drop (10.1 -> 8.7).      CARE CONFERENCE 7/29: Prior medicine team met with family 7/29 for care conference with NSGY. NSGY discussed prognosis. Family discussed option of transition to comfort care/hospice with palliative care 7/29. At this time, family requests no escalation of care. Family to be contacted if any worsening clinical status  - Spouse updated via phone 8/1  - Patient is DNR/DNI     Acute blood loss anemia on chronic microcytic anemia, gastric ulcers c/b GIB: S/p EGD 7/5 with clipping of 5mm ulcers in posterior gastric wall. Bloody stool with associated Hgb drop 7/17. Transfused 1U RBC. Colonoscopy 7/19 noted ulcers at splenic flexure that were likely source of bleeding but stopped bleeding on their own. Hgb initially improved after colonoscopy but acutely declined on 7/23.  Lovenox held but no evidence of recurrent GIB. Hgb currently stable   - PPI bid  - Transfuse if hgb drops <7  - Contact GI with any signs of recurrent bleed     Loose stools, slow improvement: Suspect 2/2 tube feeds. C.Diff negative  - Continue banatrol, schedule loperamide (increased from 2 mg BID to 4 mg BID)  - Consider scheduled Lomotil pending trend  - Rectal tube     Mild transaminitis: Stable, asymptomatic.  (117), ALT 17, AST 50 (39), Tnili 0.3 8/1. Monitor     SDH with midline shift s/p emergent  craniotomy with SDH evacuation (6/22/22) c/b nonconvulsive status epilepticus (s/p vEEG 7/8): Initial fall w/ SDH 8/12. Admitted to neuro ICU 5/12-5/20. Discharged to TCU. Returned home from TCU and was doing well. Had mechanical fall 6/22. Head CT noted new SDH w/ midline shift. Subsequently underwent L craniotomy for SDH evacuation. Head CT 7/22 stable from 7/14, no explanations for febrile spikes. CT head 7/28 with stable fluid collection and associated R to L midline shift. Mentation continues to wax and wane day to day   - NSGY following: continue Keppra until seen by neurology OP  - Delirium precautions  - Per neurosurgery, it will likely take on the order of weeks to months to see her full capacity for neurologic recovery      Non-severe protein calorie malnutrition, dysphagia: In the setting of the above. Nutrition following and managing tube feeds.  - SLP, nutrition following  - Continue MVI, B12  - Continue electrolyte replacement protocols        Resolved and Stable Issues:  Sepsis 2/2 cellulitis at G tube insertion site (placed by IR 7/15): Resolved. See notes 7/31 and prior for details. On the evening of 7/23, RN reported redness and purulent discharge around PEG insertion site. CT AP 7/24 noted inflammation without isolated fluid collection. Cx +for MSSA. Completed course of abx 7/31. Closely monitor   Myelodysplasic syndrome: PTA on darbepoietin (Last dose 7/26). Continue Darbepoietin [id number]  HTN: BP stable after amlodipine dose increase 7/27. Continue with hold parameters   Seronegative RA: Continue PTA hydroxychloroquine. Follow up OP with Dr. Us on 9/1  Osteoporosis: Continue Ca/vitamin D. Holding PTA denosumab while inpatient   HLD, PAD: Not on statin or antiplatelet  Aspiration s/p IR G tube placement (7/15), resolved: See notes 7/31 and prior for details        Diet: NPO per Anesthesia Guidelines for Procedure/Surgery Except for: Meds  Adult Formula Drip Feeding: [employer]  Peptide 1.5; Gastrostomy; Goal Rate: 45; mL/hr; Medication - Feeding Tube Flush Frequency: At least 15-30 mL water before and after medication administration and with tube clogging; Amount to Send ...    DVT Prophylaxis: Enoxaparin (Lovenox) SQ  Vasquez Catheter: Not present  Central Lines: None  Cardiac Monitoring: None  Code Status: No CPR- Do NOT Intubate      Disposition Plan      Expected Discharge Date: 08/03/2022, 12:00 PM      Discharge Comments: Family considering comfort care but no decision yet. Needs improvement in diarrhea but otherwise ready for TCU        The patient's care was discussed with the patient, PT, family, and attending physician, Dr. Jonathon Wen, PARosangelaC  Hospitalist Service, [address]  Securely message with the Vocera Web Console (learn more here)  Text page via [facility] Paging/Directory   Please see signed in provider for up to date coverage information      Clinically Significant Risk Factors Present on Admission                      ______________________________________________________________________    Interval History   Patient unable to verbally communicate with writer. She moves her legs on command. No audible pain. Per d/w family, no acute concerns or changes. Per d/w PT, patient appears at recent BL    Data reviewed today: I reviewed all medications, new labs and imaging results over the last 24 hours    Physical Exam   Vital Signs: Temp: 98.7  F (37.1  C) Temp src: Oral BP: (!) 148/64 Pulse: 90   Resp: 12 SpO2: 98 % O2 Device: None (Room air)    Weight: 146 lbs 2.64 oz  General Appearance: Alert, non-verbal  HEENT: Anicteric sclera, MMM   Respiratory: Breathing comfortably on room air, lungs CTAB without wheezing or crackles   Cardiovascular: RRR, S1, S2. No murmur noted  GI: Abdomen soft, non-tender with active bowel sounds. No guarding or rebound. Feeding tube site CDI without surrounding erythema or  edema  Lymph/Hematologic: No peripheral edema, distal pulses palpable   Skin: No rash or jaundice   Musculoskeletal: Moves all extremities   Neurologic: Follows simple commands. Non-verbal       Data   Recent Labs   Lab 08/01/22  0653 07/31/22  0532 07/30/22  0651 07/29/22  0734   WBC 11.6* 9.4 9.9 8.6   HGB 8.7* 8.8* 8.0* 8.2*   MCV 99 100 100 100   * 585* 544* 489*     --  140 137   POTASSIUM 5.0  --  4.4 4.6   CHLORIDE 103  --  105 103   CO2 25  --  26 24   BUN 28.3*  --  25.5* 23.5*   CR 0.42*  --  0.45* 0.43*   ANIONGAP 12  --  9 10   MENDEZ 9.7  --  9.5 9.4   *  --  113* 111*   ALBUMIN 3.9  --  3.5 3.3*   PROTTOTAL 6.8  --  6.2* 6.1*   BILITOTAL 0.3  --  0.2 0.2   ALKPHOS 134*  --  117* 123*   ALT 17  --  14 10   AST 50*  --  39* 42*     No results found for this or any previous visit (from the past 24 hour(s)).

## 2022-08-01 NOTE — PROGRESS NOTES
Vitals stable this shift. Has been up in chair for approx 4 hours and back to bed with use of lift. Patient transferred to  with belongings and daughter, Maria Del Carmen, bedside for transfer.Tenative plans being made by SW for either hospice or TCU pending family decisions.     Neuro: Patient following commands this shift slowly. Less alert than previous days per therapy. Minimal tracking. Moves extremities x4. Pupils 3 mm and PERRLA.  CV: No tele orders. More on hypertensive side, increased amlodipine this shift. On Lovenox for anticoagulation. Trace edema in extremities noted.  Resp: RA, weak nonproductive cough. Unable to do IS.  GI/: PEG tube with TF @ goal of 45/hr [employer] Peptide 1.5 with FWF 30Q4. Fecal management system in place d/t loose stool. Approx 300 out this shift.  Purewick being utilized with 300 ml output this shift.  ID/Heme: Abx course completed  Access: PIV x1 Right Hand

## 2022-08-02 NOTE — PROGRESS NOTES
[facility]    Medicine Progress Note - Hospitalist Service, GOLD TEAM 6    Date of Admission: 6/22/2022    Assessment & Plan        Louise Benítez is a 76 year old female with history of seronegative RA, osteoporosis, PAD, and myelodysplastic syndrome admitted 6/22 after a ground level fall resulting in SDH with midline shift s/p emergent craniotomy and evacuation. Course c/b nonconvulsive status epilepticus (resolved), GIB s/p EGD and clipping of gastric ulcer 7/5, and aspiration s/p PEG placement 7/15. Noted to have bloody stools the evening of 7/17 with acute hemoglobin drop (10.1 -> 8.7).      Goals of care: Prior medicine team met with family and NSGY 7/29 for care conference. NSGY discussed prognosis. Family discussed option of transition to comfort care/hospice with palliative care 7/29. At this time, family requests no escalation of care. Family to be contacted if any worsening clinical status  - Spouse last updated via phone 8/1, no new questions or concerns   - Patient is DNR/DNI  - Continue restorative cares for now, but need to call family prior to any changes if patient has clinical changes     SDH with midline shift s/p emergent craniotomy with SDH evacuation (6/22/22) c/b nonconvulsive status epilepticus (s/p vEEG 7/8): Initial fall w/ SDH 8/12. Admitted to neuro ICU 5/12-5/20. Discharged to TCU. Returned home from TCU and was doing well. Had mechanical fall 6/22. Head CT noted new SDH w/ midline shift. Subsequently underwent L craniotomy for SDH evacuation. Head CT 7/22 stable from 7/14, no explanations for febrile spikes. CT head 7/28 with stable fluid collection and associated R to L midline shift. Mentation continues to wax and wane day to day   - NSGY following: continue Keppra until seen by neurology OP  - Delirium precautions  - Per neurosurgery, it will likely take weeks to months to see her full capacity for neurologic recovery      Acute blood  loss anemia on chronic microcytic anemia, gastric ulcers c/b GIB: S/p EGD 7/5 with clipping of 5mm ulcers in posterior gastric wall. Bloody stool with associated Hgb drop 7/17. Transfused 1U RBC. Colonoscopy 7/19 noted ulcers at splenic flexure that were likely source of bleeding but stopped bleeding on their own. Hgb initially improved after colonoscopy but acutely declined on 7/23.  Lovenox held but no evidence of recurrent GIB. Hgb currently stable   - PPI bid  - CBC qM/F, sooner if acute changes  - Contact GI with any signs of recurrent bleed     Loose stools, slow improvement: Suspect 2/2 tube feeds. C.Diff negative. Currently has rectal tube in place  - Continue banatrol, schedule loperamide (increased from 2 mg BID to 4 mg BID)  - Consider scheduled Lomotil pending trend  - Continue rectal tube for now     Mild transaminitis: Stable, asymptomatic.  (117), ALT 17, AST 50 (39), Tnili 0.3 8/1. Monitor    Non-severe protein calorie malnutrition, dysphagia: In the setting of the above. Nutrition following and managing tube feeds.  - SLP, nutrition following  - Continue MVI, B12  - Continue electrolyte replacement protocols        Resolved and Stable Issues:  Sepsis 2/2 cellulitis at G tube insertion site (placed by IR 7/15): Resolved. See notes 7/31 and prior for details. On the evening of 7/23, RN reported redness and purulent discharge around PEG insertion site. CT AP 7/24 noted inflammation without isolated fluid collection. Cx +for MSSA. Completed course of abx 7/31. Closely monitor   Myelodysplasic syndrome: PTA on darbepoietin (Last dose 7/26). Continue Darbepoietin [id number]  HTN: BP stable after amlodipine dose increase 7/27. Continue with hold parameters   Seronegative RA: Continue PTA hydroxychloroquine. Follow up OP with Dr. Us on 9/1  Osteoporosis: Continue Ca/vitamin D. Holding PTA denosumab while inpatient   HLD, PAD: Not on statin or antiplatelet  Aspiration s/p IR G tube placement (7/15),  resolved: See notes 7/31 and prior for details        Diet: NPO per Anesthesia Guidelines for Procedure/Surgery Except for: Meds  Adult Formula Drip Feeding: Continuous [employer] Peptide 1.5; Gastrostomy; Goal Rate: 45; mL/hr; Medication - Feeding Tube Flush Frequency: At least 15-30 mL water before and after medication administration and with tube clogging; Amount to Send ...    DVT Prophylaxis: Enoxaparin (Lovenox) SQ  Vasquez Catheter: Not present  Central Lines: None  Cardiac Monitoring: None  Code Status: No CPR- Do NOT Intubate      Disposition Plan      Expected Discharge Date: 08/04/2022, 12:00 PM  Discharge Delays: Comfort Care/Hospice  Placement - Homecare    Discharge Comments: Family considering comfort care but no decision yet. Rfor TCU        The patient's care was discussed with the patient and attending physician, Dr. Otoniel Wen PA-C  Hospitalist Service, [address]  Securely message with the Vocera Web Console (learn more here)  Text page via [facility] Paging/Directory   Please see signed in provider for up to date coverage information      Clinically Significant Risk Factors Present on Admission                      ______________________________________________________________________    Interval History   Denies acute pain. No current concerns. No chest pain. Denies fever or chills. Patient communicates via head shaking and thumbs up/down     Data reviewed today: I reviewed all medications, new labs and imaging results over the last 24 hours    Physical Exam   Vital Signs: Temp: 96.9  F (36.1  C) Temp src: Axillary BP: 127/46 Pulse: 87   Resp: 18 SpO2: 92 % O2 Device: None (Room air)    Weight: 146 lbs 2.64 oz  General Appearance: Alert, non-verbal. Appears comfortable  HEENT: Anicteric sclera, MMM   Respiratory: Breathing comfortably on room air, lungs CTAB without wheezing or crackles   Cardiovascular: RRR, S1, S2. No  murmur noted  GI: Abdomen soft, non-tender with active bowel sounds. No guarding or rebound. Feeding tube site CDI without surrounding erythema or edema  Lymph/Hematologic: No peripheral edema, distal pulses palpable   Skin: No rash or jaundice   Musculoskeletal: Moves all extremities   Neurologic: Follows simple commands. Non-verbal       Data   Recent Labs   Lab 08/02/22  0657 08/01/22  0653 07/31/22  0532 07/30/22  0651   WBC 10.6 11.6* 9.4 9.9   HGB 7.9* 8.7* 8.8* 8.0*    99 100 100   * 666* 585* 544*    140  --  140   POTASSIUM 4.9 5.0  --  4.4   CHLORIDE 105 103  --  105   CO2 28 25  --  26   BUN 35.3* 28.3*  --  25.5*   CR 0.55 0.42*  --  0.45*   ANIONGAP 8 12  --  9   MENDEZ 9.6 9.7  --  9.5   * 123*  --  113*   ALBUMIN  --  3.9  --  3.5   PROTTOTAL  --  6.8  --  6.2*   BILITOTAL  --  0.3  --  0.2   ALKPHOS  --  134*  --  117*   ALT  --  17  --  14   AST  --  50*  --  39*     No results found for this or any previous visit (from the past 24 hour(s)).

## 2022-08-02 NOTE — PLAN OF CARE
Vitals: VSS   Neuros:  Alert. Non-verbal. LUE 4, all others 3. Intermittently following commands.  Mod , weak d/p.   IV: PIV-SL   Labs/Electrolytes: Hbg 7.9  Resp/trach: Clear- Weak cough   Diet: NPO, TF at goal of 45cc/hr  Bowel status: Rectal tube in place, scant output.   : Purewick in place, good output  Skin: L crani inc approximated. Skin abrasions and bruises throughout; redness/chaffing to inner thighs, barrier cream applied and pillow/intra-dry in place to prevent further irritation. PEG with scant amount of drainage, redness. See WOC note and recs  Pain: No s/s of pain   Activity: Up with 2 and lift, repo Q2h   Social:  at bedisde this shift, wants update from MD (paged at 1500)  Plan: See SW note for possible plan.   Updates this shift: Worked with SLP and OT today. Mitt on left hand when family is not present. Pulls at IV and PEG site.

## 2022-08-02 NOTE — PLAN OF CARE
Status: Pt transferred from  at 1640. Admitted 6/22 after a ground level fall resulting in subdural hematoma with midline shift s/p emergent craniotomy and SDH evacuation. Course has been complicated by nonconvulsive status epilepticus (resolved), GI bleeding s/p EGD and clipping of gastric ulcer 7/5, and aspiration s/p PEG placement 7/15. Noted to have bloody stools the evening of 7/17 with acute hemoglobin drop (10.1 -> 8.7).   Vitals: VSS on RA, continuous pulse ox 93-96%  Neuros: KELLY orientation, nonverbal. Very slow to track in all directions. Intermittently follows commands, slow to respond. LUE 4/5. AOE 3/5.   IV: PIV-SL w/nono in place  Labs/Electrolytes: Hbg 8.7  Resp/trach: LS clear/diminished in bases. Intermittent, weak cough. Oral suction as needed  Diet: NPO with TF infusing via PEG at goal rate of 45mL/hr. FWF 30mL q4hrs.   Bowel status: Rectal tube in place, some output in tubing but 0 in bag   : Incontinent of urine, purwick in place w/adequate UOP.   Skin: L crani inc approximated. Skin tear on RUE with primapore. Skin abrasions and bruises throughout. Very fragile skin. Redness/chaffing to inner thighs, barrier cream/baby powder applied and interdry placed this shift. Lotion applied to BLEx2. Small, rectal skin tear.Blanchable redness to nellie area, barrier cream applied. PEG site with some creamy/crusty secretions cleansed and new dressing applied   Pain: Tense and with some wincing with repositioning so PRN tylenol given with relief of s/s  Activity: Up w/2 and a lift, turned and repositioned q2hrs.   Plan: Anticipated discharge 8/4, family considering comfort care but no decision yet. Needs improvement in diarrhea but otherwise ready for TCU, SW following. Continue to monitor and follow POC

## 2022-08-02 NOTE — PROGRESS NOTES
[facility]  WO Nurse Inpatient Assessment     Consulted for: Left Lower extremity and PEG site    Patient History (according to provider note(s):      76-year-old female with PMHx myelodysplastic syndrome, seronegative RA, chronic microcytic anemia, PAD, and recent L SDH (5/12/2022) who arrived to [facility] via EMS after an unwitnessed fall at home.  s/p 6/22 emergent L craniotomy for SDH evacuationa left acute subdural hematoma     Areas Assessed:      Areas visualized during today's visit: Focused: Left shin and peg tube site    Wound location: Left shin                                                                                                                                                      Date of photos: 6/23/22, 7/7, 7/25  Wound due to: Trauma from wheelchair foot rest pins.    Wound history/plan of care: occurred approximately 6 weeks prior to admission.  Spouse had been leaving open to air.   Wound base: 100 % dry red/fibrin     Palpation of the wound bed: normal      Drainage: none     Description of drainage: none     Measurements (length x width x depth, in cm): 0.2  x 0.2 x  0.1 cm      Tunneling: N/A     Undermining: N/A  Periwound skin: Intact and fragile      Color: normal and consistent with surrounding tissue      Temperature: normal   Odor: none  Pain: no grimacing or signs of discomfort,   Pain interventions prior to dressing change: N/A  Treatment goal: Heal  and Remove necrotic tissue  STATUS: almost healed      Wound location: Peg tube site          last photo: 7/25/22  Wound due to: Surgical Wound  Wound history/plan of care: Peg was placed on 7/10 in IR, has two tightly fitted buttons  Wound base: previously with 100 % yellow fibrinous tissue at the insertion site  Resolved.      Drainage: scant     Description of drainage: cloudy and yellow     Measurements (length x width x depth, in cm): 0.5  x 0.3  x  0.1 cm    Insertion site skin:  Erythema- blanchable up to 0.2cm           Temperature: normal   Odor: none  Pain no tension to hands, feet and body,   Pain interventions prior to dressing change: n/a  STATUS:  resolved    Treatment Plan:     Left lower extremity wound:leave open to air    POC for wound located around peg tube- Cleanse gently with microklenz and apply No sting film barrier daily. Make sure skin surfaces are  to allow the barrier to dry completely before allowing skin to return to the normal position. Coat twice for better protection, ensure to dry first layer before applying second layer. Place a layer of split gauze.    Orders: Reviewed and Updated    RECOMMEND PRIMARY TEAM ORDER: None, at this time  Education provided: plan of care and wound progress  Discussed plan of care with: Patient and Nurse  WOC nurse follow-up plan: signing off  Notify WOC if wound(s) deteriorate.  Nursing to notify the Provider(s) and re-consult the WOC Nurse if new skin concern.    DATA:     Current support surface: Standard  Low air loss mattress  BMI: Body mass index is 25.08 kg/m .   Active diet order: Orders Placed This Encounter      NPO per Anesthesia Guidelines for Procedure/Surgery Except for: Meds     Output: I/O last 3 completed shifts:  In: 1550 [NG/GT:470]  Out: 1100 [Urine:800; Stool:300]     Labs:   Recent Labs   Lab 08/02/22  0657 08/01/22  0653   ALBUMIN  --  3.9   HGB 7.9* 8.7*   WBC 10.6 11.6*     Pressure injury risk assessment:   Sensory Perception: 3-->slightly limited  Moisture: 3-->occasionally moist  Activity: 2-->chairfast  Mobility: 2-->very limited  Nutrition: 3-->adequate  Friction and Shear: 2-->potential problem  Leobardo Score: 15

## 2022-08-02 NOTE — PROGRESS NOTES
[facility], [place]   08/02/2022  Neurosurgery Progress Note:    Assessment:  Ms. Benítez is a 76-year-old female with PMHx myelodysplastic syndrome, seronegative RA, chronic microcytic anemia, PAD, and recent L SDH (5/12/2022) who arrived to [facility] via EMS after an unwitnessed fall at home.  Shortly after arriving, the patient was complaining of a significant headache and nausea, and then her right pupil dilated.  She was no longer protecting her airway, so she was sedated and intubated.  Her right eye pupil then returned to a normal caliber.  A head CT demonstrated a left acute subdural approximately 2.3 cm thick with 12 mm of rightward midline shift.  She was taken to the OR on 6/22 for emergent L craniotomy for SDH evacuation. She was extubated on POD1. Concerns for tube feed aspiration on 6/25.  She underwent emergent EGD by GI due to sharp drop in hemoglobin as well as melanotic stools.  She had 1 gastric ulcer clipped which was the presumed culprit.  Due to requiring the EGD she needed intubation during this time, therefore she was intubated on 7/4/2022 for EGD.  She was also connected to EEG on 7/4/2022 found to be in nonconvulsive status received Keppra load and Ativan and was started on Versed overnight.  On 7/5/2022, her Versed drip was weaned off with confirmation of no status epilepticus. On 7/8 she was extubated and remains stable.  Received PEG on 7/15/2022 by IR.  Underwent colonoscopy with GI on 7/19/2022.  Transferred to medicine primary on 7/19/2022. On 7/24, drainage noted at PEG site, swab culture sent for analysis per primary team. Currently on ancef.     No acute events overnight.    Other diagnoses include:  #Brain compression  #Open evacuation of hematoma on day of admission   #Hyponatremia  #Pulmonary edema  #GI bleed  #Uremia  #hypokalemia    Recommendations:  Continue Keppra 1 g twice daily till seen by neurology as outpatient  Ok for lovenox from nsgy  perspective  Remaining cares per primary team    Neurosurgery will continue to follow  -----------------------------------  JOSÉ ANTONIO Lorenzo, CNP  Neurosurgery  Pager [phone]    Please contact neurosurgery resident on call with questions.    Dial * * [phone], enter [phone] when prompted.    -----------------------------------    Interval History: Neurological exam remains unchanged.  Denies headache, chest pain and shortness of breath.  TCU placement pending.     Objective:   Temp:  [96  F (35.6  C)-98.7  F (37.1  C)] 96.9  F (36.1  C)  Pulse:  [82-97] 87  Resp:  [12-18] 18  BP: (121-148)/(40-83) 127/46  SpO2:  [91 %-98 %] 92 %  I/O last 3 completed shifts:  In: 1550 [NG/GT:470]  Out: 1100 [Urine:800; Stool:300]    Gen: Appears comfortable, NAD, laying in bed  Abd: PEG in place  HEENT: Left craniotomy incision healing well  Wound: Incision closed with staples-now removed, incision appears clean dry-no signs of leaking, no wetness appreciated.    Pulm: On RA, no increased work of breathing, symmetric chest rise  Neurologic:  Eyes open spontaneously, minimally tracking, gaze at midline without preference  More alert during nursing cares  Follows commands in all 4 extremities  Reflexes 2+, downgoing Babinski's bilaterally    LABS:  Recent Labs   Lab 08/02/22  0657 08/01/22  0653 07/30/22  0651    140 140   POTASSIUM 4.9 5.0 4.4   CHLORIDE 105 103 105   CO2 28 25 26   ANIONGAP 8 12 9   * 123* 113*   BUN 35.3* 28.3* 25.5*   CR 0.55 0.42* 0.45*   MENDEZ 9.6 9.7 9.5       Recent Labs   Lab 08/02/22  0657   WBC 10.6   RBC 2.56*   HGB 7.9*   HCT 25.5*      MCH 30.9   MCHC 31.0*   RDW 17.3*   *       IMAGING:  No results found for this or any previous visit (from the past 24 hour(s)).

## 2022-08-02 NOTE — PROGRESS NOTES
[facility]  Palliative Care Daily Progress Note       Recommendations & Counseling       Per  Jd, no changes to plan of care for now and they are looking into TCU options.    Recommend completing a POLST prior to discharge. I provided Jd with the POLST form today and he'd like to review it before signing.    Thank you for the opportunity to participate in the care of this patient and family. Our team: will continue to follow.     During regular M-F work hours ([phone]) -- if you are not sure who specifically to contact -- please contact us in [facility] Smart Web.     After regular work hours and on weekends/holidays, you can call our answering service at [phone].     Clarice Miller PA-C  [facility]  Contact information available via [facility] Paging/Directory    Attestation:  Total time on the floor involved in the patient's care: 15 minutes  Total time spent in counseling/care coordination: >50%      Assessments          Louise Benítez is a 76 year old female with PMHx myelodysplastic syndrome, seronegative RA, chronic microcytic anemia, PAD, and recent L SDH (5/12/2022) who was admitted from her TCU on 6/22/2022 after an unwitnessed fall. Was subsequently found to have an acute on chronic L SDH, a new acute R SDH, and increased midline shift w/ trace L uncal herniation now s/p L craniotomy w/ hematoma evacuation. Course complicated by GIB requiring reintubation. Now extubated.     Today, the patient was seen for:  Acute on chronic L SDH s/p L craniotomy w/hematoma evacuation  Acute R SDH  Dysphagia  Gastric and colonic ulcers, GIB  Family support    Prognosis, Goals, or Advance Care Planning was addressed today with: Yes.  Mood, coping, and/or meaning in the context of serious illness were addressed today: Yes.            Interval History:     Chart review/discussion with unit or clinical team members:  "  Reviewed recent notes from primary team, neurosurgery and SW.    Per patient or family/caregivers today:  Visited Louise who was asleep.  Jd present. He states that a few days ago Louise was more alert, interactive, but she has been more somnolent the past 2 days. Asked how he and his daughters were doing--did they have any questions or concerns after care conference last week? He stated they did not. They had come up with a list of places yesterday to give to  for placement. He shared that coping with the ups and downs in her condition has been hard, but that he has good support and is doing ok.    Key Palliative Symptoms:  We are not helping to manage these symptoms currently in this patient.             Review of Systems:     A comprehensive ROS has been negative other than stated in the HPI           Medications:     I have reviewed this patient's medication profile and medications during this hospitalization.           Physical Exam:   Temp: (!) 96  F (35.6  C) Temp src: Axillary BP: (!) 144/53 Pulse: 101   Resp: 18 SpO2: 94 % O2 Device: None (Room air)    Gen: Awake, sitting in chair. Appears comfortable, in NAD  Resp: No increased work of breathing on room air  Msk: no gross deformity  Skin:  Warm, dry, no jaundice  Ext: warm, well perfused.  Neuro: Awake, alert. Mostly non-verbal, but did spontaneously say \"yes\" and smiled in response to her daughter.              Data Reviewed:     Reviewed recent pertinent imaging, comments:   Results for orders placed or performed during the hospital encounter of 06/22/22   Head CT w/o contrast   Result Value Ref Range    Radiologist flags (Urgent)      Acute subdural hematomas with mass effect as described    Impression    Impression:  1. Active hemorrhage superimposed upon Acute and chronic left-sided  mixed density subdural measuring up to 2.3 cm in maximum thickness  with 12 mm of rightward midline shift and trace left-sided uncal  herniation.  2. Acute " subdural along the right frontal convexity measuring up to 7  mm in maximum thickness.    [Urgent Result: Acute subdural hematomas with mass effect as described  above.] Concern for active hemorrhage.    Finding was identified on 6/22/2022 6:04 PM.     Dr. Sanchez was contacted by Dr. Leung and Dr. Lindquist at  6/22/2022 6:09 PM and was again contacted at 6:30 PM and verbalized  understanding of the urgent finding.      I have personally reviewed the examination and initial interpretation  and I agree with the findings.    BONI LINDQUIST MD         SYSTEM ID:  [id number]   Cervical spine CT w/o contrast    Impression    Impression:   No acute fracture or traumatic subluxation.    I have personally reviewed the examination and initial interpretation  and I agree with the findings.    BONI LINDQUIST MD         SYSTEM ID:  [id number]   XR Chest Port 1 View    Impression    IMPRESSION:  ET tube is 2.5 cm above the leo. No acute consolidative airspace  disease. Left lower lobe atelectasis suspected.    I have personally reviewed the examination and initial interpretation  and I agree with the findings.    ROYA EDMOND MD         SYSTEM ID:  [id number]   CT Head w/o Contrast    Impression    Impression:  1. Postsurgical changes of left frontoparietal craniotomy and subdural  hematoma evacuation.  2. Decreased size of the heterogeneous fluid collection overlying the  left cerebral convexity.  3. Decreased rightward midline shift. Decreased effacement of the left  lateral ventricle.  4. Stable appearance of the subdural hematomas overlying the right  cerebral convexity and anterior falx.    I have personally reviewed the examination and initial interpretation  and I agree with the findings.    OSVALDO CONNOLLY MD         SYSTEM ID:  [id number]   XR Chest Port 1 View    Impression    IMPRESSION:   1. ET tube is in the mid thoracic trachea.  2. Decreased perihilar pulmonary opacities.    I have personally reviewed  the examination and initial interpretation  and I agree with the findings.    ROYA EDMOND MD         SYSTEM ID:  [id number]   CT Head w/o Contrast    Impression    Impression:    1. Reduced size of subdural fluid collection deep to the left  frontoparietal craniotomy.  2. Reduced mass effect and left to right midline shift now measuring 7  mm previously 13 mm when measured in a similar fashion.  3. Similar appearance of subdural hemorrhage along the anterior falx  and extending along the right anterior cranial fossa.  4. Unchanged appearance of small subdural hemorrhage along the  bilateral tentorial leaflets.     I have personally reviewed the examination and initial interpretation  and I agree with the findings.    OSVALDO CONNOLLY MD         SYSTEM ID:  [id number]   XR Abdomen Port 1 View    Impression    Impression:   1. Enteric tube is subdiaphragmatic with tip and sidehole projecting  over the expected location of the stomach  2. Increased bibasilar pulmonary opacities.    I have personally reviewed the examination and initial interpretation  and I agree with the findings.    DARSHAN DIXON MD         SYSTEM ID:  [id number]   CT Head w/o Contrast    Impression    Impression:    1. Slightly reduced size of subdural fluid collection deep to the left  frontotemporal craniotomy site.  2. Unchanged mass effect on the left cerebrum and unchanged left to  right midline shift.  3. Unchanged additional subdural fluid collection along the right  anterior cranial fossa and extension along the anterior falx.  4. Unchanged appearance of small subdural hemorrhages along the  bilateral tentorial leaflets..     I have personally reviewed the examination and initial interpretation  and I agree with the findings.    OSVALDO CONNOLLY MD         SYSTEM ID:  [id number]   XR Chest Port 1 View    Impression    Impression:   ET tube no longer visualized. Increased interstitial pulmonary edema  and bibasilar atelectasis and/or  pneumonia.    I have personally reviewed the examination and initial interpretation  and I agree with the findings.    ANGELIQUE DENTON MD         SYSTEM ID:  [id number]   XR Chest Port 1 View    Impression    Impression:   1. Slightly decreased right lower lobe opacities.  2. Slight increase in dense retrocardiac consolidation which may  represent atelectasis versus infection/aspiration.    I have personally reviewed the examination and initial interpretation  and I agree with the findings.    GISELE BURGESS MD         SYSTEM ID:  [id number]   CT Head w/o Contrast    Impression    Impression:  1. Unchanged size of left cerebral convexity subdural fluid collection  status post subdural drain removal.  2. Unchanged size of right frontal subdural hemorrhage with extension  along the anterior falx.  3. Unchanged size of small subdural hemorrhage extending along the  tentorial leaflets.  4. Unchanged 6 mm of left-to-right midline shift.    I have personally reviewed the examination and initial interpretation  and I agree with the findings.    SAVANNA COHEN MD         SYSTEM ID:  [id number]   XR Chest Port 1 View    Impression    Impression: Increased effusions, atelectasis and interstitial edema.    I have personally reviewed the examination and initial interpretation  and I agree with the findings.    JOVANA CORRIGAN MD         SYSTEM ID:  [id number]   XR Chest Port 1 View    Impression    IMPRESSION:   1. Similar bibasilar predominant pulmonary opacities concerning for  aspiration or infection. Superimposed atelectasis and probable mild  pulmonary edema is suspected.  2. Stable pleural effusions.    I have personally reviewed the examination and initial interpretation  and I agree with the findings.    LOS BARAJAS DO         SYSTEM ID:  [id number]   XR Abdomen Port 1 View    Impression    IMPRESSION:   Enteric tube tip and side port projecting over the stomach..    I have personally reviewed the examination and  initial interpretation  and I agree with the findings.    ROYA EDMOND MD         SYSTEM ID:  [id number]   XR Chest Port 1 View    Impression    IMPRESSION:   Trace bilateral pleural effusions with overlying bibasilar opacities,  improved from prior.    I have personally reviewed the examination and initial interpretation  and I agree with the findings.    LOS BARAJAS DO         SYSTEM ID:  [id number]   XR Abdomen Port 1 View    Impression    Impression: Enteric tube tip and sidehole in the stomach.    LOS BARAJAS DO         SYSTEM ID:  [id number]   XR Abdomen Port 1 View    Impression    IMPRESSION:   Enteric tube tip and side-port overlying the stomach.    I have personally reviewed the examination and initial interpretation  and I agree with the findings.    LOS BARAJAS DO         SYSTEM ID:  [id number]   CT Head w/o Contrast    Impression    Impression:    1. Increased size of left cerebral convexity subdural hematoma without  increase in density and with decreased size of left frontal seen  subdural hemorrhage suggests redistribution of blood products.  Attention is recommended on follow-up imaging.  2. stable size of right cerebral convexity subdural hematoma.  3. Stable 6 mm of rightward midline shift.         BONI LINDQUIST MD         SYSTEM ID:  [id number]   XR Chest Port 1 View    Impression    IMPRESSION: Continued right lower lung atelectasis or mild edema. No  significant changes otherwise.    JOVANA CORRIGAN MD         SYSTEM ID:  [id number]   XR Chest Port 1 View    Impression    IMPRESSION:     1. Left upper extremity PICC line terminates in the high SVC. No  pneumothorax.  2. Continued right lower lung atelectasis or mild edema. No  significant changes otherwise.    I have personally reviewed the examination and initial interpretation  and I agree with the findings.    JOVANA CORRIGAN MD         SYSTEM ID:  [id number]   CT Head w/o Contrast    Impression    Impression:    1. Stable  size and the distribution of the subdural hemorrhage along  the right frontal and left frontoparietal convexities, falx and  tentorium.  2. Stable postsurgical changes of left frontoparietal craniotomy and  subdural hematoma evacuation.     I have personally reviewed the examination and initial interpretation  and I agree with the findings.    BONI LINDQUIST MD         SYSTEM ID:  [id number]   CT Chest Pulmonary Embolism w Contrast    Impression    IMPRESSION:   1. Exam is negative   for acute pulmonary embolism.    Evidence for right heart strain or increased right heart pressures?   is not present.     2. Mucous plugging and linear atelectasis in the right lower lobe.  Aspiration or other infectious consolidation cannot be ruled out.    3. Ectatic thoracic aorta, approximately 4.2 cm.    In the event of a positive result for acute pulmonary embolism  resulting in right heart strain, consider calling the   [facility] patient placement ([phone]) for PERT (Pulmonary Embolism  Response Team) Activation?    PERT -- Pulmonary Embolism Response Team (Multidisciplinary team  including cardiology, interventional radiology, critical care,  hematology)    I have personally reviewed the examination and initial interpretation  and I agree with the findings.    JOVANA CORRIGAN MD         SYSTEM ID:  [id number]   XR Chest Port 1 View    Impression    IMPRESSION: Endotracheal tube tip in the mid to distal thoracic  trachea. Bibasilar atelectasis. Left upper portion of the PICC line  tip possibly in the upper portion of the azygos vein. Atherosclerosis.    JOVANA CORRIGAN MD         SYSTEM ID:  [id number]   XR Abdomen Port 1 View    Impression    IMPRESSION:   1. Enteric tube with tip in the stomach and side-port at or just  distal to the gastroesophageal junction.  2. Nonobstructive bowel gas pattern.    I have personally reviewed the examination and initial interpretation  and I agree with the findings.    ROHAN MAGALLON,  MD         SYSTEM ID:  [id number]   XR Chest Port 1 View    Impression    IMPRESSION:  1. Increased perihilar and bibasilar pulmonary opacities which may  represent atelectasis, edema and/or infection.  2. Stable support devices.     I have personally reviewed the examination and initial interpretation  and I agree with the findings.    JOVANA CORRIGAN MD         SYSTEM ID:  [id number]   CTA Abdomen Pelvis with Contrast    Impression    IMPRESSION:   1. No evidence of gastrointestinal bleed as questioned.  2. Interval new subacute appearing right sacral insufficiency fracture  and subacute appearing left pubic ramus fracture since 5/12/2022.  3. Age-indeterminate compression deformity of T10 with approximately  30% height loss. Multiple subacute appearing anterior rib fractures,  and unchanged appearance of L1, L3, and L5 compression deformities.  4. Small sliding-type hiatal hernia with hemostasis clip noted in the  gastric fundus.  5. Aortobiiliac atherosclerotic atherosclerosis with multiple foci of  arterial stenosis including the renal arterial origins, common iliac  vessels, and near total occlusions of the proximal internal iliac  arteries and common femoral arteries proximally.  6. Unchanged appearance of left inguinal hernia containing a loop of  sigmoid colon, no signs of obstruction.  7. 5 mm hypodensity in the pancreatic head may represent a sidebranch  IPMN although MRI is recommended to further characterize on an  outpatient basis, pancreatic cystic follow-up is recommended below.  Per [employer] criteria.  8. Indeterminate hepatic hypodensities are too small to further  characterize but could be evaluated further at time of MRI for above  pancreatic lesion.  9. 1.4 cm right ovarian cyst for which annual follow-up is recommended  in a postmenopausal female.  10. Trace effusions and associated atelectasis.    [employer] recommendations for asymptomatic pancreatic  cysts,  modified from international consensus guidelines*   Size of largest cyst:   Less than 1 cm: Follow-up imaging in 6 months to 1 year, then lengthen  interval to 2-3 years if no change.  1-2 cm: Follow-up imaging at 6 months, then yearly for 2 years, then  lengthen interval if no change.   The optimal initial study or first follow-up exam is MRI performed  with intravenous gadolinium contrast to allow full cyst  characterization. Once characterized, future follow-up MRIs can be  performed without contrast. If MRI is contraindicated, CT with  contrast is recommended.   GI consultation for possible endoscopic ultrasound is recommended for  cysts with the following features: Size > 2 cm, >20% growth on  followup, wall thickening or enhancement, mural nodule, duct > 5 mm,  or abrupt change in duct caliber with distal atrophy. GI or surgical  consultation is also recommended for symptomatic cysts.   *Reference: International Consensus Guidelines for Management of IPMN  and MCN of the pancreas. Pancreatology: 12:(2012); 183-197.    I have personally reviewed the examination and initial interpretation  and I agree with the findings.    ILIANA JEFFERSON MD         SYSTEM ID:  [id number]   XR Abdomen Port 1 View    Impression    IMPRESSION:   1. Feeding tube with tip in the proximal jejunum.   2. Enteric tube with tip and side-port overlying the stomach.  3. Non obstructive bowel gas pattern.  4. Small bilateral pleural effusions.    I have personally reviewed the examination and initial interpretation  and I agree with the findings.    ROYA EDMOND MD         SYSTEM ID:  [id number]   Echo Complete   Result Value Ref Range    LVEF  60-65%    Echocardiogram Limited   Result Value Ref Range    LVEF  60-65%        Reviewed recent labs, comments:   Hgb 7.9 (down from 8.7)

## 2022-08-02 NOTE — PLAN OF CARE
Status: Pt transferred from 6D at 1640. Admitted 6/22 after a ground level fall resulting in subdural hematoma with midline shift s/p emergent craniotomy and SDH evacuation. Course has been complicated by nonconvulsive status epilepticus (resolved), GI bleeding s/p EGD and clipping of gastric ulcer 7/5, and aspiration s/p PEG placement 7/15. Noted to have bloody stools the evening of 7/17 with acute hemoglobin drop (10.1 -> 8.7).   Vitals: VSS on RA. Sat monitor is not working well (last check 91%), now one ordered. Cap refill still good and no s/s of issues   Neuros: A&O to self, non-verbal. RUE3+, LUE 4, BLE 2. Intermittently following commands, PEERLA, will track up and down, but not L-R. AE rigid, mod , weak d/p.   IV: PIV-SL w/nono in place  Labs/Electrolytes: Hbg 8.7  Resp/trach: LS clear/diminished in bases. Intermittent, weak cough.   Diet: NPO, TF at goal of 45cc/hr  Bowel status: Rectal tube replaced this shift. Intact and draining.   : Incontinent of urine, purwick in place w/adequate UOP.   Skin: L crani inc approximated; skin tear on R upper arm, premimore replaced; skin abrasions and bruises throughout; redness/chaffing to inner thighs, barrier cream applied and pillow in place to prevent further irritation; small, rectal skin tear, continue to monitor; blanchable redness to nellie area, barrier cream applied.   Pain: No s/s of pain.  Activity: Up w/2 and a lift, turned and repositioned q2hrs.   Social: daughter at bedside for start of the shift, supportive in cares.   Plan: continue to monitor and follow POC.   Updates this shift: New rectal tube remains intact, continue to monitor.

## 2022-08-02 NOTE — PLAN OF CARE
Arrived from: 6D   Belongings/meds: w/pt  2 RN Skin Assessment Completed by: Shantell Solorio and Lluvia Cho   Non-intact findings documented (yes/no/NA): Yes

## 2022-08-03 NOTE — PROGRESS NOTES
[facility], [place]   08/03/2022  Neurosurgery Progress Note:    Assessment:  Ms. Benítez is a 76-year-old female with PMHx myelodysplastic syndrome, seronegative RA, chronic microcytic anemia, PAD, and recent L SDH (5/12/2022) who arrived to [facility] via EMS after an unwitnessed fall at home.  Shortly after arriving, the patient was complaining of a significant headache and nausea, and then her right pupil dilated.  She was no longer protecting her airway, so she was sedated and intubated.  Her right eye pupil then returned to a normal caliber.  A head CT demonstrated a left acute subdural approximately 2.3 cm thick with 12 mm of rightward midline shift.  She was taken to the OR on 6/22 for emergent L craniotomy for SDH evacuation. She was extubated on POD1. Concerns for tube feed aspiration on 6/25.  She underwent emergent EGD by GI due to sharp drop in hemoglobin as well as melanotic stools.  She had 1 gastric ulcer clipped which was the presumed culprit.  Due to requiring the EGD she needed intubation during this time, therefore she was intubated on 7/4/2022 for EGD.  She was also connected to EEG on 7/4/2022 found to be in nonconvulsive status received Keppra load and Ativan and was started on Versed overnight.  On 7/5/2022, her Versed drip was weaned off with confirmation of no status epilepticus. On 7/8 she was extubated and remains stable.  Received PEG on 7/15/2022 by IR.  Underwent colonoscopy with GI on 7/19/2022.  Transferred to medicine primary on 7/19/2022. On 7/24, drainage noted at PEG site, swab culture sent for analysis per primary team.       Other diagnoses include:  #Brain compression  #Open evacuation of hematoma on day of admission   #Hyponatremia  #Pulmonary edema  #GI bleed  #Uremia  #hypokalemia    Recommendations:  Continue Keppra 1 g twice daily till seen by neurology as outpatient  Ok for lovenox from nsgy perspective  Remaining cares per primary team  Follow-up  in 6-8 weeks with Neurosurgery with repeat Head CT at that time.  If SDH is noted to be enlarged then would recommend MMA embolization.    Neurosurgery will follow peripherally now.  Please call with questions or concerns  -----------------------------------  JOSÉ ANTONIO Lorenzo, CNP  Neurosurgery  Pager [phone]    Please contact neurosurgery resident on call with questions.    Dial * * [phone], enter [phone] when prompted.    -----------------------------------    Interval History: Neurological exam remains unchanged. Sleepy this AM but able to follow commands and nod appropriately.   Denies headache, chest pain and shortness of breath.  TCU placement pending.     Objective:   Temp:  [96  F (35.6  C)-99.9  F (37.7  C)] 99.9  F (37.7  C)  Pulse:  [] 104  Resp:  [15-18] 15  BP: (127-144)/(45-58) 140/45  SpO2:  [91 %-95 %] 91 %  I/O last 3 completed shifts:  In: 1260 [NG/GT:360]  Out: 1200 [Urine:1000; Stool:200]    Gen: Appears comfortable, NAD, laying in bed  Abd: PEG in place  HEENT: Left craniotomy incision healing well  Wound: Incision closed with staples-now removed, incision appears clean dry-no signs of leaking, no wetness appreciated.    Pulm: On RA, no increased work of breathing, symmetric chest rise  Neurologic:  Eyes open spontaneously, minimally tracking, gaze at midline without preference  More alert during nursing cares  Follows commands in all 4 extremities  Reflexes 2+, downgoing Babinski's bilaterally    LABS:  Recent Labs   Lab 08/02/22  0657 08/01/22  0653 07/30/22  0651    140 140   POTASSIUM 4.9 5.0 4.4   CHLORIDE 105 103 105   CO2 28 25 26   ANIONGAP 8 12 9   * 123* 113*   BUN 35.3* 28.3* 25.5*   CR 0.55 0.42* 0.45*   MENDEZ 9.6 9.7 9.5       Recent Labs   Lab 08/02/22  0657   WBC 10.6   RBC 2.56*   HGB 7.9*   HCT 25.5*      MCH 30.9   MCHC 31.0*   RDW 17.3*   *       IMAGING:  No results found for this or any previous visit (from the past 24 hour(s)).

## 2022-08-03 NOTE — PROGRESS NOTES
Vitals: VSS   Neuros:  Alert. Non-verbal. LUE 4, all others 3. Intermittently following commands.  Mod , weak d/p.   IV: PIV-SL   Labs/Electrolytes: Hbg 7.9  Resp/trach: Clear- Weak cough   Diet: NPO, TF at goal of 45cc/hr  Bowel status: Rectal tube in place, 100 ml in collection bag.  : Purewick in place, good output  Skin: L crani inc approximated. Skin abrasions and bruises throughout; redness/chaffing to inner thighs, barrier cream applied and pillow/intra-dry in place to prevent further irritation. PEG with scant amount of drainage, redness. See WOC note and recs  Pain: No s/s of pain KELLY  Activity: Up with 2 and lift, repo Q2h Sleeping in between cares.  Social:  at bediside until 1700  Plan: See SW note for possible plan.   Updates this shift: . Mitt on left hand when family is not present. Pulls at IV and PEG site. Will continue with POC.

## 2022-08-03 NOTE — PROGRESS NOTES
CLINICAL NUTRITION SERVICES - REASSESSMENT NOTE     Nutrition Prescription    RECOMMENDATIONS FOR MDs/PROVIDERS TO ORDER:  Total daily fluid requirements per team    Malnutrition Status:    Patient does not meet two of the established criteria necessary for diagnosing malnutrition but is at risk for malnutrition    Recommendations already ordered by Registered Dietitian (RD):  Continue goal EN via PEG    Future/Additional Recommendations:  Monitor stool trends, weight trends, ongoing EN tolerance via PEG     EVALUATION OF THE PROGRESS TOWARD GOALS   Diet: NPO  Nutrition Support: via PEG, Shonda Li Peptide 1.5 @ 45 mL/hr (1080 mL/day) + 3 prosource to provide 1780 kcal (30 kcal/kg), 113 g protein (1.8 g/kg), 149 g CHO, 10 g fiber, 83 g fat (40% from MCTs), and 756 mL free water daily   FWF: 30ml q 4 hours  Intake: 7 day average 996 ml + 2.7 pkts prosource for 1602 kcals (27 kcal/kg), 103g protein (1.7gm/kg) for 100% lower end nutrition needs     NEW FINDINGS   Weight: 8/1 66.3 kg, fluctuations since admit ranging 60.3 kg to 80.8 kg, likely with fluid shifts.  Continue current dosing weight of 59 kg (adjusted)    Labs:   BUN 43.5 (H), trending up  Lytes stable      Meds:   Banatrol TID  Vitamin B12  Lomotil started today  Imodium BID  Liquid MVI  protonix suspension    GI: Rectal tube (removed today) with 100 ml out 8/2 - down from 350 ml, receiving banatrol since 7/25, imodium since 7/27, lomotil added today.  cdiff negative 7/25    Skin: WOCN signed off 8/2: left shin (trauma from wheelchair), almost healed.  PEG tube site infection resolved.    SLP: recommending NPO due to high aspiration risk    Disposition: TCU planned, ongoing GOC discussions    MALNUTRITION  % Intake: No decreased intake noted  % Weight Loss: None noted  Subcutaneous Fat Loss: None observed  Muscle Loss: Temporal:  severe, Facial & jaw region:  moderate, Thoracic region (clavicle, acromium bone, deltoid, trapezius, pectoral):  Mild,  Upper arm (bicep, tricep):  moderate, Lower arm  (forearm):  moderate, Dorsal hand:  Mild to moderate, Upper leg (quadricep, hamstring):  moderate and Patellar region:  moderate  Fluid Accumulation/Edema: + 1 trace  Malnutrition Diagnosis: Patient does not meet two of the established criteria necessary for diagnosing malnutrition but is at risk for malnutrition    Previous Goals   Total avg nutritional intake to meet a minimum of 25 kcal/kg and 1.5 g PRO/kg daily (per dosing wt 59 kg)  Evaluation: Met    Previous Nutrition Diagnosis  Inadequate oral intake related to inability to take PO intake as evidenced by NPO status and pt reliant on TF to meet nutritional needs  Evaluation: No change    CURRENT NUTRITION DIAGNOSIS  Inadequate oral intake related to inability to take PO intake as evidenced by NPO status and pt reliant on TF to meet nutritional needs    INTERVENTIONS  Implementation  Enteral Nutrition - continue    Goals  Total avg nutritional intake to meet a minimum of 25 kcal/kg and 1.5 g PRO/kg daily (per dosing wt 59 kg)    Monitoring/Evaluation  Progress toward goals will be monitored and evaluated per protocol.    Meli De Leon, RD, LD, [facility]  Neuro ICU RD pager [phone]  Ascom number [id number]  [facility] RD pager [phone]

## 2022-08-03 NOTE — CONSULTS
Reviewed chart. Patient was on droplet precautions for Rhinovirus infection. Symptoms resolved, precautions removed by SHIREEN

## 2022-08-03 NOTE — PROGRESS NOTES
"Care Management Follow Up    Length of Stay (days): 42    Expected Discharge Date: 08/04/2022     Concerns to be Addressed:   Disposition planning   Patient plan of care discussed at interdisciplinary rounds: Yes    Anticipated Discharge Disposition:  TCU placement   Anticipated Discharge Services:  TCU placement  Anticipated Discharge DME:  Not applicable at this time    Patient/family educated on Medicare website which has current facility and service quality ratings:   yes  Education Provided on the Discharge Plan:  yes  Patient/Family in Agreement with the Plan:  yes    Referrals Placed by CM/SW:  None on this date  Private pay costs discussed: Not applicable at this time    Additional Information:  SW is following pt for discharge planning.    Per chart review, OT and Physical Therapy are recommending TCU placement.   Per 8/3/2022 progress note entry written by Benjamín Taylor PAndresASasha Campos, \"Goal at this time is for TCU per d/w family at bedside afternoon of 8/2.\"  Per chart review, pt is off of droplet precautions as of 8/3/2022 and rectal tube was removed on 8/3/2022.  Pt has a peg tube in place and is using 02.  SW received a voice mail message to phone pt's daughter (Allyson) in regards to facility preferences. SW phoned Allyson and left a message for Allyson to call.    SW will continue to follow for discharge planning.    CÉSAR Bailey  Social Work, 6A  Phone:  [phone]  Pager:  [phone]  8/3/2022          REBECCA Pearson      "

## 2022-08-03 NOTE — PLAN OF CARE
Status: Pt transferred from  at 1640. Admitted 6/22 after a ground level fall resulting in subdural hematoma with midline shift s/p emergent craniotomy and SDH evacuation. Course has been complicated by nonconvulsive status epilepticus (resolved), GI bleeding s/p EGD and clipping of gastric ulcer 7/5, and aspiration s/p PEG placement 7/15. Noted to have bloody stools the evening of 7/17 with acute hemoglobin drop (10.1 -> 8.7).   Vitals: VSS on RA, HTN within 160 elkin. Continuous pulse ox 93-96%  Neuros: KELLY orientation, nonverbal. Very slow to track in all directions. Intermittently follows commands, slow to respond. LUE 4/5. AOE 3/5.   IV: PIV-SL w/nono in place  Labs/Electrolytes: Hbg 7.9  Resp/trach: Intermittent, weak cough. Oral suction as needed  Diet: NPO with TF infusing via PEG at goal rate of 45mL/hr. FWF 30mL q4hrs.   Bowel status: Rectal tube in place, 100mL out this shift. 200mL TV  : Incontinent of urine, purwick in place w/adequate UOP.   Skin: L crani inc approximated. Skin tear on RUE with primapore. Skin abrasions and bruises throughout. Very fragile skin. Redness/chaffing to inner thighs,baby powder applied Lotion applied to BLE. Small, rectal skin tear.Blanchable redness to nellie area, barrier cream applied. PEG site with some creamy/crusty secretions cleansed and new dressing applied   Pain: Rested comfortably throughout shift  Activity: Up w/2 and a lift, turned and repositioned q2hrs.   Plan: Anticipated discharge 8/4, family considering comfort care but no decision yet. Continue to monitor and follow POC

## 2022-08-03 NOTE — PROGRESS NOTES
Care Management Follow Up    Length of Stay (days): 42    Expected Discharge Date: 08/04/2022     Concerns to be Addressed:   Disposition planning  Patient plan of care discussed at interdisciplinary rounds: Yes    Anticipated Discharge Disposition:   TCU placement      Anticipated Discharge Services:  TCU placement  Anticipated Discharge DME:  Not applicable    Patient/family educated on Medicare website which has current facility and service quality ratings:  yes  Education Provided on the Discharge Plan:  yes  Patient/Family in Agreement with the Plan: yes     Referrals Placed by CM/SW:  No referrals placed on this date  Private pay costs discussed: Not applicable at this time    Additional Information:  SW met with pt's daughter (Allyson) in pt's room.  Pt was not awake.  Pt's grand daughter was also present.  Disposition planning was discussed including options of:  - Nursing home placement for rehab.  SW explained coverage for nursing home placement.  RACHEL explained that at this time, pt's participation is very limited and as this is the case, coverage for rehab may be limited to just a few days.  However, pt does have a peg tube which is a skilled need and would likely qualify for some room and board coverage through Medicare  Due to pt's limited participation in therapy's, RACHEL explained that an accepting SNF may choose to start pt on the long term care unit  - Hospice, home with hospice (need 24/7 caregivers), private pay residential hospice, [facility] (free), and private pay nursing home placement with contracted hospice services were discussed and SW provided resource information in regards to these options.    Per discussion, Allyson voices confidence that pt would not want to be aggressive in terms of medical care/rehab if it was known that there would be no improvement.   Allyson states that the challenge has been that pt has shown some intermittent engagement.      Plan:  Allyson plans to speak with  family this evening regarding above choices and will communicate decision regarding course (TCU vs hospice) on 8/5/2022.  RACHEL has informed Allyson that it is very likely that pt is near to ready for discharge (await update from Benjamín Taylor).    RACHEL will continue to follow for discharge planning.      CÉSAR Bailey  Social Work, 6A  Phone:  [phone]  Pager:  [phone]  8/4/2022          REBECCA Pearson

## 2022-08-03 NOTE — PROGRESS NOTES
[facility]    Medicine Progress Note - Hospitalist Service, GOLD TEAM 6    Date of Admission: 6/22/2022    Assessment & Plan        Louise Benítez is a 76 year old female with history of seronegative RA, osteoporosis, PAD, and myelodysplastic syndrome admitted 6/22 after a ground level fall resulting in SDH with midline shift s/p emergent craniotomy and evacuation. Course c/b nonconvulsive status epilepticus (resolved), GIB s/p EGD and clipping of gastric ulcer 7/5, and aspiration s/p PEG placement 7/15. Noted to have bloody stools the evening of 7/17 with acute hemoglobin drop (10.1 -> 8.7).      Goals of care: Prior medicine team met with family and NSGY 7/29 for care conference. NSGY discussed prognosis. Family discussed option of transition to comfort care/hospice with palliative care 7/29. At this time, family requests no escalation of care  - Goal at this time is for TCU per d/w family at bedside afternoon of 8/2. Family/SW working on placement  - Patient is DNR/DNI  - Continue restorative cares for now, but need to call family prior to any changes if patient has clinical changes  ** Addendum: Patient more tired and lethargic throughout the day today. Long discussion with patient's spouse followed by her daughter regarding ongoing goals, workup, escalation in cares. Family has seen waxing and waning over the past many day similar to current BL over the past several days. As a family they are still discussing TCU vs hospice and will continue to have this conversation. No changes to care to be made at this time.      SDH with midline shift s/p emergent craniotomy with SDH evacuation (6/22/22) c/b nonconvulsive status epilepticus (vEEG 7/8): Initial fall w/ SDH 8/12. Admitted to neuro ICU 5/12-5/20. Discharged to TCU. Returned home from TCU and was doing well. Had mechanical fall 6/22. Head CT noted new SDH w/ midline shift. Subsequently underwent L craniotomy for  SDH evacuation. Head CT 7/22 stable from 7/14, no explanations for febrile spikes. CT head 7/28 with stable fluid collection and associated R to L midline shift. Mentation continues to wax and wane day to day   - NSGY following: continue Keppra until seen by neurology OP  - Delirium precautions  - Per neurosurgery, it will likely take weeks to months to see her full capacity for neurologic recovery      Acute blood loss anemia on chronic microcytic anemia, gastric ulcers c/b GIB: S/p EGD 7/5 with clipping of 5mm ulcers in posterior gastric wall. Bloody stool with associated Hgb drop 7/17. Transfused 1U RBC. Colonoscopy 7/19 noted ulcers at splenic flexure that were likely source of bleeding but stopped bleeding on their own. Hgb initially improved after colonoscopy but acutely declined on 7/23.  Lovenox held but no evidence of recurrent GIB. Hgb stable   - PPI bid  - CBC qM/F, sooner if acute changes  - Contact GI with any signs of recurrent bleed     Loose stools: Slow improvement. Suspect 2/2 tube feeds. C.Diff negative. Currently has rectal tube in place with mild volume soft, formed stool  - Remove rectal tube  - Start scheduled Lomotil   - Continue banatrol, schedule loperamide (increased from 2 mg BID to 4 mg BID)     Mild transaminitis: Stable, asymptomatic.  (134), ALT 26, AST 56 (50), Tnili 0.4 8/3. Monitor    Non-severe protein calorie malnutrition, dysphagia: In the setting of the above. Nutrition following and managing tube feeds.  - SLP, nutrition following  - Continue MVI, B12  - Continue electrolyte replacement protocols        Resolved and Stable Issues:  Sepsis 2/2 cellulitis at G tube insertion site (placed by IR 7/15): Resolved. See notes 7/31 and prior for details. On the evening of 7/23, RN reported redness and purulent discharge around PEG insertion site. CT AP 7/24 noted inflammation without isolated fluid collection. Cx +for MSSA. Completed course of abx 7/31. Closely monitor    Myelodysplasic syndrome: PTA on darbepoietin (Last dose 7/26). Continue Darbepoietin [id number]  HTN: BP stable after amlodipine dose increase 7/27. Continue with hold parameters   Seronegative RA: Continue PTA hydroxychloroquine. Follow up OP with Dr. Us on 9/1  Osteoporosis: Continue Ca/vitamin D. Holding PTA denosumab while inpatient   HLD, PAD: Not on statin or antiplatelet  Aspiration s/p IR G tube placement (7/15), resolved: See notes 7/31 and prior for details   Rhinovirus: +7/22. No ongoing symptoms, thus precautions discontinued 8/3       Diet: NPO per Anesthesia Guidelines for Procedure/Surgery Except for: Meds  Adult Formula Drip Feeding: Continuous [place] Peptide 1.5; Gastrostomy; Goal Rate: 45; mL/hr; Medication - Feeding Tube Flush Frequency: At least 15-30 mL water before and after medication administration and with tube clogging; Amount to Send ...    DVT Prophylaxis: Enoxaparin (Lovenox) SQ  Vasquez Catheter: Not present  Central Lines: None  Cardiac Monitoring: None  Code Status: No CPR- Do NOT Intubate      Disposition Plan      Expected Discharge Date: 08/04/2022, 12:00 PM  Discharge Delays: Comfort Care/Hospice  Placement - Homecare    Discharge Comments: Medically ready for TCU. Per family on 8/2 want to trial TCU to see what progress can be made        The patient's care was discussed with the patient, nursing, and attending physician, Dr. Otoniel Wen PA-C  Hospitalist Service, GOLD TEAM [address] [place] [address]  Securely message with the Vocera Web Console (learn more here)  Text page via [facility] Paging/Directory   Please see signed in provider for up to date coverage information      Clinically Significant Risk Factors Present on Admission                      ______________________________________________________________________    Interval History   No acute changes. Per nursing, sleepy this am. No new pain or changes noted.  Continues on TF without issue. Rectal tube in place with mild to moderate volume soft, but formed stool. No cough or new oral secretions noted per nursing. Family updated at bedside around 1500 8/2 as above    Data reviewed today: I reviewed all medications, new labs and imaging results over the last 24 hours    Physical Exam   Vital Signs: Temp: 99.9  F (37.7  C) Temp src: Axillary BP: (!) 140/45 Pulse: 104   Resp: 15 SpO2: 91 % O2 Device: None (Room air)    Weight: 146 lbs 2.64 oz  General Appearance: Sleeping but arouses to voice. Appears comfortable. Non-verbal   HEENT: Anicteric sclera, MMM   Respiratory: Breathing comfortably on room air, lungs CTAB without wheezing or crackles   Cardiovascular: RRR, S1, S2. No murmur noted  GI: Abdomen soft, non-tender with active bowel sounds. No guarding or rebound. Feeding tube site CDI without surrounding erythema or edema. Rectal tube with mild to moderate amount of soft, but formed stool noted  Lymph/Hematologic: No peripheral edema, distal pulses palpable   Skin: No rash or jaundice   Musculoskeletal: Moves all extremities   Neurologic: Follows simple commands. Non-verbal       Data   Recent Labs   Lab 08/03/22  0733 08/02/22  0657 08/01/22  0653 07/31/22  0532   WBC  --  10.6 11.6* 9.4   HGB  --  7.9* 8.7* 8.8*   MCV  --  100 99 100   PLT  --  614* 666* 585*    141 140  --    POTASSIUM 4.9 4.9 5.0  --    CHLORIDE 106 105 103  --    CO2 27 28 25  --    BUN 43.5* 35.3* 28.3*  --    CR 0.59 0.55 0.42*  --    ANIONGAP 11 8 12  --    MENDEZ 9.9 9.6 9.7  --    * 113* 123*  --    ALBUMIN 3.9  --  3.9  --    PROTTOTAL 6.9  --  6.8  --    BILITOTAL 0.4  --  0.3  --    ALKPHOS 170*  --  134*  --    ALT 26  --  17  --    AST 56*  --  50*  --      No results found for this or any previous visit (from the past 24 hour(s)).

## 2022-08-03 NOTE — PLAN OF CARE
Vitals: Placed on 2L O2 this afternoon, sats in mid 80s while sleeping.   Neuros:  Alert/lethargic. Non-verbal. LUE 4, all others 3. Intermittently following commands. Mod , weak d/p this am, not following command this afternoon.   IV: PIV-SL   Labs/Electrolytes: See results   Resp/trach: Clear- Weak cough   Diet: NPO, TF at goal of 45cc/hr via PEG   Bowel status: Rectal tube removed, smear BM this am   : Inc of urine x 2 (purewick removed once rectal tube out d/t bowel inc).   Skin: L crani inc approximated. Skin abrasions and bruises throughout; redness/chaffing to inner thighs, barrier cream applied. PEG with scant amount of drainage, redness. See WOC note and recs  Pain: No s/s of pain   Activity: Up with 2 and lift, repo Q2h. Was up to recliner x 1 with PT .  Social: None  Plan: See SW note for possible plan.   Updates this shift: Pt more lethargic this afternoon and requiring O2. PA came to bedside to evaluate and is calling  with update. No new orders at this time until  is called.     -1450 Medicine team called back with plan. Daughter will be visiting [facility] and family decided no interventions at this time.

## 2022-08-04 NOTE — PLAN OF CARE
Vitals: Remains on 2-3L O2  Neuros:  Alert/lethargic. Non-verbal. LUE 4, all others 3. Intermittently following commands. Mod , weak d/p   IV: PIV-SL   Labs/Electrolytes: NA  Resp/trach: Clear- Weak cough   Diet: NPO, TF at goal of 45cc/hr via PEG   Bowel status: BM 8/3  : Inc of urine 1  Skin: L crani inc approximated. Skin abrasions and bruises throughout; redness/chaffing to inner thighs, barrier cream applied. PEG with scant amount of drainage, redness. See WOC note and recs  Pain: No s/s of pain   Activity: Up with 2 and lift, repo Q2h.   Social: None  Plan: See SW note for possible plan.   Updates this shift: Need to collect UA - paged MD for cath order

## 2022-08-04 NOTE — PROGRESS NOTES
Care Management Brief Note    Length of Stay (days): 43    Additional Information:  Informed by SW family is wanting a care conference to discuss TCU vs Hospice. Care Conference is planned for tomorrow afternoon 8/5 at 2 pm, daughter Allyson has conference call in number and will share with any family that wish to attend.    Palliative and Medicine teams paged to inform of time, call in information in my note.     Care Conference Friday 8/5 at 2 pm  Call in: [phone]  Call ID: [phone]  PIN: [id number]    Kourtney Gutierres, RN, BSN  6A RN Care Coordinator  Ph: [phone]   Pager: [phone]

## 2022-08-04 NOTE — PROGRESS NOTES
[facility]  Palliative Care Daily Progress Note       Recommendations & Counseling       Met with daughter Allyson.She states her father has agreed with no further workup for infection or escalation of cares while awaiting care conference tomorrow at 2pm.    Discussed with unit care coordinator and --plan for care conference by telephone. Palliative care will plan to participate.    Discussed Allyson's questions about hospice at home vs facility. Family are leaning toward hospice.    Thank you for the opportunity to participate in the care of this patient and family. Our team: will continue to follow.     During regular M-F work hours ([id number]) -- if you are not sure who specifically to contact -- please contact us in [facility] Smart Web.     After regular work hours and on weekends/holidays, you can call our answering service at [phone].     Clarice Miller PA-C  [facility]  Contact information available via [facility] Paging/Directory    Attestation:  Total time on the floor involved in the patient's care: 15 minutes  Total time spent in counseling/care coordination: >50%      Assessments          Louise Benítez is a 76 year old female with PMHx myelodysplastic syndrome, seronegative RA, chronic microcytic anemia, PAD, and recent L SDH (5/12/2022) who was admitted from her TCU on 6/22/2022 after an unwitnessed fall. Was subsequently found to have an acute on chronic L SDH, a new acute R SDH, and increased midline shift w/ trace L uncal herniation now s/p L craniotomy w/ hematoma evacuation. Course complicated by GIB requiring reintubation. Now extubated.     Today, the patient was seen for:  Acute on chronic L SDH s/p L craniotomy w/hematoma evacuation  Acute R SDH  Dysphagia  Gastric and colonic ulcers, GIB  Family support    Prognosis, Goals, or Advance Care Planning was addressed today with: Yes.  Mood,  coping, and/or meaning in the context of serious illness were addressed today: Yes.            Interval History:     Chart review/discussion with unit or clinical team members:   T max 100.4 per chart. Last labs lactic acid 1.2 on 8/4. Requiring 2-3L O2 recently. Reviewed therapy notes    Per patient or family/caregivers today:  Met with patient's daughter Allyson and her son Margarito outside of Louise's room. Allyson states that her mom has been mostly sleeping/sedated over the past few days. In addition she has been having temps of up to 100.4F. She has been having conversations with primary team and family. She states her father confirmed with her by text his agreement for no further workup or escalation of cares right now. Plan is for a care conference at 2pm tomorrow for decision about hospice. She shared that they had been hopeful over weekend when Louise had been more alert, but this week she has been sleeping, not rousable even for family and not able to participate much with therapy. Allyson states that her mother isn't making progress and that they are considering hospice. She shared that her father has been stepping back and relying more on her and her sister for making decisions. She shared that she feels it may be difficult for him to bear the burden of making this decision, but she wants to ensure they aren't making decisions he is against. When asked what Allyson thinks her mother would say about her situation if she could see herself now--she thinks her mother would be angry for allowing things to continue for as long as they have. We did discuss hospice in various settings--home, TCU, residential hospice facilities. We also discussed comfort cares in the hospital while awaiting discharge with hospice, and what that would look like. Allyson says they will wait until care conference tomorrow before deciding on comfort care. For now, no further evaluation or work up of pneumonia or other acute events.    Key  Palliative Symptoms:  We are not helping to manage these symptoms currently in this patient.             Review of Systems:     A comprehensive ROS has been negative other than stated in the HPI           Medications:     I have reviewed this patient's medication profile and medications during this hospitalization.           Physical Exam:   Temp: 99.4  F (37.4  C) Temp src: Axillary BP: 115/40 Pulse: 99   Resp: 18 SpO2: 94 % O2 Device: Oxymask Oxygen Delivery: 2.5 LPM  Gen: Asleep, in NAD  Resp: No increased work of breathing   Msk: no gross deformity  Skin:  Warm, dry, no jaundice  Ext: warm, well perfused.             Data Reviewed:     Reviewed recent pertinent imaging, comments:   Results for orders placed or performed during the hospital encounter of 06/22/22   Head CT w/o contrast   Result Value Ref Range    Radiologist flags (Urgent)      Acute subdural hematomas with mass effect as described    Impression    Impression:  1. Active hemorrhage superimposed upon Acute and chronic left-sided  mixed density subdural measuring up to 2.3 cm in maximum thickness  with 12 mm of rightward midline shift and trace left-sided uncal  herniation.  2. Acute subdural along the right frontal convexity measuring up to 7  mm in maximum thickness.    [Urgent Result: Acute subdural hematomas with mass effect as described  above.] Concern for active hemorrhage.    Finding was identified on 6/22/2022 6:04 PM.     Dr. Sanchez was contacted by Dr. Leung and Dr. Lindquist at  6/22/2022 6:09 PM and was again contacted at 6:30 PM and verbalized  understanding of the urgent finding.      I have personally reviewed the examination and initial interpretation  and I agree with the findings.    BONI LINDQUIST MD         SYSTEM ID:  [id number]   Cervical spine CT w/o contrast    Impression    Impression:   No acute fracture or traumatic subluxation.    I have personally reviewed the examination and initial interpretation  and I agree  with the findings.    BONI LINDQUIST MD         SYSTEM ID:  [id number]   XR Chest Port 1 View    Impression    IMPRESSION:  ET tube is 2.5 cm above the leo. No acute consolidative airspace  disease. Left lower lobe atelectasis suspected.    I have personally reviewed the examination and initial interpretation  and I agree with the findings.    ROYA EDMOND MD         SYSTEM ID:  [id number]   CT Head w/o Contrast    Impression    Impression:  1. Postsurgical changes of left frontoparietal craniotomy and subdural  hematoma evacuation.  2. Decreased size of the heterogeneous fluid collection overlying the  left cerebral convexity.  3. Decreased rightward midline shift. Decreased effacement of the left  lateral ventricle.  4. Stable appearance of the subdural hematomas overlying the right  cerebral convexity and anterior falx.    I have personally reviewed the examination and initial interpretation  and I agree with the findings.    OSVALDO CONNOLLY MD         SYSTEM ID:  [id number]   XR Chest Port 1 View    Impression    IMPRESSION:   1. ET tube is in the mid thoracic trachea.  2. Decreased perihilar pulmonary opacities.    I have personally reviewed the examination and initial interpretation  and I agree with the findings.    ROYA EDMOND MD         SYSTEM ID:  [id number]   CT Head w/o Contrast    Impression    Impression:    1. Reduced size of subdural fluid collection deep to the left  frontoparietal craniotomy.  2. Reduced mass effect and left to right midline shift now measuring 7  mm previously 13 mm when measured in a similar fashion.  3. Similar appearance of subdural hemorrhage along the anterior falx  and extending along the right anterior cranial fossa.  4. Unchanged appearance of small subdural hemorrhage along the  bilateral tentorial leaflets.     I have personally reviewed the examination and initial interpretation  and I agree with the findings.    OSVALDO CONNOLLY MD         SYSTEM ID:  [id number]    XR Abdomen Port 1 View    Impression    Impression:   1. Enteric tube is subdiaphragmatic with tip and sidehole projecting  over the expected location of the stomach  2. Increased bibasilar pulmonary opacities.    I have personally reviewed the examination and initial interpretation  and I agree with the findings.    DARSHAN DIXON MD         SYSTEM ID:  [id number]   CT Head w/o Contrast    Impression    Impression:    1. Slightly reduced size of subdural fluid collection deep to the left  frontotemporal craniotomy site.  2. Unchanged mass effect on the left cerebrum and unchanged left to  right midline shift.  3. Unchanged additional subdural fluid collection along the right  anterior cranial fossa and extension along the anterior falx.  4. Unchanged appearance of small subdural hemorrhages along the  bilateral tentorial leaflets..     I have personally reviewed the examination and initial interpretation  and I agree with the findings.    OSVALDO CONNOLLY MD         SYSTEM ID:  [id number]   XR Chest Port 1 View    Impression    Impression:   ET tube no longer visualized. Increased interstitial pulmonary edema  and bibasilar atelectasis and/or pneumonia.    I have personally reviewed the examination and initial interpretation  and I agree with the findings.    ANGELIQUE DENTON MD         SYSTEM ID:  [id number]   XR Chest Port 1 View    Impression    Impression:   1. Slightly decreased right lower lobe opacities.  2. Slight increase in dense retrocardiac consolidation which may  represent atelectasis versus infection/aspiration.    I have personally reviewed the examination and initial interpretation  and I agree with the findings.    GISELE BURGESS MD         SYSTEM ID:  [id number]   CT Head w/o Contrast    Impression    Impression:  1. Unchanged size of left cerebral convexity subdural fluid collection  status post subdural drain removal.  2. Unchanged size of right frontal subdural hemorrhage with extension  along the  anterior falx.  3. Unchanged size of small subdural hemorrhage extending along the  tentorial leaflets.  4. Unchanged 6 mm of left-to-right midline shift.    I have personally reviewed the examination and initial interpretation  and I agree with the findings.    SAVANNA COHEN MD         SYSTEM ID:  [id number]   XR Chest Port 1 View    Impression    Impression: Increased effusions, atelectasis and interstitial edema.    I have personally reviewed the examination and initial interpretation  and I agree with the findings.    JOVANA CORRIGAN MD         SYSTEM ID:  [id number]   XR Chest Port 1 View    Impression    IMPRESSION:   1. Similar bibasilar predominant pulmonary opacities concerning for  aspiration or infection. Superimposed atelectasis and probable mild  pulmonary edema is suspected.  2. Stable pleural effusions.    I have personally reviewed the examination and initial interpretation  and I agree with the findings.    LOS BARAJAS DO         SYSTEM ID:  [id number]   XR Abdomen Port 1 View    Impression    IMPRESSION:   Enteric tube tip and side port projecting over the stomach..    I have personally reviewed the examination and initial interpretation  and I agree with the findings.    ROYA EDMOND MD         SYSTEM ID:  [id number]   XR Chest Port 1 View    Impression    IMPRESSION:   Trace bilateral pleural effusions with overlying bibasilar opacities,  improved from prior.    I have personally reviewed the examination and initial interpretation  and I agree with the findings.    LOS BARAJAS DO         SYSTEM ID:  [id number]   XR Abdomen Port 1 View    Impression    Impression: Enteric tube tip and sidehole in the stomach.    LOS BARAJAS DO         SYSTEM ID:  [id number]   XR Abdomen Port 1 View    Impression    IMPRESSION:   Enteric tube tip and side-port overlying the stomach.    I have personally reviewed the examination and initial interpretation  and I agree with the findings.    LOS BARAJAS  DO         SYSTEM ID:  [id number]   CT Head w/o Contrast    Impression    Impression:    1. Increased size of left cerebral convexity subdural hematoma without  increase in density and with decreased size of left frontal seen  subdural hemorrhage suggests redistribution of blood products.  Attention is recommended on follow-up imaging.  2. stable size of right cerebral convexity subdural hematoma.  3. Stable 6 mm of rightward midline shift.         BONI LINDQUIST MD         SYSTEM ID:  [id number]   XR Chest Port 1 View    Impression    IMPRESSION: Continued right lower lung atelectasis or mild edema. No  significant changes otherwise.    JOVANA CORRIGAN MD         SYSTEM ID:  [id number]   XR Chest Port 1 View    Impression    IMPRESSION:     1. Left upper extremity PICC line terminates in the high SVC. No  pneumothorax.  2. Continued right lower lung atelectasis or mild edema. No  significant changes otherwise.    I have personally reviewed the examination and initial interpretation  and I agree with the findings.    JOVANA CORRIGAN MD         SYSTEM ID:  [id number]   CT Head w/o Contrast    Impression    Impression:    1. Stable size and the distribution of the subdural hemorrhage along  the right frontal and left frontoparietal convexities, falx and  tentorium.  2. Stable postsurgical changes of left frontoparietal craniotomy and  subdural hematoma evacuation.     I have personally reviewed the examination and initial interpretation  and I agree with the findings.    BONI LINDQUIST MD         SYSTEM ID:  [id number]   CT Chest Pulmonary Embolism w Contrast    Impression    IMPRESSION:   1. Exam is negative   for acute pulmonary embolism.    Evidence for right heart strain or increased right heart pressures?   is not present.     2. Mucous plugging and linear atelectasis in the right lower lobe.  Aspiration or other infectious consolidation cannot be ruled out.    3. Ectatic thoracic aorta, approximately 4.2  cm.    In the event of a positive result for acute pulmonary embolism  resulting in right heart strain, consider calling the   [facility] patient placement ([phone]) for PERT (Pulmonary Embolism  Response Team) Activation?    PERT -- Pulmonary Embolism Response Team (Multidisciplinary team  including cardiology, interventional radiology, critical care,  hematology)    I have personally reviewed the examination and initial interpretation  and I agree with the findings.    JOVANA CORRIGAN MD         SYSTEM ID:  [id number]   XR Chest Port 1 View    Impression    IMPRESSION: Endotracheal tube tip in the mid to distal thoracic  trachea. Bibasilar atelectasis. Left upper portion of the PICC line  tip possibly in the upper portion of the azygos vein. Atherosclerosis.    JOVANA CORRIGAN MD         SYSTEM ID:  [id number]   XR Abdomen Port 1 View    Impression    IMPRESSION:   1. Enteric tube with tip in the stomach and side-port at or just  distal to the gastroesophageal junction.  2. Nonobstructive bowel gas pattern.    I have personally reviewed the examination and initial interpretation  and I agree with the findings.    ROHAN MAGALLON MD         SYSTEM ID:  [id number]   XR Chest Port 1 View    Impression    IMPRESSION:  1. Increased perihilar and bibasilar pulmonary opacities which may  represent atelectasis, edema and/or infection.  2. Stable support devices.     I have personally reviewed the examination and initial interpretation  and I agree with the findings.    JOVANA CORRIGAN MD         SYSTEM ID:  [id number]   CTA Abdomen Pelvis with Contrast    Impression    IMPRESSION:   1. No evidence of gastrointestinal bleed as questioned.  2. Interval new subacute appearing right sacral insufficiency fracture  and subacute appearing left pubic ramus fracture since 5/12/2022.  3. Age-indeterminate compression deformity of T10 with approximately  30% height loss. Multiple subacute appearing anterior rib fractures,  and  unchanged appearance of L1, L3, and L5 compression deformities.  4. Small sliding-type hiatal hernia with hemostasis clip noted in the  gastric fundus.  5. Aortobiiliac atherosclerotic atherosclerosis with multiple foci of  arterial stenosis including the renal arterial origins, common iliac  vessels, and near total occlusions of the proximal internal iliac  arteries and common femoral arteries proximally.  6. Unchanged appearance of left inguinal hernia containing a loop of  sigmoid colon, no signs of obstruction.  7. 5 mm hypodensity in the pancreatic head may represent a sidebranch  IPMN although MRI is recommended to further characterize on an  outpatient basis, pancreatic cystic follow-up is recommended below.  Per [facility] criteria.  8. Indeterminate hepatic hypodensities are too small to further  characterize but could be evaluated further at time of MRI for above  pancreatic lesion.  9. 1.4 cm right ovarian cyst for which annual follow-up is recommended  in a postmenopausal female.  10. Trace effusions and associated atelectasis.    [facility] recommendations for asymptomatic pancreatic  cysts, modified from international consensus guidelines*   Size of largest cyst:   Less than 1 cm: Follow-up imaging in 6 months to 1 year, then lengthen  interval to 2-3 years if no change.  1-2 cm: Follow-up imaging at 6 months, then yearly for 2 years, then  lengthen interval if no change.   The optimal initial study or first follow-up exam is MRI performed  with intravenous gadolinium contrast to allow full cyst  characterization. Once characterized, future follow-up MRIs can be  performed without contrast. If MRI is contraindicated, CT with  contrast is recommended.   GI consultation for possible endoscopic ultrasound is recommended for  cysts with the following features: Size > 2 cm, >20% growth on  followup, wall thickening or enhancement, mural nodule, duct > 5 mm,  or abrupt change in  duct caliber with distal atrophy. GI or surgical  consultation is also recommended for symptomatic cysts.   *Reference: International Consensus Guidelines for Management of IPMN  and MCN of the pancreas. Pancreatology: 12:(2012); 183-197.    I have personally reviewed the examination and initial interpretation  and I agree with the findings.    ILIANA JEFFERSON MD         SYSTEM ID:  [id number]   XR Abdomen Port 1 View    Impression    IMPRESSION:   1. Feeding tube with tip in the proximal jejunum.   2. Enteric tube with tip and side-port overlying the stomach.  3. Non obstructive bowel gas pattern.  4. Small bilateral pleural effusions.    I have personally reviewed the examination and initial interpretation  and I agree with the findings.    ROAY EDMOND MD         SYSTEM ID:  [id number]   Echo Complete   Result Value Ref Range    LVEF  60-65%    Echocardiogram Limited   Result Value Ref Range    LVEF  60-65%        Reviewed recent labs

## 2022-08-04 NOTE — PLAN OF CARE
Care from [id number].    Tmax 100.3 axillary. Tachy to 100s. PRN tylenol given. KELLY neuro status, not command following. Lethargic. Arouses to repeated stimulation. Able to open eyes and blink to threat intermittently. Mitts have been off since this AM, before 11AM.  LUE 4/5, all other extremities 3/5. PEG tube running TF at goal rate of 45 mL/hr with q4h FWF. No BM. Voiding with incontinence. Incontinence care provided, WOC recs followed. Up with 2, lift, T&R q2h. SCDs in place. Plan for care conference tomorrow to discuss goals of care.

## 2022-08-04 NOTE — PLAN OF CARE
Status: ICH sustained from unwitnessed fall at home. S/p craniotomy SDH evacuation 6/22. Stay c/b GI bleed and nonconvulsive status. PEG placed 7/15.  Vitals: Tmax 100.4, on 2.5L oxymask   Neuros: Difficult to assess. Non-verbal. Intermittently follows commands. LUE 4/5 all others 3/5.   IV: PIV SL  Labs/Electrolytes: Lactic acid 1.2  Resp/trach: LS diminished   Diet: NPO, TF at goal of 45mL/hr  Bowel status: No BM this shift. LBM small loose 8/3.  : Voiding with incontinence.   Skin: L crani inc approximated. Skin abrasions and bruises throughout; redness/chaffing to inner thighs, no brief overnight.  Pain: Appears comfortable.   Activity: A2 lift, repo q2h  Plan: Awaiting TCU placement.   Updates this shift: Pt frequently removing O2 mask, O2 sats dip to 87%, mitt placed on L hand at 0345.

## 2022-08-04 NOTE — PROGRESS NOTES
[facility]    Medicine Progress Note - Hospitalist Service, GOLD TEAM 6    Date of Admission: 6/22/2022    Assessment & Plan        Louise Benítez is a 76 year old female with history of seronegative RA, osteoporosis, PAD, and myelodysplastic syndrome admitted 6/22 after a ground level fall resulting in SDH with midline shift s/p emergent craniotomy and evacuation. Course c/b nonconvulsive status epilepticus (resolved), GIB s/p EGD and clipping of gastric ulcer 7/5, and aspiration s/p PEG placement 7/15. Noted to have bloody stools the evening of 7/17 with acute hemoglobin drop (10.1 -> 8.7).      Goals of care: Long d/w family (, daughter) 8/3 and 8/4 regarding goals of care and possible transition to hospice. Family is moving towards hospice, but final decision has not been made yet. They would like to set up a care conference for 8/5 for finalize plans  - Medicine is available anytime 8/5 for care conference. Family will communicate a time that works for them with SW    Acute hypoxic respiratory failure, transient fever: Requiring 2-3L O2 over the past 24 hours. Temp 100.4 overnight, then resolved. Family previously asked they be informed of change in clinical status before any additional workup obtained. D/w patient's  and daughter on 8/4, they are going to further discuss as a family and determine if they would like further workup and treatment if a source of infection is found  - Hold off on workup for now, will await their final decision today      SDH with midline shift s/p emergent craniotomy with SDH evacuation (6/22/22) c/b nonconvulsive status epilepticus (vEEG 7/8): Initial fall w/ SDH 8/12. Admitted to neuro ICU 5/12-5/20. Discharged to TCU. Returned home from TCU and was doing well. Had mechanical fall 6/22. Head CT noted new SDH w/ midline shift. Subsequently underwent L craniotomy for SDH evacuation. Head CT 7/22 stable from 7/14, no  explanations for febrile spikes. CT head 7/28 with stable fluid collection and associated R to L midline shift. Mentation continues to wax and wane day to day   - NSGY following: continue Keppra until seen by neurology OP  - Delirium precautions    Acute blood loss anemia on chronic microcytic anemia, gastric ulcers c/b GIB: S/p EGD 7/5 with clipping of 5mm ulcers in posterior gastric wall. Bloody stool with associated Hgb drop 7/17. Transfused 1U RBC. Colonoscopy 7/19 noted ulcers at splenic flexure that were likely source of bleeding but stopped bleeding on their own. Hgb initially improved after colonoscopy but acutely declined on 7/23. Lovenox held but no evidence of recurrent GIB. Hgb stable   - PPI bid  - CBC qM/F, sooner if acute changes     Mild transaminitis: Stable, asymptomatic.  (134), ALT 26, AST 56 (50), Tnili 0.4 8/3. Monitor    Non-severe protein calorie malnutrition, dysphagia: In the setting of the above. Nutrition following and managing tube feeds.  - SLP, nutrition following  - Continue MVI, B12  - Continue electrolyte replacement protocols        Resolved and Stable Issues:  Sepsis 2/2 cellulitis at G tube insertion site (placed by IR 7/15): Resolved. See notes 7/31 and prior for details. On 7/23, RN reported redness and purulent discharge around PEG insertion site. CT AP 7/24 noted inflammation without isolated fluid collection. Cx +for MSSA. Completed course of abx 7/31  Myelodysplasic syndrome: PTA on darbepoietin (Last dose 7/26). Continue Darbepoietin [id number]  HTN: BP stable after amlodipine dose increase 7/27. Continue with hold parameters   Seronegative RA: Continue PTA hydroxychloroquine. Follow up OP with Dr. Us on 9/1  Osteoporosis: Continue Ca/vitamin D. Holding PTA denosumab while inpatient   HLD, PAD: Not on statin or antiplatelet  Loose stools: Resolved. Rectal tube removed 8/3. Suspect 2/2 tube feeds. C.Diff negative. Continue banatrol, schedule loperamide (increased  from 2 mg BID to 4 mg BID)  Aspiration s/p IR G tube placement (7/15), resolved: See notes 7/31 and prior for details   Rhinovirus: +7/22. No ongoing symptoms, thus precautions discontinued 8/3       Diet: NPO per Anesthesia Guidelines for Procedure/Surgery Except for: Meds  Adult Formula Drip Feeding: Continuous [employer] Peptide 1.5; Gastrostomy; Goal Rate: 45; mL/hr; Medication - Feeding Tube Flush Frequency: At least 15-30 mL water before and after medication administration and with tube clogging; Amount to Send ...    DVT Prophylaxis: Enoxaparin (Lovenox) SQ  Vasquez Catheter: Not present  Central Lines: None  Cardiac Monitoring: None  Code Status: No CPR- Do NOT Intubate      Disposition Plan      Expected Discharge Date: 08/05/2022, 12:00 PM  Discharge Delays: Comfort Care/Hospice  Placement - Homecare    Discharge Comments: Medically ready for TCU. Per family on 8/2 want to trial TCU to see what progress can be made        The patient's care was discussed with the patient, family, nursing, and attending physician, Dr. Otoniel Wen PA-C  Hospitalist Service, [address]  Securely message with the [employer] Web Console (learn more here)  Text page via [facility] Paging/Directory   Please see signed in provider for up to date coverage information      Clinically Significant Risk Factors Present on Admission                      ______________________________________________________________________    Interval History   No acute changes. Denies pain. No fevers. Able to minimally answer yes and no questions. Per nursing, no current changes in clinical status and today is a better day than yesterday. Long d/w family (, daughter) 8/3 and 8/4 regarding goals of care and possible transition to hospice. Family is moving towards hospice, but final decision has not been made yet. They would like to set up a care conference for 8/5 for finalize  plans      Data reviewed today: I reviewed all medications, new labs and imaging results over the last 24 hours    Physical Exam   Vital Signs: Temp: 99.5  F (37.5  C) Temp src: Axillary BP: 124/42 Pulse: 98   Resp: 16 SpO2: 90 % O2 Device: None (Room air) Oxygen Delivery: 2.5 LPM  Weight: 146 lbs 2.64 oz  General Appearance: Alert, responses to simple yes/no questions. NAD  HEENT: Anicteric sclera, MMM   Respiratory: Breathing comfortably on 2L oxymask, lungs CTAB without wheezing or crackles   Cardiovascular: RRR, S1, S2. No murmur noted  GI: Abdomen soft, non-tender with active bowel sounds. No guarding or rebound. Feeding tube site CDI without surrounding erythema or edema  Lymph/Hematologic: No peripheral edema, distal pulses palpable   Skin: No rash or jaundice   Musculoskeletal: Moves all extremities   Neurologic: Follows simple commands. Non-verbal       Data   Recent Labs   Lab 08/03/22  1359 08/03/22  0733 08/02/22  0657 08/01/22  0653 07/31/22  0532   WBC  --   --  10.6 11.6* 9.4   HGB  --   --  7.9* 8.7* 8.8*   MCV  --   --  100 99 100   PLT  --   --  614* 666* 585*   NA  --  144 141 140  --    POTASSIUM  --  4.9 4.9 5.0  --    CHLORIDE  --  106 105 103  --    CO2  --  27 28 25  --    BUN  --  43.5* 35.3* 28.3*  --    CR  --  0.59 0.55 0.42*  --    ANIONGAP  --  11 8 12  --    MENDEZ  --  9.9 9.6 9.7  --    * 130* 113* 123*  --    ALBUMIN  --  3.9  --  3.9  --    PROTTOTAL  --  6.9  --  6.8  --    BILITOTAL  --  0.4  --  0.3  --    ALKPHOS  --  170*  --  134*  --    ALT  --  26  --  17  --    AST  --  56*  --  50*  --      No results found for this or any previous visit (from the past 24 hour(s)).

## 2022-08-04 NOTE — PROGRESS NOTES
Vitals: Placed on 2L O2 this afternoon, sats in mid 80. Now sating 96-98% on 2 L per NC  Neuros:  Alert/lethargic. Non-verbal. LUE 4, all others 3. Intermittently following commands. Mod , weak d/p this am, not following command this afternoon.   IV: PIV-SL   Labs/Electrolytes: See results   Resp/trach: Clear- Weak cough, yanker sucktion X 1 for thick yellow sputum.  Diet: NPO, TF at goal of 45cc/hr via PEG   Bowel status: Small loose BM X 1. Abd binder on.: Inc of urine x 2   Skin: L crani inc approximated. Skin abrasions and bruises throughout; redness/chaffing to inner thighs, barrier cream applied. PEG with scant amount of drainage, redness. See WOC note and recs  Pain: No s/s of pain   Activity: Up with 2 and lift, repo Q2h.  Social: None  Plan: See SW note for possible plan.   Updates this shift: Pt more lethargic, daughter and grandchild here to visit this shift.

## 2022-08-04 NOTE — PROGRESS NOTES
Care Management Follow Up     Length of Stay (days): 43     Expected Discharge Date: 08/05/2022     Concerns to be Addressed:   Disposition planning  Patient plan of care discussed at interdisciplinary rounds: Yes     Anticipated Discharge Disposition:   TCU placement vs hospice     Anticipated Discharge Services:  TCU placement vs hospice  Anticipated Discharge DME:  Not applicable     Patient/family educated on Medicare website which has current facility and service quality ratings:  yes  Education Provided on the Discharge Plan:  yes  Patient/Family in Agreement with the Plan: yes      Referrals Placed by CM/SW:  No referrals placed on this date  Private pay costs discussed: Not applicable at this time     Additional Information:  SW is following pt for discharge planning.  On pt's behalf, pt's family is deciding between TCU and hospice.  SW received a call from Sasha Taylor.  Sasha states that she spoke with daughter who is leaning toward hospice but who requested a family conference to take place on 8/5 to further discuss.  Sasha states that [facility] is available at any time on 8/5 and daughter will reportedly be calling to state what time the family is available.  SW informed Sasha that this SW is not working on 8/5 and thus, will not be available to attend a Care Conference.     SW met with pt's daughter (Allyson).  Allyson states request 2pm family conference on 8/5/2022 .   The 6A RN CC will make arrangements for conference via IPAD.   Allyson has been informed that this SW will not be present (as is not working tomorrow).  Allyson has declined offer for coverage SW to be present.         SW will continue to follow for discharge planning.  SW is available to arrange for hospice or TCU/SNF dependent upon what family decides.        CÉSAR Bailey  Social Work, 6A  Phone:  [phone]  Pager:  [phone]  8/4/2022

## 2022-08-05 NOTE — SIGNIFICANT EVENT
Significant Event Note    Time of event: 11:06 PM August 4, 2022    Description of event:  Transition to comfort cares    Notified by 6A charge that patient desaturates into 80s at times with increased oxygen needs of 5L this evening. Previously on 2.5L earlier today. Called , Jd to update regarding change in patient status. Discussed increased oxygen needs and nursing unable to help suction as she bites down on tubing and that she was febrile again this evening. Jd and daughters had discussion earlier today to plan to meet tomorrow with hospice and transition to comfort cares. I am unable to consult hospice at this time as Ade would not be able to sign for herself and thus would need to wait for Jd to arrive. I offered option of transferring to higher level of care for increased oxygen needs overnight but ultimately prefers pursuing comfort cares.    Plan:  Comfort cares ordered. Unclear if patient will pass this evening. Jd will call family and come in to visit as able.  Consider Hospice consult in AM pending overnight course.    Updated Charge RN.    Olivia Buck PA-C

## 2022-08-05 NOTE — PROGRESS NOTES
CLINICAL NUTRITION SERVICES - BRIEF NOTE     Nutrition Prescription      Recommendations already ordered by Registered Dietitian (RD):  - Discontinued orders for TF formula of [employer] Peptide 1.5, [employer] packets and Banatrol, MVI with mineral supplement and free H20 flushes     Per Interdisciplinary rounds this am, informed that pt transitioned to comfort cares last night and her TF was stopped.      INTERVENTIONS  Implementation  Collaboration with other providers - bedside RN  Enteral Nutrition - discontinued orders as outlined above.        RD to sign off at this time.    Che Lemon RD,LD  6A pager [phone]

## 2022-08-05 NOTE — CONSULTS
"Progress Note - [facility]     ______________________________________________________________________     [facility] 24/7 Contact Number: [phone]    - Providers: Please contact [facility] with changes in orders or clinical plan of care   - Nursing: Please contact [facility] with significant changes in patient condition  ______________________________________________________________________    Referral received. Reviewing eligibility with intake team and medical director at this time.     Patients' spouse \"Jd\" was called and VM left to set up a time to discuss GIP hospice on 8/6. Patient will not be signing onto hospice tonight. Please call hospice triage if change of condition permits a reevaluation of GIP in the meantime.     Thank you!    Thank you for the referral,    tSacy Buck RN on 8/5/2022 at 3:13 PM   [phone]  "

## 2022-08-05 NOTE — PLAN OF CARE
Pt placed on comfort cares last night d/t continued O2 desaturation and little expectation for improvement. Family stayed for part of the night, rotating through to spend time with her. When it looked like pt status was stable, family went home for the rest of the night.     Pt pulled out IV overnight. TF discontinued. O2 removed per family request. Pt provided with wet towel for head, bedside fan, and ice packs to remain cool (axillary temp 99.5). Pt appears to be resting comfortably with no s/sx of pain. Oral cares performed, lip lubricant applied, and pt oral secretions suctioned x1. Turned and repositioned q2h. Incontinent cares provided x2 overnight.

## 2022-08-05 NOTE — PLAN OF CARE
Patient on comfort cares. Alert but not command following. Occasional lethargy. RR WDL. No s/s of discomfort, agitation, or air hunger noted this shift. Spouse at bedside this afternoon. PRNs available for comfort. All scheduled meds discontinued except for keppra. T&R q2h and PRN. Oral cares PRN. Incontinent of bowel and bladder this shift. Plan to be admitted to general inpatient hospice tomorrow at 0900.

## 2022-08-05 NOTE — PROGRESS NOTES
Care Management Follow Up    RACHEL was informed by unit coverage RACHEL that pt would transfer to writer's unit. Per report, pt and family have decided on hospice cares.     RACHEL paged medical team who would like pt to admit to hospice as soon as possible.     RACHEL reached out to pt's spouse Jd (PH: [phone]) to confirm that they would like to be considered for [facility] hospice. RACHEL contacted [facility] hospice liaison Stacy Buck (PH: [phone]) to initiate referral.     RACHEL received phone call from Stacy that they would not be able to admit pt to [facility] until tomorrow. Stacy reports that she will call pt's spouse Jd to discuss.     RACHEL is waiting to hear from Stacy with an update.     Update @ 4:15PM: RACHEL was informed by Stacy that they will conduct the [facility] interview tomorrow 8/6. RACHEL paged provider to update and they are in agreement with plan to discharge to hospice tomorrow.     No other questions or concerns at this time.     AQUILES Huang, LGSW   6B    Phone: [phone]  Pager: [phone]

## 2022-08-05 NOTE — PROGRESS NOTES
[facility]    Medicine Progress Note - Hospitalist Service, GOLD TEAM 6    Date of Admission: 6/22/2022    Assessment & Plan        Louise Benítez is a 76 year old female with history of seronegative RA, osteoporosis, PAD, and myelodysplastic syndrome admitted 6/22 after a ground level fall resulting in SDH with midline shift s/p emergent craniotomy and evacuation. Course c/b nonconvulsive status epilepticus (resolved), GIB s/p EGD and clipping of gastric ulcer 7/5, and aspiration s/p PEG placement 7/15. Noted to have bloody stools the evening of 7/17 with acute hemoglobin drop (10.1 -> 8.7).      Goals of care: Patient had acute decompensation overnight 8/4 to 8/5 with fever, worsening hypoxia. Family came to bedside and elected to move to comfort cares. Writer further d/w family this morning, all medications not focused on comfort were stopped. Patient will continue Keppra to prevent seizure  - Hospice order set in place  - Stop all non-comfort focused meds  - Hospice team consult placed     Sepsis without clear course, acute hypoxic respiratory failure: Decompensation 8/3, 8/4, 8/5. Family elected not to workup or treat infection, thus source not determined    Other Medical Issues Addressed: See notes 8/4 and prior for details    SDH with midline shift s/p emergent craniotomy with SDH evacuation (6/22/22) c/b nonconvulsive status epilepticus (vEEG 7/8)  Acute blood loss anemia on chronic microcytic anemia, gastric ulcers c/b GIB  Transaminitis  Non-severe protein calorie malnutrition  Dysphagia  Sepsis 2/2 cellulitis at G tube insertion site (placed by IR 7/15)  Myelodysplasic syndrome  HTN  Seronegative RA  Osteoporosis  HLD  PAD  Loose stools  Aspiration s/p IR G tube placement (7/15)  Rhinovirus    Diet: Regular Diet Adult    DVT Prophylaxis: Enoxaparin (Lovenox) SQ  Vasquez Catheter: Not present  Central Lines: None  Cardiac Monitoring: None  Code Status: No CPR-  "Do NOT Intubate      Disposition Plan     Expected Discharge Date: 08/05/2022, 12:00 PM  Discharge Delays: Comfort Care/Hospice  Placement - Homecare    Discharge Comments: Medically ready for TCU. Per family on 8/2 want to trial TCU to see what progress can be made        The patient's care was discussed with the patient, family, nursing, and attending physician, Dr. Otoniel Wen PA-C  Hospitalist Service, GOLD TEAM [address] [place] [address]  Securely message with the [employer] Web Console (learn more here)  Text page via [facility] Paging/Directory   Please see signed in provider for up to date coverage information      Clinically Significant Risk Factors Present on Admission                      ______________________________________________________________________    Interval History   Patient appears comfortable. No acute changes noted per nursing. Per d/w family, they felt as if patient \"declared\" herself as ready to move to hospice overnight when she got more sick. Family elected not to workup new infection. They would like patient to remain as comfortable as possible and are perusing facility hospice    Data reviewed today: I reviewed all medications, new labs and imaging results over the last 24 hours    Physical Exam   Vital Signs: Temp: 99.5  F (37.5  C) Temp src: Axillary BP: 115/40 Pulse: 108   Resp: 18 SpO2: 94 % O2 Device: Oxymask    Weight: 146 lbs 2.64 oz  General Appearance: Sleeping, appears comfortable   HEENT: Membranes dry  Respiratory: Breathing comfortably, mouth open  Cardiovascular: Deferred   GI: Deferred   Lymph/Hematologic: No peripheral edema, distal pulses palpable   Skin: No rash or jaundice     Data   Recent Labs   Lab 08/05/22  0754 08/03/22  1359 08/03/22  0733 08/02/22  0657 08/01/22  0653   WBC 34.8*  --   --  10.6 11.6*   HGB 7.9*  --   --  7.9* 8.7*   *  --   --  100 99   *  --   --  614* 666*   *  --  144 141 " 140   POTASSIUM 4.1  --  4.9 4.9 5.0   CHLORIDE 111*  --  106 105 103   CO2 31*  --  27 28 25   BUN 61.2*  --  43.5* 35.3* 28.3*   CR 0.66  --  0.59 0.55 0.42*   ANIONGAP 10  --  11 8 12   MENDEZ 9.7  --  9.9 9.6 9.7   * 139* 130* 113* 123*   ALBUMIN  --   --  3.9  --  3.9   PROTTOTAL  --   --  6.9  --  6.8   BILITOTAL  --   --  0.4  --  0.3   ALKPHOS  --   --  170*  --  134*   ALT  --   --  26  --  17   AST  --   --  56*  --  50*     No results found for this or any previous visit (from the past 24 hour(s)).

## 2022-08-05 NOTE — PLAN OF CARE
Goal Outcome Evaluation:    Plan of Care Reviewed With: patient     Time: 15:00-23:30     Reason for admit: unwitnessed fall  Vitals: Tachycardic, feverish  Activity: Activity as tolerated, AO2 lift  Neuro: KELLY, pt is lethargic and unable to talk  Mood/Behavior: sometimes restless, removes clothing  Lines/Drains: R PIV, SL. On continuous pulse ox.  Cardiac: Tachycardic  Resp: Lung sounds coarse, occasional weak cough noted. O2 at 5.5 Lpm via oxymask  Diet: on tube feedings at 45 ml/hr (goal rate)  GI/: Incontinent x2  Skin: Redness on the nellie area, skin is warm and dry  Pain: No facial signs of pain   New this shift: PRN tylenol given due to fever. Takes off her clothes most of the time as well as her Oxymask. Pt. desaturating often even with high O2, providers made aware, came in and saw pt. Verbalized that she will talk to family. Repositioned in bed and HOB elevated to 45-60 degrees.      Plan: Continue with POC

## 2022-08-05 NOTE — PLAN OF CARE
6A PT Discharge Note    Physical Therapy: Discharge Note. Chart reviewed and discussed with care team.? Physical Therapy no longer indicated due to transition to comfort cares and change primary goals of comfort vs. Rehabilitation.? Defer discharge recommendations to medical team.? Will complete orders.

## 2022-08-05 NOTE — PROGRESS NOTES
[facility]  Palliative Care Daily Progress Note       Recommendations & Counseling       Patient transitioned to comfort cares overnight. Recommend the following adjustments to orders (ordered for you):         --Morphine 5-10 mg soln via G tube q1h prn pain, respiratory distress         --APAP 650 mg soln via G tube q4h prn fever, pain    Thank you for the opportunity to participate in the care of this patient and family. Our team: will continue to follow.     During regular M-F work hours ([phone]) -- if you are not sure who specifically to contact -- please contact us in [facility] Smart Web.     After regular work hours and on weekends/holidays, you can call our answering service at [phone].     Clarice Miller PA-C  [facility]  Contact information available via [facility] Paging/Directory    Addendum: Revisited patient in afternoon.  Jd at bedside. He and family are at peace with decision to transition to comfort care. Discussed end of life symptoms and management.   --Added haldol prn for agitation    Attestation:  Total time on the floor involved in the patient's care: 25 minutes  Total time spent in counseling/care coordination: >50%      Assessments          Louise Benítez is a 76 year old female with PMHx myelodysplastic syndrome, seronegative RA, chronic microcytic anemia, PAD, and recent L SDH (5/12/2022) who was admitted from her TCU on 6/22/2022 after an unwitnessed fall. Was subsequently found to have an acute on chronic L SDH, a new acute R SDH, and increased midline shift w/ trace L uncal herniation now s/p L craniotomy w/ hematoma evacuation. Course complicated by GIB requiring reintubation. Now extubated.     Today, the patient was seen for:  Acute on chronic L SDH s/p L craniotomy w/hematoma evacuation  Acute R SDH  Dysphagia  Gastric and colonic ulcers, GIB  Sx management    Prognosis, Goals,  or Advance Care Planning was addressed today with: No.  Mood, coping, and/or meaning in the context of serious illness were addressed today: No.            Interval History:     Chart review/discussion with unit or clinical team members:   Increased oxygen requirements overnight. Transitioned to comfort care after discussion with  per chart. Patient pulled out IV. TF discontinued and O2 removed per family request per chart. Family visited overnight.    Per patient or family/caregivers today:  Visited Louise. No family at bedside. Louise was pulling at her gown--looked to be trying to cover herself more. Eyes opened during visit, but she remains non-verbal. Appears comfortable and in no respiratory distress.             Review of Systems:     A comprehensive ROS has been negative other than stated in the HPI           Medications:     I have reviewed this patient's medication profile and medications during this hospitalization.           Physical Exam:   Temp: 99.5  F (37.5  C) Temp src: Axillary BP: 115/40 Pulse: 108   Resp: 18 SpO2: 94 % O2 Device: Oxymask    Gen: Asleep, but opens eyes spontaneously, in NAD  Resp: No increased work of breathing. CTAB  CV: Tachycardic, regular rhythm.  Msk: no gross deformity  Skin:  Warm, dry, no jaundice  Ext: warm, well perfused.             Data Reviewed:     Reviewed recent pertinent imaging, comments:   Results for orders placed or performed during the hospital encounter of 06/22/22   Head CT w/o contrast   Result Value Ref Range    Radiologist flags (Urgent)      Acute subdural hematomas with mass effect as described    Impression    Impression:  1. Active hemorrhage superimposed upon Acute and chronic left-sided  mixed density subdural measuring up to 2.3 cm in maximum thickness  with 12 mm of rightward midline shift and trace left-sided uncal  herniation.  2. Acute subdural along the right frontal convexity measuring up to 7  mm in maximum thickness.    [Urgent  Result: Acute subdural hematomas with mass effect as described  above.] Concern for active hemorrhage.    Finding was identified on 6/22/2022 6:04 PM.     Dr. Sanchez was contacted by Dr. Leung and Dr. Lindquist at  6/22/2022 6:09 PM and was again contacted at 6:30 PM and verbalized  understanding of the urgent finding.      I have personally reviewed the examination and initial interpretation  and I agree with the findings.    BONI LINDQUIST MD         SYSTEM ID:  [id number]   Cervical spine CT w/o contrast    Impression    Impression:   No acute fracture or traumatic subluxation.    I have personally reviewed the examination and initial interpretation  and I agree with the findings.    BONI LINDQUIST MD         SYSTEM ID:  [id number]   XR Chest Port 1 View    Impression    IMPRESSION:  ET tube is 2.5 cm above the leo. No acute consolidative airspace  disease. Left lower lobe atelectasis suspected.    I have personally reviewed the examination and initial interpretation  and I agree with the findings.    ROYA EDMOND MD         SYSTEM ID:  [id number]   CT Head w/o Contrast    Impression    Impression:  1. Postsurgical changes of left frontoparietal craniotomy and subdural  hematoma evacuation.  2. Decreased size of the heterogeneous fluid collection overlying the  left cerebral convexity.  3. Decreased rightward midline shift. Decreased effacement of the left  lateral ventricle.  4. Stable appearance of the subdural hematomas overlying the right  cerebral convexity and anterior falx.    I have personally reviewed the examination and initial interpretation  and I agree with the findings.    OSVALDO CONNOLLY MD         SYSTEM ID:  [id number]   XR Chest Port 1 View    Impression    IMPRESSION:   1. ET tube is in the mid thoracic trachea.  2. Decreased perihilar pulmonary opacities.    I have personally reviewed the examination and initial interpretation  and I agree with the findings.    ROYA EDMOND  MD         SYSTEM ID:  [id number]   CT Head w/o Contrast    Impression    Impression:    1. Reduced size of subdural fluid collection deep to the left  frontoparietal craniotomy.  2. Reduced mass effect and left to right midline shift now measuring 7  mm previously 13 mm when measured in a similar fashion.  3. Similar appearance of subdural hemorrhage along the anterior falx  and extending along the right anterior cranial fossa.  4. Unchanged appearance of small subdural hemorrhage along the  bilateral tentorial leaflets.     I have personally reviewed the examination and initial interpretation  and I agree with the findings.    OSVALDO CONNOLLY MD         SYSTEM ID:  [id number]   XR Abdomen Port 1 View    Impression    Impression:   1. Enteric tube is subdiaphragmatic with tip and sidehole projecting  over the expected location of the stomach  2. Increased bibasilar pulmonary opacities.    I have personally reviewed the examination and initial interpretation  and I agree with the findings.    DARSHAN DIXON MD         SYSTEM ID:  [id number]   CT Head w/o Contrast    Impression    Impression:    1. Slightly reduced size of subdural fluid collection deep to the left  frontotemporal craniotomy site.  2. Unchanged mass effect on the left cerebrum and unchanged left to  right midline shift.  3. Unchanged additional subdural fluid collection along the right  anterior cranial fossa and extension along the anterior falx.  4. Unchanged appearance of small subdural hemorrhages along the  bilateral tentorial leaflets..     I have personally reviewed the examination and initial interpretation  and I agree with the findings.    OSVALDO CONNOLLY MD         SYSTEM ID:  [id number]   XR Chest Port 1 View    Impression    Impression:   ET tube no longer visualized. Increased interstitial pulmonary edema  and bibasilar atelectasis and/or pneumonia.    I have personally reviewed the examination and initial interpretation  and I agree with the  findings.    ANGELIQUE DENTON MD         SYSTEM ID:  [id number]   XR Chest Port 1 View    Impression    Impression:   1. Slightly decreased right lower lobe opacities.  2. Slight increase in dense retrocardiac consolidation which may  represent atelectasis versus infection/aspiration.    I have personally reviewed the examination and initial interpretation  and I agree with the findings.    GISELE BURGESS MD         SYSTEM ID:  [id number]   CT Head w/o Contrast    Impression    Impression:  1. Unchanged size of left cerebral convexity subdural fluid collection  status post subdural drain removal.  2. Unchanged size of right frontal subdural hemorrhage with extension  along the anterior falx.  3. Unchanged size of small subdural hemorrhage extending along the  tentorial leaflets.  4. Unchanged 6 mm of left-to-right midline shift.    I have personally reviewed the examination and initial interpretation  and I agree with the findings.    SAVANNA COHEN MD         SYSTEM ID:  [id number]   XR Chest Port 1 View    Impression    Impression: Increased effusions, atelectasis and interstitial edema.    I have personally reviewed the examination and initial interpretation  and I agree with the findings.    JOVANA CORRIGAN MD         SYSTEM ID:  [id number]   XR Chest Port 1 View    Impression    IMPRESSION:   1. Similar bibasilar predominant pulmonary opacities concerning for  aspiration or infection. Superimposed atelectasis and probable mild  pulmonary edema is suspected.  2. Stable pleural effusions.    I have personally reviewed the examination and initial interpretation  and I agree with the findings.    LOS BARAJAS DO         SYSTEM ID:  [id number]   XR Abdomen Port 1 View    Impression    IMPRESSION:   Enteric tube tip and side port projecting over the stomach..    I have personally reviewed the examination and initial interpretation  and I agree with the findings.    ROYA EDMOND MD         SYSTEM ID:  [id number]    XR Chest Port 1 View    Impression    IMPRESSION:   Trace bilateral pleural effusions with overlying bibasilar opacities,  improved from prior.    I have personally reviewed the examination and initial interpretation  and I agree with the findings.    LOS AIRAM DO KARL         SYSTEM ID:  [id number]   XR Abdomen Port 1 View    Impression    Impression: Enteric tube tip and sidehole in the stomach.    TACHOSIMON AIRAM DO KARL         SYSTEM ID:  [id number]   XR Abdomen Port 1 View    Impression    IMPRESSION:   Enteric tube tip and side-port overlying the stomach.    I have personally reviewed the examination and initial interpretation  and I agree with the findings.    LOS ALEGRIA DO KARL         SYSTEM ID:  [id number]   CT Head w/o Contrast    Impression    Impression:    1. Increased size of left cerebral convexity subdural hematoma without  increase in density and with decreased size of left frontal seen  subdural hemorrhage suggests redistribution of blood products.  Attention is recommended on follow-up imaging.  2. stable size of right cerebral convexity subdural hematoma.  3. Stable 6 mm of rightward midline shift.         BONI LINDQUIST MD         SYSTEM ID:  [id number]   XR Chest Port 1 View    Impression    IMPRESSION: Continued right lower lung atelectasis or mild edema. No  significant changes otherwise.    JOVANA CORRIGAN MD         SYSTEM ID:  [id number]   XR Chest Port 1 View    Impression    IMPRESSION:     1. Left upper extremity PICC line terminates in the high SVC. No  pneumothorax.  2. Continued right lower lung atelectasis or mild edema. No  significant changes otherwise.    I have personally reviewed the examination and initial interpretation  and I agree with the findings.    JOVANA CORRIGAN MD         SYSTEM ID:  [id number]   CT Head w/o Contrast    Impression    Impression:    1. Stable size and the distribution of the subdural hemorrhage along  the right frontal and left frontoparietal  convexities, falx and  tentorium.  2. Stable postsurgical changes of left frontoparietal craniotomy and  subdural hematoma evacuation.     I have personally reviewed the examination and initial interpretation  and I agree with the findings.    BONI LINDQUIST MD         SYSTEM ID:  [id number]   CT Chest Pulmonary Embolism w Contrast    Impression    IMPRESSION:   1. Exam is negative   for acute pulmonary embolism.    Evidence for right heart strain or increased right heart pressures?   is not present.     2. Mucous plugging and linear atelectasis in the right lower lobe.  Aspiration or other infectious consolidation cannot be ruled out.    3. Ectatic thoracic aorta, approximately 4.2 cm.    In the event of a positive result for acute pulmonary embolism  resulting in right heart strain, consider calling the   [facility] patient placement ([phone]) for PERT (Pulmonary Embolism  Response Team) Activation?    PERT -- Pulmonary Embolism Response Team (Multidisciplinary team  including cardiology, interventional radiology, critical care,  hematology)    I have personally reviewed the examination and initial interpretation  and I agree with the findings.    JOVANA CORRIGAN MD         SYSTEM ID:  [id number]   XR Chest Port 1 View    Impression    IMPRESSION: Endotracheal tube tip in the mid to distal thoracic  trachea. Bibasilar atelectasis. Left upper portion of the PICC line  tip possibly in the upper portion of the azygos vein. Atherosclerosis.    JOVANA CORRIGAN MD         SYSTEM ID:  [id number]   XR Abdomen Port 1 View    Impression    IMPRESSION:   1. Enteric tube with tip in the stomach and side-port at or just  distal to the gastroesophageal junction.  2. Nonobstructive bowel gas pattern.    I have personally reviewed the examination and initial interpretation  and I agree with the findings.    ROHAN MAGALLON MD         SYSTEM ID:  [id number]   XR Chest Port 1 View    Impression    IMPRESSION:  1. Increased  perihilar and bibasilar pulmonary opacities which may  represent atelectasis, edema and/or infection.  2. Stable support devices.     I have personally reviewed the examination and initial interpretation  and I agree with the findings.    JOVANA CORRIGAN MD         SYSTEM ID:  [id number]   CTA Abdomen Pelvis with Contrast    Impression    IMPRESSION:   1. No evidence of gastrointestinal bleed as questioned.  2. Interval new subacute appearing right sacral insufficiency fracture  and subacute appearing left pubic ramus fracture since 5/12/2022.  3. Age-indeterminate compression deformity of T10 with approximately  30% height loss. Multiple subacute appearing anterior rib fractures,  and unchanged appearance of L1, L3, and L5 compression deformities.  4. Small sliding-type hiatal hernia with hemostasis clip noted in the  gastric fundus.  5. Aortobiiliac atherosclerotic atherosclerosis with multiple foci of  arterial stenosis including the renal arterial origins, common iliac  vessels, and near total occlusions of the proximal internal iliac  arteries and common femoral arteries proximally.  6. Unchanged appearance of left inguinal hernia containing a loop of  sigmoid colon, no signs of obstruction.  7. 5 mm hypodensity in the pancreatic head may represent a sidebranch  IPMN although MRI is recommended to further characterize on an  outpatient basis, pancreatic cystic follow-up is recommended below.  Per [facility] criteria.  8. Indeterminate hepatic hypodensities are too small to further  characterize but could be evaluated further at time of MRI for above  pancreatic lesion.  9. 1.4 cm right ovarian cyst for which annual follow-up is recommended  in a postmenopausal female.  10. Trace effusions and associated atelectasis.    [facility] recommendations for asymptomatic pancreatic  cysts, modified from international consensus guidelines*   Size of largest cyst:   Less than 1 cm: Follow-up  imaging in 6 months to 1 year, then lengthen  interval to 2-3 years if no change.  1-2 cm: Follow-up imaging at 6 months, then yearly for 2 years, then  lengthen interval if no change.   The optimal initial study or first follow-up exam is MRI performed  with intravenous gadolinium contrast to allow full cyst  characterization. Once characterized, future follow-up MRIs can be  performed without contrast. If MRI is contraindicated, CT with  contrast is recommended.   GI consultation for possible endoscopic ultrasound is recommended for  cysts with the following features: Size > 2 cm, >20% growth on  followup, wall thickening or enhancement, mural nodule, duct > 5 mm,  or abrupt change in duct caliber with distal atrophy. GI or surgical  consultation is also recommended for symptomatic cysts.   *Reference: International Consensus Guidelines for Management of IPMN  and MCN of the pancreas. Pancreatology: 12:(2012); 183-197.    I have personally reviewed the examination and initial interpretation  and I agree with the findings.    ILIANA JEFFERSON MD         SYSTEM ID:  [id number]   XR Abdomen Port 1 View    Impression    IMPRESSION:   1. Feeding tube with tip in the proximal jejunum.   2. Enteric tube with tip and side-port overlying the stomach.  3. Non obstructive bowel gas pattern.  4. Small bilateral pleural effusions.    I have personally reviewed the examination and initial interpretation  and I agree with the findings.    ROYA EDMOND MD         SYSTEM ID:  [id number]   Echo Complete   Result Value Ref Range    LVEF  60-65%    Echocardiogram Limited   Result Value Ref Range    LVEF  60-65%        Reviewed recent labs  Na 152  K 4.1  BUN 61.2  Cr. 0.66  WBC 34.8  Hgb 7.9  Plt 588k

## 2022-08-05 NOTE — CONSULTS
[facility]    Consult Note - [facility]    ______________________________________________________________________    [facility] 24/7 Contact Number: [phone]    - Providers: Please contact [facility] with changes in orders or clinical plan of care   - Nursing: Please contact [facility] with significant changes in patient condition    Hospice will notify the care team (including the hospitalist) to confirm date of inpatient hospice (GIP) admission.    New Epic encounter will not be created until hospice completes admission.   ______________________________________________________________________        Hospice Diagnosis: Hypoxic Respiratory Failure    Indication for Inpatient Hospice: respiratory distress AEB Respiration rate >20; air hunger, Terminal agitation    Goals for Hospital Discharge: Once patient stabilizes may plan for discharge.     Plan of Care Discussed with the Following:   - Nurse:   Reny Chambers RN (Tel. [phone])  - Louise's Family/Preferred Contact: Jd, [phone]  - Hospice Provider: Dr. Talavera   Tenatively plan to admit patient to hospice GIP program on Saturday August 6th @ 9 am. Spouse is Health care agent who will sign consents.      Anna Grady, RN  [phone]

## 2022-08-06 NOTE — PLAN OF CARE
Speech Language Therapy Discharge Summary    Reason for therapy discharge:    Change in medical status.    Progress towards therapy goal(s). See goals on Care Plan in [facility] electronic health record for goal details.  Change in status, pt now on comfort cares.    Therapy recommendation(s):    No further therapy is recommended. Defer to MD for diet recommendations.

## 2022-08-06 NOTE — PLAN OF CARE
Status: Pt admitted for SDH with midline shift after a fall. Pt now on comfort cares  Neuros: Alert, not following commands. Pt able to shake head no when asked if she was in pain.  Resp/trach: No s/s of air hunger. RR WNL.  Diet: Regular. PEG in place.  Bowel status: Incontinent.   : Incontinent  Pain: No s/s of discomfort and agitation.   Activity: A2, repo q2hrs. Oral cares as needed. Last repo @ 0640  Plan: Plan to be admitted to general inpatient hospice at 0900.

## 2022-08-06 NOTE — CONSULTS
[facility]    Progress Note - [facility]    ______________________________________________________________________    [facility] 24/7 Contact Number: [phone]    - Providers: Please contact [facility] with changes in orders or clinical plan of care   - Nursing: Please contact [facility] with significant changes in patient condition  ______________________________________________________________________        Summary of Visit (includes assessment, medications and any new orders):   Writer reassessed patient in the evening and she appeared comfortable. She responded to writer with nonsensical verbage.   Temperature 98.6 axillary  Pulse 89  Oxygen sats 90-94% non labored breathing  Respirations 14 lung sounds diminished clear  Unable to tolerate anything by mouth. Family wants patient to remain on comfort cares. Tube feedings were stopped. +BS  Incontinent of Bowel and bladder.  Continue with turning and repositioning every 2 hours      No PRN doses for symptoms were needed since last assessment in the AM. Will continue to follow peripherally on a daily basis.        Discharge plans: No formal plans made for discharge to community. Patient qualifies for hospice services in the community setting. Writer notifed team members and spouse.     Anna Grady, RN

## 2022-08-06 NOTE — PROGRESS NOTES
Brief Note:  SW meet with pt and pt spouse at bedside to introduce self and role and offer emotional support. Pt spouse decline support at this time.   No further questions or concerns identifed at the time of visit.   SW will continue to follow.       AQUILES Diaz, AMBREEN  Weekend Adult Acute Care      For weekend social work needs, contact information below and available on [facility]:  4A, 4C, 4E, 5A, 5B                  pager [phone]  6A, 6B, 6C                               pager [phone]  7A, 7B, 7C, 7D, 5C                 pager [phone]  ED/OBS                                  pager [phone]     For weekend RN care coordinator needs (home discharge with needs including home care, rides home, assisted living facility returns, durable medical equip, IV antibiotics) - page [phone].     For hours 1600 - midnight Pager: [phone]

## 2022-08-06 NOTE — PLAN OF CARE
Status: Comfort cares.   Vitals: Spot checked O2 for 92% on RA.   Neuros: Minimally verbal. Occasionally responds to simple questions. Moves all extremities spontaneously, primarily the LUE.   Resp/trach: Occasional congested cough.   Diet: Regular diet ordered for comfort. Attempted to give sip of water, resulting in coughing fit. No other PO was attempted by writer after this. PEG tube clamped, utilized for scheduled keppra and PRN morphine.   Bowel status: Incontinent of stool, LBM 8/5.   : Incontinent of bladder. Multiple wet briefs this shift.   Skin: Scabs and bruising noted throughout extremities. Cheeks flushed. Lips moist and intact.   Pain: This evening, patient was restless, taking off clothes. RR was elevated. PRN morphine 5mg was given through PEG tube.   Activity: T&R q2h with 2 assist.   Social:  was at bedside most of morning. Supportive.   Plan: [facility] is following patient. Multiple PRNs available to treat symptoms, see MAR.

## 2022-08-06 NOTE — PROGRESS NOTES
[facility]    Medicine Progress Note - Hospitalist Service, GOLD TEAM 6    Date of Admission: 6/22/2022    Assessment & Plan          Louise Benítez is a 76 year old female with history of seronegative RA, osteoporosis, PAD, and myelodysplastic syndrome admitted 6/22 after a ground level fall resulting in SDH with midline shift s/p emergent craniotomy and evacuation. Course c/b nonconvulsive status epilepticus (resolved), GIB s/p EGD and clipping of gastric ulcer 7/5, and aspiration s/p PEG placement 7/15. Further c/b bloody stools 7/17 with acute hemoglobin drop.      Goals of care: Care conference 7/29: family elected to continue restorative cares without plan to escalate care if patient were to become acutely ill. Patient had acute decompensation overnight 8/4 to 8/5 with fever, worsening hypoxia. Family came to bedside and decided to move to comfort cares. Patient will continue Keppra to prevent seizure. All non-comfort focused medications stopped 8/5  - Hospice order set in place  - Hospice team consult placed and working on disposition (stay inpatient hospice vs hospice facility). Family prefers inpatient hospice if possible  - SW consulted     Sepsis without clear course, acute hypoxic respiratory failure: Decompensation 8/3, 8/4, 8/5. Family elected not to workup or treat infection, thus source not determined    Other Medical Issues Addressed: See notes 8/4 and prior for details    SDH with midline shift s/p emergent craniotomy with SDH evacuation (6/22/22) c/b nonconvulsive status epilepticus  Acute blood loss anemia on chronic microcytic anemia, gastric ulcers c/b GIB  Transaminitis  Non-severe protein calorie malnutrition  Dysphagia  Sepsis 2/2 cellulitis at G tube insertion site (placed by IR 7/15)  Myelodysplasic syndrome  HTN  Seronegative RA  Osteoporosis  HLD  PAD  Loose stools  Aspiration s/p IR G tube placement (7/15)  Rhinovirus    Diet: Regular Diet  Adult    DVT Prophylaxis: None  Vasquez Catheter: Not present  Central Lines: None  Cardiac Monitoring: None  Code Status: No CPR- Do NOT Intubate      Disposition Plan      Expected Discharge Date: 08/08/2022, 12:00 PM  Discharge Delays: Comfort Care/Hospice  Placement - Homecare    Discharge Comments: On comfort cares. Inpatient hospice vs OP hospice facility pending acceptance, ongoing qualification        The patient's care was discussed with the patient, family, [facility] hospice program, nursing, and attending physician, Dr. Otoniel Wen PA-C  Hospitalist Service, GOLD TEAM [address] [place] [address]  Securely message with the Vocera Web Console (learn more here)  Text page via [facility] Paging/Directory   Please see signed in provider for up to date coverage information      Clinically Significant Risk Factors Present on Admission                      ______________________________________________________________________    Interval History   Per , this is the best day she has had for many days. More alert. Appears comfortable but cold. Not eating. Family feels supported and prefers to stay with inpatient hospice rather than discharge to OP facility if possible    Data reviewed today: I reviewed all medications, new labs and imaging results over the last 24 hours    Physical Exam   Vital Signs:           Resp: 14 SpO2: 92 % O2 Device: None (Room air)    Weight: 146 lbs 2.64 oz  General Appearance: Alert, appears comfortable   HEENT: Membranes dry, mouth open  Respiratory: Breathing comfortably  Cardiovascular: Deferred   GI: Deferred   Lymph/Hematologic: No peripheral edema, distal pulses palpable   Skin: No rash or jaundice. Skin cold    Data   Recent Labs   Lab 08/05/22  0754 08/03/22  1359 08/03/22  0733 08/02/22  0657 08/01/22  0653   WBC 34.8*  --   --  10.6 11.6*   HGB 7.9*  --   --  7.9* 8.7*   *  --   --  100 99   *  --   --  614* 666*    *  --  144 141 140   POTASSIUM 4.1  --  4.9 4.9 5.0   CHLORIDE 111*  --  106 105 103   CO2 31*  --  27 28 25   BUN 61.2*  --  43.5* 35.3* 28.3*   CR 0.66  --  0.59 0.55 0.42*   ANIONGAP 10  --  11 8 12   MENDEZ 9.7  --  9.9 9.6 9.7   * 139* 130* 113* 123*   ALBUMIN  --   --  3.9  --  3.9   PROTTOTAL  --   --  6.9  --  6.8   BILITOTAL  --   --  0.4  --  0.3   ALKPHOS  --   --  170*  --  134*   ALT  --   --  26  --  17   AST  --   --  56*  --  50*     No results found for this or any previous visit (from the past 24 hour(s)).

## 2022-08-06 NOTE — PROGRESS NOTES
Care Management Follow Up    Length of Stay (days): 45    Expected Discharge Date: 08/08/2022     Concerns to be Addressed:       Patient plan of care discussed at interdisciplinary rounds: Yes    Anticipated Discharge Disposition:       Anticipated Discharge Services:    Anticipated Discharge DME:      Patient/family educated on Medicare website which has current facility and service quality ratings:    Education Provided on the Discharge Plan:    Patient/Family in Agreement with the Plan:      Referrals Placed by CM/SW:    Private pay costs discussed: Not applicable    Additional Information:    Update:  RACHEL spoke to Anna [facility]osp[facility] nurse, stating pt does not qualify for inpatient hospice at this time, but may qualify tomorrow. Pt is eligible for home hospice upon discharge. Anna requested RACHEL meet with pt and pt's spouse to offer additional support.    RACHEL will contact Stacy, Hospice nurse tomorrow to see if pt status has changed.    RACHEL will continue to follow for discharge needs.        RACHEL called GIP liaison Stacy to get an up regarding the interview however no response.   RACHEL called Anna the hospice nurse, Anna told RACHEL that she is currently on the phone with the pt spouse and she will call RACHEL back. RACHEL is waiting to hear from Anna with an update.        AQUILES Queen

## 2022-08-06 NOTE — CONSULTS
[facility]    Consult Note - [facility]    ______________________________________________________________________    [facility] 24/7 Contact Number: [phone]    ______________________________________________________________________        Louise is currently ineligible for inpatient hospice.    Rationale: Currently patient is asymptomatic    Ineligibility Status Discussed with the Following:   - Nurse:Reny Chambers RN (Tel. [phone])  - Hospitalist/Rounding Provide  Sasha Wen PA-C    Hospitalist    Since 8/6/2022    [phone]        - Louise's Family/Preferred Contact: Jd Benítez  - Hospice Provider: Dr. Talavrea  -  : Callie Holden MSW    Is Louise eligible for hospice on Discharge? yes     Updated family, hospitalist,  and 6A staff  on hospice ineligibility after discussion and assessment of patient. Writer called  in the hospital setting regarding ineligibility in the inpatient setting. Patient will be reassessed later today and tomorrow to determine if patient qualifies for inpatient hospice services.     Anna Grady RN

## 2022-08-07 NOTE — PLAN OF CARE
Status: Comfort cares.   Vitals: RR WNL, no S/S of respiratory distress, agitation, or discomfort  Neuros: Minimally verbal. Occasionally responds to simple questions. Moves extremities spontaneously  Resp/trach: Occasional congested cough.   Diet: Regular diet for comfort. PEG tube clamped, utilized for scheduled keppra and PRN medications  Bowel status: Incontinent, LBM 8/5.   : Incontinent  Skin: Scabs and bruising noted throughout extremities. Cheeks flushed. Oral cares performed PRN  Pain: Given low dose of PRN morphine and tylenol d/t flushing and diaphoresis overnight.  Activity: A2, repo q2hrs  Plan: [facility] is following patient. Multiple PRNs available to treat symptoms, see MAR.

## 2022-08-07 NOTE — PROGRESS NOTES
[facility]    Medicine Progress Note - Hospitalist Service, GOLD TEAM 6    HD #[id number]       ASSESSMENT & PLAN:   Louise Benítez is a 76 year old female with history of seronegative RA, MDS, PAD, and osteoporosis who was admitted 6/22/22 for traumatic SDH with midline shift s/p emergent craniotomy and evacuation. Hospital course c/b non-convulsive status epilepticus, GIB s/p EGD and clipping of gastric ulcer (7/5), acute blood loss anemia, aspiration s/p PEG placement (7/15), and further decompensation on 8/3/22 with acute hypoxic respiratory failure and sepsis of unknown etiology. Transitioned to comfort measures only on 8/4/22 per goals of care discussion with family.    For Today:  Patient appears comfortable. Met with family at bedside and happy with care provided in hospital. Would prefer to remain in hospital for end of life.   - Continue comfort measures  - Tylenol, atropine, refresh eye drops, haldol, ativan, morphine and zofran available PRN      # Sepsis of unknown etiology  # Acute hypoxic respiratory failure  # Acute traumatic SDH with midline shift s/p emergent craniotomy (6/22/22)  # Acute upper GI bleed d/t gastric ulcer  # Acute blood loss anemia  # Aspiration s/p PEG placement (715)  # Transaminitis  # Non-severe malnutrition  # MDS  # HTN  # Seronegative RA  # PAD  # Rhinovirus    See medicine note from 8/4 for further details of hospitalization.        Diet: Regular Diet Adult    DVT Prophylaxis: comfort cares  Vasquez Catheter: Not present  Central Lines: None  Cardiac Monitoring: None  Code Status: No CPR- Do NOT Intubate      Disposition Plan     Expected Discharge Date: 08/08/2022, 12:00 PM  Discharge Delays: Comfort Care/Hospice  Placement - Homecare    Discharge Comments: On comfort cares. Inpatient hospice vs OP hospice facility pending acceptance, ongoing qualification        The patient's care was discussed with the Attending Physician,   Otoniel.    Tripp Bravo PA-C  Hospitalist Service, GOLD TEAM 6  [facility]  Securely message with the [employer] Web Console (learn more here)  Text page via [facility] Paging/Directory   Please see signed in provider for up to date coverage information      Clinically Significant Risk Factors Present on Admission                      ______________________________________________________________________    Interval History   Non-verbal but follows some commands. Appears comfortable.  at bedside. Would prefer not to go to facility for end of life care. Very happy with care received in hospital.     Data reviewed today: I reviewed all medications, new labs and imaging results over the last 24 hours.     Physical Exam   Vital Signs:                    Weight: 146 lbs 2.64 oz  General Appearance:  Awake. Alert. Non-verbal. Follows some commands. Appears comfortable.  HEENT:  Moist mucous membranes.   Respiratory:  Normal effort. Breath sounds slightly coarse. No wheezing.   Cardiovascular:  RRR. S1,S2. No murmurs.   GI:  Soft, no obvious tenderness. No guarding.    Extremity:  No pitting edema.   Neuro:  Limited due to patient factors.      Data   No labs today

## 2022-08-07 NOTE — PROGRESS NOTES
Care Management Follow Up    Length of Stay (days): 46    Expected Discharge Date: 08/08/2022     Concerns to be Addressed:       Patient plan of care discussed at interdisciplinary rounds: Yes    Anticipated Discharge Disposition:       Anticipated Discharge Services:    Anticipated Discharge DME:      Patient/family educated on Medicare website which has current facility and service quality ratings:    Education Provided on the Discharge Plan:    Patient/Family in Agreement with the Plan:      Referrals Placed by CM/SW:    Private pay costs discussed: Not applicable    Additional Information:  FÁTIMA spoke with GIP liaison  Stacy regarding pt  hospice status .   Per Stacy pt is not eligible for in patient hospice ; however, pt is eligible for hospice in the community.  Stacy stated pt's family is unsure about hospice decision.  FÁTIMA attempted to meet with pt and family to provide hospice list and answer any questions but FÁTIMA was unable to connect with pt and family.   FÁTIMA left hospice list with bedside nurse.     Fátima will continue to follow for discharge planing.           AQUILES Queen

## 2022-08-07 NOTE — PLAN OF CARE
Status: Comfort cares.   Vitals: Spot checked O2 for 94% on RA.   Neuros: Minimally verbal. Occasionally responds to simple questions. Moves all extremities spontaneously, primarily the LUE.   Resp/trach: Occasional congested cough.   Diet: Regular diet ordered for comfort, no PO this shift PEG tube clamped, utilized for scheduled keppra and PRN meds.  Bowel status: Incontinent of stool, LBM 8/5.   : Incontinent of bladder. Multiple wet briefs this shift.   Skin: Scabs and bruising noted throughout extremities. Cheeks flushed. Lips moist and intact.   Pain: PRN tylenol given. Resting comfortably.   Activity: T&R q2h with 2 assist.   Social: Family visiting throughout day. Supportive.  Plan: [facility] is following patient. Multiple PRNs available to treat symptoms, see MAR.

## 2022-08-08 NOTE — PLAN OF CARE
Status: Comfort cares.  Neuros: Pt looks more alert this shift. Minimally verbal but waves and uses hands to communicate. Can answer simple yes or no questions. Moves extremities sponanteously  Resp/trach: RR WNL  Diet: Regular for comfort. Pt likes coffee mouth swabs. PEG utilized for keppra and PRNmedications.  Bowel status: LBM 8/5, incontinent  : Incontinent  Skin: Old L crani incision. Abrasions/bruising throughout. PEG site reddened.  Pain: Denies. No s/s of agitation, discomfort. Given PRN tylenol for comfort  Activity: A2, repo q2hrs. Last repo 0600.   Plan: [facility] and SW is following patient. Multiple PRNs available to treat symptoms, see MAR.

## 2022-08-08 NOTE — PROGRESS NOTES
Care Management Follow Up     Length of Stay (days): 47     Expected Discharge Date: 08/08/2022     Concerns to be Addressed:   Disposition planning   Patient plan of care discussed at interdisciplinary rounds: Yes     Anticipated Discharge Disposition:  Hospice     Anticipated Discharge Services:  Hospice  Anticipated Discharge DME:   Not applicable at this time     Patient/family educated on Medicare website which has current facility and service quality ratings: RACHEL has provided pt's family with a list of residential hospices  Education Provided on the Discharge Plan:   yes  Patient/Family in Agreement with the Plan:  yes     Referrals Placed by CM/SW:  None at this time  Private pay costs discussed: yes, education was provided in regards to private pay residential hospices and private pay SNF placement     Additional Information:  SW is following pt for discharge planning.  Pt is comfort care only and it is anticipated that pt is medically ready for discharge.  RACHEL text paged Gold 6 PA (Tripp Bravo) this am and paged this pm to confirm readiness for discharge.  SW is awaiting a return call.  RACHEL received a follow up call from  Stacy Buck, [facility] who states that she phoned pt's daughter and informed her that pt does not qualify for [facility] hospice within the hospital.   RACHEL phoned pt's daughter, Allyson, who confirmed that she spoke with Stacy Buck .  Allyson voices frustration with communication specifically related to being told that pt could remain in the hospital in the [facility]IP hospice program and now being told that pt does not qualify for this program.  RACHEL acknowledged Allyson's frustration. RACHEL inquired about preference for delivery of hospice services (private pay residential hospice vs [facility] (free), vs SNF/ Nursing home placement with contracted hospice services vs home with hospice).  Allyson again voiced frustration with inconsistent messages from [facility]  regarding pt receiving hospice services at [facility] stating that her father (pt's ) will be upset, 2 family members are going to be out of town and it now falls on Allyson's responsibility to manage and coordinate and Allyson is working full time.  SW again acknowledged Allyson's concern.   Allyson states that she would have had time to discuss alternate plan with family this past weekend but didn't as she states that they were told that pt would remain in the hospital for hospice.  Allyson states that she will now have to rush to get input from family about which facility they want pt placed at.  Allyson states that she will speak with family and get back to this SW with family preferences for delivery of hospice services to pt.    CÉSAR Bailey  Social Work, 6A  Phone:  [phone]  Pager:  [phone]  8/8/2022

## 2022-08-08 NOTE — PROGRESS NOTES
"Care Management Follow Up    Length of Stay (days): 47    Expected Discharge Date: 08/08/2022     Concerns to be Addressed:   Disposition planning   Patient plan of care discussed at interdisciplinary rounds: Yes    Anticipated Discharge Disposition:  Hospice     Anticipated Discharge Services:  Hospice  Anticipated Discharge DME:   Not applicable at this time    Patient/family educated on Medicare website which has current facility and service quality ratings: SW has provided pt's family with a list of residential hospices  Education Provided on the Discharge Plan:   yes  Patient/Family in Agreement with the Plan:  yes    Referrals Placed by CM/SW:  None at this time  Private pay costs discussed: yes, education was provided in regards to private pay residential hospices and private pay SNF placement    Additional Information:  SW is following pt for discharge planning.  Per chart review, RACHEL notes that pt was made comfort care only the evening of 8/4/2022.  It appears that [facility] was consulted to determine whether or not pt met criteria for the [facility] hospice program and it appears that pt does not meet criteria for [facility] Hospice at [facility].   Refer to the following [facility] progress note entry's:    1.  Per 8/5/2022 [facility] progress note entry written by Stacy Buck, \" Referral received. Reviewing eligibility with intake team and medical director at this time.  Patients' spouse \"Jd\" was called and VM left to set up a time to discuss GIP hospice on 8/6. Patient will not be signing onto hospice tonight. Please call hospice triage if change of condition permits a reevaluation of GIP in the meantime. \"     2.  Per 8/5/2022 [facility]  progress note entry written by Anna Grady, \"Tenatively plan to admit patient to hospice GIP program on Saturday August 6th @ 9 am. Spouse is Health care agent who will sign consents.\"     3.  Per 8/6/2022 [facility]  progress note entry written by Anna Grady, \"Updated family, hospitalist,  and 6A staff  on hospice ineligibility after discussion and assessment of patient. Writer called  in the hospital setting regarding ineligibility in the inpatient setting. Patient will be reassessed later today and tomorrow to determine if patient qualifies for inpatient hospice services. \"     4.  Per 8/6/2022 [facility] follow up progress note entry written by Anna Grady, \"Discharge plans: No formal plans made for discharge to community. Patient qualifies for hospice services in the community setting. Writer notifed team members and spouse. \"      SW phoned pt's daughter, Allyson, and discussed discharge planning.  SW inquired about preference for delivery of hospice services (private pay residential hospice vs [facility] (free), vs SNF/ Nursing home placement with contracted hospice services vs home with hospice).  Allyson voiced confusion stating that her understanding is that family was told that pt could remain in the hospital and receive hospice services.  SW explained that pt does not meet the criteria for [facility] hospice as pt's needs can be met in the community.  Allyson inquired as to where the breakdown in communication had occurred as again states that their understanding was that pt could remain in the hospital in the [facility] hospice program.  As this was reportedly there understanding Allyson states that they did not move forward with deciding upon an alternate plan.  RACHEL informed Allyson that SW would ask [facility]osp[facility] representative to call Allyson to explain outcome of their assessment.  Allyson agreed that after she talks with [facility] hospice representative, she will call this SW back to indicate family wishes (SNF, home,  residential hospice).  RACHEL phoned [facility] representative, Stacy Buck and relayed Allyson's stated confusion as to the communication regarding " whether or not pt qualified for [facility] Hospice.  Stacy confirms that she will phone Allyson to address concern.    This SW will await a follow up call from pt's daughter (Allyson) regarding family's choice, preference for delivery of hospice service to this pt.    CÉSAR Bailey  Social Work, 6A  Phone:  [phone]  Pager:  [phone]  8/8/2022                          REBECCA Pearson

## 2022-08-08 NOTE — PROGRESS NOTES
[facility]  Palliative Care Daily Progress Note       Recommendations & Counseling       Louise up in chair today and appears comfortable.     Changed Senna to liquid soln via PEG BID prn constipation    Thank you for the opportunity to participate in the care of this patient and family. Our team: will continue to follow.     During regular M-F work hours (3660-1001) -- if you are not sure who specifically to contact -- please contact us in [facility] Smart Web.     After regular work hours and on weekends/holidays, you can call our answering service at [phone].     Clarice Miller PA-C  [facility]  Contact information available via [facility] Paging/Directory    Attestation:  Total time on the floor involved in the patient's care: 15 minutes  Total time spent in counseling/care coordination: >50%      Assessments          Louise Benítez is a 76 year old female with PMHx myelodysplastic syndrome, seronegative RA, chronic microcytic anemia, PAD, and recent L SDH (5/12/2022) who was admitted from her TCU on 6/22/2022 after an unwitnessed fall. Was subsequently found to have an acute on chronic L SDH, a new acute R SDH, and increased midline shift w/ trace L uncal herniation now s/p L craniotomy w/ hematoma evacuation. Course complicated by GIB requiring reintubation. Now extubated.     Today, the patient was seen for:  Acute on chronic L SDH s/p L craniotomy w/hematoma evacuation  Acute R SDH  Dysphagia  Gastric and colonic ulcers, GIB  Sx management    Prognosis, Goals, or Advance Care Planning was addressed today with: No.  Mood, coping, and/or meaning in the context of serious illness were addressed today: No.            Interval History:     Chart review/discussion with unit or clinical team members:   Does not quality for GIP hospice.    Per patient or family/caregivers today:  Visited Louise. She was seated in chair. She  waved at me upon greeting and smiled. Did not respond verbally to me.  Jd present. He voiced frustration that family was told that Louise could remain inpatient at the hospital in hospice and now family is being told she has to leave hospital. His main concern is that the last TCU she was discharged to did not take proper care of her and she had a leg injury from a wheelchair as a result. Acknowledged his frustration and distress. Reassured him that there are good places in the community and that unit coordinator/SW would assist family in finding a place. He declined visit from palliative , stating he has enough support at this time. Left message for unit SW.             Review of Systems:   Did not respond to questions         Medications:     I have reviewed this patient's medication profile and medications during this hospitalization.           Physical Exam:                      Gen: Awake, seated in chair, in NAD  Resp: No increased work of breathing on room air.  Msk: no gross deformity  Skin:  Warm, dry, no jaundice  Ext: warm, well perfused.             Data Reviewed:     Reviewed recent pertinent imaging, comments:   Results for orders placed or performed during the hospital encounter of 06/22/22   Head CT w/o contrast   Result Value Ref Range    Radiologist flags (Urgent)      Acute subdural hematomas with mass effect as described    Impression    Impression:  1. Active hemorrhage superimposed upon Acute and chronic left-sided  mixed density subdural measuring up to 2.3 cm in maximum thickness  with 12 mm of rightward midline shift and trace left-sided uncal  herniation.  2. Acute subdural along the right frontal convexity measuring up to 7  mm in maximum thickness.    [Urgent Result: Acute subdural hematomas with mass effect as described  above.] Concern for active hemorrhage.    Finding was identified on 6/22/2022 6:04 PM.     Dr. Sanchez was contacted by Dr. Leung and Dr. Nair  at  6/22/2022 6:09 PM and was again contacted at 6:30 PM and verbalized  understanding of the urgent finding.      I have personally reviewed the examination and initial interpretation  and I agree with the findings.    BONI LINDQUIST MD         SYSTEM ID:  [id number]   Cervical spine CT w/o contrast    Impression    Impression:   No acute fracture or traumatic subluxation.    I have personally reviewed the examination and initial interpretation  and I agree with the findings.    BONI LINDQUIST MD         SYSTEM ID:  [id number]   XR Chest Port 1 View    Impression    IMPRESSION:  ET tube is 2.5 cm above the leo. No acute consolidative airspace  disease. Left lower lobe atelectasis suspected.    I have personally reviewed the examination and initial interpretation  and I agree with the findings.    ROYA EDMOND MD         SYSTEM ID:  [id number]   CT Head w/o Contrast    Impression    Impression:  1. Postsurgical changes of left frontoparietal craniotomy and subdural  hematoma evacuation.  2. Decreased size of the heterogeneous fluid collection overlying the  left cerebral convexity.  3. Decreased rightward midline shift. Decreased effacement of the left  lateral ventricle.  4. Stable appearance of the subdural hematomas overlying the right  cerebral convexity and anterior falx.    I have personally reviewed the examination and initial interpretation  and I agree with the findings.    OSVALDO CONNOLLY MD         SYSTEM ID:  [id number]   XR Chest Port 1 View    Impression    IMPRESSION:   1. ET tube is in the mid thoracic trachea.  2. Decreased perihilar pulmonary opacities.    I have personally reviewed the examination and initial interpretation  and I agree with the findings.    ROYA EDMOND MD         SYSTEM ID:  [id number]   CT Head w/o Contrast    Impression    Impression:    1. Reduced size of subdural fluid collection deep to the left  frontoparietal craniotomy.  2. Reduced mass effect and left to  right midline shift now measuring 7  mm previously 13 mm when measured in a similar fashion.  3. Similar appearance of subdural hemorrhage along the anterior falx  and extending along the right anterior cranial fossa.  4. Unchanged appearance of small subdural hemorrhage along the  bilateral tentorial leaflets.     I have personally reviewed the examination and initial interpretation  and I agree with the findings.    OSVALDO CONNOLLY MD         SYSTEM ID:  [id number]   XR Abdomen Port 1 View    Impression    Impression:   1. Enteric tube is subdiaphragmatic with tip and sidehole projecting  over the expected location of the stomach  2. Increased bibasilar pulmonary opacities.    I have personally reviewed the examination and initial interpretation  and I agree with the findings.    DARSHAN DIXON MD         SYSTEM ID:  [id number]   CT Head w/o Contrast    Impression    Impression:    1. Slightly reduced size of subdural fluid collection deep to the left  frontotemporal craniotomy site.  2. Unchanged mass effect on the left cerebrum and unchanged left to  right midline shift.  3. Unchanged additional subdural fluid collection along the right  anterior cranial fossa and extension along the anterior falx.  4. Unchanged appearance of small subdural hemorrhages along the  bilateral tentorial leaflets..     I have personally reviewed the examination and initial interpretation  and I agree with the findings.    OSVALDO CONNOLLY MD         SYSTEM ID:  [id number]   XR Chest Port 1 View    Impression    Impression:   ET tube no longer visualized. Increased interstitial pulmonary edema  and bibasilar atelectasis and/or pneumonia.    I have personally reviewed the examination and initial interpretation  and I agree with the findings.    ANGELIQUE DENTON MD         SYSTEM ID:  [id number]   XR Chest Port 1 View    Impression    Impression:   1. Slightly decreased right lower lobe opacities.  2. Slight increase in dense retrocardiac  consolidation which may  represent atelectasis versus infection/aspiration.    I have personally reviewed the examination and initial interpretation  and I agree with the findings.    GISELE BURGESS MD         SYSTEM ID:  [id number]   CT Head w/o Contrast    Impression    Impression:  1. Unchanged size of left cerebral convexity subdural fluid collection  status post subdural drain removal.  2. Unchanged size of right frontal subdural hemorrhage with extension  along the anterior falx.  3. Unchanged size of small subdural hemorrhage extending along the  tentorial leaflets.  4. Unchanged 6 mm of left-to-right midline shift.    I have personally reviewed the examination and initial interpretation  and I agree with the findings.    SAVANNA COHEN MD         SYSTEM ID:  [id number]   XR Chest Port 1 View    Impression    Impression: Increased effusions, atelectasis and interstitial edema.    I have personally reviewed the examination and initial interpretation  and I agree with the findings.    JOVANA CORRIGAN MD         SYSTEM ID:  [id number]   XR Chest Port 1 View    Impression    IMPRESSION:   1. Similar bibasilar predominant pulmonary opacities concerning for  aspiration or infection. Superimposed atelectasis and probable mild  pulmonary edema is suspected.  2. Stable pleural effusions.    I have personally reviewed the examination and initial interpretation  and I agree with the findings.    LOS BARAJAS DO         SYSTEM ID:  [id number]   XR Abdomen Port 1 View    Impression    IMPRESSION:   Enteric tube tip and side port projecting over the stomach..    I have personally reviewed the examination and initial interpretation  and I agree with the findings.    ROYA EDMOND MD         SYSTEM ID:  [id number]   XR Chest Port 1 View    Impression    IMPRESSION:   Trace bilateral pleural effusions with overlying bibasilar opacities,  improved from prior.    I have personally reviewed the examination and initial  interpretation  and I agree with the findings.    LOS BARAJAS DO         SYSTEM ID:  [id number]   XR Abdomen Port 1 View    Impression    Impression: Enteric tube tip and sidehole in the stomach.    LOS BRAAJAS DO         SYSTEM ID:  [id number]   XR Abdomen Port 1 View    Impression    IMPRESSION:   Enteric tube tip and side-port overlying the stomach.    I have personally reviewed the examination and initial interpretation  and I agree with the findings.    LOS BARAJAS DO         SYSTEM ID:  [id number]   CT Head w/o Contrast    Impression    Impression:    1. Increased size of left cerebral convexity subdural hematoma without  increase in density and with decreased size of left frontal seen  subdural hemorrhage suggests redistribution of blood products.  Attention is recommended on follow-up imaging.  2. stable size of right cerebral convexity subdural hematoma.  3. Stable 6 mm of rightward midline shift.         BONI LINDQUIST MD         SYSTEM ID:  [id number]   XR Chest Port 1 View    Impression    IMPRESSION: Continued right lower lung atelectasis or mild edema. No  significant changes otherwise.    JOVANA CORRIGAN MD         SYSTEM ID:  [id number]   XR Chest Port 1 View    Impression    IMPRESSION:     1. Left upper extremity PICC line terminates in the high SVC. No  pneumothorax.  2. Continued right lower lung atelectasis or mild edema. No  significant changes otherwise.    I have personally reviewed the examination and initial interpretation  and I agree with the findings.    JOVANA CORRIGAN MD         SYSTEM ID:  [id number]   CT Head w/o Contrast    Impression    Impression:    1. Stable size and the distribution of the subdural hemorrhage along  the right frontal and left frontoparietal convexities, falx and  tentorium.  2. Stable postsurgical changes of left frontoparietal craniotomy and  subdural hematoma evacuation.     I have personally reviewed the examination and initial  interpretation  and I agree with the findings.    BONI LINDQUIST MD         SYSTEM ID:  [id number]   CT Chest Pulmonary Embolism w Contrast    Impression    IMPRESSION:   1. Exam is negative   for acute pulmonary embolism.    Evidence for right heart strain or increased right heart pressures?   is not present.     2. Mucous plugging and linear atelectasis in the right lower lobe.  Aspiration or other infectious consolidation cannot be ruled out.    3. Ectatic thoracic aorta, approximately 4.2 cm.    In the event of a positive result for acute pulmonary embolism  resulting in right heart strain, consider calling the   [facility] patient placement ([phone]) for PERT (Pulmonary Embolism  Response Team) Activation?    PERT -- Pulmonary Embolism Response Team (Multidisciplinary team  including cardiology, interventional radiology, critical care,  hematology)    I have personally reviewed the examination and initial interpretation  and I agree with the findings.    JOVANA CORRIGAN MD         SYSTEM ID:  [id number]   XR Chest Port 1 View    Impression    IMPRESSION: Endotracheal tube tip in the mid to distal thoracic  trachea. Bibasilar atelectasis. Left upper portion of the PICC line  tip possibly in the upper portion of the azygos vein. Atherosclerosis.    JOVANA CORRIGAN MD         SYSTEM ID:  [id number]   XR Abdomen Port 1 View    Impression    IMPRESSION:   1. Enteric tube with tip in the stomach and side-port at or just  distal to the gastroesophageal junction.  2. Nonobstructive bowel gas pattern.    I have personally reviewed the examination and initial interpretation  and I agree with the findings.    ROHAN MAGALLON MD         SYSTEM ID:  [id number]   XR Chest Port 1 View    Impression    IMPRESSION:  1. Increased perihilar and bibasilar pulmonary opacities which may  represent atelectasis, edema and/or infection.  2. Stable support devices.     I have personally reviewed the examination and initial  interpretation  and I agree with the findings.    JOVANA CORRIGAN MD         SYSTEM ID:  [id number]   CTA Abdomen Pelvis with Contrast    Impression    IMPRESSION:   1. No evidence of gastrointestinal bleed as questioned.  2. Interval new subacute appearing right sacral insufficiency fracture  and subacute appearing left pubic ramus fracture since 5/12/2022.  3. Age-indeterminate compression deformity of T10 with approximately  30% height loss. Multiple subacute appearing anterior rib fractures,  and unchanged appearance of L1, L3, and L5 compression deformities.  4. Small sliding-type hiatal hernia with hemostasis clip noted in the  gastric fundus.  5. Aortobiiliac atherosclerotic atherosclerosis with multiple foci of  arterial stenosis including the renal arterial origins, common iliac  vessels, and near total occlusions of the proximal internal iliac  arteries and common femoral arteries proximally.  6. Unchanged appearance of left inguinal hernia containing a loop of  sigmoid colon, no signs of obstruction.  7. 5 mm hypodensity in the pancreatic head may represent a sidebranch  IPMN although MRI is recommended to further characterize on an  outpatient basis, pancreatic cystic follow-up is recommended below.  Per [facility] criteria.  8. Indeterminate hepatic hypodensities are too small to further  characterize but could be evaluated further at time of MRI for above  pancreatic lesion.  9. 1.4 cm right ovarian cyst for which annual follow-up is recommended  in a postmenopausal female.  10. Trace effusions and associated atelectasis.    [facility] recommendations for asymptomatic pancreatic  cysts, modified from international consensus guidelines*   Size of largest cyst:   Less than 1 cm: Follow-up imaging in 6 months to 1 year, then lengthen  interval to 2-3 years if no change.  1-2 cm: Follow-up imaging at 6 months, then yearly for 2 years, then  lengthen interval if no change.   The  optimal initial study or first follow-up exam is MRI performed  with intravenous gadolinium contrast to allow full cyst  characterization. Once characterized, future follow-up MRIs can be  performed without contrast. If MRI is contraindicated, CT with  contrast is recommended.   GI consultation for possible endoscopic ultrasound is recommended for  cysts with the following features: Size > 2 cm, >20% growth on  followup, wall thickening or enhancement, mural nodule, duct > 5 mm,  or abrupt change in duct caliber with distal atrophy. GI or surgical  consultation is also recommended for symptomatic cysts.   *Reference: International Consensus Guidelines for Management of IPMN  and MCN of the pancreas. Pancreatology: 12:(2012); 183-197.    I have personally reviewed the examination and initial interpretation  and I agree with the findings.    ILIANA JEFFERSON MD         SYSTEM ID:  [id number]   XR Abdomen Port 1 View    Impression    IMPRESSION:   1. Feeding tube with tip in the proximal jejunum.   2. Enteric tube with tip and side-port overlying the stomach.  3. Non obstructive bowel gas pattern.  4. Small bilateral pleural effusions.    I have personally reviewed the examination and initial interpretation  and I agree with the findings.    ROYA EDMOND MD         SYSTEM ID:  [id number]   Echo Complete   Result Value Ref Range    LVEF  60-65%    Echocardiogram Limited   Result Value Ref Range    LVEF  60-65%        Reviewed recent labs  No recent labs

## 2022-08-08 NOTE — PROGRESS NOTES
[facility]    Medicine Progress Note - Hospitalist Service, GOLD TEAM 6    HD #[id number]       ASSESSMENT & PLAN:   Louise Benítez is a 76 year old female with history of seronegative RA, MDS, PAD, and osteoporosis who was admitted 6/22/22 for traumatic SDH with midline shift s/p emergent craniotomy and evacuation. Hospital course c/b non-convulsive status epilepticus, GIB s/p EGD and clipping of gastric ulcer (7/5), acute blood loss anemia, aspiration s/p PEG placement (7/15), and further decompensation on 8/3/22 with acute hypoxic respiratory failure and sepsis of unknown etiology. Transitioned to comfort measures only on 8/4/22 per goals of care discussion with family.    For Today:  Patient more awake and alert today. Still relatively non-verbal but following commands. Appears comfortable. Prognosis remains guarded but if remains stable may be candidate for outpatient hospice. Discussed with . SW in communication with family re: placement.      # Sepsis of unknown etiology  # Acute hypoxic respiratory failure  # Acute traumatic SDH with midline shift s/p emergent craniotomy (6/22/22)  # Acute upper GI bleed d/t gastric ulcer  # Acute blood loss anemia  # Aspiration s/p PEG placement (7/15)  # Transaminitis  # Non-severe malnutrition  # MDS  # HTN  # Seronegative RA  # PAD  # Rhinovirus    See medicine note from 8/4 for further details of hospitalization.        Diet: Regular Diet Adult    DVT Prophylaxis: comfort cares  Vasquez Catheter: Not present  Central Lines: None  Cardiac Monitoring: None  Code Status: No CPR- Do NOT Intubate      Disposition Plan      Expected Discharge Date: 08/08/2022    Discharge Delays: Comfort Care/Hospice  Placement - Homecare    Discharge Comments: On comfort cares. Reximarla does not want placement for end of life cares.        The patient's care was discussed with the Attending Physician, Dr. Frederick.    Tripp Bravo,  TRUNG  Hospitalist Service, GOLD TEAM 6  [facility]  Securely message with the [employer] Web Console (learn more here)  Text page via [facility] Paging/Directory   Please see signed in provider for up to date coverage information      Clinically Significant Risk Factors Present on Admission                      ______________________________________________________________________    Interval History   Patient more alert today. Sitting up in chair. Follows commands and shakes head yes/no to questions. Remains non-verbal. Denies any pain, fevers, chills, dyspnea, cough, nausea, vomiting, abdominal pain, or urinary complaints.     When asked if she felt like she was having a hard time getting words out patient did not offer a response.     Data reviewed today: I reviewed all medications, new labs and imaging results over the last 24 hours.     Physical Exam   Vital Signs:                    Weight: 146 lbs 2.64 oz  General Appearance:  Awake. Alert. Non-verbal. Follows commands. Appears comfortable.  Respiratory:  Normal effort. CTAB.   Cardiovascular:  RRR. S1,S2. No murmurs.   GI:  Soft, no obvious tenderness. PEG present.   Extremity:  No pitting edema.   Neuro:  Equal strength in upper extremities. No facial droop. Tongue midline.      Data   No labs today

## 2022-08-09 NOTE — PLAN OF CARE
Status: Comfort cares.  Neuros: Pt alert. Minimally verbal but waves and uses hands to communicate. Can answer simple yes or no questions. Moves extremities sponanteously  Resp/trach: RR WNL  Diet: Regular for comfort. Pt likes coffee, soft foods. mouth swabs.  PEG utilized for keppra and PRN medications.  Bowel status: LBM 8/8, incontinent  : Incontinent  Skin: Old L crani incision. Abrasions/bruising throughout. PEG site reddened.  Pain: Denies. No s/s of agitation, discomfort. Given ice packs and PRN tylenol for comfort  Activity: A2, repo q2hrs.  Plan: [facility] and SW is following patient. Multiple PRNs available to treat symptoms, see MAR.

## 2022-08-09 NOTE — CONSULTS
[facility] [place]  [facility] [phone]

## 2022-08-09 NOTE — PROGRESS NOTES
"[facility]    Medicine Progress Note - Hospitalist Service, GOLD TEAM 6    HD #[id number]       ASSESSMENT & PLAN:   Louise Benítez is a 76 year old female with history of seronegative RA, MDS, PAD, and osteoporosis who was admitted 6/22/22 for traumatic SDH with midline shift s/p emergent craniotomy and evacuation. Hospital course c/b non-convulsive status epilepticus, GIB s/p EGD and clipping of gastric ulcer (7/5), acute blood loss anemia, aspiration s/p PEG placement (7/15), and further decompensation on 8/3/22 with acute hypoxic respiratory failure and sepsis of unknown etiology. Transitioned to comfort measures only on 8/4/22 per goals of care discussion with family.    For Today:  Patient remains awake and alert today. She appears to have expressive aphasia, ? Effect of SDH. Able to respond with gestures (head nods, OK sign) and even wrote on paper (single word, \"where\"). May be able to contribute to goals of care discussion if able to communicate more effectively. Discussed with her plans for discharge. She appears quite comfortable and stable to discharge to outpatient hospice. Still awaiting placement. Patient seemed somewhat distressed with discussing hospice. Discussed with patient that hospice can be canceled in future if not felt to be appropriate level of care.      # Sepsis of unknown etiology  # Acute hypoxic respiratory failure  # Acute traumatic SDH with midline shift s/p emergent craniotomy (6/22/22)  # Acute upper GI bleed d/t gastric ulcer  # Acute blood loss anemia  # Aspiration s/p PEG placement (7/15)  # Transaminitis  # Non-severe malnutrition  # MDS  # HTN  # Seronegative RA  # PAD  # Rhinovirus    See medicine note from 8/4 for further details of hospitalization.        Diet: Regular Diet Adult    DVT Prophylaxis: comfort cares  Vasquez Catheter: Not present  Central Lines: None  Cardiac Monitoring: None  Code Status: No CPR- Do NOT Intubate  " "    Disposition Plan      Expected Discharge Date: 08/09/2022    Discharge Delays: Comfort Care/Hospice  Placement - Homecare    Discharge Comments: Pending placement        The patient's care was discussed with the Attending Physician, Dr. Funk.    Tripp Bravo PA-C  Hospitalist Service, GOLD TEAM [address] [place] [address]  Securely message with the Vocera Web Console (learn more here)  Text page via [facility] Paging/Directory   Please see signed in provider for up to date coverage information      Clinically Significant Risk Factors Present on Admission                      ______________________________________________________________________    Interval History   Awake. Alert. Sitting in chair. Feeding self breakfast. Able to nod yes/no. Also able to give \"OK\" sign to questions. Patient denies any pain, fevers, chills, chest pain, dyspnea, nausea, vomiting, abdominal pain. She indicates that she is having difficulty getting out words. No focal weakness or paresthesias. We discussed her hospital course. We also discussed current plans to discharge to hospice. She became tearful at hearing this. I discussed that this can be changed at a later date if necessary. She gave me the \"OK\" symbol and expressed understanding. I did give her a piece of a paper and attempted to have her write to communicate. She only wrote \"where\" and then stopped.     When asked if she felt like she was having a hard time getting words out patient did not offer a response.     Data reviewed today: I reviewed all medications, new labs and imaging results over the last 24 hours.     Physical Exam   Vital Signs:                    Weight: 146 lbs 2.64 oz  General Appearance:  Awake. Alert. Non-verbal. Follows commands. Appears comfortable.   HEENT:  Left craniotomy scars.   Respiratory:  Normal effort. CTAB.   Cardiovascular:  RRR. S1,S2. No murmurs.   GI:  Soft, no obvious tenderness. PEG present. "   Extremity:  No pitting edema.   Neuro:  No facial droop.      Data   No labs today

## 2022-08-09 NOTE — PROGRESS NOTES
[facility]  Palliative Care Daily Progress Note       Recommendations & Counseling       Spoke with patient's daughter Allyson about goals of care--see discussion below. In addition to facility with hospice, she is also now wondering whether home with hospice might also be an option with some hired home health aides for additional support. Left message with unit SW.    Thank you for the opportunity to participate in the care of this patient and family. Our team: will continue to follow.     During regular M-F work hours (2512-4912) -- if you are not sure who specifically to contact -- please contact us in [facility] Smart Web.     After regular work hours and on weekends/holidays, you can call our answering service at [phone].     Clarice Miller PA-C  [facility]  Contact information available via [facility] Paging/Directory    Attestation:  Total time on the floor involved in the patient's care: 35 minutes  Total time spent in counseling/care coordination: >50%      Assessments          Louise Benítez is a 76 year old female with PMHx myelodysplastic syndrome, seronegative RA, chronic microcytic anemia, PAD, and recent L SDH (5/12/2022) who was admitted from her TCU on 6/22/2022 after an unwitnessed fall. Was subsequently found to have an acute on chronic L SDH, a new acute R SDH, and increased midline shift w/ trace L uncal herniation now s/p L craniotomy w/ hematoma evacuation. Course complicated by GIB requiring reintubation. Now extubated. Transitioned to comfort care on 8/5.     Today, the patient was seen for:  Acute on chronic L SDH s/p L craniotomy w/hematoma evacuation  Acute R SDH  Dysphagia  Gastric and colonic ulcers, GIB  Sx management    Prognosis, Goals, or Advance Care Planning was addressed today with: No.  Mood, coping, and/or meaning in the context of serious illness were addressed today: No.             "Interval History:     Chart review/discussion with unit or clinical team members:   Per RN, patient is incontinent of bladder, bowel. She is unable to make her needs known, but is able at times to respond to yes/no questions. Remains mostly non-verbal and communicating with head nods/shakes. Requires assistance with feeding and ate about 25% of last meal.     Per patient or family/caregivers today:  Visited Louise. She was seated in chair, awake, alert. Asked if she was in pain--at first she seemed to indicated with head nod yes. Asked her to point to where she is having pain, but she just stared blankly at writer. Asked if she was hungry--no response. Asked her if she was feeling short of breath--she shook her head no. She also shook head no when asked about cough. She then pointed to the pillow under her knee, but did not respond when asked if she wanted it moved or removed. Adjusted pillow for her.     Paged by unit SW that daughter Allyson was hoping to speak with writer. Called daughter Allyson. Allyson says she wants to make sure \"we are headed down the right path.\" She recounted how her mother was having a fever last week and was clinically deteriorating. Family made the decision to stop tube feedings and transition to comfort care, and now her mom is doing much better. She is awake, alert, sitting in a chair, enjoying some coffee, eating with assistance, etc. Discussed that while her mother has clinically improved from last week, overall it is unlikely she is going to regain the functional status or abilities that would allow her to have the quality of life she would want to herself, as expressed to her family in the past. Unclear how much progress she would be able to make with therapy, and that even therapy's opinion was that she would likely need 24 hour supervision or long term care. Discussed that she would likely remain tube feed dependent, as unclear even if she could pass a bedside swallow eval that she " would eat enough to maintain nutrition. She is currently requiring assistance for feeding. Discussed that likely she would remain incontinent and dependent on almost full cares. Discussed the likelihood that she would have future complications such as infections or other acute issues that would require re-hospitalization. Allyson acknowledged that she knows her mother would not want that for herself. She said her father is questioning whether her mother should go to TCU rather than hospice, but she acknowledges that she can't see her mother doing things like putting on her own shoes again, etc. She wanted reassurance from the medical team that the decision to pursue hospice was reasonable. Discussed that it is understandable given the ups and downs in her mother's condition to have these questions, but that given what her expressed wishes for her own quality of life were, that this is a reasonable choice as even with full restorative focus it is unlikely she can achieve that quality of life and likely she will face further setbacks which could lead to further decline.             Review of Systems:   Did not respond to questions         Medications:     I have reviewed this patient's medication profile and medications during this hospitalization.           Physical Exam:                      Gen: Awake, seated in chair, in NAD  Resp: No increased work of breathing on room air.  Abdomen: Soft, ND/NT  Msk: no gross deformity  Skin:  Warm, dry, no jaundice  Ext: warm, well perfused. No edema.             Data Reviewed:     Reviewed recent pertinent imaging, comments:   Results for orders placed or performed during the hospital encounter of 06/22/22   Head CT w/o contrast   Result Value Ref Range    Radiologist flags (Urgent)      Acute subdural hematomas with mass effect as described    Impression    Impression:  1. Active hemorrhage superimposed upon Acute and chronic left-sided  mixed density subdural measuring up to  2.3 cm in maximum thickness  with 12 mm of rightward midline shift and trace left-sided uncal  herniation.  2. Acute subdural along the right frontal convexity measuring up to 7  mm in maximum thickness.    [Urgent Result: Acute subdural hematomas with mass effect as described  above.] Concern for active hemorrhage.    Finding was identified on 6/22/2022 6:04 PM.     Dr. Sanchez was contacted by Dr. Leung and Dr. Lindquist at  6/22/2022 6:09 PM and was again contacted at 6:30 PM and verbalized  understanding of the urgent finding.      I have personally reviewed the examination and initial interpretation  and I agree with the findings.    BONI LINDQUIST MD         SYSTEM ID:  [id number]   Cervical spine CT w/o contrast    Impression    Impression:   No acute fracture or traumatic subluxation.    I have personally reviewed the examination and initial interpretation  and I agree with the findings.    BONI LINDQUIST MD         SYSTEM ID:  [id number]   XR Chest Port 1 View    Impression    IMPRESSION:  ET tube is 2.5 cm above the leo. No acute consolidative airspace  disease. Left lower lobe atelectasis suspected.    I have personally reviewed the examination and initial interpretation  and I agree with the findings.    ROYA EDMOND MD         SYSTEM ID:  [id number]   CT Head w/o Contrast    Impression    Impression:  1. Postsurgical changes of left frontoparietal craniotomy and subdural  hematoma evacuation.  2. Decreased size of the heterogeneous fluid collection overlying the  left cerebral convexity.  3. Decreased rightward midline shift. Decreased effacement of the left  lateral ventricle.  4. Stable appearance of the subdural hematomas overlying the right  cerebral convexity and anterior falx.    I have personally reviewed the examination and initial interpretation  and I agree with the findings.    OSVALDO CONNOLLY MD         SYSTEM ID:  [id number]   XR Chest Port 1 View    Impression    IMPRESSION:    1. ET tube is in the mid thoracic trachea.  2. Decreased perihilar pulmonary opacities.    I have personally reviewed the examination and initial interpretation  and I agree with the findings.    ROYA EDMOND MD         SYSTEM ID:  [id number]   CT Head w/o Contrast    Impression    Impression:    1. Reduced size of subdural fluid collection deep to the left  frontoparietal craniotomy.  2. Reduced mass effect and left to right midline shift now measuring 7  mm previously 13 mm when measured in a similar fashion.  3. Similar appearance of subdural hemorrhage along the anterior falx  and extending along the right anterior cranial fossa.  4. Unchanged appearance of small subdural hemorrhage along the  bilateral tentorial leaflets.     I have personally reviewed the examination and initial interpretation  and I agree with the findings.    OSVALDO CONNOLLY MD         SYSTEM ID:  [id number]   XR Abdomen Port 1 View    Impression    Impression:   1. Enteric tube is subdiaphragmatic with tip and sidehole projecting  over the expected location of the stomach  2. Increased bibasilar pulmonary opacities.    I have personally reviewed the examination and initial interpretation  and I agree with the findings.    DARSHAN DIXON MD         SYSTEM ID:  [id number]   CT Head w/o Contrast    Impression    Impression:    1. Slightly reduced size of subdural fluid collection deep to the left  frontotemporal craniotomy site.  2. Unchanged mass effect on the left cerebrum and unchanged left to  right midline shift.  3. Unchanged additional subdural fluid collection along the right  anterior cranial fossa and extension along the anterior falx.  4. Unchanged appearance of small subdural hemorrhages along the  bilateral tentorial leaflets..     I have personally reviewed the examination and initial interpretation  and I agree with the findings.    OSVALDO CONNOLLY MD         SYSTEM ID:  [id number]   XR Chest Port 1 View    Impression     Impression:   ET tube no longer visualized. Increased interstitial pulmonary edema  and bibasilar atelectasis and/or pneumonia.    I have personally reviewed the examination and initial interpretation  and I agree with the findings.    ANGELIQUE DENTON MD         SYSTEM ID:  [id number]   XR Chest Port 1 View    Impression    Impression:   1. Slightly decreased right lower lobe opacities.  2. Slight increase in dense retrocardiac consolidation which may  represent atelectasis versus infection/aspiration.    I have personally reviewed the examination and initial interpretation  and I agree with the findings.    GISELE BURGESS MD         SYSTEM ID:  [id number]   CT Head w/o Contrast    Impression    Impression:  1. Unchanged size of left cerebral convexity subdural fluid collection  status post subdural drain removal.  2. Unchanged size of right frontal subdural hemorrhage with extension  along the anterior falx.  3. Unchanged size of small subdural hemorrhage extending along the  tentorial leaflets.  4. Unchanged 6 mm of left-to-right midline shift.    I have personally reviewed the examination and initial interpretation  and I agree with the findings.    SAVANNA COHEN MD         SYSTEM ID:  [id number]   XR Chest Port 1 View    Impression    Impression: Increased effusions, atelectasis and interstitial edema.    I have personally reviewed the examination and initial interpretation  and I agree with the findings.    JOVANA CORRIGAN MD         SYSTEM ID:  [id number]   XR Chest Port 1 View    Impression    IMPRESSION:   1. Similar bibasilar predominant pulmonary opacities concerning for  aspiration or infection. Superimposed atelectasis and probable mild  pulmonary edema is suspected.  2. Stable pleural effusions.    I have personally reviewed the examination and initial interpretation  and I agree with the findings.    LOS BARAJAS DO         SYSTEM ID:  [id number]   XR Abdomen Port 1 View    Impression    IMPRESSION:    Enteric tube tip and side port projecting over the stomach..    I have personally reviewed the examination and initial interpretation  and I agree with the findings.    ROYA EDMOND MD         SYSTEM ID:  [id number]   XR Chest Port 1 View    Impression    IMPRESSION:   Trace bilateral pleural effusions with overlying bibasilar opacities,  improved from prior.    I have personally reviewed the examination and initial interpretation  and I agree with the findings.    LOS BARAJAS DO         SYSTEM ID:  [id number]   XR Abdomen Port 1 View    Impression    Impression: Enteric tube tip and sidehole in the stomach.    LOS BARAJAS DO         SYSTEM ID:  [id number]   XR Abdomen Port 1 View    Impression    IMPRESSION:   Enteric tube tip and side-port overlying the stomach.    I have personally reviewed the examination and initial interpretation  and I agree with the findings.    LOS BARAJAS DO         SYSTEM ID:  [id number]   CT Head w/o Contrast    Impression    Impression:    1. Increased size of left cerebral convexity subdural hematoma without  increase in density and with decreased size of left frontal seen  subdural hemorrhage suggests redistribution of blood products.  Attention is recommended on follow-up imaging.  2. stable size of right cerebral convexity subdural hematoma.  3. Stable 6 mm of rightward midline shift.         BONI LINDQUIST MD         SYSTEM ID:  [id number]   XR Chest Port 1 View    Impression    IMPRESSION: Continued right lower lung atelectasis or mild edema. No  significant changes otherwise.    JOVANA CORRIGAN MD         SYSTEM ID:  [id number]   XR Chest Port 1 View    Impression    IMPRESSION:     1. Left upper extremity PICC line terminates in the high SVC. No  pneumothorax.  2. Continued right lower lung atelectasis or mild edema. No  significant changes otherwise.    I have personally reviewed the examination and initial interpretation  and I agree with the findings.    JOVANA  ANJUM CORRIGAN MD         SYSTEM ID:  [id number]   CT Head w/o Contrast    Impression    Impression:    1. Stable size and the distribution of the subdural hemorrhage along  the right frontal and left frontoparietal convexities, falx and  tentorium.  2. Stable postsurgical changes of left frontoparietal craniotomy and  subdural hematoma evacuation.     I have personally reviewed the examination and initial interpretation  and I agree with the findings.    BONI LINDQUIST MD         SYSTEM ID:  [id number]   CT Chest Pulmonary Embolism w Contrast    Impression    IMPRESSION:   1. Exam is negative   for acute pulmonary embolism.    Evidence for right heart strain or increased right heart pressures?   is not present.     2. Mucous plugging and linear atelectasis in the right lower lobe.  Aspiration or other infectious consolidation cannot be ruled out.    3. Ectatic thoracic aorta, approximately 4.2 cm.    In the event of a positive result for acute pulmonary embolism  resulting in right heart strain, consider calling the   [facility] patient placement ([phone]) for PERT (Pulmonary Embolism  Response Team) Activation?    PERT -- Pulmonary Embolism Response Team (Multidisciplinary team  including cardiology, interventional radiology, critical care,  hematology)    I have personally reviewed the examination and initial interpretation  and I agree with the findings.    JOVANA CORRIGAN MD         SYSTEM ID:  [id number]   XR Chest Port 1 View    Impression    IMPRESSION: Endotracheal tube tip in the mid to distal thoracic  trachea. Bibasilar atelectasis. Left upper portion of the PICC line  tip possibly in the upper portion of the azygos vein. Atherosclerosis.    JOVANA CORRIGAN MD         SYSTEM ID:  [id number]   XR Abdomen Port 1 View    Impression    IMPRESSION:   1. Enteric tube with tip in the stomach and side-port at or just  distal to the gastroesophageal junction.  2. Nonobstructive bowel gas pattern.    I have  personally reviewed the examination and initial interpretation  and I agree with the findings.    ROHAN MAGALLON MD         SYSTEM ID:  [id number]   XR Chest Port 1 View    Impression    IMPRESSION:  1. Increased perihilar and bibasilar pulmonary opacities which may  represent atelectasis, edema and/or infection.  2. Stable support devices.     I have personally reviewed the examination and initial interpretation  and I agree with the findings.    JOVANA CORRIGAN MD         SYSTEM ID:  [id number]   CTA Abdomen Pelvis with Contrast    Impression    IMPRESSION:   1. No evidence of gastrointestinal bleed as questioned.  2. Interval new subacute appearing right sacral insufficiency fracture  and subacute appearing left pubic ramus fracture since 5/12/2022.  3. Age-indeterminate compression deformity of T10 with approximately  30% height loss. Multiple subacute appearing anterior rib fractures,  and unchanged appearance of L1, L3, and L5 compression deformities.  4. Small sliding-type hiatal hernia with hemostasis clip noted in the  gastric fundus.  5. Aortobiiliac atherosclerotic atherosclerosis with multiple foci of  arterial stenosis including the renal arterial origins, common iliac  vessels, and near total occlusions of the proximal internal iliac  arteries and common femoral arteries proximally.  6. Unchanged appearance of left inguinal hernia containing a loop of  sigmoid colon, no signs of obstruction.  7. 5 mm hypodensity in the pancreatic head may represent a sidebranch  IPMN although MRI is recommended to further characterize on an  outpatient basis, pancreatic cystic follow-up is recommended below.  Per [facility] criteria.  8. Indeterminate hepatic hypodensities are too small to further  characterize but could be evaluated further at time of MRI for above  pancreatic lesion.  9. 1.4 cm right ovarian cyst for which annual follow-up is recommended  in a postmenopausal female.  10. Trace effusions  and associated atelectasis.    [facility] recommendations for asymptomatic pancreatic  cysts, modified from international consensus guidelines*   Size of largest cyst:   Less than 1 cm: Follow-up imaging in 6 months to 1 year, then lengthen  interval to 2-3 years if no change.  1-2 cm: Follow-up imaging at 6 months, then yearly for 2 years, then  lengthen interval if no change.   The optimal initial study or first follow-up exam is MRI performed  with intravenous gadolinium contrast to allow full cyst  characterization. Once characterized, future follow-up MRIs can be  performed without contrast. If MRI is contraindicated, CT with  contrast is recommended.   GI consultation for possible endoscopic ultrasound is recommended for  cysts with the following features: Size > 2 cm, >20% growth on  followup, wall thickening or enhancement, mural nodule, duct > 5 mm,  or abrupt change in duct caliber with distal atrophy. GI or surgical  consultation is also recommended for symptomatic cysts.   *Reference: International Consensus Guidelines for Management of IPMN  and MCN of the pancreas. Pancreatology: 12:(2012); 183-197.    I have personally reviewed the examination and initial interpretation  and I agree with the findings.    ILIANA JEFFERSON MD         SYSTEM ID:  [id number]   XR Abdomen Port 1 View    Impression    IMPRESSION:   1. Feeding tube with tip in the proximal jejunum.   2. Enteric tube with tip and side-port overlying the stomach.  3. Non obstructive bowel gas pattern.  4. Small bilateral pleural effusions.    I have personally reviewed the examination and initial interpretation  and I agree with the findings.    ROYA EDMOND MD         SYSTEM ID:  [id number]   Echo Complete   Result Value Ref Range    LVEF  60-65%    Echocardiogram Limited   Result Value Ref Range    LVEF  60-65%        Reviewed recent labs  No recent labs

## 2022-08-09 NOTE — PROGRESS NOTES
"Care Management Follow Up     Length of Stay (days): 48     Expected Discharge Date: 08/10/2022     Concerns to be Addressed:   Disposition planning   Patient plan of care discussed at interdisciplinary rounds: Yes     Anticipated Discharge Disposition:  Hospice placement in a community SNF with contracted hospice services     Anticipated Discharge Services:  Hospice placement in a community SNF with contracted hospice services  Anticipated Discharge DME:   Not applicable at this time     Patient/family educated on Medicare website which has current facility and service quality ratings: SW has provided pt's family with a list of residential hospices  Education Provided on the Discharge Plan:   yes  Patient/Family in Agreement with the Plan:  yes     Referrals Placed by CM/SW:  None at this time  Private pay costs discussed: yes, education was provided in regards to private pay residential hospices and private pay SNF placement     Additional Information:  SW is following pt for discharge planning.  Pt is comfort care only and it is anticipated that pt is medically ready for discharge.  [facility] has confirmed readiness for discharge.  RACHEL spoke with pt's daughter (Allyson) who indicates that they have decided that they would like to pursue hospice services (on pt's behalf) in a community SNF with contracted hospice services.  Allyson states that she would like pt in a [facility] managed SNF or at one of the [facility].  Allyson identifies the following facility preferences (RACHEL phoned Admissions at each of these facility's, referred pt and faxed assessment materials via [employer]):  - [facility]  [facility]  - [facility]  [facility]  - [facility]  - [facility].    Allyson questioned whether or not they are making the right decision in terms of pursuing hospice rather than TCU.  Allyson states " that she second guesses the decision when ever pt seems to be having a better day.  SW [facility]oned [facility] Clarice Miller, who confirms that she will phone pt's daughter (Allyson) to further address this question/concern.    RACHEL will continue to follow for discharge planning.    CÉSAR Bailey  Social Work, 6A  Phone:  [phone]  Pager:  [phone]  8/9/2022

## 2022-08-09 NOTE — PLAN OF CARE
Status: Comfort cares.  Neuros: Pt looks more alert this shift. Minimally verbal but waves and uses hands to communicate. Can answer simple yes or no questions. Moves extremities sponanteously  Resp/trach: RR WNL  Diet: Regular for comfort. Pt likes coffee, soft foods. mouth swabs. PEG utilized for keppra and PRNmedications.  Bowel status: LBM 8/5, incontinent  : Incontinent  Skin: Old L crani incision. Abrasions/bruising throughout. PEG site reddened.  Pain: Denies. No s/s of agitation, discomfort. Given PRN tylenol for comfort  Activity: A2, repo q2hrs. Up to chair for 2 hours.   Plan: [facility] and SW is following patient. Multiple PRNs available to treat symptoms, see MAR.

## 2022-08-09 NOTE — PLAN OF CARE
On comfort care.     Pt alert and oriented to self. Able to answer yes/no for direct questions. Denies pain & no indication of pain. Breath unlabored, no sob noted, on RA. Repositioned every 2 hrs. A- 1 with ADLs and A-2 with transfers.  Incontinet urine. No BM this shift. Old crani left intact. J tube in place for meds. PRN tylenol given     Goal Outcome Evaluation:    Plan of Care Reviewed With: patient     Overall Patient Progress: no change

## 2022-08-10 NOTE — PROGRESS NOTES
[facility]    Medicine Progress Note - Hospitalist Service, GOLD TEAM 6    HD #[id number]       ASSESSMENT & PLAN:   Louise Benítez is a 76 year old female with history of seronegative RA, MDS, PAD, and osteoporosis who was admitted 6/22/22 for traumatic SDH with midline shift s/p emergent craniotomy and evacuation. Hospital course c/b non-convulsive status epilepticus, GIB s/p EGD and clipping of gastric ulcer (7/5), acute blood loss anemia, aspiration s/p PEG placement (7/15), and further decompensation on 8/3/22 with acute hypoxic respiratory failure and sepsis of unknown etiology. Transitioned to comfort measures only on 8/4/22 per goals of care discussion with family.    For Today:  Unresponsive today. Appears comfortable. Awaiting placement. Spoke with  to update on waxing/waning mentation. Questions answered to the best of my ability. Support offered.     # Sepsis of unknown etiology  # Acute hypoxic respiratory failure  # Acute traumatic SDH with midline shift s/p emergent craniotomy (6/22/22)  # Acute upper GI bleed d/t gastric ulcer  # Acute blood loss anemia  # Aspiration s/p PEG placement (7/15)  # Transaminitis  # Non-severe malnutrition  # MDS  # HTN  # Seronegative RA  # PAD  # Rhinovirus    See medicine note from 8/4 for further details of hospitalization.        Diet: Regular Diet Adult    DVT Prophylaxis: comfort cares  Vasquez Catheter: Not present  Central Lines: None  Cardiac Monitoring: None  Code Status: No CPR- Do NOT Intubate      Disposition Plan         The patient's care was discussed with the Attending Physician, Dr. Funk.    Tripp Bravo PA-C  Hospitalist Service, GOLD TEAM 6  [facility]  Securely message with the Vocera Web Console (learn more here)  Text page via [employer] Paging/Directory   Please see signed in provider for up to date coverage information      Clinically Significant Risk  Factors Present on Admission                      ______________________________________________________________________    Interval History   NAEO. Went to see patient with palliative care team. Patient is now unresponsive to loud voice and gentle physical stimulation. More aggressive stimulation or pain response were not attempted. Overall patient appears comfortable.     Data reviewed today: I reviewed all medications, new labs and imaging results over the last 24 hours.     Physical Exam   Vital Signs:                    Weight: 146 lbs 2.64 oz  General Appearance:  Unresponsive. Remainder of exam deferred.         Data   No labs today

## 2022-08-10 NOTE — PROGRESS NOTES
[facility]  Palliative Care Daily Progress Note       Recommendations & Counseling       Visit with Louise today limited by her ability to communicate. She was not able to communicate via writing today, but did respond yes/no with head nods to some questions. Assessing capacity given these limitations is difficult, but given her interactions with primary team yesterday made attempt to get her input on whether she wants hospice vs therapy at another rehab facility. She did nod yes in response to the latter option and did smile when I referenced the fact that she signed herself out of the prior rehab facility. Discussed with daughter Allyson. She also visited patient yesterday evening and stated at that time her mother was not interactive, not able to even hold her head up and that attempts to feed her were unsuccessful. She has very reasonable concerns about her mother's ability to provide reliable responses or whether she does have full understanding of her situation. We also discussed concerns given that her alertness and ability to engage/interact is so fluctuant whether she would be able to consistently participate and make progress with therapy. Discussed that hospice agencies can provide therapy, but not at the same level as a TCU. Discussed possibility of another care conference--Allyson wants some time to consider this vs family visiting on their own with Louise and attempting to ascertain her wishes. Discussed with both SW and primary team.    Thank you for the opportunity to participate in the care of this patient and family. Our team: will continue to follow.     During regular M-F work hours ([id number]) -- if you are not sure who specifically to contact -- please contact us in [employer].     After regular work hours and on weekends/holidays, you can call our answering service at [phone].     [facility]  [facility]  Contact information available via [facility] Paging/Directory    Attestation:  Total time on the floor involved in the patient's care: 35 minutes  Total time spent in counseling/care coordination: >50%      Assessments          Louise Benítez is a 76 year old female with PMHx myelodysplastic syndrome, seronegative RA, chronic microcytic anemia, PAD, and recent L SDH (5/12/2022) who was admitted from her TCU on 6/22/2022 after an unwitnessed fall. Was subsequently found to have an acute on chronic L SDH, a new acute R SDH, and increased midline shift w/ trace L uncal herniation now s/p L craniotomy w/ hematoma evacuation. Course complicated by GIB requiring reintubation. Now extubated. Transitioned to comfort care on 8/5.     Today, the patient was seen for:  Acute on chronic L SDH s/p L craniotomy w/hematoma evacuation  Acute R SDH  Dysphagia  Gastric and colonic ulcers, GIB  Sx management  Goals of care    Prognosis, Goals, or Advance Care Planning was addressed today with: Yes.  Mood, coping, and/or meaning in the context of serious illness were addressed today: No.            Interval History:       Visited Louise this morning along with palliative  and [facility] medicine team. Louise was sound asleep and was not rousable by touch or voice.     Visited Louise again later in the day along with palliative . Louise was awake, in chair. Offered her pen and paper, but she did not look at paper and was not able to write any words. Asked her if she was in pain. She nodded no. Asked her if she was nauseous. Again she nodded no. Asked if she was hungry--she did not respond to this. Asked if she knew where she was--again no response. Explained to her that she is in the hospital. Discussed her fall, subsequent injuries, surgery and hospital course. Discussed that she has been sleeping a lot until recently. Discussed that we have been giving her nutrition through a tube in her  stomach, as she wasn't awake enough to eat. Discussed that her head injury may be causing problems with her ability to speak, swallow. Discussed that my understanding was that she hated the last rehab facility she was at and that she left. She smiled at this. Discussed she had 2 choices--go to a different rehab facility and try to get stronger vs hospice--going someplace to focus on keeping her comfortable and not bringing her back to the hospital. Discussed with her that even with therapy, we are worried that she will not likely get strong enough to be independent and live at home again. Asked her if she still wanted to do therapy--she nodded yes. Louise's  Jd then arrived. He acknowledged that Louise has been up and down in terms of her mentation--sometimes awake and able to interact and sometimes not.    Discussed interaction today with Tripp Braov from primary team. Agreed that her condition has changed and that it may warrant a discussion with family if Louise is able to give any input. Called and spoke with patient's daughter Allyson. She also visited patient yesterday evening and stated at that time her mother was not interactive, not able to even hold her head up and that attempts to feed her were unsuccessful. She has very reasonable concerns about her mother's ability to provide reliable responses or whether she does have full understanding of her situation. We also discussed concerns given that her alertness and ability to engage/interact is so fluctuant whether she would be able to consistently engage and make progress with therapy. Discussed possibility of another care conference--Allyson wants some time to consider this vs family visiting on their own with Louise and attempting to ascertain her undestanding/wishes. Discussed with both SW and primary team.               Review of Systems:   Did not respond to questions         Medications:     I have reviewed this patient's medication profile and  medications during this hospitalization.           Physical Exam:                      Gen: Awake, seated in chair, in NAD  Resp: No increased work of breathing on room air.  Abdomen: Soft, ND/NT  Msk: no gross deformity  Skin:  Warm, dry, no jaundice  Ext: warm, well perfused. No edema.             Data Reviewed:     Reviewed recent pertinent imaging, comments:   Results for orders placed or performed during the hospital encounter of 06/22/22   Head CT w/o contrast   Result Value Ref Range    Radiologist flags (Urgent)      Acute subdural hematomas with mass effect as described    Impression    Impression:  1. Active hemorrhage superimposed upon Acute and chronic left-sided  mixed density subdural measuring up to 2.3 cm in maximum thickness  with 12 mm of rightward midline shift and trace left-sided uncal  herniation.  2. Acute subdural along the right frontal convexity measuring up to 7  mm in maximum thickness.    [Urgent Result: Acute subdural hematomas with mass effect as described  above.] Concern for active hemorrhage.    Finding was identified on 6/22/2022 6:04 PM.     Dr. Sanchez was contacted by Dr. Leung and Dr. Lindquist at  6/22/2022 6:09 PM and was again contacted at 6:30 PM and verbalized  understanding of the urgent finding.      I have personally reviewed the examination and initial interpretation  and I agree with the findings.    BONI LINDQUIST MD         SYSTEM ID:  [id number]   Cervical spine CT w/o contrast    Impression    Impression:   No acute fracture or traumatic subluxation.    I have personally reviewed the examination and initial interpretation  and I agree with the findings.    BONI LINDQUIST MD         SYSTEM ID:  [id number]   XR Chest Port 1 View    Impression    IMPRESSION:  ET tube is 2.5 cm above the leo. No acute consolidative airspace  disease. Left lower lobe atelectasis suspected.    I have personally reviewed the examination and initial interpretation  and I agree  with the findings.    ROYA EDMOND MD         SYSTEM ID:  [id number]   CT Head w/o Contrast    Impression    Impression:  1. Postsurgical changes of left frontoparietal craniotomy and subdural  hematoma evacuation.  2. Decreased size of the heterogeneous fluid collection overlying the  left cerebral convexity.  3. Decreased rightward midline shift. Decreased effacement of the left  lateral ventricle.  4. Stable appearance of the subdural hematomas overlying the right  cerebral convexity and anterior falx.    I have personally reviewed the examination and initial interpretation  and I agree with the findings.    OSVALDO CONNOLLY MD         SYSTEM ID:  [id number]   XR Chest Port 1 View    Impression    IMPRESSION:   1. ET tube is in the mid thoracic trachea.  2. Decreased perihilar pulmonary opacities.    I have personally reviewed the examination and initial interpretation  and I agree with the findings.    ROYA EDMOND MD         SYSTEM ID:  [id number]   CT Head w/o Contrast    Impression    Impression:    1. Reduced size of subdural fluid collection deep to the left  frontoparietal craniotomy.  2. Reduced mass effect and left to right midline shift now measuring 7  mm previously 13 mm when measured in a similar fashion.  3. Similar appearance of subdural hemorrhage along the anterior falx  and extending along the right anterior cranial fossa.  4. Unchanged appearance of small subdural hemorrhage along the  bilateral tentorial leaflets.     I have personally reviewed the examination and initial interpretation  and I agree with the findings.    OSVALDO CONNOLLY MD         SYSTEM ID:  [id number]   XR Abdomen Port 1 View    Impression    Impression:   1. Enteric tube is subdiaphragmatic with tip and sidehole projecting  over the expected location of the stomach  2. Increased bibasilar pulmonary opacities.    I have personally reviewed the examination and initial interpretation  and I agree with the findings.    DARSHAN ALEGRIA  MD ZACK         SYSTEM ID:  [id number]   CT Head w/o Contrast    Impression    Impression:    1. Slightly reduced size of subdural fluid collection deep to the left  frontotemporal craniotomy site.  2. Unchanged mass effect on the left cerebrum and unchanged left to  right midline shift.  3. Unchanged additional subdural fluid collection along the right  anterior cranial fossa and extension along the anterior falx.  4. Unchanged appearance of small subdural hemorrhages along the  bilateral tentorial leaflets..     I have personally reviewed the examination and initial interpretation  and I agree with the findings.    OSVALDO CONNOLLY MD         SYSTEM ID:  [id number]   XR Chest Port 1 View    Impression    Impression:   ET tube no longer visualized. Increased interstitial pulmonary edema  and bibasilar atelectasis and/or pneumonia.    I have personally reviewed the examination and initial interpretation  and I agree with the findings.    ANGELIQUE DENTON MD         SYSTEM ID:  [id number]   XR Chest Port 1 View    Impression    Impression:   1. Slightly decreased right lower lobe opacities.  2. Slight increase in dense retrocardiac consolidation which may  represent atelectasis versus infection/aspiration.    I have personally reviewed the examination and initial interpretation  and I agree with the findings.    GISELE BURGESS MD         SYSTEM ID:  [id number]   CT Head w/o Contrast    Impression    Impression:  1. Unchanged size of left cerebral convexity subdural fluid collection  status post subdural drain removal.  2. Unchanged size of right frontal subdural hemorrhage with extension  along the anterior falx.  3. Unchanged size of small subdural hemorrhage extending along the  tentorial leaflets.  4. Unchanged 6 mm of left-to-right midline shift.    I have personally reviewed the examination and initial interpretation  and I agree with the findings.    SAVANNA COHEN MD         SYSTEM ID:  [id number]   XR Chest Port 1  View    Impression    Impression: Increased effusions, atelectasis and interstitial edema.    I have personally reviewed the examination and initial interpretation  and I agree with the findings.    JOVANA CORRIGAN MD         SYSTEM ID:  [id number]   XR Chest Port 1 View    Impression    IMPRESSION:   1. Similar bibasilar predominant pulmonary opacities concerning for  aspiration or infection. Superimposed atelectasis and probable mild  pulmonary edema is suspected.  2. Stable pleural effusions.    I have personally reviewed the examination and initial interpretation  and I agree with the findings.    LOS BARAJAS DO         SYSTEM ID:  [id number]   XR Abdomen Port 1 View    Impression    IMPRESSION:   Enteric tube tip and side port projecting over the stomach..    I have personally reviewed the examination and initial interpretation  and I agree with the findings.    ROYA EDMOND MD         SYSTEM ID:  [id number]   XR Chest Port 1 View    Impression    IMPRESSION:   Trace bilateral pleural effusions with overlying bibasilar opacities,  improved from prior.    I have personally reviewed the examination and initial interpretation  and I agree with the findings.    LOS BARAJAS DO         SYSTEM ID:  [id number]   XR Abdomen Port 1 View    Impression    Impression: Enteric tube tip and sidehole in the stomach.    LOS BARAJAS DO         SYSTEM ID:  [id number]   XR Abdomen Port 1 View    Impression    IMPRESSION:   Enteric tube tip and side-port overlying the stomach.    I have personally reviewed the examination and initial interpretation  and I agree with the findings.    LOS BARAJAS DO         SYSTEM ID:  [id number]   CT Head w/o Contrast    Impression    Impression:    1. Increased size of left cerebral convexity subdural hematoma without  increase in density and with decreased size of left frontal seen  subdural hemorrhage suggests redistribution of blood products.  Attention is recommended on follow-up  imaging.  2. stable size of right cerebral convexity subdural hematoma.  3. Stable 6 mm of rightward midline shift.         BONI LINDQUIST MD         SYSTEM ID:  [id number]   XR Chest Port 1 View    Impression    IMPRESSION: Continued right lower lung atelectasis or mild edema. No  significant changes otherwise.    JOVANA CORRIGAN MD         SYSTEM ID:  [id number]   XR Chest Port 1 View    Impression    IMPRESSION:     1. Left upper extremity PICC line terminates in the high SVC. No  pneumothorax.  2. Continued right lower lung atelectasis or mild edema. No  significant changes otherwise.    I have personally reviewed the examination and initial interpretation  and I agree with the findings.    JOVANA CORRIGAN MD         SYSTEM ID:  [id number]   CT Head w/o Contrast    Impression    Impression:    1. Stable size and the distribution of the subdural hemorrhage along  the right frontal and left frontoparietal convexities, falx and  tentorium.  2. Stable postsurgical changes of left frontoparietal craniotomy and  subdural hematoma evacuation.     I have personally reviewed the examination and initial interpretation  and I agree with the findings.    BONI LINDQUIST MD         SYSTEM ID:  [id number]   CT Chest Pulmonary Embolism w Contrast    Impression    IMPRESSION:   1. Exam is negative   for acute pulmonary embolism.    Evidence for right heart strain or increased right heart pressures?   is not present.     2. Mucous plugging and linear atelectasis in the right lower lobe.  Aspiration or other infectious consolidation cannot be ruled out.    3. Ectatic thoracic aorta, approximately 4.2 cm.    In the event of a positive result for acute pulmonary embolism  resulting in right heart strain, consider calling the   [facility] patient placement ([phone]) for PERT (Pulmonary Embolism  Response Team) Activation?    PERT -- Pulmonary Embolism Response Team (Multidisciplinary team  including cardiology, interventional  radiology, critical care,  hematology)    I have personally reviewed the examination and initial interpretation  and I agree with the findings.    JOVANA CORRIGAN MD         SYSTEM ID:  [id number]   XR Chest Port 1 View    Impression    IMPRESSION: Endotracheal tube tip in the mid to distal thoracic  trachea. Bibasilar atelectasis. Left upper portion of the PICC line  tip possibly in the upper portion of the azygos vein. Atherosclerosis.    JOVANA CORRIGAN MD         SYSTEM ID:  [id number]   XR Abdomen Port 1 View    Impression    IMPRESSION:   1. Enteric tube with tip in the stomach and side-port at or just  distal to the gastroesophageal junction.  2. Nonobstructive bowel gas pattern.    I have personally reviewed the examination and initial interpretation  and I agree with the findings.    ROHAN MAGALLON MD         SYSTEM ID:  [id number]   XR Chest Port 1 View    Impression    IMPRESSION:  1. Increased perihilar and bibasilar pulmonary opacities which may  represent atelectasis, edema and/or infection.  2. Stable support devices.     I have personally reviewed the examination and initial interpretation  and I agree with the findings.    JOVANA CORRIGAN MD         SYSTEM ID:  [id number]   CTA Abdomen Pelvis with Contrast    Impression    IMPRESSION:   1. No evidence of gastrointestinal bleed as questioned.  2. Interval new subacute appearing right sacral insufficiency fracture  and subacute appearing left pubic ramus fracture since 5/12/2022.  3. Age-indeterminate compression deformity of T10 with approximately  30% height loss. Multiple subacute appearing anterior rib fractures,  and unchanged appearance of L1, L3, and L5 compression deformities.  4. Small sliding-type hiatal hernia with hemostasis clip noted in the  gastric fundus.  5. Aortobiiliac atherosclerotic atherosclerosis with multiple foci of  arterial stenosis including the renal arterial origins, common iliac  vessels, and near total occlusions  of the proximal internal iliac  arteries and common femoral arteries proximally.  6. Unchanged appearance of left inguinal hernia containing a loop of  sigmoid colon, no signs of obstruction.  7. 5 mm hypodensity in the pancreatic head may represent a sidebranch  IPMN although MRI is recommended to further characterize on an  outpatient basis, pancreatic cystic follow-up is recommended below.  Per [facility] criteria.  8. Indeterminate hepatic hypodensities are too small to further  characterize but could be evaluated further at time of MRI for above  pancreatic lesion.  9. 1.4 cm right ovarian cyst for which annual follow-up is recommended  in a postmenopausal female.  10. Trace effusions and associated atelectasis.    [facility] recommendations for asymptomatic pancreatic  cysts, modified from international consensus guidelines*   Size of largest cyst:   Less than 1 cm: Follow-up imaging in 6 months to 1 year, then lengthen  interval to 2-3 years if no change.  1-2 cm: Follow-up imaging at 6 months, then yearly for 2 years, then  lengthen interval if no change.   The optimal initial study or first follow-up exam is MRI performed  with intravenous gadolinium contrast to allow full cyst  characterization. Once characterized, future follow-up MRIs can be  performed without contrast. If MRI is contraindicated, CT with  contrast is recommended.   GI consultation for possible endoscopic ultrasound is recommended for  cysts with the following features: Size > 2 cm, >20% growth on  followup, wall thickening or enhancement, mural nodule, duct > 5 mm,  or abrupt change in duct caliber with distal atrophy. GI or surgical  consultation is also recommended for symptomatic cysts.   *Reference: International Consensus Guidelines for Management of IPMN  and MCN of the pancreas. Pancreatology: 12:(2012); 183-197.    I have personally reviewed the examination and initial interpretation  and I agree with  the findings.    ILIANA JEFFERSON MD         SYSTEM ID:  [id number]   XR Abdomen Port 1 View    Impression    IMPRESSION:   1. Feeding tube with tip in the proximal jejunum.   2. Enteric tube with tip and side-port overlying the stomach.  3. Non obstructive bowel gas pattern.  4. Small bilateral pleural effusions.    I have personally reviewed the examination and initial interpretation  and I agree with the findings.    ROYA EDMOND MD         SYSTEM ID:  [id number]   Echo Complete   Result Value Ref Range    LVEF  60-65%    Echocardiogram Limited   Result Value Ref Range    LVEF  60-65%        Reviewed recent labs  No recent labs

## 2022-08-10 NOTE — PLAN OF CARE
Admitted for traumatic SDH, s/p evacuation. On comfort cares. No s/s pain. G-tube for meds. Up 2/lift. Incontinent of bowel and bladder, last BM 8/10. Regular diet with 1:1. Pending hospice placement. Continue to monitor.

## 2022-08-10 NOTE — PROGRESS NOTES
"Care Management Follow Up     Length of Stay (days): 49     Expected Discharge Date: 08/10/2022     Concerns to be Addressed:   Disposition planning   Patient plan of care discussed at interdisciplinary rounds: Yes     Anticipated Discharge Disposition:  Hospice placement in a community SNF with contracted hospice services     Anticipated Discharge Services:  Hospice placement in a community SNF with contracted hospice services  Anticipated Discharge DME:   Not applicable at this time     Patient/family educated on Medicare website which has current facility and service quality ratings: SW has provided pt's family with a list of residential hospices  Education Provided on the Discharge Plan:   yes  Patient/Family in Agreement with the Plan:  yes     Referrals Placed by CM/SW:  None at this time  Private pay costs discussed: yes, education was provided in regards to private pay residential hospices and private pay SNF placement     Additional Information:  SW is following pt for discharge planning.  Pt is comfort care only and it is anticipated that pt is medically ready for discharge.  Benjamín Baker 6 P.AAndres has confirmed readiness for discharge.    RACHEL received a 8/9/2022 voice mail from [facility] confirming that she spoke with pt's daughter (Allyson) on 8/9/2022 and who stated that pt's daughter is now \"feeling much more comfortable about decision to continue to pursue hospice.\"  Clarice stated that pt's daughter inquired about what the cost would be if they decided to take pt home on hospice and hired add'l help.  Pt's daughter (Allyson) reportedly indicated that she works from home, one of pt's other daughters works from home and pt's  (their father) could move in with Allyson.      RACHEL phoned [facility] Hospital Liaison (Stacy Buck [phone])) and inquired as to whether or not they would have staffing to provide private pay extended hours if pt went home on " "hospice.  Stacy stated that it is unlikely that they would be able to provide private pay extended hours due to staffing.  However, Stacy states that she will check into this.   RACHEL phoned [facility] (Corinna [phone]) Corinna states that [facility] does not offer private pay extended hours however, they utilize \"[employer]\" to provide private pay extended hours to the [facility] clients and Corinna states that [employer] would be available to start immediately.  Corinna states that she or a representative from [facility] will call this SW back with rate information.    The following SNF's have indicated they can not accept pt for admit for hospice:  - [facility] (Mei) is full with a 60 person wait list  - [facility] ([facility]  - [facility] (Mahnaz), is full and does not have long term or hospice rooms available  - [facility] (Dasia), declined for admit stating that they cannot meet pt's needs due to internal acuity.     RACHEL phoned Admissions at the following SNF's and left message for representatives to call in regards to acceptance:  - [facility] (Mahnaz [phone])  - [facility] (Faye [phone]).    RACHEL phoned pt's daughter (Allyson) and left a message updating in regards to pursuit of SNF and requesting a call for update in regards to home with hospice.    RACHEL will continue to follow for discharge planning.      CÉSAR Bailey  Social Work, 6A  Phone:  [phone]  Pager:  [phone]  8/10/2022        "

## 2022-08-10 NOTE — PROGRESS NOTES
"Care Management Follow Up     Length of Stay (days): 49     Expected Discharge Date: 08/10/2022     Concerns to be Addressed:   Disposition planning   Patient plan of care discussed at interdisciplinary rounds: Yes     Anticipated Discharge Disposition:  Hospice placement in a community SNF with contracted hospice services     Anticipated Discharge Services:  Hospice placement in a community SNF with contracted hospice services  Anticipated Discharge DME:   Not applicable at this time     Patient/family educated on Medicare website which has current facility and service quality ratings: SW has provided pt's family with a list of residential hospices  Education Provided on the Discharge Plan:   yes  Patient/Family in Agreement with the Plan:  yes     Referrals Placed by CM/SW:  None at this time  Private pay costs discussed: yes, education was provided in regards to private pay residential hospices and private pay SNF placement     Additional Information:  SW is following pt for discharge planning.  Pt is comfort care only and it is anticipated that pt is medically ready for discharge.  Benjamín Baker 6 P.AAndres has confirmed readiness for discharge.    RACHEL received a 8/9/2022 voice mail from [facility] confirming that she spoke with pt's daughter (Allyson) on 8/9/2022 and who stated that pt's daughter is now \"feeling much more comfortable about decision to continue to pursue hospice.\"  Clarice stated that pt's daughter inquired about what the cost would be if they decided to take pt home on hospice and hired add'l help.  Pt's daughter (Allyson) reportedly indicated that she works from home, one of pt's other daughters works from home and pt's  (their father) could move in with Allyson.       RACHEL phoned [facility] Hospital Liaison (Stacy Buck [phone])) and inquired as to whether or not they would have staffing to provide private pay extended hours if pt went home on " "hospice.  Stacy stated that it is unlikely that they would be able to provide private pay extended hours due to staffing.  However, Stacy states that she will check into this.   RACHEL phoned [facility] (Corinna [phone]) Corinna states that [facility] does not offer private pay extended hours however, they utilize \"[employer]\" to provide private pay extended hours to the [facility] Hospice clients and Corinna states that [employer] would be available to start immediately.  Corinna states that she or a representative from [facility] will call this SW back with rate information.     The following SNF's have indicated they can not accept pt for admit for hospice:  - [facility] (Mei) is full with a 60 person wait list  - [facility] (Jori), is full  - [facility] (Mahnaz), is full and does not have long term or hospice rooms available  - [facility] (Dasia), declined for admit stating that they cannot meet pt's needs due to internal acuity  - [facility] (Faye), states no openings on their long term care unit so cannot accept a hospice pt   - [facility] (Mahnaz [phone]), no long term care beds available to provide hospice.            RACHEL will continue to follow for discharge planning.        CÉSAR Bailey  Social Work, 6A  Phone:  [phone]  Pager:  [phone]  8/10/2022     "

## 2022-08-10 NOTE — PROGRESS NOTES
Brief entry:  SW received an update from Clarice Miller  (Palliative P[facility]) stating that she spoke with pt, and per discussion,  pt appears to be indicating that she would want a rehab stay.  Clarice states that she spoke with Tripp Bravo (Benjamín 6) who reportedly indicated that pt was tearful when he talked with pt about hospice.  Based on this, SW has been asked to put a hold on discharge planning.  Per Clarice, a Care Conference (with pt, family, Gold 6 and palliative) is recommended to further discuss.  Clarice then phoned pt's daughter and updated.  Following this telephone call Clarice states that family want's to  follow up with pt to try to illicit pt's wishes and then determine whether or not they want a care conference.  Clarice indicates that she will follow up with Tripp Bravo and Tripp Bravo will follow up with pt's family.    CÉSAR Bailey  Social Work, 6A  Phone:  [phone]  Pager:  [phone]  8/10/2022

## 2022-08-10 NOTE — PROGRESS NOTES
"SPIRITUAL HEALTH SERVICES  SPIRITUAL ASSESSMENT Progress Note (Palliative Focus)  [facility] ([place]) 6A    REFERRAL SOURCE: Staff request.    Visit with patient Louise \"Ade\" Jeyson with Palliative Care EDUARDO Miller; Ade's  Jd arrived toward end of visit.     Ade struggled to communicate while seeming to indicate that she would want to try a rehabilitation stay. Family plan to discuss further with Ade before making decisions regarding plan of care.    Plan: I will follow for spiritual support while Palliative Care is consulted.    Shonda Murillo  Palliative   Pager [phone]  [facility] Inpatient Team Consult pager [phone] (M-F 8-4:30)  After-hours Answering Service [phone]  "

## 2022-08-11 NOTE — PROGRESS NOTES
[facility]    Medicine Progress Note - Hospitalist Service, GOLD TEAM 6    HD #50       ASSESSMENT & PLAN:   Louise Benítez is a 76 year old female with history of seronegative RA, MDS, PAD, and osteoporosis who was admitted 6/22/22 for traumatic SDH with midline shift s/p emergent craniotomy and evacuation. Hospital course c/b non-convulsive status epilepticus, GIB s/p EGD and clipping of gastric ulcer (7/5), acute blood loss anemia, aspiration s/p PEG placement (7/15), and further decompensation on 8/3/22 with acute hypoxic respiratory failure and sepsis of unknown etiology. Transitioned to comfort measures only on 8/4/22 per goals of care discussion with family.    For Today:  More alert today. Able to communicate with head nods and pointing. Appears comfortable. Family at bedside and planning to re-assess patient's status and ability to communicate needs before transitioning goals of care. Currently plan is to transfer to hospice facility pending placement.    # Sepsis of unknown etiology  # Acute hypoxic respiratory failure  # Acute traumatic SDH with midline shift s/p emergent craniotomy (6/22/22)  # Acute upper GI bleed d/t gastric ulcer  # Acute blood loss anemia  # Aspiration s/p PEG placement (7/15)  # Transaminitis  # Non-severe malnutrition  # MDS  # HTN  # Seronegative RA  # PAD  # Rhinovirus    See medicine note from 8/4 for further details of hospitalization.        Diet: Regular Diet Adult    DVT Prophylaxis: comfort cares  Vasquez Catheter: Not present  Central Lines: None  Cardiac Monitoring: None  Code Status: No CPR- Do NOT Intubate      Disposition Plan      Expected Discharge Date: 08/13/2022    Discharge Delays: Comfort Care/Hospice  Placement - Homecare    Discharge Comments: Pending placement      The patient's care was discussed with the Attending Physician, Dr. Funk.    Tripp Bravo PA-C  Hospitalist Service, GOLD TEAM 6  [facility]  [facility]  Securely message with the [employer] Web Console (learn more here)  Text page via [facility] Paging/Directory   Please see signed in provider for up to date coverage information      Clinically Significant Risk Factors Present on Admission                      ______________________________________________________________________    Interval History   Family present. Patient sitting up in chair. Responds to questions with head nods and pointing.  states that she asked for cookies earlier today, which he got for her. She indicates no pain, dyspnea, chest pain, N/V, abdominal pain, diarrhea, or urinary symptoms. She would like to get up and move around more. We discussed the need for assistance with staff d/t weakness and recent immobility.     Data reviewed today: I reviewed all medications, new labs and imaging results over the last 24 hours.     Physical Exam   Vital Signs:                    Weight: 146 lbs 2.64 oz  General Appearance:  Awake. Alert. NAD.   HEENT:  Moist mucous membranes.   Respiratory:  Normal effort. CTAB.   Cardiovascular:  Tachy but regular. S1,S2. No murmurs.  GI:  Soft, ND, NT, +BS.   Extremity:  No pitting edema. No calf tenderness. Cold feet. Several toes on L foot cyanotic. Pulse 1+.  Skin:  No visible rash.   Neuro:  Grossly non-focal.  Psych:  Appropriate mood and affect.         Data   No labs today

## 2022-08-11 NOTE — PLAN OF CARE
Status: Comfort cares  Neuros: Lethargic overnight. Minimally verbal but waves and uses hands to communicate. Can answer simple yes or no questions. Moves extremities sponanteously  Resp/trach: No s/s of respiratory distress  Diet: Regular for comfort, poor PO. Has PEG tube for scheduled keppra and PRN medications.   Bowel status: small soft LBM 8/11 AM, incontinent  : Incontinent  Skin: Scattered abrasions/bruising  Pain: No s/s of pain.  Activity: A2, repo q2hrs. Is able to sit in chair when more alert   Plan: Hospice placement vs rehab

## 2022-08-12 NOTE — PROGRESS NOTES
[facility]    Medicine Progress Note - Hospitalist Service, GOLD TEAM 6    HD #51       ASSESSMENT & PLAN:   Louise Benítez is a 76 year old female with history of seronegative RA, MDS, PAD, and osteoporosis who was admitted 6/22/22 for traumatic SDH with midline shift s/p emergent craniotomy and evacuation. Hospital course c/b non-convulsive status epilepticus, GIB s/p EGD and clipping of gastric ulcer (7/5), acute blood loss anemia, aspiration s/p PEG placement (7/15), and further decompensation on 8/3/22 with acute hypoxic respiratory failure and sepsis of unknown etiology. Transitioned to comfort measures only on 8/4/22 per goals of care discussion with family.    For Today:  Alert but having less non-verbal communication today.   Appears comfortable. Remains on comfort cares. Awaiting placement to Hospice Facility.     Remains largely dependent on cares from staff, including feeding and voiding. Family notes that remaining independent was very important to patient and do not feel that she would be happy with her current quality of life.     As patient has been more alert and communicating some needs, there was consideration of re-evaluating goals of care. Despite her attempts at communication it is largely unknown whether she truly understands what is going on. Family would like time to reassess but currently still leaning towards hospice unless she makes more meaningful improvements.     Will await further input from family prior to any ongoing goals of care discussions. SW awaiting placement referrals.     # Sepsis of unknown etiology  # Acute hypoxic respiratory failure  # Acute traumatic SDH with midline shift s/p emergent craniotomy (6/22/22)  # Acute upper GI bleed d/t gastric ulcer  # Acute blood loss anemia  # Aspiration s/p PEG placement (7/15)  # Transaminitis  # Non-severe malnutrition  # MDS  # HTN  # Seronegative RA  # PAD  # Rhinovirus    See  medicine note from 8/4 for further details of hospitalization.        Diet: Regular Diet Adult    DVT Prophylaxis: comfort cares  Vasquez Catheter: Not present  Central Lines: None  Cardiac Monitoring: None  Code Status: No CPR- Do NOT Intubate      Disposition Plan      Expected Discharge Date: 08/13/2022    Discharge Delays: Comfort Care/Hospice  Placement - Homecare    Discharge Comments: Pending placement      The patient's care was discussed with the Attending Physician, Dr. Funk.    Tripp Bravo PA-C  Hospitalist Service, GOLD TEAM [address] [place] [address]  Securely message with the [employer] Web Console (learn more here)  Text page via [facility] Paging/Directory   Please see signed in provider for up to date coverage information      Clinically Significant Risk Factors Present on Admission                      ______________________________________________________________________    Interval History   Patient awake and alert today, however not making attempts to respond to questions. Appears comfortable.      Data reviewed today: I reviewed all medications, new labs and imaging results over the last 24 hours.     Physical Exam   Vital Signs:                    Weight: 146 lbs 2.64 oz  General Appearance:  Awake. Alert. NAD.   HEENT:  Moist mucous membranes.   Respiratory:  Normal effort. CTAB.   Cardiovascular:  RRR. S1,S2. No murmurs.  GI:  Soft, ND, NT, +BS.   Extremity:  Cool. No pitting edema.         Data   No labs today

## 2022-08-12 NOTE — PLAN OF CARE
Status: Comfort cares  Neuros: Intermittently answers simple yes or no questions. Moves extremities sponanteously  Resp/trach: No s/s of respiratory distress  Diet: Regular for comfort, poor PO. Has PEG tube for scheduled keppra and PRN medications.   Bowel status: LBM 8/11, incontinent.  : Incontinent.  Skin: Scattered abrasions/bruising. Mass in LLQ of abdomen. Team updated.   Pain: Denies pain. Declines PRN pain medicine.   Activity: Up with 2, lift. T&R q2h. Up in chair this afternoon.   Plan: Hospice placement vs rehab

## 2022-08-12 NOTE — PLAN OF CARE
Pt on comfort cares. Can answer some yes/no questions, but does not follow most commands. Moves extremities spontaneously. Repositioning and oral cares provided q2hr. Pt denies pain when asked and has no nonverbal indicators of pain. Regular diet, minimal PO intake for breakfast and lunch. Incontinent of bowel and bladder, last BM 8/12. Barrier cream applied to nellie-area for blanchable redness. Continue to monitor and follow POC.

## 2022-08-12 NOTE — PLAN OF CARE
Status: Admitted for craniotomy, now on comfort cares  Neuros: Intermittently answers yes and no questions. Pt appeared withdrawn, non-verbal cues of fear or anxiety. Spontaneously moves all extremities   Resp/trach: RA  Diet: Regular diet, did not take PO overnight   Bowel status: LBM 8/12   : incontinent   Skin: no new deficits   Pain: no signs/symptoms of pain   Activity: Ax2 w/ lift   Plan: pending placement   Updates this shift:   -Gave 1mg ativan for anxiety, pt appeared more relaxed

## 2022-08-13 NOTE — PROGRESS NOTES
[facility]    Medicine Progress Note - Hospitalist Service, GOLD TEAM 6    HD #52       ASSESSMENT & PLAN:   Louise Benítez is a 76 year old female with history of seronegative RA, MDS, PAD, and osteoporosis who was admitted 6/22/22 for traumatic SDH with midline shift s/p emergent craniotomy and evacuation. Hospital course c/b non-convulsive status epilepticus, GIB s/p EGD and clipping of gastric ulcer (7/5), acute blood loss anemia, aspiration s/p PEG placement (7/15), and further decompensation on 8/3/22 with acute hypoxic respiratory failure and sepsis of unknown etiology. Transitioned to comfort measures only on 8/4/22 per goals of care discussion with family.    For Today:  Less alert today. Staff noted increased anxiety last evening, received ativan with good results. Appears comfortable.    Continues comfort cares.    Will pursue outpatient hospice per family. Daughter looking into possibility of [facility] for home hospice. SW updated, awaiting placement referrals.    # Sepsis of unknown etiology  # Acute hypoxic respiratory failure  # Acute traumatic SDH with midline shift s/p emergent craniotomy (6/22/22)  # Acute upper GI bleed d/t gastric ulcer  # Acute blood loss anemia  # Aspiration s/p PEG placement (7/15)  # Transaminitis  # Non-severe malnutrition  # MDS  # HTN  # Seronegative RA  # PAD  # Rhinovirus    See medicine note from 8/4 for further details of hospitalization.        Diet: Regular Diet Adult  Snacks/Supplements Adult: Ensure Clear; Between Meals    DVT Prophylaxis: comfort cares  Vasquez Catheter: Not present  Central Lines: None  Cardiac Monitoring: None  Code Status: No CPR- Do NOT Intubate      Disposition Plan      Expected Discharge Date: 08/15/2022    Discharge Delays: Comfort Care/Hospice  Placement - Homecare    Discharge Comments: Pending placement      The patient's care was discussed with the Attending Physician,   Good.    Tripp Bravo PA-C  Hospitalist Service, GOLD TEAM 6  [facility]  Securely message with the [employer] Web Console (learn more here)  Text page via [facility] Paging/Directory   Please see signed in provider for up to date coverage information      Clinically Significant Risk Factors Present on Admission                      ______________________________________________________________________    Interval History   Received ativan last night d/t signs of anxiety. Family reports she appears more comfortable but less responsive today. Family would like to pursue hospice placement vs home w/ hospice.      Data reviewed today: I reviewed all medications, new labs and imaging results over the last 24 hours.     Physical Exam   Vital Signs:             SpO2: 94 %      Weight: 146 lbs 2.64 oz    Exam deferred as patient visiting with family        Data   No labs today

## 2022-08-13 NOTE — PLAN OF CARE
Status: S/p craniotomy, now on comfort cares  Neuros: Intermittently responds with yes/no, short responses, spontaneously moves all extremities   Resp/trach: RA  Diet: Regular diet, ate a couple bites of mashed potatoes & ice cream  Bowel status: LBM 8/12   : No UO  Skin: PEG clamped  Pain: Morphine/ativan given for discomfort, effective  Activity: Ax2 lift   Plan: Pending placement

## 2022-08-13 NOTE — PLAN OF CARE
Pt on comfort cares, minimally invasive assessments/interventions. T&R q2, oral cares q2h. Per report pt can say yes/no, short sentences. Overnight pt communicated via squeezing writers hand (I.e 2 squeezes=yes, 1 squeeze=no). No PIV. Incontinent of bladder overnight x1. Tylenol and morphine given overnight. PEG used for meds. Per report, in the event of the pt's death, call Allyson first (see pt sticky note).

## 2022-08-13 NOTE — PLAN OF CARE
Pt on comfort cares. Family at bedside most of the day, supportive. Pt Can answer some yes/no questions, but does not follow most commands. Moves extremities spontaneously. Repositioning and oral cares provided q2hr. Tylenol given x1 for discomfort this AM. Regular diet, minimal PO intake for breakfast and lunch. Incontinent of bowel and bladder, last BM 8/13. Barrier cream applied to nellie-area for blanchable redness. Continue to monitor and follow POC.

## 2022-08-13 NOTE — PROGRESS NOTES
"Care Management Follow Up    Length of Stay (days): 52    Expected Discharge Date: 08/15/2022     Concerns to be Addressed:  Discharge planning  Patient plan of care discussed at interdisciplinary rounds: No, weekend    Anticipated Discharge Disposition:  TBD - cecilio Valadez's home with hospice and private pay RN support vs. LTC with hospice     Anticipated Discharge Services:  Hospice  Anticipated Discharge DME:      Patient/family educated on Medicare website which has current facility and service quality ratings:  yes  Education Provided on the Discharge Plan:  yes  Patient/Family in Agreement with the Plan:  yes    Referrals Placed by CM/SW:  The following referrals have been made for LTC placement with hospice.    DECLINED:   - [facility] ([place] is full with a 60 person wait list  - [facility]  - [facility] (Mahnaz), is full and does not have long term or hospice rooms available  - [facility] (Dasia), declined for admit stating that they cannot meet pt's needs due to internal acuity  - [facility] (Faye), states no openings on their long term care unit so cannot accept a hospice pt   - [facility] ([place]ahn[place] [phone]), no long term care beds available to provide hospice.    HOSPICE:  [facility] [phone]) - cecilio Valadez has scheduled an assessment to take place at patient bedside on Monday, 8/15 at 1200.  [facility] ([place] [phone]) - can offer private pay extended hours through \"[employer]\" which could start immediately if family wants to pursue this option.    [facility] - do not have staffing at this time to support extended hours needed if pt went home on hospice.       Private pay costs discussed: private pay extended nursing support    Additional Information:  SW team continuing to follow to support patient and family for discharge " planning and emotional support.  Louise is on comfort cares and joined in her room today by her daughter Allyson and two granddaughters.  RACHEL reviewed chart and spoke to Dr. Bravo, Gold 6, who provided an update on family decision to continue to pursue hospice either in home or LTC setting.  At this time, all LTC referrals have been declined (as noted above).      RACHEL met with daughter Allyson outside of patient room.  Allyson states that, at this time, she is still deciding between bringing Louise home to her 6 bedroom house with daily private pay RN support versus continuing to pursue LTC placement.  RACHEL reviewed with Allyson the referrals made previously by RACHEL in which no immediate placement was found.  Allyson is not yet ready for additional LTC referrals to be sent today.  Allyson has scheduled a bedside assessment with [employer] [facility] on Monday 8/15 and 1200.  She states that following this assessment she will have more clarity surrounding their decision.      Allyson inquired about what the process would be if she brings Louise home with her and, after she is already home and discharged, what the process would be to then attempt LTC placement.  RACHEL first encouraged Allyson and family to carefully weigh each discharge option with the goal of supporting Louise into one transition, rather than many.  RACHEL then explained that Allyson would need to connect with Louise's primary care team (including clinic ) who would assist with referral to LTC.  RACHEL and Allyson discussed how it can be very difficult to find placement from the community setting and there is no guarantee that she could get a bed in one of their desired locations.    At the end of our conversation, spouse Jd arrived and RACHEL introduced self and role to Jd.  Jd states he is in agreement with the options being pursued for discharge (Allyson's home with hospice and private pay RN support vs. LTC with hospice).      RACHEL team will follow back up with Allyson  following the meeting with [facility] to further discuss family decision.      AQUILES Benítez, LICSW  Weekend Adult Acute Care     [employer] on [facility] - search SOCIAL WORK - for text paging options    4A, 4C, 4E, 5A and 5B; pager [phone]  6A, 6B, 6C and 6D; pager [phone]  7A, 7B, 7C, 7D and 5C; pager [phone]  [facility] pager: [phone]     RNCC/Care Coordinator; job code [id number] or [phone]    -For hours 1600 - midnight Pager: [phone]    Barbara Sandoval, ELENA

## 2022-08-14 NOTE — PLAN OF CARE
Status: S/p craniotomy, now on comfort cares  Neuros: Intermittently responds with yes/no, short responses, spontaneously moves all extremities   Resp/trach: RA  Diet: Regular diet, drank 2 ensures, ice cream  Bowel status: LBM 8/13  : Voiding, incontinent  Skin: PEG clamped  Pain: Ativan given for restlessness/discomfort, effective  Activity: Ax2 lift, turn/repo q2  Social:  Jd at bedside most of afternoon, supportive/helpful  Plan: Pending placement

## 2022-08-14 NOTE — PLAN OF CARE
Pt on comfort cares. Family at bedside most of the day, supportive. More alert today and talking, speech whispered and difficult to understand. Pt Can answer some yes/no questions, but does not follow most commands. Moves extremities spontaneously. Repositioning and oral cares provided q2hr. Bed bath completed today. Tylenol given x1 for discomfort this AM. Regular diet, pt likes ensure clears to drink and ate 100% of a magic cup. PEG tube for medication administration. Incontinent of bowel and bladder, last BM 8/14.  Continue to monitor and follow POC.

## 2022-08-14 NOTE — PROGRESS NOTES
[facility]    Medicine Progress Note - Hospitalist Service, GOLD TEAM 6    Date of Admission:  6/22/2022    Assessment & Plan        Louise Benítez is a 76 year old female with history of seronegative RA, MDS, PAD, and osteoporosis who was admitted 6/22/22 for traumatic SDH with midline shift s/p emergent craniotomy and evacuation. Hospital course c/b non-convulsive status epilepticus, GIB s/p EGD and clipping of gastric ulcer (7/5), acute blood loss anemia, aspiration s/p PEG placement (7/15), and further decompensation on 8/3/22 with acute hypoxic respiratory failure and sepsis of unknown etiology. Transitioned to comfort measures only on 8/4/22 per goals of care discussion with family.     For Today:  No changes today. Per d/w Dr. Funk, less alert than prior days. Continue comfort cares.     Family working on OP hospice placement:. daughter looking into possibility of [facility] for home hospice. SW aware, awaiting placement referrals. Writer d/w daughterAllyson ([phone]) on 8/14/2022. They will have [facility] assessment at patient's bedside 8/15 as planned     Medical Issues Addressed this Hospitalization: See notes 8/4 and prior for further details  Sepsis of unknown etiology  Acute hypoxic respiratory failure  Acute traumatic SDH with midline shift s/p emergent craniotomy (6/22/22)  Acute upper GI bleed d/t gastric ulcer  Acute blood loss anemia  Aspiration s/p PEG placement (7/15)  Transaminitis  Severe protein-calorie malnutrition  MDS  HTN  Seronegative RA  PAD  Rhinovirus      Diet: Regular Diet Adult  Snacks/Supplements Adult: Ensure Clear; Between Meals    DVT Prophylaxis: None  Vasquez Catheter: Not present  Central Lines: None  Cardiac Monitoring: None  Code Status: No CPR- Do NOT Intubate      Disposition Plan      Expected Discharge Date: 08/15/2022    Discharge Delays: Comfort Care/Hospice  Placement - Homecare    Discharge Comments:  Pending placement        The patient's care was discussed with the patient, nursing, family, and attending physician, Dr. Good Wen PA-C  Hospitalist Service, [address]  Securely message with the Vocera Web Console (learn more here)  Text page via [facility] Paging/Directory   Please see signed in provider for up to date coverage information      Clinically Significant Risk Factors Present on Admission                      ______________________________________________________________________    Interval History   Resting comfortably. No acute changes. Per d/w Dr. Schultz, less alert today than prior days. Spoke with daughter, Allyson, via phone. They continue to feel as if hospice is the correct choice for their mother and in line with her previously communicated goals of care. They do not wish to peruse TCU (as briefly mentioned last week)    Data reviewed today: I reviewed all medications, new labs and imaging results over the last 24 hours    Physical Exam   Vital Signs:                    Weight: 146 lbs 2.64 oz  General Appearance: Sleeping, appears comfortable  HEENT: Membranes dry  Respiratory: Breathing comfortably on room air, lungs CTAB without wheezing or crackles   Cardiovascular: RRR, S1, S2. No murmur noted  GI: Abdomen soft, non-tender with active bowel sounds. No guarding or rebound  Lymph/Hematologic: No peripheral edema, distal pulses palpable   Skin: No rash or jaundice       Data   No lab results found in last 7 days.    No results found for this or any previous visit (from the past 24 hour(s)).

## 2022-08-14 NOTE — PLAN OF CARE
Pt on comfort cares, minimally invasive assessments/interventions. T&R q2, oral cares q2h. Per report pt can say yes/no, short sentences. Overnight pt communicated via squeezing writers hand (I.e 2 squeezes=yes, 1 squeeze=no). No PIV. Incontinent of bladder overnight x1. Tylenolgiven overnight for comfort, morphine available. PEG used for meds.

## 2022-08-15 NOTE — PROGRESS NOTES
"Care Management Follow Up    Length of Stay (days): 54    Expected Discharge Date: 08/17/2022     Concerns to be Addressed:       Patient plan of care discussed at interdisciplinary rounds: Yes    Anticipated Discharge Disposition:  Hospice home vs. LTC      Anticipated Discharge Services:  Hospice  Anticipated Discharge DME:  N/a    Patient/family educated on Medicare website which has current facility and service quality ratings:  Yes  Education Provided on the Discharge Plan:  Yes  Patient/Family in Agreement with the Plan:  Yes    Referrals Placed by CM/SW:    The following referrals have been made for LTC placement with hospice.     DECLINED:   - [facility] ([place]) is full with a 60 person wait list  - [facility] ([place]  - [facility] (Mahnaz), is full and does not have long term or hospice rooms available  - [facility] (Dasia), declined for admit stating that they cannot meet pt's needs due to internal acuity  - [facility] (Faye), states no openings on their long term care unit so cannot accept a hospice pt   - [facility] ([place] [phone]), no long term care beds available to provide hospice.     HOSPICE:  [facility] [phone]) - daughter Allyson has scheduled an assessment to take place at patient bedside on Monday, 8/15 at 1200.  [facility] ([place] [phone]) - can offer private pay extended hours through \"[employer]\" which could start immediately if family wants to pursue this option.    [facility] - do not have staffing at this time to support extended hours needed if pt went home on hospice.       Private pay costs discussed: Not applicable    Additional Information:  RACHEL met with [employer] Ian morales ([phone]). Ian reported that he had just met with pt and family and provided them with education of services. Ian reported that he had " spoken with family about Hospice at home vs. SNF or hospice residence. Ian reported that if family decided to bring pt home they would be able to staff 24/7, but could not guarantee the first 24 hours. Ian provided contact information and requested SW keep him updated.     SW spoke with provider about conversation they had with the daughter not being sure about hospice or some of the LTC referrals. SW reported that SW would f/u with daughter tomorrow morning.     Ally Harris, RACHEL  [facility]   [facility]

## 2022-08-15 NOTE — PLAN OF CARE
Status: S/p craniotomy, now on comfort cares  Neuros: Responds with yes/no, short responses, spontaneously moves all extremities   Resp/trach: RA  Diet: Regular diet, poor intake  Bowel status: LBM 8/14 evening  : Voiding, incontinent  Skin: Scab on L shin, PEG clamped  Pain: Tylenol for relaxation  Activity: Ax2 lift, turn/repo q2  Plan: Pending placement

## 2022-08-15 NOTE — PROGRESS NOTES
[facility]    Medicine Progress Note - Hospitalist Service, GOLD TEAM 6    Date of Admission:  6/22/2022    Assessment & Plan        Louise Benítez is a 76 year old female with history of seronegative RA, MDS, PAD, and osteoporosis who was admitted 6/22/22 for traumatic SDH with midline shift s/p emergent craniotomy and evacuation. Hospital course c/b non-convulsive status epilepticus, GIB s/p EGD and clipping of gastric ulcer (7/5), acute blood loss anemia, aspiration s/p PEG placement (7/15), and further decompensation on 8/3/22 with acute hypoxic respiratory failure and sepsis of unknown etiology. Transitioned to comfort measures only on 8/4/22 per goals of care discussion with family.    Interval History:   Plans for [facility] Eval at bedside today.   Family is currently in process of outpatient hospice referrals.       Medical Issues Addressed this Hospitalization: See notes 8/4 and prior for further details  Sepsis of unknown etiology  Acute hypoxic respiratory failure  Acute traumatic SDH with midline shift s/p emergent craniotomy (6/22/22)  Acute upper GI bleed d/t gastric ulcer  Acute blood loss anemia  Aspiration s/p PEG placement (7/15)  Transaminitis  Severe protein-calorie malnutrition  MDS  HTN  Seronegative RA  PAD  Rhinovirus         Diet: Regular Diet Adult  Snacks/Supplements Adult: Ensure Clear; Between Meals    DVT Prophylaxis: None  Vasquez Catheter: Not present  Central Lines: None  Cardiac Monitoring: None  Code Status: No CPR- Do NOT Intubate      Disposition Plan      Expected Discharge Date: 08/17/2022    Discharge Delays: Comfort Care/Hospice  Placement - Homecare    Discharge Comments: On comfort cares        The patient's care was discussed with the Attending Physician, Dr. Doc Funk.    Giulia Orozco PA-C  Hospitalist Service, GOLD TEAM 6  [facility]  Securely message with the  [email or web address] Web Console (learn more here)  Text page via [facility] Paging/Directory   Please see signed in provider for up to date coverage information      Clinically Significant Risk Factors Present on Admission                      ______________________________________________________________________    Interval History   All overnight events reviewed.  Patient resting comfortably in bed, sleeping.     Data reviewed today: I reviewed all medications, new labs and imaging results over the last 24 hours.    Physical Exam   Vital Signs:                    Weight: 146 lbs 2.64 oz  GENERAL: Sleeping, in NAD   HEENT:Membranes dry  CV: RRR. S1, S2. No murmurs appreciated.   RESPIRATORY: Effort normal on RA. Lungs CTAB with no wheezing, rales, rhonchi.   GI: Abdomen soft and non distended with normoactive bowel sounds present in all quadrants. No tenderness, rebound, guarding.   EXTREMITIES: No peripheral edema. Intact bilateral pedal pulses.   SKIN: No jaundice. No rashes.        Data   No lab results found in last 7 days.

## 2022-08-15 NOTE — PLAN OF CARE
Goal Outcome Evaluation:    Plan of Care Reviewed With: patient     Overall Patient Progress: no change     A+O. Opens eyes spontaneously. Responds to y/n questions. Comfort cares maintained. Repositioned q2hrs. Incontinent of urine, no stool. nellie cares performed. Minimized interruptions through night.     Problem: Plan of Care - These are the overarching goals to be used throughout the patient stay.    Goal: Readiness for Transition of Care  8/15/2022 0507 by Roselyn Stockton, RN  Outcome: Ongoing, Not Progressing    Problem: Risk for Delirium  Goal: Improved Sleep  8/15/2022 0507 by Roselyn Stockton, RN  Outcome: Ongoing, Progressing

## 2022-08-15 NOTE — PLAN OF CARE
No changes since previous note. Continue w/ comfort care orders. Turn and repo q 2 hrs. Food offered but declined.

## 2022-08-16 NOTE — PLAN OF CARE
Goal Outcome Evaluation:    Plan of Care Reviewed With: patient      Neuro: A&O difficult to assess orientation, follows commands inconsistently, pt whispers at times, yes, no, thumbs up or down, or doesn't answer at all. Denies pain.   Cardiac:regular, afebrile..VSS.   Respiratory: Sating 94% on RA.  GI/:No uop today, incontinent, no bm today, bm last evening.   Diet/appetite: Ate 15% of breakfast but then fell asleep and awoken coughing and vomited up food, appears to be pocketing food in back of throat and not fully swallowing, difficult to tell. Likes OJ and fruit. Feeder. Denies nausea. Oral [place] used, oxygen sats checked stable.   Activity:  Assist of 2 lift to chair, upright in bed today propped up with pillows, lists to right side.   Pain: At acceptable level on current regimen. Denies  Skin: Scrape on left shin, scabbed. Peg wound KELLY;not changed.   LDA's:no piv.     Plan: Comfort cares, monitor urination. Bm last noc.Continue with POC. Notify primary team with changes.

## 2022-08-16 NOTE — CARE PLAN
Occupational Therapy Discharge Summary    Reason for therapy discharge:    No further expectations of functional progress.  Per chart review and discussion with care team (RN, SW) OT no longer indicated d/t transition to comfort cares and d/c plan of d/c with hospice.     Progress towards therapy goal(s). See goals on Care Plan in [facility] electronic health record for goal details.  Goals not met.  Barriers to achieving goals:   change in medical status, transition to comfort cares..    Therapy recommendation(s):    No further therapy is recommended.

## 2022-08-16 NOTE — PROGRESS NOTES
[facility]    Medicine Progress Note - Hospitalist Service, GOLD TEAM 6    Date of Admission:  6/22/2022    Assessment & Plan        Louise Benítez is a 76 year old female with history of seronegative RA, MDS, PAD, and osteoporosis who was admitted 6/22/22 for traumatic SDH with midline shift s/p emergent craniotomy and evacuation. Hospital course c/b non-convulsive status epilepticus, GIB s/p EGD and clipping of gastric ulcer (7/5), acute blood loss anemia, aspiration s/p PEG placement (7/15), and further decompensation on 8/3/22 with acute hypoxic respiratory failure and sepsis of unknown etiology. Transitioned to comfort measures only on 8/4/22 per goals of care discussion with family.    Interval History 8/16:  -Discussed with Ally GARCIA  Still awaiting responses from several hospice facilities.     Medical Issues Addressed this Hospitalization: See notes 8/4 and prior for further details  Sepsis of unknown etiology  Acute hypoxic respiratory failure  Acute traumatic SDH with midline shift s/p emergent craniotomy (6/22/22)  Acute upper GI bleed d/t gastric ulcer  Acute blood loss anemia  Aspiration s/p PEG placement (7/15)  Transaminitis  Severe protein-calorie malnutrition  MDS  HTN  Seronegative RA  PAD  Rhinovirus         Diet: Snacks/Supplements Adult: Ensure Clear; Between Meals  Regular Diet Adult    DVT Prophylaxis: None  Vasquez Catheter: Not present  Central Lines: None  Cardiac Monitoring: None  Code Status: No CPR- Do NOT Intubate      Disposition Plan      Expected Discharge Date: 08/17/2022    Discharge Delays: Comfort Care/Hospice  Placement - Homecare    Discharge Comments: On comfort cares        The patient's care was discussed with the Attending Physician, Dr. Bobo Lange.    Giulia Orozco PA-C  Hospitalist Service, GOLD TEAM 6  [facility]  Securely message with the Vocera Web Console (learn more  here)  Text page via [facility] Paging/Directory   Please see signed in provider for up to date coverage information      Clinically Significant Risk Factors Present on Admission                      ______________________________________________________________________    Interval History   All overnight events reviewed.   Patient more alert and less somnolent than yesterday, but not interactive.  Volunteer at bedside feeding her, but only has eaten a small piece of pineapple over 30 minutes. Able to drink liquids. Does not answer any questions asked.     Data reviewed today: I reviewed all medications, new labs and imaging results over the last 24 hours.    Physical Exam   Vital Signs:                    Weight: 146 lbs 2.64 oz  GENERAL: Alert, minimally interactive. In NAD  HEENT: Anicteric sclera. Mucous membranes dry  CV: RRR. S1, S2. No murmurs appreciated.   RESPIRATORY: Effort normal on RA. Lungs CTAB with no wheezing, rales, rhonchi.   GI: Abdomen soft and non distended with normoactive bowel sounds present in all quadrants.   EXTREMITIES: No peripheral edema. Intact bilateral pedal pulses.   SKIN: No jaundice. No rashes.        Data

## 2022-08-16 NOTE — PROGRESS NOTES
"Care Management Follow Up    Length of Stay (days): 55    Expected Discharge Date: 08/17/2022     Concerns to be Addressed: LTC Hospice vs. Home Hospice      Patient plan of care discussed at interdisciplinary rounds: Yes    Anticipated Discharge Disposition:       Anticipated Discharge Services:    Anticipated Discharge DME:      Patient/family educated on Medicare website which has current facility and service quality ratings:    Education Provided on the Discharge Plan:    Patient/Family in Agreement with the Plan:      Referrals Placed by CM/SW:    Pending:   [facility]  (P: [phone], F: [phone])  8/16: resent referral but with request for LTC w/ hospice this time    [facility]   [address] [place] [address]  P: [phone], F: [phone]  8/16: resent referral for LTC w Hospice    [facility] [address] [address]  P: [phone], F: [phone]  8/16: resent referral for LTC w Hospice      DECLINED:   - [facility] (Mei) is full with a 60 person wait list  - [facility] (Jori), is full  - [facility] (Mahnaz), is full and does not have long term or hospice rooms available  - [facility] (Dasia), declined for admit stating that they cannot meet pt's needs due to internal acuity  - [facility] (Faye), states no openings on their long term care unit so cannot accept a hospice pt   - [facility] (Mahnaz [phone]), no long term care beds available to provide hospice.     HOSPICE:  [facility] [phone]) - daughter Allyson has scheduled an assessment to take place at patient bedside on Monday, 8/15 at 1200.  [facility] ([place] [phone]) - can offer private pay extended hours through \"[employer]\" which could start immediately if family wants to pursue this option.    [facility] - do not " have staffing at this time to support extended hours needed if pt went home on hospice.     Private pay costs discussed: Not applicable    Additional Information:  SW called and spoke with pt's daughter, Allyson about LTC options. SW relayed information from previous SWs notes about which LTCs had declined and why. SW noted that there seemed to be some referrals that had been sent but had not responded yet and SW would f/u. Allyson reported that she was interested in taking pt to her home where she could have [facility] come in and provide services, but that pt's spouse and other daughter preferred LTC with hospice instead. Allyson reported that they wanted to see what LTC availability there was before making a decision. Allyson also requested list of other LTC locations they could look into via email.     SW spoke with provider, EDUARDO Toribio about conversation with pt's daughter and that SW would f/u with other LTC.     SW emailed link and information about Medicare.gov to pt's daughter, Allyson and explained how to use and purpose of website.     AMBREEN Chase  [facility]   [facility]

## 2022-08-16 NOTE — PLAN OF CARE
Status: S/p craniotomy, now on comfort cares.   Neuros: Responds with yes or no, short responses. Sometimes hand gestures. Spontaneously moves all extremities.   Resp/trach: on RA, oral cares q2 and prn  Diet: Regular diet, easy to chew.   Bowel status: LBM 8/16, smear. Loose stools 8/15  : Voiding w/ incontinence.  Skin: Scab on L shin. PEG clamped.   Pain: No s/s of pain.   Activity: Ax2, lift. Turn/repo q2   Plan: Pending placement.

## 2022-08-17 NOTE — PLAN OF CARE
Status: S/p craniotomy, now on comfort cares  Neuros: Responds with yes/no, spontaneously moves all extremities   Resp/trach: RA, coughs with liquids  Diet: Regular diet, drank 1 ensure, ice cream. Adding thickener to fluids, seems to tolerate better  Bowel status: 1 loose BM today   : Voiding, incontinent  Skin: PEG clamped, changed dressing x2  Pain: Restless, tylenol given   Activity: Ax2 lift, turn/repo q2  Social: Daughter at bedside this afternoon, supportive  Plan: Pending placement

## 2022-08-17 NOTE — PLAN OF CARE
Status: S/p craniotomy, now on comfort cares  Neuros:  Responds with yes/no, moving all extremities   IV: No PIV   Resp/trach: RA  Diet: Regular  Bowel status: no BM overnight   : voiding with pure wick   Skin: PEG clamped  Pain: restless at times but declined interventions   Plan: Continue to monitor

## 2022-08-17 NOTE — PLAN OF CARE
NEURO: alert, hypo-phonic. Inconsistently followed commands. Flat, hypoactive affect.   RESPIRATORY: No SOB observed.   CARDIO: on comfort cares- no vitals done. Pulses 2+ radial and pedal.   GI/: BS active, + 2 incontinent BMs. Purewick in place- 350 output. 1 incontinent void. Poor PO intake. PEG tube in place, clamped.   SKIN: Pale. Several old skin tears and bruises on arms- healing. [place] legs.   ACTIVITY:  Pt turned q 2hours. Lift transfers.   PAIN: No observable signs of pain.   LINES: No PIV.   PLAN: continue to keep pt comfortable.

## 2022-08-17 NOTE — PROGRESS NOTES
Care Management Follow Up    Length of Stay (days): 56    Expected Discharge Date: 08/19/2022     Concerns to be Addressed:   discharge planning     Patient plan of care discussed at interdisciplinary rounds: Yes    Anticipated Discharge Disposition:  LTC w/ hospice vs. Home w/ hospice     Anticipated Discharge Services:  Hospice  Anticipated Discharge DME:  TBD    Patient/family educated on Medicare website which has current facility and service quality ratings:  Yes  Education Provided on the Discharge Plan:  Yes  Patient/Family in Agreement with the Plan:  Yes    Referrals Placed by CM/SW:    DECLINED:   - [facility] (Mei) is full with a 60 person wait list  - [facility]  - [facility] (Mahnaz), is full and does not have long term or hospice rooms available  - [facility] (Dasia), declined for admit stating that they cannot meet pt's needs due to internal acuity  - [facility] (Faye), states no openings on their long term care unit so cannot accept a hospice pt   - [facility] (Mahnaz [phone]), no long term care beds available to provide hospice.     [facility]  (P: [phone], F: [phone])  8/16: resent referral but with request for LTC w/ hospice this time  8/17: Spoke with Jim in LTC Admissions who reported that they are full and have no beds available.     [facility]   [address] [place] [address]  P: [phone], F: [phone]  8/16: resent referral for LTC w Hospice  8/17: Spoke with Sandy Bassett, liaison for [facility] and they have no beds available for LTC with hospice     [facility] [address] [place] [address]  P: [phone], F: [phone]  8/16: resent referral for LTC w Hospice  8/17: Spoke with yanni Wooetnison for [facility] and they have no beds available for LTC with hospice    HOSPICE:  [facility] [phone]) - daughter Allyson has scheduled an assessment to take place at patient bedside on Monday, 8/15 at 1200.  [facility] ([place] [phone]) - can offer private pay extended hours through \"[employer]\" which could start immediately if family wants to pursue this option.    [facility] - do not have staffing at this time to support extended hours needed if pt went home on hospice.     Private pay costs discussed: Not applicable      Additional Information:  SW called pt's daughter, Allyson to f/u with her on the additional declines from other LTC facilities. SW reviewed where referrals had been made up to this point and that there was no bed availability. Allyson reported that she had also sent the list/Medicare.gov link to her dad and sister for them to look at other LTC facilities they would be interested in. Allyson noted that they seemed to have more of a preference on location and were attempting to find more options. Allyson noted that right now she did not have other options but that they would look on Medicare.gov and call back.     SW will continue to follow for discharge planning and finding LTC with hospice or moving to hospice at daughter Allyson's home.     Ally Harris, RACHEL  [place]   [facility]         "

## 2022-08-17 NOTE — PROGRESS NOTES
[facility]    Medicine Progress Note - Hospitalist Service, GOLD TEAM 6    Date of Admission:  6/22/2022    Assessment & Plan        Louise Benítez is a 76 year old female with history of seronegative RA, MDS, PAD, and osteoporosis who was admitted 6/22/22 for traumatic SDH with midline shift s/p emergent craniotomy and evacuation. Hospital course c/b non-convulsive status epilepticus, GIB s/p EGD and clipping of gastric ulcer (7/5), acute blood loss anemia, aspiration s/p PEG placement (7/15), and further decompensation on 8/3/22 with acute hypoxic respiratory failure and sepsis of unknown etiology. Transitioned to comfort measures only on 8/4/22 per goals of care discussion with family.    Interval History 8/17:   -Continue comfort cares. Awaiting responses from some remaining hospice agencies.     Medical Issues Addressed this Hospitalization: See notes 8/4 and prior for further details    [facility]  Sepsis of Unknown Etiology - Patient with aspiration event and decompensation 8/3, 8/4, 8/5. Family elected not to workup or treat infection, thus source not determined. Placed on comfort cares while pursuing hospice vs home care options.      SDH with Midline shift s/p Emergent Craniotomy with SDH Evacuation (6/22/22) c/b Nonconvulsive Status Epilepticus (vEEG 7/8) - Initial fall w/ SDH 8/12. Admitted to neuro ICU 5/12-5/20. Discharged to TCU. Returned home from TCU and was doing well Then had mechanical fall 6/22. Head CT noted new SDH w/ midline shift. Subsequently underwent L craniotomy for SDH evacuation. Head CT 7/22 stable from 7/14, no explanations for febrile spikes. CT head 7/28 with stable fluid collection and associated R to L midline shift.     Acute Blood Loss Anemia on Chronic Microcytic Anemia  Gastric Ulcers c/b GIB - S/p EGD 7/5 with clipping of 5mm ulcers in posterior gastric wall. Bloody stool with associated Hgb drop 7/17. Transfused 1U RBC. Colonoscopy  7/19 noted ulcers at splenic flexure that were likely source of bleeding but stopped bleeding on their own. Hgb initially improved after colonoscopy but acutely declined on 7/23. Lovenox held but no evidence of recurrent GIB.     Mild Transaminitis     Non-severe protein calorie malnutrition  Dysphagia    Sepsis 2/2 Cellulitis at G tube Insertion Site (placed by IR 7/15) - See notes 7/31 and prior for details. On 7/23, RN reported redness and purulent discharge around PEG insertion site. CT AP 7/24 noted inflammation without isolated fluid collection. Cx +for MSSA. Completed course of abx 7/31    Myelodysplasic syndrome   HTN  Seronegative RA  Osteoporosis  HLD, PAD  Loose stools  Aspiration s/p IR G tube placement (7/15), resolved: See notes 7/31 and prior for details   Rhinovirus                  Diet: Snacks/Supplements Adult: Ensure Clear; Between Meals  Regular Diet Adult    DVT Prophylaxis: None  Vasquez Catheter: Not present  Central Lines: None  Cardiac Monitoring: None  Code Status: No CPR- Do NOT Intubate      Disposition Plan     Expected Discharge Date: 08/17/2022    Discharge Delays: Comfort Care/Hospice  Placement - Homecare    Discharge Comments: On comfort cares        The patient's care was discussed with the Attending Physician, Dr. Bobo Lange.    Giulia Orozco PA-C  Hospitalist Service, GOLD TEAM [address] [place] [address]  Securely message with the Vocera Web Console (learn more here)  Text page via [facility] Paging/Directory   Please see signed in provider for up to date coverage information      Clinically Significant Risk Factors Present on Admission                      ______________________________________________________________________    Interval History   All overnight events reviewed. Patient resting in bed.  Able to give OK sign with hands when asked how she's doing.  She does not respond to any further questions.     Data reviewed today: I reviewed all  medications, new labs and imaging results over the last 24 hours.    Physical Exam   Vital Signs:           Resp: 16        Weight: 146 lbs 2.64 oz  GENERAL: Alert, in NAD.    HEENT: Anicteric sclera. Mucous membranes dry  CV: RRR. S1, S2. No murmurs appreciated.   RESPIRATORY: Effort normal on RA., Breathing is non labored.   GI: Abdomen soft and non distended with normoactive bowel sounds present in all quadrants.   EXTREMITIES: No peripheral edema. Intact bilateral pedal pulses.   SKIN: No jaundice. No rashes.      Data   No lab results found in last 7 days.

## 2022-08-18 NOTE — PROGRESS NOTES
Care Management Follow Up     Length of Stay (days): 57     Expected Discharge Date: 08/19/2022     Concerns to be Addressed:   discharge planning     Patient plan of care discussed at interdisciplinary rounds: Yes     Anticipated Discharge Disposition:  LTC w/ hospice vs. Home w/ hospice     Anticipated Discharge Services:  Hospice  Anticipated Discharge DME:  TBD     Patient/family educated on Medicare website which has current facility and service quality ratings:  Yes  Education Provided on the Discharge Plan:  Yes  Patient/Family in Agreement with the Plan:  Yes     Referrals Placed by CM/SW:    DECLINED:   - [facility] (Mei) is full with a 60 person wait list  - [facility]ori[facility]  - [facility] (Mahnaz), is full and does not have long term or hospice rooms available  - [facility] (Dasia), declined for admit stating that they cannot meet pt's needs due to internal acuity  - [facility] (Faye), states no openings on their long term care unit so cannot accept a hospice pt   - [facility] ([place]ahn[place] [phone]), no long term care beds available to provide hospice.     [facility]  (P: [phone], F: [phone])  8/16: resent referral but with request for LTC w/ hospice this time  8/17: Spoke with Jim in LTC Admissions who reported that they are full and have no beds available.      [facility]   [address] [place] [address]  P: [phone], F: [phone]  8/16: resent referral for LTC w Hospice  8/17: Spoke with yanni Wootenison for [facility] and they have no beds available for LTC with hospice     [facility] [address] [place] [address]  P: [phone], F: [phone]  8/16: resent referral for LTC w Hospice  8/17: Spoke with yanni Wootenison for [facility] and they have no beds available for LTC with  "hospice     HOSPICE:  [facility] [phone]) - cecilio Valadez has scheduled an assessment to take place at patient bedside on Monday, 8/15 at 1200.  [facility] ([place] [phone]) - can offer private pay extended hours through \"[employer]\" which could start immediately if family wants to pursue this option.    [facility] - do not have staffing at this time to support extended hours needed if pt went home on hospice.     Private pay costs discussed: Not applicable        Additional Information:    Goals of care have changed for Pt. The Pt remains on comfort cares as the family explores all post hospital options. The family is now leaning towards home with hospice vs. Hospice in a facility setting.     RACHEL called cecilio Valadez for support. Allyson appeared anxious mood, oriented, appropriate and engaged in the conversation. She noted that their visit with [facility] went well on the 8/15 but they would like to pursue [facility] as an additional option. Cecilio Valadez asked for [facility] contact to speak directly with agency to learn more about their role in care/agency. RACHEL provided admissions number to kevan Valadez reported that she has the medicare list and will obtain additional options after further discussion with family. RACHEL advised her to follow up with RACHEL. Allyson appreciative of the supportive call. RACHEL will continue to follow for psychosocial support, resources and advocate on behalf of the patient.    Princess VALERO [facility]  Outpatient Kidney/Pancreas/Auto Islet Transplant Program   [address] [place] [id number]  [email or web address]  [email or web address]  Office: [phone] I Fax: [phone]    Searchable on i[employer]-search SOCIAL WORK - for text paging options      Saturday & Sunday & Holidays    On-Call Pager [phone]    4A 4C 4E 5A 5B         [phone]    6A 6B 6C 6D               [phone]    5C 7A 7B 7C 7D         " [phone]         For Hours 1600-Midnight Pager [phone]

## 2022-08-18 NOTE — PROGRESS NOTES
[facility]    Medicine Progress Note - Hospitalist Service, GOLD TEAM 6    Date of Admission:  6/22/2022    Assessment & Plan        Louise Benítez is a 76 year old female with history of seronegative RA, MDS, PAD, and osteoporosis who was admitted 6/22/22 for traumatic SDH with midline shift s/p emergent craniotomy and evacuation. Hospital course c/b non-convulsive status epilepticus, GIB s/p EGD and clipping of gastric ulcer (7/5), acute blood loss anemia, aspiration s/p PEG placement (7/15), and further decompensation on 8/3/22 with acute hypoxic respiratory failure and sepsis of unknown etiology. Transitioned to comfort measures only on 8/4/22 per goals of care discussion with family.    Interval History 8/18:   -Remains on comfort cares  -Family exploring all post-hospital options, including hospice vs long term care      Medical Issues Addressed this Hospitalization: See notes 8/4 and prior for further details     [facility]  Sepsis of Unknown Etiology - Patient with aspiration event and decompensation 8/3, 8/4, 8/5. Family elected not to workup or treat infection, thus source not determined. Placed on comfort cares while pursuing hospice vs home care options.      SDH with Midline shift s/p Emergent Craniotomy with SDH Evacuation (6/22/22) c/b Nonconvulsive Status Epilepticus (vEEG 7/8) - Initial fall w/ SDH 8/12. Admitted to neuro ICU 5/12-5/20. Discharged to TCU. Returned home from TCU and was doing well Then had mechanical fall 6/22. Head CT noted new SDH w/ midline shift. Subsequently underwent L craniotomy for SDH evacuation. Head CT 7/22 stable from 7/14, no explanations for febrile spikes. CT head 7/28 with stable fluid collection and associated R to L midline shift.     Acute Blood Loss Anemia on Chronic Microcytic Anemia  Gastric Ulcers c/b GIB - S/p EGD 7/5 with clipping of 5mm ulcers in posterior gastric wall. Bloody stool with associated Hgb drop  7/17. Transfused 1U RBC. Colonoscopy 7/19 noted ulcers at splenic flexure that were likely source of bleeding but stopped bleeding on their own. Hgb initially improved after colonoscopy but acutely declined on 7/23. Lovenox held but no evidence of recurrent GIB.     Mild Transaminitis      Non-severe protein calorie malnutrition  Dysphagia     Sepsis 2/2 Cellulitis at G tube Insertion Site (placed by IR 7/15) - See notes 7/31 and prior for details. On 7/23, RN reported redness and purulent discharge around PEG insertion site. CT AP 7/24 noted inflammation without isolated fluid collection. Cx +for MSSA. Completed course of abx 7/31     Myelodysplasic syndrome   HTN  Seronegative RA  Osteoporosis  HLD, PAD  Loose stools  Aspiration s/p IR G tube placement (7/15), resolved: See notes 7/31 and prior for details   Rhinovirus               Diet: Snacks/Supplements Adult: Ensure Clear; Between Meals  Regular Diet Adult    DVT Prophylaxis: None  Vasquez Catheter: Not present  Central Lines: None  Cardiac Monitoring: None  Code Status: No CPR- Do NOT Intubate      Disposition Plan      Expected Discharge Date: 08/22/2022    Discharge Delays: Comfort Care/Hospice  Placement - Homecare    Discharge Comments: On comfort cares; awaiting hospice vs. home cares placement  8/17 - family to give facility options today        The patient's care was discussed with the Attending Physician, Dr. Bobo Lange.    Giulia Orozco PA-C  Hospitalist Service, GOLD TEAM [address] [place] [address]  Securely message with the Vocera Web Console (learn more here)  Text page via [facility] Paging/Directory   Please see signed in provider for up to date coverage information      Clinically Significant Risk Factors Present on Admission                      ______________________________________________________________________    Interval History   All overnight events reviewed. Patient resting comfortably today, less  responsive.     Data reviewed today: I reviewed all medications, new labs and imaging results over the last 24 hours.    Physical Exam   Vital Signs:           Resp: 16        Weight: 146 lbs 2.64 oz  GENERAL: S;eeping, in NAD  HEENT: Anicteric sclera. Mucous membranes drt  CV: RRR. No murmurs  RESPIRATORY: Effort normal on RA, non labores  GI: Abdomen soft and non distended with normoactive bowel sounds present in all quadrants.   EXTREMITIES: No peripheral edema.  SKIN: No jaundice. No rashes.        Data   No lab results found in last 7 days.

## 2022-08-18 NOTE — PROGRESS NOTES
Status: S/P craniotomy.  Now on COMFORT CARES  Neuros: Moving all extremities.  Responds with yes/no at times.    IV: No PIV  Resp/trach: RA  Diet: regular. 1:1 feeder  Bowel status: BM x1 overnight  : voiding, brief in place  Skin: PEG clamped, abdomen binder on  Pain: no s/s of pain  Activity: Turn/repo Q2-3 hrs, and PRN.   Plan: continue with current POC

## 2022-08-18 NOTE — PLAN OF CARE
Goal Outcome Evaluation:      Patient is comfortable up sitting in the  recliner accompanied with family member,no s/s of pain noted.Patient was fed during meals had average appetite drank 240 ml of fluids.Patient is on comfort cares waiting for replacement LTC hospice.

## 2022-08-19 NOTE — PLAN OF CARE
Goal Outcome Evaluation:  Alert, communicates intermittently and gives yes/no answers or nodes head or does not answer and stare at the  writer. Pt moves all extremities. Pt was able to take morning meds crushed and mixed in apple sauce. Pt ate 25% of her breakfast per , Jd. Pt is a 1:1 feed. Incontinent of bowel and bladder, small soft stool x1. Purewick in place per 's permission. Position change Q 2 hrs, last postion change at 2 pm.    Plan for LTC w/ hospice vs. Home w/ hospice per  note

## 2022-08-19 NOTE — PLAN OF CARE
Status: S/p craniotomy, now on comfort cares  Neuros: Intermittently communicates and gives yes/no answers, moves all extremities  IV: No PIV  Resp/trach: RA  Diet: Reg, 1:1 feeder  Bowel status: No BM NOC, last BM 8/18  : Voiding, brief in place  Skin: WDL  Pain: No signs of pain  Activity: Turn repo q2h  Social: Calm and cooperative  Plan: Awaiting LTC hospice  Updates this shift: Pt seemed to rest well in between cares, last repo'd around 0620

## 2022-08-19 NOTE — PROGRESS NOTES
[facility]    Medicine Progress Note - Hospitalist Service, GOLD TEAM 6    Date of Admission:  6/22/2022    Assessment & Plan        Louise Benítez is a 76 year old female with history of seronegative RA, MDS, PAD, and osteoporosis who was admitted 6/22/22 for traumatic SDH with midline shift s/p emergent craniotomy and evacuation. Hospital course c/b non-convulsive status epilepticus, GIB s/p EGD and clipping of gastric ulcer (7/5), acute blood loss anemia, aspiration s/p PEG placement (7/15), and further decompensation on 8/3/22 with acute hypoxic respiratory failure and sepsis of unknown etiology. Transitioned to comfort measures only on 8/4/22 per goals of care discussion with family.    Interval History 8/19:   -Remains on comfort cares pending LTC hospice placement      Medical Issues Addressed this Hospitalization: See notes 8/4 and prior for further details     AHRF  Sepsis of Unknown Etiology - Patient with aspiration event and decompensation 8/3, 8/4, 8/5. Family elected not to workup or treat infection, thus source not determined. Placed on comfort cares while pursuing hospice vs home care options.      SDH with Midline shift s/p Emergent Craniotomy with SDH Evacuation (6/22/22) c/b Nonconvulsive Status Epilepticus (vEEG 7/8) - Initial fall w/ SDH 8/12. Admitted to neuro ICU 5/12-5/20. Discharged to TCU. Returned home from TCU and was doing well Then had mechanical fall 6/22. Head CT noted new SDH w/ midline shift. Subsequently underwent L craniotomy for SDH evacuation. Head CT 7/22 stable from 7/14, no explanations for febrile spikes. CT head 7/28 with stable fluid collection and associated R to L midline shift.     Acute Blood Loss Anemia on Chronic Microcytic Anemia  Gastric Ulcers c/b GIB - S/p EGD 7/5 with clipping of 5mm ulcers in posterior gastric wall. Bloody stool with associated Hgb drop 7/17. Transfused 1U RBC. Colonoscopy 7/19 noted ulcers at  splenic flexure that were likely source of bleeding but stopped bleeding on their own. Hgb initially improved after colonoscopy but acutely declined on 7/23. Lovenox held but no evidence of recurrent GIB.     Mild Transaminitis      Non-severe protein calorie malnutrition  Dysphagia     Sepsis 2/2 Cellulitis at G tube Insertion Site (placed by IR 7/15) - See notes 7/31 and prior for details. On 7/23, RN reported redness and purulent discharge around PEG insertion site. CT AP 7/24 noted inflammation without isolated fluid collection. Cx +for MSSA. Completed course of abx 7/31     Myelodysplasic syndrome   HTN  Seronegative RA  Osteoporosis  HLD, PAD  Loose stools  Aspiration s/p IR G tube placement (7/15), resolved: See notes 7/31 and prior for details   Rhinovirus              Diet: Snacks/Supplements Adult: Ensure Clear; Between Meals  Regular Diet Adult    DVT Prophylaxis: None  Vasquez Catheter: Not present  Central Lines: None  Cardiac Monitoring: None  Code Status: No CPR- Do NOT Intubate      Disposition Plan     Expected Discharge Date: 08/22/2022    Discharge Delays: Comfort Care/Hospice  Placement - Homecare    Discharge Comments: On comfort cares; awaiting hospice vs. home cares placement  8/17 - family to give facility options today        The patient's care was discussed with the Attending Physician, Dr. Bobo Lange.    Giulia Orozco PA-C  Hospitalist Service, GOLD TEAM [address] [place] [address]  Securely message with the Vocera Web Console (learn more here)  Text page via [facility] Paging/Directory   Please see signed in provider for up to date coverage information      Clinically Significant Risk Factors Present on Admission                      ______________________________________________________________________    Interval History   All overnight events reviewed.   Patient seen this AM, sleeping in bed. In NAD.     Data reviewed today: I reviewed all medications, new  labs and imaging results over the last 24 hours.     Physical Exam   Vital Signs:                    Weight: 146 lbs 2.64 oz  GENERAL: S;eeping, in NAD  HEENT: Anicteric sclera. Mucous membranes dry  CV: RRR. No murmurs  RESPIRATORY: Effort normal on RA, non labored respirations   GI: Abdomen soft and non distended with normoactive bowel sounds present in all quadrants.   EXTREMITIES: No peripheral edema.  SKIN: No jaundice. No rashes.       Data   No lab results found in last 7 days.

## 2022-08-20 NOTE — PLAN OF CARE
Goal Outcome Evaluation:     Alert,communicates intermittently  and gives yes/no answers or nodes head or does not answer and stare at the  writer. Pt moves all extremities. Pt was able to take meds crushed and mixed in apple sauce. Pt ate 25% of her breakfast per , Jd and 25% of dinner with 1:1 feeding.Pt likes clear ensure strawberry flavor. Incontinent of bowel and bladder,  small soft stool x2. Purewick in place per 's permission. Position change Q 2 hrs, last position change around 10 pm.  Bed alarm maintained for safety.     Plan for LTC w/ hospice vs. Home w/ hospice per  note

## 2022-08-20 NOTE — PROGRESS NOTES
[facility]    Medicine Progress Note - Hospitalist Service, GOLD TEAM 6    Date of Admission:  6/22/2022    Assessment & Plan        Louise Benítez is a 76 year old female with history of seronegative RA, MDS, PAD, and osteoporosis who was admitted 6/22/22 for traumatic SDH with midline shift s/p emergent craniotomy and evacuation. Hospital course c/b non-convulsive status epilepticus, GIB s/p EGD and clipping of gastric ulcer (7/5), acute blood loss anemia, aspiration s/p PEG placement (7/15), and further decompensation on 8/3/22 with acute hypoxic respiratory failure and sepsis of unknown etiology. Transitioned to comfort measures only on 8/4/22 per goals of care discussion with family.    Medical Issues Addressed this Hospitalization: See notes 8/4 and prior for further details     [facility]  Sepsis of Unknown Etiology - Patient with aspiration event and decompensation 8/3, 8/4, 8/5. Family elected not to workup or treat infection, thus source not determined. Placed on comfort cares while pursuing hospice vs home care options.      SDH with Midline shift s/p Emergent Craniotomy with SDH Evacuation (6/22/22) c/b Nonconvulsive Status Epilepticus (vEEG 7/8) - Initial fall w/ SDH 8/12. Admitted to neuro ICU 5/12-5/20. Discharged to TCU. Returned home from TCU and was doing well Then had mechanical fall 6/22. Head CT noted new SDH w/ midline shift. Subsequently underwent L craniotomy for SDH evacuation. Head CT 7/22 stable from 7/14, no explanations for febrile spikes. CT head 7/28 with stable fluid collection and associated R to L midline shift.     Acute Blood Loss Anemia on Chronic Microcytic Anemia  Gastric Ulcers c/b GIB - S/p EGD 7/5 with clipping of 5mm ulcers in posterior gastric wall. Bloody stool with associated Hgb drop 7/17. Transfused 1U RBC. Colonoscopy 7/19 noted ulcers at splenic flexure that were likely source of bleeding but stopped bleeding on their own.  Hgb initially improved after colonoscopy but acutely declined on 7/23. Lovenox held but no evidence of recurrent GIB.     Mild Transaminitis      Non-severe protein calorie malnutrition  Dysphagia     Sepsis 2/2 Cellulitis at G tube Insertion Site (placed by IR 7/15) - See notes 7/31 and prior for details. On 7/23, RN reported redness and purulent discharge around PEG insertion site. CT AP 7/24 noted inflammation without isolated fluid collection. Cx +for MSSA. Completed course of abx 7/31     Myelodysplasic syndrome   HTN  Seronegative RA  Osteoporosis  HLD, PAD  Loose stools  Aspiration s/p IR G tube placement (7/15), resolved: See notes 7/31 and prior for details   Rhinovirus                    Diet: Snacks/Supplements Adult: Ensure Clear; Between Meals  Regular Diet Adult    DVT Prophylaxis: None  Vasquez Catheter: Not present  Central Lines: None  Cardiac Monitoring: None  Code Status: No CPR- Do NOT Intubate      Disposition Plan      Expected Discharge Date: 08/22/2022    Discharge Delays: Comfort Care/Hospice  Placement - Homecare    Discharge Comments: On comfort cares; awaiting hospice vs. home cares placement  8/17 - family to give facility options today.  8/19 Still no plan as to home hospice vs TCU Hospice.        The patient's care was discussed with the Attending Physician, Dr. Bobo Lange.    Giulia Orozco PA-C  Hospitalist Service, [address]  Securely message with the Vocera Web Console (learn more here)  Text page via [facility] Paging/Directory   Please see signed in provider for up to date coverage information      Clinically Significant Risk Factors Present on Admission                      ______________________________________________________________________    Interval History   All overnight events reviewed.   Patient resting comfortably, alert today.  Daughter Barbara and grandson at bedside.     Data reviewed today: I reviewed all  medications, new labs and imaging results over the last 24 hours.    Physical Exam   Vital Signs:                    Weight: 146 lbs 2.64 oz  GENERAL:   HEENT: Anicteric sclera. Mucous membranes moist.   CV: RRR. S1, S2. No murmurs appreciated.   RESPIRATORY: Effort normal on RA Lungs CTAB with no wheezing, rales, rhonchi.   GI: Abdomen soft and non distended with normoactive bowel sounds present in all quadrants.  EXTREMITIES: No peripheral edema.  SKIN: No jaundice. No rashes.        Data   No lab results found in last 7 days.

## 2022-08-20 NOTE — PLAN OF CARE
[id number]  Status: Admitted for SDH w/ many complications. Now comfort cares. Whisper or uses nods for yes or no.  Intermittently refused repositioning. Purewick in place with some output. PEG clamped-meds given through tube. Calm throughout shift. Denied pain. Bed alarm on for safety. Still discussing wether to do LTC Hospice vs Homecare Hospice.

## 2022-08-20 NOTE — PLAN OF CARE
Status: S/p craniotomy, now on comfort cares  Neuros:  Responds with yes/no, moving all extremities   IV: No PIV   Resp/trach: RA  Diet: Regular, 1:1 feeder   Bowel status: no BM overnight, LBM 8/18   : voiding with pure wick   Skin: PEG clamped  Pain: restless at times but declined interventions  Plan: Continue to monitor, offered repositioning q2hrs, intermittently refusing. Last reposition around 0430.

## 2022-08-21 NOTE — PROGRESS NOTES
[facility]    Medicine Progress Note - Hospitalist Service, GOLD TEAM 6    Date of Admission:  6/22/2022    Assessment & Plan        Louise Benítez is a 76 year old female with history of seronegative RA, MDS, PAD, and osteoporosis who was admitted 6/22/22 for traumatic SDH with midline shift s/p emergent craniotomy and evacuation. Hospital course c/b non-convulsive status epilepticus, GIB s/p EGD and clipping of gastric ulcer (7/5), acute blood loss anemia, aspiration s/p PEG placement (7/15), and further decompensation on 8/3/22 with acute hypoxic respiratory failure and sepsis of unknown etiology. Transitioned to comfort measures only on 8/4/22 per goals of care discussion with family.     Medical Issues Addressed this Hospitalization: See notes 8/4 and prior for further details     [facility]  Sepsis of Unknown Etiology - Patient with aspiration event and decompensation 8/3, 8/4, 8/5. Family elected not to workup or treat infection, thus source not determined. Placed on comfort cares while pursuing hospice vs home care options.      SDH with Midline shift s/p Emergent Craniotomy with SDH Evacuation (6/22/22) c/b Nonconvulsive Status Epilepticus (vEEG 7/8) - Initial fall w/ SDH 8/12. Admitted to neuro ICU 5/12-5/20. Discharged to TCU. Returned home from TCU and was doing well Then had mechanical fall 6/22. Head CT noted new SDH w/ midline shift. Subsequently underwent L craniotomy for SDH evacuation. Head CT 7/22 stable from 7/14, no explanations for febrile spikes. CT head 7/28 with stable fluid collection and associated R to L midline shift.     Acute Blood Loss Anemia on Chronic Microcytic Anemia  Gastric Ulcers c/b GIB - S/p EGD 7/5 with clipping of 5mm ulcers in posterior gastric wall. Bloody stool with associated Hgb drop 7/17. Transfused 1U RBC. Colonoscopy 7/19 noted ulcers at splenic flexure that were likely source of bleeding but stopped bleeding on their own.  Hgb initially improved after colonoscopy but acutely declined on 7/23. Lovenox held but no evidence of recurrent GIB.     Mild Transaminitis      Non-severe protein calorie malnutrition  Dysphagia     Sepsis 2/2 Cellulitis at G tube Insertion Site (placed by IR 7/15) - See notes 7/31 and prior for details. On 7/23, RN reported redness and purulent discharge around PEG insertion site. CT AP 7/24 noted inflammation without isolated fluid collection. Cx +for MSSA. Completed course of abx 7/31     Myelodysplasic syndrome   HTN  Seronegative RA  Osteoporosis  HLD, PAD  Loose stools  Aspiration s/p IR G tube placement (7/15), resolved: See notes 7/31 and prior for details   Rhinovirus              Diet: Snacks/Supplements Adult: Ensure Clear; Between Meals  Regular Diet Adult    DVT Prophylaxis: None  Vasquez Catheter: Not present  Central Lines: None  Cardiac Monitoring: None  Code Status: No CPR- Do NOT Intubate      Disposition Plan     Expected Discharge Date: 08/22/2022    Discharge Delays: Comfort Care/Hospice  Placement - Homecare    Discharge Comments: On comfort cares; awaiting hospice vs. home cares placement  8/17 - family to give facility options today.  8/19 Still no plan as to home hospice vs TCU Hospice.        The patient's care was discussed with the Attending Physician, Dr. Bobo Lange.    Giulia Orozco PA-C  Hospitalist Service, [address]  Securely message with the Vocera Web Console (learn more here)  Text page via [facility] Paging/Directory   Please see signed in provider for up to date coverage information      Clinically Significant Risk Factors Present on Admission                      ______________________________________________________________________    Interval History   All overnight events reviewed.  Patient more alert today.  Gave a thumbs up when asked how she is. Able to answer some yes or no questions. Denies pain or difficulty  breathing.     Data reviewed today: I reviewed all medications, new labs and imaging results over the last 24 hours.    Physical Exam   Vital Signs:                    Weight: 146 lbs 2.64 oz  GENERAL: Alert    HEENT:  Mucous membranes moist.   CV: RRR. S1, S2. No murmurs appreciated.   RESPIRATORY: Effort normal on RA. Lungs CTAB with no wheezing, rales, rhonchi.   GI: Abdomen soft and non distended with normoactive bowel sounds present in all quadrants.   EXTREMITIES: No peripheral edema.  SKIN: No jaundice. No rashes.        Data   No lab results found in last 7 days.

## 2022-08-21 NOTE — PLAN OF CARE
"  Problem: Plan of Care - These are the overarching goals to be used throughout the patient stay.    Goal: Plan of Care Review/Shift Note  Description: The Plan of Care Review/Shift note should be completed every shift.  The Outcome Evaluation is a brief statement about your assessment that the patient is improving, declining, or no change.  This information will be displayed automatically on your shift note.  Outcome: Ongoing, Not Progressing  Flowsheets (Taken 8/21/2022 1712)  Plan of Care Reviewed With: patient  Overall Patient Progress: declining  Goal: Patient-Specific Goal (Individualized)  Description: You can add care plan individualizations to a care plan. Examples of Individualization might be:  \"Parent requests to be called daily at 9am for status\", \"I have a hard time hearing out of my right ear\", or \"Do not touch me to wake me up as it startles me\".  Outcome: Ongoing, Not Progressing  Goal: Absence of Hospital-Acquired Illness or Injury  Outcome: Ongoing, Not Progressing  Goal: Optimal Comfort and Wellbeing  Outcome: Ongoing, Not Progressing  Goal: Readiness for Transition of Care  Outcome: Ongoing, Not Progressing     Problem: Risk for Delirium  Goal: Optimal Coping  Outcome: Ongoing, Not Progressing  Goal: Improved Behavioral Control  Outcome: Ongoing, Not Progressing  Goal: Improved Attention and Thought Clarity  Outcome: Ongoing, Not Progressing  Goal: Improved Sleep  Outcome: Ongoing, Not Progressing     Problem: Adjustment to Surgery (Craniotomy/Craniectomy/Cranioplasty)  Goal: Optimal Coping with Surgery  Outcome: Ongoing, Not Progressing     Problem: Bowel Motility Impaired (Craniotomy/Craniectomy/Cranioplasty)  Goal: Effective Bowel Elimination  Outcome: Ongoing, Not Progressing     Problem: Cerebral Tissue Perfusion Risk (Craniotomy/Craniectomy/Cranioplasty)  Goal: Optimal Cerebral Tissue Perfusion  Outcome: Ongoing, Not Progressing     Problem: Fluid and Electrolyte Imbalance " (Craniotomy/Craniectomy/Cranioplasty)  Goal: Fluid and Electrolyte Balance  Outcome: Ongoing, Not Progressing     Problem: Functional Ability Impaired (Craniotomy/Craniectomy/Cranioplasty)  Goal: Optimal Functional Ability  Outcome: Ongoing, Not Progressing     Problem: Infection (Craniotomy/Craniectomy/Cranioplasty)  Goal: Absence of Infection Signs and Symptoms  Outcome: Ongoing, Not Progressing     Problem: Postoperative Urinary Retention (Craniotomy/Craniectomy/Cranioplasty)  Goal: Effective Urinary Elimination  Outcome: Ongoing, Not Progressing     Problem: Respiratory Compromise (Craniotomy/Craniectomy/Cranioplasty)  Goal: Effective Oxygenation and Ventilation  Outcome: Ongoing, Not Progressing   Goal Outcome Evaluation:    Plan of Care Reviewed With: patient     Overall Patient Progress: declining

## 2022-08-21 NOTE — PROGRESS NOTES
Status: Admitted for SDH w/ many complications. Now comfort cares. Whisper or uses nods for yes or no.  Intermittently refused repositioning. Purewick in place with some output. PEG clamped-meds given through tube. Calm throughout shift. Denied pain. Bed alarm on for safety. Still discussing wether to do LTC Hospice vs Homecare Hospice.

## 2022-08-21 NOTE — PLAN OF CARE
Care from [id number]    Admitted 6/22 for SDH s/p crani & evac. 7/3 gastric ulcer clipping, 7/15 PEG placement. 8/4 transition to comfort cares.  No PIV. Purewick in place. LBM 8/20. Lethargic. Arouses to voice. Regular diet. Needs help order and eating. Denied pain this shift. Up in recliner x1

## 2022-08-22 NOTE — PROGRESS NOTES
[facility]    Medicine Progress Note - Hospitalist Service, GOLD TEAM 6    Date of Admission:  6/22/2022    Assessment & Plan        Louise Benítez is a 76 year old female with history of seronegative RA, MDS, PAD, and osteoporosis who was admitted 6/22/22 for traumatic SDH with midline shift s/p emergent craniotomy and evacuation. Hospital course c/b non-convulsive status epilepticus, GIB s/p EGD and clipping of gastric ulcer (7/5), acute blood loss anemia, aspiration s/p PEG placement (7/15), and further decompensation on 8/3/22 with acute hypoxic respiratory failure and sepsis of unknown etiology. Transitioned to comfort measures only on 8/4/22 per goals of care discussion with family.    Plan for today:  - SW continues to work on placement, either LTC hospice vs home hospice.       Medical Issues Addressed this Hospitalization: See notes 8/4 and prior for further details     [facility]  Sepsis of Unknown Etiology - Patient with aspiration event and decompensation 8/3, 8/4, 8/5. Family elected not to workup or treat infection, thus source not determined. Placed on comfort cares while pursuing hospice vs home care options.      SDH with Midline shift s/p Emergent Craniotomy with SDH Evacuation (6/22/22) c/b Nonconvulsive Status Epilepticus (vEEG 7/8) - Initial fall w/ SDH 8/12. Admitted to neuro ICU 5/12-5/20. Discharged to TCU. Returned home from TCU and was doing well Then had mechanical fall 6/22. Head CT noted new SDH w/ midline shift. Subsequently underwent L craniotomy for SDH evacuation. Head CT 7/22 stable from 7/14, no explanations for febrile spikes. CT head 7/28 with stable fluid collection and associated R to L midline shift.     Acute Blood Loss Anemia on Chronic Microcytic Anemia  Gastric Ulcers c/b GIB - S/p EGD 7/5 with clipping of 5mm ulcers in posterior gastric wall. Bloody stool with associated Hgb drop 7/17. Transfused 1U RBC. Colonoscopy 7/19 noted  ulcers at splenic flexure that were likely source of bleeding but stopped bleeding on their own. Hgb initially improved after colonoscopy but acutely declined on 7/23. Lovenox held but no evidence of recurrent GIB.     Mild Transaminitis      Non-severe protein calorie malnutrition  Dysphagia     Sepsis 2/2 Cellulitis at G tube Insertion Site (placed by IR 7/15) - See notes 7/31 and prior for details. On 7/23, RN reported redness and purulent discharge around PEG insertion site. CT AP 7/24 noted inflammation without isolated fluid collection. Cx +for MSSA. Completed course of abx 7/31     Myelodysplasic syndrome   HTN  Seronegative RA  Osteoporosis  HLD, PAD  Loose stools  Aspiration s/p IR G tube placement (7/15), resolved: See notes 7/31 and prior for details   Rhinovirus        Diet: Snacks/Supplements Adult: Ensure Clear; Between Meals  Regular Diet Adult    DVT Prophylaxis: None  Vasquez Catheter: Not present  Central Lines: None  Cardiac Monitoring: None  Code Status: No CPR- Do NOT Intubate      Disposition Plan      Expected Discharge Date: 08/23/2022    Discharge Delays: Comfort Care/Hospice  Placement - Homecare    Discharge Comments: On comfort cares; awaiting hospice vs. home cares placement  8/17 - family to give facility options today.  8/19 Still no plan as to home hospice vs TCU Hospice.        The patient's care was discussed with the Attending Physician, Dr. Lange.    Gene Hooper PA-C  Hospitalist Service, GOLD TEAM [address] [place] [address]  Securely message with the Vocera Web Console (learn more here)  Text page via [facility] Paging/Directory   Please see signed in provider for up to date coverage information  ___________________________________________________________    Interval History   No acute events overnight. Currently sleeping in bed and did not respond to this writer's questions.     Data reviewed today: I reviewed all medications, new labs and  imaging results over the last 24 hours.    Physical Exam   Vital Signs:           Resp: 18   O2 Device: None (Room air)    Weight: 146 lbs 2.64 oz  GEN: In NAD  HEENT: NCAT; PERRL; sclerae non-icteric  LUNGS: CTAB  CV: RRR  ABD: +BSs; SNTND  EXT: No BLE edema  SKIN: No acute rashes noted on exposed areas.  NEURO: AAOx3; CNs grossly intact; No acute focal deficits noted.      Data   CMPNo lab results found in last 7 days.  CBCNo lab results found in last 7 days.  INRNo lab results found in last 7 days.  Arterial Blood GasNo lab results found in last 7 days.

## 2022-08-22 NOTE — PLAN OF CARE
Goal Outcome Evaluation:      Status: Admitted for SDH w/ many complications and on comfort cares. Whisper or uses nods for yes or no.  Intermittently refused repositioning. Purewick in place with some output. PEG clamped-meds given through tube. Was awake and up in recliner and now back in bed with lift. Denied pain. Bed alarm on for safety. Plan do LTC Hospice vs Homecare Hospice.Will continue to monitor.

## 2022-08-22 NOTE — PROGRESS NOTES
Care Management Follow Up     Length of Stay (days): 61     Expected Discharge Date: 08/23/2022     Concerns to be Addressed:   Disposition planning   Patient plan of care discussed at interdisciplinary rounds: Yes     Anticipated Discharge Disposition:  Hospice placement in a community SNF with contracted hospice services     Anticipated Discharge Services:  Hospice placement in a community SNF with contracted hospice services  Anticipated Discharge DME:   Not applicable at this time     Patient/family educated on Medicare website which has current facility and service quality ratings: SW has provided pt's family with a list of residential hospices and medicare care compare  Education Provided on the Discharge Plan:   yes  Patient/Family in Agreement with the Plan:  yes     Referrals Placed by CM/SW:  None at this time  Private pay costs discussed: yes, education was provided in regards to private pay residential hospices and private pay SNF placement     Additional Information:  SW is following pt for discharge planning.  Pt is comfort care only and per Dr. Gene Hooper, pt remains medically ready for discharge.    Per 8/13/2022  progress note entry written by Barbara Sandoval, pt's family wants to continue to pursue hospice/comfort cares only.  Per review of  progress notes, family has not committed to plan for home with hospice vs SNF placement with hospice.        RACHEL met with pt's  (Jd).  SW informed of readiness for discharge and need to move forward with discharge planning.  Jd states that he as well as any of the adult children can be involved with decision making as they are speak with each other and ultimately will all agree to the same plan.  Pt's  states that he would like to pursue nursing home placement (SNF) with contracted hospice services as they do not know how long pt will live making is difficult for his daughters (who work) to commit to providing 24 hour care in the home  with contracted hospice services.  Jd indicates that he would like pt placed in the [facility] as daughters live in [place] and [place] and this would be easiest for all of them.      SW phoned pt's daughter (Allyson) and informed of above conversation with pt's .  SW also indicated that discharge planning needs to move forward as pt remains medically ready for discharge.  SW informed Allyson that SW will phone Admissions at facility's in the [facility] that are 4 to 5 out of 5 star rated that have not yet been contacted.  Pt's daughter voices agreement with this plan.  Daughter also states that she followed up with [facility] today and has been in contact with [facility].  Daughter requests that SW inform her if there is an accepting SNF.            The following SNF's have indicated they can not accept pt for admit for hospice:  - [facility] (Mei) is full with a 60 person wait list  - [facility] (Jori), is full  - [facility] (Mahnaz), is full and does not have long term or hospice rooms available  - [facility] (Dasia), declined for admit stating that they cannot meet pt's needs due to internal acuity  - [facility] (Faye), states no openings on their long term care unit so cannot accept a hospice pt   - [facility] (Mahnaz [phone]), no long term care beds available to provide hospice  - [facility] is full  - [facility] is full  - [facility]\  - [facility] assessed and declined  - [facility] is full.    SW placed a follow up call to the following facility as they had previously stated that they were closed for admit and perhaps they are now accepting admits:  - [facility], left message and faxed referral via [employer].      RACHEL phoned Admissions Coordinators at the following SNF's:  - [facility] (Cecilia), is full with no  anticipated openings in the near future  - [facility]), left message referring pt and faxed assessment materials via Epic\  - [facility] (Marcella), left message referring pt and faxed assessment materials via Epic  - [facility] (Howard), left message referring pt and faxed assessment materials via Epic  - [facility] (Chloe), left message referring pt and faxed assessment materials via Epic  - [facility] (Ade) has Epic access and will review pt.     SW will continue to follow for discharge planning.      CÉSAR Bailey  Social Work, 6A  Phone:  [phone]  Pager:  [phone]  8/22/2022

## 2022-08-22 NOTE — PROGRESS NOTES
Palliative Care Note    Chart reviewed. Plan to discharge with hospice. As we are not actively managing symptoms, palliative care will sign off. Please re-consult if needed.    TRUNG [facility]  Contact information available via [facility] Paging/Directory

## 2022-08-22 NOTE — PLAN OF CARE
Goal Outcome Evaluation:    Plan of Care Reviewed With: patient     Overall Patient Progress: no change    NEURO: lethargic, opens eyes to voice, moves all extremities  RESPIRATORY: WDL on RA except intermittent weak/nonproductive cough  CARDIAC: WDL  GI/: Incontinent of bladder and bowel, purewick in place w/ 275mL clear deborah output  DIET: Regular, 1:1 feeder  PAIN/NAUSEA: no s/s  INCISION/DRAINS: PEG clamped, meds given through PEG  IV ACCESS: N/A  ACTIVITY: bedrest, q2hr repo/turn  PLAN: Continue w/ comfort cares, oral cares, and repo/turning; plan to discharge to SNF on hospice, SW following for placement

## 2022-08-22 NOTE — PLAN OF CARE
Comfort cares . Assumed cares [phone]  Pt obtunded, sleeping at beginning of shift. RR unlabored, shallow. Up talking a little this evening in one-two word phrases. Denies pain. Repositioned per comfort. No PRNs required this shift. Family at bedside.

## 2022-08-23 NOTE — PLAN OF CARE
"/40 (BP Location: Right arm)   Pulse 108   Temp 99.5  F (37.5  C) (Axillary)   Resp 12   Ht 1.626 m (5' 4.02\")   Wt 66.3 kg (146 lb 2.6 oz)   SpO2 94%   BMI 25.08 kg/m      Status: S/p craniotomy, now on comfort cares  Neuros: Intermittently communicates, get better responses with yes/no answers.  Resp/trach: RA  Diet: Regular diet, needs help with food ordering and  1:1 feeder  Bowel status: No BM during shift, last BM 8/22  : Voiding adquately, purewick in place.  Skin: WDL  Pain: No signs of pain/ distress.   Activity: Turn repo q2h, up in the chair for breakfast and lunch.  IV: No PIV  Social: Calm and cooperative  Plan: Awaiting LTC hospice  Updates this shift: Continue monitoring        "

## 2022-08-23 NOTE — PROGRESS NOTES
Care Management Follow Up     Length of Stay (days): 62     Expected Discharge Date: 08/24/2022     Concerns to be Addressed:   Disposition planning   Patient plan of care discussed at interdisciplinary rounds: Yes     Anticipated Discharge Disposition:  Hospice placement in a community SNF with contracted hospice services     Anticipated Discharge Services:  Hospice placement in a community SNF with contracted hospice services  Anticipated Discharge DME:   Not applicable at this time     Patient/family educated on Medicare website which has current facility and service quality ratings: SW has provided pt's family with a list of residential hospices and medicare care compare  Education Provided on the Discharge Plan:   yes  Patient/Family in Agreement with the Plan:  yes     Referrals Placed by CM/SW:  None at this time  Private pay costs discussed: yes, education was provided in regards to private pay residential hospices and private pay SNF placement     Additional Information:  SW is following pt for discharge planning.  Pt is comfort care only and remains medically ready for discharge.  Hospice placement (with contracted hospice services) in a community SNF is being pursued.         The following SNF's have indicated they can not accept pt for admit for hospice:  - [facility] (Mei) is full with a 60 person wait list  - [facility] (Jori), is full  - [facility] (Mahnaz), is full and does not have long term or hospice rooms available  - [facility] (Dasia), declined for admit stating that they cannot meet pt's needs due to internal acuity  - [facility] (Faye), states no openings on their long term care unit so cannot accept a hospice pt   - [facility] (Mahnaz [phone]), no long term care beds available to provide hospice  - [facility] is full  - [facility] is full  - [facility]  - [facility] assessed and declined  - [facility] is full.  - [facility] (Cecilia), is full with no anticipated openings in the near future  - [facility] ([facility]), does not have openings on the long term care unit for hospice and none are anticipated in the near future  - [facility] (Marcella), has a 4 year wait list for openings on long term care (this is where hospice pt's are placed within their facility)  - [facility] (Howard), does not anticipate openings for at least 2 weeks  - [facility] (Chloe), has no openings for hospice pt's and does not anticipate openings in the near future.    SW placed  follow up calls to the following facility's:   - [facility], left message for Admissions to call in regards to acceptance  - [facility] (Ade), left message for Admissions to call in regards to acceptance.    SW phoned pt's daughter (Allyson) and updated regarding the above.  SW reviewed list of add'l local facility's that are 4/5 or 5/5 rated and discussed locations and based Allyson's location preferences, SW made add'l referrals.    RACHEL made add'l referrals to the following facility's:  - [facility]  - [facility]  - [facility]  - [facility].     SW will continue to follow for discharge planning.    CÉSAR Bailey  Social Work, 6A  Phone:  [phone]  Pager:  [phone]  8/23/2022

## 2022-08-23 NOTE — PROGRESS NOTES
[facility]    Medicine Progress Note - Hospitalist Service, GOLD TEAM 6    Date of Admission:  6/22/2022    Assessment & Plan        Louise Benítez is a 76 year old female with history of seronegative RA, MDS, PAD, and osteoporosis who was admitted 6/22/22 for traumatic SDH with midline shift s/p emergent craniotomy and evacuation. Hospital course c/b non-convulsive status epilepticus, GIB s/p EGD and clipping of gastric ulcer (7/5), acute blood loss anemia, aspiration s/p PEG placement (7/15), and further decompensation on 8/3/22 with acute hypoxic respiratory failure and sepsis of unknown etiology. Transitioned to comfort measures only on 8/4/22 per goals of care discussion with family.    Plan for today:  - SW continues to work on placement, either LTC hospice vs home hospice.       Medical Issues Addressed this Hospitalization: See notes 8/4 and prior for further details     [facility]  Sepsis of Unknown Etiology - Patient with aspiration event and decompensation 8/3, 8/4, 8/5. Family elected not to workup or treat infection, thus source not determined. Placed on comfort cares while pursuing hospice vs home care options.      SDH with Midline shift s/p Emergent Craniotomy with SDH Evacuation (6/22/22) c/b Nonconvulsive Status Epilepticus (vEEG 7/8) - Initial fall w/ SDH 8/12. Admitted to neuro ICU 5/12-5/20. Discharged to TCU. Returned home from TCU and was doing well Then had mechanical fall 6/22. Head CT noted new SDH w/ midline shift. Subsequently underwent L craniotomy for SDH evacuation. Head CT 7/22 stable from 7/14, no explanations for febrile spikes. CT head 7/28 with stable fluid collection and associated R to L midline shift.     Acute Blood Loss Anemia on Chronic Microcytic Anemia  Gastric Ulcers c/b GIB - S/p EGD 7/5 with clipping of 5mm ulcers in posterior gastric wall. Bloody stool with associated Hgb drop 7/17. Transfused 1U RBC. Colonoscopy 7/19 noted  ulcers at splenic flexure that were likely source of bleeding but stopped bleeding on their own. Hgb initially improved after colonoscopy but acutely declined on 7/23. Lovenox held but no evidence of recurrent GIB.     Mild Transaminitis      Non-severe protein calorie malnutrition  Dysphagia     Sepsis 2/2 Cellulitis at G tube Insertion Site (placed by IR 7/15) - See notes 7/31 and prior for details. On 7/23, RN reported redness and purulent discharge around PEG insertion site. CT AP 7/24 noted inflammation without isolated fluid collection. Cx +for MSSA. Completed course of abx 7/31     Myelodysplasic syndrome   HTN  Seronegative RA  Osteoporosis  HLD, PAD  Loose stools  Aspiration s/p IR G tube placement (7/15), resolved: See notes 7/31 and prior for details   Rhinovirus        Diet: Snacks/Supplements Adult: Ensure Clear; Between Meals  Regular Diet Adult    DVT Prophylaxis: None  Vasquez Catheter: Not present  Central Lines: None  Cardiac Monitoring: None  Code Status: No CPR- Do NOT Intubate      Disposition Plan      Expected Discharge Date: 08/24/2022    Discharge Delays: Comfort Care/Hospice  Placement - Homecare    Discharge Comments: 8/23- On comfort cares; awaiting hospice vs. home cares placement        The patient's care was discussed with the Attending Physician, Dr. Lange.    Gene Hooper PA-C  Hospitalist Service, GOLD TEAM [address] [place] [address]  Securely message with the Vocera Web Console (learn more here)  Text page via [facility] Paging/Directory   Please see signed in provider for up to date coverage information  ___________________________________________________________    Interval History   No acute events overnight. Currently sitting in bedside chair. Appears sleepy. Does not respond to most questions.     Data reviewed today: I reviewed all medications, new labs and imaging results over the last 24 hours.    Physical Exam   Vital Signs:            Resp: 12        Weight: 146 lbs 2.64 oz  GEN: In NAD        Data   CMPNo lab results found in last 7 days.  CBCNo lab results found in last 7 days.  INRNo lab results found in last 7 days.  Arterial Blood GasNo lab results found in last 7 days.

## 2022-08-23 NOTE — PLAN OF CARE
Reason for Admission: ICH    Status: No change    RN assumed cares at 1500     Pain: No signs of pain or distress  Neuro: Disoriented to place, time, situation - minimal verbalization  Cardiac: WDL - no sign of chest pain  Respiratory: RA, no sign of SOB  GI/: No BM on shift; purewick in place  IV/Drains: No PIV  Activity: Assist x 2 w/lift - pt sat in chair on shift  Skin: WDL    Plan of Care: Proceed w/POC

## 2022-08-23 NOTE — PROGRESS NOTES
Entered in error.    CÉSAR Bailey  Social Work, 6A  Phone:  [phone]  Pager:  [phone]  8/23/2022

## 2022-08-23 NOTE — PLAN OF CARE
Status: S/p craniotomy, now on comfort cares  Neuros: Intermittently communicates and gives yes/no answers, moves all extremities  IV: No PIV  Resp/trach: RA  Diet: Reg, 1:1 feeder  Bowel status: No BM NOC, last BM 8/22  : Voiding, purewick in place, good output  Skin: WDL  Pain: No signs of pain  Activity: Turn repo q2h  Social: Calm and cooperative  Plan: Awaiting LTC hospice  Updates this shift: Pt seemed to rest well in between cares

## 2022-08-24 NOTE — PLAN OF CARE
"Status: S/p craniotomy. Now Comfort Cares  Neuros: intermittently yes/no answers, inconsistently, LUCAS  Resp/trach: WDL  Diet: regular. 1:1 feeder, no appetite this shift, no po.  PEG in place for meds.   Bowel status: LBM 8/22  : incontinent, purewick in place, deborah in color  Skin: WDL  Pain: no s/s  Activity: turn/repo Q2hrs  Plan: planning for home hospice.   1350 :Update this shift: Ride via stretcher transport, on 8/25, 10:30am, home with hospice  1511: Update this shift.  Call received from Barbara Reynoso, Daughter, Her phone number: [phone]. Expressed many worries about 8/25, 10:30am ride home, she stated that the \"family is not ready, and we are in a panic about this\".  Lilian and Yo, from , were notified about this family's concerns.      "

## 2022-08-24 NOTE — PROGRESS NOTES
Care Management Follow Up    Length of Stay (days): 63    Expected Discharge Date: 08/25/2022     Concerns to be Addressed:  Disposition planning     Patient plan of care discussed at interdisciplinary rounds: Yes    Anticipated Discharge Disposition:  Home with hospice     Anticipated Discharge Services:  Home with hospice  Anticipated Discharge DME:  See previous SW entry documented on 8/24/2022    Patient/family educated on Medicare website which has current facility and service quality ratings:  yes  Education Provided on the Discharge Plan:  yes  Patient/Family in Agreement with the Plan:  Pt's daughter (Allyson) is agreeable to the discharge plan, however, daughter does not want pt discharged until 8/26/2022.      Referrals Placed by CM/SW:  Post acute care facility's  Private pay costs discussed: daughter plans to hire [employer] to provide private pay extended hours    Additional Information:  RACHEL received a call from pt's daughter (Allyson) stating that she is not prepared to take pt home on 8/25/2022.  Daughter states that she needs time to purchase food, purchase sheets for the bed, to move furniture to accommodate the hospital bed and adds that she has a class (to obtain a certification) from 9am to 3pm tomorrow.  When asked, Allyson states that none of the family members are available to assist until 8/26/2022.  Allyson states that they are prepared to take pt home on 8/26/2022.  RACHEL informed Allyson that pt is medically ready for discharge and a discharge plan is in place.  RACHEL spoke with Allyson about appealing the discharge to medicare as pt would likely become private pay at the hospital if an appeal is not initiated.   At 3:52pm, RACHEL received a call from Allyson stating that she phoned [employer] and is appealing the discharge.  Gene NEWTON has been updated.  RACHEL updated 6A nursing (floor and charge nurse).  RACHEL updated [facility] ([facility]).  Pt's daughter states that [facility] told her that pt's  peg tube needs to be removed.  RACHEL phoned [facility] ([phone]) and spoke with nurse (Shona).  Shona states that they are a non skilled agency.  Shona states that as they are non skilled they have no involvement with the peg tube.  Shona states that whether or not pt comes home with the peg tube makes no difference to them they just want it known that they have no involvement with the peg tube.    Medication administration through the peg tube will need to be managed by pt's family with support from [facility].    RACHEL will continue to follow for discharge planning.    CÉSAR Bailey  Social Work, 6A  Phone:  [phone]  Pager:  [phone]  8/24/2022          REBECCA Pearson

## 2022-08-24 NOTE — PLAN OF CARE
Status: S/p craniotomy, now on comfort cares  Neuros: Intermittently communicates and gives yes/no answers, moves all extremities  IV: No PIV  Resp/trach: RA  Diet: Reg, 1:1 feeder  Bowel status: No BM NOC, last BM 8/22  : Voiding, purewick in place, good output  Skin: WDL  Pain: No signs of pain  Activity: Turn repo q2h  Social: Calm and cooperative  Plan: Awaiting LTC hospice  Updates this shift: Pt seemed to rest well in between cares, last repo'd aorund [id number]

## 2022-08-24 NOTE — PROGRESS NOTES
Care Management Follow Up    Length of Stay (days): 63    Additional Information:  Discharge appeal have been filled by family.  RNCC contacted pt dtr. Allyson #[phone] and informed her that we have received the appeal notice and copy of detailed notice of discharge form have been placed in pt room for pt family.  Allyson agreed.  Unit SW will continue to follow up.     Kyle Johnston RN, PHN, BSN  4A and 4E/ ICU  Care Coordinator  Phone: [phone]  Pager: [phone]    To contact the weekend RNCC  [place] (0800 - 1630) Saturday and Sunday    Units: 4A, 4C, 4E, 5A and 5B- Pager 1: [phone]    Units: 6A, 6B, 6C, 6D- Pager 2: [phone]    Units: 7A, 7B, 7C, 7D, and 5C-Pager 3: [phone]

## 2022-08-24 NOTE — PROGRESS NOTES
Care Management Discharge Note    Discharge Date: 08/25/2022       Discharge Disposition:  To daughters (Allyson) home on hospice. Daughters home address is [address], [place]    Discharge Services:  Contracted home hospice services    Discharge DME:  Hospital bed, bed side table, Depends, incontinence pad, w/c and aurora lift    Discharge Transportation: RACHEL spoke with patients floor nurse Kaylyn, who indicates that patient is unable to tolorate sitting up for the duration of the ride. RACHEL arranged for [facility] ([employer] [phone]) to provide stretcher transport at 10:30am.    Private pay costs discussed: RACHEL informed patients daughter (Allyson) that the cost of transport would be private pay if not covered by patients insurance.   PAS Confirmation Code:  N/A    Patient/family educated on Medicare website which has current facility and service quality ratings:  yes    Education Provided on the Discharge Plan:  yes  Persons Notified of Discharge Plans: Patient, daughter (Allyson), 6A nursing, Bobo CLARK  Patient/Family in Agreement with the Plan:  yes    Handoff Referral Completed: Yes    Additional Information:  - [facility] CLARK has confirmed readiness for discharge  - Patients daughter Allyson has confirmed wanting to take patient home on hospice  - Allyson is independently arranging for private pay extended hours through [employer]  - RACHEL has made arrangements for [facility] ([facility] [phone]) to provide contracted hospice services in the home. [facility] is arranging for above DME to be delivered to [place] home on 8/24 by 6pm. [facility] has arranged for home hospice nurse arrival to take place on 8/25 at 11am.   - RACHEL completed IMM via phone call to cecilio Valadez  - RACHEL completed physican certification for transportation  - RACHEL will fax completed discharge orders and discharge summary to [facility] when documents become available    Yo Killian, BSW, LSW

## 2022-08-24 NOTE — PROGRESS NOTES
"Care Management Follow Up    Length of Stay (days): 63    Expected Discharge Date:  8/24/2022     Concerns to be Addressed:   Disposition planning    Patient plan of care discussed at interdisciplinary rounds: Yes    Anticipated Discharge Disposition:  Home on hospice     Anticipated Discharge Services:  Home on hospice  Anticipated Discharge DME:  Hospital bed, bed side table, Depends, incontinence pad, w/c and aurora lift    Patient/family educated on Medicare website which has current facility and service quality ratings:   yes  Education Provided on the Discharge Plan:  yes  Patient/Family in Agreement with the Plan:  yes    Referrals Placed by CM/SW:  Post acute care facility's  Private pay costs discussed: Pt's daughter is arranging for [facility] to provide private pay extended hours    Additional Information:  SW is following pt for discharge planning.  Hospice is being pursued.  Pt remains medically ready for discharge.  RACHEL received a voice mail message from pt's daughter (Allyson) stating that she wants to take pt to her home on hospice.  Allyson state that she as well as pt's  and daughter Maria Del Carmen will be pt's caregivers.  Allyson also states that they are hiring \"[facility]\" to provide private pay extended hours (this arrangement is being made by Allyson).  Allyson's home address is [address] [place] [address].  There is one step to enter Allyson's home and pt's room will be on the main floor.  Allyson states that she would like to contract with [facility] for hospice services.   DME was discussed the the following DME is desired:  Hospital bed, bedside table  RACHEL phoned [facility] and spoke with Alma Rosa Villar ([phone]).  RACHEL referred pt and faxed requested referral information.  Alma Rosa states that she will phone pt's daughter to coordinate DME delivery and to scheduled initial home hospice visit.  Alma Rosa will then phone this SW to inform of hospice start date and time at which " time, SW will make transportation arrangements.  Gene RESENDIZ Has been updated.   Pt's discharge medications will be filled at [facility] (5 day supply) as per [facility] request).      SW will continue to follow for discharge planning.    CÉSAR Bailey  Social Work, 6A  Phone:  [phone]  Pager:  [phone]  8/24/2022          REBECCA Pearson

## 2022-08-25 NOTE — PROGRESS NOTES
"Care Management Follow Up    Length of Stay (days): 64    Expected Discharge Date: 08/26/2022     Concerns to be Addressed:     Disposition planning  Patient plan of care discussed at interdisciplinary rounds: Yes    Anticipated Discharge Disposition:  To daughters (Allyson) home on hospice. Daughters home address is [address], [place]     Anticipated Discharge Services:  Home on hospice  Anticipated Discharge DME:  Hospital bed, bed side table, Depends, incontinence pad, w/c and aurora lift    Patient/family educated on Medicare website which has current facility and service quality ratings:   Yes  Education Provided on the Discharge Plan:  Yes  Patient/Family in Agreement with the Plan:  Daughter has initiated Medicare Appeal of Discharge    Referrals Placed by CM/SW:  Post acute care facility's  Private pay costs discussed: Daughter (Allyson) is independently arranging for \"[facility]\" to provide private pay extended hours    Additional Information:  RACHEL received notice that pt's daughter (Allyson) initiated Medicare appeal of discharge evening of 8/24/2022.  RACHEL phoned [facility] ([facility]imitri) and cancelled stretcher transport that was previously scheduled to take place on 8/25/2022 at 10:30am.  RACHEL rescheduled transport to take place on 8/26/2022 at 10:30am pending outcome of Medicare appeal.  RACHEL updated  [facility] Liaison (Alma Rosa Villar).  RACHEL phoned pt's daughter (Allyson) at 3:31pm.  Allyson states that DME was delivered to her home on 8/24/2022.  Allyson states that she has obtained food, sheets and mattress pad.  Allyson states that pt's room is set up.  Allyson states that she is prepared to accept pt home on 8/26/2022 but understands that the outcome of the Medicare appeal is still pending.  RACHEL informed Allyson that she has the right to withdraw the Medicare appeal if she so chooses by contacting [facility].      RACHEL will continue to follow for discharge planning.    CÉSAR Bailey  Social Work, 6A  Phone: "  [phone]  Pager:  [phone]  8/26/2022          REBECCA Pearson

## 2022-08-25 NOTE — PLAN OF CARE
Status: S/p craniotomy. On Comfort Cares  Neuros: intermittently answers yes/no   Resp/trach: WDL. On RA.  Diet: regular, 1:1 feeder, ate 25% of dinner. PEG in place for meds.   Bowel status: one loose BM this shift. LBM 8/24.  : incontinent, purewick in place, deborah in color  Skin: WDL  Pain: given PRN tylenol x1 in evening.  Activity: turn/repo Q2hrs with oral cares.  Plan: planning to discharge home with hospice. Was supposed to discharge today at 1030. This is currently on hold as family appealed the discharge.

## 2022-08-25 NOTE — PROGRESS NOTES
Prior Authorization Approval    Morphine 10mg/5ml soln  Date Initiated: 8/24/2022  Date Completed: 8/24/2022  Prior Auth Type: Clinical                Status: Approved    Effective Date: 01/01/2022 - 12/31/2022  Copay: 4.47     Filling Pharmacy: [facility] - [address]    Insurance: [employer] - Phone [phone] Fax [phone]  ID: [id number]  Case Number: [id number] / [id number]   Submitted Via: Terrell Richardson  [facility] Liaison  Ph: [phone] Pager: [phone]

## 2022-08-25 NOTE — PROGRESS NOTES
[facility]    Internal Medicine Progress Note - Hospitalist Service, [facility]    Hospital Day #64    Assessment & Plan   Louise Benítez is a 76 year old woman with a history of seronegative RA, MDS, PAD, and osteoporosis who was admitted 6/22/22 for traumatic SDH with midline shift s/p emergent craniotomy and evacuation. Hospital course c/b non-convulsive status epilepticus, GIB s/p EGD and clipping of gastric ulcer (7/5), acute blood loss anemia, aspiration s/p PEG placement (7/15), and further decompensation on 8/3/22 with acute hypoxic respiratory failure and sepsis of unknown etiology. Transitioned to comfort measures only on 8/4/22 per goals of care discussion with family. Awaiting home hospice discharge once family is ready to accept.     #SDH w/ Midline Shift s/p Emergent Craniotomy w/ Evacuation c/b Non Convulsive Status Epilepticus.   #Acute Hypoxic Respiratory Failure.   #Sepsis of Unknown Etiology.   #Non Severe Protein Calorie Malnutrition.   #Dysphagia and Aspiration Risk.   Please see prior notes for details of this hospitalization. Transitioned to comfort measures on 8/4/22 and has been awaiting placement. Patient is currently awake and appearing comfortable with no PRN med usage.   - Continue Keppra for seizure prophylaxis.   - Await discharge plan.     Diet: Snacks/Supplements Adult: Ensure Clear; Between Meals  Regular Diet Adult  Diet  Fluids: PO  DVT Prophylaxis: none  Code Status: No CPR- Do NOT Intubate    Disposition Plan   Disposition Plan   Expected discharge 8/26/22 to home with hospice once discharge appeal is settled/daughter ready to take home.     Entered: EDUARDO Carrillo 08/25/2022, 11:16 AM       Nemo Sawyer PA-C  Hospitalist Service, [address]   Contact information available via [facility] Paging/Directory  Please see sign in/sign out for up to date coverage information    Interval  "History   Ade is awake during visit. She is watching state fair coverage on television but states she never really liked the state fair. Denies pain.     Physical Exam   BP (!) 137/34 (BP Location: Left arm)   Pulse 88   Temp 97.7  F (36.5  C) (Oral)   Resp 20   Ht 1.626 m (5' 4.02\")   Wt 66.3 kg (146 lb 2.6 oz)   SpO2 96%   BMI 25.08 kg/m       Lying in bed, alert, appears comfortable, offers slow responses to questions.   Extremities warm and well perfused.     Lines/Tubes/Drains  PEG                      "

## 2022-08-26 NOTE — DISCHARGE SUMMARY
"[facility]    Internal Medicine Discharge Summary - Hospitalist Service, Gold 6    Date of Admission: 6/22/2022  Date of Discharge: 8/26/2022  Discharging Provider: Nemo Sawyer PA-C/Lorenzo Palacios MD  Discharging Team: [facility] 6    Discharge Diagnoses   #SDH w/ Midline Shift s/p Emergent Craniotomy w/ Evacuation c/b Non Convulsive Status Epilepticus  #Acute Hypoxic Respiratory Failure, resolved  #Sepsis of Unknown Etiology, resolved  #Non Severe Protein Calorie Malnutrition   #Dysphagia and Aspiration Risk  #Acute Blood Loss Anemia on Chronic Microcytic Anemia  #Bleeding Gastric Ulcers s/p Clipping  #Sepsis 2/2 Cellulitis at PEG Site, resolved  #Myelodysplastic Syndrome  #Seronegative Rheumatoid Arthritis   #HTN  #HLD  #PAD  #Osteoporosis   #Rhinovirus, resolved    Hospital Course   Louise Benítez is a 76 year old woman with a history of seronegative RA, MDS, PAD, HTN, HLD, and osteoporosis who was admitted 6/22/22 for traumatic SDH with midline shift s/p emergent craniotomy and evacuation (6/22/22). Hospital course c/b non-convulsive status epilepticus, GIB s/p EGD and clipping of gastric ulcer (7/5), acute blood loss anemia, aspiration s/p PEG placement (7/15), sepsis 2/2 cellulitis at PEG site (completed abx 7/31), and further decompensation on 8/3/22 with acute hypoxic respiratory failure and sepsis of unknown etiology. Transitioned to comfort measures only on 8/4/22 per goals of care discussion with family. Since that time has been comfortable without much PRN medication usage. Is minimally interactive with staff. Have been awaiting hospice discharge. Today will be discharging home on hospice with 24/7 support from family.     Consultations This Hospital Stay   Neurosurgery, Neurology, IR, GI, Palliative Care    Physical Exam   Blood pressure (!) 137/34, pulse 88, temperature 97.7  F (36.5  C), temperature source Oral, resp. rate 20, height 1.626 m (5' 4.02\"), weight " 66.3 kg (146 lb 2.6 oz), SpO2 96 %, not currently breastfeeding.  GENERAL: Lying in bed, awake, alert, appears comfortable. Does not verbally respond to questions.   CV: RRR. S1, S2. No murmurs appreciated.   RESPIRATORY: Effort normal on room air. Lungs CTAB with no wheezing, rales, rhonchi.   GI: Abdomen soft, non distended, non tender.   EXTREMITIES: Warm and well perfused.     Significant Results and Procedures   Emergent craniotomy and evacuation 6/22/22  EGD with clipping of gastric ulcer 7/5/22  IR PEG placement 7/15/22    Pending Results   None    Primary Care Physician   Demetria James    Discharge Disposition   Discharged to home with hospice and 24/7 family support  Condition at discharge: Stable    Code Status   No CPR- Do NOT Intubate    Discharge Orders      Primary Care - Care Coordination Referral      Discharge Instructions    If questions or problems arise regarding tube function (e.g. leaking, dislodges, etc.) Contact Interventional Radiology department 24 hours a day.    For procedures that were done at the [facility] sites,   8:00-4:30 PM Monday through Friday    Contact:[phone].    For afterhours and weekends call the [facility] main phone line [phone] and ask for the [facility] IR on call physician number.    If DIRECTED by the RADIOLOGIST, related to specific problems with the tube functioning,  go to the Emergency Department.     Activity    Your activity upon discharge: activity as tolerated     Follow Up ([facility])    Follow up with primary care provider, Demetria James, within 7 days for hospital follow- up.  No follow up labs or test are needed.      Appointments on [facility] and/or [facility] (with [facility] or [facility] provider or service). Call [phone] if you haven't heard regarding these appointments within 7 days of discharge.     Reason for your hospital stay    Subdural hemorrhage     Diet    Follow this diet upon discharge: Orders Placed This Encounter       Snacks/Supplements Adult: Ensure Clear; Between Meals      Regular Diet Adult       Discharge Medications   Discharge Medication List as of 8/26/2022  9:37 AM      START taking these medications    Details   acetaminophen (TYLENOL) 325 MG/10.15ML solution 20.3 mLs (650 mg) by Per G Tube route every 4 hours as needed for mild pain or fever, Disp-118 mL, R-0, E-Prescribe      atropine 1 % ophthalmic solution Place 2 drops under the tongue every 4 hours as needed for secretions, Disp-15 mL, R-0, E-Prescribe      carboxymethylcellulose PF (REFRESH PLUS) 0.5 % ophthalmic solution Place 1-2 drops into both eyes every hour as needed for dry eyes, Disp-30 each, R-0, E-Prescribe      haloperidol (HALDOL) 2 MG/ML (HIGH CONC) solution 0.5-1 mLs (1-2 mg) by Per G Tube route every 6 hours as needed for agitation, Disp-15 mL, R-0, E-Prescribe      levETIRAcetam (KEPPRA) 1000 MG tablet 1 tablet (1,000 mg) by Oral or Feeding Tube route 2 times daily, Disp-60 tablet, R-0, E-Prescribe      LORazepam (ATIVAN) 2 MG/ML (HIGH CONC) oral solution Place 0.5 mLs (1 mg) under the tongue every 3 hours as needed for anxiety, Disp-30 mL, R-0, E-Prescribe      melatonin 5 MG tablet Take 1 tablet (5 mg) by mouth nightly as needed for sleep, Disp-30 tablet, R-0, E-Prescribe      morphine 10 MG/5ML solution 2.5-5 mLs (5-10 mg) by Per G Tube route every hour as needed for moderate to severe pain (dyspnea, respiratory distress RR>24), Disp-100 mL, R-0, E-Prescribe      ondansetron (ZOFRAN ODT) 4 MG ODT tab Take 1 tablet (4 mg) by mouth every 6 hours as needed for nausea or vomiting, Disp-10 tablet, R-0, E-Prescribe      sennosides (SENOKOT) 8.8 MG/5ML syrup 5-10 mLs by Per G Tube route 2 times daily as needed for constipation, Disp-236 mL, R-0, E-Prescribe         STOP taking these medications       acetaminophen (TYLENOL) 325 MG tablet Comments:   Reason for Stopping:         baclofen (LIORESAL) 10 MG tablet Comments:   Reason for Stopping:          celecoxib (CELEBREX) 200 MG capsule Comments:   Reason for Stopping:         cholecalciferol, vitamin D3, 50,000 unit Tab Comments:   Reason for Stopping:         darbepoetin anthony (ARANESP) 300 MCG/0.6ML injection Comments:   Reason for Stopping:         hydroxychloroquine (PLAQUENIL) 200 MG tablet Comments:   Reason for Stopping:         multivitamin w/minerals (THERA-VIT-M) tablet Comments:   Reason for Stopping:         mupirocin (BACTROBAN) 2 % external ointment Comments:   Reason for Stopping:         oxyCODONE (ROXICODONE) 5 MG tablet Comments:   Reason for Stopping:         polyethylene glycol (MIRALAX) 17 GM/Dose powder Comments:   Reason for Stopping:         senna-docusate (SENOKOT-S/PERICOLACE) 8.6-50 MG tablet Comments:   Reason for Stopping:         vitamin B-12 (CYANOCOBALAMIN) 500 MCG tablet Comments:   Reason for Stopping:               Time Spent on this Encounter   35 minutes spent in discharge     Patient was evaluated on day of discharge by attending physician who agrees with plan of care.     Nemo Sawyer PA-C  Hospitalist Service  [facility]  Pager: [phone]

## 2022-08-26 NOTE — DISCHARGE INSTRUCTIONS
You have arranged for [facility] to provide private pay extended hours.    [facility] ([phone]) to provide home hospice and will initiate services on 8/26/2022 at 1:30pm.  [facility] has arranged for medical equipment to be delivered to your home.

## 2022-08-26 NOTE — PROGRESS NOTES
Care Management Discharge Note     Discharge Date: 08/26/2022        Discharge Disposition:  To daughters (Allyson) home on hospice. Daughters home address is [address] [place], [place]     Discharge Services:  Contracted home hospice services     Discharge DME:  Hospital bed, bed side table, Depends, incontinence pad, w/c and aurora lift     Discharge Transportation: RACHEL spoke with patients floor nurse Kaylyn, who indicates that patient is unable to tolorate sitting up for the duration of the ride. RACHEL arranged for [facility] ([facility]imitri [phone]) to provide stretcher transport at 10:30am.     Private pay costs discussed: RACHEL informed patients daughter (Allyson) that the cost of transport would be private pay if not covered by patients insurance.   PAS Confirmation Code:  N/A     Patient/family educated on Medicare website which has current facility and service quality ratings:  yes     Education Provided on the Discharge Plan:  yes  Persons Notified of Discharge Plans: Patient, daughter (Allyson), 6A nursing, Dr. Lorenzo Palacios  Patient/Family in Agreement with the Plan:  yes     Handoff Referral Completed: Yes     Additional Information:  - Dr. Lorenzo Palacios has confirmed readiness for discharge  - Patients daughter Allyson has confirmed wanting to take patient home on hospice  - Allyson confirms that she has independently arranged for private pay extended hours through [facility]  - RACHEL has made arrangements for [facility] (Alma Rosa [phone]) to provide contracted hospice services in the home. Alma Rosa arranged for DME to be delivered to pt's daughters home on 8/24/2022. Alma Rosa has arranged for home hospice nurse arrival to take place on 8/26 at 1:30pm  - RACHLE will fax completed discharge orders and discharge summary to [facility].  CÉSAR Beltrán  Social Work, 6A  Phone:  [phone]  Pager:  [phone]  8/26/2022

## 2022-08-26 NOTE — PLAN OF CARE
Pt on comfort cares. Minimally verbal. No s/s of pain and denies when asked. Reposition q2hr w/ oral cares. Incontinent of bowel and bladder, no BM this shift. Regular diet, few sips of juice but refused breakfast. Peg tube clamped for medications. Continue to monitor and follow POC.       Update: Patient discharged to home on hospice via EMS stretcher at 1045 this morning. Medications retrieved from pharmacy and sent with patient along with AVS and face sheet.

## 2022-08-26 NOTE — PLAN OF CARE
Pt on comfort cares.  Awake majority of night, calm but intermittently swinging legs outside of bed.  Repositioned and oral cares performed q2h.  No s/s or reports of pain.  Pt minimally verbal, usually not able to make needs known.  On a regular diet for comfort; tooks sips of water when offered.  PEG clamped; using for meds.  Purewick in place for urine incontinence.  Last BM 8/25; incontinent.  Up with a lift and assist of 2.  Possible discharge home with family on hospice today.  Continue with POC.            Goal Outcome Evaluation:    Plan of Care Reviewed With: patient     Overall Patient Progress: no change

## 2022-08-26 NOTE — PROGRESS NOTES
"Care Management Follow Up     Length of Stay (days): 65     Expected Discharge Date: 08/26/2022     Concerns to be Addressed:     Disposition planning  Patient plan of care discussed at interdisciplinary rounds: Yes     Anticipated Discharge Disposition:  To daughters (Allyson) home on hospice. Daughters home address is [address] [place] [address]     Anticipated Discharge Services:  Home on hospice  Anticipated Discharge DME:  Hospital bed, bed side table, Depends, incontinence pad, w/c and aurora lift     Patient/family educated on Medicare website which has current facility and service quality ratings:   Yes  Education Provided on the Discharge Plan:  Yes  Patient/Family in Agreement with the Plan:  Yes     Referrals Placed by CM/SW:  Post acute care facility's  Private pay costs discussed: Daughter (Allyson) is independently arranging for \"[facility]\" to provide private pay extended hours     Additional Information:  RACHEL received a 8:35am call from pt's daughter (Allyson) stating that she wants pt to discharge to her home on hospice today with the 10:30am ride that was previously scheduled.   RACHEL has informed Allyson that [facility] has not yet communicated a decision regarding the appeal. Allyson states that she is \"ok with\" pt discharging home today without a decision from [facility].  RACHEL has advised Allyson to phone [facility] to withdraw the appeal request.  RACHEL spoke with Allyson again at 9:21am and Allyson confirmed that she phoned [facility] and left a message for representative to call in regards to Allyson's request to withdraw the appeal request.      RACHEL will coordinate discharge.    CÉSAR Bailey  Social Work, 6A  Phone:  [phone]  Pager:  [phone]  8/26/2022            RACHEL will continue to follow for discharge planning.    "

## 2022-08-26 NOTE — PLAN OF CARE
Status: S/p craniotomy. On Comfort Cares  Neuros: intermittently answers yes/no   Resp/trach: WDL. On RA.  Diet: regular, 1:1 feeder, ate 25% of dinner. PEG in place for meds.   Bowel status: Small BM this shift. LBM 8/24. Emesis x1 on days  : incontinent, purewick in place, deborah in color  Skin: WDL  Pain: given PRN tylenol x1 in evening.  Activity: turn/repo Q2hrs with oral cares.  Plan: planning to discharge home with hospice 8/26. Was supposed to discharge today at 1030. This is currently on hold as family appealed the discharge.

## 2022-08-26 NOTE — PLAN OF CARE
Status: S/p craniotomy. On Comfort Cares  Neuros: intermittently answers yes/no   Resp/trach: WDL. On RA.  Diet: regular, 1:1 feeder, ate 25% of dinner. PEG in place for meds.   Bowel status: No BM this shift. LBM 8/24. Emesis x1.  : incontinent, purewick in place, deborah in color  Skin: WDL  Pain: given PRN tylenol x1 in evening.  Activity: turn/repo Q2hrs with oral cares.  Plan: planning to discharge home with hospice 8/26. Was supposed to discharge today at 1030. This is currently on hold as family appealed the discharge.

## 2022-08-29 NOTE — PROGRESS NOTES
General acute hospital    Background: Transitional Care Management program auto-identified and prompting a chart review by [facility] team.    Assessment: Upon chart review, CCRC Team member will cancel/close this episode of Transitional Care Management program due to reason below:    General acute hospital    Background: Transitional Care Management program auto-identified and prompting a chart review by General acute hospital team.    Assessment: Upon chart review, CCRC Team member will cancel/close this episode of Transitional Care Management program due to reason below:    Patient has been discharged with Hospice Care    Plan: Transitional Care Management episode closed per reason above.      [facility]    *Connected Care Resource Team does NOT follow patient ongoing. Referrals are identified based on internal discharge reports and the outreach is to ensure patient has an understanding of their discharge instructions.General acute hospital    Background: Transitional Care Management program auto-identified and prompting a chart review by General acute hospital team.    Assessment: Upon chart review, CCRC Team member will cancel/close this episode of Transitional Care Management program due to reason below:    Patient has been discharged with Hospice Care    Plan: Transitional Care Management episode closed per reason above.      [facility]    *Connected Care Resource Team does NOT follow patient ongoing. Referrals are identified based on internal discharge reports and the outreach is to ensure patient has an understanding of their discharge instructions.    Plan: Transitional Care Management episode closed per reason above.      [facility]    *Connected Care Resource Team does NOT follow  patient ongoing. Referrals are identified based on internal discharge reports and the outreach is to ensure patient has an understanding of their discharge instructions.

## 2025-01-24 NOTE — TELEPHONE ENCOUNTER
Agree with orders .    Demetria James MD     Nausea / Vomiting    Nausea is the feeling that you have to vomit. As nausea gets worse, it can lead to vomiting. Vomiting puts you at an increased risk for dehydration. Older adults and people with other diseases or a weak immune system are at higher risk for dehydration. Drink clear fluids in small but frequent amounts as tolerated. Eat bland, easy-to-digest foods in small amounts as tolerated.    SEEK IMMEDIATE MEDICAL CARE IF YOU HAVE ANY OF THE FOLLOWING SYMPTOMS: fever, inability to keep sufficient fluids down, black or bloody vomitus, black or bloody stools, lightheadedness/dizziness, chest pain, severe headache, rash, shortness of breath, cold or clammy skin, confusion, pain with urination, or any signs of dehydration.     Abdominal Pain    Many things can cause abdominal pain. Many times, abdominal pain is not caused by a disease and will improve without treatment. Your health care provider will do a physical exam to determine if there is a dangerous cause of your pain; blood tests and imaging may help determine the cause of your pain. However, in many cases, no cause may be found and you may need further testing as an outpatient. Monitor your abdominal pain for any changes.     SEEK IMMEDIATE MEDICAL CARE IF YOU HAVE ANY OF THE FOLLOWING SYMPTOMS: worsening abdominal pain, uncontrollable vomiting, profuse diarrhea, inability to have bowel movements or pass gas, black or bloody stools, fever accompanying chest pain or back pain, or fainting. These symptoms may represent a serious problem that is an emergency. Do not wait to see if the symptoms will go away. Get medical help right away. Call 911 and do not drive yourself to the hospital.

## 2025-02-17 NOTE — PROGRESS NOTES
Acupuncture/TCM Follow-up/Progress Note  Beto Nath  2/17/2025  Reason for Treatment: Chemotherapy-induced neuropathy  (CMD)  (primary encounter diagnosis)    VISIT COUNT:   Visit Count: initial       Precautions:   Precautions: None   WBC (K/mcL)   Date Value   02/17/2025 3.5 (L)     RBC (mil/mcL)   Date Value   02/17/2025 3.06 (L)     HCT (%)   Date Value   02/17/2025 30.4 (L)     HGB (g/dL)   Date Value   02/17/2025 10.6 (L)     PLT (K/mcL)   Date Value   02/17/2025 152     Has patient had invasive needling procedure done in the last 24 hours?: No.    SUBJECTIVE:   Beto presents with neuropathy in his feet since November. He describes it as numb, tingling, swollen sensation and unable to flex toes. He states it is worse at night and when cold, felt across the bottoms of his feet. He states the neuropathy in his hands have improved, now only in the first three digits. He states his lower legs are constantly itching. He reports rectal and generalized joint pain. He reports constipation is almost controled with medication with occasional diarrhea. He reports daily nausea. He reports difficulty falling, staying and returning to sleep, stating he recently started a medication that is helping his sleep longer.     Post-Treatment Comments:  Different, the left foot is more tingling.     Integrative Oncology Outcomes:   Integrative Therapy Outcomes    Treatment Provided  Questions Answered By: Patient    Integrative Therapy Provided: Acupuncture  (Select the primary treatment provided)    Setting: Oncology Clinic  (Outpatient includes: all hospital and clinic-based outpatient)    Primary Complaint/Reason for Treatment: Neuropathy    Secondary Complaint/Reason for Treatment:: Pain      Pre-Treatment Assessment  Pain: 5  (Please rate your current pain on a scale of 0-10 (0 is no pain, 10 is the worst pain))    Neuropathy: 5  (Please rate your current neuropathy (numbness/tingling) on a scale of 0-10 (0 is no  [facility]    Medicine Progress Note - Hospitalist Service, GOLD TEAM 6    Date of Admission:  6/22/2022    Assessment & Plan        Louise Benítez is a 76 year old female with history of seronegative RA, MDS, PAD, and osteoporosis who was admitted 6/22/22 for traumatic SDH with midline shift s/p emergent craniotomy and evacuation. Hospital course c/b non-convulsive status epilepticus, GIB s/p EGD and clipping of gastric ulcer (7/5), acute blood loss anemia, aspiration s/p PEG placement (7/15), and further decompensation on 8/3/22 with acute hypoxic respiratory failure and sepsis of unknown etiology. Transitioned to comfort measures only on 8/4/22 per goals of care discussion with family.    Plan for today:  - No new concerns overnight; SW continues to work on placement, either LTC hospice vs home hospice.       Medical Issues Addressed this Hospitalization: See notes 8/4 and prior for further details     [facility]  Sepsis of Unknown Etiology - Patient with aspiration event and decompensation 8/3, 8/4, 8/5. Family elected not to workup or treat infection, thus source not determined. Placed on comfort cares while pursuing hospice vs home care options.      SDH with Midline shift s/p Emergent Craniotomy with SDH Evacuation (6/22/22) c/b Nonconvulsive Status Epilepticus (vEEG 7/8) - Initial fall w/ SDH 8/12. Admitted to neuro ICU 5/12-5/20. Discharged to TCU. Returned home from TCU and was doing well Then had mechanical fall 6/22. Head CT noted new SDH w/ midline shift. Subsequently underwent L craniotomy for SDH evacuation. Head CT 7/22 stable from 7/14, no explanations for febrile spikes. CT head 7/28 with stable fluid collection and associated R to L midline shift.     Acute Blood Loss Anemia on Chronic Microcytic Anemia  Gastric Ulcers c/b GIB - S/p EGD 7/5 with clipping of 5mm ulcers in posterior gastric wall. Bloody stool with associated Hgb drop 7/17. Transfused 1U RBC.  Colonoscopy 7/19 noted ulcers at splenic flexure that were likely source of bleeding but stopped bleeding on their own. Hgb initially improved after colonoscopy but acutely declined on 7/23. Lovenox held but no evidence of recurrent GIB.     Mild Transaminitis      Non-severe protein calorie malnutrition  Dysphagia     Sepsis 2/2 Cellulitis at G tube Insertion Site (placed by IR 7/15) - See notes 7/31 and prior for details. On 7/23, RN reported redness and purulent discharge around PEG insertion site. CT AP 7/24 noted inflammation without isolated fluid collection. Cx +for MSSA. Completed course of abx 7/31     Myelodysplasic syndrome   HTN  Seronegative RA  Osteoporosis  HLD, PAD  Loose stools  Aspiration s/p IR G tube placement (7/15), resolved: See notes 7/31 and prior for details   Rhinovirus        Diet: Snacks/Supplements Adult: Ensure Clear; Between Meals  Regular Diet Adult    DVT Prophylaxis: None  Vasquez Catheter: Not present  Central Lines: None  Cardiac Monitoring: None  Code Status: No CPR- Do NOT Intubate; On comfort cares    Disposition Plan     Expected Discharge Date: 08/24/2022    Discharge Delays: Comfort Care/Hospice  Placement - Homecare    Discharge Comments: 8/23- On comfort cares; awaiting hospice vs. home cares placement        The patient's care was discussed with the Attending Physician, Dr. Palacios.    Gene Hooper PA-C  Hospitalist Service, GOLD TEAM [address] [place] [address]  Securely message with the Vocera Web Console (learn more here)  Text page via [facility] Paging/Directory   Please see signed in provider for up to date coverage information  ___________________________________________________________    Interval History   No acute events overnight. Currently awake lying in bed. Appears more alert than yesterday but still not responding to most questions.     Data reviewed today: I reviewed all medications, new labs and imaging results over the  numbness/tingling, 10 is worst numbness/tingling))    Nausea: 0  (Please rate your current nausea on a scale 0-10 (0 is no nausea, 10 is worst nausea))    Stress: 0  (Please rate your current stress on a scale of 0-10 (0 is no stress, 10 is the worst stress))      Post-Treatment Assessment  Pain: 5  (What is your pain level after your treatment? Please rate it on a scale of 0-10 (0 is no pain, 10 is the worst pain))    Neuropathy: 4  (What is your numbness or tingling level after your treatment?  Please rate it on a scale of 0-10 (0 is no numbness/tingling, 10 is the worst numbness/tingling))    Nausea: 0  (What is your nausea level after your treatment?  Please rate it on a scale of 0-10 (0 is no nausea, 10 is the worst nausea))    Stress: 0  What is your stress level after your treatment? Please rate it on a scale of 0-10 (0 is no stress, 10 is the worst stress))      OBJECTIVE:   Physical Inspection: Observations/Measurements  Alert, pleasant, dry skin  Tongue:  Pink, dry, white coat    Pulses:  Left - wiry  Right - wiry    Traditional Chinese Medicine (TCM) Diagnosis:    Qi and blood deficiency   TCM DIAGNOSIS DOES NOT REFLECT WESTERN MEDICINE. ASK PRACTITIONER FOR MORE DETAILS.       TODAY'S ACUPUNCTURE/TCM TREATMENT:              TREATMENT PLAN  Acupuncture points/merdians:  Neuropathy POC:   ST 36, SP 6, BaFeng  3, LI 4, LV 3  Additional Points: SHENMEN, GB34, LI11       Acupuncture: # of needles used: 20    # of needles out: 20    Other Modalities Utilized:   TDP Lamp (31630) over feet to promote circulation and reduce pain. Patient response good.  Tui Na (67257) applied to feet.    Patient Education:  Yes. Mind-Body Exercises.  . Verbalizes understanding  Neuropathy cream  4oz carrier oil  48 drops of lavender  48 drops of frankincense  120 drops black pepper  Consented to trying topical application of the transdermal cream and  was made aware that black pepper is a rubefacient and has the potential to  irritate the skin. Advised on signs of sensitivity. If sensitivity or reaction occurs wash with soap and water immediately.      Peripheral neuropathy has been defined as a dysfunction of the sensory, motor, or autonomic nerves of the peripheral nervous system. Improve nerve function by regularly increasing circulation and flexibility with the following exercises at home:     Roll feet over tennis ball      Move! Incorporate regular walking, biking, yoga, or iona chi.     Soak hands and/or feet in warm espom salt bath      Discussed acupuncture and plan of care  Patient Verbalizes understanding of education.    ASSESSMENT OF TREATMENTS:     The patient has been compliant with TCM recommendations to resolve chief complaint  To date the patient has made good progress toward their goals.    Pt would continue to benefit from skilled acupuncture/TCM therapy to: increase range of motion, decrease pain, improve balance/proprioception, reduce neuropathy symptoms, alleviate nausea and improve digestion      GOALS:   Documented at time of initial eval and updated as needed    PLAN OF CARE:   Frequency/Duration  1x/wk for 4-6 wks  Continuation plan of care and goals were established with the patient who concurs.    Acupuncture/TCM Daily Billing:   Acupuncture CPT Codes reflect billing claims  Total Time per CPT Codes: 15 minutes.  [id number] = 15 minutes Acupuncture w/o E-stim     last 24 hours.    Physical Exam   Vital Signs:                    Weight: 146 lbs 2.64 oz  GEN: In NAD        Data   CMPNo lab results found in last 7 days.  CBCNo lab results found in last 7 days.  INRNo lab results found in last 7 days.  Arterial Blood GasNo lab results found in last 7 days.

## (undated) DEVICE — GLOVE PROTEXIS MICRO 7.5  2D73PM75

## (undated) DEVICE — DRAPE U SPLIT 74X120" 29440

## (undated) DEVICE — SPONGE SURGIFOAM 100 1974

## (undated) DEVICE — LABEL MEDICATION SYSTEM 3303-P

## (undated) DEVICE — SPONGE COTTONOID 1/2X1 1/2" 20-06S

## (undated) DEVICE — CLIP RANEY

## (undated) DEVICE — LINEN TOWEL PACK X30 5481

## (undated) DEVICE — PAD CHUX UNDERPAD 23X24" 7136

## (undated) DEVICE — ADH SKIN CLOSURE PREMIERPRO EXOFIN 1.0ML 3470

## (undated) DEVICE — DRAIN JACKSON PRATT RESERVOIR 100ML SU130-1305

## (undated) DEVICE — DRAPE SHEET HALF 40X60" 9358

## (undated) DEVICE — DRSG PRIMAPORE 03 1/8X6" 66000318

## (undated) DEVICE — PREP DURAPREP REMOVER 4OZ 8611

## (undated) DEVICE — SOL WATER IRRIG 1000ML BOTTLE 2F7114

## (undated) DEVICE — SPONGE COTTONOID 1X3" 20-10S

## (undated) DEVICE — ESU GROUND PAD ADULT W/CORD E7507

## (undated) DEVICE — PACK CRANIOTOMY

## (undated) DEVICE — PACK SET-UP STD 9102

## (undated) DEVICE — PERFORATOR CRANIAL DGR-O SURGICAL 11-14MM PEDS 200-271

## (undated) DEVICE — SU ETHILON 3-0 PS-1 18" 1663H

## (undated) DEVICE — WIPES FOLEY CARE SURESTEP PROVON DFC100

## (undated) DEVICE — BUR STRK 3.0MM TAPERED ROUTER - FA2 5407-FA2-030

## (undated) DEVICE — STPL SKIN 35W ROTATING HEAD PRW35

## (undated) DEVICE — SUCTION MANIFOLD NEPTUNE 2 SYS 4 PORT 0702-020-000

## (undated) DEVICE — DRAPE POUCH INSTRUMENT 1018

## (undated) DEVICE — GLOVE PROTEXIS BLUE W/NEU-THERA 7.5  2D73EB75

## (undated) DEVICE — STRAP UNIVERSAL POSITIONING 2-PIECE 4X47X76" 91-287

## (undated) DEVICE — DRAIN JACKSON PRATT 10FR ROUND SU130-1321

## (undated) DEVICE — LINEN TOWEL PACK X6 WHITE 5487

## (undated) DEVICE — SOL RINGERS LACTATED 1000ML BAG 07953-09

## (undated) DEVICE — CATH TRAY FOLEY SURESTEP 16FR W/URNE MTR STLK LATEX A303316A

## (undated) DEVICE — SYR 03ML LL W/O NDL 309657

## (undated) DEVICE — SU NUROLON 4-0 TF CR 8X18" C584D

## (undated) DEVICE — PREP CHLORAPREP CLEAR 3ML 930400

## (undated) DEVICE — DRSG PRIMAPORE 02X3" 7133

## (undated) DEVICE — DRSG TELFA 3X8" 1238

## (undated) DEVICE — PREP DURAPREP 26ML APL 8630

## (undated) DEVICE — SU VICRYL 2-0 CT-2 CR 8X18" J726D

## (undated) DEVICE — SPONGE COTTONOID 1/2X1/2" 20-04S

## (undated) DEVICE — BUR STRK 1.1MM STRAIGHT ROUTER 5820-070-011

## (undated) DEVICE — SU VICRYL 3-0 SH 8X18" UND J864D

## (undated) DEVICE — SURGICEL HEMOSTAT 4X8" 1952

## (undated) DEVICE — SYR 10ML LL W/O NDL 302995

## (undated) DEVICE — DRAPE POUCH IRR 1016

## (undated) DEVICE — ESU PENCIL SMOKE EVAC W/ROCKER SWITCH 0703-047-000

## (undated) RX ORDER — FENTANYL CITRATE 50 UG/ML
INJECTION, SOLUTION INTRAMUSCULAR; INTRAVENOUS
Status: DISPENSED
Start: 2022-01-01

## (undated) RX ORDER — BUPIVACAINE HYDROCHLORIDE 5 MG/ML
INJECTION, SOLUTION EPIDURAL; INTRACAUDAL
Status: DISPENSED
Start: 2022-01-01

## (undated) RX ORDER — HEPARIN SODIUM 1000 [USP'U]/ML
INJECTION, SOLUTION INTRAVENOUS; SUBCUTANEOUS
Status: DISPENSED
Start: 2022-01-01

## (undated) RX ORDER — LIDOCAINE HYDROCHLORIDE 10 MG/ML
INJECTION, SOLUTION EPIDURAL; INFILTRATION; INTRACAUDAL; PERINEURAL
Status: DISPENSED
Start: 2022-01-01

## (undated) RX ORDER — LIDOCAINE HYDROCHLORIDE 20 MG/ML
JELLY TOPICAL
Status: DISPENSED
Start: 2022-01-01

## (undated) RX ORDER — BUPIVACAINE HYDROCHLORIDE AND EPINEPHRINE 5; 5 MG/ML; UG/ML
INJECTION, SOLUTION PERINEURAL
Status: DISPENSED
Start: 2022-01-01